# Patient Record
Sex: MALE | Race: WHITE | Employment: OTHER | URBAN - METROPOLITAN AREA
[De-identification: names, ages, dates, MRNs, and addresses within clinical notes are randomized per-mention and may not be internally consistent; named-entity substitution may affect disease eponyms.]

---

## 2018-04-19 ENCOUNTER — TRANSFERRED RECORDS (OUTPATIENT)
Dept: HEALTH INFORMATION MANAGEMENT | Facility: CLINIC | Age: 68
End: 2018-04-19

## 2018-05-09 ENCOUNTER — TRANSFERRED RECORDS (OUTPATIENT)
Dept: HEALTH INFORMATION MANAGEMENT | Facility: CLINIC | Age: 68
End: 2018-05-09

## 2018-07-26 ENCOUNTER — TRANSFERRED RECORDS (OUTPATIENT)
Dept: HEALTH INFORMATION MANAGEMENT | Facility: CLINIC | Age: 68
End: 2018-07-26

## 2018-07-30 ENCOUNTER — TRANSFERRED RECORDS (OUTPATIENT)
Dept: HEALTH INFORMATION MANAGEMENT | Facility: CLINIC | Age: 68
End: 2018-07-30

## 2018-08-06 ENCOUNTER — APPOINTMENT (OUTPATIENT)
Dept: GENERAL RADIOLOGY | Facility: CLINIC | Age: 68
DRG: 219 | End: 2018-08-06
Attending: INTERNAL MEDICINE
Payer: MEDICARE

## 2018-08-06 ENCOUNTER — HOSPITAL ENCOUNTER (INPATIENT)
Facility: CLINIC | Age: 68
LOS: 36 days | Discharge: NURSING, REHAB OR LONG-TERM CARE | DRG: 219 | End: 2018-09-11
Attending: INTERNAL MEDICINE | Admitting: RADIOLOGY
Payer: MEDICARE

## 2018-08-06 ENCOUNTER — APPOINTMENT (OUTPATIENT)
Dept: CARDIOLOGY | Facility: CLINIC | Age: 68
DRG: 219 | End: 2018-08-06
Attending: INTERNAL MEDICINE
Payer: MEDICARE

## 2018-08-06 DIAGNOSIS — E03.9 HYPOTHYROIDISM, UNSPECIFIED TYPE: ICD-10-CM

## 2018-08-06 DIAGNOSIS — H04.123 DRY EYES: ICD-10-CM

## 2018-08-06 DIAGNOSIS — I48.0 PAROXYSMAL ATRIAL FIBRILLATION (H): ICD-10-CM

## 2018-08-06 DIAGNOSIS — Z95.4 S/P TVR (TRICUSPID VALVE REPLACEMENT): ICD-10-CM

## 2018-08-06 DIAGNOSIS — M62.830 BACK MUSCLE SPASM: ICD-10-CM

## 2018-08-06 DIAGNOSIS — I25.10 CORONARY ARTERY DISEASE INVOLVING NATIVE HEART WITHOUT ANGINA PECTORIS, UNSPECIFIED VESSEL OR LESION TYPE: ICD-10-CM

## 2018-08-06 DIAGNOSIS — D45 POLYCYTHEMIA VERA (H): ICD-10-CM

## 2018-08-06 DIAGNOSIS — J98.01 ACUTE BRONCHOSPASM: ICD-10-CM

## 2018-08-06 DIAGNOSIS — Z87.74 S/P VSD REPAIR: Primary | ICD-10-CM

## 2018-08-06 DIAGNOSIS — G89.18 ACUTE POST-OPERATIVE PAIN: ICD-10-CM

## 2018-08-06 LAB
ABO + RH BLD: NORMAL
ABO + RH BLD: NORMAL
ALBUMIN SERPL-MCNC: 2.3 G/DL (ref 3.4–5)
ALBUMIN SERPL-MCNC: 2.4 G/DL (ref 3.4–5)
ALBUMIN SERPL-MCNC: 2.6 G/DL (ref 3.4–5)
ALP SERPL-CCNC: 114 U/L (ref 40–150)
ALP SERPL-CCNC: 90 U/L (ref 40–150)
ALP SERPL-CCNC: 91 U/L (ref 40–150)
ALT SERPL W P-5'-P-CCNC: 23 U/L (ref 0–70)
ALT SERPL W P-5'-P-CCNC: 28 U/L (ref 0–70)
ALT SERPL W P-5'-P-CCNC: 30 U/L (ref 0–70)
ANION GAP SERPL CALCULATED.3IONS-SCNC: 10 MMOL/L (ref 3–14)
ANION GAP SERPL CALCULATED.3IONS-SCNC: 10 MMOL/L (ref 3–14)
ANION GAP SERPL CALCULATED.3IONS-SCNC: 13 MMOL/L (ref 3–14)
ANION GAP SERPL CALCULATED.3IONS-SCNC: 9 MMOL/L (ref 3–14)
AST SERPL W P-5'-P-CCNC: 77 U/L (ref 0–45)
AST SERPL W P-5'-P-CCNC: 86 U/L (ref 0–45)
AST SERPL W P-5'-P-CCNC: 95 U/L (ref 0–45)
BASE DEFICIT BLDV-SCNC: 4.5 MMOL/L
BASE DEFICIT BLDV-SCNC: 4.8 MMOL/L
BASE DEFICIT BLDV-SCNC: 5.1 MMOL/L
BASE DEFICIT BLDV-SCNC: 5.1 MMOL/L
BASE DEFICIT BLDV-SCNC: 5.6 MMOL/L
BASE DEFICIT BLDV-SCNC: 6.3 MMOL/L
BASE DEFICIT BLDV-SCNC: 6.5 MMOL/L
BASE DEFICIT BLDV-SCNC: 9.2 MMOL/L
BASOPHILS # BLD AUTO: 0 10E9/L (ref 0–0.2)
BASOPHILS NFR BLD AUTO: 0.1 %
BILIRUB DIRECT SERPL-MCNC: 0.1 MG/DL (ref 0–0.2)
BILIRUB SERPL-MCNC: 0.3 MG/DL (ref 0.2–1.3)
BILIRUB SERPL-MCNC: 0.4 MG/DL (ref 0.2–1.3)
BILIRUB SERPL-MCNC: 0.6 MG/DL (ref 0.2–1.3)
BLD GP AB SCN SERPL QL: NORMAL
BLOOD BANK CMNT PATIENT-IMP: NORMAL
BLOOD BANK CMNT PATIENT-IMP: NORMAL
BUN SERPL-MCNC: 15 MG/DL (ref 7–30)
BUN SERPL-MCNC: 15 MG/DL (ref 7–30)
BUN SERPL-MCNC: 17 MG/DL (ref 7–30)
BUN SERPL-MCNC: 22 MG/DL (ref 7–30)
CA-I BLD-MCNC: 4.1 MG/DL (ref 4.4–5.2)
CALCIUM SERPL-MCNC: 6.9 MG/DL (ref 8.5–10.1)
CALCIUM SERPL-MCNC: 7.1 MG/DL (ref 8.5–10.1)
CALCIUM SERPL-MCNC: 7.3 MG/DL (ref 8.5–10.1)
CALCIUM SERPL-MCNC: 7.6 MG/DL (ref 8.5–10.1)
CHLORIDE SERPL-SCNC: 102 MMOL/L (ref 94–109)
CHLORIDE SERPL-SCNC: 102 MMOL/L (ref 94–109)
CHLORIDE SERPL-SCNC: 104 MMOL/L (ref 94–109)
CHLORIDE SERPL-SCNC: 98 MMOL/L (ref 94–109)
CHOLEST SERPL-MCNC: 76 MG/DL
CO2 SERPL-SCNC: 16 MMOL/L (ref 20–32)
CO2 SERPL-SCNC: 17 MMOL/L (ref 20–32)
CO2 SERPL-SCNC: 19 MMOL/L (ref 20–32)
CO2 SERPL-SCNC: 21 MMOL/L (ref 20–32)
CREAT SERPL-MCNC: 0.91 MG/DL (ref 0.66–1.25)
CREAT SERPL-MCNC: 0.94 MG/DL (ref 0.66–1.25)
CREAT SERPL-MCNC: 0.96 MG/DL (ref 0.66–1.25)
CREAT SERPL-MCNC: 1.17 MG/DL (ref 0.66–1.25)
DIFFERENTIAL METHOD BLD: ABNORMAL
EOSINOPHIL # BLD AUTO: 0.1 10E9/L (ref 0–0.7)
EOSINOPHIL NFR BLD AUTO: 0.3 %
ERYTHROCYTE [DISTWIDTH] IN BLOOD BY AUTOMATED COUNT: 15.2 % (ref 10–15)
ERYTHROCYTE [DISTWIDTH] IN BLOOD BY AUTOMATED COUNT: 15.4 % (ref 10–15)
ERYTHROCYTE [DISTWIDTH] IN BLOOD BY AUTOMATED COUNT: 15.4 % (ref 10–15)
GFR SERPL CREATININE-BSD FRML MDRD: 62 ML/MIN/1.7M2
GFR SERPL CREATININE-BSD FRML MDRD: 78 ML/MIN/1.7M2
GFR SERPL CREATININE-BSD FRML MDRD: 79 ML/MIN/1.7M2
GFR SERPL CREATININE-BSD FRML MDRD: 83 ML/MIN/1.7M2
GLUCOSE BLDC GLUCOMTR-MCNC: 141 MG/DL (ref 70–99)
GLUCOSE SERPL-MCNC: 130 MG/DL (ref 70–99)
GLUCOSE SERPL-MCNC: 140 MG/DL (ref 70–99)
GLUCOSE SERPL-MCNC: 160 MG/DL (ref 70–99)
GLUCOSE SERPL-MCNC: 169 MG/DL (ref 70–99)
HBA1C MFR BLD: 6.3 % (ref 0–5.6)
HCO3 BLDV-SCNC: 18 MMOL/L (ref 21–28)
HCO3 BLDV-SCNC: 19 MMOL/L (ref 21–28)
HCO3 BLDV-SCNC: 20 MMOL/L (ref 21–28)
HCO3 BLDV-SCNC: 20 MMOL/L (ref 21–28)
HCT VFR BLD AUTO: 34.7 % (ref 40–53)
HCT VFR BLD AUTO: 34.8 % (ref 40–53)
HCT VFR BLD AUTO: 40.4 % (ref 40–53)
HDLC SERPL-MCNC: 30 MG/DL
HGB BLD-MCNC: 11.6 G/DL (ref 13.3–17.7)
HGB BLD-MCNC: 11.7 G/DL (ref 13.3–17.7)
HGB BLD-MCNC: 13.1 G/DL (ref 13.3–17.7)
IMM GRANULOCYTES # BLD: 0.1 10E9/L (ref 0–0.4)
IMM GRANULOCYTES NFR BLD: 0.4 %
INR PPP: 2.05 (ref 0.86–1.14)
LACTATE BLD-SCNC: 2.2 MMOL/L (ref 0.7–2)
LACTATE BLD-SCNC: 2.4 MMOL/L (ref 0.7–2)
LACTATE BLD-SCNC: 2.7 MMOL/L (ref 0.7–2)
LACTATE BLD-SCNC: 5.1 MMOL/L (ref 0.7–2)
LDLC SERPL CALC-MCNC: 34 MG/DL
LMWH PPP CHRO-ACNC: <0.1 IU/ML
LYMPHOCYTES # BLD AUTO: 1.5 10E9/L (ref 0.8–5.3)
LYMPHOCYTES NFR BLD AUTO: 6.7 %
MAGNESIUM SERPL-MCNC: 1.9 MG/DL (ref 1.6–2.3)
MAGNESIUM SERPL-MCNC: 2 MG/DL (ref 1.6–2.3)
MAGNESIUM SERPL-MCNC: 2.6 MG/DL (ref 1.6–2.3)
MAGNESIUM SERPL-MCNC: 2.7 MG/DL (ref 1.6–2.3)
MCH RBC QN AUTO: 30.4 PG (ref 26.5–33)
MCH RBC QN AUTO: 30.6 PG (ref 26.5–33)
MCH RBC QN AUTO: 30.8 PG (ref 26.5–33)
MCHC RBC AUTO-ENTMCNC: 32.4 G/DL (ref 31.5–36.5)
MCHC RBC AUTO-ENTMCNC: 33.4 G/DL (ref 31.5–36.5)
MCHC RBC AUTO-ENTMCNC: 33.6 G/DL (ref 31.5–36.5)
MCV RBC AUTO: 90 FL (ref 78–100)
MCV RBC AUTO: 92 FL (ref 78–100)
MCV RBC AUTO: 95 FL (ref 78–100)
MONOCYTES # BLD AUTO: 2.1 10E9/L (ref 0–1.3)
MONOCYTES NFR BLD AUTO: 9.4 %
MRSA DNA SPEC QL NAA+PROBE: NEGATIVE
NEUTROPHILS # BLD AUTO: 18.2 10E9/L (ref 1.6–8.3)
NEUTROPHILS NFR BLD AUTO: 83.1 %
NONHDLC SERPL-MCNC: 45 MG/DL
NRBC # BLD AUTO: 0 10*3/UL
NRBC BLD AUTO-RTO: 0 /100
O2/TOTAL GAS SETTING VFR VENT: ABNORMAL %
OXYHGB MFR BLDV: 43 %
OXYHGB MFR BLDV: 52 %
OXYHGB MFR BLDV: 53 %
OXYHGB MFR BLDV: 54 %
OXYHGB MFR BLDV: 55 %
OXYHGB MFR BLDV: 58 %
OXYHGB MFR BLDV: 91 %
PCO2 BLDV: 27 MM HG (ref 40–50)
PCO2 BLDV: 31 MM HG (ref 40–50)
PCO2 BLDV: 32 MM HG (ref 40–50)
PCO2 BLDV: 34 MM HG (ref 40–50)
PCO2 BLDV: 35 MM HG (ref 40–50)
PCO2 BLDV: 36 MM HG (ref 40–50)
PCO2 BLDV: 37 MM HG (ref 40–50)
PCO2 BLDV: 41 MM HG (ref 40–50)
PH BLDV: 7.24 PH (ref 7.32–7.43)
PH BLDV: 7.32 PH (ref 7.32–7.43)
PH BLDV: 7.36 PH (ref 7.32–7.43)
PH BLDV: 7.37 PH (ref 7.32–7.43)
PH BLDV: 7.39 PH (ref 7.32–7.43)
PH BLDV: 7.43 PH (ref 7.32–7.43)
PHOSPHATE SERPL-MCNC: 3.9 MG/DL (ref 2.5–4.5)
PLATELET # BLD AUTO: 614 10E9/L (ref 150–450)
PLATELET # BLD AUTO: 640 10E9/L (ref 150–450)
PLATELET # BLD AUTO: 833 10E9/L (ref 150–450)
PO2 BLDV: 22 MM HG (ref 25–47)
PO2 BLDV: 28 MM HG (ref 25–47)
PO2 BLDV: 32 MM HG (ref 25–47)
PO2 BLDV: 33 MM HG (ref 25–47)
PO2 BLDV: 34 MM HG (ref 25–47)
PO2 BLDV: 34 MM HG (ref 25–47)
PO2 BLDV: 35 MM HG (ref 25–47)
PO2 BLDV: 65 MM HG (ref 25–47)
POTASSIUM SERPL-SCNC: 4.2 MMOL/L (ref 3.4–5.3)
POTASSIUM SERPL-SCNC: 4.9 MMOL/L (ref 3.4–5.3)
POTASSIUM SERPL-SCNC: 4.9 MMOL/L (ref 3.4–5.3)
POTASSIUM SERPL-SCNC: 5.1 MMOL/L (ref 3.4–5.3)
PROCALCITONIN SERPL-MCNC: 0.1 NG/ML
PROT SERPL-MCNC: 6.1 G/DL (ref 6.8–8.8)
PROT SERPL-MCNC: 6.1 G/DL (ref 6.8–8.8)
PROT SERPL-MCNC: 6.7 G/DL (ref 6.8–8.8)
RBC # BLD AUTO: 3.79 10E12/L (ref 4.4–5.9)
RBC # BLD AUTO: 3.85 10E12/L (ref 4.4–5.9)
RBC # BLD AUTO: 4.26 10E12/L (ref 4.4–5.9)
SODIUM SERPL-SCNC: 127 MMOL/L (ref 133–144)
SODIUM SERPL-SCNC: 129 MMOL/L (ref 133–144)
SODIUM SERPL-SCNC: 132 MMOL/L (ref 133–144)
SODIUM SERPL-SCNC: 132 MMOL/L (ref 133–144)
SPECIMEN EXP DATE BLD: NORMAL
SPECIMEN SOURCE: NORMAL
TRIGL SERPL-MCNC: 58 MG/DL
TSH SERPL DL<=0.005 MIU/L-ACNC: 2.71 MU/L (ref 0.4–4)
WBC # BLD AUTO: 20 10E9/L (ref 4–11)
WBC # BLD AUTO: 21.9 10E9/L (ref 4–11)
WBC # BLD AUTO: 23 10E9/L (ref 4–11)

## 2018-08-06 PROCEDURE — 4A133B3 MONITORING OF ARTERIAL PRESSURE, PULMONARY, PERCUTANEOUS APPROACH: ICD-10-PCS | Performed by: INTERNAL MEDICINE

## 2018-08-06 PROCEDURE — 00000146 ZZHCL STATISTIC GLUCOSE BY METER IP

## 2018-08-06 PROCEDURE — 85027 COMPLETE CBC AUTOMATED: CPT | Performed by: INTERNAL MEDICINE

## 2018-08-06 PROCEDURE — A9270 NON-COVERED ITEM OR SERVICE: HCPCS | Mod: GY | Performed by: STUDENT IN AN ORGANIZED HEALTH CARE EDUCATION/TRAINING PROGRAM

## 2018-08-06 PROCEDURE — 40000076 ZZH STATISTIC IABP MONITORING

## 2018-08-06 PROCEDURE — 93306 TTE W/DOPPLER COMPLETE: CPT | Mod: 26 | Performed by: INTERNAL MEDICINE

## 2018-08-06 PROCEDURE — 25000128 H RX IP 250 OP 636: Performed by: STUDENT IN AN ORGANIZED HEALTH CARE EDUCATION/TRAINING PROGRAM

## 2018-08-06 PROCEDURE — 84100 ASSAY OF PHOSPHORUS: CPT | Performed by: STUDENT IN AN ORGANIZED HEALTH CARE EDUCATION/TRAINING PROGRAM

## 2018-08-06 PROCEDURE — 80076 HEPATIC FUNCTION PANEL: CPT | Performed by: STUDENT IN AN ORGANIZED HEALTH CARE EDUCATION/TRAINING PROGRAM

## 2018-08-06 PROCEDURE — A9270 NON-COVERED ITEM OR SERVICE: HCPCS | Mod: GY | Performed by: INTERNAL MEDICINE

## 2018-08-06 PROCEDURE — 25000132 ZZH RX MED GY IP 250 OP 250 PS 637: Performed by: INTERNAL MEDICINE

## 2018-08-06 PROCEDURE — 76000 FLUOROSCOPY <1 HR PHYS/QHP: CPT | Mod: TC

## 2018-08-06 PROCEDURE — 83735 ASSAY OF MAGNESIUM: CPT | Performed by: INTERNAL MEDICINE

## 2018-08-06 PROCEDURE — 85520 HEPARIN ASSAY: CPT | Performed by: STUDENT IN AN ORGANIZED HEALTH CARE EDUCATION/TRAINING PROGRAM

## 2018-08-06 PROCEDURE — 82803 BLOOD GASES ANY COMBINATION: CPT | Performed by: STUDENT IN AN ORGANIZED HEALTH CARE EDUCATION/TRAINING PROGRAM

## 2018-08-06 PROCEDURE — 40000275 ZZH STATISTIC RCP TIME EA 10 MIN

## 2018-08-06 PROCEDURE — 87640 STAPH A DNA AMP PROBE: CPT | Performed by: INTERNAL MEDICINE

## 2018-08-06 PROCEDURE — 83036 HEMOGLOBIN GLYCOSYLATED A1C: CPT | Performed by: STUDENT IN AN ORGANIZED HEALTH CARE EDUCATION/TRAINING PROGRAM

## 2018-08-06 PROCEDURE — 85027 COMPLETE CBC AUTOMATED: CPT | Performed by: STUDENT IN AN ORGANIZED HEALTH CARE EDUCATION/TRAINING PROGRAM

## 2018-08-06 PROCEDURE — 99292 CRITICAL CARE ADDL 30 MIN: CPT | Mod: GC | Performed by: INTERNAL MEDICINE

## 2018-08-06 PROCEDURE — 87641 MR-STAPH DNA AMP PROBE: CPT | Performed by: INTERNAL MEDICINE

## 2018-08-06 PROCEDURE — 20000004 ZZH R&B ICU UMMC

## 2018-08-06 PROCEDURE — 93005 ELECTROCARDIOGRAM TRACING: CPT

## 2018-08-06 PROCEDURE — 25000132 ZZH RX MED GY IP 250 OP 250 PS 637: Mod: GY | Performed by: STUDENT IN AN ORGANIZED HEALTH CARE EDUCATION/TRAINING PROGRAM

## 2018-08-06 PROCEDURE — 86850 RBC ANTIBODY SCREEN: CPT | Performed by: STUDENT IN AN ORGANIZED HEALTH CARE EDUCATION/TRAINING PROGRAM

## 2018-08-06 PROCEDURE — 99291 CRITICAL CARE FIRST HOUR: CPT | Mod: GC | Performed by: INTERNAL MEDICINE

## 2018-08-06 PROCEDURE — 25000125 ZZHC RX 250: Performed by: STUDENT IN AN ORGANIZED HEALTH CARE EDUCATION/TRAINING PROGRAM

## 2018-08-06 PROCEDURE — 80048 BASIC METABOLIC PNL TOTAL CA: CPT | Performed by: STUDENT IN AN ORGANIZED HEALTH CARE EDUCATION/TRAINING PROGRAM

## 2018-08-06 PROCEDURE — 40000048 ZZH STATISTIC DAILY SWAN MONITORING

## 2018-08-06 PROCEDURE — 40000281 ZZH STATISTIC TRANSPORT TIME EA 15 MIN

## 2018-08-06 PROCEDURE — 93503 INSERT/PLACE HEART CATHETER: CPT

## 2018-08-06 PROCEDURE — 80053 COMPREHEN METABOLIC PANEL: CPT | Performed by: STUDENT IN AN ORGANIZED HEALTH CARE EDUCATION/TRAINING PROGRAM

## 2018-08-06 PROCEDURE — 3E043XZ INTRODUCTION OF VASOPRESSOR INTO CENTRAL VEIN, PERCUTANEOUS APPROACH: ICD-10-PCS | Performed by: INTERNAL MEDICINE

## 2018-08-06 PROCEDURE — 85025 COMPLETE CBC W/AUTO DIFF WBC: CPT | Performed by: STUDENT IN AN ORGANIZED HEALTH CARE EDUCATION/TRAINING PROGRAM

## 2018-08-06 PROCEDURE — 25000132 ZZH RX MED GY IP 250 OP 250 PS 637: Performed by: STUDENT IN AN ORGANIZED HEALTH CARE EDUCATION/TRAINING PROGRAM

## 2018-08-06 PROCEDURE — 27210140 ZZH KIT CATH SWAN VIP SUPPLY

## 2018-08-06 PROCEDURE — 40000196 ZZH STATISTIC RAPCV CVP MONITORING

## 2018-08-06 PROCEDURE — 25000128 H RX IP 250 OP 636

## 2018-08-06 PROCEDURE — 93306 TTE W/DOPPLER COMPLETE: CPT

## 2018-08-06 PROCEDURE — 02HQ32Z INSERTION OF MONITORING DEVICE INTO RIGHT PULMONARY ARTERY, PERCUTANEOUS APPROACH: ICD-10-PCS | Performed by: INTERNAL MEDICINE

## 2018-08-06 PROCEDURE — 85610 PROTHROMBIN TIME: CPT | Performed by: INTERNAL MEDICINE

## 2018-08-06 PROCEDURE — 84145 PROCALCITONIN (PCT): CPT | Performed by: STUDENT IN AN ORGANIZED HEALTH CARE EDUCATION/TRAINING PROGRAM

## 2018-08-06 PROCEDURE — 86900 BLOOD TYPING SEROLOGIC ABO: CPT | Performed by: STUDENT IN AN ORGANIZED HEALTH CARE EDUCATION/TRAINING PROGRAM

## 2018-08-06 PROCEDURE — 02HV33Z INSERTION OF INFUSION DEVICE INTO SUPERIOR VENA CAVA, PERCUTANEOUS APPROACH: ICD-10-PCS | Performed by: RADIOLOGY

## 2018-08-06 PROCEDURE — 84443 ASSAY THYROID STIM HORMONE: CPT | Performed by: STUDENT IN AN ORGANIZED HEALTH CARE EDUCATION/TRAINING PROGRAM

## 2018-08-06 PROCEDURE — 83605 ASSAY OF LACTIC ACID: CPT | Performed by: STUDENT IN AN ORGANIZED HEALTH CARE EDUCATION/TRAINING PROGRAM

## 2018-08-06 PROCEDURE — 83735 ASSAY OF MAGNESIUM: CPT | Performed by: STUDENT IN AN ORGANIZED HEALTH CARE EDUCATION/TRAINING PROGRAM

## 2018-08-06 PROCEDURE — 25000128 H RX IP 250 OP 636: Performed by: INTERNAL MEDICINE

## 2018-08-06 PROCEDURE — 80061 LIPID PANEL: CPT | Performed by: INTERNAL MEDICINE

## 2018-08-06 PROCEDURE — 82805 BLOOD GASES W/O2 SATURATION: CPT | Performed by: STUDENT IN AN ORGANIZED HEALTH CARE EDUCATION/TRAINING PROGRAM

## 2018-08-06 PROCEDURE — 71045 X-RAY EXAM CHEST 1 VIEW: CPT

## 2018-08-06 PROCEDURE — 80048 BASIC METABOLIC PNL TOTAL CA: CPT | Performed by: INTERNAL MEDICINE

## 2018-08-06 PROCEDURE — 93010 ELECTROCARDIOGRAM REPORT: CPT | Performed by: INTERNAL MEDICINE

## 2018-08-06 PROCEDURE — 4A1239Z MONITORING OF CARDIAC OUTPUT, PERCUTANEOUS APPROACH: ICD-10-PCS | Performed by: INTERNAL MEDICINE

## 2018-08-06 PROCEDURE — C9460 INJECTION, CANGRELOR: HCPCS | Performed by: STUDENT IN AN ORGANIZED HEALTH CARE EDUCATION/TRAINING PROGRAM

## 2018-08-06 PROCEDURE — 86901 BLOOD TYPING SEROLOGIC RH(D): CPT | Performed by: STUDENT IN AN ORGANIZED HEALTH CARE EDUCATION/TRAINING PROGRAM

## 2018-08-06 PROCEDURE — 5A02210 ASSISTANCE WITH CARDIAC OUTPUT USING BALLOON PUMP, CONTINUOUS: ICD-10-PCS | Performed by: INTERNAL MEDICINE

## 2018-08-06 PROCEDURE — 82330 ASSAY OF CALCIUM: CPT | Performed by: STUDENT IN AN ORGANIZED HEALTH CARE EDUCATION/TRAINING PROGRAM

## 2018-08-06 RX ORDER — ATORVASTATIN CALCIUM 80 MG/1
80 TABLET, FILM COATED ORAL EVERY EVENING
Status: DISCONTINUED | OUTPATIENT
Start: 2018-08-06 | End: 2018-08-12

## 2018-08-06 RX ORDER — CLOPIDOGREL BISULFATE 75 MG/1
75 TABLET ORAL DAILY
Status: DISCONTINUED | OUTPATIENT
Start: 2018-08-06 | End: 2018-08-06

## 2018-08-06 RX ORDER — POTASSIUM CL/LIDO/0.9 % NACL 10MEQ/0.1L
10 INTRAVENOUS SOLUTION, PIGGYBACK (ML) INTRAVENOUS
Status: DISCONTINUED | OUTPATIENT
Start: 2018-08-06 | End: 2018-08-11

## 2018-08-06 RX ORDER — POTASSIUM CHLORIDE 1.5 G/1.58G
20-40 POWDER, FOR SOLUTION ORAL
Status: DISCONTINUED | OUTPATIENT
Start: 2018-08-06 | End: 2018-08-11

## 2018-08-06 RX ORDER — POTASSIUM CHLORIDE 29.8 MG/ML
20 INJECTION INTRAVENOUS
Status: DISCONTINUED | OUTPATIENT
Start: 2018-08-06 | End: 2018-08-06 | Stop reason: RX

## 2018-08-06 RX ORDER — LORAZEPAM 2 MG/ML
INJECTION INTRAMUSCULAR
Status: COMPLETED
Start: 2018-08-06 | End: 2018-08-06

## 2018-08-06 RX ORDER — LORAZEPAM 2 MG/ML
1 INJECTION INTRAMUSCULAR EVERY 5 MIN PRN
Status: COMPLETED | OUTPATIENT
Start: 2018-08-06 | End: 2018-08-06

## 2018-08-06 RX ORDER — POTASSIUM CHLORIDE 7.45 MG/ML
10 INJECTION INTRAVENOUS
Status: DISCONTINUED | OUTPATIENT
Start: 2018-08-06 | End: 2018-08-11

## 2018-08-06 RX ORDER — MAGNESIUM SULFATE HEPTAHYDRATE 40 MG/ML
2 INJECTION, SOLUTION INTRAVENOUS DAILY PRN
Status: DISCONTINUED | OUTPATIENT
Start: 2018-08-06 | End: 2018-08-14

## 2018-08-06 RX ORDER — LORAZEPAM 0.5 MG/1
0.5 TABLET ORAL 2 TIMES DAILY PRN
Status: DISCONTINUED | OUTPATIENT
Start: 2018-08-06 | End: 2018-08-10

## 2018-08-06 RX ORDER — HYDROMORPHONE HYDROCHLORIDE 1 MG/ML
INJECTION, SOLUTION INTRAMUSCULAR; INTRAVENOUS; SUBCUTANEOUS
Status: COMPLETED
Start: 2018-08-06 | End: 2018-08-06

## 2018-08-06 RX ORDER — MAGNESIUM SULFATE HEPTAHYDRATE 40 MG/ML
4 INJECTION, SOLUTION INTRAVENOUS EVERY 4 HOURS PRN
Status: DISCONTINUED | OUTPATIENT
Start: 2018-08-06 | End: 2018-08-11 | Stop reason: DRUGHIGH

## 2018-08-06 RX ORDER — HYDROMORPHONE HYDROCHLORIDE 1 MG/ML
.3-.5 INJECTION, SOLUTION INTRAMUSCULAR; INTRAVENOUS; SUBCUTANEOUS
Status: DISCONTINUED | OUTPATIENT
Start: 2018-08-06 | End: 2018-08-08

## 2018-08-06 RX ORDER — FUROSEMIDE 10 MG/ML
20 INJECTION INTRAMUSCULAR; INTRAVENOUS ONCE
Status: COMPLETED | OUTPATIENT
Start: 2018-08-06 | End: 2018-08-06

## 2018-08-06 RX ORDER — HEPARIN SODIUM 10000 [USP'U]/100ML
0-3500 INJECTION, SOLUTION INTRAVENOUS CONTINUOUS
Status: DISCONTINUED | OUTPATIENT
Start: 2018-08-06 | End: 2018-08-06

## 2018-08-06 RX ORDER — ASPIRIN 81 MG/1
81 TABLET, CHEWABLE ORAL DAILY
Status: DISCONTINUED | OUTPATIENT
Start: 2018-08-06 | End: 2018-08-10

## 2018-08-06 RX ORDER — NALOXONE HYDROCHLORIDE 0.4 MG/ML
.1-.4 INJECTION, SOLUTION INTRAMUSCULAR; INTRAVENOUS; SUBCUTANEOUS
Status: DISCONTINUED | OUTPATIENT
Start: 2018-08-06 | End: 2018-08-11

## 2018-08-06 RX ORDER — PANTOPRAZOLE SODIUM 40 MG/1
40 TABLET, DELAYED RELEASE ORAL
Status: DISCONTINUED | OUTPATIENT
Start: 2018-08-06 | End: 2018-08-10

## 2018-08-06 RX ORDER — POTASSIUM CHLORIDE 750 MG/1
20-40 TABLET, EXTENDED RELEASE ORAL
Status: DISCONTINUED | OUTPATIENT
Start: 2018-08-06 | End: 2018-08-11

## 2018-08-06 RX ADMIN — HEPARIN SODIUM 950 UNITS/HR: 10000 INJECTION, SOLUTION INTRAVENOUS at 08:24

## 2018-08-06 RX ADMIN — Medication 50 MEQ: at 02:27

## 2018-08-06 RX ADMIN — Medication 0.5 MG: at 18:56

## 2018-08-06 RX ADMIN — SODIUM BICARBONATE 50 MEQ: 84 INJECTION INTRAVENOUS at 02:27

## 2018-08-06 RX ADMIN — LORAZEPAM 1 MG: 2 INJECTION INTRAMUSCULAR; INTRAVENOUS at 03:48

## 2018-08-06 RX ADMIN — LORAZEPAM 1 MG: 2 INJECTION INTRAMUSCULAR; INTRAVENOUS at 03:15

## 2018-08-06 RX ADMIN — ATORVASTATIN CALCIUM 80 MG: 80 TABLET, FILM COATED ORAL at 20:26

## 2018-08-06 RX ADMIN — Medication 0.5 MG: at 03:48

## 2018-08-06 RX ADMIN — NOREPINEPHRINE BITARTRATE 0.3 MCG/KG/MIN: 1 INJECTION INTRAVENOUS at 02:30

## 2018-08-06 RX ADMIN — PANTOPRAZOLE SODIUM 40 MG: 40 TABLET, DELAYED RELEASE ORAL at 08:48

## 2018-08-06 RX ADMIN — FUROSEMIDE 20 MG: 10 INJECTION, SOLUTION INTRAVENOUS at 11:01

## 2018-08-06 RX ADMIN — ASPIRIN 81 MG CHEWABLE TABLET 81 MG: 81 TABLET CHEWABLE at 08:48

## 2018-08-06 RX ADMIN — DOBUTAMINE HYDROCHLORIDE 2.5 MCG/KG/MIN: 400 INJECTION INTRAVENOUS at 06:19

## 2018-08-06 RX ADMIN — CANGRELOR 0.75 MCG/KG/MIN: 50 INJECTION, POWDER, LYOPHILIZED, FOR SOLUTION INTRAVENOUS at 11:49

## 2018-08-06 RX ADMIN — LORAZEPAM 0.5 MG: 0.5 TABLET ORAL at 13:53

## 2018-08-06 RX ADMIN — MAGNESIUM SULFATE HEPTAHYDRATE 2 G: 40 INJECTION, SOLUTION INTRAVENOUS at 04:41

## 2018-08-06 RX ADMIN — Medication 0.5 MG: at 15:14

## 2018-08-06 ASSESSMENT — ACTIVITIES OF DAILY LIVING (ADL)
ADLS_ACUITY_SCORE: 18
ADLS_ACUITY_SCORE: 19
ADLS_ACUITY_SCORE: 18
ADLS_ACUITY_SCORE: 19
ADLS_ACUITY_SCORE: 19

## 2018-08-06 NOTE — IP AVS SNAPSHOT
` ` Patient Information     Patient Name Sex     Castillo De Anda ([id number]) Male 1950       Room Bed    6413 6413-02      Patient Demographics     Address Phone    [address] [facility] [address] [phone] (Home)      Patient Ethnicity & Race     Ethnic Group Patient Race    American White      Emergency Contact(s)     Name Relation Home Work Mobile    Radha De Anda Spouse [phone]        Documents on File        Status Date Received Description       Documents for the Patient    Affiliate Privacy placeholder   phase3    Consent for EHR Access Received 18     Insurance Card       Patient ID       [facility] Specified Other       Privacy Notice - [place] Received 18     Consent for Services - Hospital and Clinic Received 18     HIE Auth Received 18     Advance Directives and Living Will Received 18 Health Care Directive 18    Advance Directives and Living Will Not Received  Validation of AD 18       Documents for the Encounter    CMS IM for Patient Signature Received 18     EMS/Ambulance Record  18 [facility]    CE Point of Care Auth Received        Admission Information     Attending Provider Admitting Provider Admission Type Admission Date/Time    Jason Franco MD Jacobo, Maritza SERNA MD Urgent 18  0144    Discharge Date Hospital Service Auth/Cert Status Service Area     Cardiothoracic Surgery [facility]    Unit Room/Bed Admission Status       [facility] 6413/6413-02 Admission (Confirmed)       Admission     Complaint    cardiogenic shock, Cardiogenic shock (H), ventricular septal defect , ventricular septal defect , ventricular septal defect , Open Chest , GIB, Bleed      Hospital Account     Name Acct ID Class Status Primary Coverage    Castillo De Anda [id number] Inpatient Open MEDICARE - MEDICARE            Guarantor Account (for Hospital Account #[id number])     Name Relation to Pt Service  Area Active? Acct Type    Castillo De Anda Self FCS Yes Personal/Family    Address Phone          [address] [place] [address] [phone](H)              Coverage Information (for Hospital Account #[id number])     F/O Payor/Plan Precert #    MEDICARE/MEDICARE     Subscriber Subscriber #    Castillo De Anda [id number]    Address Phone    [employer] CLAIMS  [address]  [place] [address] [phone]

## 2018-08-06 NOTE — H&P
Camilo Hutchison M.D.  Cardiovascular Medicine  Critical Care    I personally saw and examined this patient, discussed care with housestaff and other consultants, reviewed current laboratories and imaging studies, and conveyed impression and diagnostic/therapeutic plan to patient.    Referring:  Jason Tran M.D  Pulmonary Critical Care  [facility]    Problem List  1. Post infarction VSD  2. ASHD    Right coronary occlusions treated with HUSSEIN   Proximal LAD stenosis treated with HUSSEIN  3. IABP  4. Myeloproliferative syndrome (P VERA)  5. Childhood tuberculosis  6. 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 Gallstones  8. Abnormal LFT c/w hepatic congestion  9. History of right heal fracture treated with surgery  10. History of prostate cancer  11. History of meningioma treated with surgery  12. Intolerance to non-hydroxy urea medication to treat P VERA/rash and epistaxis  13. Cardiogenic shock  14. Hypoproteinemia  15 Thrombocytosis    68 year old white male transferred from [facility] where he presented this evening with symptoms of confusion, chest pain and ECG with ST elevation suggestive of inferior myocardial infarction.  He underwent insertion of drug eluting Synergy stents in the mid and dist RCA and proximal LAD.  He was found by echocardiogram to have a VSD in the inferior apical position.   Shock was treated with vasopressors and IABP.   Risk factors for ASHD include: + family history, smoking, elevated cholesterol.  He has no history of palpitation, pre-syncope or syncope, PND, orthopnea, ankle edema.    He has a history of myeloproliferative syndrome treated initially with hydroxyurea and subsequently with another agent.  He has rash with shower, elevated platelet counts and previous history of TIA without reported existence of atrial fibrillation or atherosclerotic cerebrovascular disease.  He has undergone splenectomy as child for trauma.  He has been vaccinated for encapsulated organisms.      He fractured his  right heal in April and underwent surgical repair.    He has remote history of craniotomy for meningioma.    Allergies: none  Social:  with children, retired  Surgeries: see above  Smoking: quit remotely  ETOH : 2 oz/day but quit one month ago      Objective  BP (!) 88/64  Resp 12  SpO2 94%   .Constitutional: alert, oriented, normal gait and station, normal mentation.  Oral: moist mucous membranes  Lymph: without pathologic adenopathy  Chest: clear to ausculation and percussion, symmetric breath sounds  Cor: No evidence of left or right ventricular activity.  Rhythm is regular.  S1 normal, S2 split physiologically. Murmurs t present in parasternal position  Abdomen: without tenderness, rebound, guarding, masses, ascites  Extremities: Edema not present, IABP right groin, left femoral pulse present,  Neuro: no focal defects, normal mentation  Skin: without open lesions, pale vasoconstricted  Psych: oriented, verbal, mental status in tact    Meds: norepinephrine  Phenylephrine, Brillenta in catherization laboratory       Labs    Imaging : Bedside: VSD in inferior apical position,  RV enlarged by moving, IVC dilated      Assessment/Plan     Patient has cardiogenic shock that appears to being responsive to vasopressors and fluid with high mean arterial pressure and fortunately does require ECMO at this time.  Would like to wean back pressures, establish central hemodynamic monitoring, allow anti-platelet agents effects to resolve however, this poses threat to stent, especially LAD.  Brief echocardiographic examination does not appear the percutaneous closure with Amplatz will be specially feasible secondary to proximity to wall.    I discussed the risks and benefits of intubation, line placement, ECMO with his family and they consent.     Camilo Hutchison M.D.

## 2018-08-06 NOTE — PROGRESS NOTES
CV ICU PROGRESS NOTE  August 6, 2018      CO-MORBIDITIES:   Cardiogenic Shock   STEMI  S/p HUSSEIN to distal RCA & Proximal LAD (8/  Infraseptal VSD  Myeloproliferative Syndrome  TIA  S/p Splenectomy due to trauma      ASSESSMENT: Castillo De Anda is a 68 year old male s/p HUSSEIN to RCA and LAD for STEMI on 8/4 found to have post infarction VSD. Likely OR tomorrow (8/7) for repair.     TODAY'S PROGRESS:   -Potential arterial line placement  -Stable thus far  -TTE today  -NPO at midnight for potential OR tomorrow    PLAN:  Neuro/ pain/ sedation:  - Monitor neurological status. Notify the MD for any acute changes in exam.    Pulmonary care:   - Supplemental oxygen to keep saturation above 92 %.  - Incentive spirometer every 15- 30 minutes when awake.  - Monitor respiratory status    Cardiovascular:  #Post infarction 2.5cm infraseptal VSD  #s/p HUSSEIN to distal RCA and proximal LAD   #STEMI, cardiogenic shock  #Dilated RV   #Pulmonary HTN  - Monitor hemodynamic status.   - Infusions: Dobutamine 5mcg/kg/min  - Consider diuresis with low dose of lasix  - Obtain formal ECHO    Heme:  #Anticoagluation s/p Drug Eluting Stent placement  -Start Cangrelor    GI care:  #Transaminitis   - NPO at midnight    Fluids/ Electrolytes/ Nutrition:   - mIVF for IV fluid hydration  - Herring    Renal/ Fluid Balance:    - Urine output is adequate so far.  - Will continue to monitor intake and output.    Endocrine:  - Sliding scale for diabetes management when TF started    ID/ Antibiotics:  - No antibiotic needs currently  - Monitor fever curve, trend WBC      Prophylaxis:    - Mechanical prophylaxis for DVT.   - Anticoagulation with Cangrelor  - Protonix    Lines/ tubes/ drains:  - PIV, CVC    Disposition:  - CV ICU    Patient seen, findings and plan discussed with CV ICU staff, Dr. Porras.    Brad Hawkins MD  8/6/2018 9:15 AM  Anesthesia Resident  PGY4/CA-3      ====================================    SUBJECTIVE:   Overnight, arrived from OSH.  Evaluation with RV dilation and continued on pressors    OBJECTIVE:   1. VITAL SIGNS:   Temp:  [96  F (35.6  C)-99.3  F (37.4  C)] 96  F (35.6  C)  Heart Rate:  [] 97  Resp:  [12-20] 18  BP: ()/(22-96) 104/69  SpO2:  [89 %-100 %] 98 %  Resp: 18    2. INTAKE/ OUTPUT:   I/O last 3 completed shifts:  In: 386.4 [I.V.:386.4]  Out: 180 [Urine:180]    3. PHYSICAL EXAMINATION:   General: Alert, drowsy at times, lying flat due to invasive monitoring  Neuro: A&Ox3, NAD  Resp: Breathing non-labored on nasal cannula  CV: RRR  Abdomen: Soft, Non-distended, Non-tender  Incisions: Right central line internal jugular,   Extremities: warm and well perfused    4. INVESTIGATIONS:   Arterial Blood Gases   No lab results found in last 7 days.  Complete Blood Count     Recent Labs  Lab 08/06/18  0800 08/06/18  0218   WBC 20.0* 21.9*   HGB 11.6* 13.1*   * 640*     Basic Metabolic Panel    Recent Labs  Lab 08/06/18  0330 08/06/18  0218   * 127*   POTASSIUM 5.1 4.9   CHLORIDE 102 98   CO2 17* 16*   BUN 15 15   CR 0.91 0.94   * 160*     Liver Function Tests    Recent Labs  Lab 08/06/18  0330 08/06/18  0218   AST  --  77*   ALT  --  23   ALKPHOS  --  114   BILITOTAL  --  0.6   ALBUMIN  --  2.6*   INR 2.05*  --      Pancreatic Enzymes  No lab results found in last 7 days.  Coagulation Profile    Recent Labs  Lab 08/06/18  0330   INR 2.05*         5. RADIOLOGY:   Recent Results (from the past 24 hour(s))   XR Fluoro Port Time 0/1 Hour    Narrative    This exam was marked as non-reportable because it will not be read by a   radiologist or a [place] non-radiologist provider.             XR Chest Port 1 View    Narrative    Exam:  XR CHEST PORT 1 VW, 8/6/2018 5:18 AM    History: Congestive Heart Failure (CHF);     Comparison:  None available.    Findings:  Single AP view of the chest. Right IJ [place]-Owen catheter  tip projects over the mid right pulmonary artery. Intra-aortic balloon  pump tip projects approximately  2 cm below the aortic arch. Surgical  clips in the mediastinum and neck base.    Cardiac silhouette is enlarged. Mild perihilar and interstitial  opacities. Small left pleural effusion and dense left basilar  opacities. No pneumothorax. Visualized upper abdomen and bones are  unremarkable.      Impression    Impression:    1. Small left pleural effusion and left retrocardiac atelectasis  and/or consolidation.  2. Mild perihilar and interstitial opacities favored to represent  pulmonary edema.    I have personally reviewed the examination and initial interpretation  and I agree with the findings.    ALBERTO PERSAUD MD       =========================================

## 2018-08-06 NOTE — PROGRESS NOTES
"CLINICAL NUTRITION SERVICES - ASSESSMENT NOTE     Nutrition Prescription    RECOMMENDATIONS FOR MDs/PROVIDERS TO ORDER:  None at this time     Malnutrition Status:    Severe malnutrition in the context of acute on chronic illness     Recommendations already ordered by Registered Dietitian (RD):  None at this time     Future/Additional Recommendations:  Pending medical course, if TF indicated, would recommend:   Impact Peptide @ goal 60 ml/hr (1440 ml/day) to provide 2160 kcals (28 kcal/kg/day), 135 g PRO (1.8 g/kg/day), 1109 ml free H2O, 92 g Fat (50% from MCTs), 202 g CHO and no Fiber daily.  -vs-  (if renal formula indicated) Nepro @ goal 55 ml/hr (1320 ml/day) to provide 2376 kcals (31 kcal/kg/day), 107 g PRO (1.4 g/kg/day), 964 ml free H2O, 213 g CHO and 17 g Fiber daily.     REASON FOR ASSESSMENT  Castillo De Anda is a 68 year old male assessed by the dietitian for Interdisciplinary Rounds    NUTRITION HISTORY  Pt reports eating at baseline prior to admission. In the past, he has been told by his doctor to increase sodium intake though pt was unable to specify why exactly. For this reason, he includes higher-salt foods regularly (e.g. Cheese, potato chips).     CURRENT NUTRITION ORDERS  Diet: 2 g Sodium  Intake/Tolerance: Pt ate a few bites of applesauce this morning      LABS  Na 132 (L)     Total cholesterol 76   HDL cholesterol 30 (L)    LDL cholesterol 34  Trig 58     MEDICATIONS  Medications reviewed    ANTHROPOMETRICS  Height: 175.3 cm (5' 9\")  Most Recent Weight: 77.3 kg (170 lb 6.7 oz)    IBW: 72.7 kg  BMI: Overweight BMI 25-29.9  Weight History:  Reports weighing ~180 lbs last fall (wt loss does not meet criteria).  Wt Readings from Last 10 Encounters:   08/06/18 77.3 kg (170 lb 6.7 oz)       Dosing Weight: 77 kg (actual, based on admit wt of 77.3 kg on 8/6/18)     ASSESSED NUTRITION NEEDS  Estimated Energy Needs: 1925 - 2310 kcals/day (25 - 30 kcals/kg)  Justification: Maintenance  Estimated Protein " Needs: 92 - 116+ grams protein/day (1.2 - 1.5+ grams of pro/kg)  Justification: Increased needs  Estimated Fluid Needs: 1 mL/kcal  Justification: Maintenance    PHYSICAL FINDINGS  See malnutrition section below.    MALNUTRITION  % Intake: Decreased intake does not meet criteria  % Weight Loss: Weight loss does not meet criteria  Subcutaneous Fat Loss: Facial region:, Upper arm: and Lower arm: Moderate  Muscle Loss: Temporal, Facial & jaw region, Scapular bone, Thoracic region (clavicle, acromium bone, deltoid, trapezius, pectoral): Moderate, Upper arm (bicep, tricep), Lower arm  (forearm): Moderate/severe, Patellar region and Posterior calf: Moderate -- May be r/t to sarcopenia   Fluid Accumulation/Edema: None noted  Malnutrition Diagnosis: Severe malnutrition in the context of acute on chronic illness     NUTRITION DIAGNOSIS  Predicted inadequate nutrient intake (kcal/protein) related to poor PO intake since admission as evidenced by ate only a few bites of applesauce today, but potential to change pending medical course       INTERVENTIONS  Implementation  Nutrition Education - Provided education on a heart-healthy diet with emphasis on low-sodium foods.   Enteral nutrition - Recommendations     Goals  Patient to consume % of nutritionally adequate meal trays TID, or the equivalent with supplements/snacks.     Monitoring/Evaluation  Progress toward goals will be monitored and evaluated per protocol.    Lou Clark RD, LD  ICU Pager: [phone]

## 2018-08-06 NOTE — PLAN OF CARE
Problem: Patient Care Overview  Goal: Plan of Care/Patient Progress Review    Pt admitted from [facility] w/ STEMI, VSD, cardiogenic shock. Pt arrived on levo and ricardo gtts. Ricardo stopped per Dr. Porras, levo weaned gradually overnight. [place] placed at bedside. Pt became agitated and anxious and was given 1 mg ativan x2 doses and 0.5 mg dilaudid. IABP in place 1:1 100%. Monitor shows SR 90s w/ freq PACs. Difficulty to obtain accurate O2 sats, but has been requiring 12-15L per oxiplus mask. Hemodynamics worsened overnight, CVP 13-14 PA 42/24 SvO2 54 ->43 SVR 3390-9037 CO 2.4-3 CI 1.2-1.5. Skin is cool w/ barely palpable pulses. Dobutamine gtt started. Herring in place w/ urine output ~20ml/hr. Pt oriented x4 upon arrival but this am is confused and oriented only to self. Awaiting plan of care and surgical consult. Continue to monitor, notify team w/ changes.

## 2018-08-06 NOTE — IP AVS SNAPSHOT
` `     UNIT [address] [facility] BANK: [phone]            Medication Administration Report for Castillo De Anda as of 18 1544   Legend:    Given Hold Not Given Due Canceled Entry Other Actions    Time Time (Time) Time  Time-Action       Inactive    Active    Linked        Medications 09/05/18 09/06/18 09/07/18 09/08/18 09/09/18 09/10/18 09/11/18    acetaminophen (TYLENOL) tablet 650 mg  Dose: 650 mg  Freq: EVERY 4 HOURS PRN Route: ORAL OR FEED  PRN Reason: other  PRN Comment: multimodal surgical pain management along with NSAIDS and opioid medication as indicated based on pain control and physical function.  Start: 18 1729   Admin Instructions: May give first dose 4 hours after last scheduled dose of acetaminophen.  Maximum acetaminophen dose from all sources = 75 mg/kg/day not to exceed 4 grams/day.    Admin. Amount: 2 tablet (2 × 325 mg tablet)  Last Admin: 18 0636  Dispense Loc: [facility] ADS 6C1  POC: Post-procedure       1329 (650 mg)-Given        1939 (650 mg)-Given        0636 (650 mg)-Given             albuterol neb solution 2.5 mg  Dose: 2.5 mg  Freq: EVERY 4 HOURS PRN Route: NEBULIZATION  PRN Reason: wheezing  Start: 18 1338   Admin. Amount: 2.5 mg = 3 mL Conc: 2.5 mg/3 mL  Last Admin: 18 1425  Dispense Loc: [facility] ADS 6C1  Volume: 3 mL      1425 (2.5 mg)-Given                amiodarone (PACERONE/CODARONE) tablet 200 mg  Dose: 200 mg  Freq: DAILY Route: PO  Start: 18   End: 18 1379   Admin Instructions: Avoid grapefruit juice during oral amiodarone treatment.    Admin. Amount: 1 tablet (1 × 200 mg tablet)  Last Admin: 18 09  Dispense Loc: [facility] ADS 6C1  Administrations Remainin     0848 (200 mg)-Given        0828 (200 mg)-Given        0904 (200 mg)-Given        0810 (200 mg)-Given        0952 (200 mg)-Given        0817 (200 mg)-Given        0911 (200 mg)-Given           atorvastatin (LIPITOR) tablet 80 mg  Dose: 80 mg  Freq: EVERY EVENING Route:  ORAL OR FEED  Start: 09/05/18 2000   Admin. Amount: 1 tablet (1 × 80 mg tablet)  Last Admin: 09/10/18 2051  Dispense Loc: [facility] ADS 6C1     1925 (80 mg)-Given        2123 (80 mg)-Given        2021 (80 mg)-Given        1939 (80 mg)-Given        1926 (80 mg)-Given        2051 (80 mg)-Given        [ ] 2000           benzocaine-menthol (CEPACOL) 15-3.6 MG lozenge 1 lozenge  Dose: 1 lozenge  Freq: EVERY 1 HOUR PRN Route: BU  PRN Reason: sore throat  Start: 08/08/18 1733   Admin. Amount: 1 lozenge  Last Admin: 08/08/18 1805  Dispense Loc: [facility] ADS 6C1               bisacodyl (DULCOLAX) Suppository 10 mg  Dose: 10 mg  Freq: DAILY PRN Route: RE  PRN Reason: constipation  Start: 09/09/18 1238   Admin. Amount: 1 suppository (1 × 10 mg suppository)  Dispense Loc: [facility] ADS 6C1               calcium gluconate 1 g in D5W 100 mL intermittent infusion  Dose: 1 g  Freq: CONTINUOUS PRN Route: IV  PRN Reason: calcium supplementation  PRN Comment: For ionized calcium less than 4.5  Start: 08/12/18 2030   Admin. Amount: 1 g  Last Admin: 09/11/18 0932  Dispense Loc: [facility]  Infused Over: 60 Minutes  Volume: 100 mL   Mixture Administration Information:   Medication Type Amount   calcium gluconate 10 % SOLN Medications 1 g   D5W 5 % SOLN Base 100 mL               2024 (1 g)-New Bag [C]        1946 (1 g)-New Bag [C]        2000 (1 g)-New Bag        1616 (1 g)-New Bag        0932 (1 g)-New Bag           Carboxymethylcellulose Sod PF (REFRESH PLUS) 0.5 % ophthalmic solution 1 drop  Dose: 1 drop  Freq: 3 TIMES DAILY PRN Route: Both Eyes  PRN Reason: dry eyes  Start: 08/16/18 1228   Admin. Amount: 1 drop  Last Admin: 08/27/18 0927  Dispense Loc: [facility]               clopidogrel (PLAVIX) tablet 75 mg  Dose: 75 mg  Freq: DAILY Route: PO  Start: 08/16/18 0800   Admin. Amount: 1 tablet (1 × 75 mg tablet)  Last Admin: 09/11/18 0910  Dispense Loc: [facility] ADS 6C1     0848 (75 mg)-Given        0828 (75  mg)-Given        0904 (75 mg)-Given        0810 (75 mg)-Given        0952 (75 mg)-Given        0817 (75 mg)-Given        0910 (75 mg)-Given           glucose gel 15-30 g  Dose: 15-30 g  Freq: EVERY 15 MIN PRN Route: PO  PRN Reason: low blood sugar  Start: 08/15/18 1231   Admin Instructions: Give 15 g for BG 51 to 69 mg/dL IF patient is conscious and able to swallow. Give 30 g for BG less than or equal to 50 mg/dL IF patient is conscious and able to swallow. Do NOT give glucose gel via enteral tube.  IF patient has enteral tube: give apple juice 120 mL (4 oz or 15 g of CHO) via enteral tube for BG 51 to 69 mg/dL.  Give apple juice 240 mL (8 oz or 30 g of CHO) via enteral tube for BG less than or equal to 50 mg/dL.    ~Oral gel is preferable for conscious and able to swallow patient.   ~IF gel unavailable or patient refuses may provide apple juice 120 mL (4 oz or 15 g of CHO). Document juice on I and O flowsheet.    Admin. Amount: 15-30 g  Dispense Loc: [facility] ADS 6C1  Volume: 93.75 mL              Or  dextrose 50 % injection 25-50 mL  Dose: 25-50 mL  Freq: EVERY 15 MIN PRN Route: IV  PRN Reason: low blood sugar  Start: 08/15/18 1231   Admin Instructions: Use if have IV access, BG less than 70 mg/dL and meet dose criteria below:  Dose if conscious and alert (or disorientated) and NPO = 25 mL  Dose if unconscious / not alert = 50 mL  Vesicant. For ordered doses up to 25 g, give IV Push undiluted. Give each 5g over 1 minute.    Admin. Amount: 25-50 mL  Last Admin: 08/24/18 1652  Dispense Loc: [facility] ADS 6C1  Infused Over: 1-5 Minutes  Volume: 50 mL              Or  glucagon injection 1 mg  Dose: 1 mg  Freq: EVERY 15 MIN PRN Route: SC  PRN Reason: low blood sugar  PRN Comment: May repeat x 1 only  Start: 08/15/18 1231   Admin Instructions: May give SQ or IM. ONLY use glucagon IF patient has NO IV access AND is UNABLE to swallow AND blood glucose is LESS than or EQUAL to 50 mg/dL.  If ordered IV, give IV Push over 1  minute. Reconstitute with 1mL sterile water.    Admin. Amount: 1 mg  Dispense Loc: [facility] ADS 6C1               heparin lock flush 10 UNIT/ML injection 5-10 mL  Dose: 5-10 mL  Freq: EVERY 1 HOUR PRN Route: IK  PRN Reason: other  PRN Comment: to lock each CVC - Open Ended (Tunneled and Non-Tunneled) dormant lumen.  Start: 08/22/18 1312   Admin Instructions: MAX: 5 mL per lumen.    Admin. Amount: 5-10 mL  Last Admin: 09/11/18 0523  Dispense Loc: [facility] ADS 6C1  Volume: 10 mL      0543 (5 mL)-Given        0649 (5 mL)-Given       2142 (10 mL)-Given        2117 (10 mL)-Given        0634 (5 mL)-Given       2048 (10 mL)-Given        0609 (5 mL)-Given        0523 (5 mL)-Given           heparin lock flush 10 UNIT/ML injection 5-10 mL  Dose: 5-10 mL  Freq: EVERY 24 HOURS Route: IK  Start: 08/22/18 1400   Admin Instructions: To lock each CVC - Open Ended (Tunneled and Non-Tunneled) dormant lumen. Check PRN heparin flush order to see when last dose of PRN heparin was given before administering.  MAX: 5 mL per lumen..    Admin. Amount: 5-10 mL  Last Admin: 09/10/18 1616  Dispense Loc: [facility] ADS 6C1  Volume: 10 mL     1655 (5 mL)-Given        1558 (5 mL)-Given        1656 (5 mL)-Given        1600 (5 mL)-Given        1743 (10 mL)-Given        1616 (5 mL)-Given        (1519)-Not Given           hydrALAZINE (APRESOLINE) injection 10 mg  Dose: 10 mg  Freq: EVERY 30 MIN PRN Route: IV  PRN Reason: high blood pressure  PRN Comment: Systolic Blood Pressure greater than 140 mmHg or Diastolic Blood Pressure greater than 90 mmHg  Start: 08/10/18 2241   Admin Instructions: For ordered doses up to 40 mg, give IV Push undiluted over 1 minute.    Admin. Amount: 10 mg = 0.5 mL Conc: 20 mg/mL  Dispense Loc: [facility] ADS 6C1  Infused Over: 1-2 Minutes  Volume: 0.5 mL  POC: Post-procedure               HYDROmorphone (PF) (DILAUDID) injection 0.3-0.5 mg  Dose: 0.3-0.5 mg  Freq: EVERY 2 HOURS PRN Route: IV  PRN Reason: other  PRN Comment: pain  control or improvement in physical function. Hold dose for analgesic side effects.  Start: 08/10/18 2241   Admin Instructions: Start at the lowest dose.  May adjust dose by 0.1 mg every 2 hours as needed.   Notify provider to assess for uncontrolled pain or analgesic side effects.  Hold while on IV PCA or with regular IV opioid dosing.  For ordered doses up to 4 mg give IV Push undiluted. Administer each 2mg over 2-5 minutes.    Admin. Amount: 0.3-0.5 mg  Last Admin: 08/28/18 0923  Dispense Loc: [facility] ADS 6C1  POC: Post-procedure               hydroxyurea (HYDREA) capsule CHEMO 1,000 mg  Dose: 1,000 mg  Freq: DAILY AT 8PM Route: PO  Indications of Use: ESSENTIAL THROMBOCYTHEMIA  Start: 09/10/18 2000   Admin. Amount: 2 capsule (2 × 500 mg capsule)  Last Admin: 09/10/18 2049  Dispense Loc: [facility] ADS 6C1          2049 (1,000 mg)-Given        [ ] 2000           hydroxyurea (HYDREA) capsule CHEMO 1,500 mg  Dose: 1,500 mg  Freq: DAILY Route: PO  Indications of Use: ESSENTIAL THROMBOCYTHEMIA  Start: 09/11/18 0800   Admin. Amount: 3 capsule (3 × 500 mg capsule)  Last Admin: 09/11/18 0910  Dispense Loc: [facility] ADS 6C           0910 (1,500 mg)-Given           levothyroxine (SYNTHROID/LEVOTHROID) tablet 75 mcg  Dose: 75 mcg  Freq: DAILY Route: ORAL OR FEED  Start: 08/18/18 1700   Admin Instructions: Separate oral administration of iron- or calcium-containing products and levothyroxine by at least 4 hours.    Admin. Amount: 1 tablet (1 × 75 mcg tablet)  Last Admin: 09/11/18 0910  Dispense Loc: [facility] ADS 6C1     0848 (75 mcg)-Given        0826 (75 mcg)-Given        0904 (75 mcg)-Given        0810 (75 mcg)-Given        0952 (75 mcg)-Given        0816 (75 mcg)-Given        0910 (75 mcg)-Given           lidocaine (LMX4) cream  Freq: ONCE PRN Route: Top  PRN Reason: moderate pain  PRN Comment: for local anesthetic during PICC insertion  Start: 08/22/18 0944   Admin Instructions: Apply to PICC Insertion Site. Apply 30 minutes  "prior to procedure. MAX Dose: 2.5 gm  (1/2 of 5 gm tube)      Dispense Loc: [facility] ADS 6C1  Administrations Remainin               lidocaine 1 % 1 mL  Dose: 1 mL  Freq: EVERY 1 HOUR PRN Route: OTHER  PRN Comment: mild pain with VAD insertion or accessing implanted port  Start: 08/10/18 2241   Admin Instructions: Do NOT give if patient has a history of allergy to any local anesthetic or any \"dara\" product. MAX dose 1 mL subcutaneous OR intradermal in divided doses.    Admin. Amount: 1 mL  Dispense Loc: [facility] ADS 6C1  Volume: 2 mL  POC: Post-procedure               lidocaine 3% (non-sterile), phenylephrine 0.25% solution for irrigation  Dose: 5 mL  Freq: ONCE Route: IR  Start: 18 1130   Admin. Amount: 5 mL  Dispense Loc: [facility]  Administrations Remainin  Volume: 5 mL   Mixture Administration Information:   Medication Type Amount   lidocaine 4 % SOLN Medications 3.75 mL   phenylephrine 10 MG/ML SOLN Medications 1.25 mL                              magnesium sulfate 2 g in NS intermittent infusion ([facility] or [facility])  Dose: 2 g  Freq: DAILY PRN Route: IV  PRN Reason: magnesium supplementation  Last Dose: 2 g (18 1504)  Start: 08/10/18 2241   Admin Instructions: For Serum Mg++ 1.6 - 2 mg/dL  Give 2 g and recheck magnesium level next AM.    Admin. Amount: 2 g = 50 mL Conc: 2 g/50 mL  Last Admin: 18 1044  Dispense Loc: [facility]  Infused Over: 60 Minutes  Volume: 50 mL  POC: Post-procedure      1719 (2 g)-New Bag         1649 (2 g)-New Bag [C]         1206 (2 g)-New Bag        1044 (2 g)-New Bag           magnesium sulfate 4 g in 100 mL sterile water (premade)  Dose: 4 g  Freq: EVERY 4 HOURS PRN Route: IV  PRN Reason: magnesium supplementation  Start: 08/10/18 2241   Admin Instructions: For serum Mg++ less than 1.6 mg/dL  Give 4 g and recheck magnesium level 2 hours after dose, and next AM.    Admin. Amount: 4 g = 100 mL Conc: 4 g/100 mL  Dispense Loc: [facility] " ADS 6C1  Infused Over: 120 Minutes  Volume: 100 mL  POC: Post-procedure               May continue current IV fluids if patient has IV fluids infusing.  Freq: CONTINUOUS PRN Route: XX  Start: 08/24/18 1734   Dispense Loc: [facility]  POC: Post-procedure               melatonin tablet 1 mg  Dose: 1 mg  Freq: AT BEDTIME PRN Route: PO  PRN Reason: sleep  Start: 08/11/18 2000   Admin Instructions: POD 1.  Do not give unless at least 6 hours of uninterrupted sleep is expected.    Admin. Amount: 1 tablet (1 × 1 mg tablet)  Dispense Loc: [facility] ADS 6C1  POC: Post-procedure               methocarbamol (ROBAXIN) tablet 500 mg  Dose: 500 mg  Freq: 4 TIMES DAILY PRN Route: ORAL OR FEED  PRN Reason: muscle spasms  Start: 08/13/18 1730   Admin Instructions: Hold for sedation.    Admin. Amount: 1 tablet (1 × 500 mg tablet)  Dispense Loc: [facility] ADS 6C1  POC: Post-procedure               metoprolol (LOPRESSOR) injection 2.5 mg  Dose: 2.5 mg  Freq: EVERY 4 HOURS PRN Route: IV  PRN Reason: other  PRN Comment: For sustained HR>140  Start: 08/23/18 1545   Admin Instructions: For ordered doses up to 15 mg, give IV Push undiluted over 5-10 minutes.    Admin. Amount: 2.5 mg = 2.5 mL Conc: 1 mg/mL  Last Admin: 08/25/18 1022  Dispense Loc: [facility] ADS 6C1  Infused Over: 5-10 Minutes  Volume: 2.5 mL               metoprolol tartrate (LOPRESSOR) half-tab 12.5 mg  Dose: 12.5 mg  Freq: 2 TIMES DAILY Route: PO  Start: 08/31/18 1115   Admin Instructions: Hold for MAPS less than 65 or HR less than 60.    Admin. Amount: 1 half-tab (1 × 12.5 mg half-tab)  Last Admin: 09/11/18 0910  Dispense Loc: [facility] ADS 6C1     0848 (12.5 mg)-Given       1925 (12.5 mg)-Given [C]        0828 (12.5 mg)-Given       2123 (12.5 mg)-Given        0904 (12.5 mg)-Given       2021 (12.5 mg)-Given        0810 (12.5 mg)-Given       1939 (12.5 mg)-Given        0952 (12.5 mg)-Given       1926 (12.5 mg)-Given        0816 (12.5 mg)-Given       2051 (12.5  mg)-Given        0910 (12.5 mg)-Given       [ ] 2000           multivitamin, therapeutic with minerals (THERA-VIT-M) tablet 1 tablet  Dose: 1 tablet  Freq: DAILY Route: PO  Start: 09/08/18 0800   Admin. Amount: 1 tablet  Last Admin: 09/11/18 0910  Dispense Loc: [facility] ADS 6C1        0810 (1 tablet)-Given        0952 (1 tablet)-Given        0817 (1 tablet)-Given        0910 (1 tablet)-Given           naloxone (NARCAN) injection 0.1-0.4 mg  Dose: 0.1-0.4 mg  Freq: EVERY 2 MIN PRN Route: IV  PRN Reason: opioid reversal  Start: 08/10/18 2241   Admin Instructions: For respiratory rate LESS than or EQUAL to 8.  Partial reversal dose:  0.1 mg titrated q 2 minutes for Analgesia Side Effects Monitoring Sedation Level of 3 (frequently drowsy, arousable, drifts to sleep during conversation).Full reversal dose:  0.4 mg bolus for Analgesia Side Effects Monitoring Sedation Level of 4 (somnolent, minimal or no response to stimulation).  For ordered doses up to 2mg give IVP. Give each 0.4mg over 15 seconds in emergency situations. For non-emergent situations further dilute in 9mL of NS to facilitate titration of response.    Admin. Amount: 0.1-0.4 mg = 0.25-1 mL Conc: 0.4 mg/mL  Dispense Loc: [facility] ADS 6C1  Volume: 1 mL  POC: Post-procedure               ondansetron (ZOFRAN-ODT) ODT tab 4 mg  Dose: 4 mg  Freq: EVERY 6 HOURS PRN Route: PO  PRN Reasons: nausea,vomiting  Start: 08/10/18 2241   Admin Instructions: This is Step 1 of nausea and vomiting management.  If nausea not resolved in 15 minutes, go to Step 2 prochlorperazine (COMPAZINE). Do not push through foil backing. Peel back foil and gently remove. Place on tongue immediately. Administration with liquid unnecessary  With dry hands, peel back foil backing and gently remove tablet; do not push oral disintegrating tablet through foil backing; administer immediately on tongue and oral disintegrating tablet dissolves in seconds; then swallow with saliva; liquid not required.     Admin. Amount: 1 tablet (1 × 4 mg tablet)  Last Admin: 09/09/18 1226  Dispense Loc: [facility] ADS 6C1  POC: Post-procedure               1436 (4 mg)-Given        1226 (4 mg)-Given            Or  ondansetron (ZOFRAN) injection 4 mg  Dose: 4 mg  Freq: EVERY 6 HOURS PRN Route: IV  PRN Reasons: nausea,vomiting  Start: 08/10/18 2241   Admin Instructions: This is Step 1 of nausea and vomiting management.  If nausea not resolved in 15 minutes, go to Step 2 prochlorperazine (COMPAZINE).  Irritant. For ordered doses up to 4 mg, give IV Push undiluted over 2-5 minutes.    Admin. Amount: 4 mg = 2 mL Conc: 4 mg/2 mL  Last Admin: 09/07/18 1328  Dispense Loc: [facility] ADS 6C1  Infused Over: 2-5 Minutes  Volume: 2 mL  POC: Post-procedure       1328 (4 mg)-Given                             oxyCODONE IR (ROXICODONE) tablet 5-10 mg  Dose: 5-10 mg  Freq: EVERY 4 HOURS PRN Route: PER FEEDING   PRN Reason: other  PRN Comment: pain control or improvement in physical function. Hold dose for analgesic side effects.  Start: 08/13/18 1729   Admin Instructions: Start with the lowest dose. May adjust dose by 5 mg every 4 hours as needed. Notify provider to assess for uncontrolled pain or analgesic side effects. Hold while on PCA or with regular IV opioid dosing. Maximum total is 60 mg in 24 hours.    Admin. Amount: 1-2 tablet (1-2 × 5 mg tablet)  Last Admin: 09/08/18 2035  Dispense Loc: [facility] ADS 6C1  POC: Post-procedure        1156 (10 mg)-Given       2035 (5 mg)-Given              pantoprazole (PROTONIX) EC tablet 40 mg  Dose: 40 mg  Freq: 2 TIMES DAILY BEFORE MEALS Route: PO  Start: 08/27/18 1630   Admin Instructions: DO NOT CRUSH.    Admin. Amount: 1 tablet (1 × 40 mg tablet)  Last Admin: 09/11/18 1536  Dispense Loc: [facility] ADS 6C1     0848 (40 mg)-Given       1700 (40 mg)-Given        0827 (40 mg)-Given       1556 (40 mg)-Given        0904 (40 mg)-Given       1655 (40 mg)-Given        0810 (40 mg)-Given       1600 (40 mg)-Given         0952 (40 mg)-Given       1743 (40 mg)-Given        0817 (40 mg)-Given       1615 (40 mg)-Given        0910 (40 mg)-Given       1536 (40 mg)-Given           polyethylene glycol (MIRALAX/GLYCOLAX) Packet 17 g  Dose: 17 g  Freq: DAILY PRN Route: PO  PRN Reason: constipation  Start: 09/09/18 1238   Admin Instructions: 1 Packet = 17 grams. Mixed prescribed dose in 8 ounces of water. Follow with 8 oz. of water.    Admin. Amount: 17 g  Dispense Loc: [facility] ADS 6C1               potassium phosphate 15 mmol in D5W 250 mL intermittent infusion  Dose: 15 mmol  Freq: DAILY PRN Route: IV  PRN Reason: phosphorous supplementation  Start: 08/10/18 2241   Admin Instructions: For serum phosphorus level 2-2.4  Do not infuse Phosphorus in the same line as TPN.   Give 15 mmol and recheck phosphorus level next AM.  Each mmol of phosphate provides 1.47 mEq of Potassium. Multiply the patient's phosphate dose by 1.47 to determine the amount of potassium in this dose.    Admin. Amount: 15 mmol  Last Admin: 08/16/18 0528  Dispense Loc: [facility]  Infused Over: 4 Hours  Volume: 250 mL  POC: Post-procedure   Mixture Administration Information:   Medication Type Amount   potassium phosphate 3 MMOLE/ML SOLN Medications 15 mmol   D5W 5 % SOLN Base 250 mL                       potassium phosphate 20 mmol in D5W 250 mL intermittent infusion  Dose: 20 mmol  Freq: EVERY 6 HOURS PRN Route: IV  PRN Reason: phosphorous supplementation  Last Dose: 20 mmol (08/15/18 0540)  Start: 08/10/18 2241   Admin Instructions: For serum phosphorus level 1.1-1.9  For CENTRAL Line ONLY  Do not infuse Phosphorus in the same line as TPN.   Give 20 mmol and recheck phosphorus level 2 hours after last dose and next AM.  Each mmol of phosphate provides 1.47 mEq of Potassium. Multiply the patient's phosphate dose by 1.47 to determine the amount of potassium in this dose.    Admin. Amount: 20 mmol  Last Admin: 08/17/18 0753  Dispense Loc: [facility]  Infused Over: 4 Hours  Volume: 250 mL  POC: Post-procedure   Mixture Administration Information:   Medication Type Amount   potassium phosphate 3 MMOLE/ML SOLN Medications 20 mmol   D5W 5 % SOLN Base 250 mL                       potassium phosphate 20 mmol in D5W 500 mL intermittent infusion  Dose: 20 mmol  Freq: EVERY 6 HOURS PRN Route: IV  PRN Reason: phosphorous supplementation  Start: 08/10/18 2241   Admin Instructions: For serum phosphorus level 1.1-1.9  For Peripheral Line  Do not infuse Phosphorus in the same line as TPN.   Give 20 mmol and recheck phosphorus level 2 hours after last dose and next AM.  Each mmol of phosphate provides 1.47 mEq of Potassium. Multiply the patient's phosphate dose by 1.47 to determine the amount of potassium in this dose.    Admin. Amount: 20 mmol  Dispense Loc: [facility]  Infused Over: 4 Hours  Volume: 500 mL  POC: Post-procedure   Mixture Administration Information:   Medication Type Amount   potassium phosphate 3 MMOLE/ML SOLN Medications 20 mmol   D5W 5 % SOLN Base 500 mL                       potassium phosphate 25 mmol in D5W 500 mL intermittent infusion  Dose: 25 mmol  Freq: EVERY 8 HOURS PRN Route: IV  PRN Reason: phosphorous supplementation  Start: 08/10/18 2241   Admin Instructions: For serum phosphorus level less than 1.1  Do not infuse Phosphorus in the same line as TPN.   Give 25 mmol and recheck phosphorus level 2 hours after last dose and next AM.  Each mmol of phosphate provides 1.47 mEq of Potassium. Multiply the patient's phosphate dose by 1.47 to determine the amount of potassium in this dose.    Admin. Amount: 25 mmol  Dispense Loc: [facility]  Infused Over: 6 Hours  Volume: 500 mL  POC: Post-procedure   Mixture Administration Information:   Medication Type Amount   potassium phosphate 3 MMOLE/ML SOLN Medications 25 mmol   D5W 5 % SOLN Base 500 mL                       prochlorperazine (COMPAZINE) injection 5 mg  Dose: 5  mg  Freq: EVERY 6 HOURS PRN Route: IV  PRN Reasons: nausea,vomiting  Start: 08/10/18 2241   Admin Instructions: This is Step 2 of nausea and vomiting management.   If nausea not resolved in 15 minutes, give metoclopramide (REGLAN) if ordered (step 3 of nausea and vomiting management)  For ordered doses up to 10 mg, give IV Push undiluted. Each 5mg over 1 minute.    Admin. Amount: 5 mg = 1 mL Conc: 5 mg/mL  Dispense Loc: [facility] ADS 6C1  Infused Over: 1-2 Minutes  Volume: 2 mL  POC: Post-procedure              Or  prochlorperazine (COMPAZINE) tablet 5 mg  Dose: 5 mg  Freq: EVERY 6 HOURS PRN Route: PO  PRN Reasons: nausea,vomiting  Start: 08/10/18 2241   Admin Instructions: This is Step 2 of nausea and vomiting management.   If nausea not resolved in 15 minutes, give metoclopramide (REGLAN) if ordered (step 3 of nausea and vomiting management)    Admin. Amount: 1 tablet (1 × 5 mg tablet)  Dispense Loc: [facility] ADS 6C1  POC: Post-procedure               senna-docusate (SENOKOT-S;PERICOLACE) 8.6-50 MG per tablet 1 tablet  Dose: 1 tablet  Freq: 2 TIMES DAILY Route: ORAL OR FEED  Start: 08/13/18 2000   Admin Instructions: If no bowel movement in 24 hours, increase to 2 tablets PO.  Hold for loose stools.    Admin. Amount: 1 tablet  Last Admin: 09/10/18 2052  Dispense Loc: [facility] ADS 6C1  POC: Post-procedure     (0726)-Not Given       (1926)-Not Given        (0837)-Not Given       (2125)-Not Given        (0909)-Not Given       2021 (1 tablet)-Given               1941 (1 tablet)-Given                       (0815)-Not Given [C]       2052 (1 tablet)-Given        (0909)-Not Given [C]       [ ] 2000          Or  senna-docusate (SENOKOT-S;PERICOLACE) 8.6-50 MG per tablet 2 tablet  Dose: 2 tablet  Freq: 2 TIMES DAILY Route: ORAL OR FEED  Start: 08/13/18 2000   Admin Instructions: Hold for loose stools.    Admin. Amount: 2 tablet  Last Admin: 09/09/18 1927  Dispense Loc: [facility] ADS 6C1  POC: Post-procedure                                                   0816 (2 tablet)-Given               0953 (2 tablet)-Given       1927 (2 tablet)-Given                              [ ] 2000           simethicone (MYLICON) chewable tablet 80 mg  Dose: 80 mg  Freq: 4 TIMES DAILY PRN Route: PO  PRN Reason: cramping  Start: 09/09/18 1237   Admin. Amount: 1 tablet (1 × 80 mg tablet)  Last Admin: 09/09/18 1747  Dispense Loc: [facility] ADS 6C1         1747 (80 mg)-Given             sodium chloride (PF) 0.9% PF flush 10-20 mL  Dose: 10-20 mL  Freq: EVERY 1 HOUR PRN Route: IK  PRN Reasons: line flush,post meds or blood draw  Start: 08/22/18 1312   Admin Instructions: to flush CVC - Open Ended (Tunneled and Non-Tunneled).   10 mL post IV meds; 20 mL post blood draw.    Admin. Amount: 10-20 mL  Last Admin: 09/11/18 0523  Dispense Loc: [facility] Floor Stock  Volume: 20 mL      0543 (20 mL)-Given        0648 (20 mL)-Given         0635 (30 mL)-Given        1225 (20 mL)-Given        0523 (20 mL)-Given           sodium chloride (PF) 0.9% PF flush 3 mL  Dose: 3 mL  Freq: EVERY 1 MIN PRN Route: IV  PRN Reason: line flush  PRN Comment: after medication administration. For peripheral IV flush post IV meds  Start: 08/24/18 1734   Admin. Amount: 3 mL  Dispense Loc: [facility] Floor Stock  Volume: 3 mL  POC: Post-procedure               sodium chloride (PF) 0.9% PF flush 3 mL  Dose: 3 mL  Freq: EVERY 8 HOURS Route: IK  Start: 08/10/18 2245   Admin Instructions: And Q1H PRN, to lock peripheral IV dormant line.    Admin. Amount: 3 mL  Last Admin: 09/11/18 0914  Dispense Loc: [facility] Floor Stock  Volume: 3 mL  POC: Post-procedure     0000 (3 mL)-Given       0853 (3 mL)-Given       (1655)-Not Given [C]        (0002)-Not Given [C]       0833 (3 mL)-Given       1557 (3 mL)-Given        0000 (3 mL)-Given       0908 (3 mL)-Given       1656 (3 mL)-Given       (2308)-Not Given [C]        0818 (3 mL)-Given       1600 (3 mL)-Given               0953 (3 mL)-Given       1744 (3 mL)-Given                0819 (3 mL)-Given       1616 (3 mL)-Given       2322 (3 mL)-Given        0914 (3 mL)-Given       (1519)-Not Given           sodium chloride (PF) 0.9% PF flush 3 mL  Dose: 3 mL  Freq: EVERY 1 HOUR PRN Route: IK  PRN Reason: line flush  PRN Comment: for peripheral IV flush post IV meds  Start: 08/10/18 2241   Admin. Amount: 3 mL  Last Admin: 18 0333  Dispense Loc: [facility] Floor Stock  Volume: 3 mL  POC: Post-procedure               sodium chloride (PF) 0.9% PF flush 5-50 mL  Dose: 5-50 mL  Freq: ONCE PRN Route: IK  PRN Reason: line flush  PRN Comment: to flush each lumen with line placement  Start: 18 0944   Admin Instructions: May repeat x 1    Admin. Amount: 5-50 mL  Dispense Loc: [facility] Floor Stock  Administrations Remainin  Volume: 50 mL               umeclidinium-vilanterol (ANORO ELLIPTA) 62.5-25 MCG/INH oral inhaler 1 puff  Dose: 1 puff  Freq: DAILY Route: IN  Start: 18 1115   Admin Instructions: Therapeutic interchange for Combivent Respimat 1 puff QID.    Admin. Amount: 1 puff  Last Admin: 18 0910  Dispense Loc: [facility]     0847 (1 puff)-Given        0838 (1 puff)-Given        0911 (1 puff)-Given        0817 (1 puff)-Given        0951 (1 puff)-Given        0816 (1 puff)-Given        0910 (1 puff)-Given           Warfarin Therapy Reminder (Check START DATE - warfarin may be starting in the FUTURE)  Dose: 1 each  Freq: CONTINUOUS PRN Route: XX  Start: 18 1343   Admin Instructions: *Note to reorder warfarin daily*  Pharmacy Warfarin Dosing Service  Patient is on Warfarin Therapy - check for daily order    Admin. Amount: 1 each  Dispense Loc: [facility]              Future Medications  Medications 09/05/18 09/06/18 09/07/18 09/08/18 09/09/18 09/10/18 09/11/18       furosemide (LASIX) tablet 40 mg  Dose: 40 mg  Freq: DAILY Route: PO  Start: 18 0800   Admin. Amount: 1 tablet (1 × 40 mg tablet)  Dispense Loc: [facility] ADS 6C1                potassium chloride SA (K-DUR/KLOR-CON M) CR tablet 20 mEq  Dose: 20 mEq  Freq: DAILY Route: PO  Start: 18 0800   Admin Instructions: DO NOT CRUSH.    Admin. Amount: 2 tablet (2 × 10 mEq tablet)  Dispense Loc: [facility] ADS 6C              Completed Medications  Medications 09/05/18 09/06/18 09/07/18 09/08/18 09/09/18 09/10/18 09/11/18         Dose: 20 mg  Freq: ONCE Route: PO  Start: 18 0900   End: 18   Admin. Amount: 1 tablet (1 × 20 mg tablet)  Last Admin: 18  Dispense Loc: [facility] 6C  Administrations Remainin           0915 (20 mg)-Given             Dose: 4 mg  Freq: ONCE AT 6PM Route: PO  Start: 09/10/18 1800   End: 09/10/18 1745   Admin Instructions: Per pharmacy warfarin dosing service    Admin. Amount: 1 tablet (1 × 4 mg tablet)  Last Admin: 09/10/18 1745  Dispense Loc: [facility] 6C  Administrations Remainin          1745 (4 mg)-Given              Dose: 5 mg  Freq: ONCE AT 6PM Route: PO  Start: 18 1800   End: 18   Admin Instructions: Per pharmacy warfarin dosing service    Admin. Amount: 1 tablet (1 × 5 mg tablet)  Last Admin: 18  Dispense Loc: [facility] ADS 6C  Administrations Remainin           1536 (5 mg)-Given       [ ] 1800             Dose: 6 mg  Freq: ONCE AT 6PM Route: PO  Start: 18 1800   End: 18   Admin. Amount: 2 tablet (2 × 3 mg tablet)  Last Admin: 18  Dispense Loc: [facility] ADS 6C  Administrations Remainin         1743 (6 mg)-Given               Dose: 6 mg  Freq: ONCE AT 6PM Route: PO  Start: 18 1800   End: 18   Admin. Amount: 2 tablet (2 × 3 mg tablet)  Last Admin: 18  Disp[place] [facility] [place] [facility]  [place]ainin        1713 (6 mg)-Given             Discontinued Medications  Medications 09/05/18 09/06/18 09/07/18 09/08/18 09/09/18 09/10/18 09/11/18         Dose: 20 mg  Freq: DAILY Route: PO  Start: 18 111   End: 18 0858   Admin.  Amount: 1 tablet (1 × 20 mg tablet)  Last Admin: 09/10/18 0817  Dispense Loc: [facility] ADS 6C1     0848 (20 mg)-Given        0828 (20 mg)-Given        0904 (20 mg)-Given        0810 (20 mg)-Given        0952 (20 mg)-Given        0817 (20 mg)-Given        0858-Med Discontinued  (0908)-Not Given             Dose: 40 mg  Freq: 2 TIMES DAILY. Route: PO  Start: 09/11/18 1600   End: 09/11/18 1141   Admin. Amount: 1 tablet (1 × 40 mg tablet)  Dispense Loc: [facility] ADS 6C1           1141-Med Discontinued         Freq: HOLD Route: XX  Start: 09/06/18 1647   End: 09/09/18 1026   Order specific questions:  Medication(s) to hold: hold warfarin tonight  Parameter for hold (doses,days,conditions) : Hold NEXT  dose  Hours or days to hold med before/after procedure/surgery 1  Surgery or Procedure Date 9/7/2018     Dispense Loc: [facility]         1026-Med Discontinued           Dose: 1,500 mg  Freq: 2 TIMES DAILY Route: PO  Indications of Use: ESSENTIAL THROMBOCYTHEMIA  Start: 08/27/18 2000   End: 09/10/18 1437   Admin. Amount: 3 capsule (3 × 500 mg capsule)  Last Admin: 09/10/18 0816  Dispense Loc: [facility] ADS 6C1     0849 (1,500 mg)-Given       1926 (1,500 mg)-Given        0827 (1,500 mg)-Given       2123 (1,500 mg)-Given        0904 (1,500 mg)-Given       2021 (1,500 mg)-Given        0810 (1,500 mg)-Given       1940 (1,500 mg)-Given        0957 (1,500 mg)-Given       1927 (1,500 mg)-Given        0816 (1,500 mg)-Given       1437-Med Discontinued          Dose: 20-40 mEq  Freq: EVERY 2 HOURS PRN Route: ORAL OR FEED  PRN Reason: potassium supplementation  Start: 08/10/18 2241   End: 09/09/18 1026   Admin Instructions: Use if unable to tolerate tablets.    If Serum K+ 3.4-4.0, dose = 20 mEq x1. Recheck K+ level the next AM.  If Serum K+ 3.0-3.3, dose = 60 mEq po total dose (40 mEq x 1 followed in 2 hours by 20 mEq X1). Recheck K+ level 4 hours after dose and the next AM.  If Serum K+ 2.5-2.9, dose = 80 mEq po total dose  (40 mEq Q2H x2). Recheck K+ level 4 hours after dose and the next AM.  If Serum K+ less than 2.5, See IV order.  Dissolve packet contents in 4-8 ounces of cold water or juice.    Admin. Amount: 20-40 mEq  Last Admin: 08/25/18 0340  Dispense Loc: [facility] ADS 6C1  POC: Post-procedure         1026-Med Discontinued           Dose: 10 mEq  Freq: EVERY 1 HOUR PRN Route: IV  PRN Reason: potassium supplementation  Start: 08/10/18 2241   End: 09/09/18 1026   Admin Instructions: Infuse via PERIPHERAL LINE. Use potassium with lidocaine for pain with peripheral administration.  If Serum K+ 3.4-4.0, dose = 10 mEq/hr x2 doses. Recheck K+ level the next AM.  If Serum K+ 3.0-3.3, dose = 10 mEq/hr x4 doses (40 mEq IV total dose). Recheck K+ level 2 hours after dose and the next AM.  If Serum K+ less than 3.0, dose = 10 mEq/hr x6 doses (60 mEq IV total dose). Recheck K+ level 2 hours after dose and the next AM.    Admin. Amount: 10 mEq = 100 mL Conc: 10 mEq/100 mL  Dispense Loc: [facility]  Infused Over: 1 Hours  Volume: 100 mL  POC: Post-procedure         1026-Med Discontinued           Dose: 10 mEq  Freq: EVERY 1 HOUR PRN Route: IV  PRN Reason: potassium supplementation  Start: 08/10/18 2241   End: 09/09/18 1026   Admin Instructions: Infuse via PERIPHERAL LINE or CENTRAL LINE. Use for central line replacement if patient weight less than 65 kg, if patient is on TPN with high potassium content or if unit does not stock 20 mEq bags.  If Serum K+ 3.4-4.0, dose = 10 mEq/hr x2 doses. Recheck K+ level the next AM.  If Serum K+ 3.0-3.3, dose = 10 mEq/hr x4 doses (40 mEq IV total dose). Recheck K+ level 2 hours after dose and the next AM.  If Serum K+ less than 3.0, dose = 10 mEq/hr x6 doses (60 mEq IV total dose). Recheck K+ level 2 hours after dose and the next AM.    Admin. Amount: 10 mEq = 100 mL Conc: 10 mEq/100 mL  Dispense Loc: [facility]  Infused Over: 60 Minutes  Volume: 100 mL  POC: Post-procedure          1026-Med Discontinued           Dose: 20 mEq  Freq: EVERY 1 HOUR PRN Route: IV  PRN Reason: potassium supplementation  Last Dose: 20 mEq (08/17/18 0758)  Start: 08/10/18 2241   End: 09/09/18 1026   Admin Instructions: Infuse via CENTRAL LINE Only.  May need EKG if less than 65 kg or on TPN - Max rate is 0.3 mEq/kg/hr for patients not on EKG monitoring.    If Serum K+ 3.4-4.0, dose = 20 mEq/hr x1 doses. Recheck K+ level the next AM.  If Serum K+ 3.0-3.3, dose = 20 mEq/hr x2 doses (40 mEq IV total dose).  Recheck K+ level 2 hours after dose and the next AM.  If Serum K+ less than 3.0, dose = 20 mEq/hr x3 doses (60 mEq IV total dose). Recheck K+ level 2 hours after dose and the next AM.    Admin. Amount: 20 mEq = 50 mL Conc: 20 mEq/50 mL  Last Admin: 08/17/18 0758  Dispense Loc: [facility] ADS 6C1  Volume: 50 mL  POC: Post-procedure         1026-Med Discontinued           Dose: 10 mEq  Freq: DAILY Route: PO  Start: 08/31/18 1115   End: 09/11/18 1143   Admin Instructions: DO NOT CRUSH.    Admin. Amount: 1 tablet (1 × 10 mEq tablet)  Last Admin: 09/11/18 0910  Dispense Loc: [facility] ADS 6C1     0849 (10 mEq)-Given        0827 (10 mEq)-Given        0904 (10 mEq)-Given        0810 (10 mEq)-Given        0953 (10 mEq)-Given        0816 (10 mEq)-Given        0910 (10 mEq)-Given       1143-Med Discontinued         Dose: 20-40 mEq  Freq: EVERY 2 HOURS PRN Route: PO  PRN Reason: potassium supplementation  Start: 08/10/18 2241   End: 09/09/18 1026   Admin Instructions: Use if able to take PO.   If Serum K+ 3.4-4.0, dose = 20 mEq x1. Recheck K+ level the next AM.  If Serum K+ 3.0-3.3, dose = 60 mEq po total dose (40 mEq x1 followed in 2 hours by 20 mEq x1). Recheck K+ level 4 hours after dose and the next AM.  If Serum K+ 2.5-2.9, dose = 80 mEq po total dose (40 mEq Q2H x2). Recheck K+ level 4 hours after dose and the next AM.  If Serum K+ less than 2.5, See IV order.  DO NOT CRUSH.    Admin. Amount: 2-4 tablet (2-4 × 10 mEq  tablet)  Last Admin: 08/31/18 1055  Dispense Loc: [facility] ADS 6C1  POC: Post-procedure         1026-Med Discontinued           Dose: 12.5 mg  Freq: 2 TIMES DAILY PRN Route: ORAL OR FEED  PRN Comment: anxiety  Start: 08/30/18 1219   End: 09/09/18 1026   Admin. Amount: 1 half-tab (1 × 12.5 mg half-tab)  Last Admin: 09/05/18 2033  Dispense Loc: [facility] ADS 6C1     2033 (12.5 mg)-Given           1026-Med Discontinued           Freq: DOES NOT GO TO MAR Route: OTHER  PRN Reason: other  Start: 08/06/18 0148   End: 09/09/18 1026   Order specific questions:  Reason not prescribed: symptomatic hypotension     Dispense Loc: [facility]         1026-Med Discontinued           Freq: DOES NOT GO TO MAR Route: XX  PRN Reason: other  Start: 08/06/18 0148   End: 09/09/18 1026   Order specific questions:  Reason not prescribed: hypotension (SBP<90)     Dispense Loc: [facility]         1026-Med Discontinued           Freq: DOES NOT GO TO MAR Route: XX  PRN Reason: other  Start: 08/10/18 2241   End: 09/09/18 1026   Order specific questions:  Reason not prescribed: hypotension (SBP<90)     Dispense Loc: [facility]  POC: Post-procedure         1026-Med Discontinued      Medications 09/05/18 09/06/18 09/07/18 09/08/18 09/09/18 09/10/18 09/11/18

## 2018-08-06 NOTE — PROGRESS NOTES
Cardiology Progress Note    Assessment & Plan   67 yo M who presented with confusion chest pain and inferior STEMI s/p HUSSEIN to distal RCA and p LAD. Course complicated by inferoapical VSD    # Post infarction VSD   - c/b cardiogenic shock   -s/p IABP on dobutamine with stable CI    - amplatz likely not possible given the proximity to the wall- plan for possible OR in the am   - formal echo today will review with surgery and interventional   - gentle diuresis    - cangrelor given fresh stents     #Abnormal LFT c/w hepatic congestion    # CAD s/p inferoapical STEMI                        Right coronary occlusions treated with HUSSEIN                        Proximal LAD stenosis treated with HUSSEIN    Last dose of brillinta was 8/5 pm per discussion with CVTS who wants P2Y2 inhibitor off    # Myeloproliferative syndrome (P VERA)   -history of TIA   - non-hydroxy urea medication to treat P VERA/rash and epistaxis   -thrombocytosis    Chronic  - Childhood tuberculosis  - 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 Foster Romoal   Cardiology fellow, PGY-5    Interval History   HR in the 90's MAP 60-70's   SVO2's estimated from CVP dropping so dobutamine was added this am   On 12 LPM satting 100%  SCr stable INR 2          Physical Exam   Temp: 99.3  F (37.4  C) Temp src: Axillary BP: 90/68   Heart Rate: 92 Resp: 19 SpO2: 91 % O2 Device: Oxi Plus Oxygen Delivery: 12 LPM  Vitals:    08/06/18 0200   Weight: 77.3 kg (170 lb 6.7 oz)     Vital Signs with Ranges  Temp:  [97.9  F (36.6  C)-99.3  F (37.4  C)] 99.3  F (37.4  C)  Heart Rate:  [] 92  Resp:  [12-20] 19  BP: ()/(22-96) 90/68  SpO2:  [89 %-100 %] 91 %       Heart Rate: 92, Blood pressure 90/68,  "temperature 99.3  F (37.4  C), temperature source Axillary, resp. rate 19, height 1.753 m (5' 9\"), weight 77.3 kg (170 lb 6.7 oz), SpO2 91 %.  170 lbs 6.65 oz  GEN:  Alert, oriented x 3, appears comfortable, NAD.  CV:  Regular rate and rhythm, systolic murmur at apex  LUNGS:  Clear to auscultation bilaterally   ABD:  Active bowel sounds, soft, non-tender/non-distended.  No rebound/guarding/rigidity.  EXT:  No edema or cyanosis.      Medications     DOBUTamine 5 mcg/kg/min (08/06/18 0645)     norepinephrine 0.08 mcg/kg/min (08/06/18 0630)     Reason ACE/ARB/ARNI order not selected       Reason beta blocker order not selected         aspirin  81 mg Oral Daily     atorvastatin  80 mg Oral QPM       Data     Recent Labs  Lab 08/06/18  0330 08/06/18  0218   WBC  --  21.9*   HGB  --  13.1*   MCV  --  95   PLT  --  640*   INR 2.05*  --    * 127*   POTASSIUM 5.1 4.9   CHLORIDE 102 98   CO2 17* 16*   BUN 15 15   CR 0.91 0.94   ANIONGAP 10 13   GABRIELA 6.9* 7.3*   * 160*   ALBUMIN  --  2.6*   PROTTOTAL  --  6.7*   BILITOTAL  --  0.6   ALKPHOS  --  114   ALT  --  23   AST  --  77*       Recent Results (from the past 24 hour(s))   XR Fluoro Port Time 0/1 Hour    Narrative    This exam was marked as non-reportable because it will not be read by a   radiologist or a [place] non-radiologist provider.             XR Chest Port 1 View    Narrative    1. Small left pleural effusion and left retrocardiac atelectasis  and/or consolidation.  2. Mild perihilar and interstitial opacities favored to represent  pulmonary edema.     Camilo Hutchison  "

## 2018-08-06 NOTE — IP AVS SNAPSHOT
"    UNIT [address] [facility] BANK: [phone]                                              INTERAGENCY TRANSFER FORM - LAB / IMAGING / EKG / EMG RESULTS   2018                    Hospital Admission Date: 2018  JOSE SOLER   : 1950  Sex: Male        Attending Provider: Jason Franco MD     Allergies:  Anagrelide    Infection:  None   Service:  CARDIOTHORAC    Ht:  1.753 m (5' 9\")   Wt:  72.6 kg (160 lb)   Admission Wt:  77.3 kg (170 lb 6.7 oz)    BMI:  23.63 kg/m 2   BSA:  1.88 m 2            Patient PCP Information     Provider PCP Type    [facility]         Lab Results - 3 Days      Magnesium [[id number]]  Resulted: 18, Result status: Final result    Ordering provider: Miguel Hodge MD  09/10/18 2300 Resulting lab: [facility]    Specimen Information    Type Source Collected On   Blood  18          Components       Value Reference Range Flag Lab   Magnesium 2.0 1.6 - 2.3 mg/dL  51            Basic metabolic panel [[id number]] (Abnormal)  Resulted: 18, Result status: Final result    Ordering provider: Miguel Hodge MD  09/10/18 2300 Resulting lab: [facility]    Specimen Information    Type Source Collected On   Blood  18          Components       Value Reference Range Flag Lab   Sodium 131 133 - 144 mmol/L L 51   Potassium 4.1 3.4 - 5.3 mmol/L  51   Chloride 99 94 - 109 mmol/L  51   Carbon Dioxide 26 20 - 32 mmol/L  51   Anion Gap 6 3 - 14 mmol/L  51   Glucose 77 70 - 99 mg/dL  51   Urea Nitrogen 13 7 - 30 mg/dL  51   Creatinine 0.68 0.66 - 1.25 mg/dL  51   GFR Estimate >90 >60 mL/min/1.7m2  51   Comment:  Non  GFR Calc   GFR Estimate If Black >90 >60 mL/min/1.7m2  51   Comment:  African American GFR Calc   Calcium 7.6 8.5 - 10.1 mg/dL L 51            INR [[id number]] (Abnormal)  Resulted: 18, Result status: Final result    Ordering " provider: Phyllis Padron APRN CNP  09/10/18 2300 Resulting lab: [facility]    Specimen Information    Type Source Collected On   Blood  09/11/18 0526          Components       Value Reference Range Flag Lab   INR 2.08 0.86 - 1.14 H 51            Calcium ionized whole blood [[id number]] (Abnormal)  Resulted: 09/11/18 0600, Result status: Final result    Ordering provider: Jason Franco MD  09/10/18 2300 Resulting lab: [facility]    Specimen Information    Type Source Collected On   Blood  09/11/18 0526          Components       Value Reference Range Flag Lab   Calcium Ionized Whole Blood 4.3 4.4 - 5.2 mg/dL L 51            CBC (AM Draw) [[id number]] (Abnormal)  Resulted: 09/11/18 0557, Result status: Final result    Ordering provider: Miguel Hodge MD  09/10/18 2300 Resulting lab: [facility]    Specimen Information    Type Source Collected On   Blood  09/11/18 0526          Components       Value Reference Range Flag Lab   WBC 3.7 4.0 - 11.0 10e9/L L 51   RBC Count 2.44 4.4 - 5.9 10e12/L L 51   Hemoglobin 7.8 13.3 - 17.7 g/dL L 51   Hematocrit 24.4 40.0 - 53.0 % L 51    78 - 100 fl  51   MCH 32.0 26.5 - 33.0 pg  51   MCHC 32.0 31.5 - 36.5 g/dL  51   RDW 24.7 10.0 - 15.0 % H 51   Platelet Count 345 150 - 450 10e9/L  51            Calcium ionized whole blood [[id number]]  Resulted: 09/10/18 1318, Result status: Final result    Ordering provider: Jason Franco MD  09/10/18 1140 Resulting lab: [facility]    Specimen Information    Type Source Collected On   Blood  09/10/18 1231          Components       Value Reference Range Flag Lab   Calcium Ionized Whole Blood 4.4 4.4 - 5.2 mg/dL  51            Magnesium [[id number]]  Resulted: 09/10/18 0711, Result status: Final result    Ordering provider: Miguel Hodge MD  09/09/18 2300 Resulting lab: [facility]    Specimen Information    Type Source Collected On   Blood  09/10/18 0630          Components       Value Reference Range Flag Lab   Magnesium 1.9 1.6 - 2.3 mg/dL  51            Basic metabolic panel [[id number]] (Abnormal)  Resulted: 09/10/18 0711, Result status: Final result    Ordering provider: Miguel Hodge MD  09/09/18 2300 Resulting lab: [facility]    Specimen Information    Type Source Collected On   Blood  09/10/18 0630          Components       Value Reference Range Flag Lab   Sodium 131 133 - 144 mmol/L L 51   Potassium 4.1 3.4 - 5.3 mmol/L  51   Chloride 99 94 - 109 mmol/L  51   Carbon Dioxide 24 20 - 32 mmol/L  51   Anion Gap 8 3 - 14 mmol/L  51   Glucose 81 70 - 99 mg/dL  51   Urea Nitrogen 13 7 - 30 mg/dL  51   Creatinine 0.72 0.66 - 1.25 mg/dL  51   GFR Estimate >90 >60 mL/min/1.7m2  51   Comment:  Non  GFR Calc   GFR Estimate If Black >90 >60 mL/min/1.7m2  51   Comment:  African American GFR Calc   Calcium 7.8 8.5 - 10.1 mg/dL L 51            INR [[id number]] (Abnormal)  Resulted: 09/10/18 0707, Result status: Final result    Ordering provider: Phyllis Padron APRN CNP  09/09/18 2300 Resulting lab: [facility]    Specimen Information    Type Source Collected On   Blood  09/10/18 0630          Components       Value Reference Range Flag Lab   INR 1.90 0.86 - 1.14 H 51            CBC (AM Draw) [[id number]] (Abnormal)  Resulted: 09/10/18 0700, Result status: Final result    Ordering provider: Miguel Hodge MD  09/09/18 2300 Resulting lab: [facility]    Specimen Information    Type Source Collected On   Blood  09/10/18 0630          Components       Value Reference Range Flag Lab   WBC 3.8 4.0 - 11.0 10e9/L L 51   RBC Count 2.43 4.4 - 5.9 10e12/L L 51   Hemoglobin 7.7 13.3 - 17.7 g/dL L 51   Hematocrit 24.7 40.0 - 53.0 % L 51    78 - 100 fl H 51    MCH 31.7 26.5 - 33.0 pg  51   MCHC 31.2 31.5 - 36.5 g/dL L 51   RDW 24.7 10.0 - 15.0 % H 51   Platelet Count 405 150 - 450 10e9/L  51            Calcium ionized whole blood [[id number]]  Resulted: 09/09/18 1615, Result status: Final result    Ordering provider: Miguel Hodge MD  09/09/18 0634 Resulting lab: [facility]    Specimen Information    Type Source Collected On     09/09/18 0634          Components       Value Reference Range Flag Lab   Calcium Ionized Whole Blood 4.4 4.4 - 5.2 mg/dL  51            Magnesium [[id number]]  Resulted: 09/09/18 1542, Result status: Final result    Ordering provider: Miguel Hodge MD  09/08/18 2300 Resulting lab: [facility]    Specimen Information    Type Source Collected On   Blood  09/09/18 0634          Components       Value Reference Range Flag Lab   Magnesium 2.1 1.6 - 2.3 mg/dL  51            Basic metabolic panel [[id number]] (Abnormal)  Resulted: 09/09/18 1542, Result status: Final result    Ordering provider: Miguel Hodge MD  09/08/18 2300 Resulting lab: [facility]    Specimen Information    Type Source Collected On   Blood  09/09/18 0634          Components       Value Reference Range Flag Lab   Sodium 131 133 - 144 mmol/L L 51   Potassium 4.4 3.4 - 5.3 mmol/L  51   Chloride 99 94 - 109 mmol/L  51   Carbon Dioxide 25 20 - 32 mmol/L  51   Anion Gap 7 3 - 14 mmol/L  51   Glucose 98 70 - 99 mg/dL  51   Urea Nitrogen 17 7 - 30 mg/dL  51   Creatinine 0.72 0.66 - 1.25 mg/dL  51   GFR Estimate >90 >60 mL/min/1.7m2  51   Comment:  Non  GFR Calc   GFR Estimate If Black >90 >60 mL/min/1.7m2  51   Comment:  African American GFR Calc   Calcium 8.1 8.5 - 10.1 mg/dL L 51            INR [[id number]] (Abnormal)  Resulted: 09/09/18 1323, Result status: Final result    Ordering provider: Miguel Hodge MD  09/09/18 0663 Resulting lab: [facility]    Specimen Information    Type Source Collected On     09/09/18 0634          Components       Value Reference Range Flag Lab   INR 1.21 0.86 - 1.14 H 51            CBC (AM Draw) [[id number]] (Abnormal)  Resulted: 09/09/18 1246, Result status: Final result    Ordering provider: Miguel Hodge MD  09/08/18 2300 Resulting lab: [facility]    Specimen Information    Type Source Collected On   Blood  09/09/18 0634          Components       Value Reference Range Flag Lab   WBC 4.9 4.0 - 11.0 10e9/L  51   RBC Count 2.75 4.4 - 5.9 10e12/L L 51   Hemoglobin 8.7 13.3 - 17.7 g/dL L 51   Hematocrit 27.5 40.0 - 53.0 % L 51    78 - 100 fl  51   MCH 31.6 26.5 - 33.0 pg  51   MCHC 31.6 31.5 - 36.5 g/dL  51   RDW 24.5 10.0 - 15.0 % H 51   Platelet Count 468 150 - 450 10e9/L H 51            Calcium, ionized whole blood (1200) [[id number]]  Resulted: 09/08/18 0710, Result status: Final result    Ordering provider: Miguel Hodge MD  09/07/18 2300 Resulting lab: [facility]    Specimen Information    Type Source Collected On   Blood  09/08/18 0610          Components       Value Reference Range Flag Lab   Calcium Ionized Whole Blood 4.4 4.4 - 5.2 mg/dL  51            Magnesium [[id number]]  Resulted: 09/08/18 0708, Result status: Final result    Ordering provider: Miguel Hodge MD  09/07/18 2300 Resulting lab: [facility]    Specimen Information    Type Source Collected On   Blood  09/08/18 0610          Components       Value Reference Range Flag Lab   Magnesium 1.9 1.6 - 2.3 mg/dL  51            Basic metabolic panel [[id number]] (Abnormal)  Resulted: 09/08/18 0708, Result status: Final result    Ordering provider: Miguel Hodge MD  09/07/18 2300 Resulting lab: [facility]    Specimen Information    Type Source Collected On   Blood  09/08/18 0610           Components       Value Reference Range Flag Lab   Sodium 132 133 - 144 mmol/L L 51   Potassium 4.2 3.4 - 5.3 mmol/L  51   Chloride 100 94 - 109 mmol/L  51   Carbon Dioxide 25 20 - 32 mmol/L  51   Anion Gap 7 3 - 14 mmol/L  51   Glucose 74 70 - 99 mg/dL  51   Urea Nitrogen 16 7 - 30 mg/dL  51   Creatinine 0.70 0.66 - 1.25 mg/dL  51   GFR Estimate >90 >60 mL/min/1.7m2  51   Comment:  Non  GFR Calc   GFR Estimate If Black >90 >60 mL/min/1.7m2  51   Comment:  African American GFR Calc   Calcium 7.6 8.5 - 10.1 mg/dL L 51            INR [[id number]] (Abnormal)  Resulted: 09/08/18 0648, Result status: Final result    Ordering provider: Miguel Hodge MD  09/07/18 2300 Resulting lab: [facility]    Specimen Information    Type Source Collected On   Blood  09/08/18 0610          Components       Value Reference Range Flag Lab   INR 1.79 0.86 - 1.14 H 51            CBC (AM Draw) [[id number]] (Abnormal)  Resulted: 09/08/18 0639, Result status: Final result    Ordering provider: Miguel Hodge MD  09/07/18 2300 Resulting lab: [facility]    Specimen Information    Type Source Collected On   Blood  09/08/18 0610          Components       Value Reference Range Flag Lab   WBC 4.8 4.0 - 11.0 10e9/L  51   RBC Count 2.52 4.4 - 5.9 10e12/L L 51   Hemoglobin 8.1 13.3 - 17.7 g/dL L 51   Hematocrit 25.6 40.0 - 53.0 % L 51    78 - 100 fl H 51   MCH 32.1 26.5 - 33.0 pg  51   MCHC 31.6 31.5 - 36.5 g/dL  51   RDW 24.4 10.0 - 15.0 % H 51   Platelet Count 496 150 - 450 10e9/L H 51            Testing Performed By     Lab - Abbreviation Name Director Address Valid Date Range    51 - Unknown [facility] Unknown [address] [facility] [address] 12/31/14 1010 - Present            Unresulted Labs (24h ago through future)    Start       Ordered    08/27/18 0500  INR  (warfarin (COUMADIN) Pharmacy Consult-INITIAL ORDER)   "DAILY,   Routine      08/26/18 1343    08/25/18 0500  Magnesium  DAILY,   Routine      08/25/18 0303    08/25/18 0500  Basic metabolic panel  DAILY,   Routine      08/25/18 0303    08/25/18 0500  CBC (AM Draw)  AM DRAW,   Routine     Comments:  Last Lab Result: Hemoglobin (g/dL)       Date                     Value                 08/24/2018               8.5 (L)          ----------    08/25/18 0304    Unscheduled  Magnesium  (Magnesium Replacement - Adult - \"High\" - Replacement for all levels less than or equal to 2 mg/dL)  CONDITIONAL (SPECIFY),   Routine     Comments:  Obtain Magnesium Level for these conditions:  *IF no magnesium result within 24 hrs before initiation of order set, draw magnesium level with next lab collect.    *2 HOURS AFTER last magnesium replacement dose when magnesium replacement given for level less than 1.6  *Next morning after magnesium dose.     Repeat Magnesium Replacement if necessary.    08/06/18 0149    Unscheduled  Magnesium  (Magnesium Replacement - Adult - \"High\" - Replacement for all levels less than or equal to 2 mg/dL)  CONDITIONAL (SPECIFY),   Routine     Comments:  Obtain Magnesium Level for these conditions:  *IF no magnesium result within 24 hrs before initiation of order set, draw magnesium level with next lab collect.    *2 HOURS AFTER last magnesium replacement dose when magnesium replacement given for level less than 1.6  *Next morning after magnesium dose.     Repeat Magnesium Replacement if necessary.    08/10/18 2241         Imaging Results - 3 Days      XR Chest 2 Views [[id number]]  Resulted: 09/11/18 1439, Result status: Final result    Ordering provider: Janice Disla PA-C  09/11/18 0844 Resulted by: Vineet Kelly MD Bachour, Ornina, MD    Performed: 09/11/18 1305 - 09/11/18 1314 Resulting lab: RADIOLOGY RESULTS    Narrative:       Exam: XR CHEST 2 VW, 9/11/2018 1:14 PM    Indication: interval change;     Comparison: 9/10/2018    Findings:   PA and lateral " view of the chest. Right PICC tip projects over the mid  SVC. The trachea is midline. The cardiomediastinal silhouette is  unchanged. Pulmonary vasculature is distinct. Stable opacity in the  right lung base, compatible with blood products in the major fissure  on comparison CT. Bilateral pleural effusions are not sufficiently  changed image) technique. Streaky left retrocardiac opacities. The  upper abdomen is unremarkable.      Impression:       Impression:   1. Bilateral pleural effusions which are stable given the differences  in technique. Streaky left basilar opacity probably represents  atelectasis.  2. Round opacity in the right base is compatible with blood products  as seen on prior CT.    I have personally reviewed the examination and initial interpretation  and I agree with the findings.    ZOLTAN CHIRINOS MD      XR Chest Port 1 View [[id number]]  Resulted: 09/10/18 1619, Result status: Final result    Ordering provider: Janice Disla PA-C  09/10/18 1509 Resulted by: Zach Yarbrough MD Bachour, Ornina, MD    Performed: 09/10/18 1513 - 09/10/18 1533 Resulting lab: RADIOLOGY RESULTS    Narrative:       Exam: XR CHEST PORT 1 VW, 9/10/2018 3:33 PM    Indication: CT removed;     Comparison: Same-day radiograph    Findings:   AP view of the chest. Postsurgical changes of VSD repair. Interval  removal of right apically directed chest tube. Stable position of  right PICC tip.The trachea is midline. The cardiomediastinal  silhouette is stable. Stable opacity of the right lung base, seen as  blood products in the major fissure on comparison CT. No pneumothorax.  Stable small bilateral pleural effusions. Unchanged perihilar  opacities.      Impression:       Impression:   1. Removal of apically directed right chest tube without recurrent  pneumothorax.  2. Stable opacity of the right lung base, seen is blood products in  the major fissure on comparison CT.  3. Stable appearance of bilateral pleural  effusions.    I have personally reviewed the examination and initial interpretation  and I agree with the findings.    TARAN LOCKE MD      XR Chest Port 1 View [[id number]]  Resulted: 09/10/18 1619, Result status: Final result    Ordering provider: Janice Disla PA-C  09/10/18 1124 Resulted by: Taran Locke MD Bachour, Ornina, MD    Performed: 09/10/18 1224 - 09/10/18 1250 Resulting lab: RADIOLOGY RESULTS    Narrative:       Exam: XR CHEST PORT 1 VW, 9/10/2018 12:50 PM    Indication: interval change on water seal;     Comparison: Same-day radiograph    Findings:   AP view of the chest. Postsurgical changes of VSD repair and tricuspid  valve replacement. Stable median sternotomy wires. PICC tip projects  over the superior cavoatrial junction. Stable appearance of right  apical chest tube.    The trachea is midline. The cardiomediastinal silhouette is stable.  Stable opacity of the right lung base, seen as blood products in the  major fissure on comparison CT. No pneumothorax. Stable small  bilateral pleural effusions. Unchanged perihilar opacities.      Impression:       Impression:   1. Stable support devices no evidence of recurrent pneumothorax.  2. Stable opacity of the right lung base, seen as blood products in  the major fissure on comparison CT.  3. Stable small bilateral pleural effusions.    I have personally reviewed the examination and initial interpretation  and I agree with the findings.    TARAN LOCKE MD      XR Chest 2 Views [[id number]]  Resulted: 09/10/18 1208, Result status: Final result    Ordering provider: Tyrese Pollack PA-C  09/10/18 0757 Resulted by: Taran Locke MD Bachour, Ornina, MD    Performed: 09/10/18 0839 - 09/10/18 0854 Resulting lab: RADIOLOGY RESULTS    Narrative:       Exam: XR CHEST 2 VW, 9/10/2018 8:54 AM    Indication: interval; right-sided pneumo status post placement of  chest tube     Comparison: 9/9/2018   Findings:   PA and lateral views  of the chest. Postsurgical changes of VSD repair.  Stable median sternotomy wires. Right PICC tip projects over the  superior cavoatrial junction. Stable right apical chest tube. The  trachea is midline. The cardiomediastinal silhouette is stable. Stable  consolidation of the right lung base. No pneumothorax. Stable small  bilateral pleural effusions. Stable perihilar opacities. Upper abdomen  is unremarkable.      Impression:       Impression:   1. Stable support devices with no evidence of recurrent pneumothorax.  2. Unchanged bilateral perihilar, right greater than left patchy  airspace opacities.  3. Unchanged consolidation in the right lung base.  4. Unchanged small bilateral pleural effusions, layering on the left,  with associated atelectasis.    I have personally reviewed the examination and initial interpretation  and I agree with the findings.    TARAN LOCKE MD      XR Chest 2 Views [[id number]]  Resulted: 09/09/18 1154, Result status: Final result    Ordering provider: Abdelrahman Arredondo PA-C  09/09/18 1006 Resulted by: Cornelio Suresh MD Craig, Paul Grant, MD    Performed: 09/09/18 1055 - 09/09/18 1117 Resulting lab: RADIOLOGY RESULTS    Narrative:       EXAM: XR CHEST 2 VW  9/9/2018 11:17 AM     HISTORY:  R sided pneumothorax s/p placement of chest tube;        COMPARISON:  CT 9/7/2018. Radiograph 9/6/2018    FINDINGS: PA and lateral radiographs of the chest. Interval placement  of a right apical chest tube. Resolved pneumothorax. Prosthetic valves  and sternotomy wires are present. Right approach PICC tip projects  over the low SVC. Stable consolidation in the right lung base. Stable  small pleural effusions.      Impression:       IMPRESSION:  1. Resolved right apical pneumothorax, status post placement of a  right chest tube.  2. Stable consolidation in the right lung base.  3. Stable small pleural effusions.    I have personally reviewed the examination and initial interpretation  and I  agree with the findings.    EFREN CORDOBA MD      IR Chest Tube Place Non Tunneled Right [[id number]]  Resulted: 09/08/18 1512, Result status: Preliminary result    Ordering provider: Jason Tello PA-C  09/07/18 0953 Resulted by: Maritza Centeno MD Hakkola, Jacob M, MD    Performed: 09/08/18 1014 - 09/08/18 1101 Resulting lab: RADIOLOGY RESULTS    Narrative:       Procedure: 9/8/2018.  1. Fluoroscopic guided chest tube placement    History: Right hydropneumothorax.    Comparison: Chest CT 9/7/2017    Staff: Maritza Martinez MD    Fellow/Resident: Eric Delacruz MD    Monitoring: Patient was placed on continuous monitoring administered  by the IR nursing staff and supervised by the IR attending. Patient  remained stable throughout the procedure.    Medications:  1. 1% lidocaine for local anesthesia    Fluoroscopy time: 2.2 minutes    Dose: 1.9 mGy    Procedure/Findings: The patient understood the limitations,  alternatives, and risks of the procedure and requested the procedure  be performed. Both written and oral consent were obtained.    Patient placed supine on the interventional table. Right upper thorax  was prepped and draped in the usual sterile fashion. 1% lidocaine was  used for local anesthesia. 5 [place] Yueh needle/catheter was advanced  through the right second intercostal space in a caudal direction into  the pleural space. This was confirmed by aspiration of air. Catheter  advanced and needle removed. A 0.035 floppy tipped Bentson guidewire  was advanced into the catheter, and the tract was dilated. 8 [place]  Flexima chest tube advanced over the wire into the pleural space.  Secured with a 2-0 Ethilon catheter retention suture. Patient  tolerated the procedure well. No immediate complication.      Impression:       Impression:   1. Right-sided 8 [place]er nonlocking chest tube.    Plan:   1. Catheter to waterseal -20 mmH20      Testing Performed By     Lab - Abbreviation Name Director Address  Valid Date Range    104 - Rad Rslts RADIOLOGY RESULTS Unknown Unknown 05 1553 - Present               ECG/EMG Results      Echo complete [[id number]]  Resulted: 18 0756, Result status: Edited Result - FINAL    Ordering provider: Camilo Hutchison MD  18 0149 Resulted by: MARIXA Rosas MD    Performed: 18 0940 - 18 0941 Resulting lab: RADIOLOGY RESULTS    Narrative:       [id number]  [facility]  [id number]  [id number]^IVAN^SUSHIL^CASA           [facility],[place]  Echocardiography Laboratory  [address] [id number]     Name: JOSE SOLER  MRN: [id number]  : 1950  Study Date: 2018 07:56 AM  Age: 68 yrs  Gender: Male  Patient Location: [facility]  Reason For Study: CHF, VSD  Ordering Physician: SUSHIL LOVE  Referring Physician: SELF, REFERRED  Performed By: Brian Bansal RDCS     BSA: 1.9 m2  Height: 69 in  Weight: 170 lb  BP: 112/90 mmHg  _____________________________________________________________________________  __        Procedure  Complete Portable Echo Adult.  _____________________________________________________________________________  __        Interpretation Summary  Left ventricular size is normal.  The Ejection Fraction is estimated at 55-60%.  Inferior wall akinesis with inferoseptal muscular VAD. Peak velocity 3.7m/sec.  Mild to moderate right ventricular dilation is present.  Global right ventricular function is moderately reduced.  Dilation of the inferior vena cava is present with abnormal respiratory  variation in diameter.  No pericardial effusion is present.  Previous study not available for comparison.  _____________________________________________________________________________  __        Left Ventricle  Left ventricular size is normal. Left ventricular wall thickness is normal.  The Ejection Fraction is estimated at 55-60%. Left ventricular diastolic  function is indeterminate. Diastolic Doppler findings  (E/E' ratio and/or other  parameters) suggest left ventricular filling pressures are increased. Inferior  wall akinesis with inferoseptal muscular VAD. Peak velocity 3.7m/sec.     Right Ventricle  Mild to moderate right ventricular dilation is present. Global right  ventricular function is moderately reduced.     Atria  Both atria appear normal. The atrial septum is intact as assessed by color  Doppler .     Mitral Valve  The mitral valve is normal.        Aortic Valve  Aortic valve is normal in structure and function.     Tricuspid Valve  The tricuspid valve is normal. Mild tricuspid insufficiency is present. The  right ventricular systolic pressure is approximated at 30.0 mmHg plus the  right atrial pressure.     Pulmonic Valve  The pulmonic valve is normal.     Vessels  The aorta root is normal. The pulmonary artery is normal. Ascending aorta 3.8  cm. Dilation of the inferior vena cava is present with abnormal respiratory  variation in diameter.     Pericardium  No pericardial effusion is present.        Compared to Previous Study  Previous study not available for comparison.  _____________________________________________________________________________  __  MMode/2D Measurements & Calculations     RVDd: 5.5 cm  IVSd: 0.89 cm  LVIDd: 4.9 cm  LVIDs: 3.1 cm  LVPWd: 0.87 cm  FS: 36.3 %  LV mass(C)d: 145.9 grams  LV mass(C)dI: 75.7 grams/m2  LA dimension: 3.8 cm  asc Aorta Diam: 3.8 cm  LVOT diam: 2.1 cm  LVOT area: 3.5 cm2  RVOT diam: 2.6 cm  LA Volume (BP): 40.3 ml  RWT: 0.36  TAPSE: 2.0 cm        Doppler Measurements & Calculations  MV E max luisa: 90.4 cm/sec  MV A max luisa: 98.0 cm/sec  MV E/A: 0.92  MV dec slope: 651.6 cm/sec2  MV dec time: 0.14 sec  LV V1 VTI: 8.8 cm     SV(LVOT): 30.5 ml  SI(LVOT): 15.8 ml/m2  TV max P.0 mmHg  PA V2 max: 138.0 cm/sec  PA max P.6 mmHg  PA acc time: 0.08 sec  TR max luisa: 272.7 cm/sec  TR max P.0 mmHg  Qp/Qs: 3.3/1.0  E/E' av.8  Lateral E/e': 11.3  Medial E/e':  18.3     _____________________________________________________________________________  __           Report approved by: Hilda Bashir 2018 10:22 AM       1    Type Source Collected On     18 0756          View Image (below)        Echo limited (Adults Only) [[id number]]  Resulted: 08/10/18 1017, Result status: Edited Result - FINAL    Ordering provider: Iglesia Villa MD  08/10/18 1007 Resulted by: Aaron Sandoval MD    Performed: 08/10/18 1013 - 08/10/18 1043 Resulting lab: RADIOLOGY RESULTS    Narrative:       [id number]  ECH11  [id number]  [id number]^ALLEN^IGLESIA^CONCHITA           [facility],[place]  Echocardiography Laboratory  [address] [place] [id number]     Name: JOSE SOLER  MRN: [id number]  : 1950  Study Date: 08/10/2018 10:17 AM  Age: 68 yrs  Gender: Male  Patient Location: [facility]  Reason For Study: VSD  Ordering Physician: IGLESIA VILLA  Referring Physician: SELF, REFERRED  Performed By: Monico Alexandra     BSA: 1.9 m2  Height: 69 in  Weight: 170 lb  HR: 96  BP: 93/56 mmHg  _____________________________________________________________________________  __        Procedure  Limited Portable Echo Adult.  _____________________________________________________________________________  __        Interpretation Summary  Limited study.  The Ejection Fraction is estimated at 60-65%. Mid-inferior wall is akinetic.  A mid inferoseptal muscular VSD is present.  VSD peak gradient is 35 mmHg. Peak velocity 3 m/s.  Mild to moderate right ventricular dilation is present.  RV function is moderately reduced.  Moderate to severe tricuspid insufficiency is present.  Right ventricular systolic pressure is 43mmHg above the right atrial pressure.  Moderate pulmonary hypertension is present (~57 mmHg).  The inferior vena cava was dilated at 2.4 cm without respiratory variability,  consistent with increased right atrial pressure.  Estimated mean right atrial  pressure is 15 mmHg (significantly elevated).  No pericardial effusion is present.     This study was compared with the study from 2018 .  PAP appears slightly higher and TR appears to have worsened on this study.  Otherwise no other significant change.     _____________________________________________________________________________  __        Left Ventricle  The Ejection Fraction is estimated at 60-65%. Mid-inferior wall is akinetic. A  muscularventricular-septal defect is present. VSD peak gradient is 35 mmHg.  Peak velocity 3 m/s.     Right Ventricle  Mild to moderate right ventricular dilation is present. RV function is  moderately reduced. A right heart catheter is noted in the right ventricle.     Atria  The atria cannot be assessed.     Aortic Valve  The aortic valve cannot be assessed.        Tricuspid Valve  Moderate to severe tricuspid insufficiency is present. The right ventricular  systolic pressure is approximated at 42.8 mmHg plus the right atrial pressure.  Right ventricular systolic pressure is 43mmHg above the right atrial pressure.  Moderate (pulmonary artery systolic pressure 50-75mmHg) pulmonary hypertension  is present.     Vessels  The inferior vena cava was dilated at 2.4 cm without respiratory variability,  consistent with increased right atrial pressure. Estimated mean right atrial  pressure is 15 mmHg (significantly elevated).     Pericardium  No pericardial effusion is present.     Compared to Previous Study  This study was compared with the study from 2018 . PAP appears slightly  higher and TR appears to have worsened on this study. Otherwise no other  significant change.     _____________________________________________________________________________  __        Doppler Measurements & Calculations  TR max luisa: 327.0 cm/sec  TR max P.8 mmHg        _____________________________________________________________________________  __        Report approved by: Jacobo HERNANDEZ  08/10/2018 11:31 AM       1    Type Source Collected On     08/10/18 1017          View Image (below)        Echo limited (Adults Only) [[id number]]  Resulted: 18, Result status: Edited Result - FINAL    Ordering provider: Endy Ward MD  08/15/18 1739 Resulted by: Kris Monaco MD    Performed: 18 - 18 Resulting lab: RADIOLOGY RESULTS    Narrative:       [id number]  ECH  [id number]  [id number]^DANYA RIVERA^ENDY^           [facility],[place]  Echocardiography Laboratory  [address] [place] [address]     Name: JOSE SOLER  MRN: [id number]  : 1950  Study Date: 2018 08:17 AM  Age: 68 yrs  Gender: Male  Patient Location: [facility]  Reason For Study: VSD  Ordering Physician: ENDY WARD  Referring Physician: SELF, REFERRED  Performed By: Brian Bansal RDCS     BSA: 2.0 m2  Height: 69 in  Weight: 182 lb  HR: 105  BP: 96/66 mmHg  _____________________________________________________________________________  __        Procedure  Limited Portable Echo Adult. Technically difficult study. Poor acoustic  windows.  _____________________________________________________________________________  __        Interpretation Summary  Limited echocardiogram.Technically difficult study.     s/p repair of inferior membranous VSD. There is no flow seen across repair.  There is a non-mobile mass (1 cm) protruding into the LV at the site of patch  repair. At the edge of it there is a small filamentous mobile mass that likely  represents surgical material like a suture, less likely thrombus. These  findings appear unchanged from intra-op ELISABETH after repair. Consider cardiac CT  for further delineation of repair and mass. Findings discussed with Dr Tanner.  Left ventricular EF is 55-60%.  Inferior wall and basal to mid inferoseptal wall akinesis is present.  Global right ventricular function is mildly  reduced.  No pericardial effusion is present.     This study was compared with the study from 8/10/2018: s/p VSD repair,  improvement RV function, and improvement in TR.        _____________________________________________________________________________  __        Left Ventricle  Left ventricular function is normal.The EF is 55-60%. Left ventricular size is  normal. Relative wall thickness is increased consistent with concentric  remodeling. Inferior wall akinesis is present. Basal to mid inferoseptal wall  akinesis. Interval resolution of VSD.     Right Ventricle  The right ventricle is normal size. Global right ventricular function is  mildly reduced.     Atria  The atria cannot be assessed. The atrial septum is intact as assessed by color  Doppler .     Mitral Valve  The mitral valve cannot be assessed.        Aortic Valve  The aortic valve cannot be assessed.     Tricuspid Valve  The tricuspid valve is normal. Trace tricuspid insufficiency is present. The  peak velocity of the tricuspid regurgitant jet is not obtainable. Pulmonary  artery systolic pressure cannot be assessed.     Pulmonic Valve  The pulmonic valve cannot be assessed.     Vessels  The aorta root cannot be assessed. The thoracic aorta cannot be assessed. The  inferior vena cava cannot be assessed.     Pericardium  No pericardial effusion is present.        Compared to Previous Study  This study was compared with the study from 8/10/2018 .     Attestation  I have personally viewed the imaging and agree with the interpretation and  report as documented by the fellow, Ирина Drake, and/or edited by me.  _____________________________________________________________________________  __     MMode/2D Measurements & Calculations  IVSd: 1.1 cm  LVIDd: 4.2 cm  LVIDs: 2.7 cm  LVPWd: 1.0 cm  FS: 37.0 %  LV mass(C)d: 156.1 grams  LV mass(C)dI: 78.7 grams/m2  RWT: 0.49            _____________________________________________________________________________  __           Report approved by: Hilda Moser 2018 02:11 PM       1    Type Source Collected On     18 0817          View Image (below)        Echo limited (Adults Only) [[id number]]  Resulted: 18 1211, Result status: Edited Result - FINAL    Ordering provider: Brad Brown MD  18 1044 Resulted by: MARIXA Rosas MD    Performed: 18 1053 - 18 1241 Resulting lab: RADIOLOGY RESULTS    Narrative:       [id number]  ECH  [id number]  [id number]^KEVIN^BRAD^           [facility],[place]  Echocardiography Laboratory  [address] [place] [address]     Name: JOSE SOLER  MRN: [id number]  : 1950  Study Date: 2018 12:11 PM  Age: 68 yrs  Gender: Male  Patient Location: [facility]  Reason For Study: Eval LV/RV FX/ S/P VSC closure  History: Eval LV/RV FX/ S/P VSC closure  Ordering Physician: BRAD BROWN  Referring Physician: SELF, REFERRED  Performed By: Marcie Buitrago RDCS     BSA: 2.0 m2  Height: 69 in  Weight: 175 lb  BP: 86/63 mmHg  _____________________________________________________________________________  __        Procedure  Limited Echocardiogram with portions of two-dimensional, color and spectral  Doppler performed.  _____________________________________________________________________________  __        Interpretation Summary  Moderate size pericardial effusion predominently loculated along RV free wall.  No definitive echo evidence for Tamponade. Recommend clinical correlation.  This effusion is new when compared to previous study.  _____________________________________________________________________________  __        Left Ventricle  Left ventricular wall thickness is normal. Left ventricular size is normal.  Diastolic function not assessed due to tachycardia. The Ejection Fraction is  estimated at 55-60%. Basal to mid  inferoseptal wall akinesis. Basal to mid  anteroseptal wall hypokinesis.     Right Ventricle  The right ventricle is normal size. Global right ventricular function is  moderately to severely reduced.     Atria  Both atria appear normal. Atrial septum can't be assessed.     Mitral Valve  The mitral valve is normal.        Aortic Valve  Aortic valve is normal in structure and function.     Tricuspid Valve  The tricuspid valve is normal.     Pulmonic Valve  The pulmonic valve cannot be assessed.     Vessels  The inferior vena cava cannot be assessed. The aorta root cannot be assessed.  The thoracic aorta cannot be assessed.     Pericardium  Moderate size pericardial effusion predominently loculated along RV free wall.  No definitive echo evidence for Tamponade. Recommend clinical correlation.  This effusion is new when compared to previous study.        Attestation  I have personally viewed the imaging and agree with the interpretation and  report as documented by the fellow, Richard Horton, and/or edited by me.  _____________________________________________________________________________  __                          Report approved by: Hilda Bashir 2018 01:46 PM              _____________________________________________________________________________  __       1    Type Source Collected On     18 1211          View Image (below)        Echo limited (Adults Only) [[id number]]  Resulted: 18 012, Result status: Edited Result - FINAL    Ordering provider: Duke Farris MD  18 0113 Resulted by: Lee Mauricio MD    Performed: 18 0728 - 18 0730 Resulting lab: RADIOLOGY RESULTS    Narrative:       [id number]  ECH11  [id number]  [id number]^KIMBERLI^DUKE^NALLELY           [facility]  Echocardiography Laboratory  [address] [place] [address]     Name: JOSE SOLER  MRN: [id number]  : 1950  Study Date:  08/22/2018 01:22 AM  Age: 68 yrs  Gender: Male  Patient Location: [facility]  Reason For Study: Heart Failure  History: VSD  Ordering Physician: DUKE AG  Referring Physician: [facility]  Performed By: Norbreto Uriarte MD     BSA: 1.9 m2  Height: 69 in  Weight: 174 lb  HR: 101  BP: 98/58 mmHg  _____________________________________________________________________________  __        Procedure  Limited Echocardiogram with portions of two-dimensional, color and spectral  Doppler performed. Adequate quality two-dimensional was performed and  interpreted. ECG shows normal sinus rhythm.  _____________________________________________________________________________  __        Interpretation Summary  Limited study.  Moderate sized (max dimension 1.8 cm anterior to the RV), predominantly  anterior, partially-organized pericardial effusion. Echocardiographic findings  collectively do not support a diagnosis of pericardial tamponade.     Global and regional left ventricular function is normal with an EF of 60-65%.  Global right ventricular function is moderately reduced. The right ventricle  is normal size.  This study was compared with the study from 8/20/18 . There has been no  change.  Compared with 8/20/18, there has been no significant change.  _____________________________________________________________________________  __        Left Ventricle  Left ventricular size is normal. Global and regional left ventricular function  is normal with an EF of 60-65%.     Right Ventricle  The right ventricle is normal size. Global right ventricular function is  moderately reduced.     Atria  The atria cannot be assessed.     Mitral Valve  The mitral leaflets are not well visualized, but mitral valve function appears  normal on Doppler interrogation.        Aortic Valve  The aortic valve cannot be assessed.     Tricuspid Valve  The tricuspid leaflets are not well visualized, but mitral valve function  appears normal on Doppler  interrogation.     Pulmonic Valve  The pulmonic valve cannot be assessed.     Vessels  The aorta root cannot be assessed. The pulmonary artery and bifurcation cannot  be assessed. Dilation of the inferior vena cava is present with normal  respiratory variation in diameter. The IVC is dilated; respiratory variability  is not assessed (RA pressure at least 8 mmHg.).     Pericardium  Moderate sized (max dimension 1.8 cm anterior to the RV), predominantly  anterior, partially-organized pericardial effusion. Echocardiographic findings  collectively do not support a diagnosis of pericardial tamponade. Features  suggesting against a diagnosis of pericardial tamponade are: absence of  diastolic RV collpase, absence of systolic RA collpase, absence of respiratory  variation in mitral inflows, absence of respiratory variation in tricuspid  inflows, absence of respirophasic septal shift.  Features supporting a diagnosis of pericardial tamponade are: dilated IVC.        Compared to Previous Study  This study was compared with the study from 18 . There has been no  change.     Attestation  I have personally viewed the imaging and agree with the interpretation and  report as documented by the fellow, Norberto Uriarte, and/or edited by me.  _____________________________________________________________________________  __           Doppler Measurements & Calculations  TR max luisa: 63.8 cm/sec  TR max P.8 mmHg     _____________________________________________________________________________  __           Report approved by: Hilda Jensen 2018 08:38 AM       1    Type Source Collected On     18 0122          View Image (below)        Echo limited (Adults Only) [[id number]]  Resulted: 18 1042, Result status: Edited Result - FINAL    Ordering provider: Brad Hawkins MD  18 0005 Resulted by: MARIXA Rosas MD    Performed: 18 1036 - 18 1117 Resulting lab: RADIOLOGY RESULTS     Narrative:       [id number]  [facility]  [id number]  [id number]^KEVIN^BONY           [facility],[place]  Echocardiography Laboratory  [address] [place] [address]     Name: JOSE SOLER  MRN: [id number]  : 1950  Study Date: 2018 10:42 AM  Age: 68 yrs  Gender: Male  Patient Location: [facility]  Reason For Study: Other, Please Specify in Comments  Ordering Physician: BONY BROWN  Referring Physician: SELF, REFERRED  Performed By: Monico Alexandra     BSA: 2.0 m2  Height: 69 in  Weight: 178 lb  BP: 94/69 mmHg  _____________________________________________________________________________  __        Procedure  Limited Portable Echo Adult.  _____________________________________________________________________________  __        Interpretation Summary  S/P surgical Ischemic posterior wall VSD repair. A 1 cm long mobile  echodensity noted on the VAD patch on LV side. Most likely a thrombus.        Left ventricular size is normal.  The Ejection Fraction is estimated at 50-55%.  Global right ventricular function is moderately reduced.  A bioprosthetic tricuspid valve is present.  Mean tricuspid gradient 5mmHg.  Pericardial effusion as noted in previous study.  _____________________________________________________________________________  __        Left Ventricle  Left ventricular size is normal. The Ejection Fraction is estimated at 50-55%.     Right Ventricle  The right ventricle is normal size. Global right ventricular function is  moderately reduced.     Tricuspid Valve  A bioprosthetic tricuspid valve is present. Mean tricuspid gradient 5mmHg.     Pericardium  Pericardial effusion as noted in previous study.     _____________________________________________________________________________  __           Doppler Measurements & Calculations  TV V2 max: 141.0 cm/sec  TV max P.0 mmHg  TV mean P.0 mmHg  TV V2 VTI: 35.0 cm      _____________________________________________________________________________  __           Report approved by: Hilda Bashir 2018 01:30 PM       1    Type Source Collected On     18 1042          View Image (below)        Echocardiogram Complete [[id number]]  Resulted: 18, Result status: In process    Ordering provider: Torsten Ruiz PA  18 1640 Performed: 18 - 18    Resulting lab: [facility]IANT             Echocardiogram Limited [[id number]]  Resulted: 18, Result status: Edited Result - FINAL    Ordering provider: Torsten Ruiz PA  18 1640 Resulted by: Ignacia Morales MD    Performed: 18 - 18 Resulting lab: RADIOLOGY RESULTS    Narrative:       [id number]  ECH11  [id number]  [id number]^VALDEMAR^TORSTEN^SUSHIL           [facility],[place]  Echocardiography Laboratory  [address] [place], [place] [address]     Name: JOSE SOLER  MRN: [id number]  : 1950  Study Date: 2018 08:27 AM  Age: 68 yrs  Gender: Male  Patient Location: [facility]  Reason For Study: Eval for effusion, EF, RV fxn  Ordering Physician: TORSTEN RUIZ  Referring Physician: SELF, REFERRED  Performed By: Jeanmarie Saucedo RDCS     BSA: 1.9 m2  Height: 69 in  Weight: 162 lb  HR: 93  BP: 109/73 mmHg  _____________________________________________________________________________  __        Procedure  Limited Portable Echo Adult.  _____________________________________________________________________________  __        Interpretation Summary  Compared to study done 18 the pericardial effusion is decreased in size  and now only trivial.  VSD patch protruding into the LV cavitity and continues to have mobile  filamentous density attached to it.  Limited Color Doppler without signs of shunting.  IVC diameter at upper normal measuring 2.2 cm and without compression.      _____________________________________________________________________________  __     _____________________________________________________________________________  __           Doppler Measurements & Calculations  TV V2 max: 162.0 cm/sec  TV max PG: 10.5 mmHg  TV mean P.0 mmHg  TV V2 VTI: 41.6 cm     _____________________________________________________________________________  __           Report approved by: Hilda Mejia 2018 09:15 AM       1    Type Source Collected On     18 0827          View Image (below)              Encounter-Level Documents:     There are no encounter-level documents.      Order-Level Documents:     There are no order-level documents.

## 2018-08-06 NOTE — IP AVS SNAPSHOT
` `           UNIT [facility] [facility]: [phone]                 INTERAGENCY TRANSFER FORM - NOTES (H&P, Discharge Summary, Consults, Procedures, Therapies)   2018                    Hospital Admission Date: 2018  JOSE SOLER   : 1950  Sex: Male        Patient PCP Information     Provider PCP Type    [facility]         History & Physicals      H&P by Camilo Hutchison MD at 2018  2:39 AM     Author:  Camilo Hutchison MD Service:  Cardiology Author Type:  Physician    Filed:  2018  2:41 AM Date of Service:  2018  2:39 AM Creation Time:  2018  2:39 AM    Status:  Signed :  Camilo Hutchison MD (Physician)         Critical Care    Critical Care ICU Note - Cardiology  Camilo Hutchison M.D.    Impression:    Cardiogenic shock  Acute and chronic congestive heart failure  Acute respiratory failure  Acute renal failure    The patient remains unstable in the ICU with on-going need for t, parenteral medications for the adjustment of blood pressure and cardiac output and maintenance of renal function.      The patient is seen foshock requiring vasopressor and or inotropic agents; low cardiac output necessitating  inotropic agents, vasopressors and afterload reducing agents; limited mechanical support requiring IABP;   I personally reviewed:    Arterial and venous blood gases to assess acid base balance, oxygenation, and ventilator settings.      Hemodynamic parameters obtained by central hemodynamic monitoring including RAP, estimated LVEDP, pulmonary artery pressure, cardiac output and vascular resistances in order to adjust fluids and infused medications for blood pressure and cardiac output maintenance.    Ventilatory settings and/or supplement oxygen needs in order to obtain optimal oxygenation and electrolyte balance at low possible pressure support and inspired oxygen tension  Volume status, renal function and nutritional support as judged by  intake, output, daily weight and appropriate laboratories.    I reviewed individual and serial imaging studies including echocardiogram, chest x-ray, CT scan    I personally supervised the prescription of parenteral fluids, inotropes, vasodilators , and vasopressors in order to correct cardiac output, maintain urine output and renal function.    I personally met with patient or family to discuss current and future diagnostic and therapeutic strategies    90 minutes[MP1.1]         Revision History        User Key Date/Time User Provider Type Action    > MP1.1 8/6/2018  2:41 AM Camilo Hutchison MD Physician Sign            H&P by Camilo Hutchison MD at 8/6/2018  2:14 AM     Author:  Camilo Hutchison MD Service:  Cardiology Author Type:  Physician    Filed:  8/6/2018  2:39 AM Date of Service:  8/6/2018  2:14 AM Creation Time:  8/6/2018  2:14 AM    Status:  Signed :  Camilo Hutchison MD (Physician)         Camilo Hutchison M.D.  Cardiovascular Medicine  Critical Care    I personally saw and examined this patient, discussed care with housestaff and other consultants, reviewed current laboratories and imaging studies, and conveyed impression and diagnostic/therapeutic plan to patient.    Referring:  Jason Tran M.D  Pulmonary Critical Care  [facility]    Problem List  1. Post infarction VSD  2. ASHD    Right coronary occlusions treated with HUSSEIN   Proximal LAD stenosis treated with HUSSEIN  3. IABP  4. Myeloproliferative syndrome (P VERA)  5. Childhood tuberculosis  6. 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 Gallstones  8. Abnormal LFT c/w hepatic congestion  9. History of right heal fracture treated with surgery  10. History of prostate cancer  11. History of meningioma treated with surgery  12. Intolerance to non-hydroxy urea medication to treat P VERA/rash and epistaxis  13. Cardiogenic shock  14. Hypoproteinemia  15 Thrombocytosis    68 year old white male transferred from [facility] where he  presented this evening with symptoms of confusion, chest pain and ECG with ST elevation suggestive of inferior myocardial infarction.  He underwent insertion of drug eluting Synergy stents in the mid and dist RCA and proximal LAD.  He was found by echocardiogram to have a VSD in the inferior apical position.   Shock was treated with vasopressors and IABP.   Risk factors for ASHD include: + family history, smoking, elevated cholesterol.  He has no history of palpitation, pre-syncope or syncope, PND, orthopnea, ankle edema.    He has a history of myeloproliferative syndrome treated initially with hydroxyurea and subsequently with another agent.  He has rash with shower, elevated platelet counts and previous history of TIA without reported existence of atrial fibrillation or atherosclerotic cerebrovascular disease.  He has undergone splenectomy as child for trauma.  He has been vaccinated for encapsulated organisms.      He fractured his right heal in April and underwent surgical repair.    He has remote history of craniotomy for meningioma.    Allergies: none  Social:  with children, retired  Surgeries: see above  Smoking: quit remotely  ETOH : 2 oz/day but quit one month ago      Objective  BP (!) 88/64  Resp 12  SpO2 94%   .Constitutional: alert, oriented, normal gait and station, normal mentation.  Oral: moist mucous membranes  Lymph: without pathologic adenopathy  Chest: clear to ausculation and percussion, symmetric breath sounds  Cor: No evidence of left or right ventricular activity.  Rhythm is regular.  S1 normal, S2 split physiologically. Murmurs t present in parasternal position  Abdomen: without tenderness, rebound, guarding, masses, ascites  Extremities: Edema not present, IABP right groin, left femoral pulse present,  Neuro: no focal defects, normal mentation  Skin: without open lesions, pale vasoconstricted  Psych: oriented, verbal, mental status in tact    Meds: norepinephrine  Phenylephrine,  Jose in catherization laboratory       Labs    Imaging : Bedside: VSD in inferior apical position,  RV enlarged by moving, IVC dilated      Assessment/Plan     Patient has cardiogenic shock that appears to being responsive to vasopressors and fluid with high mean arterial pressure and fortunately does require ECMO at this time.  Would like to wean back pressures, establish central hemodynamic monitoring, allow anti-platelet agents effects to resolve however, this poses threat to stent, especially LAD.  Brief echocardiographic examination does not appear the percutaneous closure with Amplatz will be specially feasible secondary to proximity to wall.    I discussed the risks and benefits of intubation, line placement, ECMO with his family and they consent.     Camilo Hutchison M.D.[MP1.1]         Revision History        User Key Date/Time User Provider Type Action    > MP1.1 8/6/2018  2:39 AM Camilo Hutchison MD Physician Sign                     Discharge Summaries      Discharge Summaries by Janice Disla PA-C at 9/10/2018 11:55 AM     Author:  Janice Disla PA-C Service:  Cardiothoracic Surgery Author Type:  Physician Assistant    Filed:  9/11/2018  2:35 PM Date of Service:  9/10/2018 11:55 AM Creation Time:  9/10/2018 11:55 AM    Status:  Addendum :  Janice Disla PA-C (Physician Assistant)         [facility], [place]   Cardiothoracic Surgery [facility] Discharge Summary       Castillo De Anda MRN# [id number]   YOB: 1950 Age: 68 year old     Date of Admission:  8/6/2018  Date of Discharge::[TY1.1]  9/11/2018[AS1.1]  Admitting Physician:  Maritza Centeno MD  Discharge Physician:[TY1.1]  Janice Ellis PA-C ([phone])[AS1.2]   Primary Care Physician:        Vinod Frank          Primary Diagnoses:   1. STEMI s/p HUSSEIN to distal RCA and proximal LAD  2. Ischemic ventricular septal defect s/p repair  3.[TY1.1] S/p TVR due to the  septal leaflet of the tricuspid valve being included in the ventricular septal repair requiring valve replacement[AS1.2]  4. Anticoagulation x 3 months s/p atrial flutter/atrial fibrillation          Secondary Diagnosis:   1. Coronary artery disease  2. Thyroid cancer  3. Polycythemia vera  4. Anemia  5. Deep vein thrombosis  6. Meningioma  7. Prostate Cancer         Procedures:[TY1.1]   PROCEDURE: 8/10/18  1) Repair of posterior ventricular septal defect with 5x15mm Dacron patch  2) Tricuspid valve replacement - 33m St. Kt Epic mitral valve     SURGEON: Jason Franco MD     COSURGEON: Dianne Plunkett MD    PROCEDURE: 8/13/18  1) Chest washout  2) Chest closure     SURGEON: Jason Franco MD     ASSISTANT: Michael Viera MD[AS1.2]        Non-operative procedures:   PICC line placement, Interventional radiology drain placement and Interventional radiology non-tunneled dialysis catheter line placement for plateletpheresis           Consultations:   CARDIAC REHAB IP CONSULT  NUTRITION SERVICES ADULT IP CONSULT  SOCIAL WORK IP CONSULT  CORE CLINIC EVALUATION IP CONSULT  HEMATOLOGY IP CONSULT  VASCULAR ACCESS CARE ADULT IP CONSULT  NEUROLOGY GENERAL ADULT IP CONSULT  PHARMACY IP CONSULT  NUTRITION SERVICES ADULT IP CONSULT  CARDIAC REHAB IP CONSULT  PHYSICAL THERAPY ADULT IP CONSULT  OCCUPATIONAL THERAPY ADULT IP CONSULT  PHARMACY TO DOSE VANCO  NUTRITION SERVICES ADULT IP CONSULT  PHARMACY IP CONSULT  CARDIOLOGY ELECTROPHYSIOLOGY (EP) IP CONSULT  VASCULAR ACCESS CARE ADULT IP CONSULT  VASCULAR ACCESS CARE ADULT IP CONSULT  VASCULAR ACCESS CARE ADULT IP CONSULT  VASCULAR ACCESS CARE ADULT IP CONSULT  SWALLOW EVAL SPEECH PATH AT BEDSIDE IP CONSULT  VASCULAR ACCESS CARE ADULT IP CONSULT  CARDIOLOGY ELECTROPHYSIOLOGY (EP) IP CONSULT  GI LUMINAL ADULT IP CONSULT  VASCULAR ACCESS ADULT IP CONSULT  SPIRITUAL HEALTH SERVICES IP CONSULT  SOCIAL WORK IP CONSULT  PHARMACY TO DOSE WARFARIN  INTERVENTIONAL RADIOLOGY  ADULT/PEDS IP CONSULT  INTERVENTIONAL RADIOLOGY ADULT/PEDS IP CONSULT  APHERESIS TRANSFUSION ADULT/PEDS IP CONSULT  INTERVENTIONAL RADIOLOGY ADULT/PEDS IP CONSULT  INTERVENTIONAL RADIOLOGY ADULT/PEDS IP CONSULT  ENT IP CONSULT          Brief History of Illness:   Castillo De Anda is a 68 year old male with a past medical history of coronary artery disease, myeloproliferative syndrome, and TIA who presented from OSH s/p inferior STEMI with drug eluding stents to the distal RCA and proximal LAD.  At time of admission, active problems included cardiogenic shock requiring IABP and inotrope support. Also found to have an ischemic ventricular septal defect, timeline unclear, but thought to have occurred at the time of symptom onset. Mr. De Anda underwent VSD repair and a bioprosthetic tricuspid valve replacement.            Hospital Course:   Castillo De Anda is a 68 year old male was admitted on 8/6/2018. Prior to surgery he required IABP and inotrope support.  Additionally, there was concern for focal seizures prior to surgery. Neurology recommended keppra load and scheduled dosing. Head CT appreciable for encephalomalacia.   Patient underwent surgery on 8/10/2018 for the above-named procedures.  Patient tolerated the operation and was admitted to the CVICU post-operatively with an open-chest due to coagulopathy.  Continued to required IABP and pressor support to maintain hemodynamic stability.  Patient returned to OR on 8/13/2018 for chest washout and closure.  IABP removed 8/14/2018.   Per neurology recommendations, repeat EEGs consistent with moderate encephalopathy, right frontotemporal neuronal dysfunction, evidence of a known skull defect, and an elevated risk of partial epilepsy. Final repeat EEG on 8/16 was without seizure activity. Neuro recommends follow-up as outpatient in 3 months. Keppra discontinued 8/20.   Patient was extubated on POD # 11 after aggressive diuresis.  Pressors were weaned off as able.   Repeat ECHO on 8/20 appreciable for moderate size pericardial effusion with no clear signs of tamponade.   Patient's ICU stay was also remarkable for atrial flutter and SVT.  EP consulted, treated SVT with IV adenosine resulting in approximately 20 seconds of asystole with brief CPR and ROSC.   Prior to discharge from the ICU, patient had two gram hemoglobin drop and had melanotic stools and NG output.  Low intensity heparin stopped and patient received 3 units of PRBC. EGD appreciable for two gastric ulcers with no active bleeding.  Continued IV PPI for couple days, prior to transitioning to high dose PPI PO BID x 8 weeks.      Patient was transferred to the surgical floor on POD # 15.     ENT:   Concern for vocal cord paralysis s/p VSD repair and TVR, requiring prolonged intubation. ENT consult and exam appreciable for paretic left vocal cord. No immediate interventions.  Recommend following up with ENT as outpatient for possible temporary VC injections if there is no improvement in voice.      CV:  Patient started on ASA 81 mg, atorvastatin 40 mg daily, and metoprolol 12.5 mg BID.   Patient converted from sinus rhythm to atrial fibrillation on 8/25/18.  Amiodarone bolus and drip started with successful rhythm conversion.  Transitioned to PO Amiodarone, does have notable prolonged QTc.  TPW were cut following no notable bradycardia.[TY1.1] Last Echo showed stable[AS1.3] VSD patch protruding into the LV cavitity and continues to have mobile filamentous density attached to it.[TY1.1] Spoke with Dr. Franco and he believes it is a fold in the VSD patch.[AS1.4] Patient on coumadin[AS1.3] regardless[AS1.4], will repeat ECHO at F/u with CV surgery[AS1.3] if necessary[AS1.4].[AS1.3]       Resp:   Patient participated in aggressive pulmonary toilet. POD #19 Left pleural chest tube removed.  Follow-up CXR appreciable for left pleural effusion. U/S completed with findings of loculated fluid. No thoracentesis performed per  Dr. Franco.   POD # 26 Right pleural chest tube removed. Follow-up CXR and Chest CT appreciable for small right apical pneumothorax.  IR consulted and right pigtail chest tube placed on 9/8/2018 for treatment of right pneumothorax.  Pigtail removed 9/10/18 following resolution of pneumothorax.     FEN/GI:   Following prolonged intubation, patient started on continuous tube feedings in the ICU, and eventually transition to cycled tube feeds with PO intake.  Speech consulted for risk of aspiration.  Patient's diet advanced as tolerated, discharged on regular diet and thin liquids. Tube feedings discontinued following calorie counts and adequate calorie and protein intake. + flatus, +BM, bowel regimen in place.      Renal:   Creatinine WNL with adequate UOP. Diuresed throughout hospitalization to dry weight.  Plan to discharge on  Lasix[TY1.1] 4[AS1.3]0 mg PO daily[TY1.1] with KCL[AS1.3].      ID:   Completed alban-op abx.  WBC WNL  and trending down, afebrile, no signs or symptoms of infection      Heme:   At time of discharge, patient's hemoglobin stable, no signs or symptoms of bleeding.    Hematology consulted for management of myeloproliferative syndrome and polycythemia vera, accounting for thrombocytosis. Patient started on scheduled Hydrea 1500 mg BID, in addition to plateletpheresis x 2.  Per hematology, stopping plateletpheresis with improvement in platelet counts and will follow-up with hematology as outpatient for further management.[TY1.1] Day prior to discharge patient's dose was decreased to Hydrea 1500 mg qAM and 1,000 mg qpm. He will follow up with Hematology at the [facility] on 9/18. Please send labs to their office.[AS1.3]       Endo:   Blood glucose well controlled without sliding scale insulin.       PPx:   Discharged on protonix EC 40 mg BID per GI recommendation s/p GI bleed.       Anticoagulation:   Started on low intensity heparin for atrial fibrillation with bridge to warfarin. INR goal 2-3 x  3 months[TY1.1] for a-fib/TVR tissue[AS1.3].  Plavix 75 mg daily for at least one year s/p HUSSEIN to dRCA and pLAD.[TY1.1] No aspirin due to coumadin and Plavix to avoid triple therapy.[AS1.3]     Post-operative pain control included PO oxycodone and tylenol  and will be discharged on[TY1.1] just Tylenol as patient no longer requiring narcotics[AS1.3].     Prior to discharge, patient's pain was controlled well, he was able to perform most ADLs and ambulate without difficulty, and had full return of bowel and bladder function.  On September 1[TY1.1]1[AS1.3], 2018, he was[TY1.1] Discharged to rehabilitation facility[AS1.3] in stable condition.      Day of Discharge Exam   Vitals:[TY1.1]  Temp:  [97.7  F (36.5  C)-98.7  F (37.1  C)] 97.8  F (36.6  C)  Pulse:  [89] 89  Heart Rate:  [87-96] 87  Resp:  [18] 18  BP: ()/(61-80) 114/80  SpO2:  [93 %-100 %] 100 %[AS1.5]  Wt: 160 lbs 0 oz, 72 kg      MAPs:[TY1.1] 60's[AS1.3]  Physical Exam:   Gen:  NAD, conversational  CV:  S1S2 normal, no murmurs, rubs, or gallops  Pulm:  CTA, no rhonchi or wheezes  Abd:  Soft, nondistended, NTTP  Ext:[TY1.1] 1+[AS1.3] lower extremity edema  Incision:  Clean, dry, intact, no erythema      Labs:   BMP[TY1.1]    Recent Labs  Lab 09/11/18  0526 09/10/18  0630 09/09/18  0634 09/08/18  0610   * 131* 131* 132*   POTASSIUM 4.1 4.1 4.4 4.2   CHLORIDE 99 99 99 100   CO2 26 24 25 25   BUN 13 13 17 16   CR 0.68 0.72 0.72 0.70   GLC 77 81 98 74   MAG 2.0 1.9 2.1 1.9[AS1.5]     CBC[TY1.1]    Recent Labs  Lab 09/11/18  0526 09/10/18  0630 09/09/18  0634 09/08/18  0610   WBC 3.7* 3.8* 4.9 4.8   RBC 2.44* 2.43* 2.75* 2.52*   HGB 7.8* 7.7* 8.7* 8.1*   HCT 24.4* 24.7* 27.5* 25.6*    102* 100 102*   MCH 32.0 31.7 31.6 32.1   MCHC 32.0 31.2* 31.6 31.6   RDW 24.7* 24.7* 24.5* 24.4*    405 468* 496*[AS1.5]     INR[TY1.1]    Recent Labs  Lab 09/11/18  0526 09/10/18  0630 09/09/18  0634 09/08/18  0610   INR 2.08* 1.90* 1.21* 1.79*[AS1.5]       Hepatic Panel[TY1.1]     Recent Labs  Lab 09/05/18  0616   AST 21   ALT 53   ALKPHOS 153*   BILITOTAL 0.4   ALBUMIN 2.2*[AS1.5]            Imaging Studies:   CXR[TY1.1]   9/10/18  3:33 PM [id number] [facility], [place],  Radiology    [employer] Interactive Report and InfoRx   View the interactive report   PACS Images   Show images for XR Chest Port 1 View   Study Result   Exam: XR CHEST PORT 1 VW, 9/10/2018 3:33 PM     Indication: CT removed;      Comparison: Same-day radiograph     Findings:   AP view of the chest. Postsurgical changes of VSD repair. Interval  removal of right apically directed chest tube. Stable position of  right PICC tip.The trachea is midline. The cardiomediastinal  silhouette is stable. Stable opacity of the right lung base, seen as  blood products in the major fissure on comparison CT. No pneumothorax.  Stable small bilateral pleural effusions. Unchanged perihilar  opacities.         Impression:   1. Removal of apically directed right chest tube without recurrent  pneumothorax.  2. Stable opacity of the right lung base, seen is blood products in  the major fissure on comparison CT.  3. Stable appearance of bilateral pleural effusions.     I have personally reviewed the examination and initial interpretation  and I agree with the findings.     TARAN LOCKE MD[AS1.3]         ECHO 9/7/2018   Compared to study done 8/27/18 the pericardial effusion is decreased in size  and now only trivial.  VSD patch protruding into the LV cavitity and continues to have mobile  filamentous density attached to it.  Limited Color Doppler without signs of shunting.  IVC diameter at upper normal measuring 2.2 cm and without compression.         Final Pathology/Culture Result:   Heart, interventricular septum, repair:   Extensive myocardial necrosis, consistent with recent (3-7 days)   myocardial infarction       Drains/tubes Present at Discharge   None         Medications Prior to Admission:     No prescriptions prior  to admission.             Discharge Medications:[TY1.1]        Review of your medicines      START taking       Dose / Directions    acetaminophen 325 MG tablet   Commonly known as:  TYLENOL   Used for:  S/P VSD repair        Dose:  650 mg   2 tablets (650 mg) by Oral or Feeding Tube route every 4 hours as needed for other (multimodal surgical pain management along with NSAIDS and opioid medication as indicated based on pain control and physical function.)   Quantity:  100 tablet   Refills:  0       amiodarone 200 MG tablet   Commonly known as:  PACERONE/CODARONE   Used for:  Paroxysmal atrial fibrillation (H)        Dose:  200 mg   Start taking on:  9/22/2018   Take 1 tablet (200 mg) by mouth daily   Quantity:  60 tablet   Refills:  0       atorvastatin 80 MG tablet   Commonly known as:  LIPITOR   Used for:  S/P VSD repair        Dose:  80 mg   1 tablet (80 mg) by Oral or Feeding Tube route every evening   Quantity:  30 tablet   Refills:  0       Carboxymethylcellulose Sod PF 0.5 % Soln ophthalmic solution   Commonly known as:  REFRESH PLUS   Used for:  S/P VSD repair        Dose:  1 drop   Place 1 drop into both eyes 3 times daily as needed for dry eyes   Quantity:  1 Bottle   Refills:  0       clopidogrel 75 MG tablet   Commonly known as:  PLAVIX   Used for:  S/P VSD repair        Dose:  75 mg   Start taking on:  9/12/2018   Take 1 tablet (75 mg) by mouth daily   Quantity:  30 tablet   Refills:  0       furosemide 40 MG tablet   Commonly known as:  LASIX   Used for:  S/P VSD repair        Dose:  40 mg   Start taking on:  9/12/2018   Take 1 tablet (40 mg) by mouth daily   Quantity:  30 tablet   Refills:  0       * hydroxyurea 500 MG capsule CHEMO   Commonly known as:  HYDREA   Indication:  Essential Thrombocythemia   Used for:  Polycythemia vera (H)        Dose:  1000 mg   Take 2 capsules (1,000 mg) by mouth every evening   Refills:  0       * hydroxyurea 500 MG capsule CHEMO   Commonly known as:  HYDREA    Indication:  Essential Thrombocythemia   Used for:  S/P VSD repair        Dose:  1500 mg   Start taking on:  9/12/2018   Take 3 capsules (1,500 mg) by mouth daily   Refills:  0       levothyroxine 75 MCG tablet   Commonly known as:  SYNTHROID/LEVOTHROID   Used for:  S/P VSD repair        Dose:  75 mcg   Start taking on:  9/12/2018   1 tablet (75 mcg) by Oral or Feeding Tube route daily   Quantity:  30 tablet   Refills:  0       melatonin 1 MG Tabs tablet   Used for:  S/P VSD repair        Dose:  1 mg   Take 1 tablet (1 mg) by mouth nightly as needed for sleep   Refills:  0       methocarbamol 500 MG tablet   Commonly known as:  ROBAXIN   Used for:  S/P VSD repair        Dose:  500 mg   1 tablet (500 mg) by Oral or Feeding Tube route 4 times daily as needed for muscle spasms   Quantity:  120 tablet   Refills:  0       metoprolol tartrate 25 MG tablet   Commonly known as:  LOPRESSOR   Used for:  S/P VSD repair        Dose:  12.5 mg   Take 0.5 tablets (12.5 mg) by mouth 2 times daily   Quantity:  60 tablet   Refills:  0       multivitamin, therapeutic with minerals Tabs tablet   Used for:  S/P VSD repair        Dose:  1 tablet   Start taking on:  9/12/2018   Take 1 tablet by mouth daily   Quantity:  30 each   Refills:  0       pantoprazole 40 MG EC tablet   Commonly known as:  PROTONIX   Used for:  S/P VSD repair        Dose:  40 mg   Take 1 tablet (40 mg) by mouth 2 times daily (before meals)   Quantity:  30 tablet   Refills:  0       potassium chloride SA 20 MEQ CR tablet   Commonly known as:  K-DUR/KLOR-CON M   Used for:  S/P VSD repair        Dose:  20 mEq   Start taking on:  9/12/2018   Take 1 tablet (20 mEq) by mouth daily   Quantity:  90 tablet   Refills:  0       senna-docusate 8.6-50 MG per tablet   Commonly known as:  SENOKOT-S;PERICOLACE   Used for:  S/P VSD repair        Dose:  1 tablet   1 tablet by Oral or Feeding Tube route 2 times daily   Quantity:  100 tablet   Refills:  0        umeclidinium-vilanterol 62.5-25 MCG/INH oral inhaler   Commonly known as:  ANORO ELLIPTA   Used for:  S/P VSD repair        Dose:  1 puff   Start taking on:  9/12/2018   Inhale 1 puff into the lungs daily   Refills:  0       warfarin 5 MG tablet   Commonly known as:  COUMADIN   Used for:  S/P VSD repair        Dose:  5 mg   Take 1 tablet (5 mg) by mouth daily   Quantity:  30 tablet   Refills:  0       * Notice:  This list has 2 medication(s) that are the same as other medications prescribed for you. Read the directions carefully, and ask your doctor or other care provider to review them with you.         Where to get your medicines      Some of these will need a paper prescription and others can be bought over the counter. Ask your nurse if you have questions.     You don't need a prescription for these medications      acetaminophen 325 MG tablet     amiodarone 200 MG tablet     atorvastatin 80 MG tablet     Carboxymethylcellulose Sod PF 0.5 % Soln ophthalmic solution     clopidogrel 75 MG tablet     furosemide 40 MG tablet     hydroxyurea 500 MG capsule CHEMO     hydroxyurea 500 MG capsule CHEMO     levothyroxine 75 MCG tablet     melatonin 1 MG Tabs tablet     methocarbamol 500 MG tablet     metoprolol tartrate 25 MG tablet     multivitamin, therapeutic with minerals Tabs tablet     pantoprazole 40 MG EC tablet     potassium chloride SA 20 MEQ CR tablet     senna-docusate 8.6-50 MG per tablet     umeclidinium-vilanterol 62.5-25 MCG/INH oral inhaler     warfarin 5 MG tablet[AS1.6]                  Discharge Instructions and Follow-Up:   Avoid lifting anything greater than ten pounds for 6 weeks after surgery and then less than 20 pounds for an additional 6 weeks.    No driving for 4 weeks after surgery or while on pain medication. If you had a minimally invasive procedure, you may drive after three weeks if not taking pain medication.      Avoid strenuous activities such as bowling, vacuuming, raking, shoveling,  golf or tennis for 12 weeks after your surgery. Splint chest incision by hugging a pillow or bringing arms across chest when coughing or sneezing. Avoid pushing off with arms when getting up for the first month if sternum was opened.    Shower or wash incisions daily with soap and water (or as instructed), pat dry. Watch for signs of infection: increased redness, tenderness, warmth or any drainage that appears infected (pus like) or is persistent.  Also a temperature > 100.5 F or chills. Call surgeon or primary care provider's office immediately. Remove any skin glue left on incisions after 10 days. This will not affect the incision and can speed up healing.    Avoid sitting for prolonged periods of time, try to walk every hour during the day. If patient has a leg incision, patient should elevate leg often when not walking.    Check weight when arriving at home from the hospital and continue to check it daily through recovery for at least a month. Patient instructed to call with any weight gain more than 3 pounds overnight or 5 pounds in a week.     We recommend using stool softeners while using narcotics for pain.      DENTAL VISITS AFTER SURGERY  If a heart valve was repaired or replaced, we do not recommend having any dental work done for 6 months and we recommend taking an antibiotic prior to dental visits from now on.  Patient is to notify their dentist before any procedure for the proper treatment needed. The antibiotic is taken by mouth one hour prior to visit. This includes routine cleanings.    POST-OPERATIVE CLINIC VISITS  Patient has a follow up visit with [facility] Advance Care Practitioners on[TY1.1] 9/21/18 at 1:30pm[AS1.3] at the [facility].  They will then return to the care of their primary physician and cardiologist. Instructed to see PCP within 3-5 days of discharge and cardiologist at 1 month post surgery.         Home Health Care:[TY1.1]   N/A- discharged to rehab  facility[AS1.3]           Discharge Disposition:[TY1.1]   Discharged to rehabilitation facility[AS1.3]      Condition at discharge:[TY1.1] Stable    Janice Ellis PA-C  Cardiothoracic Surgery  Pager [phone]  2018[AS1.3]     CC:Vinod Blanco[TY1.1]         Revision History        User Key Date/Time User Provider Type Action    > AS1.4 2018  2:35 PM Janice Disla PA-C Physician Assistant Addend     AS1.2 2018  1:19 PM Janice Disla PA-C Physician Assistant Addend     AS1.6 2018  1:14 PM Janice Disla PA-C Physician Assistant Sign     AS1.5 2018  1:09 PM Janice Disla PA-C Physician Assistant      AS1.1 2018 12:58 PM Janice Disla PA-C Physician Assistant      AS1.3 2018 12:56 PM Janice Disla PA-C Physician Assistant      TY1.1 9/10/2018  2:49 PM Phyllis Padron, STEPHANIE CNP Nurse Practitioner Share                     Consult Notes      Consults by Leny Oconnell MD at 2018 11:55 AM     Author:  Leny Oconnell MD Service:  Hematology Author Type:  Physician    Filed:  2018 11:59 AM Date of Service:  2018 11:55 AM Creation Time:  2018 11:55 AM    Status:  Signed :  Leny Oconnell MD (Physician)             Hematology Consult Service     Follow Up Consult Note:    Patient: Castillo De Anda  MRN: [id number]  : 1950  DOS: 2018  Date Admitted:2018  Hospital Day:34      Primary Team: [facility]  Other Inpatient Consultants: Cardiology, Transfusion Medicine    Reason for Consultation:  Hematology is consulted on Castillo De Anda for evaluation and treatment of Jak2+ Myeloproliferative neoplasm.   __________________________________________________________________________________________________________________________________________________________________________________________________________________________________________  Assessment:  In summary, Castillo De Anda is 68 year old man with JAK2+  myeloproliferative neoplasm (polycythemia vera), recently switched by primary heme/onc provider from Hydrea to Anagrelide with very poor tolerance. Reason for switching apparently was that his anemia was felt to be due to Hydrea.  He is admitted s/p STEMI with ischemia-related VSD requiring surgical intervention. He is high-risk for ongoing thrombocytosis and we restarted hydrea on 8/7/18.   He completed two sessions of plateletpheresis with platelet counts of 359 followed by 397. Although new NCCN guidelines give a target platelet count of 425, there is no strong evidence for a particular target. Goal of therapy was to reduce count to <1 million to reduce overall risk of both thrombosis and bleeding complications from acquired von willebrand disease.      Discuss with patient the rational for edgardo-2 inhibition moving forward. Would like to obtain outpatient bone marrow in future. Patient would like to transfer care to [facility] in future. Outpatient appointment with Dr. Oconnell set for 9/18.    Please send all labs to Dr. Oconnell's RN Care Coordinator: DANNY ShahN, RN, OCN       Recommendations:  1. Continue with HU at 20 mg/kg dose.  2. Patient needs weekly CBC with differential and comprehensive metabolic panel.   3. Would continue systemic anticoagulation (coumadin) with INR goal 2-3    We will continue to follow the patient peripherally.     Leny Oconnell MD/PhD   of Medicine  Division of Hematology, Oncology and Transplantation  Pager [phone]  09/09/2018 11:55 AM    __________________________________________________________________________________________________________________________________________________________________________________________________________________________________________  Interval History:  Patient up and out of bed. SOB improved and working with PT.      Medications:  Facility Administered Medications as of 9/9/2018             acetaminophen (TYLENOL)  "tablet 650 mg 2 tablets (650 mg) by Oral or Feeding Tube route every 4 hours as needed for other (multimodal surgical pain management along with NSAIDS and opioid medication as indicated based on pain control and physical function.)    albuterol neb solution 2.5 mg Take 3 mLs (2.5 mg) by nebulization every 4 hours as needed for wheezing    amiodarone (PACERONE/CODARONE) tablet 200 mg Take 1 tablet (200 mg) by mouth daily    atorvastatin (LIPITOR) tablet 80 mg 1 tablet (80 mg) by Oral or Feeding Tube route every evening    benzocaine-menthol (CEPACOL) 15-3.6 MG lozenge 1 lozenge Place 1 lozenge inside cheek every hour as needed for sore throat    calcium gluconate 1 g in D5W 100 mL intermittent infusion Inject 1 g into the vein continuous prn for calcium supplementation (For ionized calcium less than 4.5)    Carboxymethylcellulose Sod PF (REFRESH PLUS) 0.5 % ophthalmic solution 1 drop Place 1 drop into both eyes 3 times daily as needed for dry eyes    clopidogrel (PLAVIX) tablet 75 mg Take 1 tablet (75 mg) by mouth daily    dextrose 50 % injection 25-50 mL Inject 25-50 mLs into the vein every 15 minutes as needed for low blood sugar    Linked Group 1:  \"Or\" Linked Group Details     furosemide (LASIX) tablet 20 mg Take 1 tablet (20 mg) by mouth daily    glucagon injection 1 mg Inject 1 mg Subcutaneous every 15 minutes as needed for low blood sugar (May repeat x 1 only)    Linked Group 1:  \"Or\" Linked Group Details     glucose gel 15-30 g Take 15-30 g by mouth every 15 minutes as needed for low blood sugar    Linked Group 1:  \"Or\" Linked Group Details     heparin lock flush 10 UNIT/ML injection 5-10 mL 5-10 mLs by Intracatheter route every 24 hours    heparin lock flush 10 UNIT/ML injection 5-10 mL 5-10 mLs by Intracatheter route every hour as needed for other (to lock each CVC - Open Ended (Tunneled and Non-Tunneled) dormant lumen.)    hydrALAZINE (APRESOLINE) injection 10 mg Inject 0.5 mLs (10 mg) into the vein every " 30 minutes as needed for high blood pressure (Systolic Blood Pressure greater than 140 mmHg or Diastolic Blood Pressure greater than 90 mmHg)    HYDROmorphone (PF) (DILAUDID) injection 0.3-0.5 mg Inject 0.3-0.5 mg into the vein every 2 hours as needed for other (pain control or improvement in physical function. Hold dose for analgesic side effects.)    hydroxyurea (HYDREA) capsule CHEMO 1,500 mg Take 3 capsules (1,500 mg) by mouth 2 times daily    levothyroxine (SYNTHROID/LEVOTHROID) tablet 75 mcg 1 tablet (75 mcg) by Oral or Feeding Tube route daily    lidocaine (LMX4) cream Apply topically once as needed for moderate pain (for local anesthetic during PICC insertion)    lidocaine 1 % 1 mL 1 mL by Other route every hour as needed (mild pain with VAD insertion or accessing implanted port)    lidocaine 3% (non-sterile), phenylephrine 0.25% solution for irrigation Irrigate with 5 mLs as directed once    magnesium sulfate 2 g in NS intermittent infusion (Phar[employer] or FV Cmpd) Inject 50 mLs (2 g) into the vein daily as needed for magnesium supplementation    magnesium sulfate 4 g in 100 mL sterile water (premade) Inject 100 mLs (4 g) into the vein every 4 hours as needed for magnesium supplementation    May continue current IV fluids if patient has IV fluids infusing. continuous prn    melatonin tablet 1 mg Take 1 tablet (1 mg) by mouth nightly as needed for sleep    methocarbamol (ROBAXIN) tablet 500 mg 1 tablet (500 mg) by Oral or Feeding Tube route 4 times daily as needed for muscle spasms    metoprolol (LOPRESSOR) injection 2.5 mg Inject 2.5 mLs (2.5 mg) into the vein every 4 hours as needed for other (For sustained HR>140)    metoprolol tartrate (LOPRESSOR) half-tab 12.5 mg Take 1 half-tab (12.5 mg) by mouth 2 times daily    multivitamin, therapeutic with minerals (THERA-VIT-M) tablet 1 tablet Take 1 tablet by mouth daily    naloxone (NARCAN) injection 0.1-0.4 mg Inject 0.25-1 mLs (0.1-0.4 mg) into the vein every 2  "minutes as needed for opioid reversal    ondansetron (ZOFRAN) injection 4 mg Inject 2 mLs (4 mg) into the vein every 6 hours as needed for nausea or vomiting    Linked Group 2:  \"Or\" Linked Group Details     ondansetron (ZOFRAN-ODT) ODT tab 4 mg Take 1 tablet (4 mg) by mouth every 6 hours as needed for nausea or vomiting    Linked Group 2:  \"Or\" Linked Group Details     oxyCODONE IR (ROXICODONE) tablet 5-10 mg 1-2 tablets (5-10 mg) by Per Feeding Tube route every 4 hours as needed for other (pain control or improvement in physical function. Hold dose for analgesic side effects.)    pantoprazole (PROTONIX) EC tablet 40 mg Take 1 tablet (40 mg) by mouth 2 times daily (before meals)    potassium chloride SA (K-DUR/KLOR-CON M) CR tablet 10 mEq Take 1 tablet (10 mEq) by mouth daily    potassium phosphate 15 mmol in D5W 250 mL intermittent infusion Inject 15 mmol into the vein daily as needed for phosphorous supplementation    potassium phosphate 20 mmol in D5W 250 mL intermittent infusion Inject 20 mmol into the vein every 6 hours as needed for phosphorous supplementation    potassium phosphate 20 mmol in D5W 500 mL intermittent infusion Inject 20 mmol into the vein every 6 hours as needed for phosphorous supplementation    potassium phosphate 25 mmol in D5W 500 mL intermittent infusion Inject 25 mmol into the vein every 8 hours as needed for phosphorous supplementation    prochlorperazine (COMPAZINE) injection 5 mg Inject 1 mL (5 mg) into the vein every 6 hours as needed for nausea or vomiting    Linked Group 3:  \"Or\" Linked Group Details     prochlorperazine (COMPAZINE) tablet 5 mg Take 1 tablet (5 mg) by mouth every 6 hours as needed for nausea or vomiting    Linked Group 3:  \"Or\" Linked Group Details     senna-docusate (SENOKOT-S;PERICOLACE) 8.6-50 MG per tablet 2 tablet 2 tablets by Oral or Feeding Tube route 2 times daily    Linked Group 4:  \"Or\" Linked Group Details     senna-docusate (SENOKOT-S;PERICOLACE) 8.6-50 " "MG per tablet 1 tablet 1 tablet by Oral or Feeding Tube route 2 times daily    Linked Group 4:  \"Or\" Linked Group Details     sodium chloride (PF) 0.9% PF flush 10-20 mL 10-20 mLs by Intracatheter route every hour as needed for line flush or post meds or blood draw    sodium chloride (PF) 0.9% PF flush 3 mL Inject 3 mLs into the vein q1 min prn for line flush (after medication administration. For peripheral IV flush post IV meds)    sodium chloride (PF) 0.9% PF flush 3 mL 3 mLs by Intracatheter route every hour as needed for line flush (for peripheral IV flush post IV meds)    sodium chloride (PF) 0.9% PF flush 3 mL 3 mLs by Intracatheter route every 8 hours    sodium chloride (PF) 0.9% PF flush 5-50 mL 5-50 mLs by Intracatheter route once as needed for line flush (to flush each lumen with line placement)    umeclidinium-vilanterol (ANORO ELLIPTA) 62.5-25 MCG/INH oral inhaler 1 puff Inhale 1 puff into the lungs daily    warfarin (COUMADIN) tablet 6 mg Take 2 tablets (6 mg) by mouth Once at 6pm    Warfarin Therapy Reminder (Check START DATE - warfarin may be starting in the FUTURE) 1 each continuous prn    HOLD MEDICATION (Discontinued) HOLD    potassium chloride (KLOR-CON) Packet 20-40 mEq (Discontinued) 20-40 mEq by Oral or Feeding Tube route every 2 hours as needed for potassium supplementation    potassium chloride 10 mEq in 100 mL intermittent infusion with 10 mg lidocaine (Discontinued) Inject 100 mLs (10 mEq) into the vein every hour as needed for potassium supplementation    potassium chloride 10 mEq in 100 mL sterile water intermittent infusion (premix) (Discontinued) Inject 100 mLs (10 mEq) into the vein every hour as needed for potassium supplementation    potassium chloride 20 mEq in 50 mL intermittent infusion (Discontinued) Inject 50 mLs (20 mEq) into the vein every hour as needed for potassium supplementation    potassium chloride SA (K-DUR/KLOR-CON M) CR tablet 20-40 mEq (Discontinued) Take 2-4 " tablets (20-40 mEq) by mouth every 2 hours as needed for potassium supplementation    QUEtiapine (SEROquel) half-tab 12.5 mg (Discontinued) 1 half-tab (12.5 mg) by Oral or Feeding Tube route 2 times daily as needed (anxiety)    Reason ACE/ARB/ARNI order not selected (Discontinued) by Other route DOES NOT GO TO MAR for other    Reason beta blocker not prescribed (Discontinued) DOES NOT GO TO MAR for other    Reason beta blocker order not selected (Discontinued) DOES NOT GO TO MAR for other          Objective:  Temp: 98.3  F (36.8  C) Temp src: Oral BP: 90/65 Pulse: 92 Heart Rate: 104 Resp: 16 SpO2: 97 % O2 Device: None (Room air) Oxygen Delivery: 2 LPM    Exam:   Constitutional: up in chair, Appears well, no distress  HEENT: No scleral icterus or hemorrhage. Mucous membranes moist with no wet purpura.   CV: scar present on chest  Respiratory: no accessory muscle use  Mus/Skele: no edema  Skin: no petechiae, no ecchymosis.  Neuro:  AOx3    Labs: personally reviewed with relevant trends annotated below  CBC RESULTS:   Recent Labs   Lab Test  09/08/18   0610   WBC  4.8   RBC  2.52*   HGB  8.1*   HCT  25.6*   MCV  102*   MCH  32.1   MCHC  31.6   RDW  24.4*   PLT  496*[JB1.1]          Revision History        User Key Date/Time User Provider Type Action    > JB1.1 9/9/2018 11:59 AM Leny Oconnell MD Physician Sign            Consults by Yazmin Michael PA-C at 9/7/2018 11:13 AM     Author:  Yazmin Michael PA-C Service:  Otolaryngology/ENT Author Type:  Physician Assistant - C    Filed:  9/7/2018  1:56 PM Date of Service:  9/7/2018 11:13 AM Creation Time:  9/7/2018 11:13 AM    Status:  Signed :  Yazmin Michael PA-C (Physician Assistant - C)     Consult Orders:    1. ENT IP Consult: hoarse voice s/p VSD repair and TVR; Consultant may enter orders: Yes; Patient to be seen: Routine - within 24 hours [[id number]] ordered by Phyllis Padron APRN CNP at 09/07/18 1006                Otolaryngology Consult  Note  September 7, 2018      CC: We were asked to evaluate the patient for[TV1.1] a cause of his postoperative hoarseness[TV1.2]    HPI: Castillo De Anda is a 68 year old male with a past medical history of meningioma s/p craniotomy and resection (2008) c/b DVT, polycythemia vera, and hx of STEMI c/b postinfarct VSD s/p VSD repair and TVR on 8/10/18.[TV1.1] He was kept with an open chest due to coagulopathy and RV dysfunction.[CS1.1] He was extubated on 8/21/18 (POD#11).[TV1.1] The patient reports that his voice has been hoarse since his extubation. He describes his voice as gravelly and is only able to speak in short sentences[TV1.2] due to loss of breath[CS1.1]. He occasionally feels secretions pool in his throat but he is able to cough them up. He is tolerating a regular diet[TV1.2] without signs or symptoms of aspiration[CS1.1].[TV1.2] He was evaluated by speech therapy earlier on this admission who gave him vocal cord strengthening exercises for his voice and recommended outpatient ENT follow up.[CS1.1] He denies any stridor, coughing or choking when drinking, or throat pain[TV1.2]/odynophagia[CS1.1].[TV1.2]    He had b/l chest tube placed from his surgery had has had b/l pleural effusions ost-op. He now has a right apical pneumothorax and will undergo pigtail placement by IR.[CS1.1]       Past Medical History:   Diagnosis Date     DVT (deep venous thrombosis) (H) 2008    after craniotomy     Meningioma (H)      Polycythemia vera (H)      Prostate cancer (H)        Past Surgical History:   Procedure Laterality Date     ANKLE SURGERY Right 04/2018     APPENDECTOMY       CRANIOTOMY Right 2008     ESOPHAGOSCOPY, GASTROSCOPY, DUODENOSCOPY (EGD), COMBINED N/A 8/24/2018    Procedure: COMBINED ESOPHAGOSCOPY, GASTROSCOPY, DUODENOSCOPY (EGD);  Upper Endoscopy with No Intervention; Two Gastric Ulcers;  Surgeon: Rocael Barber MD;  Location: [facility] OR     IRRIGATION AND DEBRIDEMENT CHEST WASHOUT, COMBINED N/A 8/13/2018  "   Procedure: COMBINED IRRIGATION AND DEBRIDEMENT CHEST WASHOUT;  COMBINED IRRIGATION AND DEBRIDEMENT CHEST WASHOUT, Closure;  Surgeon: Jason Franco MD;  Location: [facility] OR     PROSTATECTOMY PERINEAL  2004     REPAIR VENTRICULAR SEPTAL DEFECT N/A 8/10/2018    Procedure: REPAIR VENTRICULAR SEPTAL DEFECT;  Median Sternotomy, REPAIR VENTRICULAR SEPTAL DEFECT on pump oxygenation, Tricuspid valve replacement ;  Surgeon: Jason Franco MD;  Location: [facility] OR     SPLENECTOMY      childhood     THYROID SURGERY  2012       No current outpatient prescriptions on file.          Allergies   Allergen Reactions     Anagrelide Rash     Noted to have small rash and nose bleeds after starting the prescription (prior to August 2018 hospitalization; prescribed by [place] clinician); dosage had then been increased (by [place] clinician) and subsequently developed full body rash within 4 hours of increased dose.       Social History     Social History     Marital status:      Spouse name: Radha     Number of children: N/A     Years of education: N/A     Occupational History     Not on file.     Social History Main Topics     Smoking status: Former Smoker     Smokeless tobacco: Never Used     Alcohol use 1.2 oz/week     2 Shots of liquor per week     Drug use: Not on file     Sexual activity: Not on file     Other Topics Concern     Not on file     Social History Narrative    Wife is Radha       Family History   Problem Relation Age of Onset     Hypertension Mother      Cerebrovascular Disease Paternal Uncle        ROS: 12 point review of systems is negative unless noted in HPI.    PHYSICAL EXAM:  /66 (BP Location: Left arm)  Pulse 94  Temp 97.6  F (36.4  C) (Oral)  Resp 20  Ht 1.753 m (5' 9\")  Wt 72.7 kg (160 lb 4.8 oz)  SpO2 99%  BMI 23.67 kg/m2    General: laying[TV1.1] reclined[TV1.2] bed, no acute distress  HEAD: normocephalic, atraumatic  Face: symmetrical, CN VII intact bilaterally (HB 1), no " swelling, edema, or erythema.  Eyes: EOMI without spontaneous or gaze evoked nystagmus, clear sclera  Ears:[TV1.1] normal external ears[TV1.2]  Nose:[TV1.1] normal external nose, no anterior drainage, dry mucosa[TV1.2]  Neck: no LAD, trachea midline  Neuro: cranial nerves 2-12 grossly intact[TV1.1]  Pulm: breathing comfortably on 1.5 L NC, no stridor. Strong cough. Voice hoarse, speaks in short breathy sentences.   CV: extremities are warm and well perfused  Psych: pleasant affect[TV1.2]    FIBEROPTIC ENDOSCOPY:  Due to[TV1.1] hoarseness[TV1.2], fiberoptic laryngoscopy was indicated. After obtaining verbal consent, the nose was topically decongested and anesthetized. The fiberoptic laryngoscope was passed under endoscopic vision through the[TV1.1] right[TV1.2] nasal passage.[TV1.1] The mucosa was dry and crusty, but[TV1.2] other[TV1.1]wise clear, no purulence or masses[TV1.2]. The nasopharynx was clear. The soft palate appeared normal with good mobility. The epiglottis was sharp, and the visualized portion of the vallecula[TV1.1] and base of tongue were[TV1.2] clear.[TV1.1] Right vocal cord was fully mobile with normal epithelium. Left vocal cord paresis with normal epithelium.[TV1.2] There seems to be some very minor motion of the left cord.[CS1.1] Good glottic closure during cough and phonation. The hypopharynx was clear.[TV1.2]     ROUTINE IP LABS (Last four results)  BMP  Recent Labs  Lab 09/07/18  0653 09/06/18  0548 09/05/18  0616 09/04/18  0557   * 131* 131* 134   POTASSIUM 4.2 4.3 4.1 4.3   CHLORIDE 100 101 102 103   GABRIELA 7.8* 7.5* 7.7* 7.6*   CO2 24 23 23 25   BUN 14 13 12 12   CR 0.65* 0.62* 0.77 0.73   GLC 90 83 81 77     CBC  Recent Labs  Lab 09/07/18  0653 09/06/18  0548 09/05/18  0616 09/04/18  0557   WBC 5.5 6.3 9.2 6.5   RBC 2.72* 2.82* 2.96* 2.88*   HGB 8.6* 8.8* 9.2* 8.9*   HCT 27.0* 28.7* 29.9* 29.3*   MCV 99 102* 101* 102*   MCH 31.6 31.2 31.1 30.9   MCHC 31.9 30.7* 30.8* 30.4*   RDW 24.4*  24.1* 23.6* 23.5*   * 596* 586* 550*     INR  Recent Labs  Lab 09/07/18  0653 09/06/18  0548 09/05/18  0616 09/04/18  0557   INR 2.45* 2.67* 2.21* 2.49*         Assessment and Plan  Castillo De Anda is a 68 year old male with a past medical history of STEMI c/b postinfarct VSD s/p VSD repair and TVR[TV1.1] with median sternotomy[TV1.2] on 8/10/18[TV1.1] requiring prolonged intubation with hoarseness. Ddx includes vocal cord paresis/paralysis, laryngeal edema, vocal cord polyp vs granuloma from traumatic intubation. 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 The patient is compensating well with good glottic closure, no signs or symptoms of aspiration and strong cough, SLP has already evaluated the patient and is safe for a regular diet. Discussed with patient that he may regain function in time. We would like to see him as an outpatient in 2-4 weeks to reassess and discuss temporary VC injection if[TV1.2] there is no improvement in his voice and this continues to be[CS1.1] bothersome.    - no indication for[TV1.2] acute[CS1.1] intervention at this time  -[TV1.2] recommend f[CS1.1]ollow up as an outpatient in 2-4 weeks for reevaluation[TV1.2] if symptoms do not improve[CS1.1]  - please contact ENT with any questions or concerns[TV1.2]    -- Will discuss patient and above plan with Dr. Tra Mcihael PA-C[CS1.1]  Otolaryngology-Head & Neck Surgery  Please page ENT with questions by dialing [phone] and entering job code [phone] when prompted.[TV1.1]       Revision History        User Key Date/Time User Provider Type Action    > CS1.1 9/7/2018  1:56 PM Yazmin Michael PA-C Physician Assistant - C Sign     TV1.2 9/7/2018  1:33 PM Vahid Cardozo MD Resident Share     TV1.1 9/7/2018 11:13 AM Vahid Cardozo MD Resident             Consults by Jason Tello PA-C at 9/7/2018  9:54 AM     Author:  Jason Tello PA-C Service:  Interventional Radiology  Author Type:  Physician Assistant    Filed:  9/7/2018 11:53 AM Date of Service:  9/7/2018  9:54 AM Creation Time:  9/7/2018  9:53 AM    Status:  Signed :  Jason Tello PA-C (Physician Assistant)     Consult Orders:    1. Interventional Radiology Adult/Peds IP Consult: Patient to be seen: Routine within 24 hours; Call back #: [phone]; right apical pneumothorax [[id number]] ordered by Phyllis Padron APRN CNP at 09/07/18 0822                INTERVENTIONAL RADIOLOGY CONSULT    Castillo De Anda is a 68 year old male with crecent cardiothoracic surgery who had a right chest tube removed 9/5 now found to have enlarging pneumothorax so IR has been consulted for apical chest tube placement.    Patient is on IR schedule for[AM1.1] tomorrow for[AM1.2] a[AM1.1]n apical chest tube placement[AM1.3].[AM1.1] INR is elevated at 2.45 due to warfarin which was held for 1 day.[AM1.3] Team to correct prior to procedure tomorrow, 9/8[AM1.2].[AM1.3] No NPO required. Consent will be done prior to procedure.[AM1.1] Discussed with Phyllis Padron PA-C and Dr. Martinez[AM1.2]    Immanuel Tello PA-C  Interventional Radiology  [phone][AM1.1]       Revision History        User Key Date/Time User Provider Type Action    > AM1.2 9/7/2018 11:53 AM Jason Tello PA-C Physician Assistant Sign     [N/A] 9/7/2018  9:57 AM Jason Tello PA-C Physician Assistant Share     AM1.3 9/7/2018  9:54 AM Jason Tello PA-C Physician Assistant Share     AM1.1 9/7/2018  9:53 AM Jason Tello PA-C Physician Assistant             Consults by Leny Oconnell MD at 9/2/2018  3:08 PM     Author:  Leny Oconnell MD Service:  Hematology Author Type:  Physician    Filed:  9/2/2018  3:11 PM Date of Service:  9/2/2018  3:08 PM Creation Time:  9/2/2018  3:07 PM    Status:  Signed :  Leny Oconnell MD (Physician)             Hematology Consult Service     Follow Up Consult Note:    Patient: Castillo Millerhlberg  MRN:  [id number]  : 1950  DOS:[JB1.1] 2018[JB1.2]  Date Admitted:2018  Hospital Day:[JB1.1][JB1.2]      Primary Team: [facility]  Other Inpatient Consultants: Cardiology, Transfusion Medicine    Reason for Consultation:  Hematology is consulted on Castillo De Anda for evaluation and treatment of Jak2+ Myeloproliferative neoplasm.   __________________________________________________________________________________________________________________________________________________________________________________________________________________________________________  Assessment:  In summary, Castillo De Anda is 68 year old man with JAK2+ myeloproliferative neoplasm (polycythemia vera), recently switched by primary heme/onc provider from Hydrea to Anagrelide with very poor tolerance. Reason for switching apparently was that his anemia was felt to be due to Hydrea.  He is admitted s/p STEMI with ischemia-related VSD requiring surgical intervention. He is high-risk for ongoing thrombocytosis and we restarted hydrea on 18.   He has now completed two sessions of plateletpheresis with platelet counts of 359 followed by 397. Although new NCCN guidelines give a target platelet count of 425, there is no strong evidence for a particular target. Goal of therapy was to reduce count to <1 million to reduce overall risk of both thrombosis and bleeding complications from acquired von willebrand disease. WE WILL NOT DO ANY FURTHER PLATELETPHERESIS--- PLEASE REMOVE LINE.      Discuss with patient the rational for edgardo-2 inhibition moving forward. Would like to obtain outpatient bone marrow in future. Patient would like to transfer care to [facility] in future. Will help arrange.     Recommendations:  1. PULL APHERESIS CATHETER  2. Continue with HU at 20 mg/kg dose.  3. Will assess platelet count in AM-- plan to start Jakafi based on Blood 2017 paper (Ruxolitinib for essential thrombocythemia refractory to or intolerant of  hydroxyurea, Radha BENNETT et al. DOI 10.1182/[id number])  4. Would continue systemic anticoagulation with bridging as patient is at high risk for bleeding.   5. No further iron infusions needed at present.       We will continue to follow the patient.     Leny Oconnell MD/PhD   of Medicine  Division of Hematology, Oncology and Transplantation  Pager [phone][JB1.1]  09/02/2018 3:08 PM[JB1.2]    __________________________________________________________________________________________________________________________________________________________________________________________________________________________________________  Interval History:  Patient doing well. No new symptoms.       Medications:[JB1.1]  Facility Administered Medications as of 9/2/2018             acetaminophen (TYLENOL) tablet 650 mg 2 tablets (650 mg) by Oral or Feeding Tube route every 4 hours as needed for other (multimodal surgical pain management along with NSAIDS and opioid medication as indicated based on pain control and physical function.)    albuterol neb solution 2.5 mg Take 3 mLs (2.5 mg) by nebulization every 4 hours as needed for wheezing    amiodarone (PACERONE/CODARONE) tablet 200 mg Take 1 tablet (200 mg) by mouth 2 times daily    amiodarone (PACERONE/CODARONE) tablet 200 mg Take 1 tablet (200 mg) by mouth daily    atorvastatin (LIPITOR) tablet 40 mg 1 tablet (40 mg) by Oral or Feeding Tube route every evening    benzocaine-menthol (CEPACOL) 15-3.6 MG lozenge 1 lozenge Place 1 lozenge inside cheek every hour as needed for sore throat    calcium gluconate 1 g in D5W 100 mL intermittent infusion Inject 1 g into the vein continuous prn for calcium supplementation (For ionized calcium less than 4.5)    Carboxymethylcellulose Sod PF (REFRESH PLUS) 0.5 % ophthalmic solution 1 drop Place 1 drop into both eyes 3 times daily as needed for dry eyes    clopidogrel (PLAVIX) tablet 75 mg Take 1 tablet (75  "mg) by mouth daily    dextrose 50 % injection 25-50 mL Inject 25-50 mLs into the vein every 15 minutes as needed for low blood sugar    Linked Group 1:  \"Or\" Linked Group Details     diphenhydrAMINE (BENADRYL) injection 50 mg Inject 1 mL (50 mg) into the vein once as needed for other (hives, itching, rash, flushing, swollen lips/tongue, or respiratory distress associated with IV iron hypersensitivity.)    EPINEPHrine (ADRENALIN) kit 0.3 mg Inject 0.3 mLs (0.3 mg) into the muscle every 3 minutes as needed (hypotension or airway obstruction associated with IV iron hypersensitivity.)    furosemide (LASIX) injection 20 mg Inject 2 mLs (20 mg) into the vein once    furosemide (LASIX) tablet 20 mg Take 1 tablet (20 mg) by mouth daily    glucagon injection 1 mg Inject 1 mg Subcutaneous every 15 minutes as needed for low blood sugar (May repeat x 1 only)    Linked Group 1:  \"Or\" Linked Group Details     glucose gel 15-30 g Take 15-30 g by mouth every 15 minutes as needed for low blood sugar    Linked Group 1:  \"Or\" Linked Group Details     heparin bolus from infusion pump Inject into the vein every 6 hours as needed (to reach target Heparin Xa (10a) goal. GOAL: Heparin Xa (10a) LEVEL = 0.15-0.35 (PTT = 45-65 seconds).)    heparin lock flush 10 UNIT/ML injection 2-5 mL 2-5 mLs by Intracatheter route once as needed for line flush (for locking each dormant lumen with line placement)    heparin lock flush 10 UNIT/ML injection 5-10 mL 5-10 mLs by Intracatheter route every 24 hours    heparin lock flush 10 UNIT/ML injection 5-10 mL 5-10 mLs by Intracatheter route every hour as needed for other (to lock each CVC - Open Ended (Tunneled and Non-Tunneled) dormant lumen.)    hydrALAZINE (APRESOLINE) injection 10 mg Inject 0.5 mLs (10 mg) into the vein every 30 minutes as needed for high blood pressure (Systolic Blood Pressure greater than 140 mmHg or Diastolic Blood Pressure greater than 90 mmHg)    HYDROmorphone (PF) (DILAUDID) " injection 0.3-0.5 mg Inject 0.3-0.5 mg into the vein every 2 hours as needed for other (pain control or improvement in physical function. Hold dose for analgesic side effects.)    hydroxyurea (HYDREA) capsule CHEMO 1,500 mg Take 3 capsules (1,500 mg) by mouth 2 times daily    levothyroxine (SYNTHROID/LEVOTHROID) tablet 75 mcg 1 tablet (75 mcg) by Oral or Feeding Tube route daily    lidocaine (LMX4) cream Apply topically once as needed for moderate pain (for local anesthetic during PICC insertion)    lidocaine (LMX4) cream Apply topically every hour as needed for pain (with VAD insertion or accessing implanted port.)    lidocaine 1 % 1 mL 1 mL by Other route every hour as needed (mild pain with VAD insertion or accessing implanted port)    magnesium sulfate 2 g in NS intermittent infusion (PharMEDium or FV Cmpd) Inject 50 mLs (2 g) into the vein daily as needed for magnesium supplementation    magnesium sulfate 4 g in 100 mL sterile water (premade) Inject 100 mLs (4 g) into the vein every 4 hours as needed for magnesium supplementation    May continue current IV fluids if patient has IV fluids infusing. continuous prn    melatonin tablet 1 mg Take 1 tablet (1 mg) by mouth nightly as needed for sleep    methocarbamol (ROBAXIN) tablet 500 mg 1 tablet (500 mg) by Oral or Feeding Tube route 4 times daily as needed for muscle spasms    methylPREDNISolone sodium succinate (solu-MEDROL) injection 125 mg Inject 2 mLs (125 mg) into the vein once as needed (respiratory distress associated with hypersensitivity reaction to iron.)    metoprolol (LOPRESSOR) injection 2.5 mg Inject 2.5 mLs (2.5 mg) into the vein every 4 hours as needed for other (For sustained HR>140)    metoprolol tartrate (LOPRESSOR) half-tab 12.5 mg Take 1 half-tab (12.5 mg) by mouth 2 times daily    multivitamin, therapeutic (THERA-VIT) tablet 1 tablet Take 1 tablet by mouth daily    naloxone (NARCAN) injection 0.1-0.4 mg Inject 0.25-1 mLs (0.1-0.4 mg) into  "the vein every 2 minutes as needed for opioid reversal    ondansetron (ZOFRAN) injection 4 mg Inject 2 mLs (4 mg) into the vein every 6 hours as needed for nausea or vomiting    Linked Group 2:  \"Or\" Linked Group Details     ondansetron (ZOFRAN-ODT) ODT tab 4 mg Take 1 tablet (4 mg) by mouth every 6 hours as needed for nausea or vomiting    Linked Group 2:  \"Or\" Linked Group Details     oxyCODONE IR (ROXICODONE) tablet 5-10 mg 1-2 tablets (5-10 mg) by Per Feeding Tube route every 4 hours as needed for other (pain control or improvement in physical function. Hold dose for analgesic side effects.)    pantoprazole (PROTONIX) EC tablet 40 mg Take 1 tablet (40 mg) by mouth 2 times daily (before meals)    potassium chloride (KLOR-CON) Packet 20-40 mEq 20-40 mEq by Oral or Feeding Tube route every 2 hours as needed for potassium supplementation    potassium chloride 10 mEq in 100 mL intermittent infusion with 10 mg lidocaine Inject 100 mLs (10 mEq) into the vein every hour as needed for potassium supplementation    potassium chloride 10 mEq in 100 mL sterile water intermittent infusion (premix) Inject 100 mLs (10 mEq) into the vein every hour as needed for potassium supplementation    potassium chloride 20 mEq in 50 mL intermittent infusion Inject 50 mLs (20 mEq) into the vein every hour as needed for potassium supplementation    potassium chloride SA (K-DUR/KLOR-CON M) CR tablet 10 mEq Take 1 tablet (10 mEq) by mouth daily    potassium chloride SA (K-DUR/KLOR-CON M) CR tablet 20-40 mEq Take 2-4 tablets (20-40 mEq) by mouth every 2 hours as needed for potassium supplementation    potassium phosphate 15 mmol in D5W 250 mL intermittent infusion Inject 15 mmol into the vein daily as needed for phosphorous supplementation    potassium phosphate 20 mmol in D5W 250 mL intermittent infusion Inject 20 mmol into the vein every 6 hours as needed for phosphorous supplementation    potassium phosphate 20 mmol in D5W 500 mL " "intermittent infusion Inject 20 mmol into the vein every 6 hours as needed for phosphorous supplementation    potassium phosphate 25 mmol in D5W 500 mL intermittent infusion Inject 25 mmol into the vein every 8 hours as needed for phosphorous supplementation    prochlorperazine (COMPAZINE) injection 5 mg Inject 1 mL (5 mg) into the vein every 6 hours as needed for nausea or vomiting    Linked Group 3:  \"Or\" Linked Group Details     prochlorperazine (COMPAZINE) tablet 5 mg Take 1 tablet (5 mg) by mouth every 6 hours as needed for nausea or vomiting    Linked Group 3:  \"Or\" Linked Group Details     QUEtiapine (SEROquel) half-tab 12.5 mg 1 half-tab (12.5 mg) by Oral or Feeding Tube route 2 times daily as needed (anxiety)    QUEtiapine (SEROquel) half-tab 12.5 mg 1 half-tab (12.5 mg) by Oral or Feeding Tube route At Bedtime    ranitidine (ZANTAC) injection 50 mg Inject 2 mLs (50 mg) into the vein once as needed (hives, itching, rash associated with IV iron hypersensitivity.  )    Reason ACE/ARB/ARNI order not selected by Other route DOES NOT GO TO MAR for other    Reason beta blocker not prescribed DOES NOT GO TO MAR for other    Reason beta blocker order not selected DOES NOT GO TO MAR for other    senna-docusate (SENOKOT-S;PERICOLACE) 8.6-50 MG per tablet 2 tablet 2 tablets by Oral or Feeding Tube route 2 times daily    Linked Group 4:  \"Or\" Linked Group Details     senna-docusate (SENOKOT-S;PERICOLACE) 8.6-50 MG per tablet 1 tablet 1 tablet by Oral or Feeding Tube route 2 times daily    Linked Group 4:  \"Or\" Linked Group Details     sodium chloride (PF) 0.9% PF flush 10-20 mL 10-20 mLs by Intracatheter route every hour as needed for line flush or post meds or blood draw    sodium chloride (PF) 0.9% PF flush 3 mL Inject 3 mLs into the vein q1 min prn for line flush (after medication administration. For peripheral IV flush post IV meds)    sodium chloride (PF) 0.9% PF flush 3 mL 3 mLs by Intracatheter route every hour " as needed for line flush (for peripheral IV flush post IV meds)    sodium chloride (PF) 0.9% PF flush 3 mL 3 mLs by Intracatheter route every 8 hours    sodium chloride (PF) 0.9% PF flush 5-50 mL 5-50 mLs by Intracatheter route once as needed for line flush (to flush each lumen with line placement)    warfarin (COUMADIN) tablet 4 mg Take 1 tablet (4 mg) by mouth Once at 6pm    warfarin (COUMADIN) tablet 6 mg Take 2 tablets (6 mg) by mouth Once at 6pm    Warfarin Therapy Reminder (Check START DATE - warfarin may be starting in the FUTURE) 1 each continuous prn    heparin  drip 25,000 units in 0.45% NaCl 250 mL (see additional administration details for dose) (Discontinued) Inject 0-3,500 Units/hr into the vein continuous[JB1.2]        A 14 point ROS is negative except as stated in the HPI    Objective:[JB1.1]  Temp: 97.8  F (36.6  C) Temp src: Oral BP: 101/69   Heart Rate: 83 Resp: 18 SpO2: 99 % O2 Device: Nasal cannula Oxygen Delivery: 2 LPM[JB1.3]    Exam:   Constitutional: up in chair, Appears well, no distress  HEENT: No scleral icterus or hemorrhage. Mucous membranes moist with no wet purpura.   CV: scar present on chest  Respiratory: no accessory muscle use  Mus/Skele: no edema  Skin: no petechiae, no ecchymosis.  Neuro:  AOx3    Labs: personally reviewed with relevant trends annotated below  CBC RESULTS:   CBC RESULTS:   Recent Labs   Lab Test  09/02/18   0626   WBC  6.0   RBC  2.81*   HGB  8.6*   HCT  28.2*   MCV  100   MCH  30.6   MCHC  30.5*   RDW  22.7*   PLT  487*       Recent Labs   Lab Test  09/01/18   0608   WBC  6.5   RBC  2.97*   HGB  9.1*   HCT  29.5*   MCV  99   MCH  30.6   MCHC  30.8*   RDW  22.7*   PLT  397[JB1.1]            Revision History        User Key Date/Time User Provider Type Action    > JB1.3 9/2/2018  3:11 PM Leny Oconnell MD Physician Sign     JB1.2 9/2/2018  3:08 PM Leny Oconnell MD Physician      JB1.1 9/2/2018  3:07 PM Leny Oconnell MD Physician             Consults by  Leny Oconnell MD at 2018  3:59 PM     Author:  Leny Oconnell MD Service:  Hematology Author Type:  Physician    Filed:  2018  4:07 PM Date of Service:  2018  3:59 PM Creation Time:  2018  3:59 PM    Status:  Signed :  Leny Oconnell MD (Physician)             Hematology Consult Service     Follow Up Consult Note:    Patient: Castillo De Anda  MRN: [id number]  : 1950  DOS:[JB1.1] 2018[JB1.2]  Date Admitted:2018  Hospital Day:[JB1.1][JB1.2]      Primary Team: [facility]  Other Inpatient Consultants: Cardiology, Transfusion Medicine    Reason for Consultation:  Hematology is consulted on Castillo De Anda for evaluation and treatment of Jak2+ Myeloproliferative neoplasm.   __________________________________________________________________________________________________________________________________________________________________________________________________________________________________________  Assessment:  In summary, Castillo De Anda is 68 year old man with JAK2+ myeloproliferative neoplasm (polycythemia vera), recently switched by primary heme/onc provider from Hydrea to Anagrelide with very poor tolerance. Reason for switching apparently was that his anemia was felt to be due to Hydrea.  He is admitted s/p STEMI with ischemia-related VSD requiring surgical intervention. He is high-risk for ongoing thrombocytosis and we restarted hydrea on 18.   He has now completed two sessions of plateletpheresis with platelet counts of 359 followed by 397. Although new NCCN guidelines give a target platelet count of 425, there is no strong evidence for a particular target. Goal of therapy was to reduce count to <1 million to reduce overall risk of both thrombosis and bleeding complications from acquired von willebrand disease. Anticipate that addition of iron to treat iron deficiency also helped reduce thrombopoietic drive.     Recommendations:  1. Will pull aphresis  catheter   2. Continue with HU at 20 mg/kg dose.  3. If platelet count start to rise precipitously, will start Jakafi based on Blood 2017 paper (Ruxolitinib for essential thrombocythemia refractory to or intolerant of hydroxyurea, Radha BENNETT, et al. DOI 10.1182/[id number])  4. Would continue systemic anticoagulation with bridging as patient is at high risk for bleeding.   5. No further iron infusions needed at present.       We will continue to follow the patient.     Leny Oconnell MD/PhD   of Medicine  Division of Hematology, Oncology and Transplantation  Pager [phone][JB1.1]  09/01/2018 4:00 PM[JB1.2]    __________________________________________________________________________________________________________________________________________________________________________________________________________________________________________  Interval History:  Patient doing well. Up enjoying football with family. No new symptoms.       Medications:[JB1.1]  Facility Administered Medications as of 9/1/2018             acetaminophen (TYLENOL) tablet 650 mg 2 tablets (650 mg) by Oral or Feeding Tube route every 4 hours as needed for other (multimodal surgical pain management along with NSAIDS and opioid medication as indicated based on pain control and physical function.)    albuterol neb solution 2.5 mg Take 3 mLs (2.5 mg) by nebulization every 4 hours as needed for wheezing    amiodarone (PACERONE/CODARONE) tablet 200 mg Take 1 tablet (200 mg) by mouth 2 times daily    amiodarone (PACERONE/CODARONE) tablet 200 mg Starting on 9/2/2018. Take 1 tablet (200 mg) by mouth daily    atorvastatin (LIPITOR) tablet 40 mg 1 tablet (40 mg) by Oral or Feeding Tube route every evening    benzocaine-menthol (CEPACOL) 15-3.6 MG lozenge 1 lozenge Place 1 lozenge inside cheek every hour as needed for sore throat    calcium gluconate 1 g in D5W 100 mL intermittent infusion Inject 1 g into the vein  "continuous prn for calcium supplementation (For ionized calcium less than 4.5)    Carboxymethylcellulose Sod PF (REFRESH PLUS) 0.5 % ophthalmic solution 1 drop Place 1 drop into both eyes 3 times daily as needed for dry eyes    clopidogrel (PLAVIX) tablet 75 mg Take 1 tablet (75 mg) by mouth daily    dextrose 50 % injection 25-50 mL Inject 25-50 mLs into the vein every 15 minutes as needed for low blood sugar    Linked Group 1:  \"Or\" Linked Group Details     diphenhydrAMINE (BENADRYL) injection 50 mg Inject 1 mL (50 mg) into the vein once as needed for other (hives, itching, rash, flushing, swollen lips/tongue, or respiratory distress associated with IV iron hypersensitivity.)    EPINEPHrine (ADRENALIN) kit 0.3 mg Inject 0.3 mLs (0.3 mg) into the muscle every 3 minutes as needed (hypotension or airway obstruction associated with IV iron hypersensitivity.)    furosemide (LASIX) injection 20 mg Inject 2 mLs (20 mg) into the vein once    furosemide (LASIX) tablet 20 mg Take 1 tablet (20 mg) by mouth daily    glucagon injection 1 mg Inject 1 mg Subcutaneous every 15 minutes as needed for low blood sugar (May repeat x 1 only)    Linked Group 1:  \"Or\" Linked Group Details     glucose gel 15-30 g Take 15-30 g by mouth every 15 minutes as needed for low blood sugar    Linked Group 1:  \"Or\" Linked Group Details     heparin  drip 25,000 units in 0.45% NaCl 250 mL (see additional administration details for dose) Inject 0-3,500 Units/hr into the vein continuous    heparin bolus from infusion pump Inject into the vein every 6 hours as needed (to reach target Heparin Xa (10a) goal. GOAL: Heparin Xa (10a) LEVEL = 0.15-0.35 (PTT = 45-65 seconds).)    heparin lock flush 10 UNIT/ML injection 2-5 mL 2-5 mLs by Intracatheter route once as needed for line flush (for locking each dormant lumen with line placement)    heparin lock flush 10 UNIT/ML injection 5-10 mL 5-10 mLs by Intracatheter route every 24 hours    heparin lock flush 10 " UNIT/ML injection 5-10 mL 5-10 mLs by Intracatheter route every hour as needed for other (to lock each CVC - Open Ended (Tunneled and Non-Tunneled) dormant lumen.)    hydrALAZINE (APRESOLINE) injection 10 mg Inject 0.5 mLs (10 mg) into the vein every 30 minutes as needed for high blood pressure (Systolic Blood Pressure greater than 140 mmHg or Diastolic Blood Pressure greater than 90 mmHg)    HYDROmorphone (PF) (DILAUDID) injection 0.3-0.5 mg Inject 0.3-0.5 mg into the vein every 2 hours as needed for other (pain control or improvement in physical function. Hold dose for analgesic side effects.)    hydroxyurea (HYDREA) capsule CHEMO 1,500 mg Take 3 capsules (1,500 mg) by mouth 2 times daily    levothyroxine (SYNTHROID/LEVOTHROID) tablet 75 mcg 1 tablet (75 mcg) by Oral or Feeding Tube route daily    lidocaine (LMX4) cream Apply topically once as needed for moderate pain (for local anesthetic during PICC insertion)    lidocaine (LMX4) cream Apply topically every hour as needed for pain (with VAD insertion or accessing implanted port.)    lidocaine 1 % 1 mL 1 mL by Other route every hour as needed (mild pain with VAD insertion or accessing implanted port)    magnesium sulfate 2 g in NS intermittent infusion (PharMEDium or FV Cmpd) Inject 50 mLs (2 g) into the vein daily as needed for magnesium supplementation    magnesium sulfate 4 g in 100 mL sterile water (premade) Inject 100 mLs (4 g) into the vein every 4 hours as needed for magnesium supplementation    May continue current IV fluids if patient has IV fluids infusing. continuous prn    melatonin tablet 1 mg Take 1 tablet (1 mg) by mouth nightly as needed for sleep    methocarbamol (ROBAXIN) tablet 500 mg 1 tablet (500 mg) by Oral or Feeding Tube route 4 times daily as needed for muscle spasms    methylPREDNISolone sodium succinate (solu-MEDROL) injection 125 mg Inject 2 mLs (125 mg) into the vein once as needed (respiratory distress associated with  "hypersensitivity reaction to iron.)    metoprolol (LOPRESSOR) injection 2.5 mg Inject 2.5 mLs (2.5 mg) into the vein every 4 hours as needed for other (For sustained HR>140)    metoprolol tartrate (LOPRESSOR) half-tab 12.5 mg Take 1 half-tab (12.5 mg) by mouth 2 times daily    multivitamin, therapeutic (THERA-VIT) tablet 1 tablet Take 1 tablet by mouth daily    naloxone (NARCAN) injection 0.1-0.4 mg Inject 0.25-1 mLs (0.1-0.4 mg) into the vein every 2 minutes as needed for opioid reversal    ondansetron (ZOFRAN) injection 4 mg Inject 2 mLs (4 mg) into the vein every 6 hours as needed for nausea or vomiting    Linked Group 2:  \"Or\" Linked Group Details     ondansetron (ZOFRAN-ODT) ODT tab 4 mg Take 1 tablet (4 mg) by mouth every 6 hours as needed for nausea or vomiting    Linked Group 2:  \"Or\" Linked Group Details     oxyCODONE IR (ROXICODONE) tablet 5-10 mg 1-2 tablets (5-10 mg) by Per Feeding Tube route every 4 hours as needed for other (pain control or improvement in physical function. Hold dose for analgesic side effects.)    pantoprazole (PROTONIX) EC tablet 40 mg Take 1 tablet (40 mg) by mouth 2 times daily (before meals)    potassium chloride (KLOR-CON) Packet 20 mEq Take 20 mEq by mouth once    potassium chloride (KLOR-CON) Packet 20-40 mEq 20-40 mEq by Oral or Feeding Tube route every 2 hours as needed for potassium supplementation    potassium chloride 10 mEq in 100 mL intermittent infusion with 10 mg lidocaine Inject 100 mLs (10 mEq) into the vein every hour as needed for potassium supplementation    potassium chloride 10 mEq in 100 mL sterile water intermittent infusion (premix) Inject 100 mLs (10 mEq) into the vein every hour as needed for potassium supplementation    potassium chloride 20 mEq in 50 mL intermittent infusion Inject 50 mLs (20 mEq) into the vein every hour as needed for potassium supplementation    potassium chloride SA (K-DUR/KLOR-CON M) CR tablet 10 mEq Take 1 tablet (10 mEq) by mouth " "daily    potassium chloride SA (K-DUR/KLOR-CON M) CR tablet 20-40 mEq Take 2-4 tablets (20-40 mEq) by mouth every 2 hours as needed for potassium supplementation    potassium phosphate 15 mmol in D5W 250 mL intermittent infusion Inject 15 mmol into the vein daily as needed for phosphorous supplementation    potassium phosphate 20 mmol in D5W 250 mL intermittent infusion Inject 20 mmol into the vein every 6 hours as needed for phosphorous supplementation    potassium phosphate 20 mmol in D5W 500 mL intermittent infusion Inject 20 mmol into the vein every 6 hours as needed for phosphorous supplementation    potassium phosphate 25 mmol in D5W 500 mL intermittent infusion Inject 25 mmol into the vein every 8 hours as needed for phosphorous supplementation    prochlorperazine (COMPAZINE) injection 5 mg Inject 1 mL (5 mg) into the vein every 6 hours as needed for nausea or vomiting    Linked Group 3:  \"Or\" Linked Group Details     prochlorperazine (COMPAZINE) tablet 5 mg Take 1 tablet (5 mg) by mouth every 6 hours as needed for nausea or vomiting    Linked Group 3:  \"Or\" Linked Group Details     QUEtiapine (SEROquel) half-tab 12.5 mg 1 half-tab (12.5 mg) by Oral or Feeding Tube route 2 times daily as needed (anxiety)    QUEtiapine (SEROquel) half-tab 12.5 mg 1 half-tab (12.5 mg) by Oral or Feeding Tube route At Bedtime    ranitidine (ZANTAC) injection 50 mg Inject 2 mLs (50 mg) into the vein once as needed (hives, itching, rash associated with IV iron hypersensitivity.  )    Reason ACE/ARB/ARNI order not selected by Other route DOES NOT GO TO MAR for other    Reason beta blocker not prescribed DOES NOT GO TO MAR for other    Reason beta blocker order not selected DOES NOT GO TO MAR for other    senna-docusate (SENOKOT-S;PERICOLACE) 8.6-50 MG per tablet 2 tablet 2 tablets by Oral or Feeding Tube route 2 times daily    Linked Group 4:  \"Or\" Linked Group Details     senna-docusate (SENOKOT-S;PERICOLACE) 8.6-50 MG per tablet " "1 tablet 1 tablet by Oral or Feeding Tube route 2 times daily    Linked Group 4:  \"Or\" Linked Group Details     sodium chloride (PF) 0.9% PF flush 10-20 mL 10-20 mLs by Intracatheter route every hour as needed for line flush or post meds or blood draw    sodium chloride (PF) 0.9% PF flush 3 mL Inject 3 mLs into the vein q1 min prn for line flush (after medication administration. For peripheral IV flush post IV meds)    sodium chloride (PF) 0.9% PF flush 3 mL 3 mLs by Intracatheter route every hour as needed for line flush (for peripheral IV flush post IV meds)    sodium chloride (PF) 0.9% PF flush 3 mL 3 mLs by Intracatheter route every 8 hours    sodium chloride (PF) 0.9% PF flush 5-50 mL 5-50 mLs by Intracatheter route once as needed for line flush (to flush each lumen with line placement)    warfarin (COUMADIN) tablet 6 mg Take 2 tablets (6 mg) by mouth Once at 6pm    warfarin (COUMADIN) tablet 6 mg Take 2 tablets (6 mg) by mouth Once at 6pm    Warfarin Therapy Reminder (Check START DATE - warfarin may be starting in the FUTURE) 1 each continuous prn[JB1.2]        A 14 point ROS is negative except as stated in the HPI    Objective:  Vitals: B/P: 130/85, T: 97.8, P: 68, R: 18, Wt: 279 lbs 4.8 oz  Exam:   Constitutional: Appears well, no distress  HEENT: No scleral icterus or hemorrhage. Mucous membranes moist with no wet purpura.   CV: scar present on chest  Respiratory: no accessory muscle use  Mus/Skele: no edema  Skin: no petechiae, no ecchymosis.  Neuro:  AOx3    Labs: personally reviewed with relevant trends annotated below  CBC RESULTS:   Recent Labs   Lab Test  09/01/18   0608   WBC  6.5   RBC  2.97*   HGB  9.1*   HCT  29.5*   MCV  99   MCH  30.6   MCHC  30.8*   RDW  22.7*   PLT  397[JB1.1]            Revision History        User Key Date/Time User Provider Type Action    > JB1.2 9/1/2018  4:07 PM Leny Oconnell MD Physician Sign     JB1.1 9/1/2018  3:59 PM Leny Oconnell MD Physician           "   Consults by Jason Fowler MD at 8/30/2018 12:30 PM     Author:  Jason Fowler MD Service:  Pathology Author Type:  Physician    Filed:  8/30/2018  8:27 PM Date of Service:  8/30/2018 12:30 PM Creation Time:  8/30/2018 12:30 PM    Status:  Signed :  Jason Fowler MD (Physician)     Consult Orders:    1. Apheresis Transfusion Adult/Peds IP Consult: Patient to be seen: Routine within 24 hrs; Call back #: [phone] (cell); need plateletpheresis; Consultant may enter orders: Yes [[id number]] ordered by Balaji Rodriguez MD at 08/30/18 0937                  Transfusion Medicine Consultation    Castillo De Anda [id number]   YOB: 1950 Age: 68 year old   Date of Consult: 8/30/2018      Reason for consult: Plateletpheresis            Assessment and Plan:   68 year old male is seen for consultation for initiation of plateletpheresis for thrombocytosis (PLT 1534 on 08/30).  He has a history of[KH1.1] a myleoproliferative disorder[AJ1.1]. The plan is to[KH1.1] complete the depletion procedure[AJ1.1] once, with clinical re-evaluation subsequently.  The patient will require a line placement prior to the procedure.            Chief Complaint:   Transfusion medicine consultation.         History of Present Illness:   Castillo De Anda is a 68 year old male who is seen for consultation for plateletpheresis for thrombocytosis.  His past medical history includes Polycythemia vera.  He is in hospital status post stenting for ST elevation myocardial infarction. He has ongoing thrombocytosis, with PLT 1534 on 08/30 labs. He is currently well.      The patient denies any back pain that would prevent him from tolerating the procedure. The procedure, risks/benefits were discussed with the patient and all of his questions were addressed at that time.             Past Medical History:   Polycythemia vera          Past Surgical History:[KH1.1]     Past Surgical History:   Procedure Laterality Date      ANKLE SURGERY Right 04/2018     APPENDECTOMY       CRANIOTOMY Right 2008     ESOPHAGOSCOPY, GASTROSCOPY, DUODENOSCOPY (EGD), COMBINED N/A 8/24/2018    Procedure: COMBINED ESOPHAGOSCOPY, GASTROSCOPY, DUODENOSCOPY (EGD);  Upper Endoscopy with No Intervention; Two Gastric Ulcers;  Surgeon: Rocael Barber MD;  Location: [facility] OR     IRRIGATION AND DEBRIDEMENT CHEST WASHOUT, COMBINED N/A 8/13/2018    Procedure: COMBINED IRRIGATION AND DEBRIDEMENT CHEST WASHOUT;  COMBINED IRRIGATION AND DEBRIDEMENT CHEST WASHOUT, Closure;  Surgeon: Jason Franco MD;  Location: [facility] OR     PROSTATECTOMY PERINEAL  2004     REPAIR VENTRICULAR SEPTAL DEFECT N/A 8/10/2018    Procedure: REPAIR VENTRICULAR SEPTAL DEFECT;  Median Sternotomy, REPAIR VENTRICULAR SEPTAL DEFECT on pump oxygenation, Tricuspid valve replacement ;  Surgeon: Jason Franco MD;  Location: [facility] OR     SPLENECTOMY      childhood     THYROID SURGERY  2012[KH1.2]            Allergies:[KH1.1]     Allergies   Allergen Reactions     Anagrelide Rash     Noted to have small rash and nose bleeds after starting the prescription (prior to August 2018 hospitalization; prescribed by [place] clinician); dosage had then been increased (by [place] clinician) and subsequently developed full body rash within 4 hours of increased dose.[KH1.2]             Medications:[KH1.1]     Current Facility-Administered Medications   Medication     acetaminophen (TYLENOL) tablet 650 mg     albuterol neb solution 2.5 mg     amiodarone (PACERONE/CODARONE) tablet 400 mg     atorvastatin (LIPITOR) tablet 40 mg     benzocaine-menthol (CEPACOL) 15-3.6 MG lozenge 1 lozenge     calcium gluconate 1 g in D5W 100 mL intermittent infusion     Carboxymethylcellulose Sod PF (REFRESH PLUS) 0.5 % ophthalmic solution 1 drop     clopidogrel (PLAVIX) tablet 75 mg     glucose gel 15-30 g    Or     dextrose 50 % injection 25-50 mL    Or     glucagon injection 1 mg     furosemide (LASIX) injection 40 mg      heparin  drip 25,000 units in 0.45% NaCl 250 mL (see additional administration details for dose)     heparin bolus from infusion pump     heparin lock flush 10 UNIT/ML injection 2-5 mL     heparin lock flush 10 UNIT/ML injection 5-10 mL     heparin lock flush 10 UNIT/ML injection 5-10 mL     hydrALAZINE (APRESOLINE) injection 10 mg     HYDROmorphone (PF) (DILAUDID) injection 0.3-0.5 mg     hydroxyurea (HYDREA) capsule CHEMO 1,500 mg     insulin aspart (NovoLOG) inj (RAPID ACTING)     levothyroxine (SYNTHROID/LEVOTHROID) tablet 75 mcg     lidocaine (LMX4) cream     lidocaine (LMX4) cream     lidocaine 1 % 1 mL     magnesium sulfate 2 g in NS intermittent infusion ([facility] or [facility])     magnesium sulfate 4 g in 100 mL sterile water (premade)     May continue current IV fluids if patient has IV fluids infusing.     melatonin tablet 1 mg     methocarbamol (ROBAXIN) tablet 500 mg     metoprolol (LOPRESSOR) injection 2.5 mg     multivitamin, therapeutic (THERA-VIT) tablet 1 tablet     naloxone (NARCAN) injection 0.1-0.4 mg     ondansetron (ZOFRAN-ODT) ODT tab 4 mg    Or     ondansetron (ZOFRAN) injection 4 mg     oxyCODONE IR (ROXICODONE) tablet 5-10 mg     pantoprazole (PROTONIX) EC tablet 40 mg     polyethylene glycol (MIRALAX/GLYCOLAX) Packet 17 g     potassium chloride (KLOR-CON) Packet 20-40 mEq     potassium chloride 10 mEq in 100 mL intermittent infusion with 10 mg lidocaine     potassium chloride 10 mEq in 100 mL sterile water intermittent infusion (premix)     potassium chloride 20 mEq in 50 mL intermittent infusion     potassium chloride SA (K-DUR/KLOR-CON M) CR tablet 20 mEq     potassium chloride SA (K-DUR/KLOR-CON M) CR tablet 20-40 mEq     potassium phosphate 15 mmol in D5W 250 mL intermittent infusion     potassium phosphate 20 mmol in D5W 250 mL intermittent infusion     potassium phosphate 20 mmol in D5W 500 mL intermittent infusion     potassium phosphate 25 mmol in D5W 500 mL intermittent  "infusion     prochlorperazine (COMPAZINE) injection 5 mg    Or     prochlorperazine (COMPAZINE) tablet 5 mg     QUEtiapine (SEROquel) half-tab 12.5 mg     QUEtiapine (SEROquel) half-tab 12.5 mg     Reason ACE/ARB/ARNI order not selected     Reason beta blocker not prescribed     Reason beta blocker order not selected     senna-docusate (SENOKOT-S;PERICOLACE) 8.6-50 MG per tablet 2 tablet    Or     senna-docusate (SENOKOT-S;PERICOLACE) 8.6-50 MG per tablet 1 tablet     sodium chloride (PF) 0.9% PF flush 10-20 mL     sodium chloride (PF) 0.9% PF flush 3 mL     sodium chloride (PF) 0.9% PF flush 3 mL     sodium chloride (PF) 0.9% PF flush 3 mL     sodium chloride (PF) 0.9% PF flush 5-50 mL     Warfarin Therapy Reminder (Check START DATE - warfarin may be starting in the FUTURE)[KH1.2]             Review of Systems:   CONSTITUTIONAL: NEGATIVE for fever, chills  HENT: NEGATIVE for hearing or vision changes  RESP: NEGATIVE for significant cough or SOB  CV: NEGATIVE for significant chest pain  MUSCULOSKELETAL: NEGATIVE for significant arthralgias or myalgia  NEURO: NEGATIVE for weakness, dizziness or paresthesias           Vital Signs:   /72 (BP Location: Left arm)  Pulse 88  Temp 97.7  F (36.5  C) (Oral)  Resp 20  Ht 1.753 m (5' 9\")  Wt 77 kg (169 lb 12.8 oz)  SpO2 98%  BMI 25.08 kg/m2              Data:     CBC RESULTS:   Recent Labs   Lab Test  08/30/18   0652   WBC  8.0   RBC  2.99*   HGB  9.2*   HCT  29.8*   MCV  100   MCH  30.8   MCHC  30.9*   RDW  23.2*   PLT  1534*       Vineet Urbano DO  Blood Bank and Transfusion Medicine Fellow  Transfusion Medicine Service  Pager [phone][KH1.1]      Attestation:  I, Jason Fowler MD, saw and evaluated this patient following their visit with the transfusion medicine fellow, Vineet Urbano DO. I have reviewed the patient's records and data.  I have edited this note and it reflects our medical assessment.  Please see my procedure note from 8/30 for more details " regarding tonight's plateletpheresis procedure.     Jason Fowler MD  Transfusion Medicine Attending  Laboratory Medicine and Pathology  Pager [phone][AJ1.1]             Revision History        User Key Date/Time User Provider Type Action    > AJ1.1 8/30/2018  8:27 PM Jason Fowler MD Physician Sign     KH1.2 8/30/2018 12:41 PM Vineet Urbano MD Resident Sign     KH1.1 8/30/2018 12:30 PM Vineet Urbano MD Resident             Consults by Lazaro Hartmann PA-C at 8/30/2018 11:42 AM     Author:  Lazaro Hartmann PA-C Service:  Radiology Author Type:  Physician Assistant - C    Filed:  8/30/2018 11:42 AM Date of Service:  8/30/2018 11:42 AM Creation Time:  8/30/2018 11:40 AM    Status:  Signed :  Lazaro Hartmann PA-C (Physician Assistant - YAMILA)     Consult Orders:    1. Interventional Radiology Adult/Peds IP Consult: Patient to be seen: URGENT within 4 hours; Call back #: Phyllis [phone]; dialysisis capable catheter needed for plateletpheresis [[id number]] ordered by Phyllis Padron APRN CNP at 08/30/18 1124                Patient is on IR schedule 8/30/2018 for a non-tunneled central venous catheter placement for plateletpheresis.   Labs WNL for procedure.    No NPO required.  Medications to be held include: none  Consent will be done prior to procedure.      [place] the [place] [phone] for estimated time of procedure.     Case discussed with Dr. Sadler and Phyllis BROWN.    Lazaro Hartmann PA-C  Interventional Radiology  Phone: [phone]  Pager: [phone][KO1.1]       Revision History        User Key Date/Time User Provider Type Action    > KO1.1 8/30/2018 11:42 AM Lazaro Hartmann PA-C Physician Assistant - C Sign            Consults by Lazaro Hartmann PA-C at 8/30/2018  9:48 AM     Author:  Lazaro Hartmann PA-C Service:  Radiology Author Type:  Physician Assistant - C    Filed:  8/30/2018  9:48 AM Date of Service:  8/30/2018  9:48 AM Creation Time:  8/30/2018   9:44 AM    Status:  Signed :  Lazaro Hartmann PA-C (Physician Assistant - C)     Consult Orders:    1. Interventional Radiology Adult/Peds IP Consult: Patient to be seen: Routine within 24 hours; Call back #: Phyllis [phone]; Left pleural effusion [[id number]] ordered by Phyllis Padron APRN CNP at 08/29/18 1551                Patient's imaging was reviewed and the pleural effusion is very minimal and the benefits do not outweigh the risks for intervention at this time.     Case discussed with both Dr. Sadler and Phyllis BROWN CNP.     Lazaro Hartmann PA-C  Interventional Radiology  Phone: [phone]  Pager: [phone][KO1.1]       Revision History        User Key Date/Time User Provider Type Action    > KO1.1 8/30/2018  9:48 AM Lazaro Hartmann PA-C Physician Assistant - C Sign            Consults by Lazaro Hartmann PA-C at 8/29/2018  2:38 PM     Author:  Lazaro Hartmann PA-C Service:  Radiology Author Type:  Physician Assistant - YAMILA    Filed:  8/29/2018  2:38 PM Date of Service:  8/29/2018  2:38 PM Creation Time:  8/29/2018  2:33 PM    Status:  Signed :  Lazaro Hartmann PA-C (Physician Assistant - C)     Consult Orders:    1. Interventional Radiology Adult/Peds IP Consult: Patient to be seen: Routine within 24 hours; Call back #: Phyllis [phone]; Left pleural effusion [[id number]] ordered by Phyllis Padron APRN CNP at 08/29/18 1401                Patient with left pleural effusion. Ordering team referred to CAPs for bedside thoracentesis.     Re-consult IR if procedural team is unable to accomodate.         Lazaro Hartmann PA-C  Interventional Radiology[KO1.1]     Revision History        User Key Date/Time User Provider Type Action    > KO1.1 8/29/2018  2:38 PM Lazaro Hartmann PA-C Physician Assistant - C Sign            Consults by Stacy Engel MD at 8/22/2018  1:30 PM     Author:  Toro, Stacy Claudia, MD Service:  Gastroenterology Author Type:  Resident    Filed:   8/22/2018  4:52 PM Date of Service:  8/22/2018  1:30 PM Creation Time:  8/22/2018  1:30 PM    Status:  Attested :  Stacy Engel MD (Resident)    Cosigner:  Zi Guzmán MD at 8/23/2018  9:06 AM         Consult Orders:    1. GI LUMINAL ADULT IP CONSULT: Patient to be seen: Routine within 24 hrs; Call back #: [phone]; Concern for GI Bleed, s/p 2 PRBC (+OG). Patient is s/p HUSSEIN x2 one month ago with recent VSD repair on 8/10. On ASA/Plavix, stopped heparin gtt this AM.; C... [[id number]] ordered by Brad Hawkins MD at 08/22/18 0942           Attestation signed by Zi Guzmán MD at 8/23/2018  9:06 AM        Attestation:  ATTENDING ATTESTATION:     DATE SEEN: 8/22/2018    Patient was discussed, seen, and examined by meZi. The plan of care and pertinent data/imaging were also reviewed with the GI Consult team and GI Fellow, Dr. Engel. Agree with the assessment and plan as delineated above with the following additions:     Overall his respiratory status has been tenuous since extubation. He has had 1-2 episodes of melena with decrease in hgb. Now vitals more stable and hgb has responded appropriately. If EGD were to be needed today he likely would need to be intubated again for airway protection. At this time I favor supportive care with IV PPI gtt and monitoring his clinical status. If he decompensates and is clearly actively bleeding then he will be intubated to allow for EGD. If he remains stable we can re-evaluate daily the need for an EGD. This plan was discussed with the patient, his wife and the ICU team.     Please contact me with any further questions.    Zi Guzmán MD    [facility]  Division of Gastroenterology, Hepatology and Nutrition                                         GASTROENTEROLOGY CONSULTATION      Date of Admission:  8/6/2018  Date of Service:   8/22/2018          ASSESSMENT AND RECOMMENDATIONS:    Assessment:  Castillo De Anda is a 68 year old male with h/o thrombocytosis, meningioma s/p radiation and surgery, and STEMI 8/4/18, treated with HUSSEIN to RCA and LAD, c/b basal VSD s/p posterior repair and TVR 8/10, with chest closure 8/13, IABP removal 8/14, and extubation 8/21.[NS1.1] Continued on ASA and Plavix.  He had signs of hemodynamically significant GI bleeding in the past day, with coffee ground material from OG prior to extubation and an episode of melena with associated hypotension.  However, hypotension quickly resolved and he responded appropriately to transfusion.  Discussed risks and benefits of pursuing EGD today.  Would likely be a diagnostic exam at this point, as he has no ongoing signs of rapid, or hemodynamically significant bleeding.  Expect upper GI bleeding is most likely related to a stress ulcer, considering recent critical illness and intubation, but differential also includes OG trauma, esophagitis, AVM, GAVE, dieulafoy lesion, and less likely malignancy.  Would defer diagnostic exam to a time with Mr. De Anda is a little less tenuous, as risk of EGD outweighs benefit at this time.  Will continue IV PPI ggt to help treat likely ulceration vs esophagitis/gastritis.  If there is any sign of recurrent, hemodynamically significant bleeding, remain available for EGD.[NS1.2]    Recommendations[NS1.1]  - Continue NPO and hold tube feeds for tonight, can resume in the morning if no concerns for bleeding overnight  - Continue PPI IV ggt  - Continue to trend hgb, transfuse as needed  - Monitor and document stool output closely  - Continue monitoring vitals closely[NS1.2]    Gastroenterology follow up recommendations:[NS1.1] Will continue to follow[NS1.2]    Thank you for involving us in this patient's care. Please do not hesitate to contact the GI service with any questions or concerns[NS1.1], or if Mr. De Anda develops signs of hemodynamically significant bleeding[NS1.2]    Pt care plan  discussed with [NS1.1] Ramirez[NS1.2], GI staff physician.    Stacy Engel MD  GI Fellow  [phone]  -------------------------------------------------------------------------------------------------------------------          Reason for Consult:   We were asked by Dr. Franco to evaluate this patient with GI bleeding    History is obtained from the patient and the medical record.          History of Present Illness:     Castillo De Anda is a 68 year old male with h/o thrombocytosis, meningioma s/p radiation and surgery, and STEMI 8/4/18, treated with HUSSEIN to RCA and LAD, c/b basal VSD s/p posterior repair and TVR 8/10, with chest closure 8/13, IABP removal 8/14, and extubation 8/21.  He was noted to have some coffee ground material through g-tube prior to extubation, but there was no change in vitals or hgb at that time.  Following extubation, he was noted to have hgb decrease to 6.1 from 7.7[NS1.1] with associated hypotension requiring pressors.  He had one episode of melena.  Received blood transfusion, and hypotension resolved and hgb increased appropriately.  He reports a baseline of formed, daily, brown stool.  Denies abd pain, nausea or vomiting.  He has been tolerating tube feeds.  Through the day today has had one smear of black stool.  On ASA and plavix, and denies other NSAIDS.  Has been on pantoprazole 40mg daily through IV and then PO in the last few days.[NS1.3]  Says breathing is better today than it was yesterday.[NS1.2]           Review of Systems:   A 10 point review of systems was performed and is negative except as noted in the HPI[NS1.1] and generalized weakness, discomfort related to recent surgery[NS1.2]          Past Medical History:   Reviewed and edited as appropriate[NS1.1]  Past Medical History:   Diagnosis Date     DVT (deep venous thrombosis) (H) 2008    after craniotomy     Meningioma (H)      Polycythemia vera (H)      Prostate cancer (H)[NS1.4]             Past Surgical History:    Reviewed and edited as appropriate[NS1.1]   Past Surgical History:   Procedure Laterality Date     ANKLE SURGERY Right 04/2018     APPENDECTOMY       CRANIOTOMY Right 2008     IRRIGATION AND DEBRIDEMENT CHEST WASHOUT, COMBINED N/A 8/13/2018    Procedure: COMBINED IRRIGATION AND DEBRIDEMENT CHEST WASHOUT;  COMBINED IRRIGATION AND DEBRIDEMENT CHEST WASHOUT, Closure;  Surgeon: Jason Franco MD;  Location: [facility] OR     PROSTATECTOMY PERINEAL  2004     REPAIR VENTRICULAR SEPTAL DEFECT N/A 8/10/2018    Procedure: REPAIR VENTRICULAR SEPTAL DEFECT;  Median Sternotomy, REPAIR VENTRICULAR SEPTAL DEFECT on pump oxygenation, Tricuspid valve replacement ;  Surgeon: Jason Franco MD;  Location: [facility] OR     SPLENECTOMY      childhood     THYROID SURGERY  2012[NS1.4]            Previous Endoscopy:[NS1.1]   None[NS1.2]         Social History:   Reviewed and edited as appropriate[NS1.1]  Social History     Social History     Marital status:      Spouse name: Radha     Number of children: N/A     Years of education: N/A     Occupational History     Not on file.     Social History Main Topics     Smoking status: Former Smoker     Smokeless tobacco: Never Used     Alcohol use 1.2 oz/week     2 Shots of liquor per week     Drug use: Not on file     Sexual activity: Not on file     Other Topics Concern     Not on file     Social History Narrative    Wife is Radha[NS1.4]            Family History:   Reviewed and edited as appropriate[NS1.1]  Family History   Problem Relation Age of Onset     Hypertension Mother      Cerebrovascular Disease Paternal Uncle[NS1.4]            Allergies:   Reviewed and edited as appropriate[NS1.1]     Allergies   Allergen Reactions     Anagrelide Rash     Noted to have small rash and nose bleeds after starting the prescription (prior to August 2018 hospitalization; prescribed by [facility] clinician); dosage had then been increased (by [facility] clinician) and subsequently developed full body  rash within 4 hours of increased dose.[NS1.4]            Medications:[NS1.1]       Current Facility-Administered Medications:      acetaminophen (TYLENOL) tablet 650 mg, 650 mg, Oral or Feeding Tube, Q4H PRN, Camilo Hutchison MD, 650 mg at 08/18/18 1639     aspirin chewable tablet 81 mg, 81 mg, Oral or Feeding Tube, Daily, Camilo Hutchison MD, 81 mg at 08/22/18 0746     atorvastatin (LIPITOR) tablet 80 mg, 80 mg, Oral or Feeding Tube, QPM, Brad Hawkins MD, 80 mg at 08/21/18 1947     benzocaine-menthol (CEPACOL) 15-3.6 MG lozenge 1 lozenge, 1 lozenge, Buccal, Q1H PRN, Arnaldo Viera MD, 1 lozenge at 08/08/18 1805     calcium gluconate 1 g in D5W 100 mL intermittent infusion, 1 g, Intravenous, Continuous PRN, Sal Chun MD, 1 g at 08/15/18 0929     Carboxymethylcellulose Sod PF (REFRESH PLUS) 0.5 % ophthalmic solution 1 drop, 1 drop, Both Eyes, TID PRN, Brad Hawkins MD, 1 drop at 08/21/18 1947     clopidogrel (PLAVIX) tablet 75 mg, 75 mg, Oral, Daily, Arnaldo Viera MD, 75 mg at 08/22/18 0746     glucose gel 15-30 g, 15-30 g, Oral, Q15 Min PRN **OR** dextrose 50 % injection 25-50 mL, 25-50 mL, Intravenous, Q15 Min PRN **OR** glucagon injection 1 mg, 1 mg, Subcutaneous, Q15 Min PRN, Arnaldo Viera MD     docusate (COLACE) 50 MG/5ML liquid 100 mg, 100 mg, Oral or Feeding Tube, BID, Brad Hawkins MD, 100 mg at 08/19/18 0755     heparin lock flush 10 UNIT/ML injection 2-5 mL, 2-5 mL, Intracatheter, Once PRN, Brad Hawkins MD     heparin lock flush 10 UNIT/ML injection 5-10 mL, 5-10 mL, Intracatheter, Q24H, Brad Hawkins MD, 10 mL at 08/22/18 1421     heparin lock flush 10 UNIT/ML injection 5-10 mL, 5-10 mL, Intracatheter, Q1H PRN, Brad Hawkins MD     HOLD nitroGLYcerin IF, , Does not apply, HOLD, Camilo Hutchison MD     hydrALAZINE (APRESOLINE) injection 10 mg, 10 mg, Intravenous, Q30 Min PRN, Hiram Meyers MD     HYDROmorphone (PF)  (DILAUDID) injection 0.3-0.5 mg, 0.3-0.5 mg, Intravenous, Q2H PRN, Hiram Meyers MD, 0.5 mg at 08/16/18 0203     hydroxyurea (HYDREA/DROXIA) suspension CHEMOTHERAPY 1,000 mg, 1,000 mg, Oral or Feeding Tube, BID, Brad Hawkins MD, 1,000 mg at 08/22/18 0748     insulin aspart (NovoLOG) inj (RAPID ACTING), 1-6 Units, Subcutaneous, Q4H, Arnaldo Viera MD, Stopped at 08/17/18 1659     levothyroxine (SYNTHROID/LEVOTHROID) tablet 75 mcg, 75 mcg, Oral or Feeding Tube, Daily, Qi, Jose Merino MD, 75 mcg at 08/22/18 0746     lidocaine (LMX4) cream, , Topical, Once PRN, Brad Hawkins MD     lidocaine (LMX4) cream, , Topical, Q1H PRN, Brad Hawkins MD     lidocaine (LMX4) cream, , Topical, Q1H PRN, Hiram Meyers MD     lidocaine 1 % 1 mL, 1 mL, Other, Q1H PRN, Brad Hawkins MD     lidocaine 1 % 1 mL, 1 mL, Other, Q1H PRN, Hiram Meyers MD     magnesium sulfate 2 g in NS intermittent infusion (PharMEDium or FV Cmpd), 2 g, Intravenous, Daily PRN, Hiram Meyers MD, 2 g at 08/20/18 0604     magnesium sulfate 4 g in 100 mL sterile water (premade), 4 g, Intravenous, Q4H PRN, Hiram Meyers MD     melatonin tablet 1 mg, 1 mg, Oral, At Bedtime PRN, Hiram Meyers MD     methocarbamol (ROBAXIN) tablet 500 mg, 500 mg, Oral or Feeding Tube, 4x Daily PRN, Camilo Hutchison MD     multivitamins with minerals (CERTAVITE/CEROVITE) liquid 15 mL, 15 mL, Per Feeding Tube, Daily, Arnaldo Viera MD, 15 mL at 08/22/18 0746     naloxone (NARCAN) injection 0.1-0.4 mg, 0.1-0.4 mg, Intravenous, Q2 Min PRN, Hiram Meyers MD     norepinephrine (LEVOPHED) 16 mg in D5W 250 mL infusion, 0.03-0.4 mcg/kg/min (Dosing Weight), Intravenous, Continuous, Bernice Farris MD, Stopped at 08/22/18 0700     ondansetron (ZOFRAN-ODT) ODT tab 4 mg, 4 mg, Oral, Q6H PRN **OR** ondansetron (ZOFRAN) injection 4 mg, 4 mg, Intravenous, Q6H PRN, Hiram Meyers MD, 4 mg at 08/19/18 0711     oxyCODONE IR (ROXICODONE) tablet  5-10 mg, 5-10 mg, Per Feeding Tube, Q4H PRN, Camilo Hutchison MD     pantoprazole (PROTONIX) 80 mg in sodium chloride 0.9 % 100 mL infusion, 8 mg/hr, Intravenous, Continuous, Brad Hawkins MD, Last Rate: 10 mL/hr at 08/22/18 1500, 8 mg/hr at 08/22/18 1500     polyethylene glycol (MIRALAX/GLYCOLAX) Packet 17 g, 17 g, Oral or Feeding Tube, Daily, Brad Hawkins MD, 17 g at 08/14/18 0815     potassium chloride (KLOR-CON) Packet 20-40 mEq, 20-40 mEq, Oral or Feeding Tube, Q2H PRN, Hiram Meyers MD, 20 mEq at 08/22/18 1101     potassium chloride 10 mEq in 100 mL intermittent infusion with 10 mg lidocaine, 10 mEq, Intravenous, Q1H PRN, Hiram Meyers MD     potassium chloride 10 mEq in 100 mL sterile water intermittent infusion (premix), 10 mEq, Intravenous, Q1H PRN, Hiram Meyers MD     potassium chloride 20 mEq in 50 mL intermittent infusion, 20 mEq, Intravenous, Q1H PRN, Hiram Meyers MD, Last Rate: 50 mL/hr at 08/17/18 0758, 20 mEq at 08/17/18 0758     potassium chloride SA (K-DUR/KLOR-CON M) CR tablet 20-40 mEq, 20-40 mEq, Oral, Q2H PRN, Hiram Meyers MD     potassium phosphate 15 mmol in D5W 250 mL intermittent infusion, 15 mmol, Intravenous, Daily PRN, Hiram Meyers MD, 15 mmol at 08/16/18 0528     potassium phosphate 20 mmol in D5W 250 mL intermittent infusion, 20 mmol, Intravenous, Q6H PRN, Hiram Meyers MD, Last Rate: 62.5 mL/hr at 08/15/18 0540, 20 mmol at 08/17/18 0753     potassium phosphate 20 mmol in D5W 500 mL intermittent infusion, 20 mmol, Intravenous, Q6H PRN, Hiram Meyers MD     potassium phosphate 25 mmol in D5W 500 mL intermittent infusion, 25 mmol, Intravenous, Q8H PRN, Hiram Meyers MD     prochlorperazine (COMPAZINE) injection 5 mg, 5 mg, Intravenous, Q6H PRN **OR** prochlorperazine (COMPAZINE) tablet 5 mg, 5 mg, Oral, Q6H PRN, Hiram Meyers MD     protein modular (PROSource TF) 1 packet, 1 packet, Per Feeding Tube, TID, Arnaldo Viera MD, 1 packet at  "08/22/18 1421     QUEtiapine (SEROquel) tablet 25 mg, 25 mg, Oral or Feeding Tube, BID PRN, Camilo Hutchison MD, 25 mg at 08/19/18 0100     QUEtiapine (SEROquel) tablet 50 mg, 50 mg, Oral or Feeding Tube, At Bedtime, Brad Hawkins MD, 50 mg at 08/21/18 2304     Reason ACE/ARB/ARNI order not selected, , Other, DOES NOT GO TO [facility] Marc Richard, MD     Reason beta blocker not prescribed, , Does not apply, DOES NOT GO TO [facility] Marc Richard, MD     Reason beta blocker order not selected, , Does not apply, DOES NOT GO TO [facility] Ryan C, MD     senna-docusate (SENOKOT-S;PERICOLACE) 8.6-50 MG per tablet 1 tablet, 1 tablet, Oral or Feeding Tube, BID, Stopped at 08/18/18 2027 **OR** senna-docusate (SENOKOT-S;PERICOLACE) 8.6-50 MG per tablet 2 tablet, 2 tablet, Oral or Feeding Tube, BID, Camilo Hutchison MD     sodium chloride (PF) 0.9% PF flush 10-20 mL, 10-20 mL, Intracatheter, Q1H PRN, Brad Hawkins MD     sodium chloride (PF) 0.9% PF flush 3 mL, 3 mL, Intracatheter, Q1H PRN, Hirma Meyers MD, 3 mL at 08/21/18 0333     sodium chloride (PF) 0.9% PF flush 3 mL, 3 mL, Intracatheter, Q8H, Hiram Meyers MD, 3 mL at 08/22/18 0749     sodium chloride (PF) 0.9% PF flush 5-50 mL, 5-50 mL, Intracatheter, Once PRN, Brad Hawkins MD     sodium chloride 0.9 % infusion, , , ,[NS1.5]            Physical Exam:[NS1.1]   BP (!) 86/63  Temp 97.9  F (36.6  C) (Axillary)  Resp (P) 20  Ht 1.753 m (5' 9\")  Wt 79.2 kg (174 lb 9.7 oz)  SpO2 90%  BMI 25.78 kg/m2[NS1.4]  Wt:[NS1.1]   Wt Readings from Last 2 Encounters:   08/21/18 79.2 kg (174 lb 9.7 oz)[NS1.4]      Constitutional: Cooperative, no acute distress[NS1.1], initially tachypnic and this improved on subsequent exam[NS1.2]  Eyes: Sclera anicteric  Ears/nose/mouth/throat: Mucus membranes moist, hearing intact  CV:[NS1.1] Tachycardic[NS1.2]  Respiratory:[NS1.1] Initially tachypnic, and this improved on subsequent exam. On oxygen by " NC.[NS1.2]    Abd: Soft, nontender, nondistended, no peritoneal signs  Skin: Warm, perfused, no jaundice  Neuro: AAO,[NS1.1] answers questions appropriately[NS1.2]  MSK: No gross deformities[NS1.1], surgical incision over chest c/d/i[NS1.2]           Data:   All available labs, imaging, and procedure notes were independently reviewed and interpreted, pertinent values are as follow:    BMP[NS1.1]    Recent Labs  Lab 08/22/18  0915 08/22/18  0340 08/21/18  2250 08/21/18  1550 08/21/18  0339    144  --  145* 148*   POTASSIUM 3.8 4.1 3.4 3.7 4.3   CHLORIDE 112* 112*  --  110* 112*   GABRIELA 7.5* 7.5*  --  8.4* 8.0*   CO2 24 27  --  26 28   BUN 62* 68*  --  66* 64*   CR 0.67 0.72  --  0.74 0.71   * 128*  --  108* 113*[NS1.4]     CBC[NS1.1]  Recent Labs  Lab 08/22/18  1221 08/22/18  0915 08/22/18  0340  08/21/18  1550 08/21/18  1105   WBC  --  8.3 8.1  --  9.2 8.4   RBC  --  2.35* 2.10*  --  2.79* 2.81*   HGB 8.0* 7.0* 6.1*  < > 8.1* 8.1*   HCT  --  21.3* 19.4*  --  25.5* 26.4*   MCV  --  91 92  --  91 94   MCH  --  29.8 29.0  --  29.0 28.8   MCHC  --  32.9 31.4*  --  31.8 30.7*   RDW  --  20.5* 22.7*  --  21.9* 20.9*   PLT  --  347 358  --  469* 460*   < > = values in this interval not displayed.[NS1.4]  INR[NS1.1]    Recent Labs  Lab 08/22/18  0340 08/21/18  0339 08/20/18  0315 08/19/18  0332   INR 1.27* 1.21* 1.16* 1.26*[NS1.4]     LFTs[NS1.1]    Recent Labs  Lab 08/22/18  0340 08/21/18  0339 08/20/18  0315 08/19/18  0332   ALKPHOS 158* 196* 220* 248*   AST 92* 86* 108* 188*   * 106* 132* 183*   BILITOTAL 0.3 0.2 0.3 0.4   PROTTOTAL 4.8* 5.0* 5.0* 4.8*   ALBUMIN 2.2* 1.6* 1.6* 1.6*[NS1.4]        Imaging:  Exam: XR CHEST PORT 1 VW, 8/22/2018 1:17 AM     Indication: hypotension in post op patient;      Comparison: Radiograph of the chest 8/21/2018     Findings:   AP view of the chest. Interval removal of the endotracheal tube.  Enteric tube courses below level the diaphragm and outside the  inferior field  of view. Bilateral chest tubes are stable. 3 drains  project over the mediastinum. Defibrillator pads project over the  inferior heart. Multiple epicardial pacemaker leads. The  cardiomediastinal silhouette is enlarged. The pulmonary vasculature is  indistinct, and the main pulmonary artery is prominent. Bilateral  mixed airspace opacities increased since the prior radiograph. Left  lung base opacity. Small left pleural effusion. No pneumothorax. The  upper abdomen is unremarkable .          Impression:   1.  Interval extubation. Remaining support devices are stable,  including bilateral chest tubes and mediastinal drains.  2.  Mixed airspace opacities throughout both lungs, unchanged small  left pleural effusion, and prominent main pulmonary artery, most  compatible with pulmonary edema.[NS1.1]     Revision History        User Key Date/Time User Provider Type Action    > NS1.5 8/22/2018  4:52 PM Stacy Engel MD Resident Sign     NS1.4 8/22/2018  4:29 PM Stacy Engel MD Resident      NS1.2 8/22/2018  4:27 PM Stacy Engel MD Resident      NS1.3 8/22/2018  2:26 PM Stacy Engel MD Resident      NS1.1 8/22/2018  1:30 PM Stacy Engel MD Resident             Consults by Deon Sorensen MD at 8/22/2018  1:00 PM     Author:  Deon Sorensen MD Service:  Cardiology Author Type:  Physician    Filed:  8/22/2018  6:09 PM Date of Service:  8/22/2018  1:00 PM Creation Time:  8/22/2018  1:00 PM    Status:  Signed :  Deon Sorensen MD (Physician)     Consult Orders:    1. Cardiology Electrophysiology (EP) IP Consult: Patient to be seen: Routine within 24 hrs; Call back #: [phone]; SVT, requiring intermittent pacing; Consultant may enter orders: Yes [[id number]] ordered by Hiram Meyers MD at 08/22/18 0910                  Electrophysiology Consultation Note   EP Attending: .   Reason for consultation: episode prolonged asystole s/p adenosine[JM1.1],  assess current rhythm[JM1.2]   Provider requesting consultation: .  Date of Service: 8/22/2018      HPI:   Mr. Castillo De Anda is a 68 year old male with a past medical history significant for STEMI s/p HUSSEIN, repair VSD and TVR. He underwent a HUSSEIN x2 to RCA and LAD for STEMI 8/4/2018.  Recovery c/b ischemic VSD and he underwent posterior VSD repair and TVR (bioprostetic) on 8/10. On 8/13 his chest was closed and on 8/14 IABP was pulled. He was extubated 8/21. Limited echocardiogram today shows EF 60-65%.  He has a temporary pacer in place set to VVI @ 60.  He is on aspirin and plavix.  CHADSVASC 2 (age+, CAD+).  EP has consulted on this patient for atrial flutter on 8/15 with recommendations that once it is safe to anticoagulate from a surgical standpoint, we may consider overdrive pacing with his temporary pacer as he has an A lead in place to attempt to convert his rhythm.  Last night patient was having narrow complex tachycardia with MAP in 50's so adenosine 6 mg was administered and s/p adenosine he had a period of approximately 20 seconds of asystole with brief CPR with ROSC. Review of telemetry and EKG[JM1.1] today[JM1.2] show[JM1.1] possible SR[JM1.2].[JM1.1] EP was consulted for prolonged asystole s/p adenosine and to assess current rhythm.[JM1.2]    Past Medical History:   Past Medical History:   Diagnosis Date     DVT (deep venous thrombosis) (H) 2008    after craniotomy     Meningioma (H)      Polycythemia vera (H)      Prostate cancer (H)      Past Surgical History:   Past Surgical History:   Procedure Laterality Date     ANKLE SURGERY Right 04/2018     APPENDECTOMY       CRANIOTOMY Right 2008     IRRIGATION AND DEBRIDEMENT CHEST WASHOUT, COMBINED N/A 8/13/2018    Procedure: COMBINED IRRIGATION AND DEBRIDEMENT CHEST WASHOUT;  COMBINED IRRIGATION AND DEBRIDEMENT CHEST WASHOUT, Closure;  Surgeon: Jason Franco MD;  Location: [facility] OR     PROSTATECTOMY PERINEAL  2004     REPAIR VENTRICULAR  SEPTAL DEFECT N/A 8/10/2018    Procedure: REPAIR VENTRICULAR SEPTAL DEFECT;  Median Sternotomy, REPAIR VENTRICULAR SEPTAL DEFECT on pump oxygenation, Tricuspid valve replacement ;  Surgeon: Jason Franco MD;  Location: [facility] OR     SPLENECTOMY      childhood     THYROID SURGERY  2012     Allergies: Per MAR     Allergies   Allergen Reactions     Anagrelide Rash     Noted to have small rash and nose bleeds after starting the prescription (prior to August 2018 hospitalization; prescribed by [facility] clinician); dosage had then been increased (by [facility] clinician) and subsequently developed full body rash within 4 hours of increased dose.     Medications:   Per MAR current outpatient cardiovascular medications include: No prescriptions prior to admission.     No current outpatient prescriptions on file.     Current Facility-Administered Medications   Medication Dose Route Frequency     aspirin  81 mg Oral or Feeding Tube Daily     atorvastatin  80 mg Oral or Feeding Tube QPM     clopidogrel  75 mg Oral Daily     docusate  100 mg Oral or Feeding Tube BID     hydroxyurea  1,000 mg Oral or Feeding Tube BID     insulin aspart  1-6 Units Subcutaneous Q4H     levothyroxine  75 mcg Oral or Feeding Tube Daily     multivitamins with minerals  15 mL Per Feeding Tube Daily     polyethylene glycol  17 g Oral or Feeding Tube Daily     protein modular  1 packet Per Feeding Tube TID     QUEtiapine  50 mg Oral or Feeding Tube At Bedtime     senna-docusate  2 tablet Oral or Feeding Tube BID    Or     senna-docusate  1 tablet Oral or Feeding Tube BID     sodium chloride (PF)  3 mL Intracatheter Q8H     sodium chloride         Family History:   Family History   Problem Relation Age of Onset     Hypertension Mother      Cerebrovascular Disease Paternal Uncle      Social History:   Social History   Substance Use Topics     Smoking status: Former Smoker     Smokeless tobacco: Never Used     Alcohol use 1.2 oz/week     2 Shots of  "liquor per week       ROS:   A comprehensive 10 point ROS was[JM1.1] negative[JM1.2] other than as mentioned in HPI.    Physical Examination:   VITALS: BP (!) 86/63  Temp 97.6  F (36.4  C) (Axillary)  Resp (!) 34  Ht 1.753 m (5' 9\")  Wt 79.2 kg (174 lb 9.7 oz)  SpO2 100%  BMI 25.78 kg/m2     GENERAL APPEARANCE: AxO, NAD   HEENT: NCAT, EOMI, MMM.   NECK: Supple. No JVD or bruit. Good carotid upstroke.   CHEST: CTAB   CARDIOVASCULAR: S1S2, Reg, No m/r/g.[JM1.1] Chest tubes noted.[JM1.2]  ABDOMEN: BS+, soft, No pulsatile masses or bruits.   EXTREMITIES: No pedal edema. Distal pulses intact.   NEURO: Grossly nonfocal.   PSYCH: Normal affect.  SKIN: Warm and dry.   Data:   Labs:  BMP  Recent Labs  Lab 08/22/18  0915 08/22/18  0340 08/21/18  2250 08/21/18  1550 08/21/18  0339    144  --  145* 148*   POTASSIUM 3.8 4.1 3.4 3.7 4.3   CHLORIDE 112* 112*  --  110* 112*   GBARIELA 7.5* 7.5*  --  8.4* 8.0*   CO2 24 27  --  26 28   BUN 62* 68*  --  66* 64*   CR 0.67 0.72  --  0.74 0.71   * 128*  --  108* 113*     CBC  Recent Labs  Lab 08/22/18  1221 08/22/18  0915 08/22/18  0340 08/21/18  2250 08/21/18  1550 08/21/18  1105   WBC  --  8.3 8.1  --  9.2 8.4   RBC  --  2.35* 2.10*  --  2.79* 2.81*   HGB 8.0* 7.0* 6.1* 7.7* 8.1* 8.1*   HCT  --  21.3* 19.4*  --  25.5* 26.4*   MCV  --  91 92  --  91 94   MCH  --  29.8 29.0  --  29.0 28.8   MCHC  --  32.9 31.4*  --  31.8 30.7*   RDW  --  20.5* 22.7*  --  21.9* 20.9*   PLT  --  347 358  --  469* 460*     INR  Recent Labs  Lab 08/22/18  0340 08/21/18  0339 08/20/18  0315 08/19/18  0332   INR 1.27* 1.21* 1.16* 1.26*     No results found for: CKTOTAL, CKMB, TROPN  Cholesterol (mg/dL)   Date Value   08/06/2018 76     HDL Cholesterol (mg/dL)   Date Value   08/06/2018 30 (L)     LDL Cholesterol Calculated (mg/dL)   Date Value   08/06/2018 34     EKG:[JM1.1] [JM1.2]   Tele:[JM1.1]     [JM1.2]  ECHO:   Recent Results (from the past 4320 hour(s))   Echo limited (Adults Only)    " [facility]    [id number]  [facility]  [id number]  [id number]^KIMBERLI^DUKE^[facility]LELYAbb[facility],[place]  Echocardiography Laboratory  [address] [place] [id number]     Name: JOSE SOLER  MRN: [id number]  : 1950  Study Date: 2018 01:22 AM  Age: 68 yrs  Gender: Male  Patient [facility]: [facility]  Reason For Study: Heart Failure  History: VSD  Ordering Physician: DUKE AG  Referring Physician: -[facility]  Performed By: Norberto Uriarte MD     BSA: 1.9 m2  Height: 69 in  Weight: 174 lb  HR: 101  BP: 98/58 mmHg  _____________________________________________________________________________  __        Procedure  Limited Echocardiogram with portions of two-dimensional, color and spectral  Doppler performed. Adequate quality two-dimensional was performed and  interpreted. ECG shows normal sinus rhythm.  _____________________________________________________________________________  __        Interpretation Summary  Limited study.  Moderate sized (max dimension 1.8 cm anterior to the RV), predominantly  anterior, partially-organized pericardial effusion. Echocardiographic findings  collectively do not support a diagnosis of pericardial tamponade.     Global and regional left ventricular function is normal with an EF of 60-65%.  Global right ventricular function is moderately reduced. The right ventricle  is normal size.  This study was compared with the study from 18 . There has been no  change.  Compared with 18, there has been no significant change.  _____________________________________________________________________________  __        Left Ventricle  Left ventricular size is normal. Global and regional left ventricular function  is normal with an EF of 60-65%.     Right Ventricle  The right ventricle is normal size. Global right ventricular function is  moderately reduced.     Atria  The atria cannot be assessed.     Mitral Valve  The mitral leaflets are not  well visualized, but mitral valve function appears  normal on Doppler interrogation.        Aortic Valve  The aortic valve cannot be assessed.     Tricuspid Valve  The tricuspid leaflets are not well visualized, but mitral valve function  appears normal on Doppler interrogation.     Pulmonic Valve  The pulmonic valve cannot be assessed.     Vessels  The aorta root cannot be assessed. The pulmonary artery and bifurcation cannot  be assessed. Dilation of the inferior vena cava is present with normal  respiratory variation in diameter. The IVC is dilated; respiratory variability  is not assessed (RA pressure at least 8 mmHg.).     Pericardium  Moderate sized (max dimension 1.8 cm anterior to the RV), predominantly  anterior, partially-organized pericardial effusion. Echocardiographic findings  collectively do not support a diagnosis of pericardial tamponade. Features  suggesting against a diagnosis of pericardial tamponade are: absence of  diastolic RV collpase, absence of systolic RA collpase, absence of respiratory  variation in mitral inflows, absence of respiratory variation in tricuspid  inflows, absence of respirophasic septal shift.  Features supporting a diagnosis of pericardial tamponade are: dilated IVC.        Compared to Previous Study  This study was compared with the study from 18 . There has been no  change.     Attestation  I have personally viewed the imaging and agree with the interpretation and  report as documented by the fellow, Norberto Uriarte, and/or edited by me.  _____________________________________________________________________________  __           Doppler Measurements & Calculations  TR max luisa: 63.8 cm/sec  TR max P.8 mmHg     _____________________________________________________________________________  __           Report approved by: Hilda Jensen 2018 08:38 AM      Echo limited (Adults Only)    Narrative    [id number]  [id number]  [id number]  [id number]^KEVIN^BONY^            [facility]  Echocardiography Laboratory  [address] [place] [address]     Name: JOSE SOLER  MRN: [id number]  : 1950  Study Date: 2018 12:11 PM  Age: 68 yrs  Gender: Male  Patient Location: [facility]  Reason For Study: Eval LV/RV FX/ S/P VSC closure  History: Eval LV/RV FX/ S/P VSC closure  Ordering Physician: BONY BROWN  Referring Physician: SELF, REFERRED  Performed By: Marcie Buitrago RDCS     BSA: 2.0 m2  Height: 69 in  Weight: 175 lb  BP: 86/63 mmHg  _____________________________________________________________________________  __        Procedure  Limited Echocardiogram with portions of two-dimensional, color and spectral  Doppler performed.  _____________________________________________________________________________  __        Interpretation Summary  Moderate size pericardial effusion predominently loculated along RV free wall.  No definitive echo evidence for Tamponade. Recommend clinical correlation.  This effusion is new when compared to previous study.  _____________________________________________________________________________  __        Left Ventricle  Left ventricular wall thickness is normal. Left ventricular size is normal.  Diastolic function not assessed due to tachycardia. The Ejection Fraction is  estimated at 55-60%. Basal to mid inferoseptal wall akinesis. Basal to mid  anteroseptal wall hypokinesis.     Right Ventricle  The right ventricle is normal size. Global right ventricular function is  moderately to severely reduced.     Atria  Both atria appear normal. Atrial septum can't be assessed.     Mitral Valve  The mitral valve is normal.        Aortic Valve  Aortic valve is normal in structure and function.     Tricuspid Valve  The tricuspid valve is normal.     Pulmonic Valve  The pulmonic valve cannot be assessed.     Vessels  The inferior vena cava cannot be assessed. The aorta root cannot be assessed.  The  thoracic aorta cannot be assessed.     Pericardium  Moderate size pericardial effusion predominently loculated along RV free wall.  No definitive echo evidence for Tamponade. Recommend clinical correlation.  This effusion is new when compared to previous study.        Attestation  I have personally viewed the imaging and agree with the interpretation and  report as documented by the fellow, Richard Horton, and/or edited by me.  _____________________________________________________________________________  __                          Report approved by: Hilda Bashir 2018 01:46 PM              _____________________________________________________________________________  __      Echo limited (Adults Only)    Narrative    [id number]  [facility]  [id number]  [id number]^DANYA RIVERA^ENDY^           [facility],[place]  Echocardiography Laboratory  [address] [place] [id number]     Name: JOSE SOLER  MRN: [id number]  : 1950  Study Date: 2018 08:17 AM  Age: 68 yrs  Gender: Male  Patient Location: [facility]  Reason For Study: VSD  Ordering Physician: ENDY WARD  Referring Physician: SELF, REFERRED  Performed By: Brian Bansal RDCS     BSA: 2.0 m2  Height: 69 in  Weight: 182 lb  HR: 105  BP: 96/66 mmHg  _____________________________________________________________________________  __        Procedure  Limited Portable Echo Adult. Technically difficult study. Poor acoustic  windows.  _____________________________________________________________________________  __        Interpretation Summary  Limited echocardiogram.Technically difficult study.     s/p repair of inferior membranous VSD. There is no flow seen across repair.  There is a non-mobile mass (1 cm) protruding into the LV at the site of patch  repair. At the edge of it there is a small filamentous mobile mass that likely  represents surgical material like a suture, less likely thrombus.  These  findings appear unchanged from intra-op ELISABETH after repair. Consider cardiac CT  for further delineation of repair and mass. Findings discussed with Dr Tanner.  Left ventricular EF is 55-60%.  Inferior wall and basal to mid inferoseptal wall akinesis is present.  Global right ventricular function is mildly reduced.  No pericardial effusion is present.     This study was compared with the study from 8/10/2018: s/p VSD repair,  improvement RV function, and improvement in TR.        _____________________________________________________________________________  __        Left Ventricle  Left ventricular function is normal.The EF is 55-60%. Left ventricular size is  normal. Relative wall thickness is increased consistent with concentric  remodeling. Inferior wall akinesis is present. Basal to mid inferoseptal wall  akinesis. Interval resolution of VSD.     Right Ventricle  The right ventricle is normal size. Global right ventricular function is  mildly reduced.     Atria  The atria cannot be assessed. The atrial septum is intact as assessed by color  Doppler .     Mitral Valve  The mitral valve cannot be assessed.        Aortic Valve  The aortic valve cannot be assessed.     Tricuspid Valve  The tricuspid valve is normal. Trace tricuspid insufficiency is present. The  peak velocity of the tricuspid regurgitant jet is not obtainable. Pulmonary  artery systolic pressure cannot be assessed.     Pulmonic Valve  The pulmonic valve cannot be assessed.     Vessels  The aorta root cannot be assessed. The thoracic aorta cannot be assessed. The  inferior vena cava cannot be assessed.     Pericardium  No pericardial effusion is present.        Compared to Previous Study  This study was compared with the study from 8/10/2018 .     Attestation  I have personally viewed the imaging and agree with the interpretation and  report as documented by the fellow, Ирина Drake, and/or edited by  me.  _____________________________________________________________________________  __     MMode/2D Measurements & Calculations  IVSd: 1.1 cm  LVIDd: 4.2 cm  LVIDs: 2.7 cm  LVPWd: 1.0 cm  FS: 37.0 %  LV mass(C)d: 156.1 grams  LV mass(C)dI: 78.7 grams/m2  RWT: 0.49           _____________________________________________________________________________  __           Report approved by: Hilda Moser 2018 02:11 PM      Echo limited (Adults Only)    Narrative    [id number]  [id number]  [id number]  [id number]^ALLEN^INGE^[facility],[place]  Echocardiography Laboratory  [address] [place] [id number]     Name: JOSE SOLER  MRN: [id number]  : 1950  Study Date: 08/10/2018 10:17 AM  Age: 68 yrs  Gender: Male  Patient Location: [facility]  Reason For Study: VSD  Ordering Physician: INGE VILLA  Referring Physician: SELF, REFERRED  Performed By: Monico Alexandra     BSA: 1.9 m2  Height: 69 in  Weight: 170 lb  HR: 96  BP: 93/56 mmHg  _____________________________________________________________________________  __        Procedure  Limited Portable Echo Adult.  _____________________________________________________________________________  __        Interpretation Summary  Limited study.  The Ejection Fraction is estimated at 60-65%. Mid-inferior wall is akinetic.  A mid inferoseptal muscular VSD is present.  VSD peak gradient is 35 mmHg. Peak velocity 3 m/s.  Mild to moderate right ventricular dilation is present.  RV function is moderately reduced.  Moderate to severe tricuspid insufficiency is present.  Right ventricular systolic pressure is 43mmHg above the right atrial pressure.  Moderate pulmonary hypertension is present (~57 mmHg).  The inferior vena cava was dilated at 2.4 cm without respiratory variability,  consistent with increased right atrial pressure.  Estimated mean right atrial pressure is 15 mmHg (significantly elevated).  No pericardial  effusion is present.     This study was compared with the study from 2018 .  PAP appears slightly higher and TR appears to have worsened on this study.  Otherwise no other significant change.     _____________________________________________________________________________  __        Left Ventricle  The Ejection Fraction is estimated at 60-65%. Mid-inferior wall is akinetic. A  muscularventricular-septal defect is present. VSD peak gradient is 35 mmHg.  Peak velocity 3 m/s.     Right Ventricle  Mild to moderate right ventricular dilation is present. RV function is  moderately reduced. A right heart catheter is noted in the right ventricle.     Atria  The atria cannot be assessed.     Aortic Valve  The aortic valve cannot be assessed.        Tricuspid Valve  Moderate to severe tricuspid insufficiency is present. The right ventricular  systolic pressure is approximated at 42.8 mmHg plus the right atrial pressure.  Right ventricular systolic pressure is 43mmHg above the right atrial pressure.  Moderate (pulmonary artery systolic pressure 50-75mmHg) pulmonary hypertension  is present.     Vessels  The inferior vena cava was dilated at 2.4 cm without respiratory variability,  consistent with increased right atrial pressure. Estimated mean right atrial  pressure is 15 mmHg (significantly elevated).     Pericardium  No pericardial effusion is present.     Compared to Previous Study  This study was compared with the study from 2018 . PAP appears slightly  higher and TR appears to have worsened on this study. Otherwise no other  significant change.     _____________________________________________________________________________  __        Doppler Measurements & Calculations  TR max luisa: 327.0 cm/sec  TR max P.8 mmHg        _____________________________________________________________________________  __        Report approved by: Jacobo HERNANDEZ 08/10/2018 11:31 AM      Echo complete    Narrative     [id number]  [facility]  [id number]  [id number]^IVAN^SUSHIL^CASA           [facility],[place]  Echocardiography Laboratory  [address] [place] [address]     Name: JOSE SOLER  MRN: [id number]  : 1950  Study Date: 2018 07:56 AM  Age: 68 yrs  Gender: Male  Patient Location: [facility]  Reason For Study: CHF, VSD  Ordering Physician: SUSHIL LOVE  Referring Physician: SELF, REFERRED  Performed By: Brian Bansal RDCS     BSA: 1.9 m2  Height: 69 in  Weight: 170 lb  BP: 112/90 mmHg  _____________________________________________________________________________  __        Procedure  Complete Portable Echo Adult.  _____________________________________________________________________________  __        Interpretation Summary  Left ventricular size is normal.  The Ejection Fraction is estimated at 55-60%.  Inferior wall akinesis with inferoseptal muscular VAD. Peak velocity 3.7m/sec.  Mild to moderate right ventricular dilation is present.  Global right ventricular function is moderately reduced.  Dilation of the inferior vena cava is present with abnormal respiratory  variation in diameter.  No pericardial effusion is present.  Previous study not available for comparison.  _____________________________________________________________________________  __        Left Ventricle  Left ventricular size is normal. Left ventricular wall thickness is normal.  The Ejection Fraction is estimated at 55-60%. Left ventricular diastolic  function is indeterminate. Diastolic Doppler findings (E/E' ratio and/or other  parameters) suggest left ventricular filling pressures are increased. Inferior  wall akinesis with inferoseptal muscular VAD. Peak velocity 3.7m/sec.     Right Ventricle  Mild to moderate right ventricular dilation is present. Global right  ventricular function is moderately reduced.     Atria  Both atria appear normal. The atrial septum is intact as assessed by color  Doppler .      Mitral Valve  The mitral valve is normal.        Aortic Valve  Aortic valve is normal in structure and function.     Tricuspid Valve  The tricuspid valve is normal. Mild tricuspid insufficiency is present. The  right ventricular systolic pressure is approximated at 30.0 mmHg plus the  right atrial pressure.     Pulmonic Valve  The pulmonic valve is normal.     Vessels  The aorta root is normal. The pulmonary artery is normal. Ascending aorta 3.8  cm. Dilation of the inferior vena cava is present with abnormal respiratory  variation in diameter.     Pericardium  No pericardial effusion is present.        Compared to Previous Study  Previous study not available for comparison.  _____________________________________________________________________________  __  MMode/2D Measurements & Calculations     RVDd: 5.5 cm  IVSd: 0.89 cm  LVIDd: 4.9 cm  LVIDs: 3.1 cm  LVPWd: 0.87 cm  FS: 36.3 %  LV mass(C)d: 145.9 grams  LV mass(C)dI: 75.7 grams/m2  LA dimension: 3.8 cm  asc Aorta Diam: 3.8 cm  LVOT diam: 2.1 cm  LVOT area: 3.5 cm2  RVOT diam: 2.6 cm  LA Volume (BP): 40.3 ml  RWT: 0.36  TAPSE: 2.0 cm        Doppler Measurements & Calculations  MV E max luisa: 90.4 cm/sec  MV A max luisa: 98.0 cm/sec  MV E/A: 0.92  MV dec slope: 651.6 cm/sec2  MV dec time: 0.14 sec  LV V1 VTI: 8.8 cm     SV(LVOT): 30.5 ml  SI(LVOT): 15.8 ml/m2  TV max P.0 mmHg  PA V2 max: 138.0 cm/sec  PA max P.6 mmHg  PA acc time: 0.08 sec  TR max luisa: 272.7 cm/sec  TR max P.0 mmHg  Qp/Qs: 3.3/1.0  E/E' av.8  Lateral E/e': 11.3  Medial E/e': 18.3     _____________________________________________________________________________  __           Report approved by: Hilda Bashir 2018 10:22 AM          Assessment:   Mr. Castillo De Anda is a 68 year old male with a past medical history significant for STEMI s/p HUSSEIN, repair VSD and TVR. He underwent a HUSSEIN x2 to RCA and LAD for STEMI 2018.  Recovery c/b ischemic VSD and he underwent posterior  VSD repair and TVR (bioprostetic) on 8/10. On 8/13 his chest was closed and on 8/14 IABP was pulled. He was extubated 8/21. Limited echocardiogram today shows EF 60-65%.  He has a temporary pacer in place set to VVI @ 60.  He is on aspirin and plavix.  CHADSVASC 2 (age+, CAD+).  EP has consulted on this patient for atrial flutter on 8/15 with recommendations that once it is safe to anticoagulate from a surgical standpoint, we may consider overdrive pacing with his temporary pacer as he has an A lead in place to attempt to convert his rhythm.  Last night patient was having narrow complex tachycardia with MAP in 50's so adenosine 6 mg was administered and s/p adenosine he had a period of approximately 20 seconds of asystole with brief CPR with ROSC. Review of telemetry and EKG[JM1.1] today[JM1.2] show[JM1.1] likely SR[JM1.2].[JM1.1] EP was consulted for prolonged asystole s/p adenosine and to assess current rhythm.[JM1.2]  EP Recommendations:[JM1.1]  1. Asystole s/p adenosine is an expected outcome of administration of the medication.  No further pauses noted on telemetry.    2.[JM1.2] Review of telemetry and EKG[JM1.1] today[JM1.2] show[JM1.1] likely SR.  Will review overnight telemetry to assess rhythm at possibly a slower rate.   3. Continue to recommend anticoagulation when safe from a surgical standpoint.[JM1.2]     The patient states understanding and is agreeable with plan.   Thank you for allowing us to participate in the care of this patient.     The patient was discussed w/ .[JM1.1] Franchesca[JM1.2].  The above note reflects our joint plan.    STEPHANIE Ledbetter CNP  Electrophysiology Consult Service  Pager: [phone][JM1.1]      ELECTROPHYSIOLOGY STAFF  Patient seen and examined by me.  History and physical examination discussed with Lavinia Wheeler whose note reflects our joint assessment and recommendation/plans.  We have been consulted for a new issue: asystole upon administration of IV adenosine.  AV  block is the expected response to adenosine, in this case there was no ventricular escape.  A little over a minute after administration sinus rhythm appears to ensue.  12 lead ECG now is consistent.  His HR has been about 103 bpm.  We will see if there is any variation in rate later, e.g during sleep.  We still recommend anticoagulation (for AF/flutter) when safe to do so from a surgical standpoint.  Deon Sorensen[SS1.1]         Revision History        User Key Date/Time User Provider Type Action    > SS1.1 8/22/2018  6:09 PM Deon Sorensen MD Physician Sign     JM1.2 8/22/2018  5:17 PM Lavinia Wheeler APRN CNP Nurse Practitioner Sign     JM1.1 8/22/2018  1:00 PM Lavinia Wheeler APRN CNP Nurse Practitioner             Consults by Deon Sorensen MD at 8/15/2018  1:44 PM     Author:  Deon Sorensen MD Service:  Cardiology Author Type:  Physician    Filed:  8/15/2018  6:26 PM Date of Service:  8/15/2018  1:44 PM Creation Time:  8/15/2018  1:44 PM    Status:  Signed :  Deon Sorensen MD (Physician)     Consult Orders:    1. Cardiology Electrophysiology (EP) IP Consult: Patient to be seen: Routine within 24 hrs; Call back #: [phone] Dimitris with CVTS; 68M Hx of HUSSEIN x2 to RCA and prox LAD c/b ischemic VSD now s/p VSD and tricuspid valve repair 8/6. Postop noted to have und... [[id number]] ordered by Arnaldo Viera MD at 08/15/18 1045                  Electrophysiology Consultation Note   EP Attending: .   Reason for consultation: Atrial flutter and evaluation for PPM.   Provider requesting consultation: .  Date of Service: 8/15/2018      HPI:   Mr. Castillo De Anda is a 68 year old male with a past medical history significant for STEMI s/p HUSSEIN, repair VSD and TVR. He underwent a HUSSEIN x2 to RCA and LAD for STEMI 8/4/2018.  Recovery c/b ischemic VSD and he underwent posterior VSD repair and TVR (bioprostetic) on 8/10. On 8/13 his chest was closed and on 8/14 IABP was  pulled. Limited echocardiogram 8/10 shows EF 60-65%.  He has a temporary pacer in place set to VVI @ . EP consulted for development of atrial flutter and evaluation for PPM. Telemetry shows intrisic rhythm AFL 3:1 conduction at 70 bpm and intermittent pacing. He is currently on cangrelor gtt. CHADSVASC 2 (age+, CAD+). He is intubated and[JM1.1] awake[JM1.2].     Past Medical History:   Past Medical History:   Diagnosis Date     DVT (deep venous thrombosis) (H) 2008    after craniotomy     Meningioma (H)      Polycythemia vera (H)      Prostate cancer (H)      Past Surgical History:   Past Surgical History:   Procedure Laterality Date     ANKLE SURGERY Right 04/2018     APPENDECTOMY       CRANIOTOMY Right 2008     IRRIGATION AND DEBRIDEMENT CHEST WASHOUT, COMBINED N/A 8/13/2018    Procedure: COMBINED IRRIGATION AND DEBRIDEMENT CHEST WASHOUT;  COMBINED IRRIGATION AND DEBRIDEMENT CHEST WASHOUT, Closure;  Surgeon: Jason Franco MD;  Location: [facility] OR     PROSTATECTOMY PERINEAL  2004     REPAIR VENTRICULAR SEPTAL DEFECT N/A 8/10/2018    Procedure: REPAIR VENTRICULAR SEPTAL DEFECT;  Median Sternotomy, REPAIR VENTRICULAR SEPTAL DEFECT on pump oxygenation, Tricuspid valve replacement ;  Surgeon: Jason Franco MD;  Location: [facility] OR     SPLENECTOMY      childhood     THYROID SURGERY  2012     Allergies: Per MAR     Allergies   Allergen Reactions     Anagrelide Rash     Noted to have small rash and nose bleeds after starting the prescription (prior to August 2018 hospitalization; prescribed by [facility] clinician); dosage had then been increased (by [facility] clinician) and subsequently developed full body rash within 4 hours of increased dose.     Medications:   Per MAR current outpatient cardiovascular medications include: No prescriptions prior to admission.     No current outpatient prescriptions on file.     Current Facility-Administered Medications   Medication Dose Route Frequency     aspirin  81 mg Oral or  "Feeding Tube Daily     atorvastatin  80 mg Oral or Feeding Tube QPM     clopidogrel  600 mg Oral Once     clopidogrel  75 mg Oral Daily     docusate  100 mg Oral or Feeding Tube BID     hydroxyurea  1,000 mg Oral or Feeding Tube BID     insulin aspart  1-6 Units Subcutaneous Q4H     levETIRAcetam  750 mg Intravenous Q12H     lidocaine (viscous)  5 mL Topical Once     multivitamins with minerals  15 mL Per Feeding Tube Daily     pantoprazole (PROTONIX) IV  40 mg Intravenous Daily     piperacillin-tazobactam  3.375 g Intravenous Q6H     polyethylene glycol  17 g Oral or Feeding Tube Daily     potassium & sodium phosphates  1 packet Oral 4x Daily     potassium phosphate (KPHOS) in D5W IV  20 mmol Intravenous Once     QUEtiapine  25 mg Oral or Feeding Tube QAM     QUEtiapine  50 mg Oral or Feeding Tube At Bedtime     senna-docusate  2 tablet Oral or Feeding Tube BID    Or     senna-docusate  1 tablet Oral or Feeding Tube BID     sodium chloride (PF)  3 mL Intracatheter Q8H     vitamin  B-1  100 mg Per Feeding Tube Daily     Family History:   Family History   Problem Relation Age of Onset     Hypertension Mother      Cerebrovascular Disease Paternal Uncle      Social History:   Social History   Substance Use Topics     Smoking status: Former Smoker     Smokeless tobacco: Never Used     Alcohol use 1.2 oz/week     2 Shots of liquor per week       ROS:   A comprehensive 10 point ROS was[JM1.1] negative[JM1.2] other than as mentioned in HPI.    Physical Examination:   VITALS: /76  Temp 97.2  F (36.2  C) (Oral)  Resp 25  Ht 1.753 m (5' 9\")  Wt 83 kg (182 lb 15.7 oz)  SpO2 100%  BMI 27.02 kg/m2    GENERAL APPEARANCE: A, NAD   HEENT: NCAT, EOMI, MMM.   NECK: Supple. No JVD or bruit. Good carotid upstroke.   CHEST:[JM1.1] mechanical[JM1.2]  CARDIOVASCULAR: S1S2, Reg, No m/r/g.   ABDOMEN: BS+, soft, No pulsatile masses or bruits.   EXTREMITIES: No pedal edema. Distal pulses intact.   NEURO: Grossly nonfocal.   PSYCH: " Normal affect.  SKIN: Warm and dry.   Data:   Labs:  BMP  Recent Labs  Lab 08/15/18  0957 08/15/18  0339 18  03318  2340 18  1511 18  1319  18  0433   NA  --  146* 142  --  144 140  --  143   POTASSIUM 4.2 3.9 4.6 4.0 4.1 4.6  < > 4.0   CHLORIDE  --  119* 115*  --  112*  --   --  113*   GABRIELA  --  6.9* 7.6*  --  7.4*  --   --  7.8*   CO2  --  21 20  --  21  --   --  21   BUN  --  28 25  --  28  --   --  29   CR  --  0.67 0.84  --  0.93  --   --  0.99   GLC  --  135* 128*  --  133* 127*  --  115*   < > = values in this interval not displayed.  CBC  Recent Labs  Lab 08/15/18  0339 08/14/18  2030 18  1611 18  1511   WBC 12.9*  --  11.7* 10.6 11.9*   RBC 3.07*  --  3.01* 2.68* 2.84*   HGB 8.9* 8.7* 8.8* 7.8* 8.4*   HCT 27.4*  --  27.1* 24.4* 26.1*   MCV 89  --  90 91 92   MCH 29.0  --  29.2 29.1 29.6   MCHC 32.5  --  32.5 32.0 32.2   RDW 18.7*  --  18.1* 18.4* 18.5*     --  421 483* 495*     INR  Recent Labs  Lab 08/15/18  03318  1511 18  0433   INR 1.27* 1.32* 1.34* 1.44*     No results found for: CKTOTAL, CKMB, TROPN  Cholesterol (mg/dL)   Date Value   2018 76     HDL Cholesterol (mg/dL)   Date Value   2018 30 (L)     LDL Cholesterol Calculated (mg/dL)   Date Value   2018 34       Tele:   ECHO:   Recent Results (from the past 4320 hour(s))   Echo limited (Adults Only)    Narrative    [id number]  [facility]  [id number]  [id number]^ALLEN^INGE^[facility],[place]  Echocardiography Laboratory  [address] [place] [address]     Name: JOSE SOLER  MRN: [id number]  : 1950  Study Date: 08/10/2018 10:17 AM  Age: 68 yrs  Gender: Male  Patient [facility]  Reason For Study: VSD  Ordering Physician: INGE VILLA  Referring Physician: SELF, REFERRED  Performed By: Monico Alexandra     BSA: 1.9 m2  Height: 69 in  Weight: 170 lb  HR: 96  BP: 93/56  mmHg  _____________________________________________________________________________  __        Procedure  Limited Portable Echo Adult.  _____________________________________________________________________________  __        Interpretation Summary  Limited study.  The Ejection Fraction is estimated at 60-65%. Mid-inferior wall is akinetic.  A mid inferoseptal muscular VSD is present.  VSD peak gradient is 35 mmHg. Peak velocity 3 m/s.  Mild to moderate right ventricular dilation is present.  RV function is moderately reduced.  Moderate to severe tricuspid insufficiency is present.  Right ventricular systolic pressure is 43mmHg above the right atrial pressure.  Moderate pulmonary hypertension is present (~57 mmHg).  The inferior vena cava was dilated at 2.4 cm without respiratory variability,  consistent with increased right atrial pressure.  Estimated mean right atrial pressure is 15 mmHg (significantly elevated).  No pericardial effusion is present.     This study was compared with the study from 8/6/2018 .  PAP appears slightly higher and TR appears to have worsened on this study.  Otherwise no other significant change.     _____________________________________________________________________________  __        Left Ventricle  The Ejection Fraction is estimated at 60-65%. Mid-inferior wall is akinetic. A  muscularventricular-septal defect is present. VSD peak gradient is 35 mmHg.  Peak velocity 3 m/s.     Right Ventricle  Mild to moderate right ventricular dilation is present. RV function is  moderately reduced. A right heart catheter is noted in the right ventricle.     Atria  The atria cannot be assessed.     Aortic Valve  The aortic valve cannot be assessed.        Tricuspid Valve  Moderate to severe tricuspid insufficiency is present. The right ventricular  systolic pressure is approximated at 42.8 mmHg plus the right atrial pressure.  Right ventricular systolic pressure is 43mmHg above the right atrial  pressure.  Moderate (pulmonary artery systolic pressure 50-75mmHg) pulmonary hypertension  is present.     Vessels  The inferior vena cava was dilated at 2.4 cm without respiratory variability,  consistent with increased right atrial pressure. Estimated mean right atrial  pressure is 15 mmHg (significantly elevated).     Pericardium  No pericardial effusion is present.     Compared to Previous Study  This study was compared with the study from 2018 . PAP appears slightly  higher and TR appears to have worsened on this study. Otherwise no other  significant change.     _____________________________________________________________________________  __        Doppler Measurements & Calculations  TR max luisa: 327.0 cm/sec  TR max P.8 mmHg        _____________________________________________________________________________  __        Report approved by: Jacobo HERNANDEZ 08/10/2018 11:31 AM      Echo complete    Narrative    [id number]  [id number]  [id number]  [id number]^IVAN^SUSHIL^CASA           [facility],[place]  Echocardiography Laboratory  [address] [place] [address]     Name: JOSE SOLER  MRN: [id number]  : 1950  Study Date: 2018 07:56 AM  Age: 68 yrs  Gender: Male  Patient Location: [facility]  Reason For Study: CHF, VSD  Ordering Physician: SUSHIL LOVE  Referring Physician: SELF, REFERRED  Performed By: Brian Bansal [facility]     BSA: 1.9 m2  Height: 69 in  Weight: 170 lb  BP: 112/90 mmHg  _____________________________________________________________________________  __        Procedure  Complete Portable Echo Adult.  _____________________________________________________________________________  __        Interpretation Summary  Left ventricular size is normal.  The Ejection Fraction is estimated at 55-60%.  Inferior wall akinesis with inferoseptal muscular VAD. Peak velocity 3.7m/sec.  Mild to moderate right ventricular dilation is present.  Global right ventricular  function is moderately reduced.  Dilation of the inferior vena cava is present with abnormal respiratory  variation in diameter.  No pericardial effusion is present.  Previous study not available for comparison.  _____________________________________________________________________________  __        Left Ventricle  Left ventricular size is normal. Left ventricular wall thickness is normal.  The Ejection Fraction is estimated at 55-60%. Left ventricular diastolic  function is indeterminate. Diastolic Doppler findings (E/E' ratio and/or other  parameters) suggest left ventricular filling pressures are increased. Inferior  wall akinesis with inferoseptal muscular VAD. Peak velocity 3.7m/sec.     Right Ventricle  Mild to moderate right ventricular dilation is present. Global right  ventricular function is moderately reduced.     Atria  Both atria appear normal. The atrial septum is intact as assessed by color  Doppler .     Mitral Valve  The mitral valve is normal.        Aortic Valve  Aortic valve is normal in structure and function.     Tricuspid Valve  The tricuspid valve is normal. Mild tricuspid insufficiency is present. The  right ventricular systolic pressure is approximated at 30.0 mmHg plus the  right atrial pressure.     Pulmonic Valve  The pulmonic valve is normal.     Vessels  The aorta root is normal. The pulmonary artery is normal. Ascending aorta 3.8  cm. Dilation of the inferior vena cava is present with abnormal respiratory  variation in diameter.     Pericardium  No pericardial effusion is present.        Compared to Previous Study  Previous study not available for comparison.  _____________________________________________________________________________  __  MMode/2D Measurements & Calculations     RVDd: 5.5 cm  IVSd: 0.89 cm  LVIDd: 4.9 cm  LVIDs: 3.1 cm  LVPWd: 0.87 cm  FS: 36.3 %  LV mass(C)d: 145.9 grams  LV mass(C)dI: 75.7 grams/m2  LA dimension: 3.8 cm  asc Aorta Diam: 3.8 cm  LVOT diam: 2.1  cm  LVOT area: 3.5 cm2  RVOT diam: 2.6 cm  LA Volume (BP): 40.3 ml  RWT: 0.36  TAPSE: 2.0 cm        Doppler Measurements & Calculations  MV E max luisa: 90.4 cm/sec  MV A max luisa: 98.0 cm/sec  MV E/A: 0.92  MV dec slope: 651.6 cm/sec2  MV dec time: 0.14 sec  LV V1 VTI: 8.8 cm     SV(LVOT): 30.5 ml  SI(LVOT): 15.8 ml/m2  TV max P.0 mmHg  PA V2 max: 138.0 cm/sec  PA max P.6 mmHg  PA acc time: 0.08 sec  TR max luisa: 272.7 cm/sec  TR max P.0 mmHg  Qp/Qs: 3.3/1.0  E/E' av.8  Lateral E/e': 11.3  Medial E/e': 18.3     _____________________________________________________________________________  __           Report approved by: Hilda Bashir 2018 10:22 AM          Assessment:   Mr. Castillo De Anda is a 68 year old male with a past medical history significant for STEMI s/p HUSSEIN, repair VSD and TVR. He underwent a HUSSEIN x2 to RCA and LAD for STEMI 2018.  Recovery c/b ischemic VSD and he underwent posterior VSD repair and TVR (bioprostetic) on 8/10. On  his chest was closed and on  IABP was pulled. Limited echocardiogram 8/10 shows EF 60-65%.  He has a temporary pacer in place set to VVI @[JM1.1] 60.  He does have a V lead and an A lead in place[JM1.2]. EP consulted for development of atrial flutter and evaluation for PPM. Telemetry shows intrisic rhythm AFL 3:1 conduction at 70 bpm and intermittent pacing. He is currently on cangrelor gtt.  CHADSVASC 2 (age+, CAD+). He is intubated and[JM1.1] awake[JM1.2].   EP Recommendations:[JM1.1]  - His heart rate is currently well controlled.  - Once it is safe to anticoagulate from a surgical standpoint, we may consider overdrive pacing with his temporary pacer as he has an A lead in place to attempt to convert his rhythm.  - We will continue to follow this patient.[JM1.2]     The patient[JM1.1] family[JM1.2] states understanding and is agreeable with plan.   Thank you for allowing us to participate in the care of this patient.     The patient was  discussed w/ Dr. Sorensen.  The above note reflects our joint plan.    STEPHANIE Ledbetter CNP  Electrophysiology Consult Service  Pager: [phone][JM1.1]      ELECTROPHYSIOLOGY STAFF  Patient seen and examined by me.  History and physical examination discussed with Lavinia Wheeler whose note reflects our joint assessment and recommendation/plans.  68 year old gentleman s/p PCI for STEMI, repair of ischemic VSD and bioprosthetic TVR.  He has atrial flutter with controlled ventricular response.  We suggest anticoagulation for stroke prophyllaxis when safe to do so from a surgical standpoint. There is concern that he may need permanent pacing which would need to be considered carefully in view of his TVR.  When anticoagulated we can arrange ELISABETH/cardioversion.  He has an epicardial atrial lead (appropriately sensed flutter today) so we could try overdrive pacing prior to direct current cardioversion.  His post conversion pause may give an indication whether he needs pacing for sinus node dysfunction.  Deon Sorensen[SS1.1]       Revision History        User Key Date/Time User Provider Type Action    > SS1.1 8/15/2018  6:26 PM Deon Sorensen MD Physician Sign     JM1.2 8/15/2018  6:10 PM Lavinia Wheeler APRN CNP Nurse Practitioner Sign     JM1.1 8/15/2018  1:44 PM Lavinia Wheeler APRN CNP Nurse Practitioner             Consults by Krystin Anaya MD at 8/9/2018  6:10 PM     Author:  Krystin Anaya MD Service:  Neuro ICU Author Type:  Resident    Filed:  8/9/2018  9:58 PM Date of Service:  8/9/2018  6:10 PM Creation Time:  8/9/2018  6:10 PM    Status:  Attested :  Krystin Anaya MD (Resident)    Cosigner:  Rahul Fletcher MD at 8/11/2018  6:00 AM        Attestation signed by Rahul Fletcher MD at 8/11/2018  6:00 AM        Attestation:  Physician Attestation   I, Rahul Fletcher, saw this patient with the resident and agree with the resident and/or medical student's  findings and plan of care as documented in the note.      I personally reviewed vital signs, medications, labs and imaging.    Key findings: Patient presenting with complex cardiac history and now with multiple episodes of bradycardia with eyes rolling back and desaturations.  Per report he is conscious during these events and quickly returns to baseline after the episode subsides and 30 seconds or so.  On my examination today he was somnolent, but arousable and moves all extremities freely.  He had been receiving Ativan multiple times during the night following each episode.  There were many episodes documented over the night and marked on the EEG recording.  EEG recording would suggest that some of the events were related to hyperventilation and others may have represented simple partial seizures.  Will have to continue to follow these events with EEG to further determine the significance.  EEG should be reconnected following his cardiac surgery.    Rahul Fletcher MD  Date of Service (when I saw the patient): 8/10/18                               [facility]  Neurology Critical Care Consultation    Patient Name:  Castillo De Anda  MRN:  [id number]    :  1950  Date of Service:  2018  Primary care provider:  No primary care provider on file.      Neurology consultation service was asked to see Castillo Hauser[SR1.1]rg[SR1.2] to evaluate[SR1.1] for seizures.[SR1.2]     History of Present Illness:   68 year old ma[SR1.1]n[SR1.3] h/o[SR1.1] myeloproliferative syndrome, right temporal meningioma s/p resection (), hx of malignancy (prostate, thyroid)[SR1.3] who present[SR1.1]ed as transfer from [facility] for VSD in the setting of CAD/MI s/p RCA and proximal LAD HUSSEIN on 18. Neurology was consulted for symptoms concerning for possible seizures with abnormal EEG findings prior to consult. In conversation with Mr. De Anda's daughter and nursing staff, there have  "been numerous events on this admission in which he becomes bradycardic to 40s, desaturates to 70s, with eye rolling back phenomena lasting approximately 30 seconds per episode. He is responsive and states that he feels hot and appears anxious[SR1.3] during these episodes[SR1.4]. These have occurred nearly every 1.5-2 hours during this admission. In between episodes, he returns to baseline where he is largely intact cognitively.     Video EEG was obtained for these episodes with findings of \"very frequent right frontotemporal delta slowing and spike-wave complexes.\" Additionally \"2 events with marked increase in generalized delta slowing and in spike-wave discharges.\" I spoke to Dr. Cisneros prior to seeing Mr. De Anda and he informed me that there was increased build up of right frontal temporal spikes associated with episode of hyperventilation, anxious appearance, and mild bradycardia with retained consciousness. Although there were no electrographic seizures, this likely represent[SR1.3]s[SR1.4] simple partial seizures without significant alteration in consciousness.[SR1.3]     PMH[SR1.1]  Past Medical History:   Diagnosis Date     DVT (deep venous thrombosis) (H) 2008    after craniotomy     Meningioma (H)      Polycythemia vera (H)      Prostate cancer (H)[SR1.5]    - Thyroid cancer   - Polycythemia vera   - Anemia[SR1.2]       Past Surgical History:   Procedure Laterality Date     ANKLE SURGERY Right 04/2018     APPENDECTOMY       CRANIOTOMY Right 2008     PROSTATECTOMY PERINEAL  2004     SPLENECTOMY      childhood     THYROID SURGERY  2012[SR1.5]   - Recent left foot fracture[SR1.2]     Medications[SR1.1]   Levothyroxine   ASA   Rousvastatin   Anagrelide[SR1.2]     Allergies[SR1.1]  No Known Allergies[SR1.6]    Social History[SR1.1]  . Prior smoker 1/2 pk day until 2005. Occasional alcohol.[SR1.2]     Family History[SR1.1]    NPH in brother[SR1.2]     Physical Examination   Vitals: BP (!) 77/55  Temp " "96.9  F (36.1  C) (Axillary)  Resp 18  Ht 1.753 m (5' 9\")  Wt 78.3 kg (172 lb 9.9 oz)  SpO2 94%  BMI 25.49 kg/m2  General: Adult, in NAD, cooperative  HEENT:[SR1.1] Normocephalic atraumatic[SR1.2]   Cardiac:[SR1.1] Hum of balloon pump. Normal heart rate.[SR1.2]   Chest:[SR1.1] Clear to ausculation[SR1.2]   Abdomen:[SR1.1] Soft, nontender, nondistended[SR1.2]  Extremities:[SR1.1] 1+ pitting edema L ankle.[SR1.2]   Skin: No rash or lesion.   Neuro:  Mental status:[SR1.1] Mildly somnolent, but wakes easily. [facility], month, year, day of week. Calculates quarters in $1.75. [place] world backwards. Naming intact with increasing level of difficulty, missed stethoscope.[SR1.2]    Cranial nerves: Visual fields intact, eyes conjugate, PERRLA, EOMI, face symmetric, facial sensation intact, shoulder shrug strong, tongue/uvula midline.   Motor: Tone within normal. 5/5 strength[SR1.1] in bilateral uppers. LLE 5/5; RLE dorsiflexion 4/5, plantarflexion[SR1.2] 4/5[SR1.1], more proximal strength not assessed[SR1.2]. No atrophy or twitches.   Reflexes:[SR1.1] 2+  r[SR1.2]eflexic[SR1.1], symmetric[SR1.2] biceps,[SR1.1] triceps and brachioradialis. Right patellar not tested, left[SR1.2] patelllar[SR1.1] 1+, achilles 0.[SR1.2] Toes down-going.  Sensory: Intact to light touch throughout   Coordination: FNF no dysmetria[SR1.1]. HTS not performed because of restricted movement right leg.[SR1.2]   Gait:[SR1.1] Not assessed.[SR1.2]     Investigations[SR1.1]    Data[SR1.7]     CMP[SR1.1]   Recent Labs  Lab 08/09/18  1111 08/09/18  0351 08/08/18  2021 08/08/18  0503 08/07/18  1210 08/07/18  0403 08/06/18  1957 08/06/18  1144  08/06/18  0218   NA  --  133 132* 132* 133 132* 132* 132*  < > 127*   POTASSIUM 4.6 4.5 4.3 5.1 4.8 5.0 4.9 4.2  < > 4.9   CHLORIDE  --  104 102 105 104 103 102 104  < > 98   CO2  --  20 20 19* 19* 20 21 19*  < > 16*   ANIONGAP  --  9 10 8 10 9 10 9  < > 13   GLC  --  121* 142* 121* 130* 135* 140* 130*  < > " 160*   BUN  --  46* 45* 45* 32* 26 22 17  < > 15   CR  --  1.28* 1.39* 1.55* 1.39* 1.40* 1.17 0.96  < > 0.94   GFRESTIMATED  --  56* 51* 45* 51* 50* 62 78  < > 79   GFRESTBLACK  --  68 61 54* 61 61 75 >90  < > >90   GABRIELA  --  7.6* 7.7* 7.7* 7.3* 7.5* 7.6* 7.1*  < > 7.3*   MAG 2.9* 2.8* 2.7* 3.1* 2.7* 2.8* 2.7* 2.6*  < > 2.0   PHOS  --  4.6* 4.2 5.2* 4.7* 5.1*  --  3.9  < >  --    PROTTOTAL  --   --   --   --   --  6.2* 6.1* 6.1*  --  6.7*   ALBUMIN  --   --   --   --   --  2.4* 2.4* 2.3*  --  2.6*   BILITOTAL  --   --   --   --   --  0.4 0.4 0.3  --  0.6   ALKPHOS  --   --   --   --   --  85 91 90  --  114   AST  --   --   --   --   --  73* 86* 95*  --  77*   ALT  --   --   --   --   --  28 28 30  --  23   < > = values in this interval not displayed.[SR1.8]     CBC   Recent Labs  Lab 08/09/18  0351 08/07/18  1210 08/07/18  0403 08/07/18  0015   WBC 19.8* 21.3* 21.7* 24.1*   RBC 3.51* 3.57* 3.69* 3.66*   HGB 10.5* 10.9* 11.2* 11.1*   HCT 32.1* 32.3* 33.3* 33.2*   MCV 92 91 90 91   MCH 29.9 30.5 30.4 30.3   MCHC 32.7 33.7 33.6 33.4   RDW 15.8* 15.3* 15.3* 15.3*   * 865* 869* 836*       INR, PTT   Recent Labs  Lab 08/09/18  0351 08/08/18  0503 08/07/18  0403 08/06/18  0330   INR 1.34* 1.41* 1.39* 2.05*        Arterial Blood Gas   Recent Labs  Lab 08/09/18  1528 08/09/18  1111 08/09/18  0900 08/09/18  0351   O2PER 3L 2L  2L 2L 2L       UA    Recent Labs  Lab 08/07/18  0618   COLOR Yellow   APPEARANCE Clear   URINEGLC Negative   URINEBILI Negative   URINEKETONE Negative   SG 1.015   UBLD Large*   URINEPH 5.0   PROTEIN 10*   NITRITE Negative   LEUKEST Trace*   RBCU 2   WBCU 5       Micro[SR1.1]     Recent Labs  Lab 08/08/18  1158   SDES Blood Right Hand  Blood Right Hand   SREQ Received in aerobic bottle only  Received in aerobic bottle only   CULT No growth after 1 day  No growth after 23 hours[SR1.9]          Radiological Data[SR1.1]  CT head 8/9/18   - No hemorrhage. Encephalomalacia in the right temporal  lobe.[SR1.2] Formal reading pending.[SR1.3]     Impression[SR1.1]  Mr. De Anda is a 68 y.o. man myeloproliferative syndrome, right temporal meningioma s/p resection (2008), hx of malignancy (prostate, thyroid)[SR1.3] who present[SR1.1]ed as transfer from [facility] for VSD in the setting of CAD/MI s/p RCA and proximal LAD HUSSEIN on 8/6/18 with episodes concerning for focal seizures. EEG suggest vulnerability to seizures and prior to seeing Mr. De Anda, we recommended a loading dose of Keppra 2 g IV and maintenance Keppra 750 mg BID. CT head was pending, reviewed by me and notable for right temporal encephalomalacia. On CT there appears to be hypodensity in the right medulla that is likely artifact given lack of clinical correlate, no hemorrhage or new hypodensity.[SR1.3]     Recommendations  -[SR1.1]Keppra 2 g IV, completed   -Keppra 750 mg IV Q12 hrs[SR1.3]   -Treat any seizure last >5 minutes with 2 mg ativan   -Treat any 3 seizures within 1 hr period with 2 mg ativan[SR1.10]     Thank you for involving neurology in the care of Castillo De Anda.      Patient was seen and discussed with [SR1.1] Amrit[SR1.2], neurocritical care fellow. He will be formally staffed with Dr. Fletcher in the morning.[SR1.3]     Krystin Anaya MD  Neurology PGY 2  [phone][SR1.1]     Revision History        User Key Date/Time User Provider Type Action    > SR1.10 8/9/2018  9:58 PM Krystin Anaya MD Resident Sign     [N/A] 8/9/2018  9:02 PM Krystin Anaya MD Resident Sign     SR1.4 8/9/2018  9:00 PM Krystin Anaya MD Resident Sign     SR1.7 8/9/2018  8:50 PM Krystin Anaya MD Resident      SR1.3 8/9/2018  8:30 PM Krystin Anaya MD Resident      SR1.5 8/9/2018  7:37 PM Krystin Anaya MD Resident      SR1.6 8/9/2018  7:34 PM Krystin Anaya MD Resident      SR1.8 8/9/2018  7:27 PM Krystin Anaya MD Resident      SR1.2 8/9/2018  7:26 PM Krystin Anaya MD Resident      SR1.9 8/9/2018  6:11 PM Jaclyn  MD Krystin Resident      SR1.1 2018  6:10 PM Krystin Anaya MD Resident             Consults by Carolyn So MSW at 2018  3:34 PM     Author:  Carolyn So MSW Service:  Social Work Author Type:      Filed:  8/10/2018  9:56 AM Date of Service:  2018  3:34 PM Creation Time:  2018  3:33 PM    Status:  Signed :  Carolyn So MSW ()     Consult Orders:    1. Social Work IP Consult [[id number]] ordered by Camilo Hutchison MD at 18 0149                Social Work: Assessment with Discharge Plan    Patient Name:  Castillo De Anda  :  1950  Age:  68 year old  MRN:  [id number]  Risk/Complexity Score:  Filed Complexity Screen Score: 6  Completed assessment with:[KB1.1]  Pt, daughter (Chrsitine)[KB1.2]    Presenting Information   Reason for Referral:[KB1.1]  Discharge plan and Resources[KB1.2]  Date of Intake:  2018  Referral Source:[KB1.1]  Physician[KB1.2]  Decision Maker:[KB1.1]  Pt[KB1.2]  Alternate Decision Maker:[KB1.1]  Per [facility] policy, Pt's wife would be his surrogate decision-maker.[KB1.2]  Health Care Directive:[KB1.1]  Not on file[KB1.2]  Living Situation:[KB1.1]  House; Pt lives with his wife in a split-level house.[KB1.2]  Previous Functional Status:[KB1.1]  Independent; PT had been independent prior to admit. He is not working.[KB1.2]  Patient and family understanding of hospitalization:[KB1.1]  Pt and family are aware of plan for cardiac surgery.[KB1.2]  Cultural/Language/Spiritual Considerations:[KB1.1]  Pt's primary language is English.[KB1.2]  Adjustment to Illness:[KB1.1]  Pt has some anxiety due to his breathing and anticipation of surgery.[KB1.2]    Physical Health  Reason for Admission:[KB1.1]  No diagnosis found.[KB1.3]  Services Needed/Recommended:[KB1.1]  Other:  pending workup after surgery.[KB1.2]    Mental Health/Chemical Dependency  Diagnosis:[KB1.1]  Not noted[KB1.2]  Support/Services in  Place:[KB1.1]  N/A[KB1.2]  Services Needed/Recommended:[KB1.1]  N/A[KB1.2]    Support System  Significant relationship at present time:[KB1.1]  Wife, daughters, son[KB1.2]  Family of origin is available for support:[KB1.1]  Yes, Pt has a son and daughter in [place]. He, his wife, and other daughter are from [place].[KB1.2]  Other support available:[KB1.1]  Not noted.[KB1.2]  Gaps in support system:[KB1.1]  None[KB1.2]  Patient is caregiver to:[KB1.1]  None[KB1.2]     Provider Information   Primary Care Physician:  No primary care provider on file.   None   Clinic:  No primary physician on file.      :[KB1.1]  None[KB1.2]    Financial   Income Source:[KB1.1]  Retired[KB1.2]  Financial Concerns:[KB1.1]  None[KB1.2]  Insurance:    Payor/Plan Subscriber Name Rel Member # Group #   MEDICARE - MEDICARE JOSE SOLER  [id number]       ATTN CLAIMS, [address]       Discharge Plan   Patient and family discharge goal:[KB1.1]  Pt and family are hopeful for return to baseline function.[KB1.2]  Provided education on discharge plan:[KB1.1]  YES; discussed need for re-evaluation after surgery to assess care and rehab needs.[KB1.2]  Patient agreeable to discharge plan:[KB1.1]  Pending recommendations.[KB1.2]  A list of Medicare Certified Facilities was provided to the patient and/or family to encourage patient choice. Patient's choices for facility are:[KB1.1]  N/A[KB1.2]  Will NH provide Skilled rehabilitation or complex medical:[KB1.1]  N/A[KB1.2]  General information regarding anticipated insurance coverage and possible out of pocket cost was discussed. Patient and patient's family are aware patient may incur the cost of transportation to the facility, pending insurance payment:[KB1.1] N/A[KB1.2]  Barriers to discharge:[KB1.1]  Surgery, medical clearance[KB1.2]    Discharge Recommendations   Anticipated Disposition:[KB1.1]  pending evaluation after surgery.[KB1.2]  Transportation Needs:[KB1.1]  Family:  pending  "needs, family may transport[KB1.2]  Name of Transportation Company and Phone:[KB1.1]  N/A[KB1.2]    Additional comments[KB1.1]   Writer met with Pt and daughter at bedside. Pt was in and out of sleeping, but daughter was able to provide information above. Pt had an episode of difficult breathing and his daughter attended to him to gently assist him in calming his breath. Pt's daughter states these \"episodes\" have increased in frequency. Pt will have surgery Friday and is hopeful to be able to return home after a period of recovery. SW will remain available for ongoing support as needed.        Carolyn So [facility]  ICU   Pager: [phone][KB1.2]     Revision History        User Key Date/Time User Provider Type Action    > KB1.2 8/10/2018  9:56 AM Carolyn So MSW  Sign     KB1.3 8/9/2018  3:34 PM [place] [facility] [place]       KB1.1 8/9/2018  3:33 PM [place] [facility] [place]              Consults by Dionna Bates MD at 8/7/2018  3:30 PM     Author:  Dionna Bates MD Service:  Hem/Onc Author Type:  Physician    Filed:  8/7/2018  9:58 PM Date of Service:  8/7/2018  3:30 PM Creation Time:  8/7/2018  4:20 PM    Status:  Attested :  Dionna Bates MD (Physician)    Cosigner:  Miguel Hodge MD at 8/8/2018  9:43 PM         Consult Orders:    1. Hematology IP Consult: Patient to be seen: Routine - within 24 hours; thrombocytosis, vsd, ? need for hydroxyurea; Consultant may enter orders: Yes [[id number]] ordered by Bautista Yarbrough MD at 08/07/18 1425           Attestation signed by Miguel Hodge MD at 8/8/2018  9:43 PM        Attestation:  Physician Attestation   IMiguel, saw this patient with the resident and agree with the resident and/or medical student's findings and plan of care as documented in the note.      I personally reviewed vital signs, medications and labs.    Key findings: 68 YOM with Salbador-2 pos " P Vera, with new leukocytosis, thrombocytosis and anemia while on HU.  Stopped HU recently and now is s/p STEMI and high risk for ongoing thrombosis.  Recommend carefully starting HU and checking ferritin.    Miguel Hodge MD  Date of Service (when I saw the patient): 8/7/18                               [facility] [place]   Hematology/Oncology Consult Note    Castillo De Anda MRN# [id number]   Age: 68 year old YOB: 1950          Reason for Consult:[KO1.1]   History of PCV, now admitted with STEMI - consideration of hydrea?[AW1.1]         Assessment and Plan:   Castillo De Anda is a 68 year old[KO1.1] man[AW1.1] with[KO1.1] history of[AW1.1] polycythemia vera[KO1.1], DVT, and meningioma[AW1.1] who present[KO1.1]ed[AW1.1] as a transfer after STEMI s/p HUSSEIN placement[KO1.1]. He was ultimately[AW1.1] found to have[KO1.1] a[AW1.1] VSD.[KO1.1] He was previously maintained on hydrea for ~10 years, but stopped taking it several months ago, likely related to anemia (though unclear and no records). He was started on anagrelide, and this was very poorly tolerated, and stopped several days prior to this presentation.     He is currently[AW1.1] anemic[KO1.1] ([AW1.1]Hgb of 13.1 on arrival 08/16/18, now 10.9 today[KO1.1]), with a platelet count in the 800s.[AW1.1] Thrombocytosis is likely secondary to[KO1.1] his[AW1.1] underlying MPN, prior splenectomy, and[KO1.1] suspected[AW1.1] iron deficiency.[KO1.1] At this time, we[AW1.1] recommend[KO1.1] re[AW1.1]starting hydroxyurea 500 mg[KO1.1] PO[AW1.1] once daily. Given[KO1.1] his[AW1.1] anemia and reported history of nose bleeds[KO1.1], we[AW1.1] will also order von Willebrand factor labs to assess[KO1.1] for potential platelet dysfunction[AW1.1].     Summary of[KO1.1] Recommendations[AW1.1]:  -[KO1.1] S[AW1.1]tart Hydroxyurea 500 mg[KO1.1] PO daily.[AW1.1]  -[KO1.1] vWF labs ordered. We will follow-up on these results, which may take  several days.[AW1.1]    Thank you for involving us in the care of this patient. We will continue to follow during the hospitalization.    Patient was seen and plan of care developed with Dr. Hodge .    Georgesujit Vegas, MS-IV  Acting as scribe for Dr. Hodge[KO1.1]  08/07/2018[KO1.2]         History of Present Illness:   History obtained from chart review and confirmed with patient and family[KO1.1].[AW1.1]    Castillo De Anda is a 68 year old[KO1.1] man[AW1.1] with PMH[KO1.1] significant for[AW1.1] polycythemia vera[KO1.1], DVT, meningioma and prior prostate cancer[AW1.1] who present[KO1.1]ed[AW1.1] as a transfer from[KO1.1] an[AW1.1] outside facility for[KO1.1] further evaluation after he was found to have a[AW1.1] STEMI[KO1.1] ([AW1.1]s/p HUSSEIN to RCA and LAD[KO1.1])[AW1.1] with ischemic VSD.[KO1.1] The heme/onc team was consulted for assistance in managing the patient's thrombocytosis with history of PCV. Patient was[AW1.1] interviewed at bedside with multiple family members present.   P[KO1.1]atient[AW1.1] states he was diagnosed with PV in 2008 after developing[KO1.1] two separate DVTs in the right leg[AW1.1] after having surgery for a meningioma. Subsequent workup by heme/onc with[KO1.1] Bm[AW1.1]Bx and[KO1.1] he was[AW1.1] shown to have a JAK2 mutation.[KO1.1] He[AW1.1] was started on hydroxyurea at that time and was on it for a couple of years but reports that it was discontinued[KO1.1] several months ago[AW1.1] because his doctor felt it was no[KO1.1] longer[AW1.1] working. He was started on Lovenox for DVT prophylaxis.[KO1.1] He later[AW1.1] had ankle surgery in April 2018, around this time he states his platelets climbed to 1200 and[KO1.1] he[AW1.1] had an increase in WBCs. He was on Lovenox for 6 more weeks and then was started on an[KO1.1]a[AW1.1]grelide. P[KO1.1]atien[AW1.1]t reports having HAs and a full body rash with a[KO1.1]nagrelide[AW1.1].[KO1.1] He[AW1.1] and family report recent weight loss,  "decreased appetite, lethargy and increased sleeping over the past few weeks.        Past Medical History:[KO1.1]     Past Medical History:   Diagnosis Date     DVT (deep venous thrombosis) (H) 2008    after craniotomy     Meningioma (H)      Polycythemia vera (H)      Prostate cancer (H)[AW1.2]           Past Surgical History:[KO1.1]     Past Surgical History:   Procedure Laterality Date     ANKLE SURGERY Right 04/2018     APPENDECTOMY       CRANIOTOMY Right 2008     PROSTATECTOMY PERINEAL  2004     SPLENECTOMY      childhood     THYROID SURGERY  2012[AW1.2]          Social History:[KO1.1]     Social History     Social History     Marital status: N/A     Spouse name: Radha     Number of children: N/A     Years of education: N/A     Occupational History     Not on file.     Social History Main Topics     Smoking status: Former Smoker     Smokeless tobacco: Never Used     Alcohol use 1.2 oz/week     2 Shots of liquor per week     Drug use: Not on file     Sexual activity: Not on file     Other Topics Concern     Not on file     Social History Narrative    Wife is Radha[KO1.2]          Family History:[KO1.1]     Family History   Problem Relation Age of Onset     Hypertension Mother      Cerebrovascular Disease Paternal Uncle[KO1.2]           Allergies:[KO1.1]   No Known Allergies[KO1.2]          Physical Exam:[KO1.1]   BP (!) 109/92  Temp 97  F (36.1  C) (Axillary)  Resp 14  Ht 1.753 m (5' 9\")  Wt 76.7 kg (169 lb 1.5 oz)  SpO2 96%  BMI 24.97 kg/m2  Vitals:    08/06/18 0200 08/07/18 0400   Weight: 77.3 kg (170 lb 6.7 oz) 76.7 kg (169 lb 1.5 oz)[KO1.2]     General: Lying supine in bed, no acute distress.  Heme/Lymph: No overt bleeding.  HEENT: EOMI, anicteric sclera. NC in place[KO1.1].[AW1.1]  Respiratory: Non-labored breathing, normal resp rate[KO1.1].[AW1.1]  Cardiovascular: Tachycardic, balloon pump in place[KO1.1].[AW1.1]  Extremities: Mild extremity edema, R leg in brace[KO1.1].[AW1.1]  Neurologic: A&O x 3, " mildly dysarthric speech[KO1.1].[AW1.1]         Data:   I have personally reviewed the following labs/imaging:  CBC[KO1.1]  Recent Labs  Lab 08/07/18  1210 08/07/18  0403 08/07/18  0015 08/06/18 1957   WBC 21.3* 21.7* 24.1* 23.0*   RBC 3.57* 3.69* 3.66* 3.85*   HGB 10.9* 11.2* 11.1* 11.7*   HCT 32.3* 33.3* 33.2* 34.8*   MCV 91 90 91 90   MCH 30.5 30.4 30.3 30.4   MCHC 33.7 33.6 33.4 33.6   RDW 15.3* 15.3* 15.3* 15.4*   * 869* 836* 833*[KO1.2]     CMP[KO1.1]  Recent Labs  Lab 08/07/18  1210 08/07/18  0403 08/06/18 1957 08/06/18  1144  08/06/18  0218    132* 132* 132*  < > 127*   POTASSIUM 4.8 5.0 4.9 4.2  < > 4.9   CHLORIDE 104 103 102 104  < > 98   CO2 19* 20 21 19*  < > 16*   ANIONGAP 10 9 10 9  < > 13   * 135* 140* 130*  < > 160*   BUN 32* 26 22 17  < > 15   CR 1.39* 1.40* 1.17 0.96  < > 0.94   GFRESTIMATED 51* 50* 62 78  < > 79   GFRESTBLACK 61 61 75 >90  < > >90   GABRIELA 7.3* 7.5* 7.6* 7.1*  < > 7.3*   MAG 2.7* 2.8* 2.7* 2.6*  < > 2.0   PHOS 4.7* 5.1*  --  3.9  --   --    PROTTOTAL  --  6.2* 6.1* 6.1*  --  6.7*   ALBUMIN  --  2.4* 2.4* 2.3*  --  2.6*   BILITOTAL  --  0.4 0.4 0.3  --  0.6   ALKPHOS  --  85 91 90  --  114   AST  --  73* 86* 95*  --  77*   ALT  --  28 28 30  --  23   < > = values in this interval not displayed.[KO1.2]  INR[KO1.1]  Recent Labs  Lab 08/07/18  0403 08/06/18  0330   INR 1.39* 2.05*[KO1.2]            Revision History        User Key Date/Time User Provider Type Action    > AW1.2 8/7/2018  9:58 PM Dionna Bates MD Physician Sign     AW1.1 8/7/2018  9:40 PM Dionna Bates MD Physician      KO1.2 8/7/2018  4:50 PM [facility] Medical Student Share     KO1.1 8/7/2018  4:20 PM [facility] Medical Student             Consults by Hiram Meyers MD at 8/7/2018  8:55 AM     Author:  Hiram Meyers MD Service:  Cardiothoracic Surgery Author Type:  Resident    Filed:  8/7/2018  9:14 AM Date of Service:  8/7/2018  8:55 AM Creation Time:  8/7/2018   8:55 AM    Status:  Attested :  Hiram Meyers MD (Resident)    Cosigner:  Christiana Yousif MD at 8/8/2018  6:47 PM        Attestation signed by Christiana Yousif MD at 8/8/2018  6:47 PM        I have examined the patient after reviewing the chart and films. I agree with the assessment and plan as outlined.    Christiana Yousif MD  [phone] (pager)                               CARDIAC SURGERY CONSULT NOTE    Consult Reason: Ischemic VSD    HPI:   Mr. Castillo De Anda is a 68 year old man who presents as a transfer after having STEMI s/p HUSSEIN to RCA and pLAD found to have inferoapical VSD. He has an IABP and was on dobutamine and norepinephrine for cardiogenic shock. Cardiac surgery is consulted for surgical correction of the ischemic VSD.     A/P: Patient is a 68 year old male with STEMI s/p HUSSEIN to RCA and LAD with ischemic VSD.     - tentative plan for surgery today  - cangrelor for short half life to stop preop given recent HUSSEIN, will resume dual antiplatelet once stable from surgical standpoint.   - cont management of cardiogenic shock    Hiram Meyers MD  Cardiothoracic Surgery Fellow  [phone]      PMH:  Past Medical History:   Diagnosis Date     DVT (deep vein thrombosis) in pregnancy (H) 2008    after craniotomy     Meningioma (H)      Polycythemia vera (H)      Prostate cancer (H)          PSH:  Past Surgical History:   Procedure Laterality Date     APPENDECTOMY       CRANIOTOMY Right 2008     PROSTATECTOMY PERINEAL  2004     SPLENECTOMY      childhood     THYROID SURGERY  2012         FH:  family history includes Cerebrovascular Disease in his paternal uncle; Hypertension in his mother.      SH:  Former Tobacco use  Occasional EtOH use  Denies Illicit drug use    Allergies:  No Known Allergies    Home Meds:  No prescriptions prior to admission.       ROS:   Review Of Systems  Skin: negative  Eyes: negative  Ears/Nose/Throat: negative  Respiratory: shortness of breath  Cardiovascular:  negative  Gastrointestinal: negative  Genitourinary: negative  Musculoskeletal: negative  Neurologic: negative  Psychiatric: negative  Hematologic/Lymphatic/Immunologic: negative  Endocrine: negative      Physical Exam:  Temp:  [96.9  F (36.1  C)-98.2  F (36.8  C)] 96.9  F (36.1  C)  Heart Rate:  [] 90  Resp:  [14-18] 16  BP: ()/() 107/43  SpO2:  [85 %-100 %] 93 %    Gen: NAD, resting comfortably in bed  Lungs: CTAB, non-labored breathing, on NC  CV: regular rhythm, normal rate, IABP augmenting at  1:1  Abd: soft, nt, nd  Ext: cool, 1+ cap refill,   Neuro: AOx3    Labs:  ABG No lab results found in last 7 days.  CBC  Recent Labs  Lab 08/07/18  0403 08/07/18  0015 08/06/18 1957 08/06/18  0800   WBC 21.7* 24.1* 23.0* 20.0*   HGB 11.2* 11.1* 11.7* 11.6*   * 836* 833* 614*     BMP  Recent Labs  Lab 08/07/18  0403 08/06/18 1957 08/06/18  1144 08/06/18  0330   * 132* 132* 129*   POTASSIUM 5.0 4.9 4.2 5.1   CHLORIDE 103 102 104 102   CO2 20 21 19* 17*   BUN 26 22 17 15   CR 1.40* 1.17 0.96 0.91   * 140* 130* 169*     LFT  Recent Labs  Lab 08/07/18  0403 08/06/18 1957 08/06/18  1144 08/06/18  0330 08/06/18  0218   AST 73* 86* 95*  --  77*   ALT 28 28 30  --  23   ALKPHOS 85 91 90  --  114   BILITOTAL 0.4 0.4 0.3  --  0.6   ALBUMIN 2.4* 2.4* 2.3*  --  2.6*   INR 1.39*  --   --  2.05*  --      PancreasNo lab results found in last 7 days.    Imaging:  TTE 8/6/2018:   Interpretation Summary  Left ventricular size is normal.  The Ejection Fraction is estimated at 55-60%.  Inferior wall akinesis with inferoseptal muscular VAD. Peak velocity 3.7m/sec.  Mild to moderate right ventricular dilation is present.  Global right ventricular function is moderately reduced.  Dilation of the inferior vena cava is present with abnormal respiratory  variation in diameter.  No pericardial effusion is present.  Previous study not available for comparison.    Coronary angiogram:  kelsea ZHAO  bring today.[RK1.1]                 Revision History        User Key Date/Time User Provider Type Action    > RK1.1 8/7/2018  9:14 AM Hiram Meyers MD Resident Sign                     Progress Notes - Physician (Notes from 09/08/18 through 09/11/18)      Progress Notes by Marie Alaniz, RN at 9/11/2018 11:01 AM     Author:  Marie Alaniz RN Service:  WO Nurse Author Type:  Registered Nurse    Filed:  9/11/2018  3:13 PM Date of Service:  9/11/2018 11:01 AM Creation Time:  9/11/2018  3:01 PM    Status:  Addendum :  Marie Alaniz RN (Registered Nurse)         WO Nurse Inpatient Pressure Injury Assessment   Reason for consultation: Evaluate and treat coccyx      ASSESSMENT  Pressure Injury: on coccyx  , hospital acquired ,   Pressure Injury is Stage 2   Contributing factor of the pressure injury: moisture  Status: initial assessment     TREATMENT PLAN  coccyx wound: Every 3 days:  Cleanse with microklenz spray and gauze.  Paint periwound with no-sting barrier film.  Cover with mepilex sacral border dressing[PR1.1]  Geomat cushion in chair  Remind patient to shift weight every 10-15 minutes while in chair.    Limit sitting in chair to 2 hours as much as possible[PR1.2]   Orders Written  WO Nurse follow-up plan:weekly  Nursing to notify the Provider(s) and re-consult the WO Nurse if wound(s) deteriorates or new skin concern.    Patient History  According to provider note(s): 68 year old male with a history of s/p HUSSEIN to RCA and LAD for STEMI with post infarction basal VSD, who on 8/10/2018 underwent repair of posterior VSD and TVR with bioprosthetic valve.  He was kept open chest due to coagulopathy and RV dysfunction. Chest closed on 8/13/2018, IABP removed 8/14/18 and he was extubated on POD #11  Objective Data  Containment of urine/stool: Diaper.  Pt now rarely incontinent, uses urinal at NOC    Current Diet/ Nutrition:    Active Diet Order      Regular Diet Adult Thin Liquids (water, ice  chips, juice, milk, gelatin, ice cream, etc)      Advance Diet as Tolerated    Output:   I/O last 3 completed shifts:  In: 630 [P.O.:360; I.V.:270]  Out: 925 [Urine:925]    Risk Assessment:   Sensory Perception: 4-->no impairment  Moisture: 4-->rarely moist  Activity: 3-->walks occasionally  Mobility: 4-->no limitation  Nutrition: 3-->adequate  Friction and Shear: 3-->no apparent problem  Lee Score: 21      Labs:   Recent Labs  Lab 09/11/18  0526  09/05/18  0616   ALBUMIN  --   --  2.2*   HGB 7.8*  < > 9.2*   INR 2.08*  < > 2.21*   WBC 3.7*  < > 9.2   < > = values in this interval not displayed.    Physical Exam  Skin inspection: focused buttocks   Wound Location: coccyx  Date of last Photo none, camera unavailable   Wound History:  Pt previously incontinent of bladder.    Measurements (length x width x depth, in cm)   Right coccyx:  1.2cm x 0.7cm  x  0.1 cm   Left coccyx: 0.5 cm x 0.5 cm  x  0.1 cm   Wound Base: 100 % dermis  Tunneling N/A  Undermining N/A  Palpation of the wound bed: normal   Periwound skin: intact  Color:  Up to 1 cm ring of blanchable erythema   Temperature: normal   Drainage:, scant  Description of drainage: serosanguinous  Odor: mild  Pain: when sitting    Interventions  Current support surface: Standard  Atmos Air mattress  Current off-loading measures: Foam padding  Repositioning aid:  Independent   Visual inspection of wound(s) completed   Wound Care: was done per plan of care.  Supplies: ordered: Geomat cushion   Educated provided: importance of repositioning and plan of care  Education provided to: patient  and spouse  Discussed plan of care with Nurse[PR1.1]         Revision History        User Key Date/Time User Provider Type Action    > [N/A] 9/11/2018  3:13 PM Marie Alaniz RN Registered Nurse Addend     PR1.2 9/11/2018  3:12 PM Marie Alaniz, RN Registered Nurse Sign     PR1.1 9/11/2018  3:01 PM Marie Alaniz, RN Registered Nurse             Progress Notes by Radha  LUPE Brantley at 9/11/2018  1:33 PM     Author:  Karon Garcia RN Service:  (none) Author Type:  Registered Nurse    Filed:  9/11/2018  1:35 PM Date of Service:  9/11/2018  1:33 PM Creation Time:  9/11/2018  1:33 PM    Status:  Signed :  Karno Garcia RN (Registered Nurse)         Gave RN to RN report to staff at [facility], phone number [phone].  All questions answered at this time.  Patient expected to transfer by wheelchair transport at 1600.[MD1.1]     Revision History        User Key Date/Time User Provider Type Action    > MD1.1 9/11/2018  1:35 PM Karon Garcia RN Registered Nurse Sign            Progress Notes by Lula Virk MSW at 9/11/2018 12:12 PM     Author:  Lula Virk MSW Service:  Social Work Author Type:      Filed:  9/11/2018 12:24 PM Date of Service:  9/11/2018 12:12 PM Creation Time:  9/11/2018 12:12 PM    Status:  Signed :  Lula Virk MSW ()         Social Work Services Discharge Note      Patient Name:  Castillo De Anda     Anticipated Discharge Date: 09/11/18 @ 1600    Discharge Disposition:   Facility: [facility]  PH: [phone]  F: [phone]  Nurse to Nurse Call: PH: [phone]    Following MD: Vinod Frank  [facility] [address] [place] [id number]     Pre-Admission Screening (PAS) online form has been completed.  The Level of Care (LOC) is:  Determined  Confirmation Code is:[LH1.1]  [id number][LH1.2]  Patient/caregiver informed of referral to [facility] Line for Pre-Admission Screening for skilled nursing facility (SNF) placement and to expect a phone call post discharge from SNF.     Additional Services/Equipment Arranged:    - Transportation: [facility] (PH: [phone]) wheelchair ride scheduled for 1600.  Pt and family are aware and in agreement that insurance may not cover wheelchair ride: YES     Patient / Family response to discharge  plan:  Pt and family (Daughter Christine in room) in agreement with the plan.     Persons notified of above discharge plan:  Pt, Family (Christine), bedside RN, CVTS team, PCP, SNF admissions.    Staff Discharge Instructions:  Please fax discharge orders and signed hard scripts for any controlled substances.  Please print a packet and send with patient.     CTS Handoff completed:  YES    Medicare Notice of Rights provided to the patient/family:  YES    CAROL ANN Almanza APSW  [address] Unit   Phone: [phone]  Pager: [phone]  Unit: [phone][LH1.1]             Revision History        User Key Date/Time User Provider Type Action    > LH1.2 9/11/2018 12:24 PM Lula Virk MSW  Sign     LH1.1 9/11/2018 12:12 PM Lula Virk MSW              Progress Notes by Lula Virk MSW at 9/10/2018  9:44 AM     Author:  Lula Virk MSW Service:  Social Work Author Type:      Filed:  9/11/2018  9:27 AM Date of Service:  9/10/2018  9:44 AM Creation Time:  9/10/2018  9:44 AM    Status:  Addendum :  Lula Virk MSW ()         Social Work Services Progress Note    Hospital Day: 35  Date of Initial Social Work Evaluation: 8/9/18  Collaborated with:  SNF admissions,    Data:  Pt is a 68 year old male being followed by [facility] for placement to TCU vs ARU per rehab recommendations.    Intervention: On chart review, notes indicate pt's chest tube will be removed today.  SW called [facility] admissions to have pt reviewed for both transitional care and acute rehab placement.  SW to follow up with admissions on determination of placement.[LH1.1]     Addendum: Sw met with pt in the afternoon to notify of progress in discharge planning.  Pt in agreement with the plans.  Pt could be ready for discharge as early as tomorrow (tuesday).  Pt states he would prefer to update is daughter and family this  afternoon.[LH1.2]    Referrals in Progress:   [facility] - re-sent updated notes/referral  PH: [phone]  F: [phone]    [facility] - pended for [facility] review  PH: [phone]  F: [phone]    [facility] - pended for [facility] review  PH: [phone]  F: [phone]    Assessment:  Did not meet with pt for this encounter note.    Plan:    Anticipated Disposition:  Facility - TCU    Barriers to d/c plan:  Medical stability with removal of chest tube, bed availability    Follow Up:  SW to follow for discharge planning.    CAROL ANN Almanza, APSW  [address] Unit   Phone: [phone]  Pager: [phone]  Unit: [phone]    ___________________________________________________________________________________________________________________________________________________  Referrals Discontinued:  [facility] - not a medical candidate  PH: [phone]  F: [phone]    Community Case Management/Community Services in place:   None reported.[LH1.1]           Revision History        User Key Date/Time User Provider Type Action    > LH1.2 9/11/2018  9:27 AM Lula Virk MSW  Addend     LH1.1 9/10/2018  9:46 AM Lula Virk MSW  Sign            Progress Notes by Balaji Rodriguez MD at 9/10/2018  1:26 PM     Author:  Balaji Rodriguez MD Service:  Hem/Onc Author Type:  Fellow    Filed:  9/10/2018  2:38 PM Date of Service:  9/10/2018  1:26 PM Creation Time:  9/10/2018  1:26 PM    Status:  Attested :  Balaji Rodriguez MD (Fellow)    Cosigner:  Leny Oconnell MD at 9/10/2018  9:44 PM        Attestation signed by Leny Oconnell MD at 9/10/2018  9:44 PM        Attestation:I personally reviewed labs, vitals and medications. I discussed treatment plan with Fellow and agree with plan as documented.   Platelets stable and starting to have some leukopenia. Will reduce  Hyrea dose.   Outpatient set up with me set up for 9/18.  Physician Attestation   I did not see the patient on this date.    Leny Oconnell MD                               HEMATOLOGY FOLLOW UP NOTE    Castillo Soler is a 68 year old man with JAK2+ polycythemia vera, recently switched by primary heme/onc provider from Hydrea to Anagrelide with very poor tolerance. Reason for switching apparently was that his anemia was felt to be due to Hydrea.  He is admitted s/p STEMI with ischemia-related VSD requiring surgical intervention. He is high-risk for ongoing thrombocytosis and we restarted hydrea on 8/7/18. Platelet count has continued to increase, hydrea dose increased to 1000 mg bid on 8/10. Work-up for platelet dysfunction has been negative.    Patient is doing ok overall, denies chest pain. No palpitation. Denies sob, no headache, no fever/chills.   Offers no other complains.         Physical Examination:   Temp: 97.9  F (36.6  C) Temp src: Oral BP: 90/59   Heart Rate: 84 Resp: 18 SpO2: 98 % O2 Device: None (Room air)    Constitutional: Awake and alert, not in acute distress.  Eyes: No scleral icterus. Eyes exhibit no discharge.  ENT/Mouth: Oral mucosa pink and moist  Cardiovascular: Normal rate, regular rhythm, S1, S2. No murmur or rub. No leg edema. Surgical incision in chest, healing well  Respiratory: No respiratory distress. No wheezes. Chest tube out.  Gastrointestinal: soft, no distention, no tenderness, active BS  Neurological: AAOX3, grossly non-focal  Psychiatric: Mentation and affect appear normal      Results for CASTILLO SOLER (MRN [id number]) as of 9/10/2018 12:06   Ref. Range 9/3/2018 05:41 9/4/2018 05:57 9/5/2018 06:16 9/6/2018 05:48 9/7/2018 06:53 9/8/2018 06:10 9/9/2018 06:34 9/10/2018 06:30   Platelet Count Latest Ref Range: 150 - 450 10e9/L 534 (H) 550 (H) 586 (H) 596 (H) 518 (H) 496 (H) 468 (H) 405             Recommendation:  - His platelet counts are down trending now with hydroxyurea  (1500mg bid), so is his hgb and WBC, would decrease his hydroxyurea dose to 1500mg @AM, then 1000mg @PM (order placed)   - will likely need repeat bone marrow biopsy before adjust treatment if considering swithc to [facility], this can be planned as outpatient.  - He will follow with Hem clinic after discharge       We will continue to follow. Please page with any questions/concerns.       Plan was discussed with attending physician Dr. Oconnell.        Balaji Rodriguez MD/MPH  Heme/Onc Fellow, PGY-4[HW1.1]         Revision History        User Key Date/Time User Provider Type Action    > HW1.1 9/10/2018  2:38 PM Balaji Rodriguez MD Fellow Sign            Progress Notes by Janice Disla PA-C at 9/10/2018  8:48 AM     Author:  Janice Disla PA-C Service:  Cardiothoracic Surgery Author Type:  Physician Assistant    Filed:  9/10/2018  2:58 PM Date of Service:  9/10/2018  8:48 AM Creation Time:  9/10/2018  8:48 AM    Status:  Signed :  Janice Disla PA-C (Physician Assistant)         9/10/2018   CVTS Progress Note  Attending provider: Jason Franco, *        S: No acute issues over night.[PS1.1]  Breathing much improved and increasing appetite.[PS1.2] Patient denies SOB, CP, fever, chills, sweats. Patient eating well/poor.[PS1.1] Patient[PS1.2] ambulating in halls.[PS1.1] +[PS1.2] BM[PS1.1] today (9/10), some constipation yesterday[PS1.2].[PS1.1] No[PS1.2] arrhythmias.[PS1.1] Lower extremity swelling is unchanged, and has had persistent coldness and decreased sensation in his toes bilaterally[PS1.2]    O:   Vitals:    09/09/18 2317 09/10/18 0251 09/10/18 0614 09/10/18 0705   BP: 94/62 (!) 88/63  97/58   BP Location: Left arm Left arm  Left arm   Pulse:       Resp: 18 16 18   Temp: 98.4  F (36.9  C)   97.5  F (36.4  C)   TempSrc: Oral   Oral   SpO2: 93% 93%  95%   Weight:   72.6 kg (160 lb)    Height:         Vitals:    09/07/18 0528 09/08/18 0500 09/10/18 0614   Weight: 72.7 kg (160 lb 4.8 oz) 70.6 kg  (155 lb 9.6 oz) 72.6 kg (160 lb)       Intake/Output Summary (Last 24 hours) at 09/10/18 0848  Last data filed at 09/10/18 0800   Gross per 24 hour   Intake              460 ml   Output              610 ml   Net             -150 ml       Gen: AxOx3, NAD  CV: RRR, no murmurs, rubs, or gallops, HR[PS1.1] 85, -JVD[PS1.2]  Pulm: CTA,[PS1.1] decreased lung sounds lower left lobe, no[PS1.2] wheezing,[PS1.1] no[PS1.2] rhonchi  Abd: soft, NT, ND, +BS,[PS1.1] +[PS1.2]BM  Ext:[PS1.1] Mild[PS1.2] LE edema[PS1.1], Gross Sensation and ROM intact in toes bilaterally[PS1.2]  Incision: c/d/i, no erythema, sternal stable  Tubes/drains:[PS1.1] Right Apical pigtail, dressings CDI[PS1.2]    Labs:   BMP  Recent Labs  Lab 09/10/18  0630 09/09/18  0634 09/08/18  0610 09/07/18  0653   * 131* 132* 131*   POTASSIUM 4.1 4.4 4.2 4.2   CHLORIDE 99 99 100 100   GABRIELA 7.8* 8.1* 7.6* 7.8*   CO2 24 25 25 24   BUN 13 17 16 14   CR 0.72 0.72 0.70 0.65*   GLC 81 98 74 90     CBC  Recent Labs  Lab 09/10/18  0630 09/09/18  0634 09/08/18  0610 09/07/18  0653   WBC 3.8* 4.9 4.8 5.5   RBC 2.43* 2.75* 2.52* 2.72*   HGB 7.7* 8.7* 8.1* 8.6*   HCT 24.7* 27.5* 25.6* 27.0*   * 100 102* 99   MCH 31.7 31.6 32.1 31.6   MCHC 31.2* 31.6 31.6 31.9   RDW 24.7* 24.5* 24.4* 24.4*    468* 496* 518*     INR  Recent Labs  Lab 09/10/18  0630 09/09/18  0634 09/08/18  0610 09/07/18  0653   INR 1.90* 1.21* 1.79* 2.45*      Hepatic Panel   Lab Results   Component Value Date    AST 21 09/05/2018     Lab Results   Component Value Date    ALT 53 09/05/2018     No results found for: BILICONJ   Lab Results   Component Value Date    BILITOTAL 0.4 09/05/2018     Lab Results   Component Value Date    ALBUMIN 2.2 09/05/2018     Lab Results   Component Value Date    PROTTOTAL 6.2 09/05/2018      Lab Results   Component Value Date    ALKPHOS 153 09/05/2018         Recent Labs  Lab 09/10/18  0630 09/09/18  0634 09/08/18  0610 09/07/18  0653 09/06/18  0548 09/05/18  0616    GLC 81 98 74 90 83 81         Imaging:[PS1.1]   9/10/18  8:54 AM [id number] [facility], [place],  Radiology    [employer] Interactive Report and InfoRx   View the interactive report   PACS Images   Show images for XR Chest 2 Views   Study Result   Exam: XR CHEST 2 VW, 9/10/2018 8:54 AM     Indication: interval; right-sided pneumo status post placement of  chest tube      Comparison: 9/9/2018   Findings:   PA and lateral views of the chest. Postsurgical changes of VSD repair.  Stable median sternotomy wires. Right PICC tip projects over the  superior cavoatrial junction. Stable right apical chest tube. The  trachea is midline. The cardiomediastinal silhouette is stable. Stable  consolidation of the right lung base. No pneumothorax. Stable small  bilateral pleural effusions. Stable perihilar opacities. Upper abdomen  is unremarkable.         Impression:   1. Stable support devices with no evidence of recurrent pneumothorax.  2. Unchanged bilateral perihilar, right greater than left patchy  airspace opacities.  3. Unchanged consolidation in the right lung base.  4. Unchanged small bilateral pleural effusions, layering on the left,  with associated atelectasis.     I have personally reviewed the examination and initial interpretation  and I agree with the findings.     TARAN LOCKE MD     Second CXR performed at 1250 after being on water seal for 3 hours, unremarkable for pneumothorax, official read pending.[AS1.1]   Endoscopy 9/7:[PS1.1]   Per ENT,[PS1.2] Due to hoarseness, fiberoptic laryngoscopy was indicated. After obtaining verbal consent, the nose was topically decongested and anesthetized. The fiberoptic laryngoscope was passed under endoscopic vision through the right nasal passage. The mucosa was dry and crusty, but otherwise clear, no purulence or masses. The nasopharynx was clear. The soft palate appeared normal with good mobility. The epiglottis was sharp, and the visualized portion of the vallecula and base of  tongue were clear. Right vocal cord was fully mobile with normal epithelium. 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 There seems to be some very minor motion of the left cord. Good glottic closure during cough and phonation. The hypopharynx was clear.  ASSESSMENT/PLAN: Patient is a 68 year old male with a history of s/p HUSSEIN to RCA and LAD for STEMI with post infarction basal VSD, who on 8/10/2018 underwent repair of posterior VSD and TVR with bioprosthetic valve.  He was kept open chest due to coagulopathy and RV dysfunction. Chest closed on 8/13/2018, IABP removed 8/14/18 and he was extubated on POD #11.       Neuro:  -Acute post-op pain: IV dilaudid, tylenol and oxycodone prn  - Wean Seroquel as tolerated, decreased to 12.5 mg HS      ENT:   -ENT consult regarding hoarse voice, new following surgery  - 9/10 ENT recommended no acute intervention from 9/7 Endoscopy, F/U as outpatient in 2-4 weeks        CV:  - ASA 81 mg, atorvastatin 40 mg daily,   - Systolic Blood pressure: 80-98  - TPW capped  - ICU course complicated by a-flutter, EP consult with plan to anticoagulate and then attempt to overdrive pace with A lead.  While in the ICU, patient also went into narrow complex SVT with MAPs in 50s. Adenosine 6 mg was administered, resulting in 20 seconds of asystole and brief CPR with ROSC.    - 8/25 patient converted from NSR to a-fib overnight. During morning therapy, patient had a-fib with RVR (rates 140s) and symptomatic. EKGs appreciable for a-fib and SVT. Metoprolol 2.5 mg IV administered with improved rate control. Discussed case with EP fellow, amio bolus and gtt started.  Patient converted to NSR afternoon on 8/25. Amiodarone 400 mg PO BID 8/26.  8/31 Start Amiodarone taper 200 mg BID x 4 doses, then 200 mg daily for 21 doses   - Cut TPW 8/27, no bradycardia on PO Amio but prolonged QTc.   - Metoprolol 12.5 mg po bid.  - (9/6) Echo ordered to r/o pericardial effusion in setting of increased SOB. No  appreciable pericardial effusion. Small bilateral pleural effusions.   -  repeat CT chest appreciable for moderate R pneumothorax  - R sided pigtail placed on [PS1.1] for pneumothorax[AS1.1].      Resp:   - Extubated POD 11  - IS, encourage activity, albuterol inhaler QID  -  Pulled Left pleural tube, f/u CXR.  POC U/S by Medicine for Left pleural effusion today-findings appreciable for loculated fluid. IR consult with CT chest w/o contrast. Possible chest tube placement with IR on .   -  IR recommended thoracentesis for left pleural effusion.  Per Dr. Franco, no thoracentesis necessary at this time.    -  right pleural chest tube removed, f/u CXR,  dermabond old CT site  -  neb treatments q 4hrs, CXR for increased SOB  -  IR consult for R pigtail for increasing R apical pneumothorax  - 9/10[PS1.1]  Right pigtail[AS1.1] output, 190 mL 24 Hrs, Last three shifts, 130 > 20 > 40 mL, No airleak, CXR showed[PS1.1] no pneumothorax[PS1.2],[PS1.1] Water seal trail with f/u CXR[PS1.2] unremarkable. Pigtail removed without issue, will get f/u CXR[AS1.1]      FEN/GI:   - Reg Diet with thin liquids, speech following.   - Continuous TF, consider calorie counts once patient's diet advances   - Bowel regimen, + BM  -  EGD: two gastric ulcers, no active bleeding or high-risk features.        Renal:   - Creatinine WNL, adequate UOP  - Volume status: hypervolemic, dry weight ~ 170 lbs, current weight 160 lbs.  - Diurese Lasix 20 mg po daily[PS1.1], continue today and monitor.[AS1.1]       ID:   - Completed albna-op abx  - WBC WNL  and trending down, afebrile, no signs or symptoms of infection      Heme:   - Hgb stable, no signs or symptoms of bleeding   - Continue to monitor for signs of GI bleed, transfuse for Hgb < 7  - Hematology followin/30 Plt 1534, Hydrea 1500 mg BID, Continue to trend plts, Heme recommended Iron replacement, started 8/29.    -  Plateletpheresis for Plts >1500, IR to  place double lumen non-tunneled catheter prior to procedure  - 8/31 Plts 714 s/p plateletpheresis, repeat plateletpheresis x 1  Per hematology, stopping plateletpheresis, okay to remove HD catheter.  Will follow-up with hematology as outpatient for further management.     - 9/10 - Plts 405, stable      Endo:   - BG 100s, sliding scale insulin discontinue      PPx:   - PPI      Anticoagulation:   - 8/25 start low intensity heparin gtt, discontinue 9/2  - 8/26 start warfarin, INR 2-3. INR 1.90  - continue coumadin  - Plavix 75 mg daily      Dispo:   - 6C since 8/24  - Therapy recommending d/c to TCU,  pending medical stabilization and bed availablity. Looking at likely discharge on Tuesday, September 11, 2018.[PS1.1] Patient medically ready to discharge tomorrow.[AS1.1]     Clay ARIAS  [facility][PS1.3]    Janice Ellis PA-C  Cardiothoracic Surgery   Pager [phone]  September 10, 2018[PS1.1]         Revision History        User Key Date/Time User Provider Type Action    > AS1.1 9/10/2018  2:58 PM Janice Disla PA-C Physician Assistant Sign     PS1.3 9/10/2018  2:00 PM Clay Sim Physician Assistant Share     PS1.2 9/10/2018 10:44 AM Clay Sim Physician Assistant Share     PS1.1 9/10/2018  9:12 AM Clay Sim Physician Assistant Share            Progress Notes by Abdelrahman Arredondo PA-C at 9/9/2018 10:15 AM     Author:  Abdelrahman Arredondo PA-C Service:  (none) Author Type:  Physician Assistant - YAMILA    Filed:  9/9/2018 10:22 AM Date of Service:  9/9/2018 10:15 AM Creation Time:  9/9/2018 10:15 AM    Status:  Signed :  Abdelrahman Arredondo PA-C (Physician Assistant - C)         9/9/2018   CVTS Progress Note  Attending provider: Dr Franco    S:   States that pain is being controlled. Some pain from pigtail chest tube. Breathing is better. Working with IS. Appetite is good. +BM, denies any popping/clicking in his breast bone, ambulating, plans for TCU at [facility]  to get some sleep    O:   Vitals:    09/08/18 1900 09/08/18 2315 09/09/18 0427 09/09/18 0720   BP: 100/75 (!) 87/62 95/66 94/74   BP Location: Right arm Left arm Left arm Left arm   Pulse:       Resp: 16 16 16 16   Temp: 97.7  F (36.5  C) 97.8  F (36.6  C)  97.9  F (36.6  C)   TempSrc: Oral Oral  Oral   SpO2: 94% 94% 97% 92%   Weight:       Height:         Vitals:    09/06/18 0100 09/07/18 0528 09/08/18 0500   Weight: 73.7 kg (162 lb 8 oz) 72.7 kg (160 lb 4.8 oz) 70.6 kg (155 lb 9.6 oz)     Gen: up in bed, comfortable, -O2  CV: RRR, telemetry SR 90s  Pulm: CTA, IS 1000 ml  Abd: BS+, NT/ND, soft  Incision: sternal incision D/I  Labs: WBC 4.9   Hgb/Hct 8.7/27.5   Plt 468   INR 1.21    CXR: ordered    U/S Chest/Pleural Effusion: 9/6    Small bilateral pleural fluid collections. Echogenic foci in the right pleural fluid collection likely gas from pneumothorax.      ASSESSMENT/PLAN: Patient is a 68 year old male with a history of s/p HUSSEIN to RCA and LAD for STEMI with post infarction basal VSD, who on 8/10/2018 underwent repair of posterior VSD and TVR with bioprosthetic valve.  He was kept open chest due to coagulopathy and RV dysfunction. Chest closed on 8/13/2018, IABP removed 8/14/18 and he was extubated on POD #11.       Neuro:  -Acute post-op pain: IV dilaudid, tylenol and oxycodone prn  - Wean Seroquel as tolerated, decreased to 12.5 mg HS      ENT:   -ENT consult regarding hoarse voice, new following surgery      CV:  - ASA 81 mg, atorvastatin 40 mg daily,   - Systolic Blood pressure: 80-98  - TPW capped  - ICU course complicated by a-flutter, EP consult with plan to anticoagulate and then attempt to overdrive pace with A lead.  While in the ICU, patient also went into narrow complex SVT with MAPs in 50s. Adenosine 6 mg was administered, resulting in 20 seconds of asystole and brief CPR with ROSC.    - 8/25 patient converted from NSR to a-fib overnight. During morning therapy, patient had a-fib with RVR (rates  140s) and symptomatic. EKGs appreciable for a-fib and SVT. Metoprolol 2.5 mg IV administered with improved rate control. Discussed case with EP fellow, amio bolus and gtt started.  Patient converted to NSR afternoon on 8/25. Amiodarone 400 mg PO BID 8/26.  8/31 Start Amiodarone taper 200 mg BID x 4 doses, then 200 mg daily for 21 doses   - Cut TPW 8/27, no bradycardia on PO Amio but prolonged QTc.   - Metoprolol 12.5 mg po bid.  - (9/6) Echo ordered to r/o pericardial effusion in setting of increased SOB. No appreciable pericardial effusion. Small bilateral pleural effusions.   - 9/7 repeat CT chest appreciable for moderate R pneumothorax  - R sided pigtail placed on 9/8. Will plan to monitor for airleak. Will plan to keep x 2 days (Monday). CXR today. Possible removal of chest tube on 9/10 if continues with no air leak. Output has decreased though pleurovac has been knocked over (fluid in 2 chambers - to 300 ml, and 180 ml)      Resp:   - Extubated POD 11  - IS, encourage activity, albuterol inhaler QID  - 8/29 Pulled Left pleural tube, f/u CXR.  POC U/S by Medicine for Left pleural effusion today-findings appreciable for loculated fluid. IR consult with CT chest w/o contrast. Possible chest tube placement with IR on 8/30.   - 8/30 IR recommended thoracentesis for left pleural effusion.  Per Dr. Franco, no thoracentesis necessary at this time.    - 9/5 right pleural chest tube removed, f/u CXR, 9/7 dermabond old CT site  - 9/6 neb treatments q 4hrs, CXR for increased SOB  - 9/7 IR consult for R pigtail for increasing R apical pneumothorax       FEN/GI:   - Reg Diet with thin liquids, speech following.   - Continuous TF, consider calorie counts once patient's diet advances   - Bowel regimen, + BM  - 8/24 EGD: two gastric ulcers, no active bleeding or high-risk features.        Renal:   - Creatinine WNL, adequate UOP  - Volume status: hypervolemic, dry weight ~ 170 lbs, current weight 169.  - Diurese Lasix 20 mg po  daily     ID:   - Completed alban-op abx  - WBC WNL  and trending down, afebrile, no signs or symptoms of infection      Heme:   - Hgb stable, no signs or symptoms of bleeding   - Continue to monitor for signs of GI bleed, transfuse for Hgb < 7  - Hematology followin/30 Plt 1534, Hydrea 1500 mg BID, Continue to trend plts, Heme recommended Iron replacement, started .    -  Plateletpheresis for Plts >1500, IR to place double lumen non-tunneled catheter prior to procedure  -  Plts 714 s/p plateletpheresis, repeat plateletpheresis x 1  Per hematology, stopping plateletpheresis, okay to remove HD catheter.  Will follow-up with hematology as outpatient for further management.        Endo:   - BG 100s, sliding scale insulin discontinue      PPx:   - PPI      Anticoagulation:   -  start low intensity heparin gtt, discontinue   -  start warfarin, INR 2-3. INR 1.21  - continue coumadin, will not bring now with subtherapeutic INR  - Plavix 75 mg daily      Dispo:   - 6C since   - Therapy recommending d/c to TCU,  pending medical stabilization and bed availablity. Looking at likely discharge on 2018.    Abdelrahman Arredondo PA-C  [phone][DC1.1]     Revision History        User Key Date/Time User Provider Type Action    > DC1.1 2018 10:22 AM Abdelrahman Arredondo PA-C Physician Assistant - YAMILA Sign            Progress Notes by Abdelrahman Arredondo PA-C at 2018  1:08 PM     Author:  Abdelrahman Arredondo PA-C Service:  (none) Author Type:  Physician Assistant - C    Filed:  2018  1:17 PM Date of Service:  2018  1:08 PM Creation Time:  2018  1:08 PM    Status:  Signed :  Abdelrahman Arredondo PA-C (Physician Assistant - YAMILA)         2018   CVTS Progress Note  Attending provider: Dr Franco    S:   States that pain is being controlled. Some pain from pigtail chest tube. Breathing is better. Working with IS. Appetite is good. +BM, denies any popping/clicking in his breast  bone, ambulating, plans for TCU at [facility], able to get some sleep    O:   Vitals:    09/08/18 1040 09/08/18 1050 09/08/18 1100 09/08/18 1225   BP: 97/71 98/71 97/65 92/66   BP Location: Left arm Left arm Left arm Left arm   Pulse:       Resp: 20 20 20 18   Temp:    97.5  F (36.4  C)   TempSrc:    Oral   SpO2: 100% 99% 99% 93%   Weight:       Height:         Vitals:    09/06/18 0100 09/07/18 0528 09/08/18 0500   Weight: 73.7 kg (162 lb 8 oz) 72.7 kg (160 lb 4.8 oz) 70.6 kg (155 lb 9.6 oz)     Gen: up in bed, comfortable, -O2  CV: RRR, telemetry SR 80s  Pulm: CTA,  ml  Abd: BS+, NT/ND, soft  Incision: sternal incision D/I    Labs:     BMP    Recent Labs  Lab 09/08/18  0610 09/07/18  0653 09/06/18  0548 09/05/18  0616   * 131* 131* 131*   POTASSIUM 4.2 4.2 4.3 4.1   CHLORIDE 100 100 101 102   GABRIELA 7.6* 7.8* 7.5* 7.7*   CO2 25 24 23 23   BUN 16 14 13 12   CR 0.70 0.65* 0.62* 0.77   GLC 74 90 83 81     CBC    Recent Labs  Lab 09/08/18  0610 09/07/18  0653 09/06/18  0548 09/05/18  0616   WBC 4.8 5.5 6.3 9.2   RBC 2.52* 2.72* 2.82* 2.96*   HGB 8.1* 8.6* 8.8* 9.2*   HCT 25.6* 27.0* 28.7* 29.9*   * 99 102* 101*   MCH 32.1 31.6 31.2 31.1   MCHC 31.6 31.9 30.7* 30.8*   RDW 24.4* 24.4* 24.1* 23.6*   * 518* 596* 586*     INR    Recent Labs  Lab 09/08/18  0610 09/07/18  0653 09/06/18  0548 09/05/18  0616   INR 1.79* 2.45* 2.67* 2.21*      Hepatic Panel   Lab Results   Component Value Date    AST 21 09/05/2018     Lab Results   Component Value Date    ALT 53 09/05/2018     No results found for: BILICONJ   Lab Results   Component Value Date    BILITOTAL 0.4 09/05/2018     Lab Results   Component Value Date    ALBUMIN 2.2 09/05/2018     Lab Results   Component Value Date    PROTTOTAL 6.2 09/05/2018      Lab Results   Component Value Date    ALKPHOS 153 09/05/2018         Recent Labs  Lab 09/08/18  0610 09/07/18  0653 09/06/18  0548 09/05/18  0616 09/04/18  0557 09/03/18  0541   GLC 74 90 83 81 77 80      Imaging:   CXR: 9/6  Increased size of the right-sided hydropneumothorax.  Cardiomegaly with mild pulmonary edema.  Unchanged mild to moderate left pleural effusion with overlying atelectasis.    U/S Chest/Pleural Effusion: 9/6    Small bilateral pleural fluid collections. Echogenic foci in the right pleural fluid collection likely gas from pneumothorax.      ASSESSMENT/PLAN: Patient is a 68 year old male with a history of s/p HUSSEIN to RCA and LAD for STEMI with post infarction basal VSD, who on 8/10/2018 underwent repair of posterior VSD and TVR with bioprosthetic valve.  He was kept open chest due to coagulopathy and RV dysfunction. Chest closed on 8/13/2018, IABP removed 8/14/18 and he was extubated on POD #11.       Neuro:  -Acute post-op pain: IV dilaudid, tylenol and oxycodone prn  - Wean Seroquel as tolerated, decreased to 12.5 mg HS      ENT:   -ENT consult regarding hoarse voice, new following surgery      CV:  - ASA 81 mg, atorvastatin 40 mg daily,   - Systolic Blood pressure: 80-98  - TPW capped  - ICU course complicated by a-flutter, EP consult with plan to anticoagulate and then attempt to overdrive pace with A lead.  While in the ICU, patient also went into narrow complex SVT with MAPs in 50s. Adenosine 6 mg was administered, resulting in 20 seconds of asystole and brief CPR with ROSC.    - 8/25 patient converted from NSR to a-fib overnight. During morning therapy, patient had a-fib with RVR (rates 140s) and symptomatic. EKGs appreciable for a-fib and SVT. Metoprolol 2.5 mg IV administered with improved rate control. Discussed case with EP fellow, amio bolus and gtt started.  Patient converted to NSR afternoon on 8/25. Amiodarone 400 mg PO BID 8/26.  8/31 Start Amiodarone taper 200 mg BID x 4 doses, then 200 mg daily for 21 doses   - Cut TPW 8/27, no bradycardia on PO Amio but prolonged QTc.   - Metoprolol 12.5 mg po bid.  - (9/6) Echo ordered to r/o pericardial effusion in setting of increased SOB.  No appreciable pericardial effusion. Small bilateral pleural effusions.   -  repeat CT chest appreciable for moderate R pneumothorax  - R sided pigtail placed on . Will plan to monitor for airleak. Will plan to keep x 2 days (Monday).      Resp:   - Extubated POD 11  - IS, encourage activity, albuterol inhaler QID  -  Pulled Left pleural tube, f/u CXR.  POC U/S by Medicine for Left pleural effusion today-findings appreciable for loculated fluid. IR consult with CT chest w/o contrast. Possible chest tube placement with IR on .   -  IR recommended thoracentesis for left pleural effusion.  Per Dr. Franco, no thoracentesis necessary at this time.    -  right pleural chest tube removed, f/u CXR,  dermabond old CT site  -  neb treatments q 4hrs, CXR for increased SOB  -  IR consult for R pigtail for increasing R apical pneumothorax       FEN/GI:   - Reg Diet with thin liquids, speech following.   - Continuous TF, consider calorie counts once patient's diet advances   - Bowel regimen, + BM  -  EGD: two gastric ulcers, no active bleeding or high-risk features.        Renal:   - Creatinine WNL, adequate UOP  - Volume status: hypervolemic, dry weight ~ 170 lbs, current weight 169.  - Diurese Lasix 20 mg po daily     ID:   - Completed alban-op abx  - WBC WNL  and trending down, afebrile, no signs or symptoms of infection      Heme:   - Hgb stable, no signs or symptoms of bleeding   - Continue to monitor for signs of GI bleed, transfuse for Hgb < 7  - Hematology followin/30 Plt 1534, Hydrea 1500 mg BID, Continue to trend plts, Heme recommended Iron replacement, started .    -  Plateletpheresis for Plts >1500, IR to place double lumen non-tunneled catheter prior to procedure  -  Plts 714 s/p plateletpheresis, repeat plateletpheresis x 1  Per hematology, stopping plateletpheresis, okay to remove HD catheter.  Will follow-up with hematology as outpatient for further management.         Endo:   - BG 100s, sliding scale insulin discontinue      PPx:   - PPI      Anticoagulation:   - 8/25 start low intensity heparin gtt, discontinue 9/2  - 8/26 start warfarin, INR 2-3. INR 1.79  - will restart coumadin today  - Plavix 75 mg daily      Dispo:   - 6C since 8/24  - Therapy recommending d/c to TCU,  pending medical stabilization and bed availablity. Looking at likely discharge on Tuesday, September 11, 2018.    Abdelrahman Arredondo PA-C  [phone][DC1.1]     Revision History        User Key Date/Time User Provider Type Action    > DC1.1 9/8/2018  1:17 PM Abdelrahman Arredondo PA-C Physician Assistant - YAMILA Sign            Progress Notes by Nya Tanner RN at 9/8/2018 10:16 AM     Author:  Nya aTnner RN Service:  (none) Author Type:  Registered Nurse    Filed:  9/8/2018 11:06 AM Date of Service:  9/8/2018 10:16 AM Creation Time:  9/8/2018 10:16 AM    Status:  Signed :  Nya Tanner RN (Registered Nurse)         Patient Name: Castillo De Anda  Medical Record Number: [id number]  Today's Date: 9/8/2018    Procedure:[MS1.1] apical chest[MS1.2]  tube placement   Proceduralist: Dr.D'Souza Burger    Procedure start time:[MS1.1] 1045[MS1.3]  Procedure end time:[MS1.1] 1100[MS1.4]    Report given to: Roosevelt ANDRADE     Pt arrived to IR room 5 from 6C accompanied by daughter.Consent obtained  Pt denies any questions or concerns regarding procedure. Pt positioned supine  and monitored per protocol. Pt tolerated procedure without any noted complications.[MS1.1] 8Fr Flexima chest tube to right apical chest.[MS1.4]Pt transferred back to[MS1.1] 6C.[MS1.3]     Revision History        User Key Date/Time User Provider Type Action    > MS1.4 9/8/2018 11:06 AM Nya Tanner RN Registered Nurse Sign     MS1.3 9/8/2018 10:48 AM Nya Tanner RN Registered Nurse      MS1.2 9/8/2018 10:38 AM Nya Tanner, RN Registered Nurse      MS1.1 9/8/2018 10:16 AM Nya Tanner, RN Registered Nurse                       Procedure Notes      Procedures by Eric Delacruz MD at 9/8/2018 11:15 AM     Author:  Eric Delacruz MD Service:  Interventional Radiology Author Type:  Resident    Filed:  9/8/2018 11:15 AM Date of Service:  9/8/2018 11:15 AM Creation Time:  9/8/2018 11:14 AM    Status:  Signed :  Eric Delacruz MD (Resident)         Interventional Radiology Brief Post Procedure Note    Procedure: Right Chest Tube    Proceduralist: Maritza Martinez MD    Assistant: John Delacruz MD    Time Out: Prior to the start of the procedure and with procedural staff participation, I verbally confirmed the patient s identity using two indicators, relevant allergies, that the procedure was appropriate and matched the consent or emergent situation, and that the correct equipment/implants were available. Immediately prior to starting the procedure I conducted the Time Out with the procedural staff and re-confirmed the patient s name, procedure, and site/side. (The Joint Commission universal protocol was followed.)  Yes        Sedation: None. Local Anesthestic used    Findings: Right pneumothorax    Estimated Blood Loss: Minimal    Fluoroscopy Time:  minute(s)    SPECIMENS: None    Complications: 1. None     Condition: Stable    Plan: Chest tube to water seal -20 mmH20    Comments: See dictated procedure note for full details.    Eric Delacruz MD[JH1.1]           Revision History        User Key Date/Time User Provider Type Action    > JH1.1 9/8/2018 11:15 AM Eric Delacruz MD Resident Sign            Procedures by Jason Fowler MD at 8/31/2018  2:06 PM     Author:  Jason Fowler MD Service:  Pathology Author Type:  Physician    Filed:  8/31/2018  5:36 PM Date of Service:  8/31/2018  2:06 PM Creation Time:  8/31/2018  3:29 PM    Status:  Signed :  Jason Fowler MD (Physician)     Procedure Orders:    1. Plateletpheresis [[id number]] ordered by Vineet Urbano MD at 08/31/18 1051                     Transfusion Medicine  Apheresis Procedure Note    Castillo De Anda [id number]   YOB: 1950 Age: 68 year old       Procedure: Plateletpheresis for thrombocytosis           Assessment and Plan:   68 year old male is seen for a second plateletpheresis procedure for thrombocytosis (PLT 1534 on 08/30).  He has a history of a myleoproliferative disorder.     The patient tolerated the plateletpheresis procedure well without complication.  Denies nausea, vomiting, fevers, chills.  Depletion procedure from yesterday was effective. Pre procedure platelet count: 1666.  Post procedure platelet count: 586.  His platelet count is already beginning to drop independent of the depletion procedures.  Do not anticipate a need for additional procedures.     Pre procedure platelet count: 685  Post procedure platelet count:[AJ1.1] 359[AJ1.2]             History of Present Illness:   Castillo De Anda is a 68 year old male who is seen for plateletpheresis for thrombocytosis.  His past medical history includes Polycythemia vera.  He is in hospital status post stenting for ST elevation myocardial infarction. He has ongoing thrombocytosis, with PLT 1534 on 08/30 AM labs. He is currently well.                 Past Medical History:   Polycythemia vera  STEMI s/p HUSSEIN          Past Surgical History:     Past Surgical History:   Procedure Laterality Date     ANKLE SURGERY Right 04/2018     APPENDECTOMY       CRANIOTOMY Right 2008     ESOPHAGOSCOPY, GASTROSCOPY, DUODENOSCOPY (EGD), COMBINED N/A 8/24/2018    Procedure: COMBINED ESOPHAGOSCOPY, GASTROSCOPY, DUODENOSCOPY (EGD);  Upper Endoscopy with No Intervention; Two Gastric Ulcers;  Surgeon: Rocael Barber MD;  Location: [facility] OR     IRRIGATION AND DEBRIDEMENT CHEST WASHOUT, COMBINED N/A 8/13/2018    Procedure: COMBINED IRRIGATION AND DEBRIDEMENT CHEST WASHOUT;  COMBINED IRRIGATION AND DEBRIDEMENT CHEST WASHOUT, Closure;  Surgeon: Jason Franco MD;  Location: [facility]  OR     PROSTATECTOMY PERINEAL  2004     REPAIR VENTRICULAR SEPTAL DEFECT N/A 8/10/2018    Procedure: REPAIR VENTRICULAR SEPTAL DEFECT;  Median Sternotomy, REPAIR VENTRICULAR SEPTAL DEFECT on pump oxygenation, Tricuspid valve replacement ;  Surgeon: Jason Franco MD;  Location: [facility] OR     SPLENECTOMY      childhood     THYROID SURGERY  2012            Allergies:     Allergies   Allergen Reactions     Anagrelide Rash     Noted to have small rash and nose bleeds after starting the prescription (prior to August 2018 hospitalization; prescribed by [facility] clinician); dosage had then been increased (by [facility] clinician) and subsequently developed full body rash within 4 hours of increased dose.             Medications:     Current Facility-Administered Medications   Medication     acetaminophen (TYLENOL) tablet 650 mg     albuterol neb solution 2.5 mg     amiodarone (PACERONE/CODARONE) tablet 200 mg     [START ON 9/2/2018] amiodarone (PACERONE/CODARONE) tablet 200 mg     atorvastatin (LIPITOR) tablet 40 mg     benzocaine-menthol (CEPACOL) 15-3.6 MG lozenge 1 lozenge     calcium gluconate 1 g in D5W 100 mL intermittent infusion     Carboxymethylcellulose Sod PF (REFRESH PLUS) 0.5 % ophthalmic solution 1 drop     clopidogrel (PLAVIX) tablet 75 mg     glucose gel 15-30 g    Or     dextrose 50 % injection 25-50 mL    Or     glucagon injection 1 mg     diphenhydrAMINE (BENADRYL) injection 50 mg     EPINEPHrine (ADRENALIN) kit 0.3 mg     furosemide (LASIX) tablet 20 mg     heparin  drip 25,000 units in 0.45% NaCl 250 mL (see additional administration details for dose)     heparin 100 UNIT/ML injection 3 mL     heparin 100 UNIT/ML injection 3 mL     heparin bolus from infusion pump     heparin lock flush 10 UNIT/ML injection 2-5 mL     heparin lock flush 10 UNIT/ML injection 5-10 mL     heparin lock flush 10 UNIT/ML injection 5-10 mL     hydrALAZINE (APRESOLINE) injection 10 mg     HYDROmorphone (PF) (DILAUDID)  injection 0.3-0.5 mg     hydroxyurea (HYDREA) capsule CHEMO 1,500 mg     levothyroxine (SYNTHROID/LEVOTHROID) tablet 75 mcg     lidocaine (LMX4) cream     lidocaine (LMX4) cream     lidocaine 1 % 1 mL     magnesium sulfate 2 g in NS intermittent infusion ([facility] or [facility])     magnesium sulfate 4 g in 100 mL sterile water (premade)     May continue current IV fluids if patient has IV fluids infusing.     melatonin tablet 1 mg     methocarbamol (ROBAXIN) tablet 500 mg     methylPREDNISolone sodium succinate (solu-MEDROL) injection 125 mg     metoprolol (LOPRESSOR) injection 2.5 mg     metoprolol tartrate (LOPRESSOR) half-tab 12.5 mg     multivitamin, therapeutic (THERA-VIT) tablet 1 tablet     naloxone (NARCAN) injection 0.1-0.4 mg     ondansetron (ZOFRAN-ODT) ODT tab 4 mg    Or     ondansetron (ZOFRAN) injection 4 mg     oxyCODONE IR (ROXICODONE) tablet 5-10 mg     pantoprazole (PROTONIX) EC tablet 40 mg     potassium chloride (KLOR-CON) Packet 20-40 mEq     potassium chloride 10 mEq in 100 mL intermittent infusion with 10 mg lidocaine     potassium chloride 10 mEq in 100 mL sterile water intermittent infusion (premix)     potassium chloride 20 mEq in 50 mL intermittent infusion     potassium chloride SA (K-DUR/KLOR-CON M) CR tablet 10 mEq     potassium chloride SA (K-DUR/KLOR-CON M) CR tablet 20-40 mEq     potassium phosphate 15 mmol in D5W 250 mL intermittent infusion     potassium phosphate 20 mmol in D5W 250 mL intermittent infusion     potassium phosphate 20 mmol in D5W 500 mL intermittent infusion     potassium phosphate 25 mmol in D5W 500 mL intermittent infusion     prochlorperazine (COMPAZINE) injection 5 mg    Or     prochlorperazine (COMPAZINE) tablet 5 mg     QUEtiapine (SEROquel) half-tab 12.5 mg     QUEtiapine (SEROquel) half-tab 12.5 mg     ranitidine (ZANTAC) injection 50 mg     Reason ACE/ARB/ARNI order not selected     Reason beta blocker not prescribed     Reason beta blocker order not  "selected     senna-docusate (SENOKOT-S;PERICOLACE) 8.6-50 MG per tablet 2 tablet    Or     senna-docusate (SENOKOT-S;PERICOLACE) 8.6-50 MG per tablet 1 tablet     sodium chloride (PF) 0.9% PF flush 10 mL     sodium chloride (PF) 0.9% PF flush 10 mL     sodium chloride (PF) 0.9% PF flush 10-20 mL     sodium chloride (PF) 0.9% PF flush 3 mL     sodium chloride (PF) 0.9% PF flush 3 mL     sodium chloride (PF) 0.9% PF flush 3 mL     sodium chloride (PF) 0.9% PF flush 5-50 mL     warfarin (COUMADIN) tablet 6 mg     Warfarin Therapy Reminder (Check START DATE - warfarin may be starting in the FUTURE)             Review of Systems:   See above           Exam:   /74  Pulse 78  Temp 97.9  F (36.6  C) (Oral)  Resp 20  Ht 1.753 m (5' 9\")  Wt 76.6 kg (168 lb 14.4 oz)  SpO2 95%  BMI 24.94 kg/m2   Alert, no acute distress.  Central line accessed for the procedure.              Data:     ROUTINE IP LABS (Last four results)  BMP    Recent Labs  Lab 08/31/18  0822 08/30/18  0652 08/29/18  0829 08/28/18  0731    133 133 135   POTASSIUM 3.9 4.4 4.7 4.2   CHLORIDE 96 100 101 101   GABRIELA 8.8 7.8* 7.8* 7.9*   CO2 30 26 24 26   BUN 22 27 29 31*   CR 0.91 0.83 0.70 0.77   * 88 109* 98     CBC    Recent Labs  Lab 08/31/18  1350 08/31/18  0822 08/30/18  2121 08/30/18  1730   WBC 7.3 7.3 7.1 8.5   RBC 2.85* 3.18* 2.84* 3.11*   HGB 8.7* 9.8* 8.6* 9.5*   HCT 27.9* 31.9* 27.5* 30.4*   MCV 98 100 97 98   MCH 30.5 30.8 30.3 30.5   MCHC 31.2* 30.7* 31.3* 31.3*   RDW 22.0* 22.6* 22.0* 22.7*   * 714* 586* 1666*       INR    Recent Labs  Lab 08/31/18  0822 08/30/18  0652 08/29/18  0829 08/28/18  0731   INR 1.47* 1.32* 1.23* 1.13             Procedure Summary:   A Plateletpheresis procedure was performed..  A dual lumen central line was used for access and allowed for appropriate flow during the procedure.  ACD-A was used for anticoagulation. To offset the effects of the citrate, calcium gluconate was given in the return " line.  The patient's vital signs were stable throughout.  The patient tolerated the procedure well without complication.  See apheresis flowsheet for additional details.[AJ1.1]      Attestation: During the procedure the patient was directly seen and evaluated by Jason domingo MD.    Jason Fowler MD  Transfusion Medicine Attending  Laboratory Medicine & Pathology  Pager: [phone][AJ1.2]                 Revision History        User Key Date/Time User Provider Type Action    > AJ1.2 8/31/2018  5:36 PM Jason Fowler MD Physician Sign     AJ1.1 8/31/2018  3:29 PM Jason Fowler MD Physician             Procedures by Jason Fowler MD at 8/30/2018  7:34 PM     Author:  Jason Fowler MD Service:  Pathology Author Type:  Physician    Filed:  8/30/2018  9:54 PM Date of Service:  8/30/2018  7:34 PM Creation Time:  8/30/2018  9:28 PM    Status:  Signed :  Jason Fowler MD (Physician)     Procedure Orders:    1. Plateletpheresis [[id number]] ordered by Vineet Urbano MD at 08/30/18 1536                  Transfusion Medicine  Apheresis Procedure Note    Castillo De Anda [id number]   YOB: 1950 Age: 68 year old   Date of Consult: 8/30/2018      Reason for consult: Plateletpheresis            Assessment and Plan:   68 year old male is seen for plateletpheresis for thrombocytosis (PLT 1534 on 08/30).  He has a history of a myleoproliferative disorder. The plan is to complete the depletion procedure once, with clinical re-evaluation subsequently. Please feel free to contact us if there is a clinical need for additional plateletpheresis procedure.    The patient tolerated the plateletpheresis procedure well without complication.  He felt a little cold during the procedure, this improved with some warm blankets.  He noted some tingling and paresthesias, increased calcium gluconate replacement.  Denies nausea, vomiting, fevers.    Pre procedure platelet  count: 1666  Post procedure platelet count: 586           History of Present Illness:   Castillo De Anda is a 68 year old male who is seen for plateletpheresis for thrombocytosis.  His past medical history includes Polycythemia vera.  He is in hospital status post stenting for ST elevation myocardial infarction. He has ongoing thrombocytosis, with PLT 1534 on 08/30 AM labs. He is currently well.                 Past Medical History:   Polycythemia vera  STEMI s/p HUSSEIN          Past Surgical History:     Past Surgical History:   Procedure Laterality Date     ANKLE SURGERY Right 04/2018     APPENDECTOMY       CRANIOTOMY Right 2008     ESOPHAGOSCOPY, GASTROSCOPY, DUODENOSCOPY (EGD), COMBINED N/A 8/24/2018    Procedure: COMBINED ESOPHAGOSCOPY, GASTROSCOPY, DUODENOSCOPY (EGD);  Upper Endoscopy with No Intervention; Two Gastric Ulcers;  Surgeon: Rocael Barber MD;  Location: [facility]     IRRIGATION AND DEBRIDEMENT CHEST WASHOUT, COMBINED N/A 8/13/2018    Procedure: COMBINED IRRIGATION AND DEBRIDEMENT CHEST WASHOUT;  COMBINED IRRIGATION AND DEBRIDEMENT CHEST WASHOUT, Closure;  Surgeon: Jason Franco MD;  Location: [facility]     PROSTATECTOMY PERINEAL  2004     REPAIR VENTRICULAR SEPTAL DEFECT N/A 8/10/2018    Procedure: REPAIR VENTRICULAR SEPTAL DEFECT;  Median Sternotomy, REPAIR VENTRICULAR SEPTAL DEFECT on pump oxygenation, Tricuspid valve replacement ;  Surgeon: Jason Franco MD;  Location: [facility] OR     SPLENECTOMY      childhood     THYROID SURGERY  2012            Allergies:     Allergies   Allergen Reactions     Anagrelide Rash     Noted to have small rash and nose bleeds after starting the prescription (prior to August 2018 hospitalization; prescribed by [facility] clinician); dosage had then been increased (by [facility] clinician) and subsequently developed full body rash within 4 hours of increased dose.             Medications:     Current Facility-Administered Medications   Medication      acetaminophen (TYLENOL) tablet 650 mg     albuterol neb solution 2.5 mg     amiodarone (PACERONE/CODARONE) tablet 400 mg     atorvastatin (LIPITOR) tablet 40 mg     benzocaine-menthol (CEPACOL) 15-3.6 MG lozenge 1 lozenge     calcium gluconate 1 g in D5W 100 mL intermittent infusion     Carboxymethylcellulose Sod PF (REFRESH PLUS) 0.5 % ophthalmic solution 1 drop     clopidogrel (PLAVIX) tablet 75 mg     glucose gel 15-30 g    Or     dextrose 50 % injection 25-50 mL    Or     glucagon injection 1 mg     heparin  drip 25,000 units in 0.45% NaCl 250 mL (see additional administration details for dose)     heparin bolus from infusion pump     heparin lock flush 10 UNIT/ML injection 2-5 mL     heparin lock flush 10 UNIT/ML injection 5-10 mL     heparin lock flush 10 UNIT/ML injection 5-10 mL     hydrALAZINE (APRESOLINE) injection 10 mg     HYDROmorphone (PF) (DILAUDID) injection 0.3-0.5 mg     hydroxyurea (HYDREA) capsule CHEMO 1,500 mg     levothyroxine (SYNTHROID/LEVOTHROID) tablet 75 mcg     lidocaine (LMX4) cream     lidocaine (LMX4) cream     lidocaine 1 % 1 mL     magnesium sulfate 2 g in NS intermittent infusion ([facility] or [facility])     magnesium sulfate 4 g in 100 mL sterile water (premade)     May continue current IV fluids if patient has IV fluids infusing.     melatonin tablet 1 mg     methocarbamol (ROBAXIN) tablet 500 mg     metoprolol (LOPRESSOR) injection 2.5 mg     multivitamin, therapeutic (THERA-VIT) tablet 1 tablet     naloxone (NARCAN) injection 0.1-0.4 mg     ondansetron (ZOFRAN-ODT) ODT tab 4 mg    Or     ondansetron (ZOFRAN) injection 4 mg     oxyCODONE IR (ROXICODONE) tablet 5-10 mg     pantoprazole (PROTONIX) EC tablet 40 mg     polyethylene glycol (MIRALAX/GLYCOLAX) Packet 17 g     potassium chloride (KLOR-CON) Packet 20-40 mEq     potassium chloride 10 mEq in 100 mL intermittent infusion with 10 mg lidocaine     potassium chloride 10 mEq in 100 mL sterile water intermittent infusion (premix)  "    potassium chloride 20 mEq in 50 mL intermittent infusion     potassium chloride SA (K-DUR/KLOR-CON M) CR tablet 20-40 mEq     potassium phosphate 15 mmol in D5W 250 mL intermittent infusion     potassium phosphate 20 mmol in D5W 250 mL intermittent infusion     potassium phosphate 20 mmol in D5W 500 mL intermittent infusion     potassium phosphate 25 mmol in D5W 500 mL intermittent infusion     prochlorperazine (COMPAZINE) injection 5 mg    Or     prochlorperazine (COMPAZINE) tablet 5 mg     QUEtiapine (SEROquel) half-tab 12.5 mg     QUEtiapine (SEROquel) half-tab 12.5 mg     Reason ACE/ARB/ARNI order not selected     Reason beta blocker not prescribed     Reason beta blocker order not selected     senna-docusate (SENOKOT-S;PERICOLACE) 8.6-50 MG per tablet 2 tablet    Or     senna-docusate (SENOKOT-S;PERICOLACE) 8.6-50 MG per tablet 1 tablet     sodium chloride (PF) 0.9% PF flush 10-20 mL     sodium chloride (PF) 0.9% PF flush 3 mL     sodium chloride (PF) 0.9% PF flush 3 mL     sodium chloride (PF) 0.9% PF flush 3 mL     sodium chloride (PF) 0.9% PF flush 5-50 mL     Warfarin Therapy Reminder (Check START DATE - warfarin may be starting in the FUTURE)             Review of Systems:   See above           Exam:   BP 99/63  Pulse 95  Temp 98.2  F (36.8  C) (Oral)  Resp 18  Ht 1.753 m (5' 9\")  Wt 77 kg (169 lb 12.8 oz)  SpO2 100%  BMI 25.08 kg/m2   Alert, no acute distress.  On supplemental oxygen.  Central line accessed for the procedure.              Data:     ROUTINE IP LABS (Last four results)  BMP[AJ1.1]  Recent Labs  Lab 08/30/18  0652 08/29/18  0829 08/28/18  0731 08/27/18  0703    133 135 138   POTASSIUM 4.4 4.7 4.2 4.2   CHLORIDE 100 101 101 104   GABRIELA 7.8* 7.8* 7.9* 7.9*   CO2 26 24 26 26   BUN 27 29 31* 31*   CR 0.83 0.70 0.77 0.74   GLC 88 109* 98 102*[AJ1.2]     CBC[AJ1.1]    Recent Labs  Lab 08/30/18  2121 08/30/18  1730 08/30/18  0652 08/29/18  0829   WBC 7.1 8.5 8.0 9.2   RBC 2.84* 3.11* " 2.99* 2.98*   HGB 8.6* 9.5* 9.2* 9.2*   HCT 27.5* 30.4* 29.8* 29.8*   MCV 97 98 100 100   MCH 30.3 30.5 30.8 30.9   MCHC 31.3* 31.3* 30.9* 30.9*   RDW 22.0* 22.7* 23.2* 23.1*   * 1666* 1534* 1386*[AJ1.3]       INR[AJ1.1]  Recent Labs  Lab 08/30/18  0652 08/29/18  0829 08/28/18  0731 08/27/18  0703   INR 1.32* 1.23* 1.13 1.19*[AJ1.2]             Procedure Summary:   A Plateletpheresis procedure was performed..  A dual lumen central line was used for access and allowed for appropriate flow during the procedure.  ACD-A was used for anticoagulation. To offset the effects of the citrate, calcium gluconate was given in the return line.  The patient's vital signs were stable throughout.  The patient tolerated the procedure well without complication.  See apheresis flowsheet for additional details.    Attestation: During the procedure the patient was directly seen and evaluated by meJason MD.    Jason Fowler MD  Transfusion Medicine Attending  Laboratory Medicine & Pathology  Pager: [phone][AJ1.1]               Revision History        User Key Date/Time User Provider Type Action    > AJ1.3 8/30/2018  9:54 PM Jason Fowler MD Physician Sign     AJ1.2 8/30/2018  9:29 PM Jason Fowler MD Physician      AJ1.1 8/30/2018  9:28 PM Jason Fowler MD Physician             Procedures by Hermes Ortiz MD at 8/6/2018  1:44 AM     Author:  Hermes Ortiz MD Service:  Interventional Radiology Author Type:  Physician    Filed:  8/30/2018  5:08 PM Date of Service:  8/6/2018  1:44 AM Creation Time:  8/30/2018  5:07 PM    Status:  Signed :  Hermes Ortiz MD (Physician)         Interventional Radiology Brief Post Procedure Note    Procedure: IR CVC NON TUNNEL PLACEMENT    Proceduralist: Hermes Ortiz, MD    Assistant: None    Time Out: Prior to the start of the procedure and with procedural staff participation, I verbally confirmed the patient s  identity using two indicators, relevant allergies, that the procedure was appropriate and matched the consent or emergent situation, and that the correct equipment/implants were available. Immediately prior to starting the procedure I conducted the Time Out with the procedural staff and re-confirmed the patient s name, procedure, and site/side. (The Joint Commission universal protocol was followed.)  Yes    Medications   Medication Event Details Admin User Admin Time   heparin  drip 25,000 units in 0.45% NaCl 250 mL (see additional administration details for dose) Medication Rate/Dose Verify Dose: 800 Units/hr; Rate: 8 mL/hr; Route: Intravenous; Scheduled Time:  3:11 PM; Comment: xa=0.22 no change in rate Elliott Echeverria RN 8/30/2018  3:11 PM   lidocaine (PF) (XYLOCAINE) 1 % injection 1-30 mL Medication Given by Other Dose: 6 mL; Route: Subcutaneous Marissa Michelle RN 8/30/2018  3:24 PM   heparin 5,000 units in 0.9% sodium chloride 1000 mL Medication New Bag by Other Clinician Dose: 5,000 Units; Route: TABLE SOLN Marissa Michelle RN 8/30/2018  3:25 PM   heparin 100 UNIT/ML injection 3 mL Medication Given by Other Dose: 2 mL; Route: Intracatheter; Scheduled Time:  2:30 PM Marissa Michelle RN 8/30/2018  3:25 PM   heparin 100 UNIT/ML injection 3 mL Medication Given by Other Dose: 2 mL; Route: Intracatheter; Scheduled Time:  2:30 PM Marissa Michelle RN 8/30/2018  3:26 PM       Sedation: None. Local Anesthestic used    Findings: 15cm 14F DL Schon apheresis catheter R IJV SVC/RA jxn under U/S and fluoro guidance.  Functions well, is hep-locked and ready for immediate use.     Estimated Blood Loss: Minimal    Fluoroscopy Time: 0.4 minute(s)    SPECIMENS: None    Complications: 1. None     Condition: Stable    Plan: As above.    Comments: See dictated procedure note for full details.    Hermes Ortiz MD[MR1.1]         Revision History        User Key Date/Time User Provider Type  Action    > MR1.1 8/30/2018  5:08 PM Hermes Ortiz MD Physician Sign            Procedures by Bernice Farris MD at 8/22/2018  1:25 AM     Author:  Bernice Farris MD Service:  Cardiothoracic Surgery Author Type:  Physician    Filed:  8/22/2018  1:25 AM Date of Service:  8/22/2018  1:25 AM Creation Time:  8/22/2018  1:19 AM    Status:  Signed :  Bernice Farris MD (Physician)     Pre-procedure Diagnoses:    1. Hypotension, unspecified hypotension type [I95.9]           Post-procedure Diagnoses:    1. Other specified hypotension [I95.89]           Procedures:    1. CENTRAL LINE [PRO85 (Custom)]               Central line placement    Indication for procedure: Hypotension requiring pressors  Procedure was considered emergent. Patient verbally agreed to procedure. Timeout was performed.   Due to emergent nature of procedure and plavix/heparin use, elected to place left femoral CVC.   The patient was prepped and fully draped in usual sterile fashion. The skin of the left groin was anesthetized with 1% lidocaine. The femoral pulse was barely palpable given blood pressure. Ultrasound was used to visualize the left femoral vein. After puncturing the vein, a wire was passed through the needle and the needle removed. A skin knick was made over the wire and the dilator was passed. Next the previously flushed CVC was placed over the wire and the wire was removed. All hubs withdrew blood and flushed easily. The catheter was sutured in place and a sterile dressing was applied. EBL was 5 ml. Patient tolerated procedure with no apparent complications.     Bernice Farris MD, PhD  CV surgery[ER1.1]       Revision History        User Key Date/Time User Provider Type Action    > ER1.1 8/22/2018  1:25 AM Bernice Farris MD Physician Sign            Procedures signed by Steffen Doyle MD at 8/20/2018  2:22 PM      Author:  Steffen Doyle MD Service:  Neurology  Author Type:  Physician    Filed:  8/20/2018  2:22 PM Encounter Date:  8/16/2018 Status:  Signed    :  Steffen Doyle MD (Physician)       Procedure Orders:    1. EEG [[id number]] ordered by Interface, Transcription at 08/17/18 0252                  EEG #       DATE OF RECORDING/SERVICE DATE:  08/16/2018       TYPE OF STUDY:  Inpatient video-EEG monitoring       DURATION OF STUDY:  3 hours, 29 minutes, 12 seconds       CLINICAL SUMMARY:  Inpatient video-EEG monitoring performed on Castillo De Anda, a 68-year-old who is status post resection of a right temporal meningioma and who underwent extensive cardiac surgery and placement of a bioprosthetic valve.  He has had periods of altered mental status associated with tachycardia and concern is raised that these are epileptic seizures.  Previous video monitoring reported epileptiform abnormalities in the right temporal region and treatment with levetiracetam was initiated.  He is currently in ICU and intubated. The patient also had a right temporal meningioma with resection and right temporal encephalomalacia that could provide a focus for seizure activity.      TECHNICAL SUMMARY: EEG was recorded from scalp electrodes placed according to the 10-20 International system.  Additional electrodes were utilized for referencing, artifact detection, and recording from other cerebral regions.  Video was continuously recorded.  Video was reviewed for clinical correlation and to assist with EEG interpretation.        FINDINGS:  While awake, with eye blinks, etc., a 10 Hz posterior dominant rhythm is seen.  There is excessive diffuse theta over the left hemisphere.  There is more extensive slowing over the right hemisphere. More persistent irregular delta is seen over the right temporal regions.  Higher amplitude rhythmic theta is seen over the right temporal regions with amplitude maximum at T4.  This has the appearance of a breach rhythm.  Occasional theta  transients stand out and occasionally occur singly with a spiking negativity at T4.  However, these all have a wickety appearance and in this observer's opinion are not epileptiform appearing.      ICTAL:  No electrographic seizures.  No spells marked by staff or patient.      IMPRESSION:  Abnormal.  There is evidence of a mild diffuse encephalopathy.  There is evidence of additional right temporal focal cerebral dysfunction.  This is consistent with known right temporal structural abnormalities.  Epileptiform discharges or seizures were not seen in this approximately 3-hour video-EEG study.         STEFFEN ALVARES MD             D: 2018   T: 2018   MT:       Name:     CASTILLO SOLER   MRN:      [id number]        Account:        [id number]   :      1950           Procedure Date: 2018      Document: [id number][TW1.1]         Revision History        User Key Date/Time User Provider Type Action    > TW1.1 2018  2:22 PM Steffen Alvares MD Physician Sign     [N/A] 2018  2:52 AM Steffen Alvares MD Physician Edit            Procedures signed by Joaquín Cisneros MD at 2018  7:39 PM      Author:  Joaquín Cisneros MD Service:  Neurology Author Type:  Physician    Filed:  2018  7:39 PM Encounter Date:  8/10/2018 Status:  Signed    :  Joaquín Cisneros MD (Physician)       Procedure Orders:    1. EEG [[id number]] ordered by Interface, Transcription at 18 2049                  [facility] EEG #-2 (Day 2 of Video-EEG Monitoring)    DATE OF RECORDIN/10/2018    DURATION OF RECORDING:  15 hours, 14 minutes.      CLINICAL SUMMARY:  This video-EEG monitoring procedure is performed in evaluation of encephalopathy in Castillo Soler.  He was reported to be receiving levetiracetam and lorazepam on this day of monitoring.      TECHNICAL SUMMARY:  This continuous EEG monitoring procedure was performed with 23 scalp electrodes in 10-20  system placements, and additional scalp, precordial and other surface electrodes used for electrical referencing and artifact detection.  Video monitoring was utilized and periodically reviewed by EEG technologists and the physician for electroclinical correlation.     INTERICTAL EEG ACTIVITIES:  During waking, there was a bilateral 8 Hz posterior dominant rhythm, which was poorly sustained on the right.  Lower amplitude faster activities predominated anteriorly.  During waking, there was frequent occurrence of generalized irregular 2-8 Hz delta-theta slowing.  Drowsiness was manifested by increased rhythmicity of background theta activities.  Symmetric sleep spindles were seen during slow wave sleep.  During waking and drowsiness, there was very frequent occurrence of right frontotemporal polymorphic 2-4 Hz delta slowing.  There was a breach effect at the T4 electrode.  There were frequent right frontotemporal spike-wave complexes, which were independent of the breach effect in that spike frequencies were faster than the background activities subject to the breach effect.      ICTAL RECORDINGS:  The patient or his family members activated the event marking system several times for reasons that were not fully described; no definite behavioral or EEG changes were observed at these times.      SUMMARY OF DAY 2 OF VIDEO EEG MONITORING:    The interictal EEG recording was abnormal due to frequent right frontotemporal spike-wave complexes, with a focal breach effect at the T4 electrode.    No electrographic seizures were recorded on this day of monitoring.      SUMMARY OF 2 DAYS OF VIDEO EEG MONITORING:         The interictal EEG recording was persistently abnormal due to generalized delta-theta slowing during waking, due to very frequent right frontotemporal delta slowing in waking and drowsiness, due to frequent right frontotemporal spike-wave complexes in waking and drowsiness, and due to a focal breach effect at the  T4 electrode.    Multiple events with subjective alteration in wellbeing where family observed changes in heart rate were marked, and twice on day 1 of monitoring there was apparent hyperventilation associated with a buildup of generalized delta slowing, but also with monorhythmic nonevolving 1 Hz delta slowing over the right frontotemporal area and increased right frontotemporal spike frequency.           These findings indicate moderate electrographic encephalopathy, additional right frontotemporal neuronal dysfunction, evidence of a known skull defect, and an elevated risk of partial epilepsy.  Some of the marked events may represent simple partial seizures given that there were right frontotemporal frequency changes and increased spiking at these times, but transitory increases in generalized delta slowing are more likely to be accounted for by volitional hyperventilation.  Clinical correlation is recommended.   Joaquín Cisneros M.D., Professor of Neurology       D: 2018   T: 2018   MT: MODESTO      Name:     JOSE SOLER   MRN:      [id number]        Account:        [id number]   :      1950           Procedure Date: 08/10/2018      Document: [id number][TH1.1]             Revision History        User Key Date/Time User Provider Type Action    > TH1.1 2018  7:39 PM Joaquín Cisneros MD Physician Sign     [N/A] 2018  8:49 PM Joaquín Cisneros MD Physician Edit            Procedures signed by Joaquín Cisneros MD at 2018  7:37 PM      Author:  Joaquín Cisneros MD Service:  Neurology Author Type:  Physician    Filed:  2018  7:37 PM Encounter Date:  2018 Status:  Signed    :  Joaquín Cisneros MD (Physician)       Procedure Orders:    1. EEG [[id number]] ordered by Interface, Transcription at 08/10/18 2156                  [facility] EEG #-1 (Day 1 of Video-EEG Monitoring)    DATE OF RECORDIN2018    DURATION OF RECORDIN hours, 28 minutes         CLINICAL SUMMARY:  This diagnostic video-EEG monitoring procedure is performed in evaluation of encephalopathy in Castillo De Anda.  He was reported to be receiving levetiracetam, lorazepam and quetiapine during the period of monitoring.      TECHNICAL SUMMARY:  This continuous EEG monitoring procedure was performed with 23 scalp electrodes in 10-20 system placements, and additional scalp, precordial and other surface electrodes used for electrical referencing and artifact detection.  Video monitoring was utilized and periodically reviewed by EEG technologists and the physician for electroclinical correlation.     INTERICTAL EEG ACTIVITIES:  During waking, there was a bilateral 8 Hz posterior dominant rhythm, which was poorly sustained on the right.  Lower amplitude faster activities predominated anteriorly.  During waking, there was frequent occurrence of generalized irregular 2-8 Hz delta-theta slowing.  Drowsiness was manifested by increased rhythmicity of background theta activities.  Symmetric sleep spindles were seen during slow wave sleep.  During waking and drowsiness, there was very frequent occurrence of right frontotemporal polymorphic 2-4 Hz delta slowing.  There was a breach effect at the T4 electrode.  There were frequent right frontotemporal spike-wave complexes, which were independent of the breach effect in that spike frequencies were faster than the background activities subject to the breach effect.      ICTAL RECORDINGS:  The event marker was activated multiple times during the period of monitoring, usually by the patient's family members, who noted that the patient was having variability in heart rate.  On 2 occasions (at 15:54 and at 17:59), there was a buildup of generalized high amplitude semi-rhythmic 2-4 Hz delta activity, in association with video recorded evidence of hyperventilation, and with increased occurrence of right frontotemporal spikes and runs of approximately 1 Hz delta  slowing for up to 30 seconds.  During other event markings, there were less prominent changes in generalized delta slowing, in right frontotemporal delta slowing and in spike frequency.  The patient had behavioral interaction at all of the marked events, and he was never observed to be unresponsive to voice.  With several events, he complained of feeling hot.      SUMMARY OF DAY 1 OF VIDEO-EEG MONITORING:    The interictal EEG recording was abnormal due to generalized delta-theta slowing during waking, due to very frequent right frontotemporal delta slowing in waking and drowsiness, frequent right frontotemporal spike-wave complexes in waking and drowsiness, and a focal breach effect at the T4 electrode.    Multiple events with subjective alteration in well-being or family-observed changes in heart rate were marked, on 2 occasions with apparent hyperventilation associated with a buildup of generalized delta slowing but also with monorhythmic non-evolving 1 Hz delta slowing over the right frontotemporal area in conjunction with marked increase in right frontotemporal spike frequency.    These findings indicate moderate electrographic encephalopathy, additional right frontotemporal neuronal dysfunction, evidence of the known skull defect and an elevated risk of partial epilepsy.  Some of the marked events may represent simple partial seizures given that there were right frontotemporal changes and slowing and spiking at these times, but transitory increases in generalized delta slowing are more likely to be accounted for by volitional hyperventilation.  Clinical correlation is recommended.   Joaquín Cisneros M.D., Professor of Neurology       D: 08/10/2018   T: 08/10/2018   MT:       Name:     JOSE SOLER   MRN:      [id number]        Account:        [id number]   :      1950           Procedure Date: 2018      Document: [id number][TH1.1]             Revision History        User Key Date/Time User  Provider Type Action    > TH1.1 8/18/2018  7:37 PM Joaquín Cisneros MD Physician Sign     [N/A] 8/10/2018  9:56 PM Joaquín Cisneros MD Physician Edit            Procedures by Elizabeth Berry RD at 8/13/2018  3:44 PM     Author:  Elizabeth Berry RD Service:  Nutrition Author Type:  Registered Dietitian    Filed:  8/13/2018  3:44 PM Date of Service:  8/13/2018  3:44 PM Creation Time:  8/13/2018  3:44 PM    Status:  Signed :  Elizabeth Berry RD (Registered Dietitian)         Bridle Placement:   Reason for bridle placement: securement of FT   Medicine delivered during procedure: lubricating jelly   Procedure: Successful  Location of top of clip on FT: @ 97 cm marker   Condition of nose/skin at time of bridle placement: Unremarkable   Face to Face time with patient: <5 minutes.    Elizabeth Berry RD, LD, CNSC  CVICU Dietitian  Pager: [phone][EB1.1]             Revision History        User Key Date/Time User Provider Type Action    > EB1.1 8/13/2018  3:44 PM Elizabeth Berry RD Registered Dietitian Sign            Procedures by Elizabeth Berry RD at 8/13/2018  3:44 PM     Author:  Elizabeth Berry RD Service:  Nutrition Author Type:  Registered Dietitian    Filed:  8/13/2018  3:44 PM Date of Service:  8/13/2018  3:44 PM Creation Time:  8/13/2018  3:42 PM    Status:  Signed :  Elizabeth Berry RD (Registered Dietitian)         Small Bowel Feeding Tube Placement Assessment  Reason for Feeding Tube Placement: request for post-pyloric FT by providers  Cortrak Start Time: 15:10   Cortrak End Time: 15:18  Medicine Delivered During Procedure: lubricating jelly as pt remained sedated from OR  Placement Successful: Presume post-pyloric (pending AXR confirmation).    Procedure Complications: None  Final Placement Elvis at exit of nare 96 cm  Face to Face time with patient: 15 min    Elizabeth Berry RD, LD, CNSC  CVICU Dietitian  Pager: [phone][EB1.1]         Revision History        User Key Date/Time User  Provider Type Action    > EB1.1 8/13/2018  3:44 PM Elizabeth Berry RD Registered Dietitian Sign            Procedures by Richard Horton MD at 8/6/2018  3:45 AM     Author:  Richard Horton MD Service:  Cardiology Author Type:  Fellow    Filed:  8/6/2018  5:13 AM Date of Service:  8/6/2018  3:45 AM Creation Time:  8/6/2018  3:45 AM    Status:  Addendum :  Richard Horton MD (Fellow)         Procedure: [facility] catheter placement  Consent obtained from the family. A double lumen placed sheath placed under US guidance in Right internal jugular vein using standard sterile technique. [facility] catheter floated in under fluoro. No complications.[PP1.1]      CVP 13  PA 40/25  PCW 14  MAP 70 (per IABP 1:1)  CVP ScVO2 54% using this CI 1.5 SVR 1500 (on Norepi 0.14)  PA ScVO2 90%[PP1.2]      Richard Horton  General Cardiology Fellow, PGY6  Pager [phone][PP1.1]     Revision History        User Key Date/Time User Provider Type Action    > PP1.2 8/6/2018  5:13 AM Richard Horton MD Fellow Addend     PP1.1 8/6/2018  3:47 AM Richard Horton MD Fellow Sign                  Progress Notes - Therapies (Notes from 09/08/18 through 09/11/18)     No notes of this type exist for this encounter.

## 2018-08-06 NOTE — IP AVS SNAPSHOT
"` `           [address] [facility]: [phone]                                              INTERAGENCY TRANSFER FORM - NURSING   2018                    Hospital Admission Date: 2018  JOSE SOLER   : 1950  Sex: Male        Attending Provider: Jason Franco MD     Allergies:  Anagrelide    Infection:  None   Service:  CARDIOTHORAC    Ht:  1.753 m (5' 9\")   Wt:  72.6 kg (160 lb)   Admission Wt:  77.3 kg (170 lb 6.7 oz)    BMI:  23.63 kg/m 2   BSA:  1.88 m 2            Patient PCP Information     Provider PCP Type    TRAN Poe      Current Code Status     Date Active Code Status Order ID Comments User Context       Prior      Code Status History     Date Active Date Inactive Code Status Order ID Comments User Context    2018 12:49 PM  Full Code [id number]  Janice Disla PA-C Outpatient    8/10/2018 10:41 PM 2018 12:49 PM Full Code [id number]  Hiram Meyers MD Inpatient    2018  5:33 AM 8/10/2018 10:41 PM Full Code [id number]  Hermes Munroe MD Inpatient      Advance Directives        Scanned docmt in ACP Activity?           No scanned doc        Hospital Problems as of 2018              Priority Class Noted POA    Cardiogenic shock (H) Medium  2018 Yes      Non-Hospital Problems as of 2018              Priority Class Noted    Splenosis Medium  2015    Myeloproliferative disease (H) Medium  2018      Immunizations     None         END      ASSESSMENT     Discharge Profile Flowsheet     DISCHARGE NEEDS ASSESSMENT     Full except areas not inspected   Hip, left;Hip, right;Buttock, left;Buttock, right;Sacrum;Coccyx 09/10/18 1110    Equipment Currently Used at Home  walker, standard (does not use currently) 18 0926   Inspection under devices  Full 18 1052    Transportation Available  car 18 09   Not Inspected under devices  -- (old CT site dressing) 09/10/18 2158    GASTROINTESTINAL (ADULT,PEDIATRIC,OB)     " "Skin WDL  ex 09/11/18 1052    GI WDL  ex 09/11/18 0908   Skin Color/Characteristics  bruised (ecchymotic) 09/11/18 1052    Abdominal Appearance  rounded 09/11/18 0908   Skin Temperature  warm 09/09/18 1848    All Quadrants Bowel Sounds  audible and normoactive 09/10/18 2158   Skin Moisture  flaky;dry 09/11/18 1052    All Quadrants Palpation  soft/nontender 09/10/18 2158   Skin Elasticity  quick return to original state 09/03/18 2059    Last Bowel Movement  09/10/18 09/11/18 0908   Skin Integrity  bruise(s);incision(s);scab(s);scar(s);wound(s) 09/11/18 1052    GI Signs/Symptoms  diarrhea 09/11/18 0908   SAFETY      Passing flatus  yes 09/11/18 0908   Safety WDL  WDL 09/11/18 1157    COMMUNICATION ASSESSMENT     Safety Factors  patient up in chair;bed in low position;wheels locked;call light in reach;upper side rails raised x 2;ID band on 09/11/18 1157    Patient's communication style  spoken language (English or Bilingual) 08/08/18 1644   Safety Equipment  oxygen flowmeter;manual resuscitator/mask/valve in room;suction regulator;suction equipment 09/11/18 1157    SKIN     All Alarms  none present 09/11/18 1157    Inspection of bony prominences  Full 09/11/18 1052                      Assessment WDL (Within Defined Limits) Definitions           Safety WDL     Effective: 09/28/15    Row Information: <b>WDL Definition:</b> Bed in low position, wheels locked; call light in reach; upper side rails up x 2; ID band on<br> <font color=\"gray\"><i>Item=AS safety wdl>>List=AS safety wdl>>Version=F14</i></font>      Skin WDL     Effective: 09/28/15    Row Information: <b>WDL Definition:</b> Warm; dry; intact; elastic; without discoloration; pressure points without redness<br> <font color=\"gray\"><i>Item=AS skin wdl>>List=AS skin wdl>>Version=F14</i></font>      Vitals     Vital Signs Flowsheet     COMMENTS     Comfort  negligible pain 09/11/18 1203    Comments  extubation 08/21/18 1200   Change in Pain  about the same 09/11/18 " 1203    VITAL SIGNS     Pain Control  fully effective 09/11/18 1203    Temp  97.8  F (36.6  C) 09/11/18 1137   Functioning  can do everything I need to 09/11/18 1203    Temp src  Oral 09/11/18 1137   Sleep  normal sleep 09/11/18 1203    Resp  18 09/11/18 1137   PAIN ASSESSMENT/NONVERBAL ICU (ADULT)      Pulse  89 09/10/18 2007   Presence of Pain  Nonverbal indicators of pain present 08/30/18 1047    Heart Rate  87 09/11/18 1137   Assessment Indicator  PRN assessment 08/30/18 1047    Pulse/Heart Rate Source  Monitor 09/08/18 1037   Nonverbal Indicators of Pain  ADLs, inability to perform 08/30/18 1047    BP  114/80 09/11/18 1137   Pain Management Interventions  Distraction 08/30/18 1047    BP Location  Left arm 09/11/18 1137   Response to Interventions  Absence of nonverbal indicators of pain 08/30/18 1050    Patient Position  Lying 08/24/18 1448   CRITICAL-CARE PAIN OBSERVATION TOOL (CPOT)      OXYGEN THERAPY     Facial Expression  0 08/21/18 0404    SpO2  100 % 09/11/18 1137   Body Movements  0 08/21/18 0404    O2 Device  None (Room air) 09/11/18 1137   Compliance w/ventilator (intubated patients)  0 08/21/18 0404    FiO2 (%)  3 % 08/22/18 0046   Vocalization (extubated patients)  Intubated 08/21/18 0404    Oxygen Delivery  2 LPM 09/08/18 1225   Muscle Tension  0 08/21/18 0404    Suction Occurrance  1 09/05/18 0846   Total  0 08/21/18 0404    RESPIRATORY MONITORING     ANALGESIA SIDE EFFECTS MONITORING      Respiratory Monitoring (EtCO2)  -- (D/C no orders) 08/25/18 0237   Side Effects Monitoring: Respiratory Quality  R 09/11/18 1203    Integrated Pulmonary Index (IPI)  5-6 08/24/18 2035   Side Effects Monitoring: Respiratory Depth  N 09/11/18 1203    PACEMAKER     Side Effects Monitoring: Sedation Level  1 09/11/18 1203    Pacemaker  Temporary 08/26/18 1856   HEIGHT AND WEIGHT      Pacemaker Type  Epicardial 08/26/18 1856   Height  -- (175cm) 08/31/18 1414    Atrial Output (milliamps)  10 milliamps 08/25/18 2129    Height Method  Stated 08/06/18 0220    Atrial Sensitivity (mV)  .5 08/24/18 1100   Weight  72.6 kg (160 lb) 09/11/18 0323    AV Interval (mSec)  200 08/24/18 1100   Weight Method  Standing scale 09/11/18 0323    Ventricular Output (milliamps)  10 milliamps 08/25/18 0300   Bed Scale  Other, please comment (pillow, green sheet, chux ) 08/15/18 0310    Ventricular Sensitivity (mV)  2 08/24/18 1100   BSA (Calculated - sq m)  1.94 08/06/18 0221    Pacemaker Set Rate (beats/min)  50 BPM 08/26/18 1605   BMI (Calculated)  25.22 08/06/18 0221    Pacer Mode  Off 08/26/18 1856   POSITIONING      Function  Sensing 08/24/18 1250   Body Position  independently positioning;supine, head elevated 09/11/18 1154    Paced Amount  Rarely paced (<10%) 09/10/18 0807   Head of Bed (HOB)  HOB at 30-45 degrees 09/11/18 1154    Standby Status  Other (Comments) (PW dc'd) 08/27/18 1719   Positioning/Transfer Devices  pillows 09/11/18 0323    Battery Check  Done 08/26/18 1605   Chair  Upright in chair 09/11/18 0854    Battery Changed  Done 08/22/18 0055   ECG      Site Assessment  WDL 09/08/18 1002   ECG Rhythm  Sinus rhythm 09/11/18 1154    Dressing Status  Normal: Clean, Dry & Intact 08/27/18 2114   Ectopy  None 09/11/18 1154    Dressing Change Due  08/28/18 08/27/18 2114   Ectopy Frequency  Rare 09/09/18 1047    PAIN/COMFORT     Rhythm Comment  ST Segment Normal 08/30/18 1047    Patient Currently in Pain  denies 09/11/18 1203   Lead Monitored  Lead II 09/09/18 1047    Preferred Pain Scale  CAPA (Clinically Aligned Pain Assessment) ([facility], [facility] and [facility] Adults Only) 09/11/18 1203   Equipment  electrodes changed 09/09/18 1047    Patient's Stated Pain Goal  No pain 09/09/18 1047   DRUG CALCULATION WEIGHT      0-10 Pain Scale  0 09/09/18 1047   Drug Calculation Weight  77.1 kg (169 lb 15.6 oz) 08/06/18 0149    Pain Location  Incision 09/09/18 0637   DAILY CARE      Pain Orientation  Right 09/09/18 0637   Activity Management  activity  encouraged 09/11/18 1154    Pain Descriptors  Aching 09/09/18 0637   Activity Assistance Provided  assistance, stand-by 09/11/18 1154    Pain Management Interventions  breathing exercises;analgesia administered 09/08/18 1339   Symptoms Noted During/After Activity  shortness of breath 09/11/18 1154    Pain Intervention(s)  Medication (See eMAR) 09/09/18 0637   Additional Documentation  Symptoms Noted After/During Activity (Row) 09/08/18 1048    Response to Interventions  Decrease in pain 09/08/18 2147   POINT OF CARE TESTING      Additional Documentation  CAPA (Group) 09/08/18 1339   Puncture Site  fingertip 08/29/18 2043    CLINICALLY ALIGNED PAIN ASSESSMENT (CAPA) ([facility], [facility] AND [facility] ADULTS ONLY)     Bedside Glucose (mg/dl )   107 mg/dl 08/29/18 2043            Patient Lines/Drains/Airways Status    Active LINES/DRAINS/AIRWAYS     Name: Placement date: Placement time: Site: Days: Last dressing change:    Chest Tube 1 Anterior Fourth intercostal space 8 Azeri Flexima J tip [id number] [id number] exp:02/2021 09/08/18   1101   Fourth intercostal space   3     Pressure Injury 09/01/18 Coccyx Reddened, Blanchable NA 09/01/18   1918    9     Incision/Surgical Site 08/10/18 Bilateral Chest 08/10/18   1802    31             Patient Lines/Drains/Airways Status    Active PICC/CVC     Name: Placement date: Placement time: Site: Days: Additional Info Last dressing change:    PICC Double Lumen 08/22/18 Right Basilic 08/22/18   1141   Basilic   20 External Cath Length (cm): 1 cm   09/05/18 1400 (145.35 hrs)         Size (Fr): 5 Fr            Orientation: Right            Extremity Circumference (cm): 27 cm            Catheter Brand: BIoFlo            Dressing Intervention: Chlorhexidine patch;Transparent;Securing device            Description: Power PICC;Non - valved (open ended)            Total Catheter Length (cm) Trimmed: 42 cm            Site Prep: Chlorhexidine            Inserted by: Gilmar Hong RN             Insertion attempts with ultrasound: 1            Patient Tolerance: Tolerated well            Placement Verification: Xray;Blood Return            Difficulty with threading line: No            Tip location: SVC            Full barrier precautions done: Yes            Consent Signed: Yes            Time Out performed: Yes            Lot #: [id number]            Use for : Access. RN notified PICC was ready to use.               Intake/Output Detail Report     Date Intake                   Output         Net    Shift P.O. I.V. Other NG/GT IV Piggyback Colloid Enteral Red Blood Cells Plasma Blood Components Total Urine Emesis/NG output Drains Stool Chest Tube Total       Anabella 09/10/18 0700 - 09/10/18 1459 480 70 -- -- -- -- -- -- -- -- 550 -- -- -- -- -- -- 550    Noc 09/10/18 1500 - 09/10/18 2359 -- 100 -- -- -- -- -- -- -- -- 100 100 -- -- -- -- 100 0    Day 09/11/18 0000 - 09/11/18 0659 -- 20 -- -- -- -- -- -- -- -- 20 500 -- -- -- -- 500 -480    Anabella 09/11/18 0700 - 09/11/18 1459 360 150 -- -- -- -- -- -- -- -- 510 325 -- -- -- -- 325 185    Noc 09/11/18 1500 - 09/11/18 2359 -- -- -- -- -- -- -- -- -- -- -- -- -- -- -- -- -- 0      Last Void/BM       Most Recent Value    Urine Occurrence 1 [into toilet, flushed] at 09/11/2018 1000    Stool Occurrence 1 [into toilet, flushed] at 09/11/2018 1000      Case Management/Discharge Planning     Case Management/Discharge Planning Flowsheet     LIVING ENVIRONMENT     [facility] CAREGIVER      Lives With  spouse 08/19/18 1127   Filed Complexity Screen Score  9 08/17/18 1558    Living Arrangements  [place] 08/19/18 1127   ABUSE RISK SCREEN      COPING/STRESS     QUESTION TO PATIENT:  Has a member of your family or a partner(now or in the past) intimidated, hurt, manipulated, or controlled you in any way?  patient declined to answer or is unable to answer 08/15/18 0657    Major Change/Loss/Stressor  other (see comments) (fall, broken aaron) 08/08/18 0312   QUESTION TO PATIENT: Do you feel  safe going back to the place where you are living?  patient declined to answer or is unable to answer 08/15/18 0633    DISCHARGE PLANNING     OBSERVATION: Is there reason to believe there has been maltreatment of a vulnerable adult (ie. Physical/Sexual/Emotional abuse, self neglect, lack of adequate food, shelter, medical care, or financial exploitation)?  no 08/15/18 0633    Transportation Available  car 08/08/18 0936   OTHER      FINAL RESOURCES     Are you depressed or being treated for depression?  No 08/08/18 1701    Equipment Currently Used at Home  walker, standard (does not use currently) 08/08/18 4582

## 2018-08-06 NOTE — PROCEDURES
Procedure: Madison Owen catheter placement  Consent obtained from the family. A double lumen placed sheath placed under US guidance in Right internal jugular vein using standard sterile technique. Madison Owen catheter floated in under fluoro. No complications.      CVP 13  PA 40/25  PCW 14  MAP 70 (per IABP 1:1)  CVP ScVO2 54% using this CI 1.5 SVR 1500 (on Norepi 0.14)  PA ScVO2 90%      Richard Horton  General Cardiology Fellow, PGY6  Pager [phone]

## 2018-08-06 NOTE — IP AVS SNAPSHOT
MRN:[id number]                      After Visit Summary   8/6/2018    Castillo De Anda    MRN: [id number]           Thank you!     Thank you for choosing [facility] for your care. Our goal is always to provide you with excellent care. Hearing back from our patients is one way we can continue to improve our services. Please take a few minutes to complete the written survey that you may receive in the mail after you visit with us. Thank you!        Patient Information     Date Of Birth          1950        Designated Caregiver       Most Recent Value    Caregiver    Will someone help with your care after discharge? yes    Name of designated caregiver Radha De Anda    Phone number of caregiver [phone]    Caregiver address [address] [place] [address], [address]      About your hospital stay     You were admitted on:  August 6, 2018 You last received care in the:  [address] [facility]    You were discharged on:  September 11, 2018        Reason for your hospital stay       S/p VSD repair and TVR by Dr. Franco on 8/10/18                  Who to Call     For medical emergencies, please call 911.  For non-urgent questions about your medical care, please call your primary care provider or clinic, [phone]  For questions related to your surgery, please call your surgery clinic        Attending Provider     Provider Specialty    Camilo Hutchison MD Cardiology    Salvador, Jason Sheridan MD Cardiology       Primary Care Provider Office Phone # Fax #    Vinod Frank [phone]      After Care Instructions     Activity - Up with nursing assistance           Additional Discharge Instructions       Monitor weight, if 2 pound weight gain in 24 hours or 5 pounds in a week, notify a provider. Monitor platelets, send lab draws to patient's home hematologist.            Advance Diet as Tolerated       Follow this diet upon discharge: Orders Placed This Encounter      Calorie  Counts      Snacks/Supplements Adult: Other; If pt asks for a snack/supplement, please send.; With Meals      Snacks/Supplements Adult: Boost Plus; Between Meals      Snacks/Supplements Adult: Boost Breeze; Between Meals      Regular Diet Adult Thin Liquids (water, ice chips, juice, milk, gelatin, ice cream, etc)            Cardiac sternal precautions           General info for SNF       Length of Stay Estimate: Short Term Care: Estimated # of Days <30  Condition at Discharge: Stable  Level of care:skilled   Rehabilitation Potential: Good  Admission H&P remains valid and up-to-date: Yes  Recent Chemotherapy: Date:  On oral hydroxyurea 9/11/18                    Use Nursing Home Standing Orders: Yes            Mantoux instructions       Give two-step Mantoux (PPD) Per Facility Policy Yes            Wound care (specify)       Site:   Sternal wound   Instructions:  Wash daily in shower with soap and water, pat dry, leave open to air if not draining.                  Follow-up Appointments     Follow Up and recommended labs and tests       Follow up with Nursing home physician as necessary. INR, BMP, and CBC should be drawn 2x weekly starting 9/13/18. INR goal 2-3 for a-fib and TVR.   Follow up with primary care provider in 1-2 weeks to reestablish care and wound check.   Follow up with CV surgery September 18 at 1:30pm for post op appointment.   Follow up with a cardiologist in 4-6 weeks. Family physician to arrange.   Follow up with hematology as scheduled below on 9/18 at 2:30pm. Send lab results to hematology office at [facility].                  Your next 10 appointments already scheduled     Sep 18, 2018  1:30 PM CDT   (Arrive by 1:15 PM)   Return Visit with  CVTS   [facility] ([facility])    [address] [place] [address]  [address] [place] [address] [phone]            Sep 18, 2018  2:00 PM CDT   [facility] with  [facility]   [facility] ([facility])    [address]  [address]  [facility] [address]   [phone]            Sep 18, 2018  2:30 PM CDT   (Arrive by 2:15 PM)   New Patient Visit with Leny Oconnell MD   [facility] ([facility])    [address]  [address]  [facility] [address]   [phone]              Additional Services     Occupational Therapy Adult Consult       Evaluate and treat as clinically indicated.    Reason:  S/p VSD repair and TVR            Physical Therapy Adult Consult       Evaluate and treat as clinically indicated.    Reason:  S/p VSD repair and TVR                  Future tests that were ordered for you     AntiEmbolism Stockings       Bilateral below knee length.On in the morning, off at night                  Further instructions from your care team       [facility]      AFTER YOU GO HOME FROM YOUR HEART SURGERY    You had a sternotomy, avoid lifting anything greater than ten pounds for 6 weeks after surgery and then less than 20 pounds for an additional 6 weeks.    No driving for 4 weeks after surgery or while on pain medication.    Avoid strenuous activities such as bowling, vacuuming, raking, shoveling, golf or tennis for 12 weeks after your surgery. It is okay to resume sex if you feel comfortable in doing so. You may have to try different positions with your partner.     Splint your chest incision by hugging a pillow or bringing your arms across your chest when coughing or sneezing. Avoid pushing off with your arms when getting up for the first month if you have had your sternum opened.    Shower or wash your incisions daily with soap and water (or as instructed), pat dry. Keep wound clean and dry, showers are okay after discharge, but don't let spray hit directly on incision. No baths or swimming for 1 month. Cover chest tube sites with gauze until they stop draining, then leave open to air. It  is not abnormal for chest tube sites to drain yellowish/clear fluid for up to 2-3 weeks after surgery.   Watch for signs of infection: increased redness, tenderness, warmth or any drainage that appears infected (pus like) or is persistent.  Also a temperature > 100.5 F or chills. Call your surgeon or primary care provider's office immediately. Remove any skin glue left on incisions after 10-14 days. This will not affect your incision and can speed up healing.    Exercise is very important in your recovery. Please follow the guidelines set up for you in your cardiac rehab classes at the hospital. If outpatient cardiac rehab was ordered for you, we highly recommend you participate. If you have problems arranging your cardiac rehab, please call [phone].     Avoid sitting for prolonged periods of time, try to walk every hour during the day. If you have a leg incision, elevate your leg often when you are not walking.    Check your weight when you get home from the hospital and continue to check it daily through your recovery for at least a month. If you notice a weight gain of 2-3 pounds in a week, notify your primary care physician, cardiologist or surgeon.    Bowel activity may be slow after surgery. If necessary, you may take an over the counter laxative such as Milk of Magnesia or Miralax. You may have stool softeners prescribed (docusate sodium, Senokot). We recommend using stool softeners while using narcotics for pain (oxycodone/percocet, hydrocodone/vicodin, hydromorphone/dilaudid).      Wean OFF of narcotics (oxycodone, dilaudid, hydrocodone) as soon as possible. You should continue taking acetaminophen as long as you have any surgical pain as the first choice for pain control and add narcotics as necessary for pain to be tolerable.      DENTAL VISITS AFTER SURGERY  You have had your heart valve repaired or replaced, we do not recommend having any dental work done for 6 months and you will need to take an  "antibiotic prior to dental visits from now on.  Please notify your dentist before any procedure for the proper treatment needed. The antibiotic is taken by mouth one hour prior to visit. This includes routine cleanings.  You can sometimes hear a mechanical valve \"clicking,\" this is normal and not a sign of something wrong.    DO NOT SMOKE.  IF YOU NEED HELP QUITTING, PLEASE TALK WITH YOUR CARDIOLOGIST OR PRIMARY DOCTOR.    You are on a blood thinner, follow the instructions you were given in the hospital and DO NOT SKIP this medication. Try and take it the same time everyday. Your primary care physician or coumadin clinic will manage the dosing. INR goal is 2-3.      REGARDING PRESCRIPTION REFILLS.  If you need a refill on your pain medication contact us to discuss your pain and a possible one time refill.   All other medications will be adjusted, discontinued and re-filled by your primary care physician and/or your cardiologist as they were prior to your surgery. We have given you enough for one to three month with possibly one refill.    POST-OPERATIVE CLINIC VISITS  You have a follow up visit with CVTS Surgery Advance Care Practitioners on 9/18/18 at 1:30 pm at the [facility].  You will then return to the care of your primary physician and your cardiologist.   It is important to see your cardiologist about 4-6 weeks after discharge and your primary care physician in 2-4 weeks after discharge.   If there is a need to return to see CT Surgery please call our  at [phone].    SURGICAL QUESTIONS  Please call Stacy Valero or Maria M Saldivar with surgical recovery and medication questions, the phone number is listed below.  They can assist you with your needs and contact other surgery care team members as indicated.    On weekends or after hours, please call [phone] and ask the  to   page the Cardiothoracic Surgery fellow on call.      Thank you,    Your Cardiothoracic " "Surgery Team  Stacy Valero RN Care Coordinator-  [phone]   Maria M Saldivar RN Care Coordinator-  [phone]  Janice Padron NP-C           Patient will need outpatient INR and Warfarin monitoring arranged prior to discharge from rehab facility: for Atrial Fibrillation, goal=2-3.         Warfarin Instruction     You have started taking a medicine called warfarin. This is a blood-thinning medicine (anticoagulant). It helps prevent and treat blood clots.      Before leaving the hospital, make sure you know how much to take and how long to take it.      You will need regular blood tests to make sure your blood is clotting safely. It is very important to see your doctor for regular blood tests.    Talk to your doctor before taking any new medicine (this includes over-the-counter drugs and herbal products). Many medicines can interact with warfarin. This may cause more bleeding or too much clotting.     Eating a lot of vitamin K--found in green, leafy vegetables--can change the way warfarin works in your body. Do NOT avoid these foods. Instead, try to eat the same amount each day.     Bleeding is the most common side-effect of warfarin. You may notice bleeding gums, a bloody nose, bruises and bleeding longer when you cut yourself. See a doctor at once if:   o You cough up blood  o You find blood in your stool (poop)  o You have a deep cut, or a cut that bleeds longer than 10 minutes   o You have a bad cut, hard fall, accident or hit your head (go to urgent care or the emergency room).    For women who can get pregnant: This medicine can harm an unborn baby. Be very careful not to get pregnant while taking this medicine. If you think you might be pregnant, call your doctor right away.    For more information, read \"Guide to Warfarin Therapy,  the booklet you received in the hospital.        Pending Results     Date and Time Order Name " "Status Description    2018 0953 IR Chest Tube Place Non Tunneled Right Preliminary     2018 1322 Platelets prepare order unit In process     2018 1322 Plasma prepare order unit In process     8/10/2018 2124 Cryoprecipitate prepare order unit In process             Statement of Approval     Ordered          18 1256  I have reviewed and agree with all the recommendations and orders detailed in this document.  EFFECTIVE NOW     Approved and electronically signed by:  Janice Disla PA-C             Admission Information     Date & Time Provider Department Dept. Phone    2018 Jason Franco MD [address] [facility] [phone]      Your Vitals Were     Blood Pressure Pulse Temperature Respirations Height Weight    114/80 (BP Location: Left arm) 89 97.8  F (36.6  C) (Oral) 18 1.753 m (5' 9\") 72.6 kg (160 lb)    Pulse Oximetry BMI (Body Mass Index)                100% 23.63 kg/m2          "[employer]" Information     "[employer]" lets you send messages to your doctor, view your test results, renew your prescriptions, schedule appointments and more. To sign up, go to [email or web address]" . Click on \"Log in\" on the left side of the screen, which will take you to the Welcome page. Then click on \"Sign up Now\" on the right side of the page.     You will be asked to enter the access code listed below, as well as some personal information. Please follow the directions to create your username and password.     Your access code is: [email or web address]  Expires: 2018  4:09 PM     Your access code will  in 90 days. If you need help or a new code, please call your [facility] clinic or [phone].        Care EveryWhere ID     This is your Care EveryWhere ID. This could be used by other organizations to access your [facility] medical records  [id number]        Equal Access to Services     MORGAN SANCHEZ AH: Jaden Leong, dinora anand, giana nicholson " harrison gamezstarrlizbeth naylor'aan ah. So [facility] [phone].    ATENCIÓN: Si shayyla radhames, tiene a durbin disposición servicios gratuitos de asistencia lingüística. Cyrus al [phone].    We comply with applicable federal civil rights laws and [place] laws. We do not discriminate on the basis of race, color, national origin, age, disability, sex, sexual orientation, or gender identity.               Review of your medicines      START taking        Dose / Directions    acetaminophen 325 MG tablet   Commonly known as:  TYLENOL   Used for:  S/P VSD repair        Dose:  650 mg   2 tablets (650 mg) by Oral or Feeding Tube route every 4 hours as needed for other (multimodal surgical pain management along with NSAIDS and opioid medication as indicated based on pain control and physical function.)   Quantity:  100 tablet   Refills:  0       amiodarone 200 MG tablet   Commonly known as:  PACERONE/CODARONE   Used for:  Paroxysmal atrial fibrillation (H)        Dose:  200 mg   Start taking on:  9/22/2018   Take 1 tablet (200 mg) by mouth daily   Quantity:  60 tablet   Refills:  0       atorvastatin 80 MG tablet   Commonly known as:  LIPITOR   Used for:  S/P VSD repair        Dose:  80 mg   1 tablet (80 mg) by Oral or Feeding Tube route every evening   Quantity:  30 tablet   Refills:  0       Carboxymethylcellulose Sod PF 0.5 % Soln ophthalmic solution   Commonly known as:  REFRESH PLUS   Used for:  S/P VSD repair        Dose:  1 drop   Place 1 drop into both eyes 3 times daily as needed for dry eyes   Quantity:  1 Bottle   Refills:  0       clopidogrel 75 MG tablet   Commonly known as:  PLAVIX   Used for:  S/P VSD repair        Dose:  75 mg   Start taking on:  9/12/2018   Take 1 tablet (75 mg) by mouth daily   Quantity:  30 tablet   Refills:  0       furosemide 40 MG tablet   Commonly known as:  LASIX   Used for:  S/P VSD repair        Dose:  40 mg   Start taking on:  9/12/2018   Take 1 tablet (40 mg) by mouth daily   Quantity:  30 tablet    Refills:  0       * hydroxyurea 500 MG capsule CHEMO   Commonly known as:  HYDREA   Indication:  Essential Thrombocythemia   Used for:  Polycythemia vera (H)        Dose:  1000 mg   Take 2 capsules (1,000 mg) by mouth every evening   Refills:  0       * hydroxyurea 500 MG capsule CHEMO   Commonly known as:  HYDREA   Indication:  Essential Thrombocythemia   Used for:  S/P VSD repair        Dose:  1500 mg   Start taking on:  9/12/2018   Take 3 capsules (1,500 mg) by mouth daily   Refills:  0       levothyroxine 75 MCG tablet   Commonly known as:  SYNTHROID/LEVOTHROID   Used for:  S/P VSD repair        Dose:  75 mcg   Start taking on:  9/12/2018   1 tablet (75 mcg) by Oral or Feeding Tube route daily   Quantity:  30 tablet   Refills:  0       melatonin 1 MG Tabs tablet   Used for:  S/P VSD repair        Dose:  1 mg   Take 1 tablet (1 mg) by mouth nightly as needed for sleep   Refills:  0       methocarbamol 500 MG tablet   Commonly known as:  ROBAXIN   Used for:  S/P VSD repair        Dose:  500 mg   1 tablet (500 mg) by Oral or Feeding Tube route 4 times daily as needed for muscle spasms   Quantity:  120 tablet   Refills:  0       metoprolol tartrate 25 MG tablet   Commonly known as:  LOPRESSOR   Used for:  S/P VSD repair        Dose:  12.5 mg   Take 0.5 tablets (12.5 mg) by mouth 2 times daily   Quantity:  60 tablet   Refills:  0       multivitamin, therapeutic with minerals Tabs tablet   Used for:  S/P VSD repair        Dose:  1 tablet   Start taking on:  9/12/2018   Take 1 tablet by mouth daily   Quantity:  30 each   Refills:  0       pantoprazole 40 MG EC tablet   Commonly known as:  PROTONIX   Used for:  S/P VSD repair        Dose:  40 mg   Take 1 tablet (40 mg) by mouth 2 times daily (before meals)   Quantity:  30 tablet   Refills:  0       potassium chloride SA 20 MEQ CR tablet   Commonly known as:  K-DUR/KLOR-CON M   Used for:  S/P VSD repair        Dose:  20 mEq   Start taking on:  9/12/2018   Take 1 tablet  (20 mEq) by mouth daily   Quantity:  90 tablet   Refills:  0       senna-docusate 8.6-50 MG per tablet   Commonly known as:  SENOKOT-S;PERICOLACE   Used for:  S/P VSD repair        Dose:  1 tablet   1 tablet by Oral or Feeding Tube route 2 times daily   Quantity:  100 tablet   Refills:  0       umeclidinium-vilanterol 62.5-25 MCG/INH oral inhaler   Commonly known as:  ANORO ELLIPTA   Used for:  S/P VSD repair        Dose:  1 puff   Start taking on:  9/12/2018   Inhale 1 puff into the lungs daily   Refills:  0       warfarin 5 MG tablet   Commonly known as:  COUMADIN   Used for:  S/P VSD repair        Dose:  5 mg   Take 1 tablet (5 mg) by mouth daily   Quantity:  30 tablet   Refills:  0       * Notice:  This list has 2 medication(s) that are the same as other medications prescribed for you. Read the directions carefully, and ask your doctor or other care provider to review them with you.         Where to get your medicines      Some of these will need a paper prescription and others can be bought over the counter. Ask your nurse if you have questions.     You don't need a prescription for these medications     acetaminophen 325 MG tablet    amiodarone 200 MG tablet    atorvastatin 80 MG tablet    Carboxymethylcellulose Sod PF 0.5 % Soln ophthalmic solution    clopidogrel 75 MG tablet    furosemide 40 MG tablet    hydroxyurea 500 MG capsule CHEMO    hydroxyurea 500 MG capsule CHEMO    levothyroxine 75 MCG tablet    melatonin 1 MG Tabs tablet    methocarbamol 500 MG tablet    metoprolol tartrate 25 MG tablet    multivitamin, therapeutic with minerals Tabs tablet    pantoprazole 40 MG EC tablet    potassium chloride SA 20 MEQ CR tablet    senna-docusate 8.6-50 MG per tablet    umeclidinium-vilanterol 62.5-25 MCG/INH oral inhaler    warfarin 5 MG tablet                Protect others around you: Learn how to safely use, store and throw away your medicines at www.disposemymeds.org.             Medication List: This is a  list of all your medications and when to take them. Check marks below indicate your daily home schedule. Keep this list as a reference.      Medications           Morning Afternoon Evening Bedtime As Needed    acetaminophen 325 MG tablet   Commonly known as:  TYLENOL   2 tablets (650 mg) by Oral or Feeding Tube route every 4 hours as needed for other (multimodal surgical pain management along with NSAIDS and opioid medication as indicated based on pain control and physical function.)   Last time this was given:  650 mg on 9/9/2018  6:36 AM                                   amiodarone 200 MG tablet   Commonly known as:  PACERONE/CODARONE   Take 1 tablet (200 mg) by mouth daily   Start taking on:  9/22/2018   Last time this was given:  200 mg on 9/11/2018  9:11 AM                                   atorvastatin 80 MG tablet   Commonly known as:  LIPITOR   1 tablet (80 mg) by Oral or Feeding Tube route every evening   Last time this was given:  80 mg on 9/10/2018  8:51 PM                                   Carboxymethylcellulose Sod PF 0.5 % Soln ophthalmic solution   Commonly known as:  REFRESH PLUS   Place 1 drop into both eyes 3 times daily as needed for dry eyes   Last time this was given:  1 drop on 8/27/2018  9:27 AM                                   clopidogrel 75 MG tablet   Commonly known as:  PLAVIX   Take 1 tablet (75 mg) by mouth daily   Start taking on:  9/12/2018   Last time this was given:  75 mg on 9/11/2018  9:10 AM                                   furosemide 40 MG tablet   Commonly known as:  LASIX   Take 1 tablet (40 mg) by mouth daily   Start taking on:  9/12/2018   Last time this was given:  20 mg on 9/11/2018  9:15 AM                                   * hydroxyurea 500 MG capsule CHEMO   Commonly known as:  HYDREA   Take 2 capsules (1,000 mg) by mouth every evening   Last time this was given:  1,500 mg on 9/11/2018  9:10 AM                    1000 mg               * hydroxyurea 500 MG capsule CHEMO    Commonly known as:  HYDREA   Take 3 capsules (1,500 mg) by mouth daily   Start taking on:  9/12/2018   Last time this was given:  1,500 mg on 9/11/2018  9:10 AM            1500 mg                       levothyroxine 75 MCG tablet   Commonly known as:  SYNTHROID/LEVOTHROID   1 tablet (75 mcg) by Oral or Feeding Tube route daily   Start taking on:  9/12/2018   Last time this was given:  75 mcg on 9/11/2018  9:10 AM                                   melatonin 1 MG Tabs tablet   Take 1 tablet (1 mg) by mouth nightly as needed for sleep                        As needed           methocarbamol 500 MG tablet   Commonly known as:  ROBAXIN   1 tablet (500 mg) by Oral or Feeding Tube route 4 times daily as needed for muscle spasms                                   metoprolol tartrate 25 MG tablet   Commonly known as:  LOPRESSOR   Take 0.5 tablets (12.5 mg) by mouth 2 times daily   Last time this was given:  12.5 mg on 9/11/2018  9:10 AM                                   multivitamin, therapeutic with minerals Tabs tablet   Take 1 tablet by mouth daily   Start taking on:  9/12/2018   Last time this was given:  1 tablet on 9/11/2018  9:10 AM                                   pantoprazole 40 MG EC tablet   Commonly known as:  PROTONIX   Take 1 tablet (40 mg) by mouth 2 times daily (before meals)   Last time this was given:  40 mg on 9/11/2018  3:36 PM                                      potassium chloride SA 20 MEQ CR tablet   Commonly known as:  K-DUR/KLOR-CON M   Take 1 tablet (20 mEq) by mouth daily   Start taking on:  9/12/2018   Last time this was given:  10 mEq on 9/11/2018  9:10 AM                                   senna-docusate 8.6-50 MG per tablet   Commonly known as:  SENOKOT-S;PERICOLACE   1 tablet by Oral or Feeding Tube route 2 times daily   Last time this was given:  1 tablet on 9/10/2018  8:52 PM            As needed           As needed               umeclidinium-vilanterol 62.5-25 MCG/INH oral inhaler    Commonly known as:  ANORO ELLIPTA   Inhale 1 puff into the lungs daily   Start taking on:  9/12/2018   Last time this was given:  1 puff on 9/11/2018  9:10 AM                                   warfarin 5 MG tablet   Commonly known as:  COUMADIN   Take 1 tablet (5 mg) by mouth daily   Last time this was given:  5 mg on 9/11/2018  3:36 PM                                   * Notice:  This list has 2 medication(s) that are the same as other medications prescribed for you. Read the directions carefully, and ask your doctor or other care provider to review them with you.

## 2018-08-06 NOTE — IP AVS SNAPSHOT
[address] [address]    Phone:  [phone]                                       After Visit Summary   8/6/2018    Castillo De Anda    MRN: [id number]           After Visit Summary Signature Page     I have received my discharge instructions, and my questions have been answered. I have discussed any challenges I see with this plan with the nurse or doctor.    ..........................................................................................................................................  Patient/Patient Representative Signature      ..........................................................................................................................................  Patient Representative Print Name and Relationship to Patient    ..................................................               ................................................  Date                                   Time    ..........................................................................................................................................  Reviewed by Signature/Title    ...................................................              ..............................................  Date                                               Time          22EPIC Rev 08/18

## 2018-08-06 NOTE — PLAN OF CARE
Problem: Patient Care Overview  Goal: Plan of Care/Patient Progress Review  Outcome: No Change  Patient remained stable throughout day. NSR/ST 90s-100s with occasional PACs. MAP goal >65. IABP 1:1 in right groin. CVP 6-8, PAP 30s/10s, SVO2 53-58, CO 3.2-3.7, CI 1.6-1.9, SVR 9406-1071. Afebrile. 4L NC. Lung sounds clear/diminished. Patient is A&Ox4- forgetful/anxious at times. Moving all extremities. Doppler pulses. Currently on Dobutamine and Cangrelor drips. Herring intact with minimal output- Lasix 20mg IV given x1. No bm. Decreased appetite. Minimal pain/discomfort- prn Ativan and Dilaudid given. Family at bedside and updated multiple times throughout day. Will continue to monitor and update MDs with any changes. Plan for possible VSD closure in OR tomorrow.

## 2018-08-06 NOTE — LETTER
Transition Communication Hand-off for Care Transitions to Next Level of Care Provider    Name: Castillo De Anda  : 1950  MRN #: [id number]  Primary Care Provider: No primary care provider on file.     Primary Clinic: No primary provider on file.     Reason for Hospitalization:  Ventricular septal defect, post-infarction [I51.0]  Admit Date/Time: 2018  1:44 AM  Discharge Date: 18  Payor Source: Payor: MEDICARE / Plan: MEDICARE / Product Type: Medicare /     Readmission Assessment Measure (MARGARET) Risk Score/category: None    Reason for Communication Hand-off Referral: Fragility    Discharge Plan:  [facility]  PH: [phone]  F: [phone]     Concern for non-adherence with plan of care:   Y/N N  Discharge Needs Assessment:  Needs       Most Recent Value    Equipment Currently Used at Home walker, standard [does not use currently]    Transportation Available car          Already enrolled in Tele-monitoring program and name of program:  Unknown  Follow-up specialty is recommended: Per AVS    Follow-up plan:  Future Appointments  Date Time Provider Department Center   2018 1:30 PM  CVTS [facility]   2018 2:00 PM  [facility]   2018 2:30 PM Leny Oconnell MD [facility]       Any outstanding tests or procedures:              Key Recommendations:      CAROL ANN Almanza, APSW  [address]   Phone: [phone]  Pager: [phone]  Unit: [phone]

## 2018-08-06 NOTE — H&P
Critical Care    Critical Care ICU Note - Cardiology  Camilo Hutchison M.D.    Impression:    Cardiogenic shock  Acute and chronic congestive heart failure  Acute respiratory failure  Acute renal failure    The patient remains unstable in the ICU with on-going need for t, parenteral medications for the adjustment of blood pressure and cardiac output and maintenance of renal function.      The patient is seen foshock requiring vasopressor and or inotropic agents; low cardiac output necessitating  inotropic agents, vasopressors and afterload reducing agents; limited mechanical support requiring IABP;   I personally reviewed:    Arterial and venous blood gases to assess acid base balance, oxygenation, and ventilator settings.      Hemodynamic parameters obtained by central hemodynamic monitoring including RAP, estimated LVEDP, pulmonary artery pressure, cardiac output and vascular resistances in order to adjust fluids and infused medications for blood pressure and cardiac output maintenance.    Ventilatory settings and/or supplement oxygen needs in order to obtain optimal oxygenation and electrolyte balance at low possible pressure support and inspired oxygen tension  Volume status, renal function and nutritional support as judged by intake, output, daily weight and appropriate laboratories.    I reviewed individual and serial imaging studies including echocardiogram, chest x-ray, CT scan    I personally supervised the prescription of parenteral fluids, inotropes, vasodilators , and vasopressors in order to correct cardiac output, maintain urine output and renal function.    I personally met with patient or family to discuss current and future diagnostic and therapeutic strategies    90 minutes

## 2018-08-06 NOTE — IP AVS SNAPSHOT
"    [address] [facility]: [phone]                                              INTERAGENCY TRANSFER FORM - PHYSICIAN ORDERS   2018                    Hospital Admission Date: 2018  JOSE SOLER   : 1950  Sex: Male        Attending Provider: Jason Franco MD     Allergies:  Anagrelide    Infection:  None   Service:  CARDIOTHORAC    Ht:  1.753 m (5' 9\")   Wt:  72.6 kg (160 lb)   Admission Wt:  77.3 kg (170 lb 6.7 oz)    BMI:  23.63 kg/m 2   BSA:  1.88 m 2            Patient PCP Information     Provider PCP Type    RTAN ARRIOLA [facility]      ED Clinical Impression     Diagnosis Description Comment Added By Time Added    S/P VSD repair [Z98.890, Z87.74] S/P VSD repair [Z98.890, Z87.74]  Phyllis Padron APRN CNP 2018  4:07 PM    S/P TVR (tricuspid valve replacement) [Z95.2] S/P TVR (tricuspid valve replacement) [Z95.2]  Phyllis Padron APRN CNP 2018  4:07 PM    Acute post-operative pain [G89.18] Acute post-operative pain [G89.18]  Phyllis Padron APRN CNP 2018  4:08 PM    Paroxysmal atrial fibrillation (H) [I48.0] Paroxysmal atrial fibrillation (H) [I48.0]  Phyllis Padron APRN CNP 2018  4:08 PM    Acute bronchospasm [J98.01] Acute bronchospasm [J98.01]  Phyllis Padron APRN CNP 2018  4:09 PM    Coronary artery disease involving native heart without angina pectoris, unspecified vessel or lesion type [I25.10] Coronary artery disease involving native heart without angina pectoris, unspecified vessel or lesion type [I25.10]  Phyllis Padron APRN CNP 2018  4:10 PM    Polycythemia vera (H) [D45] Polycythemia vera (H) [D45]  Phyllis Padron APRN CNP 2018  4:10 PM    Dry eyes [H04.123] Dry eyes [H04.123]  Phyllis Padron APRN CNP 2018  4:16 PM    Back muscle spasm [M62.830] Back muscle spasm [M62.830]  Phyllis Padron, STEPHANIE CNP 2018  4:16 PM    Hypothyroidism, unspecified type [E03.9] Hypothyroidism, unspecified type [E03.9]  Phyllis Padron, STEPHANIE CNP " 9/5/2018  4:16 PM      Hospital Problems as of 9/11/2018              Priority Class Noted POA    Cardiogenic shock (H) Medium  8/6/2018 Yes      Non-Hospital Problems as of 9/11/2018              Priority Class Noted    Splenosis Medium  7/28/2015    Myeloproliferative disease (H) Medium  7/30/2018      Code Status History     Date Active Date Inactive Code Status Order ID Comments User Context    9/11/2018 12:49 PM  Full Code [id number]  Janice Disla PA-C Outpatient    8/10/2018 10:41 PM 9/11/2018 12:49 PM Full Code [id number]  Hiram Meyers MD Inpatient    8/6/2018  5:33 AM 8/10/2018 10:41 PM Full Code [id number]  Hermes Munroe MD Inpatient         Medication Review      START taking        Dose / Directions Comments    acetaminophen 325 MG tablet   Commonly known as:  TYLENOL   Used for:  S/P VSD repair        Dose:  650 mg   2 tablets (650 mg) by Oral or Feeding Tube route every 4 hours as needed for other (multimodal surgical pain management along with NSAIDS and opioid medication as indicated based on pain control and physical function.)   Quantity:  100 tablet   Refills:  0        amiodarone 200 MG tablet   Commonly known as:  PACERONE/CODARONE   Used for:  Paroxysmal atrial fibrillation (H)        Dose:  200 mg   Start taking on:  9/22/2018   Take 1 tablet (200 mg) by mouth daily   Quantity:  60 tablet   Refills:  0        atorvastatin 80 MG tablet   Commonly known as:  LIPITOR   Used for:  S/P VSD repair        Dose:  80 mg   1 tablet (80 mg) by Oral or Feeding Tube route every evening   Quantity:  30 tablet   Refills:  0        Carboxymethylcellulose Sod PF 0.5 % Soln ophthalmic solution   Commonly known as:  REFRESH PLUS   Used for:  S/P VSD repair        Dose:  1 drop   Place 1 drop into both eyes 3 times daily as needed for dry eyes   Quantity:  1 Bottle   Refills:  0        clopidogrel 75 MG tablet   Commonly known as:  PLAVIX   Used for:  S/P VSD repair        Dose:  75 mg   Start taking on:   9/12/2018   Take 1 tablet (75 mg) by mouth daily   Quantity:  30 tablet   Refills:  0        furosemide 40 MG tablet   Commonly known as:  LASIX   Used for:  S/P VSD repair        Dose:  40 mg   Start taking on:  9/12/2018   Take 1 tablet (40 mg) by mouth daily   Quantity:  30 tablet   Refills:  0        * hydroxyurea 500 MG capsule CHEMO   Commonly known as:  HYDREA   Indication:  Essential Thrombocythemia   Used for:  Polycythemia vera (H)        Dose:  1000 mg   Take 2 capsules (1,000 mg) by mouth every evening   Refills:  0        * hydroxyurea 500 MG capsule CHEMO   Commonly known as:  HYDREA   Indication:  Essential Thrombocythemia   Used for:  S/P VSD repair        Dose:  1500 mg   Start taking on:  9/12/2018   Take 3 capsules (1,500 mg) by mouth daily   Refills:  0        levothyroxine 75 MCG tablet   Commonly known as:  SYNTHROID/LEVOTHROID   Used for:  S/P VSD repair        Dose:  75 mcg   Start taking on:  9/12/2018   1 tablet (75 mcg) by Oral or Feeding Tube route daily   Quantity:  30 tablet   Refills:  0        melatonin 1 MG Tabs tablet   Used for:  S/P VSD repair        Dose:  1 mg   Take 1 tablet (1 mg) by mouth nightly as needed for sleep   Refills:  0        methocarbamol 500 MG tablet   Commonly known as:  ROBAXIN   Used for:  S/P VSD repair        Dose:  500 mg   1 tablet (500 mg) by Oral or Feeding Tube route 4 times daily as needed for muscle spasms   Quantity:  120 tablet   Refills:  0        metoprolol tartrate 25 MG tablet   Commonly known as:  LOPRESSOR   Used for:  S/P VSD repair        Dose:  12.5 mg   Take 0.5 tablets (12.5 mg) by mouth 2 times daily   Quantity:  60 tablet   Refills:  0        multivitamin, therapeutic with minerals Tabs tablet   Used for:  S/P VSD repair        Dose:  1 tablet   Start taking on:  9/12/2018   Take 1 tablet by mouth daily   Quantity:  30 each   Refills:  0        pantoprazole 40 MG EC tablet   Commonly known as:  PROTONIX   Used for:  S/P VSD repair         Dose:  40 mg   Take 1 tablet (40 mg) by mouth 2 times daily (before meals)   Quantity:  30 tablet   Refills:  0        potassium chloride SA 20 MEQ CR tablet   Commonly known as:  K-DUR/KLOR-CON M   Used for:  S/P VSD repair        Dose:  20 mEq   Start taking on:  9/12/2018   Take 1 tablet (20 mEq) by mouth daily   Quantity:  90 tablet   Refills:  0        senna-docusate 8.6-50 MG per tablet   Commonly known as:  SENOKOT-S;PERICOLACE   Used for:  S/P VSD repair        Dose:  1 tablet   1 tablet by Oral or Feeding Tube route 2 times daily   Quantity:  100 tablet   Refills:  0        umeclidinium-vilanterol 62.5-25 MCG/INH oral inhaler   Commonly known as:  ANORO ELLIPTA   Used for:  S/P VSD repair        Dose:  1 puff   Start taking on:  9/12/2018   Inhale 1 puff into the lungs daily   Refills:  0        warfarin 5 MG tablet   Commonly known as:  COUMADIN   Used for:  S/P VSD repair        Dose:  5 mg   Take 1 tablet (5 mg) by mouth daily   Quantity:  30 tablet   Refills:  0        * Notice:  This list has 2 medication(s) that are the same as other medications prescribed for you. Read the directions carefully, and ask your doctor or other care provider to review them with you.              Further instructions from your care team       [facility]      AFTER YOU GO HOME FROM YOUR HEART SURGERY    You had a sternotomy, avoid lifting anything greater than ten pounds for 6 weeks after surgery and then less than 20 pounds for an additional 6 weeks.    No driving for 4 weeks after surgery or while on pain medication.    Avoid strenuous activities such as bowling, vacuuming, raking, shoveling, golf or tennis for 12 weeks after your surgery. It is okay to resume sex if you feel comfortable in doing so. You may have to try different positions with your partner.     Splint your chest incision by hugging a pillow or bringing your arms across your chest when coughing or sneezing. Avoid pushing  off with your arms when getting up for the first month if you have had your sternum opened.    Shower or wash your incisions daily with soap and water (or as instructed), pat dry. Keep wound clean and dry, showers are okay after discharge, but don't let spray hit directly on incision. No baths or swimming for 1 month. Cover chest tube sites with gauze until they stop draining, then leave open to air. It is not abnormal for chest tube sites to drain yellowish/clear fluid for up to 2-3 weeks after surgery.   Watch for signs of infection: increased redness, tenderness, warmth or any drainage that appears infected (pus like) or is persistent.  Also a temperature > 100.5 F or chills. Call your surgeon or primary care provider's office immediately. Remove any skin glue left on incisions after 10-14 days. This will not affect your incision and can speed up healing.    Exercise is very important in your recovery. Please follow the guidelines set up for you in your cardiac rehab classes at the hospital. If outpatient cardiac rehab was ordered for you, we highly recommend you participate. If you have problems arranging your cardiac rehab, please call [phone].     Avoid sitting for prolonged periods of time, try to walk every hour during the day. If you have a leg incision, elevate your leg often when you are not walking.    Check your weight when you get home from the hospital and continue to check it daily through your recovery for at least a month. If you notice a weight gain of 2-3 pounds in a week, notify your primary care physician, cardiologist or surgeon.    Bowel activity may be slow after surgery. If necessary, you may take an over the counter laxative such as Milk of Magnesia or Miralax. You may have stool softeners prescribed (docusate sodium, Senokot). We recommend using stool softeners while using narcotics for pain (oxycodone/percocet, hydrocodone/vicodin, hydromorphone/dilaudid).      Wean OFF of narcotics  "(oxycodone, dilaudid, hydrocodone) as soon as possible. You should continue taking acetaminophen as long as you have any surgical pain as the first choice for pain control and add narcotics as necessary for pain to be tolerable.      DENTAL VISITS AFTER SURGERY  You have had your heart valve repaired or replaced, we do not recommend having any dental work done for 6 months and you will need to take an antibiotic prior to dental visits from now on.  Please notify your dentist before any procedure for the proper treatment needed. The antibiotic is taken by mouth one hour prior to visit. This includes routine cleanings.  You can sometimes hear a mechanical valve \"clicking,\" this is normal and not a sign of something wrong.    DO NOT SMOKE.  IF YOU NEED HELP QUITTING, PLEASE TALK WITH YOUR CARDIOLOGIST OR PRIMARY DOCTOR.    You are on a blood thinner, follow the instructions you were given in the hospital and DO NOT SKIP this medication. Try and take it the same time everyday. Your primary care physician or coumadin clinic will manage the dosing. INR goal is 2-3.      REGARDING PRESCRIPTION REFILLS.  If you need a refill on your pain medication contact us to discuss your pain and a possible one time refill.   All other medications will be adjusted, discontinued and re-filled by your primary care physician and/or your cardiologist as they were prior to your surgery. We have given you enough for one to three month with possibly one refill.    POST-OPERATIVE CLINIC VISITS  You have a follow up visit with CVTS Surgery Advance Care Practitioners on *** at the [facility].  You will then return to the care of your primary physician and your cardiologist.   It is important to see your cardiologist about 4-6 weeks after discharge and your primary care physician in 2-4 weeks after discharge.   If there is a need to return to see CT Surgery please call our  at [phone].    SURGICAL " QUESTIONS  Please call Stacy Valero or Maria M Saldivar with surgical recovery and medication questions, the phone number is listed below.  They can assist you with your needs and contact other surgery care team members as indicated.    On weekends or after hours, please call [phone] and ask the  to   page the Cardiothoracic Surgery fellow on call.      Thank you,    Your Cardiothoracic Surgery Team  Stacy Valero RN Care Coordinator-  [phone]   Maria M Saldivar RN Care Coordinator-  [phone]  Janice Pollack PACHRIS Padron NP-C           Patient will need outpatient INR and Warfarin monitoring arranged prior to discharge from rehab facility: for Atrial Fibrillation, goal=2-3.         Summary of Visit     Reason for your hospital stay       S/p VSD repair and TVR by Dr. Franco on 8/10/18             After Care     Activity - Up with nursing assistance           Additional Discharge Instructions       Monitor weight, if 2 pound weight gain in 24 hours or 5 pounds in a week, notify a provider. Monitor platelets, send lab draws to patient's home hematologist.       Advance Diet as Tolerated       Follow this diet upon discharge: Orders Placed This Encounter      Calorie Counts      Snacks/Supplements Adult: Other; If pt asks for a snack/supplement, please send.; With Meals      Snacks/Supplements Adult: Boost Plus; Between Meals      Snacks/Supplements Adult: Boost Breeze; Between Meals      Regular Diet Adult Thin Liquids (water, ice chips, juice, milk, gelatin, ice cream, etc)       Cardiac sternal precautions           General info for SNF       Length of Stay Estimate: Short Term Care: Estimated # of Days <30  Condition at Discharge: Stable  Level of care:skilled   Rehabilitation Potential: Good  Admission H&P remains valid and up-to-date: Yes  Recent Chemotherapy: Date:  On oral hydroxyurea 9/11/18                    Use Nursing  Home Standing Orders: Yes       Mantoux instructions       Give two-step Mantoux (PPD) Per Facility Policy Yes       Wound care (specify)       Site:   Sternal wound   Instructions:  Wash daily in shower with soap and water, pat dry, leave open to air if not draining.             Referrals     Occupational Therapy Adult Consult       Evaluate and treat as clinically indicated.    Reason:  S/p VSD repair and TVR       Physical Therapy Adult Consult       Evaluate and treat as clinically indicated.    Reason:  S/p VSD repair and TVR             Supplies     AntiEmbolism Stockings       Bilateral below knee length.On in the morning, off at night             Your next 10 appointments already scheduled     Sep 18, 2018  1:30 PM CDT   (Arrive by 1:15 PM)   Return Visit with  CVTS   [facility] ([facility])    [address] [place] [address]  [address]  [facility] [address]   [phone]            Sep 18, 2018  2:00 PM CDT   [facility] Draw with  [facility]   [facility] ([facility])    [address] [place] [address]  [address]  [facility] [address] [phone]            Sep 18, 2018  2:30 PM CDT   (Arrive by 2:15 PM)   New Patient Visit with Leny Oconnell MD   [facility] ([facility])    [address] [place] [address]  [address]  [facility] [address] [phone]              Follow-Up Appointment Instructions     Future Labs/Procedures    Follow Up and recommended labs and tests     Comments:    Follow up with Nursing home physician as necessary. INR, BMP, and CBC should be drawn 2x weekly starting 9/13/18. INR goal 2-3 for a-fib and TVR.   Follow up with primary care provider in 1-2 weeks to reestablish care and wound check.   Follow up with CV surgery September 18 at 1:30pm for post op appointment.   Follow up with a cardiologist in 4-6 weeks. Family physician to arrange.   Follow up with hematology  as scheduled below on 9/18 at 2:30pm. Send lab results to hematology office at [facility].      Follow-Up Appointment Instructions     Follow Up and recommended labs and tests       Follow up with Nursing home physician as necessary. INR, BMP, and CBC should be drawn 2x weekly starting 9/13/18. INR goal 2-3 for a-fib and TVR.   Follow up with primary care provider in 1-2 weeks to reestablish care and wound check.   Follow up with CV surgery September 18 at 1:30pm for post op appointment.   Follow up with a cardiologist in 4-6 weeks. Family physician to arrange.   Follow up with hematology as scheduled below on 9/18 at 2:30pm. Send lab results to hematology office at [facility].             Statement of Approval     Ordered          09/11/18 1256  I have reviewed and agree with all the recommendations and orders detailed in this document.  EFFECTIVE NOW     Approved and electronically signed by:  Janice Disla PA-C

## 2018-08-06 NOTE — PLAN OF CARE
Problem: Patient Care Overview  Goal: Plan of Care/Patient Progress Review  OT:  Per nursing, pt not medically appropriate for therapy today.  Will reschedule.

## 2018-08-07 ENCOUNTER — APPOINTMENT (OUTPATIENT)
Dept: GENERAL RADIOLOGY | Facility: CLINIC | Age: 68
DRG: 219 | End: 2018-08-07
Attending: INTERNAL MEDICINE
Payer: MEDICARE

## 2018-08-07 LAB
ALBUMIN SERPL-MCNC: 2.4 G/DL (ref 3.4–5)
ALBUMIN UR-MCNC: 10 MG/DL
ALP SERPL-CCNC: 85 U/L (ref 40–150)
ALT SERPL W P-5'-P-CCNC: 28 U/L (ref 0–70)
ANION GAP SERPL CALCULATED.3IONS-SCNC: 10 MMOL/L (ref 3–14)
ANION GAP SERPL CALCULATED.3IONS-SCNC: 9 MMOL/L (ref 3–14)
APPEARANCE UR: CLEAR
AST SERPL W P-5'-P-CCNC: 73 U/L (ref 0–45)
BASE DEFICIT BLDV-SCNC: 4.8 MMOL/L
BASE DEFICIT BLDV-SCNC: 5.4 MMOL/L
BASE DEFICIT BLDV-SCNC: 5.5 MMOL/L
BASE DEFICIT BLDV-SCNC: 5.8 MMOL/L
BASE DEFICIT BLDV-SCNC: 6.3 MMOL/L
BILIRUB DIRECT SERPL-MCNC: 0.1 MG/DL (ref 0–0.2)
BILIRUB SERPL-MCNC: 0.4 MG/DL (ref 0.2–1.3)
BILIRUB UR QL STRIP: NEGATIVE
BUN SERPL-MCNC: 26 MG/DL (ref 7–30)
BUN SERPL-MCNC: 32 MG/DL (ref 7–30)
CALCIUM SERPL-MCNC: 7.3 MG/DL (ref 8.5–10.1)
CALCIUM SERPL-MCNC: 7.5 MG/DL (ref 8.5–10.1)
CHLORIDE SERPL-SCNC: 103 MMOL/L (ref 94–109)
CHLORIDE SERPL-SCNC: 104 MMOL/L (ref 94–109)
CO2 SERPL-SCNC: 19 MMOL/L (ref 20–32)
CO2 SERPL-SCNC: 20 MMOL/L (ref 20–32)
COLOR UR AUTO: YELLOW
CREAT SERPL-MCNC: 1.39 MG/DL (ref 0.66–1.25)
CREAT SERPL-MCNC: 1.4 MG/DL (ref 0.66–1.25)
CREAT UR-MCNC: 287 MG/DL
ERYTHROCYTE [DISTWIDTH] IN BLOOD BY AUTOMATED COUNT: 15.3 % (ref 10–15)
GFR SERPL CREATININE-BSD FRML MDRD: 50 ML/MIN/1.7M2
GFR SERPL CREATININE-BSD FRML MDRD: 51 ML/MIN/1.7M2
GLUCOSE BLDC GLUCOMTR-MCNC: 135 MG/DL (ref 70–99)
GLUCOSE BLDC GLUCOMTR-MCNC: 143 MG/DL (ref 70–99)
GLUCOSE SERPL-MCNC: 130 MG/DL (ref 70–99)
GLUCOSE SERPL-MCNC: 135 MG/DL (ref 70–99)
GLUCOSE UR STRIP-MCNC: NEGATIVE MG/DL
HCO3 BLDV-SCNC: 18 MMOL/L (ref 21–28)
HCO3 BLDV-SCNC: 19 MMOL/L (ref 21–28)
HCO3 BLDV-SCNC: 19 MMOL/L (ref 21–28)
HCO3 BLDV-SCNC: 20 MMOL/L (ref 21–28)
HCO3 BLDV-SCNC: 20 MMOL/L (ref 21–28)
HCT VFR BLD AUTO: 32.3 % (ref 40–53)
HCT VFR BLD AUTO: 33.2 % (ref 40–53)
HCT VFR BLD AUTO: 33.3 % (ref 40–53)
HGB BLD-MCNC: 10.9 G/DL (ref 13.3–17.7)
HGB BLD-MCNC: 11.1 G/DL (ref 13.3–17.7)
HGB BLD-MCNC: 11.2 G/DL (ref 13.3–17.7)
HGB UR QL STRIP: ABNORMAL
HYALINE CASTS #/AREA URNS LPF: 3 /LPF (ref 0–2)
INR PPP: 1.39 (ref 0.86–1.14)
INTERPRETATION ECG - MUSE: NORMAL
IRON SATN MFR SERPL: 20 % (ref 15–46)
IRON SERPL-MCNC: 34 UG/DL (ref 35–180)
KETONES UR STRIP-MCNC: NEGATIVE MG/DL
LACTATE BLD-SCNC: 2.2 MMOL/L (ref 0.7–2)
LACTATE BLD-SCNC: 2.3 MMOL/L (ref 0.7–2)
LACTATE BLD-SCNC: 2.5 MMOL/L (ref 0.7–2)
LEUKOCYTE ESTERASE UR QL STRIP: ABNORMAL
LMWH PPP CHRO-ACNC: <0.1 IU/ML
MAGNESIUM SERPL-MCNC: 2.7 MG/DL (ref 1.6–2.3)
MAGNESIUM SERPL-MCNC: 2.8 MG/DL (ref 1.6–2.3)
MCH RBC QN AUTO: 30.3 PG (ref 26.5–33)
MCH RBC QN AUTO: 30.4 PG (ref 26.5–33)
MCH RBC QN AUTO: 30.5 PG (ref 26.5–33)
MCHC RBC AUTO-ENTMCNC: 33.4 G/DL (ref 31.5–36.5)
MCHC RBC AUTO-ENTMCNC: 33.6 G/DL (ref 31.5–36.5)
MCHC RBC AUTO-ENTMCNC: 33.7 G/DL (ref 31.5–36.5)
MCV RBC AUTO: 90 FL (ref 78–100)
MCV RBC AUTO: 91 FL (ref 78–100)
MCV RBC AUTO: 91 FL (ref 78–100)
NITRATE UR QL: NEGATIVE
O2/TOTAL GAS SETTING VFR VENT: ABNORMAL %
OXYHGB MFR BLDV: 56 %
OXYHGB MFR BLDV: 59 %
OXYHGB MFR BLDV: 64 %
OXYHGB MFR BLDV: 64 %
OXYHGB MFR BLDV: 65 %
OXYHGB MFR BLDV: 83 %
OXYHGB MFR BLDV: 86 %
PCO2 BLDV: 28 MM HG (ref 40–50)
PCO2 BLDV: 29 MM HG (ref 40–50)
PCO2 BLDV: 30 MM HG (ref 40–50)
PCO2 BLDV: 30 MM HG (ref 40–50)
PCO2 BLDV: 33 MM HG (ref 40–50)
PCO2 BLDV: 34 MM HG (ref 40–50)
PCO2 BLDV: 35 MM HG (ref 40–50)
PH BLDV: 7.35 PH (ref 7.32–7.43)
PH BLDV: 7.37 PH (ref 7.32–7.43)
PH BLDV: 7.38 PH (ref 7.32–7.43)
PH BLDV: 7.39 PH (ref 7.32–7.43)
PH BLDV: 7.4 PH (ref 7.32–7.43)
PH BLDV: 7.41 PH (ref 7.32–7.43)
PH BLDV: 7.41 PH (ref 7.32–7.43)
PH UR STRIP: 5 PH (ref 5–7)
PHOSPHATE SERPL-MCNC: 4.7 MG/DL (ref 2.5–4.5)
PHOSPHATE SERPL-MCNC: 5.1 MG/DL (ref 2.5–4.5)
PLATELET # BLD AUTO: 836 10E9/L (ref 150–450)
PLATELET # BLD AUTO: 865 10E9/L (ref 150–450)
PLATELET # BLD AUTO: 869 10E9/L (ref 150–450)
PO2 BLDV: 34 MM HG (ref 25–47)
PO2 BLDV: 37 MM HG (ref 25–47)
PO2 BLDV: 38 MM HG (ref 25–47)
PO2 BLDV: 53 MM HG (ref 25–47)
PO2 BLDV: 57 MM HG (ref 25–47)
POTASSIUM SERPL-SCNC: 4.8 MMOL/L (ref 3.4–5.3)
POTASSIUM SERPL-SCNC: 5 MMOL/L (ref 3.4–5.3)
PROT SERPL-MCNC: 6.2 G/DL (ref 6.8–8.8)
RBC # BLD AUTO: 3.57 10E12/L (ref 4.4–5.9)
RBC # BLD AUTO: 3.66 10E12/L (ref 4.4–5.9)
RBC # BLD AUTO: 3.69 10E12/L (ref 4.4–5.9)
RBC #/AREA URNS AUTO: 2 /HPF (ref 0–2)
SODIUM SERPL-SCNC: 132 MMOL/L (ref 133–144)
SODIUM SERPL-SCNC: 133 MMOL/L (ref 133–144)
SODIUM UR-SCNC: 18 MMOL/L
SOURCE: ABNORMAL
SP GR UR STRIP: 1.01 (ref 1–1.03)
TIBC SERPL-MCNC: 170 UG/DL (ref 240–430)
UROBILINOGEN UR STRIP-MCNC: NORMAL MG/DL (ref 0–2)
UUN UR-MCNC: 297 MG/DL
UUN/CREAT 24H UR: 1 G/G CR
WBC # BLD AUTO: 21.3 10E9/L (ref 4–11)
WBC # BLD AUTO: 21.7 10E9/L (ref 4–11)
WBC # BLD AUTO: 24.1 10E9/L (ref 4–11)
WBC #/AREA URNS AUTO: 5 /HPF (ref 0–5)

## 2018-08-07 PROCEDURE — 20000004 ZZH R&B ICU UMMC

## 2018-08-07 PROCEDURE — 40000275 ZZH STATISTIC RCP TIME EA 10 MIN

## 2018-08-07 PROCEDURE — A9270 NON-COVERED ITEM OR SERVICE: HCPCS | Mod: GY | Performed by: STUDENT IN AN ORGANIZED HEALTH CARE EDUCATION/TRAINING PROGRAM

## 2018-08-07 PROCEDURE — 83540 ASSAY OF IRON: CPT | Performed by: HOSPITALIST

## 2018-08-07 PROCEDURE — 25000128 H RX IP 250 OP 636: Performed by: STUDENT IN AN ORGANIZED HEALTH CARE EDUCATION/TRAINING PROGRAM

## 2018-08-07 PROCEDURE — 80076 HEPATIC FUNCTION PANEL: CPT | Performed by: INTERNAL MEDICINE

## 2018-08-07 PROCEDURE — 80048 BASIC METABOLIC PNL TOTAL CA: CPT | Performed by: STUDENT IN AN ORGANIZED HEALTH CARE EDUCATION/TRAINING PROGRAM

## 2018-08-07 PROCEDURE — 40000281 ZZH STATISTIC TRANSPORT TIME EA 15 MIN

## 2018-08-07 PROCEDURE — 40000196 ZZH STATISTIC RAPCV CVP MONITORING

## 2018-08-07 PROCEDURE — 82805 BLOOD GASES W/O2 SATURATION: CPT | Performed by: STUDENT IN AN ORGANIZED HEALTH CARE EDUCATION/TRAINING PROGRAM

## 2018-08-07 PROCEDURE — 84100 ASSAY OF PHOSPHORUS: CPT | Performed by: INTERNAL MEDICINE

## 2018-08-07 PROCEDURE — 85610 PROTHROMBIN TIME: CPT | Performed by: INTERNAL MEDICINE

## 2018-08-07 PROCEDURE — 71045 X-RAY EXAM CHEST 1 VIEW: CPT

## 2018-08-07 PROCEDURE — 25000132 ZZH RX MED GY IP 250 OP 250 PS 637: Performed by: STUDENT IN AN ORGANIZED HEALTH CARE EDUCATION/TRAINING PROGRAM

## 2018-08-07 PROCEDURE — 83735 ASSAY OF MAGNESIUM: CPT | Performed by: STUDENT IN AN ORGANIZED HEALTH CARE EDUCATION/TRAINING PROGRAM

## 2018-08-07 PROCEDURE — 00000146 ZZHCL STATISTIC GLUCOSE BY METER IP

## 2018-08-07 PROCEDURE — A9270 NON-COVERED ITEM OR SERVICE: HCPCS | Mod: GY | Performed by: INTERNAL MEDICINE

## 2018-08-07 PROCEDURE — 25000128 H RX IP 250 OP 636: Performed by: HOSPITALIST

## 2018-08-07 PROCEDURE — 40000076 ZZH STATISTIC IABP MONITORING

## 2018-08-07 PROCEDURE — 83605 ASSAY OF LACTIC ACID: CPT | Performed by: INTERNAL MEDICINE

## 2018-08-07 PROCEDURE — 80048 BASIC METABOLIC PNL TOTAL CA: CPT | Performed by: INTERNAL MEDICINE

## 2018-08-07 PROCEDURE — 85520 HEPARIN ASSAY: CPT | Performed by: INTERNAL MEDICINE

## 2018-08-07 PROCEDURE — 40000048 ZZH STATISTIC DAILY SWAN MONITORING

## 2018-08-07 PROCEDURE — A9270 NON-COVERED ITEM OR SERVICE: HCPCS | Mod: GY | Performed by: HOSPITALIST

## 2018-08-07 PROCEDURE — 25000132 ZZH RX MED GY IP 250 OP 250 PS 637: Mod: GY | Performed by: INTERNAL MEDICINE

## 2018-08-07 PROCEDURE — 85027 COMPLETE CBC AUTOMATED: CPT | Performed by: STUDENT IN AN ORGANIZED HEALTH CARE EDUCATION/TRAINING PROGRAM

## 2018-08-07 PROCEDURE — 25000132 ZZH RX MED GY IP 250 OP 250 PS 637: Mod: GY | Performed by: STUDENT IN AN ORGANIZED HEALTH CARE EDUCATION/TRAINING PROGRAM

## 2018-08-07 PROCEDURE — 83735 ASSAY OF MAGNESIUM: CPT | Performed by: INTERNAL MEDICINE

## 2018-08-07 PROCEDURE — 83605 ASSAY OF LACTIC ACID: CPT | Performed by: STUDENT IN AN ORGANIZED HEALTH CARE EDUCATION/TRAINING PROGRAM

## 2018-08-07 PROCEDURE — 99221 1ST HOSP IP/OBS SF/LOW 40: CPT | Mod: GC | Performed by: INTERNAL MEDICINE

## 2018-08-07 PROCEDURE — 99291 CRITICAL CARE FIRST HOUR: CPT | Performed by: INTERNAL MEDICINE

## 2018-08-07 PROCEDURE — 83550 IRON BINDING TEST: CPT | Performed by: HOSPITALIST

## 2018-08-07 PROCEDURE — 85027 COMPLETE CBC AUTOMATED: CPT | Performed by: INTERNAL MEDICINE

## 2018-08-07 PROCEDURE — 99291 CRITICAL CARE FIRST HOUR: CPT | Mod: GC | Performed by: INTERNAL MEDICINE

## 2018-08-07 PROCEDURE — 25000132 ZZH RX MED GY IP 250 OP 250 PS 637: Mod: GY | Performed by: HOSPITALIST

## 2018-08-07 PROCEDURE — C9460 INJECTION, CANGRELOR: HCPCS | Performed by: STUDENT IN AN ORGANIZED HEALTH CARE EDUCATION/TRAINING PROGRAM

## 2018-08-07 PROCEDURE — 84300 ASSAY OF URINE SODIUM: CPT | Performed by: STUDENT IN AN ORGANIZED HEALTH CARE EDUCATION/TRAINING PROGRAM

## 2018-08-07 PROCEDURE — 84540 ASSAY OF URINE/UREA-N: CPT | Performed by: STUDENT IN AN ORGANIZED HEALTH CARE EDUCATION/TRAINING PROGRAM

## 2018-08-07 PROCEDURE — 81001 URINALYSIS AUTO W/SCOPE: CPT | Performed by: STUDENT IN AN ORGANIZED HEALTH CARE EDUCATION/TRAINING PROGRAM

## 2018-08-07 PROCEDURE — 84100 ASSAY OF PHOSPHORUS: CPT | Performed by: STUDENT IN AN ORGANIZED HEALTH CARE EDUCATION/TRAINING PROGRAM

## 2018-08-07 PROCEDURE — 25000128 H RX IP 250 OP 636: Performed by: INTERNAL MEDICINE

## 2018-08-07 PROCEDURE — 99292 CRITICAL CARE ADDL 30 MIN: CPT | Performed by: INTERNAL MEDICINE

## 2018-08-07 RX ORDER — QUETIAPINE FUMARATE 25 MG/1
25 TABLET, FILM COATED ORAL 2 TIMES DAILY PRN
Status: DISCONTINUED | OUTPATIENT
Start: 2018-08-07 | End: 2018-08-13

## 2018-08-07 RX ORDER — HEPARIN SODIUM 5000 [USP'U]/.5ML
5000 INJECTION, SOLUTION INTRAVENOUS; SUBCUTANEOUS EVERY 12 HOURS
Status: DISCONTINUED | OUTPATIENT
Start: 2018-08-07 | End: 2018-08-11

## 2018-08-07 RX ORDER — QUETIAPINE FUMARATE 25 MG/1
25 TABLET, FILM COATED ORAL DAILY PRN
Status: DISCONTINUED | OUTPATIENT
Start: 2018-08-07 | End: 2018-08-07

## 2018-08-07 RX ORDER — HYDROXYUREA 500 MG/1
500 CAPSULE ORAL DAILY
Status: DISCONTINUED | OUTPATIENT
Start: 2018-08-07 | End: 2018-08-09

## 2018-08-07 RX ADMIN — ATORVASTATIN CALCIUM 80 MG: 80 TABLET, FILM COATED ORAL at 20:14

## 2018-08-07 RX ADMIN — HEPARIN SODIUM 5000 UNITS: 5000 INJECTION, SOLUTION INTRAVENOUS; SUBCUTANEOUS at 16:20

## 2018-08-07 RX ADMIN — ASPIRIN 81 MG CHEWABLE TABLET 81 MG: 81 TABLET CHEWABLE at 07:44

## 2018-08-07 RX ADMIN — CANGRELOR 0.75 MCG/KG/MIN: 50 INJECTION, POWDER, LYOPHILIZED, FOR SOLUTION INTRAVENOUS at 01:23

## 2018-08-07 RX ADMIN — DOBUTAMINE HYDROCHLORIDE 8.5 MCG/KG/MIN: 400 INJECTION INTRAVENOUS at 11:39

## 2018-08-07 RX ADMIN — LORAZEPAM 0.5 MG: 0.5 TABLET ORAL at 20:18

## 2018-08-07 RX ADMIN — Medication 0.5 MG: at 02:14

## 2018-08-07 RX ADMIN — PANTOPRAZOLE SODIUM 40 MG: 40 TABLET, DELAYED RELEASE ORAL at 07:44

## 2018-08-07 RX ADMIN — HYDROXYUREA 500 MG: 500 CAPSULE ORAL at 18:30

## 2018-08-07 RX ADMIN — SODIUM CHLORIDE, POTASSIUM CHLORIDE, SODIUM LACTATE AND CALCIUM CHLORIDE 500 ML: 600; 310; 30; 20 INJECTION, SOLUTION INTRAVENOUS at 11:14

## 2018-08-07 RX ADMIN — QUETIAPINE FUMARATE 25 MG: 25 TABLET, FILM COATED ORAL at 20:17

## 2018-08-07 RX ADMIN — Medication 0.5 MG: at 21:16

## 2018-08-07 RX ADMIN — QUETIAPINE FUMARATE 25 MG: 25 TABLET ORAL at 02:57

## 2018-08-07 RX ADMIN — CANGRELOR 0.75 MCG/KG/MIN: 50 INJECTION, POWDER, LYOPHILIZED, FOR SOLUTION INTRAVENOUS at 16:52

## 2018-08-07 ASSESSMENT — ACTIVITIES OF DAILY LIVING (ADL)
ADLS_ACUITY_SCORE: 18

## 2018-08-07 NOTE — PROGRESS NOTES
CV ICU PROGRESS NOTE  August 7, 2018  CO-MORBIDITIES:   Cardiogenic Shock   STEMI  S/p HUSSEIN to distal RCA & Proximal LAD (8/  Infraseptal VSD  Myeloproliferative Syndrome  TIA  S/p Splenectomy due to trauma    ASSESSMENT: Castillo De Anda is a 68 year old male s/p HUSSEIN to RCA and LAD for STEMI on 8/4 found to have post infarction inferoapical VSD. OR timing TBD. NATALIIA on labs today with low urine output    TODAY'S PROGRESS:   - Continue cangrelor  - OR timing TBD  - PRN Quetiapine 25mg for sleep  - LR bolus 500cc    PLAN:  Neuro/ pain/ sedation:  - PRN Quetiapine 25mg for sleep  - Monitor neurological status. Notify the MD for any acute changes in exam.    Pulmonary care:   - Supplemental oxygen to keep saturation above 92 %.  - Incentive spirometer every 15- 30 minutes when awake.  - Monitor respiratory status    Cardiovascular:  #Post infarction 2.5cm infraseptal VSD  #s/p HUSSEIN to distal RCA and proximal LAD  #STEMI, cardiogenic shock  #Dilated RV   #Pulmonary HTN  - Monitor hemodynamic status.   - Infusions: Dobutamine 8.5mcg/kg/min  - Consider diuresis with low dose of lasix    Heme:  #Anticoagluation s/p Drug Eluting Stent placement  -Continue Cangrelor until time of surgery, reconsider if surgical timing changed to next week    GI care:  #Transaminitis   - NPO at midnight    Fluids/ Electrolytes/ Nutrition:   - mIVF for IV fluid hydration  - Wiggins    Renal/ Fluid Balance:    #Acute Kidney injury  - Urine output low today.  - Will continue to monitor intake and output via wiggins    Endocrine:  - Sliding scale for diabetes management when TF started    ID/ Antibiotics:  - No antibiotic needs currently  - Monitor fever curve, trend WBC      Prophylaxis:    - Mechanical prophylaxis for DVT.   - Anticoagulation with Cangrelor  - Protonix    Lines/ tubes/ drains:  - PIV, CVC/[place], IABP    Disposition:  - CV ICU    Patient seen, findings and plan discussed with CV ICU staff, Dr. Porras.    Arnaldo Viera MD   PGY-3,  GEN SURG  ====================================    SUBJECTIVE:   ECHO yesterday showing Inferior wall akinesis with inferoseptal muscular VAD. Mild to moderate right ventricular dilation is present. Global right ventricular function is moderately reduced. Creatinine elevated. Was given seroquel 25mg overnight.      OBJECTIVE:   1. VITAL SIGNS:   Temp:  [96.9  F (36.1  C)-98.2  F (36.8  C)] 96.9  F (36.1  C)  Heart Rate:  [] 91  Resp:  [14-18] 16  BP: ()/() 101/54  SpO2:  [85 %-100 %] 98 %  Resp: 16    2. INTAKE/ OUTPUT:   I/O last 3 completed shifts:  In: 962.77 [P.O.:250; I.V.:712.77]  Out: 610 [Urine:610]    3. PHYSICAL EXAMINATION:   General: Alert, lying flat due to invasive monitoring  Neuro: A&Ox3, NAD  Resp: Breathing non-labored on nasal cannula  CV: RRR  Abdomen: Soft, Non-distended, Non-tender  Incisions: Right central line internal jugular,   Extremities: warm and well perfused    4. INVESTIGATIONS:   Arterial Blood Gases   No lab results found in last 7 days.  Complete Blood Count     Recent Labs  Lab 08/07/18 0403 08/07/18  0015 08/06/18 1957 08/06/18  0800   WBC 21.7* 24.1* 23.0* 20.0*   HGB 11.2* 11.1* 11.7* 11.6*   * 836* 833* 614*     Basic Metabolic Panel    Recent Labs  Lab 08/07/18 0403 08/06/18 1957 08/06/18  1144 08/06/18  0330   * 132* 132* 129*   POTASSIUM 5.0 4.9 4.2 5.1   CHLORIDE 103 102 104 102   CO2 20 21 19* 17*   BUN 26 22 17 15   CR 1.40* 1.17 0.96 0.91   * 140* 130* 169*     Liver Function Tests    Recent Labs  Lab 08/07/18  0403 08/06/18  1957 08/06/18  1144 08/06/18  0330 08/06/18  0218   AST 73* 86* 95*  --  77*   ALT 28 28 30  --  23   ALKPHOS 85 91 90  --  114   BILITOTAL 0.4 0.4 0.3  --  0.6   ALBUMIN 2.4* 2.4* 2.3*  --  2.6*   INR 1.39*  --   --  2.05*  --      Pancreatic Enzymes  No lab results found in last 7 days.  Coagulation Profile    Recent Labs  Lab 08/07/18  0403 08/06/18  0330   INR 1.39* 2.05*         5. RADIOLOGY:   No  results found for this or any previous visit (from the past 24 hour(s)).    =========================================    ECHO  Interpretation Summary  Left ventricular size is normal.  The Ejection Fraction is estimated at 55-60%.  Inferior wall akinesis with inferoseptal muscular VAD. Peak velocity 3.7m/sec.  Mild to moderate right ventricular dilation is present.  Global right ventricular function is moderately reduced.  Dilation of the inferior vena cava is present with abnormal respiratory  variation in diameter.  No pericardial effusion is present.  Previous study not available for comparison.  _____________________________________________________________________________

## 2018-08-07 NOTE — PLAN OF CARE
Assessment: VSD, IABP.   Neuro: A&O x4, mildly slurred speech, redirectable. Pt displaying panic attacks and night terrors overnight (i.e. HR bradys to 50s) but is redirectable with vocal instruction. PERRLA, pupils 3 mm b/l, CARRIAZLES, follows commands and able to make needs known. No fevers overnight.  Cardiac: SR with PACs, HR 85-90. Normotensive via IABP, MAP > 65. Pulses: +2 b/l radial and pedal. Extremities cool, pt refused blankets/lindsey hugger. Dobutamine gtt increased to 8.5 mcg/kg/min.  IABP 1:1, augmentation 70s-100s (mainly in 80s).   Hemodynamics: CVP 10/12/12, PA 38/22, 40/26, 38/26, ScvO2 52/59/64, CO 3.2/4/4.4, CI 1.6/2.1/2.3, SVR 1562/1334/1054, /93/72, SV 35/44/47. (SAKSHI numbers based off of CVP port).  Respiratory: LSC with IABP sounds present, SpO2 100% on 4L NC.   GI: Abd soft, non distended. No BM this shift.   : UOP 10-15 ml/hr with x1 UOP of 0 ml/hr.      @ 2030, C2 Fellow called re: low UOP (20 ml/hr), CVP 9-10, increase in plt count (pt on cangrelor gtt), and poor PO intake by patient. Fluids suggested, per MD, con't to monitor.    @ 0001, C2 Fellow called re: no UOP from 3774-9016, CVP 13. No fluids given d/t stable SAKSHI numbers.    @ 0200, C2 Fellow called re: consistent night terrors experienced by patient. PRN Seroquel 25 mg ordered.    @ 0430, C2 Fellow notified of worsening renal function and low UOP, UA ordered and sent.

## 2018-08-07 NOTE — CONSULTS
CARDIAC SURGERY CONSULT NOTE    Consult Reason: Ischemic VSD    HPI:   Mr. Castillo De Anda is a 68 year old man who presents as a transfer after having STEMI s/p HUSSEIN to RCA and pLAD found to have inferoapical VSD. He has an IABP and was on dobutamine and norepinephrine for cardiogenic shock. Cardiac surgery is consulted for surgical correction of the ischemic VSD.     A/P: Patient is a 68 year old male with STEMI s/p HUSSEIN to RCA and LAD with ischemic VSD.     - tentative plan for surgery today  - cangrelor for short half life to stop preop given recent HUSSEIN, will resume dual antiplatelet once stable from surgical standpoint.   - cont management of cardiogenic shock    Hiram Meyers MD  Cardiothoracic Surgery Fellow  [phone]      PMH:  Past Medical History:   Diagnosis Date     DVT (deep vein thrombosis) in pregnancy (H) 2008    after craniotomy     Meningioma (H)      Polycythemia vera (H)      Prostate cancer (H)          PSH:  Past Surgical History:   Procedure Laterality Date     APPENDECTOMY       CRANIOTOMY Right 2008     PROSTATECTOMY PERINEAL  2004     SPLENECTOMY      childhood     THYROID SURGERY  2012         FH:  family history includes Cerebrovascular Disease in his paternal uncle; Hypertension in his mother.      SH:  Former Tobacco use  Occasional EtOH use  Denies Illicit drug use    Allergies:  No Known Allergies    Home Meds:  No prescriptions prior to admission.       ROS:   Review Of Systems  Skin: negative  Eyes: negative  Ears/Nose/Throat: negative  Respiratory: shortness of breath  Cardiovascular: negative  Gastrointestinal: negative  Genitourinary: negative  Musculoskeletal: negative  Neurologic: negative  Psychiatric: negative  Hematologic/Lymphatic/Immunologic: negative  Endocrine: negative      Physical Exam:  Temp:  [96.9  F (36.1  C)-98.2  F (36.8  C)] 96.9  F (36.1  C)  Heart Rate:  [] 90  Resp:  [14-18] 16  BP: ()/() 107/43  SpO2:  [85 %-100 %] 93 %    Gen: NAD,  resting comfortably in bed  Lungs: CTAB, non-labored breathing, on NC  CV: regular rhythm, normal rate, IABP augmenting at  1:1  Abd: soft, nt, nd  Ext: cool, 1+ cap refill,   Neuro: AOx3    Labs:  ABG No lab results found in last 7 days.  CBC  Recent Labs  Lab 08/07/18  0403 08/07/18  0015 08/06/18 1957 08/06/18  0800   WBC 21.7* 24.1* 23.0* 20.0*   HGB 11.2* 11.1* 11.7* 11.6*   * 836* 833* 614*     BMP  Recent Labs  Lab 08/07/18  0403 08/06/18 1957 08/06/18  1144 08/06/18  0330   * 132* 132* 129*   POTASSIUM 5.0 4.9 4.2 5.1   CHLORIDE 103 102 104 102   CO2 20 21 19* 17*   BUN 26 22 17 15   CR 1.40* 1.17 0.96 0.91   * 140* 130* 169*     LFT  Recent Labs  Lab 08/07/18  0403 08/06/18 1957 08/06/18  1144 08/06/18  0330 08/06/18  0218   AST 73* 86* 95*  --  77*   ALT 28 28 30  --  23   ALKPHOS 85 91 90  --  114   BILITOTAL 0.4 0.4 0.3  --  0.6   ALBUMIN 2.4* 2.4* 2.3*  --  2.6*   INR 1.39*  --   --  2.05*  --      PancreasNo lab results found in last 7 days.    Imaging:  TTE 8/6/2018:   Interpretation Summary  Left ventricular size is normal.  The Ejection Fraction is estimated at 55-60%.  Inferior wall akinesis with inferoseptal muscular VAD. Peak velocity 3.7m/sec.  Mild to moderate right ventricular dilation is present.  Global right ventricular function is moderately reduced.  Dilation of the inferior vena cava is present with abnormal respiratory  variation in diameter.  No pericardial effusion is present.  Previous study not available for comparison.    Coronary angiogram:  [facility],  to bring today.

## 2018-08-07 NOTE — PLAN OF CARE
Problem: Patient Care Overview  Goal: Plan of Care/Patient Progress Review  OT:  Evaluation rescheduled due to possible surgery today.

## 2018-08-07 NOTE — PROGRESS NOTES
Cardiology Progress Note    Assessment & Plan   67 yo M who presented with confusion chest pain and inferior STEMI s/p HUSSEIN to distal RCA and p LAD. Course complicated by inferoapical VSD    # Post infarction VSD   - c/b cardiogenic shock   -s/p IABP on dobutamine with stable CI    - amplatz likely not possible given the proximity to the wall- plan for possible OR in the am   - formal echo today will review with surgery and interventional   - gentle diuresis    - cangrelor given fresh stents     #Abnormal LFT c/w hepatic congestion    # CAD s/p inferoapical STEMI                        Right coronary occlusions treated with HUSSEIN                        Proximal LAD stenosis treated with HUSSEIN    Last dose of brillinta was 8/5 pm per discussion with CVTS who wants P2Y2 inhibitor off    # Myeloproliferative syndrome (P VERA)   -history of TIA   - non-hydroxy urea medication to treat P VERA/rash and epistaxis   -thrombocytosis    Chronic  - Childhood tuberculosis  - 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 Foster Yarbrough   Cardiology fellow, PGY-5    Interval History   No acute events overnight  Hemodynamically stable      Physical Exam   Temp: 98.2  F (36.8  C) Temp src: Axillary BP: (!) 67/54   Heart Rate: 91 Resp: 16 SpO2: 91 % O2 Device: Nasal cannula Oxygen Delivery: 4 LPM  Vitals:    08/06/18 0200 08/07/18 0400   Weight: 77.3 kg (170 lb 6.7 oz) 76.7 kg (169 lb 1.5 oz)     Vital Signs with Ranges  Temp:  [96  F (35.6  C)-98.2  F (36.8  C)] 98.2  F (36.8  C)  Heart Rate:  [] 91  Resp:  [14-18] 16  BP: ()/(39-93) 67/54  SpO2:  [85 %-100 %] 91 %  I/O last 3 completed shifts:  In: 1117.47 [P.O.:250; I.V.:867.47]  Out: 715 [Urine:715]    Heart Rate: 91, Blood  "pressure (!) 67/54, temperature 98.2  F (36.8  C), temperature source Axillary, resp. rate 16, height 1.753 m (5' 9\"), weight 76.7 kg (169 lb 1.5 oz), SpO2 91 %.  169 lbs 1.49 oz  GEN:  Alert, oriented x 3, appears comfortable, NAD.  CV:  Regular rate and rhythm, systolic murmur at apex  LUNGS:  Clear to auscultation bilaterally   ABD:  Active bowel sounds, soft, non-tender/non-distended.  No rebound/guarding/rigidity.  EXT:  No edema or cyanosis.      Medications     cangrelor (KENGREAL) infusion ADULT 0.75 mcg/kg/min (08/07/18 0600)     DOBUTamine 8.5 mcg/kg/min (08/07/18 0600)     - MEDICATION INSTRUCTIONS -       - MEDICATION INSTRUCTIONS -       norepinephrine Stopped (08/06/18 0730)     Reason ACE/ARB/ARNI order not selected       Reason beta blocker order not selected         aspirin  81 mg Oral Daily     atorvastatin  80 mg Oral QPM     cangrelor  30 mcg/kg (Dosing Weight) Intravenous Once     pantoprazole  40 mg Oral QAM AC       Data     Recent Labs  Lab 08/07/18  0403 08/07/18  0015 08/06/18  1957 08/06/18  1144  08/06/18  0330   WBC 21.7* 24.1* 23.0*  --   < >  --    HGB 11.2* 11.1* 11.7*  --   < >  --    MCV 90 91 90  --   < >  --    * 836* 833*  --   < >  --    INR 1.39*  --   --   --   --  2.05*   *  --  132* 132*  --  129*   POTASSIUM 5.0  --  4.9 4.2  --  5.1   CHLORIDE 103  --  102 104  --  102   CO2 20  --  21 19*  --  17*   BUN 26  --  22 17  --  15   CR 1.40*  --  1.17 0.96  --  0.91   ANIONGAP 9  --  10 9  --  10   GABRIELA 7.5*  --  7.6* 7.1*  --  6.9*   *  --  140* 130*  --  169*   ALBUMIN 2.4*  --  2.4* 2.3*  --   --    PROTTOTAL 6.2*  --  6.1* 6.1*  --   --    BILITOTAL 0.4  --  0.4 0.3  --   --    ALKPHOS 85  --  91 90  --   --    ALT 28  --  28 30  --   --    AST 73*  --  86* 95*  --   --    < > = values in this interval not displayed.    No results found for this or any previous visit (from the past 24 hour(s)).    Critical Care ICU Note - Cardiology  Camilo Hutchison, " M.D.    Impression:  Post infarction VSD  Cardiogenic shock  Acute and chronic congestive heart failurea  IABP  Splenectomy  Polycythemia rubra vera       The patient remains unstable in the ICU with on-going need for , parenteral medications for the adjustment of blood pressure and cardiac output and maintenance of renal function.      The patient is seen for; shock requiring vasopressor and or inotropic agents; low cardiac output necessitating  inotropic agents, vasopressors and afterload reducing agents; limited mechanical support requiring IABP; aI personally reviewed:    Arterial and venous blood gases to assess acid base balance, oxygenation, and ventilator settings.      Hemodynamic parameters obtained by central hemodynamic monitoring including RAP, estimated LVEDP, pulmonary artery pressure, cardiac output and vascular resistances in order to adjust fluids and infused medications for blood pressure and cardiac output maintenance.      Volume status, renal function and nutritional support as judged by intake, output, daily weight and appropriate laboratories.    I reviewed individual and serial imaging studies including echocardiogram, chest x-ray, CT scan    I personally supervised the prescription of parenteral fluids, inotropes, vasodilators , and vasopressors in order to correct cardiac output, maintain urine output and renal function.    I personally met with patient or family to discuss current and future diagnostic and therapeutic strategies    The patient's platelet count is increasing in the context of splenic absence and myeloproliferative syndrome.  He previously has taken anagrelide (has cardiac toxicity).  I am wondering if he should get a dose of hydroxyurea to blunt platelet increase with its attendant risk for stroke.     We coordinated care with hematology, cardiac surgery, critical care.      IABP site without evidence of arterial impairment     90 minutes

## 2018-08-07 NOTE — CONSULTS
[facility], [place]   Hematology/Oncology Consult Note    Castillo De Anda MRN# [id number]   Age: 68 year old YOB: 1950          Reason for Consult:   History of PCV, now admitted with STEMI - consideration of hydrea?         Assessment and Plan:   Castillo De Anda is a 68 year old man with history of polycythemia vera, DVT, and meningioma who presented as a transfer after STEMI s/p HUSSEIN placement. He was ultimately found to have a VSD. He was previously maintained on hydrea for ~10 years, but stopped taking it several months ago, likely related to anemia (though unclear and no records). He was started on anagrelide, and this was very poorly tolerated, and stopped several days prior to this presentation.     He is currently anemic (Hgb of 13.1 on arrival 08/16/18, now 10.9 today), with a platelet count in the 800s. Thrombocytosis is likely secondary to his underlying MPN, prior splenectomy, and suspected iron deficiency. At this time, we recommend restarting hydroxyurea 500 mg PO once daily. Given his anemia and reported history of nose bleeds, we will also order von Willebrand factor labs to assess for potential platelet dysfunction.     Summary of Recommendations:  - Start Hydroxyurea 500 mg PO daily.  - vWF labs ordered. We will follow-up on these results, which may take several days.    Thank you for involving us in the care of this patient. We will continue to follow during the hospitalization.    Patient was seen and plan of care developed with Dr. Hodge .    George Vegas, MS-IV  Acting as scribe for Dr. Hodge  08/07/2018         History of Present Illness:   History obtained from chart review and confirmed with patient and family.    Castillo De Anda is a 68 year old man with PMH significant for polycythemia vera, DVT, meningioma and prior prostate cancer who presented as a transfer from an outside facility for further evaluation after he was found to have a  STEMI (s/p HUSSEIN to RCA and LAD) with ischemic VSD. The heme/onc team was consulted for assistance in managing the patient's thrombocytosis with history of PCV. Patient was interviewed at bedside with multiple family members present.   Patient states he was diagnosed with PV in 2008 after developing two separate DVTs in the right leg after having surgery for a meningioma. Subsequent workup by heme/onc with BmBx and he was shown to have a JAK2 mutation. He was started on hydroxyurea at that time and was on it for a couple of years but reports that it was discontinued several months ago because his doctor felt it was no longer working. He was started on Lovenox for DVT prophylaxis. He later had ankle surgery in April 2018, around this time he states his platelets climbed to 1200 and he had an increase in WBCs. He was on Lovenox for 6 more weeks and then was started on anagrelide. Patient reports having HAs and a full body rash with anagrelide. He and family report recent weight loss, decreased appetite, lethargy and increased sleeping over the past few weeks.        Past Medical History:     Past Medical History:   Diagnosis Date     DVT (deep venous thrombosis) (H) 2008    after craniotomy     Meningioma (H)      Polycythemia vera (H)      Prostate cancer (H)           Past Surgical History:     Past Surgical History:   Procedure Laterality Date     ANKLE SURGERY Right 04/2018     APPENDECTOMY       CRANIOTOMY Right 2008     PROSTATECTOMY PERINEAL  2004     SPLENECTOMY      childhood     THYROID SURGERY  2012          Social History:     Social History     Social History     Marital status: N/A     Spouse name: Radha     Number of children: N/A     Years of education: N/A     Occupational History     Not on file.     Social History Main Topics     Smoking status: Former Smoker     Smokeless tobacco: Never Used     Alcohol use 1.2 oz/week     2 Shots of liquor per week     Drug use: Not on file     Sexual activity: Not  "on file     Other Topics Concern     Not on file     Social History Narrative    Wife is Radha          Family History:     Family History   Problem Relation Age of Onset     Hypertension Mother      Cerebrovascular Disease Paternal Uncle           Allergies:   No Known Allergies          Physical Exam:   BP (!) 109/92  Temp 97  F (36.1  C) (Axillary)  Resp 14  Ht 1.753 m (5' 9\")  Wt 76.7 kg (169 lb 1.5 oz)  SpO2 96%  BMI 24.97 kg/m2  Vitals:    08/06/18 0200 08/07/18 0400   Weight: 77.3 kg (170 lb 6.7 oz) 76.7 kg (169 lb 1.5 oz)     General: Lying supine in bed, no acute distress.  Heme/Lymph: No overt bleeding.  HEENT: EOMI, anicteric sclera. NC in place.  Respiratory: Non-labored breathing, normal resp rate.  Cardiovascular: Tachycardic, balloon pump in place.  Extremities: Mild extremity edema, R leg in brace.  Neurologic: A&O x 3, mildly dysarthric speech.         Data:   I have personally reviewed the following labs/imaging:  CBC  Recent Labs  Lab 08/07/18  1210 08/07/18  0403 08/07/18  0015 08/06/18 1957   WBC 21.3* 21.7* 24.1* 23.0*   RBC 3.57* 3.69* 3.66* 3.85*   HGB 10.9* 11.2* 11.1* 11.7*   HCT 32.3* 33.3* 33.2* 34.8*   MCV 91 90 91 90   MCH 30.5 30.4 30.3 30.4   MCHC 33.7 33.6 33.4 33.6   RDW 15.3* 15.3* 15.3* 15.4*   * 869* 836* 833*     CMP  Recent Labs  Lab 08/07/18  1210 08/07/18  0403 08/06/18 1957 08/06/18  1144  08/06/18  0218    132* 132* 132*  < > 127*   POTASSIUM 4.8 5.0 4.9 4.2  < > 4.9   CHLORIDE 104 103 102 104  < > 98   CO2 19* 20 21 19*  < > 16*   ANIONGAP 10 9 10 9  < > 13   * 135* 140* 130*  < > 160*   BUN 32* 26 22 17  < > 15   CR 1.39* 1.40* 1.17 0.96  < > 0.94   GFRESTIMATED 51* 50* 62 78  < > 79   GFRESTBLACK 61 61 75 >90  < > >90   GABRIELA 7.3* 7.5* 7.6* 7.1*  < > 7.3*   MAG 2.7* 2.8* 2.7* 2.6*  < > 2.0   PHOS 4.7* 5.1*  --  3.9  --   --    PROTTOTAL  --  6.2* 6.1* 6.1*  --  6.7*   ALBUMIN  --  2.4* 2.4* 2.3*  --  2.6*   BILITOTAL  --  0.4 0.4 0.3  --  0.6 "   ALKPHOS  --  85 91 90  --  114   AST  --  73* 86* 95*  --  77*   ALT  --  28 28 30  --  23   < > = values in this interval not displayed.  INR  Recent Labs  Lab 08/07/18  0403 08/06/18  0330   INR 1.39* 2.05*

## 2018-08-08 ENCOUNTER — APPOINTMENT (OUTPATIENT)
Dept: OCCUPATIONAL THERAPY | Facility: CLINIC | Age: 68
DRG: 219 | End: 2018-08-08
Attending: INTERNAL MEDICINE
Payer: MEDICARE

## 2018-08-08 LAB
ABO + RH BLD: NORMAL
ANION GAP SERPL CALCULATED.3IONS-SCNC: 10 MMOL/L (ref 3–14)
ANION GAP SERPL CALCULATED.3IONS-SCNC: 8 MMOL/L (ref 3–14)
BASE DEFICIT BLDV-SCNC: 3.6 MMOL/L
BASE DEFICIT BLDV-SCNC: 3.6 MMOL/L
BASE DEFICIT BLDV-SCNC: 3.7 MMOL/L
BASE DEFICIT BLDV-SCNC: 4.3 MMOL/L
BASE DEFICIT BLDV-SCNC: 5.2 MMOL/L
BASE DEFICIT BLDV-SCNC: 6.2 MMOL/L
BLD GP AB SCN SERPL QL: NORMAL
BLD PROD TYP BPU: NORMAL
BLOOD BANK CMNT PATIENT-IMP: NORMAL
BLOOD BANK CMNT PATIENT-IMP: NORMAL
BUN SERPL-MCNC: 45 MG/DL (ref 7–30)
BUN SERPL-MCNC: 45 MG/DL (ref 7–30)
CALCIUM SERPL-MCNC: 7.7 MG/DL (ref 8.5–10.1)
CALCIUM SERPL-MCNC: 7.7 MG/DL (ref 8.5–10.1)
CHLORIDE SERPL-SCNC: 102 MMOL/L (ref 94–109)
CHLORIDE SERPL-SCNC: 105 MMOL/L (ref 94–109)
CO2 SERPL-SCNC: 19 MMOL/L (ref 20–32)
CO2 SERPL-SCNC: 20 MMOL/L (ref 20–32)
CREAT SERPL-MCNC: 1.39 MG/DL (ref 0.66–1.25)
CREAT SERPL-MCNC: 1.55 MG/DL (ref 0.66–1.25)
FACT VIII ACT/NOR PPP: 190 % (ref 55–200)
FACT VIII ACT/NOR PPP: NORMAL % (ref 55–200)
GFR SERPL CREATININE-BSD FRML MDRD: 45 ML/MIN/1.7M2
GFR SERPL CREATININE-BSD FRML MDRD: 51 ML/MIN/1.7M2
GLUCOSE BLDC GLUCOMTR-MCNC: 143 MG/DL (ref 70–99)
GLUCOSE SERPL-MCNC: 121 MG/DL (ref 70–99)
GLUCOSE SERPL-MCNC: 142 MG/DL (ref 70–99)
HCO3 BLDV-SCNC: 18 MMOL/L (ref 21–28)
HCO3 BLDV-SCNC: 19 MMOL/L (ref 21–28)
HCO3 BLDV-SCNC: 20 MMOL/L (ref 21–28)
HCO3 BLDV-SCNC: 20 MMOL/L (ref 21–28)
HCO3 BLDV-SCNC: 21 MMOL/L (ref 21–28)
HCO3 BLDV-SCNC: 21 MMOL/L (ref 21–28)
INR PPP: 1.41 (ref 0.86–1.14)
LMWH PPP CHRO-ACNC: <0.1 IU/ML
MAGNESIUM SERPL-MCNC: 2.7 MG/DL (ref 1.6–2.3)
MAGNESIUM SERPL-MCNC: 3.1 MG/DL (ref 1.6–2.3)
NUM BPU REQUESTED: 4
O2/TOTAL GAS SETTING VFR VENT: ABNORMAL %
OXYHGB MFR BLDV: 46 %
OXYHGB MFR BLDV: 53 %
OXYHGB MFR BLDV: 62 %
OXYHGB MFR BLDV: 64 %
OXYHGB MFR BLDV: 67 %
OXYHGB MFR BLDV: 84 %
PCO2 BLDV: 29 MM HG (ref 40–50)
PCO2 BLDV: 30 MM HG (ref 40–50)
PCO2 BLDV: 31 MM HG (ref 40–50)
PCO2 BLDV: 32 MM HG (ref 40–50)
PCO2 BLDV: 35 MM HG (ref 40–50)
PCO2 BLDV: 36 MM HG (ref 40–50)
PH BLDV: 7.37 PH (ref 7.32–7.43)
PH BLDV: 7.38 PH (ref 7.32–7.43)
PH BLDV: 7.39 PH (ref 7.32–7.43)
PH BLDV: 7.4 PH (ref 7.32–7.43)
PH BLDV: 7.41 PH (ref 7.32–7.43)
PH BLDV: 7.42 PH (ref 7.32–7.43)
PHOSPHATE SERPL-MCNC: 4.2 MG/DL (ref 2.5–4.5)
PHOSPHATE SERPL-MCNC: 5.2 MG/DL (ref 2.5–4.5)
PO2 BLDV: 31 MM HG (ref 25–47)
PO2 BLDV: 32 MM HG (ref 25–47)
PO2 BLDV: 36 MM HG (ref 25–47)
PO2 BLDV: 37 MM HG (ref 25–47)
PO2 BLDV: 40 MM HG (ref 25–47)
PO2 BLDV: 55 MM HG (ref 25–47)
POTASSIUM SERPL-SCNC: 4.3 MMOL/L (ref 3.4–5.3)
POTASSIUM SERPL-SCNC: 5.1 MMOL/L (ref 3.4–5.3)
SODIUM SERPL-SCNC: 132 MMOL/L (ref 133–144)
SODIUM SERPL-SCNC: 132 MMOL/L (ref 133–144)
SPECIMEN EXP DATE BLD: NORMAL
SPECIMEN EXP DATE BLD: NORMAL
TRANSFERRIN SERPL-MCNC: 158 MG/DL (ref 210–360)
VWF CBA/VWF AG PPP IA-RTO: 142 % (ref 50–200)
VWF:AC ACT/NOR PPP IA: 106 % (ref 50–180)

## 2018-08-08 PROCEDURE — 85610 PROTHROMBIN TIME: CPT | Performed by: INTERNAL MEDICINE

## 2018-08-08 PROCEDURE — 82805 BLOOD GASES W/O2 SATURATION: CPT | Performed by: STUDENT IN AN ORGANIZED HEALTH CARE EDUCATION/TRAINING PROGRAM

## 2018-08-08 PROCEDURE — 40000133 ZZH STATISTIC OT WARD VISIT: Performed by: OCCUPATIONAL THERAPIST

## 2018-08-08 PROCEDURE — 86900 BLOOD TYPING SEROLOGIC ABO: CPT | Performed by: PHYSICIAN ASSISTANT

## 2018-08-08 PROCEDURE — A9270 NON-COVERED ITEM OR SERVICE: HCPCS | Mod: GY | Performed by: HOSPITALIST

## 2018-08-08 PROCEDURE — 84466 ASSAY OF TRANSFERRIN: CPT | Performed by: INTERNAL MEDICINE

## 2018-08-08 PROCEDURE — 40000275 ZZH STATISTIC RCP TIME EA 10 MIN

## 2018-08-08 PROCEDURE — 97166 OT EVAL MOD COMPLEX 45 MIN: CPT | Mod: GO | Performed by: OCCUPATIONAL THERAPIST

## 2018-08-08 PROCEDURE — 80048 BASIC METABOLIC PNL TOTAL CA: CPT | Performed by: INTERNAL MEDICINE

## 2018-08-08 PROCEDURE — 85245 CLOT FACTOR VIII VW RISTOCTN: CPT | Performed by: INTERNAL MEDICINE

## 2018-08-08 PROCEDURE — 86923 COMPATIBILITY TEST ELECTRIC: CPT | Performed by: PHYSICIAN ASSISTANT

## 2018-08-08 PROCEDURE — 00000328 ZZHCL STATISTIC PTT NC: Performed by: INTERNAL MEDICINE

## 2018-08-08 PROCEDURE — 40000048 ZZH STATISTIC DAILY SWAN MONITORING

## 2018-08-08 PROCEDURE — 25000132 ZZH RX MED GY IP 250 OP 250 PS 637: Mod: GY | Performed by: STUDENT IN AN ORGANIZED HEALTH CARE EDUCATION/TRAINING PROGRAM

## 2018-08-08 PROCEDURE — 25000125 ZZHC RX 250

## 2018-08-08 PROCEDURE — A9270 NON-COVERED ITEM OR SERVICE: HCPCS | Mod: GY | Performed by: STUDENT IN AN ORGANIZED HEALTH CARE EDUCATION/TRAINING PROGRAM

## 2018-08-08 PROCEDURE — 25000128 H RX IP 250 OP 636: Performed by: STUDENT IN AN ORGANIZED HEALTH CARE EDUCATION/TRAINING PROGRAM

## 2018-08-08 PROCEDURE — 84100 ASSAY OF PHOSPHORUS: CPT | Performed by: INTERNAL MEDICINE

## 2018-08-08 PROCEDURE — 85247 CLOT FACTOR VIII MULTIMETRIC: CPT | Performed by: INTERNAL MEDICINE

## 2018-08-08 PROCEDURE — 86900 BLOOD TYPING SEROLOGIC ABO: CPT | Performed by: INTERNAL MEDICINE

## 2018-08-08 PROCEDURE — 00000146 ZZHCL STATISTIC GLUCOSE BY METER IP

## 2018-08-08 PROCEDURE — 00000447 ZZHCL STATISTIC VON WILLEBRAND MULTIMERS: Performed by: INTERNAL MEDICINE

## 2018-08-08 PROCEDURE — 97110 THERAPEUTIC EXERCISES: CPT | Mod: GO | Performed by: OCCUPATIONAL THERAPIST

## 2018-08-08 PROCEDURE — 86901 BLOOD TYPING SEROLOGIC RH(D): CPT | Performed by: PHYSICIAN ASSISTANT

## 2018-08-08 PROCEDURE — 40000076 ZZH STATISTIC IABP MONITORING

## 2018-08-08 PROCEDURE — 85240 CLOT FACTOR VIII AHG 1 STAGE: CPT | Performed by: INTERNAL MEDICINE

## 2018-08-08 PROCEDURE — 85246 CLOT FACTOR VIII VW ANTIGEN: CPT | Performed by: INTERNAL MEDICINE

## 2018-08-08 PROCEDURE — 25000132 ZZH RX MED GY IP 250 OP 250 PS 637: Mod: GY | Performed by: INTERNAL MEDICINE

## 2018-08-08 PROCEDURE — 40000196 ZZH STATISTIC RAPCV CVP MONITORING

## 2018-08-08 PROCEDURE — 40000556 ZZH STATISTIC PERIPHERAL IV START W US GUIDANCE

## 2018-08-08 PROCEDURE — 83735 ASSAY OF MAGNESIUM: CPT | Performed by: INTERNAL MEDICINE

## 2018-08-08 PROCEDURE — 00000167 ZZHCL STATISTIC INR NC: Performed by: INTERNAL MEDICINE

## 2018-08-08 PROCEDURE — 25000132 ZZH RX MED GY IP 250 OP 250 PS 637: Mod: GY | Performed by: HOSPITALIST

## 2018-08-08 PROCEDURE — 86850 RBC ANTIBODY SCREEN: CPT | Performed by: PHYSICIAN ASSISTANT

## 2018-08-08 PROCEDURE — 99291 CRITICAL CARE FIRST HOUR: CPT | Mod: GC | Performed by: INTERNAL MEDICINE

## 2018-08-08 PROCEDURE — 25000128 H RX IP 250 OP 636: Performed by: INTERNAL MEDICINE

## 2018-08-08 PROCEDURE — A9270 NON-COVERED ITEM OR SERVICE: HCPCS | Mod: GY | Performed by: INTERNAL MEDICINE

## 2018-08-08 PROCEDURE — 25000128 H RX IP 250 OP 636: Performed by: HOSPITALIST

## 2018-08-08 PROCEDURE — 00000401 ZZHCL STATISTIC THROMBIN TIME NC: Performed by: INTERNAL MEDICINE

## 2018-08-08 PROCEDURE — 87103 BLOOD FUNGUS CULTURE: CPT | Performed by: INTERNAL MEDICINE

## 2018-08-08 PROCEDURE — 87040 BLOOD CULTURE FOR BACTERIA: CPT | Performed by: INTERNAL MEDICINE

## 2018-08-08 PROCEDURE — 97535 SELF CARE MNGMENT TRAINING: CPT | Mod: GO | Performed by: OCCUPATIONAL THERAPIST

## 2018-08-08 PROCEDURE — C9460 INJECTION, CANGRELOR: HCPCS | Performed by: STUDENT IN AN ORGANIZED HEALTH CARE EDUCATION/TRAINING PROGRAM

## 2018-08-08 PROCEDURE — 20000004 ZZH R&B ICU UMMC

## 2018-08-08 PROCEDURE — 85520 HEPARIN ASSAY: CPT | Performed by: INTERNAL MEDICINE

## 2018-08-08 RX ORDER — QUETIAPINE FUMARATE 25 MG/1
25 TABLET, FILM COATED ORAL EVERY MORNING
Status: DISCONTINUED | OUTPATIENT
Start: 2018-08-09 | End: 2018-08-08

## 2018-08-08 RX ORDER — HYDROMORPHONE HCL/0.9% NACL/PF 0.2MG/0.2
0.2 SYRINGE (ML) INTRAVENOUS EVERY 4 HOURS PRN
Status: DISCONTINUED | OUTPATIENT
Start: 2018-08-08 | End: 2018-08-08

## 2018-08-08 RX ORDER — MUPIROCIN 20 MG/G
1 OINTMENT TOPICAL 2 TIMES DAILY
Status: DISCONTINUED | OUTPATIENT
Start: 2018-08-09 | End: 2018-09-12

## 2018-08-08 RX ORDER — ACETAMINOPHEN 325 MG/1
650 TABLET ORAL EVERY 6 HOURS
Status: DISCONTINUED | OUTPATIENT
Start: 2018-08-08 | End: 2018-08-10

## 2018-08-08 RX ORDER — OXYCODONE HYDROCHLORIDE 5 MG/1
5 TABLET ORAL EVERY 6 HOURS PRN
Status: DISCONTINUED | OUTPATIENT
Start: 2018-08-08 | End: 2018-08-13

## 2018-08-08 RX ORDER — QUETIAPINE FUMARATE 25 MG/1
25 TABLET, FILM COATED ORAL EVERY MORNING
Status: DISCONTINUED | OUTPATIENT
Start: 2018-08-09 | End: 2018-08-12

## 2018-08-08 RX ORDER — ACETAMINOPHEN 325 MG/1
650 TABLET ORAL EVERY 6 HOURS PRN
Status: DISCONTINUED | OUTPATIENT
Start: 2018-08-08 | End: 2018-08-08

## 2018-08-08 RX ORDER — FUROSEMIDE 10 MG/ML
40 INJECTION INTRAMUSCULAR; INTRAVENOUS ONCE
Status: COMPLETED | OUTPATIENT
Start: 2018-08-08 | End: 2018-08-08

## 2018-08-08 RX ORDER — CEFAZOLIN SODIUM 1 G/3ML
1 INJECTION, POWDER, FOR SOLUTION INTRAMUSCULAR; INTRAVENOUS SEE ADMIN INSTRUCTIONS
Status: CANCELLED | OUTPATIENT
Start: 2018-08-08

## 2018-08-08 RX ORDER — QUETIAPINE FUMARATE 50 MG/1
50 TABLET, FILM COATED ORAL AT BEDTIME
Status: DISCONTINUED | OUTPATIENT
Start: 2018-08-08 | End: 2018-08-12

## 2018-08-08 RX ORDER — CEFAZOLIN SODIUM 2 G/100ML
2 INJECTION, SOLUTION INTRAVENOUS
Status: CANCELLED | OUTPATIENT
Start: 2018-08-08

## 2018-08-08 RX ORDER — POLYETHYLENE GLYCOL 3350 17 G/17G
17 POWDER, FOR SOLUTION ORAL DAILY
Status: DISCONTINUED | OUTPATIENT
Start: 2018-08-08 | End: 2018-08-10

## 2018-08-08 RX ORDER — DOCUSATE SODIUM 100 MG/1
100 CAPSULE, LIQUID FILLED ORAL 2 TIMES DAILY
Status: DISCONTINUED | OUTPATIENT
Start: 2018-08-08 | End: 2018-08-11

## 2018-08-08 RX ADMIN — DOBUTAMINE HYDROCHLORIDE 7.5 MCG/KG/MIN: 400 INJECTION INTRAVENOUS at 19:22

## 2018-08-08 RX ADMIN — QUETIAPINE FUMARATE 25 MG: 25 TABLET, FILM COATED ORAL at 07:52

## 2018-08-08 RX ADMIN — ASPIRIN 81 MG CHEWABLE TABLET 81 MG: 81 TABLET CHEWABLE at 07:52

## 2018-08-08 RX ADMIN — FUROSEMIDE 40 MG: 10 INJECTION, SOLUTION INTRAVENOUS at 17:56

## 2018-08-08 RX ADMIN — Medication 0.5 MG: at 00:16

## 2018-08-08 RX ADMIN — PANTOPRAZOLE SODIUM 40 MG: 40 TABLET, DELAYED RELEASE ORAL at 07:52

## 2018-08-08 RX ADMIN — QUETIAPINE FUMARATE 50 MG: 50 TABLET ORAL at 21:15

## 2018-08-08 RX ADMIN — ACETAMINOPHEN 650 MG: 325 TABLET, FILM COATED ORAL at 17:56

## 2018-08-08 RX ADMIN — HYDROXYUREA 500 MG: 500 CAPSULE ORAL at 07:52

## 2018-08-08 RX ADMIN — ACETAMINOPHEN 650 MG: 325 TABLET, FILM COATED ORAL at 13:09

## 2018-08-08 RX ADMIN — QUETIAPINE FUMARATE 25 MG: 25 TABLET, FILM COATED ORAL at 23:59

## 2018-08-08 RX ADMIN — HEPARIN SODIUM 5000 UNITS: 5000 INJECTION, SOLUTION INTRAVENOUS; SUBCUTANEOUS at 15:16

## 2018-08-08 RX ADMIN — OXYCODONE HYDROCHLORIDE 5 MG: 5 TABLET ORAL at 21:55

## 2018-08-08 RX ADMIN — CANGRELOR 0.75 MCG/KG/MIN: 50 INJECTION, POWDER, LYOPHILIZED, FOR SOLUTION INTRAVENOUS at 19:22

## 2018-08-08 RX ADMIN — ACETAMINOPHEN 650 MG: 325 TABLET, FILM COATED ORAL at 23:59

## 2018-08-08 RX ADMIN — BENZOCAINE AND MENTHOL 1 LOZENGE: 15; 3.6 LOZENGE ORAL at 18:05

## 2018-08-08 RX ADMIN — DOCUSATE SODIUM 100 MG: 100 CAPSULE, LIQUID FILLED ORAL at 13:09

## 2018-08-08 RX ADMIN — DOCUSATE SODIUM 100 MG: 100 CAPSULE, LIQUID FILLED ORAL at 20:40

## 2018-08-08 RX ADMIN — Medication 0.5 MG: at 03:26

## 2018-08-08 RX ADMIN — HEPARIN SODIUM 5000 UNITS: 5000 INJECTION, SOLUTION INTRAVENOUS; SUBCUTANEOUS at 03:26

## 2018-08-08 RX ADMIN — DOBUTAMINE HYDROCHLORIDE 7.5 MCG/KG/MIN: 400 INJECTION INTRAVENOUS at 17:42

## 2018-08-08 RX ADMIN — ATORVASTATIN CALCIUM 80 MG: 80 TABLET, FILM COATED ORAL at 20:40

## 2018-08-08 RX ADMIN — LORAZEPAM 0.5 MG: 0.5 TABLET ORAL at 21:55

## 2018-08-08 RX ADMIN — POLYETHYLENE GLYCOL 3350 17 G: 17 POWDER, FOR SOLUTION ORAL at 13:08

## 2018-08-08 RX ADMIN — FUROSEMIDE 40 MG: 10 INJECTION, SOLUTION INTRAVENOUS at 15:16

## 2018-08-08 RX ADMIN — CANGRELOR 0.75 MCG/KG/MIN: 50 INJECTION, POWDER, LYOPHILIZED, FOR SOLUTION INTRAVENOUS at 05:13

## 2018-08-08 ASSESSMENT — ACTIVITIES OF DAILY LIVING (ADL)
ADLS_ACUITY_SCORE: 14
ADLS_ACUITY_SCORE: 18
NUMBER_OF_TIMES_PATIENT_HAS_FALLEN_WITHIN_LAST_SIX_MONTHS: 1
PREVIOUS_RESPONSIBILITIES: MEAL PREP;HOUSEKEEPING;LAUNDRY;SHOPPING;YARDWORK;MEDICATION MANAGEMENT;FINANCES;DRIVING
FALL_HISTORY_WITHIN_LAST_SIX_MONTHS: YES
ADLS_ACUITY_SCORE: 18
ADLS_ACUITY_SCORE: 15
ADLS_ACUITY_SCORE: 14
ADLS_ACUITY_SCORE: 18

## 2018-08-08 NOTE — PROGRESS NOTES
CLINICAL NUTRITION SERVICES - brief note. See 8/6 note for full assessment.    Alerted by RN of fair PO intake, would like to try magic cups PRN (already trying Boost, etc).    Interventions  Medical food supplement therapy - entered order for PRN oral supplements      RD will continue to follow.    Elizabeth Berry RD, LD, [facility]  CVICU Dietitian  Pager: [phone]

## 2018-08-08 NOTE — PROGRESS NOTES
CV ICU PROGRESS NOTE  08/08/18  CO-MORBIDITIES:   Cardiogenic Shock   STEMI  S/p HUSSEIN to distal RCA & Proximal LAD  Infraseptal VSD  Myeloproliferative Syndrome  TIA  S/p Splenectomy due to trauma    ASSESSMENT: Castillo De Anda is a 68 year old male s/p HUSSEIN to RCA and LAD for STEMI on 8/4 found to have post infarction inferoapical VSD. OR timing tentatively for Friday 8/10 and monitoring now for optimization prior to surgery, IABP in place. NATALIIA on labs today with low urine output.    TODAY'S PROGRESS:   - Seroquel 50mg nightime dose   -Seroquel 25mg BID PRN  -Bowel meds  -EKG tomorrow to evaluate QTc (466ms from EKG 8/6)  -Planning OR 8/10 @0715  -Net even goal  -Hydroxyurea per Hematology    PLAN:  Neuro/ pain/ sedation:  #Anxiety  - Seroquel 50mg at night, also 25mg BID PRN available  - Tylenol scheduled, oxycodone PRN 5mg Q6h  - Monitor neurological status. Notify the MD for any acute changes in exam.    Pulmonary care:   - Supplemental oxygen to keep saturation above 92 %.  - Incentive spirometer every 15- 30 minutes when awake.  - Monitor respiratory status    Cardiovascular:  #Post infarction 2.5cm infraseptal VSD  #s/p HUSSEIN to distal RCA and proximal LAD  #STEMI, cardiogenic shock  #Dilated RV   #Pulmonary HTN  - Monitor hemodynamic status.   - Infusions: Dobutamine 7.5mcg/kg/min  - Consider diuresis with low dose of lasix    Heme:  #Anticoagluation s/p Drug Eluting Stent placement  -Continue Cangrelor until time of surgery, planned for 8/10 tentatively, reconsider if surgical timing changed to next week    GI care:  #Transaminitis   - Cardiac diet  -Colace 100mg BID  -NPO at midnight 8/9    Fluids/ Electrolytes/ Nutrition:   - mIVF for IV fluid hydration  - Wiggins  - Net even goal    Renal/ Fluid Balance:    #Acute Kidney injury  - Urine output low today.  - Will continue to monitor intake and output via wiggins    Endocrine:  - Sliding scale for diabetes management when TF started    ID/ Antibiotics:  - No  antibiotic needs currently  - Monitor fever curve, trend WBC    Prophylaxis:    - Mechanical prophylaxis for DVT.   - Anticoagulation with Cangrelor  - Protonix    Lines/ tubes/ drains:  - PIV, CVC/[place], IABP    Disposition:  - CV ICU    Patient seen, findings and plan discussed with CV ICU staff, Dr. Porras.    Brad Hawkins MD  8/8/2018 11:35 AM  Anesthesia Resident  PGY4/CA-3    ====================================    SUBJECTIVE:   Agitated at times overnight, desaturated into 70% briefly. Given Seroquel for sedation.      OBJECTIVE:   1. VITAL SIGNS:   Temp:  [96.6  F (35.9  C)-97.8  F (36.6  C)] 96.8  F (36  C)  Heart Rate:  [] 93  Resp:  [14-22] 22  BP: ()/() 74/56  SpO2:  [85 %-100 %] 99 %  Resp: 22    2. INTAKE/ OUTPUT:   I/O last 3 completed shifts:  In: 1390.25 [I.V.:890.25; IV Piggyback:500]  Out: 255 [Urine:255]    3. PHYSICAL EXAMINATION:    General: Alert, lying flat due to invasive monitoring  Neuro: A&Ox3, NAD  Resp: Breathing non-labored on nasal cannula  CV: RRR  Abdomen: Soft, Non-distended, Non-tender  Incisions: Right central line internal jugular,   Extremities: warm and well perfused    4. INVESTIGATIONS:   Arterial Blood Gases   No lab results found in last 7 days.  Complete Blood Count     Recent Labs  Lab 08/07/18  1210 08/07/18  0403 08/07/18  0015 08/06/18 1957   WBC 21.3* 21.7* 24.1* 23.0*   HGB 10.9* 11.2* 11.1* 11.7*   * 869* 836* 833*     Basic Metabolic Panel    Recent Labs  Lab 08/08/18  0503 08/07/18  1210 08/07/18  0403 08/06/18 1957   * 133 132* 132*   POTASSIUM 5.1 4.8 5.0 4.9   CHLORIDE 105 104 103 102   CO2 19* 19* 20 21   BUN 45* 32* 26 22   CR 1.55* 1.39* 1.40* 1.17   * 130* 135* 140*     Liver Function Tests    Recent Labs  Lab 08/08/18  0503 08/07/18  0403 08/06/18  1957 08/06/18  1144 08/06/18  0330 08/06/18  0218   AST  --  73* 86* 95*  --  77*   ALT  --  28 28 30  --  23   ALKPHOS  --  85 91 90  --  114   BILITOTAL  --  0.4 0.4  0.3  --  0.6   ALBUMIN  --  2.4* 2.4* 2.3*  --  2.6*   INR 1.41* 1.39*  --   --  2.05*  --      Pancreatic Enzymes  No lab results found in last 7 days.  Coagulation Profile    Recent Labs  Lab 08/08/18  0503 08/07/18  0403 08/06/18  0330   INR 1.41* 1.39* 2.05*         5. RADIOLOGY:   Recent Results (from the past 24 hour(s))   XR Chest Port 1 View    Narrative    Exam:  Chest X-ray 8/7/2018 9:25 AM    History: IABP, [place];     Comparison: 8/6/2018    Findings: AP chest radiograph. [place]-Owen catheter with tip in the  right pulmonary artery. Intra-aortic balloon pump in stable position  in the high descending aorta. Surgical clips in the superior  mediastinum and neck. Cardiac size within normal limits. Interval  improvement of left retrocardiac opacity. Small left pleural effusion.  No new focal pulmonary opacities, perihilar and interstitial opacities  are grossly unchanged. No acute bony abnormalities.      Impression    Impression:  1. Interval improvement of left retrocardiac opacity. Small left  pleural effusion noted.  2. Stable perihilar and interstitial opacities, favoring pulmonary  edema    I have personally reviewed the examination and initial interpretation  and I agree with the findings.    TARAN LOCKE MD

## 2018-08-08 NOTE — PLAN OF CARE
Problem: Patient Care Overview  Goal: Plan of Care/Patient Progress Review  Outcome: No Change  Pt A&Ox4 with periods of anxiety/panic and hyperventilation with desats into the 70's. Reassurance and calming techniques given per RN. Seroquel, ativan and hydromorphone given.  NSR/ST 90s-100s with rare PACs.  IABP in place 1:1/100%. CVP 12-14, PAP 38/22, Afebrile. 2L NC. Lung sounds clear/diminished. Herring with poor UOP. Creat increased to 1.55. Team aware. No bm. Continue plan of care. Continue to update team with changes.

## 2018-08-08 NOTE — PLAN OF CARE
Problem: Patient Care Overview  Goal: Plan of Care/Patient Progress Review  OT 4E: Evaluation completed and treatment initiated    Discharge Planner OT   Patient plan for discharge: home with wife  Current status: currently on bedrest 2/2 femoral line, deconditioned with noted SOB during light in bed activity  Barriers to return to prior living situation: deconditioning, medical status  Recommendations for discharge: TBD pending OOB activity assessment and potential surgical course  Rationale for recommendations: TBD. Given pt's PLOF and supportive family, anticipate home with OP CR       Entered by: Azalea Bruno 08/08/2018 3:04 PM

## 2018-08-08 NOTE — PROGRESS NOTES
Cardiology Progress Note    Assessment & Plan   69 yo M who presented with confusion chest pain and inferior STEMI s/p HUSSEIN to distal RCA and p LAD. Course complicated by inferoapical VSD    # Post infarction VSD   - c/b cardiogenic shock   -s/p IABP on dobutamine with stable CI    - echo showed EF 55-60% inferor wall akinesis with inferoseptal muscular VAD with peak velocity of 3.7m/sec mild to mod RV dilation and global moderate dysfunction.    - amplatz likely not possible given the proximity to the wall- plan for possible OR in the am   - cangrelor given fresh stents     #Abnormal LFT c/w hepatic congestion    # NATALIIA   - baseline SCr 0.9-1-> currently trending up 1.55   - will treat per Jose nice suspect it is secondary to hemodynamics, has not been hypotensive here though admitted with 2 pressors and consideration for advanced therapies from OSH  - UA with micro to assess for ATN    # CAD s/p inferoapical STEMI                        Right coronary occlusions treated with HUSSEIN                        Proximal LAD stenosis treated with HUSSEIN    Last dose of brillinta was 8/5 pm per discussion with CVTS - cangrelor- surgery Friday??    # Myeloproliferative syndrome (P VERA)   -history of TIA   - appreciate heme/onc recs- started hydroxyurea 500mg daily   -thrombocytosis    # severe malnutrition   - nutrition consult appreciate recs    Chronic  - Childhood tuberculosis  - Splenectomy secondary to trauma  -Gallstones  -History of prostate cancer  - History of meningioma treated with surgery  - Hypoproteinemia- nutrition consult  -smoker    Code Status: Full Code  DVT prophylaxis- on heparin SC  GI prophy- pantoprazole    Case discussed with Dr. Foster Saldana   Cardiology fellow, PGY-5    Interval History   No acute events overnight  Hemodynamically stable on IABP CI dropped somewhat overnight after drips were titrated with SVO2 from CVP     CVP 13 PA 32/22 PCWP 12 sVO2 pending      Physical  "Exam   Temp: 96.8  F (36  C) Temp src: Axillary BP: (!) 74/56   Heart Rate: 93 Resp: 22 SpO2: 99 % O2 Device: Nasal cannula Oxygen Delivery: 2 LPM  Vitals:    08/06/18 0200 08/07/18 0400 08/08/18 0500   Weight: 77.3 kg (170 lb 6.7 oz) 76.7 kg (169 lb 1.5 oz) 78.3 kg (172 lb 9.9 oz)     Vital Signs with Ranges  Temp:  [96.6  F (35.9  C)-97.8  F (36.6  C)] 96.8  F (36  C)  Heart Rate:  [] 93  Resp:  [14-22] 22  BP: ()/() 74/56  SpO2:  [85 %-100 %] 99 %  I/O last 3 completed shifts:  In: 1390.25 [I.V.:890.25; IV Piggyback:500]  Out: 255 [Urine:255]    Heart Rate: 93, Blood pressure (!) 74/56, temperature 96.8  F (36  C), temperature source Axillary, resp. rate 22, height 1.753 m (5' 9\"), weight 78.3 kg (172 lb 9.9 oz), SpO2 99 %.  172 lbs 9.92 oz  GEN:  Alert, oriented x 3, appears comfortable, NAD.  CV:  Regular rate and rhythm, systolic murmur at apex, IABP site clean not bleeding no hematoma  LUNGS:  Clear to auscultation bilaterally   ABD:  Active bowel sounds, soft, non-tender/non-distended.  No rebound/guarding/rigidity.  EXT:  No edema or cyanosis.      Medications     cangrelor (KENGREAL) infusion ADULT 0.75 mcg/kg/min (08/08/18 0700)     DOBUTamine 7.5 mcg/kg/min (08/08/18 0700)     - MEDICATION INSTRUCTIONS -       Reason ACE/ARB/ARNI order not selected       Reason beta blocker order not selected         aspirin  81 mg Oral Daily     atorvastatin  80 mg Oral QPM     heparin  5,000 Units Subcutaneous Q12H     hydroxyurea  500 mg Oral Daily     pantoprazole  40 mg Oral QAM AC       Data     Recent Labs  Lab 08/08/18  0503 08/07/18  1210 08/07/18  0403 08/07/18  0015 08/06/18  1957  08/06/18  0330   WBC  --  21.3* 21.7* 24.1* 23.0*  < >  --    HGB  --  10.9* 11.2* 11.1* 11.7*  < >  --    MCV  --  91 90 91 90  < >  --    PLT  --  865* 869* 836* 833*  < >  --    INR 1.41*  --  1.39*  --   --   --  2.05*   * 133 132*  --  132*  < > 129*   POTASSIUM 5.1 4.8 5.0  --  4.9  < > 5.1   CHLORIDE " 105 104 103  --  102  < > 102   CO2 19* 19* 20  --  21  < > 17*   BUN 45* 32* 26  --  22  < > 15   CR 1.55* 1.39* 1.40*  --  1.17  < > 0.91   ANIONGAP 8 10 9  --  10  < > 10   GABRIELA 7.7* 7.3* 7.5*  --  7.6*  < > 6.9*   * 130* 135*  --  140*  < > 169*   ALBUMIN  --   --  2.4*  --  2.4*  < >  --    PROTTOTAL  --   --  6.2*  --  6.1*  < >  --    BILITOTAL  --   --  0.4  --  0.4  < >  --    ALKPHOS  --   --  85  --  91  < >  --    ALT  --   --  28  --  28  < >  --    AST  --   --  73*  --  86*  < >  --    < > = values in this interval not displayed.    Recent Results (from the past 24 hour(s))   XR Chest Port 1 View    Narrative    Exam:  Chest X-ray 8/7/2018 9:25 AM    History: IABP, [place];     Comparison: 8/6/2018    Findings: AP chest radiograph. [place]-Owen catheter with tip in the  right pulmonary artery. Intra-aortic balloon pump in stable position  in the high descending aorta. Surgical clips in the superior  mediastinum and neck. Cardiac size within normal limits. Interval  improvement of left retrocardiac opacity. Small left pleural effusion.  No new focal pulmonary opacities, perihilar and interstitial opacities  are grossly unchanged. No acute bony abnormalities.      Impression    Impression:  1. Interval improvement of left retrocardiac opacity. Small left  pleural effusion noted.  2. Stable perihilar and interstitial opacities, favoring pulmonary  edema    I have personally reviewed the examination and initial interpretation  and I agree with the findings.    TARAN LOCKE MD         Echo  Interpretation Summary  Left ventricular size is normal.  The Ejection Fraction is estimated at 55-60%.  Inferior wall akinesis with inferoseptal muscular VAD. Peak velocity 3.7m/sec.  Mild to moderate right ventricular dilation is present.  Global right ventricular function is moderately reduced.  Dilation of the inferior vena cava is present with abnormal respiratory  variation in diameter.  No pericardial effusion  is present.  Previous study not available for comparison.    I personally provided care for this patient, reviewed chart, discussed course with patient, housestaff and consulting physicians.  I answered all questions.    Camilo Hutchison M.D.  Division of Cardiology  Department of Medicine

## 2018-08-08 NOTE — PLAN OF CARE
Problem: Patient Care Overview  Goal: Plan of Care/Patient Progress Review  Outcome: No Change  Alert and oriented x4.  MAP 70-90, -100s SR/ST, PAP44/24, CO/CI 4.9/2.5, CVP13; continue dobutamine at same rate.  Temp 96.5.  Spontaneous breathing with NC 2L, SpO2 good.  With episode of hyperventilation with HR dropped to 50-60 at the same time, then subsided spontaneously in about 1 min.  Continue cangrelor drip.  U/O poor 10cc per hour with IV lasix 40+ 40 mg given with minimal effect.  Continue IABP 1:1 via right femoral access.  Bilateral lower extremities with baseline poor circulation.  Poor appetite with oral intake encourage.  Pending surgery Friday.

## 2018-08-08 NOTE — PROGRESS NOTES
08/08/18 0919   Quick Adds   Type of Visit Initial Occupational Therapy Evaluation   Living Environment   Lives With spouse   Living Arrangements house  (split level)   Number of Stairs to Enter Home 2   Number of Stairs Within Home 7  (split level)   Stair Railings at Home inside, present at both sides;outside, present at both sides   Transportation Available car   Self-Care   Dominant Hand right   Usual Activity Tolerance good   Current Activity Tolerance fair   Regular Exercise yes   Activity/Exercise Type walking   Exercise Amount/Frequency daily   Equipment Currently Used at Home walker, standard  (does not use currently)   Activity/Exercise/Self-Care Comment walks dog 2 miles/day   Functional Level Prior   Ambulation 0-->independent   Transferring 0-->independent   Toileting 0-->independent   Bathing 0-->independent   Dressing 0-->independent   Eating 0-->independent   Communication 0-->understands/communicates without difficulty   Swallowing 0-->swallows foods/liquids without difficulty   Cognition 0 - no cognition issues reported   General Information   Onset of Illness/Injury or Date of Surgery - Date 08/06/18   Referring Physician Hermes Munroe MD   Patient/Family Goals Statement return home with OP CR as needed   Additional Occupational Profile Info/Pertinent History of Current Problem 67 yo M who presented with confusion chest pain and inferior STEMI s/p HUSSEIN to distal RCA and p LAD. Awaiting further potential surgical course   General Info Comments balloon pump right LE, bedrest   Cognitive Status Examination   Orientation orientation to person, place and time   Level of Consciousness alert   Able to Follow Commands WNL/WFL   Personal Safety (Cognitive) WNL/WFL   Memory intact   Attention No deficits were identified   Organization/Problem Solving No deficits were identified   Executive Function Cognitive flexibility impaired;Working memory impaired, decreased storage of information for performing  tasks   Cognitive Comment Pt/wife question if mild deficits are due to new diagnosis/feeling overwhelmed with current medical status   Visual Perception   Visual Perception No deficits were identified   Sensory Examination   Sensory Quick Adds No deficits were identified   Pain Assessment   Patient Currently in Pain No   Posture   Posture not impaired   Range of Motion (ROM)   ROM Quick Adds No deficits were identified   Hand Strength   Hand Strength Comments good grasp   Muscle Tone Assessment   Muscle Tone Quick Adds No deficits were identified   Coordination   Upper Extremity Coordination No deficits were identified   Transfer Skills   Transfer Comments Pt on bedrest 2/2 balloon pump   Transfer Skill: Bed to Chair/Chair to Bed   Level of [place]: Bed to Chair unable to perform   Transfer Skill: Sit to Stand   Level of [place]: Sit/Stand unable to perform   Transfer Skill: Toilet Transfer   Level of [place]: Toilet unable to perform   Grooming   Level of [place]: Grooming stand-by assist   Physical Assist/Nonphysical Assist: Grooming set-up required   Eating/Self Feeding   Level of [place]: Eating independent   Instrumental Activities of Daily Living (IADL)   Previous Responsibilities meal prep;housekeeping;laundry;shopping;yardwork;medication management;finances;driving   Activities of Daily Living Analysis   Impairments Contributing to Impaired Activities of Daily Living cognition impaired;fear and anxiety;post surgical precautions;strength decreased   ADL Comments Pt at risk for further deconditioning given current bedridden status and anticipated surgical intervention end of this week   General Therapy Interventions   Planned Therapy Interventions ADL retraining;IADL retraining;bed mobility training;strengthening;transfer training;home program guidelines;progressive activity/exercise;risk factor education;cognition   Intervention Comments Anticipating need for further education and use  "of activity precautions following anticipated surgery end of week   Clinical Impression   Criteria for Skilled Therapeutic Interventions Met yes, treatment indicated   OT Diagnosis deconditioning, impaired cognition, decreased I with ADLs   Influenced by the following impairments pending surgical intervention, bedrest status (balloon pump), dyspnea   Assessment of Occupational Performance 3-5 Performance Deficits   Identified Performance Deficits decreased dressing, showering, toileting, home mgmt, community integration   Clinical Decision Making (Complexity) Moderate complexity   Therapy Frequency 5 times/wk   Predicted Duration of Therapy Intervention (days/wks) 2 weeks   Anticipated Discharge Disposition Home with Outpatient Therapy  (OP CR)   Risks and Benefits of Treatment have been explained. Yes   Patient, Family & other staff in agreement with plan of care Yes   [facility] TM \"6 Clicks\"   2016, Trustees of [employer], under license to [employer].  All rights reserved.   6 Clicks Short Forms Daily Activity Inpatient Short Form   [employer] AM-PAC  \"6 Clicks\" Daily Activity Inpatient Short Form   1. Putting on and taking off regular lower body clothing? 3 - A Little   2. Bathing (including washing, rinsing, drying)? 2 - A Lot   3. Toileting, which includes using toilet, bedpan or urinal? 3 - A Little   4. Putting on and taking off regular upper body clothing? 4 - None   5. Taking care of personal grooming such as brushing teeth? 3 - A Little   6. Eating meals? 4 - None   Daily Activity Raw Score (Score out of 24.Lower scores equate to lower levels of function) 19   Total Evaluation Time   Total Evaluation Time (Minutes) 15     "

## 2018-08-09 ENCOUNTER — APPOINTMENT (OUTPATIENT)
Dept: CT IMAGING | Facility: CLINIC | Age: 68
DRG: 219 | End: 2018-08-09
Attending: STUDENT IN AN ORGANIZED HEALTH CARE EDUCATION/TRAINING PROGRAM
Payer: MEDICARE

## 2018-08-09 ENCOUNTER — ALLIED HEALTH/NURSE VISIT (OUTPATIENT)
Dept: NEUROLOGY | Facility: CLINIC | Age: 68
DRG: 219 | End: 2018-08-09
Attending: PSYCHIATRY & NEUROLOGY
Payer: MEDICARE

## 2018-08-09 DIAGNOSIS — R57.0 CARDIOGENIC SHOCK (H): Primary | ICD-10-CM

## 2018-08-09 LAB
ANION GAP SERPL CALCULATED.3IONS-SCNC: 9 MMOL/L (ref 3–14)
BASE DEFICIT BLDV-SCNC: 2.3 MMOL/L
BASE DEFICIT BLDV-SCNC: 3.4 MMOL/L
BASE DEFICIT BLDV-SCNC: 3.5 MMOL/L
BASE DEFICIT BLDV-SCNC: 3.5 MMOL/L
BASE DEFICIT BLDV-SCNC: 3.8 MMOL/L
BASE DEFICIT BLDV-SCNC: 4.2 MMOL/L
BASE DEFICIT BLDV-SCNC: 4.6 MMOL/L
BUN SERPL-MCNC: 46 MG/DL (ref 7–30)
CALCIUM SERPL-MCNC: 7.6 MG/DL (ref 8.5–10.1)
CHLORIDE SERPL-SCNC: 104 MMOL/L (ref 94–109)
CO2 SERPL-SCNC: 20 MMOL/L (ref 20–32)
CREAT SERPL-MCNC: 1.28 MG/DL (ref 0.66–1.25)
ERYTHROCYTE [DISTWIDTH] IN BLOOD BY AUTOMATED COUNT: 15.8 % (ref 10–15)
GFR SERPL CREATININE-BSD FRML MDRD: 56 ML/MIN/1.7M2
GLUCOSE BLDC GLUCOMTR-MCNC: 132 MG/DL (ref 70–99)
GLUCOSE BLDC GLUCOMTR-MCNC: 144 MG/DL (ref 70–99)
GLUCOSE BLDC GLUCOMTR-MCNC: 148 MG/DL (ref 70–99)
GLUCOSE BLDC GLUCOMTR-MCNC: 151 MG/DL (ref 70–99)
GLUCOSE SERPL-MCNC: 121 MG/DL (ref 70–99)
HCO3 BLDV-SCNC: 18 MMOL/L (ref 21–28)
HCO3 BLDV-SCNC: 19 MMOL/L (ref 21–28)
HCO3 BLDV-SCNC: 20 MMOL/L (ref 21–28)
HCO3 BLDV-SCNC: 21 MMOL/L (ref 21–28)
HCO3 BLDV-SCNC: 21 MMOL/L (ref 21–28)
HCT VFR BLD AUTO: 32.1 % (ref 40–53)
HGB BLD-MCNC: 10.5 G/DL (ref 13.3–17.7)
INR PPP: 1.34 (ref 0.86–1.14)
LMWH PPP CHRO-ACNC: <0.1 IU/ML
MAGNESIUM SERPL-MCNC: 2.8 MG/DL (ref 1.6–2.3)
MAGNESIUM SERPL-MCNC: 2.9 MG/DL (ref 1.6–2.3)
MCH RBC QN AUTO: 29.9 PG (ref 26.5–33)
MCHC RBC AUTO-ENTMCNC: 32.7 G/DL (ref 31.5–36.5)
MCV RBC AUTO: 92 FL (ref 78–100)
O2/TOTAL GAS SETTING VFR VENT: ABNORMAL %
OXYHGB MFR BLDV: 54 %
OXYHGB MFR BLDV: 61 %
OXYHGB MFR BLDV: 61 %
OXYHGB MFR BLDV: 63 %
OXYHGB MFR BLDV: 65 %
OXYHGB MFR BLDV: 84 %
OXYHGB MFR BLDV: 86 %
PCO2 BLDV: 27 MM HG (ref 40–50)
PCO2 BLDV: 29 MM HG (ref 40–50)
PCO2 BLDV: 30 MM HG (ref 40–50)
PCO2 BLDV: 31 MM HG (ref 40–50)
PCO2 BLDV: 31 MM HG (ref 40–50)
PCO2 BLDV: 33 MM HG (ref 40–50)
PCO2 BLDV: 35 MM HG (ref 40–50)
PH BLDV: 7.39 PH (ref 7.32–7.43)
PH BLDV: 7.4 PH (ref 7.32–7.43)
PH BLDV: 7.42 PH (ref 7.32–7.43)
PH BLDV: 7.43 PH (ref 7.32–7.43)
PH BLDV: 7.43 PH (ref 7.32–7.43)
PH BLDV: 7.44 PH (ref 7.32–7.43)
PH BLDV: 7.46 PH (ref 7.32–7.43)
PHOSPHATE SERPL-MCNC: 4.6 MG/DL (ref 2.5–4.5)
PLATELET # BLD AUTO: 956 10E9/L (ref 150–450)
PO2 BLDV: 34 MM HG (ref 25–47)
PO2 BLDV: 35 MM HG (ref 25–47)
PO2 BLDV: 37 MM HG (ref 25–47)
PO2 BLDV: 37 MM HG (ref 25–47)
PO2 BLDV: 39 MM HG (ref 25–47)
PO2 BLDV: 56 MM HG (ref 25–47)
PO2 BLDV: 57 MM HG (ref 25–47)
POTASSIUM SERPL-SCNC: 4.5 MMOL/L (ref 3.4–5.3)
POTASSIUM SERPL-SCNC: 4.6 MMOL/L (ref 3.4–5.3)
RBC # BLD AUTO: 3.51 10E12/L (ref 4.4–5.9)
SODIUM SERPL-SCNC: 133 MMOL/L (ref 133–144)
SPECIMEN SOURCE: NORMAL
VWF MULTIMERS PPP QL: NORMAL
WBC # BLD AUTO: 19.8 10E9/L (ref 4–11)
YEAST SPEC QL CULT: NORMAL

## 2018-08-09 PROCEDURE — A9270 NON-COVERED ITEM OR SERVICE: HCPCS | Mod: GY | Performed by: STUDENT IN AN ORGANIZED HEALTH CARE EDUCATION/TRAINING PROGRAM

## 2018-08-09 PROCEDURE — 40000076 ZZH STATISTIC IABP MONITORING

## 2018-08-09 PROCEDURE — 85610 PROTHROMBIN TIME: CPT | Performed by: INTERNAL MEDICINE

## 2018-08-09 PROCEDURE — 20000004 ZZH R&B ICU UMMC

## 2018-08-09 PROCEDURE — 93010 ELECTROCARDIOGRAM REPORT: CPT | Performed by: INTERNAL MEDICINE

## 2018-08-09 PROCEDURE — 00000146 ZZHCL STATISTIC GLUCOSE BY METER IP

## 2018-08-09 PROCEDURE — 25000132 ZZH RX MED GY IP 250 OP 250 PS 637: Mod: GY | Performed by: HOSPITALIST

## 2018-08-09 PROCEDURE — 83735 ASSAY OF MAGNESIUM: CPT | Performed by: STUDENT IN AN ORGANIZED HEALTH CARE EDUCATION/TRAINING PROGRAM

## 2018-08-09 PROCEDURE — 25000128 H RX IP 250 OP 636: Performed by: STUDENT IN AN ORGANIZED HEALTH CARE EDUCATION/TRAINING PROGRAM

## 2018-08-09 PROCEDURE — C9460 INJECTION, CANGRELOR: HCPCS | Performed by: STUDENT IN AN ORGANIZED HEALTH CARE EDUCATION/TRAINING PROGRAM

## 2018-08-09 PROCEDURE — 40000281 ZZH STATISTIC TRANSPORT TIME EA 15 MIN

## 2018-08-09 PROCEDURE — 25000128 H RX IP 250 OP 636: Performed by: HOSPITALIST

## 2018-08-09 PROCEDURE — 70450 CT HEAD/BRAIN W/O DYE: CPT

## 2018-08-09 PROCEDURE — 83735 ASSAY OF MAGNESIUM: CPT | Performed by: INTERNAL MEDICINE

## 2018-08-09 PROCEDURE — 99291 CRITICAL CARE FIRST HOUR: CPT | Mod: GC | Performed by: INTERNAL MEDICINE

## 2018-08-09 PROCEDURE — 25000132 ZZH RX MED GY IP 250 OP 250 PS 637: Mod: GY | Performed by: STUDENT IN AN ORGANIZED HEALTH CARE EDUCATION/TRAINING PROGRAM

## 2018-08-09 PROCEDURE — A9270 NON-COVERED ITEM OR SERVICE: HCPCS | Mod: GY | Performed by: HOSPITALIST

## 2018-08-09 PROCEDURE — 25000125 ZZHC RX 250: Performed by: PHYSICIAN ASSISTANT

## 2018-08-09 PROCEDURE — 84132 ASSAY OF SERUM POTASSIUM: CPT | Performed by: STUDENT IN AN ORGANIZED HEALTH CARE EDUCATION/TRAINING PROGRAM

## 2018-08-09 PROCEDURE — 40000196 ZZH STATISTIC RAPCV CVP MONITORING

## 2018-08-09 PROCEDURE — 40000275 ZZH STATISTIC RCP TIME EA 10 MIN

## 2018-08-09 PROCEDURE — 85027 COMPLETE CBC AUTOMATED: CPT | Performed by: INTERNAL MEDICINE

## 2018-08-09 PROCEDURE — 80048 BASIC METABOLIC PNL TOTAL CA: CPT | Performed by: INTERNAL MEDICINE

## 2018-08-09 PROCEDURE — 85520 HEPARIN ASSAY: CPT | Performed by: INTERNAL MEDICINE

## 2018-08-09 PROCEDURE — A9270 NON-COVERED ITEM OR SERVICE: HCPCS | Mod: GY | Performed by: INTERNAL MEDICINE

## 2018-08-09 PROCEDURE — 95951 ZZHC EEG VIDEO < 12 HR: CPT | Mod: 52,ZF

## 2018-08-09 PROCEDURE — 82805 BLOOD GASES W/O2 SATURATION: CPT | Performed by: STUDENT IN AN ORGANIZED HEALTH CARE EDUCATION/TRAINING PROGRAM

## 2018-08-09 PROCEDURE — 84100 ASSAY OF PHOSPHORUS: CPT | Performed by: INTERNAL MEDICINE

## 2018-08-09 PROCEDURE — 25000132 ZZH RX MED GY IP 250 OP 250 PS 637: Mod: GY | Performed by: INTERNAL MEDICINE

## 2018-08-09 PROCEDURE — 40000048 ZZH STATISTIC DAILY SWAN MONITORING

## 2018-08-09 PROCEDURE — P9047 ALBUMIN (HUMAN), 25%, 50ML: HCPCS | Performed by: STUDENT IN AN ORGANIZED HEALTH CARE EDUCATION/TRAINING PROGRAM

## 2018-08-09 PROCEDURE — 93005 ELECTROCARDIOGRAM TRACING: CPT

## 2018-08-09 RX ORDER — HYDROXYUREA 500 MG/1
500 CAPSULE ORAL 2 TIMES DAILY
Status: DISCONTINUED | OUTPATIENT
Start: 2018-08-09 | End: 2018-08-10

## 2018-08-09 RX ORDER — LORAZEPAM 2 MG/ML
2 INJECTION INTRAMUSCULAR
Status: DISCONTINUED | OUTPATIENT
Start: 2018-08-09 | End: 2018-08-10

## 2018-08-09 RX ORDER — FUROSEMIDE 10 MG/ML
40 INJECTION INTRAMUSCULAR; INTRAVENOUS ONCE
Status: COMPLETED | OUTPATIENT
Start: 2018-08-09 | End: 2018-08-09

## 2018-08-09 RX ORDER — ALBUMIN (HUMAN) 12.5 G/50ML
12.5 SOLUTION INTRAVENOUS ONCE
Status: COMPLETED | OUTPATIENT
Start: 2018-08-09 | End: 2018-08-09

## 2018-08-09 RX ADMIN — LEVETIRACETAM 2000 MG: 100 INJECTION, SOLUTION INTRAVENOUS at 18:57

## 2018-08-09 RX ADMIN — FUROSEMIDE 40 MG: 10 INJECTION, SOLUTION INTRAVENOUS at 12:21

## 2018-08-09 RX ADMIN — DOCUSATE SODIUM 100 MG: 100 CAPSULE, LIQUID FILLED ORAL at 07:53

## 2018-08-09 RX ADMIN — ATORVASTATIN CALCIUM 80 MG: 80 TABLET, FILM COATED ORAL at 20:09

## 2018-08-09 RX ADMIN — ASPIRIN 81 MG CHEWABLE TABLET 81 MG: 81 TABLET CHEWABLE at 07:53

## 2018-08-09 RX ADMIN — HEPARIN SODIUM 5000 UNITS: 5000 INJECTION, SOLUTION INTRAVENOUS; SUBCUTANEOUS at 04:03

## 2018-08-09 RX ADMIN — MUPIROCIN 1 G: 20 OINTMENT TOPICAL at 07:57

## 2018-08-09 RX ADMIN — HYDROXYUREA 500 MG: 500 CAPSULE ORAL at 07:54

## 2018-08-09 RX ADMIN — HYDROXYUREA 500 MG: 500 CAPSULE ORAL at 20:09

## 2018-08-09 RX ADMIN — PANTOPRAZOLE SODIUM 40 MG: 40 TABLET, DELAYED RELEASE ORAL at 07:53

## 2018-08-09 RX ADMIN — QUETIAPINE FUMARATE 25 MG: 25 TABLET, FILM COATED ORAL at 07:54

## 2018-08-09 RX ADMIN — QUETIAPINE FUMARATE 50 MG: 50 TABLET ORAL at 22:55

## 2018-08-09 RX ADMIN — CANGRELOR 0.75 MCG/KG/MIN: 50 INJECTION, POWDER, LYOPHILIZED, FOR SOLUTION INTRAVENOUS at 11:13

## 2018-08-09 RX ADMIN — DOCUSATE SODIUM 100 MG: 100 CAPSULE, LIQUID FILLED ORAL at 20:09

## 2018-08-09 RX ADMIN — POLYETHYLENE GLYCOL 3350 17 G: 17 POWDER, FOR SOLUTION ORAL at 07:52

## 2018-08-09 RX ADMIN — HEPARIN SODIUM 5000 UNITS: 5000 INJECTION, SOLUTION INTRAVENOUS; SUBCUTANEOUS at 16:11

## 2018-08-09 RX ADMIN — ALBUMIN HUMAN 12.5 G: 0.25 SOLUTION INTRAVENOUS at 21:19

## 2018-08-09 RX ADMIN — LORAZEPAM 0.5 MG: 0.5 TABLET ORAL at 20:47

## 2018-08-09 ASSESSMENT — ACTIVITIES OF DAILY LIVING (ADL)
ADLS_ACUITY_SCORE: 13

## 2018-08-09 ASSESSMENT — PAIN DESCRIPTION - DESCRIPTORS: DESCRIPTORS: ACHING

## 2018-08-09 NOTE — MR AVS SNAPSHOT
After Visit Summary   2018    Castillo De Anda    MRN: [id number]           Patient Information     Date Of Birth          1950        Visit Information        Provider Department      2018 2:30 PM [facility] EEG TECH 4 P EEG        Today's Diagnoses     Cardiogenic shock (H)    -  1       Follow-ups after your visit        Your next 10 appointments already scheduled     Aug 10, 2018  7:00 AM CDT   24 Hour Video Visit with [facility] EEG TECH 4   P EEG ([facility])    [address] [id number] [phone]           Magnet: Your appointment is scheduled at [facility]. [address] [place] [id number]            Aug 10, 2018   Procedure with Jason Franco MD   [facility], [place], Same Day Surgery (--)    [address] [place] [id number] [phone]              Who to contact     Please call your clinic at [phone] to:    Ask questions about your health    Make or cancel appointments    Discuss your medicines    Learn about your test results    Speak to your doctor            Additional Information About Your Visit        MyChart Information     InnerWorkingst is an electronic gateway that provides easy, online access to your medical records. With Envoimoinscher, you can request a clinic appointment, read your test results, renew a prescription or communicate with your care team.     To sign up for InnerWorkingst visit the website at [email or web address]   You will be asked to enter the access code listed below, as well as some personal information. Please follow the directions to create your username and password.     Your access code is: [email or web address]  Expires: 2018  4:09 PM     Your access code will  in 90 days. If you need help or a new code, please contact your [facility] or call [phone] for assistance.        Care EveryWhere ID     This  is your Care EveryWhere ID. This could be used by other organizations to access your [facility] medical records  [id number]         Blood Pressure from Last 3 Encounters:   08/09/18 109/55    Weight from Last 3 Encounters:   08/08/18 78.3 kg (172 lb 9.9 oz)              Today, you had the following     No orders found for display         Today's Medication Changes      Notice     This visit is during an admission. Changes to the med list made in this visit will be reflected in the After Visit Summary of the admission.             Primary Care Provider    None Specified       No primary provider on file.        Equal Access to Services     : Hadii rajendra vidal Sokeisha, waximena luqadaha, qamendez kaalmaaruna garcia, giana aguiar . So [facility] [phone].    ATENCIÓN: Si habla español, tiene a durbin disposición servicios gratuitos de asistencia lingüística. [facility] [phone].    We comply with applicable federal civil rights laws and [place] laws. We do not discriminate on the basis of race, color, national origin, age, disability, sex, sexual orientation, or gender identity.            Thank you!     Thank you for choosing [facility]  for your care. Our goal is always to provide you with excellent care. Hearing back from our patients is one way we can continue to improve our services. Please take a few minutes to complete the written survey that you may receive in the mail after your visit with us. Thank you!             Your Updated Medication List - Protect others around you: Learn how to safely use, store and throw away your medicines at www.disposemymeds.org.      Notice     This visit is during an admission. Changes to the med list made in this visit will be reflected in the After Visit Summary of the admission.

## 2018-08-09 NOTE — PROGRESS NOTES
Cardiology Progress Note    Assessment & Plan   67 yo M who presented with confusion chest pain and inferior STEMI s/p HUSSEIN to distal RCA and p LAD. Course complicated by inferoapical VSD    # Post infarction VSD   - c/b cardiogenic shock   -s/p IABP on dobutamine with stable CI    - echo showed EF 55-60% inferor wall akinesis with inferoseptal muscular VAD with peak velocity of 3.7m/sec mild to mod RV dilation and global moderate dysfunction.    - plan for surgical closure tomorrow   - cangrelor given fresh stents     #Abnormal LFT c/w hepatic congestion   - improving    # NATALIIA   - baseline SCr 0.9-1-> currently trending up 1.55   - improving with IABP and decongestion    # CAD s/p inferoapical STEMI                        Right coronary occlusions treated with HUSSEIN                        Proximal LAD stenosis treated with HUSSEIN    Last dose of brillinta was 8/5 pm per discussion with CVTS - cangrelor- surgery Friday??    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 Foster Saldana   Cardiology fellow, PGY-5    Interval History   No acute events overnight  Net positive 699 s/p 40mg IV lasix   SCr downtrending 1.28  plts uptreding 956      Physical Exam   Temp: 96.4  F (35.8  C) Temp src: Axillary BP: 98/53   Heart Rate: 97 Resp: 24 SpO2: 100 % O2 Device: Nasal cannula Oxygen Delivery: 2 LPM  Vitals:    08/06/18 0200 08/07/18 0400 08/08/18 0500   Weight: 77.3 kg (170 lb 6.7 oz) 76.7 kg (169 lb 1.5 oz) 78.3 kg (172 lb  "9.9 oz)     Vital Signs with Ranges  Temp:  [96.2  F (35.7  C)-96.6  F (35.9  C)] 96.4  F (35.8  C)  Heart Rate:  [] 97  Resp:  [12-26] 24  BP: ()/(41-92) 98/53  SpO2:  [80 %-100 %] 100 %  I/O last 3 completed shifts:  In: 1342 [P.O.:350; I.V.:992]  Out: 670 [Urine:670]    Heart Rate: 97, Blood pressure 98/53, temperature 96.4  F (35.8  C), temperature source Axillary, resp. rate 24, height 1.753 m (5' 9\"), weight 78.3 kg (172 lb 9.9 oz), SpO2 100 %.  172 lbs 9.92 oz  GEN:  Alert, oriented x 3, appears comfortable, NAD.  CV:  Regular rate and rhythm, systolic murmur at apex, IABP site clean not bleeding no hematoma  LUNGS:  Clear to auscultation bilaterally   ABD:  Active bowel sounds, soft, non-tender/non-distended.  No rebound/guarding/rigidity.  EXT:  No edema or cyanosis.      Medications     cangrelor (KENGREAL) infusion ADULT 0.75 mcg/kg/min (08/09/18 0700)     DOBUTamine 7.5 mcg/kg/min (08/09/18 0700)     - MEDICATION INSTRUCTIONS -       Reason ACE/ARB/ARNI order not selected       Reason beta blocker order not selected         acetaminophen  650 mg Oral Q6H     aspirin  81 mg Oral Daily     atorvastatin  80 mg Oral QPM     docusate sodium  100 mg Oral BID     heparin  5,000 Units Subcutaneous Q12H     hydroxyurea  500 mg Oral Daily     mupirocin  1 g Both Nostrils BID     pantoprazole  40 mg Oral QAM AC     polyethylene glycol  17 g Oral Daily     QUEtiapine  25 mg Oral QAM     QUEtiapine  50 mg Oral At Bedtime       Data     Recent Labs  Lab 08/09/18  0351 08/08/18  2021 08/08/18  0503 08/07/18  1210 08/07/18  0403  08/06/18  1957   WBC 19.8*  --   --  21.3* 21.7*  < > 23.0*   HGB 10.5*  --   --  10.9* 11.2*  < > 11.7*   MCV 92  --   --  91 90  < > 90   *  --   --  865* 869*  < > 833*   INR 1.34*  --  1.41*  --  1.39*  --   --     132* 132* 133 132*  --  132*   POTASSIUM 4.5 4.3 5.1 4.8 5.0  --  4.9   CHLORIDE 104 102 105 104 103  --  102   CO2 20 20 19* 19* 20  --  21   BUN 46* " 45* 45* 32* 26  --  22   CR 1.28* 1.39* 1.55* 1.39* 1.40*  --  1.17   ANIONGAP 9 10 8 10 9  --  10   GABRIELA 7.6* 7.7* 7.7* 7.3* 7.5*  --  7.6*   * 142* 121* 130* 135*  --  140*   ALBUMIN  --   --   --   --  2.4*  --  2.4*   PROTTOTAL  --   --   --   --  6.2*  --  6.1*   BILITOTAL  --   --   --   --  0.4  --  0.4   ALKPHOS  --   --   --   --  85  --  91   ALT  --   --   --   --  28  --  28   AST  --   --   --   --  73*  --  86*   < > = values in this interval not displayed.    No results found for this or any previous visit (from the past 24 hour(s)).      Echo  Interpretation Summary  Left ventricular size is normal.  The Ejection Fraction is estimated at 55-60%.  Inferior wall akinesis with inferoseptal muscular VAD. Peak velocity 3.7m/sec.  Mild to moderate right ventricular dilation is present.  Global right ventricular function is moderately reduced.  Dilation of the inferior vena cava is present with abnormal respiratory  variation in diameter.  No pericardial effusion is present.  Previous study not available for comparison.    Faculty Attestation  Camilo Hutchison M.D.    I personally saw and examined this patient, reviewed recent laboratories and imaging studies, discussed the case with the housestaff, and conveyed plan to the patient.  I answered all questions from patient and/or family. I agree with the examination, assessment and plan outlined here.

## 2018-08-09 NOTE — PROGRESS NOTES
BRIEF HEME/ONC NOTE    Castillo De Anda is a 68 year old man with JAK2+ polycythemia vera, with thrombocytosis and anemia while on hydrea, recently switched to Anagrelide with very poor tolerance. He is admitted s/p STEMI with ischemia-related VSD requiring surgical intervention. He is high-risk for ongoing thrombocytosis and we restarted hydrea on 8/7/18. Platelet count has continued to increase. Work-up for platelet dysfunction has been negative.    Component      Latest Ref Rng & Units 8/6/2018 8/7/2018 8/9/2018   WBC      4.0 - 11.0 10e9/L 23.0 (H) 21.7 (H) 19.8 (H)   Hemoglobin      13.3 - 17.7 g/dL 11.7 (L) 11.2 (L) 10.5 (L)   Platelet Count      150 - 450 10e9/L 833 (H) 869 (H) 956 (H)       Recommendation:    Please increase hydrea to 500 mg PO BID for now.      We will continue to follow peripherally. Please page with any questions/concerns.      Plan was discussed with attending physician Dr. Elvis Quezada.    Dionna Bates MD/PhD  Heme/Onc Fellow, PGY-4

## 2018-08-09 NOTE — PLAN OF CARE
Problem: Patient Care Overview  Goal: Plan of Care/Patient Progress Review  Outcome: No Change  D: STEMI/ VSD/ stents  I/A: Alert/ issues with word-finding at times, answers orientation questions appropriately. Continues to have episodes of tachypnea, bradycardia with HR in the 40s, and decreased O2 sats with upward glare during episode. EEG monitoring initiated. STAT head CT pending. IV Keppra given.  No change in amount of episodes with Seroquel. HR primarily 90-110s with exemption of bradycardia episodes. IABP 1:1 augmenting at 100%, MAPs on IABP 70-80. 2-3L O2 to keep sats >90%. UO decreased, lasix given x1 with UO ~100 mL/hr after. No BM. R ankle swollen from prior surgery, pulses in BLE present on doppler. Pt family at bedside for majority of shift.   P: Continue POC, scheduled VSD repair tomorrow with Dr. Franco.

## 2018-08-09 NOTE — PLAN OF CARE
Problem: Patient Care Overview  Goal: Plan of Care/Patient Progress Review  OT4E: CX: Pt continues with femoral IABP. Possible surgery tomorrow. Will follow up post op as appropriate.

## 2018-08-09 NOTE — CONSULTS
Social Work: Assessment with Discharge Plan    Patient Name:  Castillo Soler  :  1950  Age:  68 year old  MRN:  [id number]  Risk/Complexity Score:  Filed Complexity Screen Score: 6  Completed assessment with:  Pt, daughter (Christine)    Presenting Information   Reason for Referral:  Discharge plan and Resources  Date of Intake:  2018  Referral Source:  Physician  Decision Maker:  Pt  Alternate Decision Maker:  Per [facility] policy, Pt's wife would be his surrogate decision-maker.  Health Care Directive:  Not on file  Living Situation:  House; Pt lives with his wife in a split-level house.  Previous Functional Status:  Independent; PT had been independent prior to admit. He is not working.  Patient and family understanding of hospitalization:  Pt and family are aware of plan for cardiac surgery.  Cultural/Language/Spiritual Considerations:  Pt's primary language is English.  Adjustment to Illness:  Pt has some anxiety due to his breathing and anticipation of surgery.    Physical Health  Reason for Admission:  No diagnosis found.  Services Needed/Recommended:  Other:  pending workup after surgery.    Mental Health/Chemical Dependency  Diagnosis:  Not noted  Support/Services in Place:  N/A  Services Needed/Recommended:  N/A    Support System  Significant relationship at present time:  Wife, daughters, son  Family of origin is available for support:  Yes, Pt has a son and daughter in [place]. He, his wife, and other daughter are from [place].  Other support available:  Not noted.  Gaps in support system:  None  Patient is caregiver to:  None     Provider Information   Primary Care Physician:  No primary care provider on file.   None   Clinic:  No primary physician on file.      :  None    Financial   Income Source:  Retired  Financial Concerns:  None  Insurance:    Payor/Plan Subscriber Name Rel Member # Group #   MEDICARE - MEDICARE CASTILLO SOLER  [id number]       ATTN CLAIMS, [address]  "      Discharge Plan   Patient and family discharge goal:  Pt and family are hopeful for return to baseline function.  Provided education on discharge plan:  YES; discussed need for re-evaluation after surgery to assess care and rehab needs.  Patient agreeable to discharge plan:  Pending recommendations.  A list of Medicare Certified Facilities was provided to the patient and/or family to encourage patient choice. Patient's choices for facility are:  N/A  Will NH provide Skilled rehabilitation or complex medical:  N/A  General information regarding anticipated insurance coverage and possible out of pocket cost was discussed. Patient and patient's family are aware patient may incur the cost of transportation to the facility, pending insurance payment: N/A  Barriers to discharge:  Surgery, medical clearance    Discharge Recommendations   Anticipated Disposition:  pending evaluation after surgery.  Transportation Needs:  Family:  pending needs, family may transport  Name of Transportation Company and Phone:  N/A    Additional comments   Writer met with Pt and daughter at bedside. Pt was in and out of sleeping, but daughter was able to provide information above. Pt had an episode of difficult breathing and his daughter attended to him to gently assist him in calming his breath. Pt's daughter states these \"episodes\" have increased in frequency. Pt will have surgery Friday and is hopeful to be able to return home after a period of recovery. SW will remain available for ongoing support as needed.        Carolyn So, [facility]  ICU   Pager: [phone]  "

## 2018-08-09 NOTE — PLAN OF CARE
Problem: Patient Care Overview  Goal: Plan of Care/Patient Progress Review  Outcome: No Change  Pt A&Ox4 multiple episodes of hyperventilation/ bradycardia and desats into the 70's.  Reassurance and calming techniques given per RN. Seroquel and ativan given.  SR 80's -90's  IABP in place 1:1/100%. CVP 11-12, 0400   SVO2 54 CI 1.9. Team notified, Dobutamine left at current rate.  Afebrile. 2L NC. Lung sounds clear/diminished. UOP improving 10 - 50 mL/hr . Creat decreased to 1.28.  No bm. Continue plan of care. Continue to update team with changes. Continue to encourage family presence.         Problem: Cardiac Output Decreased (Adult)  Goal: Effective Tissue Perfusion  Patient will demonstrate the desired outcomes by discharge/transition of care.   Outcome: No Change  Pt remains on dobutamine with IABP

## 2018-08-09 NOTE — PROGRESS NOTES
INITIAL REPORT of Video-EEG Monitoring              DATE OF RECORDIN2018         I reviewed the first 2 hours of video-EEG monitoring of Castillo eD Anda.          The EEG during waking was abnormal due to generalized delta-theta slowing, with very frequent right frontotemporal delta slowing and spike-wave complexes (independently of a T4 electrode breach effect), and 2 events with marked increase in generalized delta slowing and in spike-wave discharges (probably without unresponsiveness, but with limited behavioral testing).           These abnormalities indicate moderate electrographic encephalopathy, additional right frontotemporal dysfunction and evidence of the known skull defect (due to reported meningioma resection), and an elevated risk of partial epilepsy.  The patient may be having simple partial seizures, but this determination would require further neurological evaluation.    Neurology consultation should be considered.   Joaquín Cisneros M.D., [facility] [phone]

## 2018-08-09 NOTE — CONSULTS
"[facility]  Neurology Critical Care Consultation    Patient Name:  Castillo De Anda  MRN:  [id number]    :  1950  Date of Service:  2018  Primary care provider:  No primary care provider on file.      Neurology consultation service was asked to see Castillo De Anda to evaluate for seizures.     History of Present Illness:   68 year old man h/o myeloproliferative syndrome, right temporal meningioma s/p resection (), hx of malignancy (prostate, thyroid) who presented as transfer from [facility] for VSD in the setting of CAD/MI s/p RCA and proximal LAD HUSSEIN on 18. Neurology was consulted for symptoms concerning for possible seizures with abnormal EEG findings prior to consult. In conversation with Mr. De Anda's daughter and nursing staff, there have been numerous events on this admission in which he becomes bradycardic to 40s, desaturates to 70s, with eye rolling back phenomena lasting approximately 30 seconds per episode. He is responsive and states that he feels hot and appears anxious during these episodes. These have occurred nearly every 1.5-2 hours during this admission. In between episodes, he returns to baseline where he is largely intact cognitively.     Video EEG was obtained for these episodes with findings of \"very frequent right frontotemporal delta slowing and spike-wave complexes.\" Additionally \"2 events with marked increase in generalized delta slowing and in spike-wave discharges.\" I spoke to Dr. Cisneros prior to seeing Mr. De Anda and he informed me that there was increased build up of right frontal temporal spikes associated with episode of hyperventilation, anxious appearance, and mild bradycardia with retained consciousness. Although there were no electrographic seizures, this likely represents simple partial seizures without significant alteration in consciousness.     PMH  Past Medical History:   Diagnosis Date     DVT (deep venous " "thrombosis) (H) 2008    after craniotomy     Meningioma (H)      Polycythemia vera (H)      Prostate cancer (H)    - Thyroid cancer   - Polycythemia vera   - Anemia       Past Surgical History:   Procedure Laterality Date     ANKLE SURGERY Right 04/2018     APPENDECTOMY       CRANIOTOMY Right 2008     PROSTATECTOMY PERINEAL  2004     SPLENECTOMY      childhood     THYROID SURGERY  2012   - Recent left foot fracture     Medications   Levothyroxine   ASA   Rousvastatin   Anagrelide     Allergies  No Known Allergies    Social History  . Prior smoker 1/2 pk day until 2005. Occasional alcohol.     Family History    NPH in brother     Physical Examination   Vitals: BP (!) 77/55  Temp 96.9  F (36.1  C) (Axillary)  Resp 18  Ht 1.753 m (5' 9\")  Wt 78.3 kg (172 lb 9.9 oz)  SpO2 94%  BMI 25.49 kg/m2  General: Adult, in NAD, cooperative  HEENT: Normocephalic atraumatic   Cardiac: Hum of balloon pump. Normal heart rate.   Chest: Clear to ausculation   Abdomen: Soft, nontender, nondistended  Extremities: 1+ pitting edema L ankle.   Skin: No rash or lesion.   Neuro:  Mental status: Mildly somnolent, but wakes easily. [facility], month, year, day of week. Calculates quarters in $1.75. [place] world backwards. Naming intact with increasing level of difficulty, missed stethoscope.    Cranial nerves: Visual fields intact, eyes conjugate, PERRLA, EOMI, face symmetric, facial sensation intact, shoulder shrug strong, tongue/uvula midline.   Motor: Tone within normal. 5/5 strength in bilateral uppers. LLE 5/5; RLE dorsiflexion 4/5, plantarflexion 4/5, more proximal strength not assessed. No atrophy or twitches.   Reflexes: 2+  reflexic, symmetric biceps, triceps and brachioradialis. Right patellar not tested, left patelllar 1+, achilles 0. Toes down-going.  Sensory: Intact to light touch throughout   Coordination: FNF no dysmetria. HTS not performed because of restricted movement right leg.   Gait: Not assessed. "     Investigations    Data     CMP   Recent Labs  Lab 08/09/18  1111 08/09/18  0351 08/08/18  2021 08/08/18  0503 08/07/18  1210 08/07/18  0403 08/06/18  1957 08/06/18  1144  08/06/18  0218   NA  --  133 132* 132* 133 132* 132* 132*  < > 127*   POTASSIUM 4.6 4.5 4.3 5.1 4.8 5.0 4.9 4.2  < > 4.9   CHLORIDE  --  104 102 105 104 103 102 104  < > 98   CO2  --  20 20 19* 19* 20 21 19*  < > 16*   ANIONGAP  --  9 10 8 10 9 10 9  < > 13   GLC  --  121* 142* 121* 130* 135* 140* 130*  < > 160*   BUN  --  46* 45* 45* 32* 26 22 17  < > 15   CR  --  1.28* 1.39* 1.55* 1.39* 1.40* 1.17 0.96  < > 0.94   GFRESTIMATED  --  56* 51* 45* 51* 50* 62 78  < > 79   GFRESTBLACK  --  68 61 54* 61 61 75 >90  < > >90   GABRIELA  --  7.6* 7.7* 7.7* 7.3* 7.5* 7.6* 7.1*  < > 7.3*   MAG 2.9* 2.8* 2.7* 3.1* 2.7* 2.8* 2.7* 2.6*  < > 2.0   PHOS  --  4.6* 4.2 5.2* 4.7* 5.1*  --  3.9  < >  --    PROTTOTAL  --   --   --   --   --  6.2* 6.1* 6.1*  --  6.7*   ALBUMIN  --   --   --   --   --  2.4* 2.4* 2.3*  --  2.6*   BILITOTAL  --   --   --   --   --  0.4 0.4 0.3  --  0.6   ALKPHOS  --   --   --   --   --  85 91 90  --  114   AST  --   --   --   --   --  73* 86* 95*  --  77*   ALT  --   --   --   --   --  28 28 30  --  23   < > = values in this interval not displayed.     CBC   Recent Labs  Lab 08/09/18  0351 08/07/18  1210 08/07/18  0403 08/07/18  0015   WBC 19.8* 21.3* 21.7* 24.1*   RBC 3.51* 3.57* 3.69* 3.66*   HGB 10.5* 10.9* 11.2* 11.1*   HCT 32.1* 32.3* 33.3* 33.2*   MCV 92 91 90 91   MCH 29.9 30.5 30.4 30.3   MCHC 32.7 33.7 33.6 33.4   RDW 15.8* 15.3* 15.3* 15.3*   * 865* 869* 836*       INR, PTT   Recent Labs  Lab 08/09/18  0351 08/08/18  0503 08/07/18  0403 08/06/18  0330   INR 1.34* 1.41* 1.39* 2.05*        Arterial Blood Gas   Recent Labs  Lab 08/09/18  1528 08/09/18  1111 08/09/18  0900 08/09/18  0351   O2PER 3L 2L  2L 2L 2L       UA    Recent Labs  Lab 08/07/18  0618   COLOR Yellow   APPEARANCE Clear   URINEGLC Negative   URINEBILI  Negative   URINEKETONE Negative   SG 1.015   UBLD Large*   URINEPH 5.0   PROTEIN 10*   NITRITE Negative   LEUKEST Trace*   RBCU 2   WBCU 5       Micro     Recent Labs  Lab 08/08/18  1158   SDES Blood Right Hand  Blood Right Hand   SREQ Received in aerobic bottle only  Received in aerobic bottle only   CULT No growth after 1 day  No growth after 23 hours          Radiological Data  CT head 8/9/18   - No hemorrhage. Encephalomalacia in the right temporal lobe. Formal reading pending.     Impression  Mr. De Anda is a 68 y.o. man myeloproliferative syndrome, right temporal meningioma s/p resection (2008), hx of malignancy (prostate, thyroid) who presented as transfer from [facility] for VSD in the setting of CAD/MI s/p RCA and proximal LAD HUSSEIN on 8/6/18 with episodes concerning for focal seizures. EEG suggest vulnerability to seizures and prior to seeing Mr. De Anda, we recommended a loading dose of Keppra 2 g IV and maintenance Keppra 750 mg BID. CT head was pending, reviewed by me and notable for right temporal encephalomalacia. On CT there appears to be hypodensity in the right medulla that is likely artifact given lack of clinical correlate, no hemorrhage or new hypodensity.     Recommendations  -Keppra 2 g IV, completed   -Keppra 750 mg IV Q12 hrs   -Treat any seizure last >5 minutes with 2 mg ativan   -Treat any 3 seizures within 1 hr period with 2 mg ativan     Thank you for involving neurology in the care of Castillo De Anda.      Patient was seen and discussed with Dr. Marcus, neurocritical care fellow. He will be formally staffed with Dr. Fletcher in the morning.     Krystin Anaya MD  Neurology PGY 2  [phone]

## 2018-08-09 NOTE — PROGRESS NOTES
[facility] ICU PROGRESS NOTE  08/09/18  CO-MORBIDITIES:   Cardiogenic Shock   STEMI  S/p HUSSEIN to distal RCA & Proximal LAD  Infraseptal VSD  Myeloproliferative Syndrome  TIA  S/p Splenectomy due to trauma    ASSESSMENT: Castillo De Anda is a 68 year old male s/p HUSSEIN to RCA and LAD for STEMI on 8/4 found to have post infarction inferoapical VSD. OR timing tentatively planned for AM 8/10. On Cangrelor gtt for anticoagulation.    TODAY'S PROGRESS:   - To OR tomorrow  - Continue cangrelor, anticipate stopping 30-60min prior to OR  - NPO at midnight  - Will draw SvO2 from PA port for accurate saturations      PLAN:  Neuro/ pain/ sedation:  - PRN Quetiapine 25mg for sleep, ativan PRN  - Monitor neurological status. Notify the MD for any acute changes in exam.    Pulmonary care:   - Supplemental oxygen to keep saturation above 92 %.  - Incentive spirometer every 15- 30 minutes when awake.  - Monitor respiratory status    Cardiovascular:  #Post infarction 2.5cm infraseptal VSD  #s/p HUSSEIN to distal RCA and proximal LAD  #STEMI, cardiogenic shock  #Dilated RV   #Pulmonary HTN  - Monitor hemodynamic status.   - Infusions: Dobutamine 7.5mcg/kg/min  - Consider diuresis with low dose of lasix  - ECHO from 8/8 prior to surgery: Inferior wall akinesis with inferoseptal muscular VAD. Mild to moderate right ventricular dilation is present. Global right ventricular function is moderately reduced.    Heme:  #Anticoagluation s/p Drug Eluting Stent placement  -Continue Cangrelor until time of surgery, planning holding 30min to 1hr prior to OR    GI care:  #Transaminitis   - NPO at midnight    Fluids/ Electrolytes/ Nutrition:   - mIVF for IV fluid hydration  - Wiggins    Renal/ Fluid Balance:    #Acute Kidney injury  - Urine output low but stable, creatinine trending down  - Will continue to monitor intake and output via wiggins    Endocrine:  - Sliding scale for diabetes management when TF started    ID/ Antibiotics:  - No antibiotic needs  currently  - Monitor fever curve, trend WBC      Prophylaxis:    - Mechanical prophylaxis for DVT.   - Anticoagulation with Cangrelor  - Protonix    Lines/ tubes/ drains:  - PIV, CVC/[place], IABP    Disposition:  - [facility] ICU    Patient seen, findings and plan discussed with CV ICU staff, Dr. Porrsa.    Brad Hawkins MD  8/9/2018 2:28 PM  Anesthesia Resident  PGY4/CA-3    ====================================    SUBJECTIVE:   Some brief episodes of anxiety and hyperventilation.  ECHO yesterday showing Inferior wall akinesis with inferoseptal muscular VAD. Mild to moderate right ventricular dilation is present. Global right ventricular function is moderately reduced. Creatinine elevated. Was given seroquel 25mg overnight.      OBJECTIVE:   1. VITAL SIGNS:   Temp:  [96.4  F (35.8  C)-98.9  F (37.2  C)] 97.6  F (36.4  C)  Heart Rate:  [] 102  Resp:  [12-26] 18  BP: ()/(46-86) 109/55  SpO2:  [75 %-100 %] 82 %  Resp: 18    2. INTAKE/ OUTPUT:   I/O last 3 completed shifts:  In: 1342 [P.O.:350; I.V.:992]  Out: 670 [Urine:670]    3. PHYSICAL EXAMINATION:   General: Alert, lying flat due to invasive monitoring  Neuro: A&Ox3, NAD  Resp: Breathing non-labored on nasal cannula  CV: RRR  Abdomen: Soft, Non-distended, Non-tender  Incisions: Right central line internal jugular,   Extremities: warm and well perfused    4. INVESTIGATIONS:   Arterial Blood Gases   No lab results found in last 7 days.  Complete Blood Count     Recent Labs  Lab 08/09/18  0351 08/07/18  1210 08/07/18  0403 08/07/18  0015   WBC 19.8* 21.3* 21.7* 24.1*   HGB 10.5* 10.9* 11.2* 11.1*   * 865* 869* 836*     Basic Metabolic Panel    Recent Labs  Lab 08/09/18  1111 08/09/18  0351 08/08/18 2021 08/08/18  0503 08/07/18  1210   NA  --  133 132* 132* 133   POTASSIUM 4.6 4.5 4.3 5.1 4.8   CHLORIDE  --  104 102 105 104   CO2  --  20 20 19* 19*   BUN  --  46* 45* 45* 32*   CR  --  1.28* 1.39* 1.55* 1.39*   GLC  --  121* 142* 121* 130*     Liver Function  Tests    Recent Labs  Lab 08/09/18  0351 08/08/18  0503 08/07/18  0403 08/06/18  1957 08/06/18  1144 08/06/18  0330 08/06/18  0218   AST  --   --  73* 86* 95*  --  77*   ALT  --   --  28 28 30  --  23   ALKPHOS  --   --  85 91 90  --  114   BILITOTAL  --   --  0.4 0.4 0.3  --  0.6   ALBUMIN  --   --  2.4* 2.4* 2.3*  --  2.6*   INR 1.34* 1.41* 1.39*  --   --  2.05*  --      Pancreatic Enzymes  No lab results found in last 7 days.  Coagulation Profile    Recent Labs  Lab 08/09/18  0351 08/08/18  0503 08/07/18  0403 08/06/18  0330   INR 1.34* 1.41* 1.39* 2.05*         5. RADIOLOGY:   No results found for this or any previous visit (from the past 24 hour(s)).

## 2018-08-10 ENCOUNTER — APPOINTMENT (OUTPATIENT)
Dept: GENERAL RADIOLOGY | Facility: CLINIC | Age: 68
DRG: 219 | End: 2018-08-10
Attending: THORACIC SURGERY (CARDIOTHORACIC VASCULAR SURGERY)
Payer: MEDICARE

## 2018-08-10 ENCOUNTER — ANESTHESIA EVENT (OUTPATIENT)
Dept: SURGERY | Facility: CLINIC | Age: 68
DRG: 219 | End: 2018-08-10
Payer: MEDICARE

## 2018-08-10 ENCOUNTER — ALLIED HEALTH/NURSE VISIT (OUTPATIENT)
Dept: NEUROLOGY | Facility: CLINIC | Age: 68
DRG: 219 | End: 2018-08-10
Attending: PSYCHIATRY & NEUROLOGY
Payer: MEDICARE

## 2018-08-10 ENCOUNTER — APPOINTMENT (OUTPATIENT)
Dept: CARDIOLOGY | Facility: CLINIC | Age: 68
DRG: 219 | End: 2018-08-10
Attending: INTERNAL MEDICINE
Payer: MEDICARE

## 2018-08-10 ENCOUNTER — ANESTHESIA (OUTPATIENT)
Dept: SURGERY | Facility: CLINIC | Age: 68
DRG: 219 | End: 2018-08-10
Payer: MEDICARE

## 2018-08-10 ENCOUNTER — APPOINTMENT (OUTPATIENT)
Dept: GENERAL RADIOLOGY | Facility: CLINIC | Age: 68
DRG: 219 | End: 2018-08-10
Attending: INTERNAL MEDICINE
Payer: MEDICARE

## 2018-08-10 DIAGNOSIS — R57.0 CARDIOGENIC SHOCK (H): Primary | ICD-10-CM

## 2018-08-10 LAB
ALBUMIN SERPL-MCNC: 2.7 G/DL (ref 3.4–5)
ALP SERPL-CCNC: 80 U/L (ref 40–150)
ALT SERPL W P-5'-P-CCNC: 37 U/L (ref 0–70)
ANION GAP SERPL CALCULATED.3IONS-SCNC: 10 MMOL/L (ref 3–14)
ANION GAP SERPL CALCULATED.3IONS-SCNC: 15 MMOL/L (ref 3–14)
APTT PPP: 41 SEC (ref 22–37)
APTT PPP: 46 SEC (ref 22–37)
AST SERPL W P-5'-P-CCNC: 24 U/L (ref 0–45)
BASE DEFICIT BLDA-SCNC: 0.8 MMOL/L
BASE DEFICIT BLDA-SCNC: 1.1 MMOL/L
BASE DEFICIT BLDA-SCNC: 2.2 MMOL/L
BASE DEFICIT BLDA-SCNC: 3.8 MMOL/L
BASE DEFICIT BLDA-SCNC: 4.4 MMOL/L
BASE DEFICIT BLDA-SCNC: 4.5 MMOL/L
BASE DEFICIT BLDA-SCNC: 5 MMOL/L
BASE DEFICIT BLDA-SCNC: 5.9 MMOL/L
BASE DEFICIT BLDV-SCNC: 3.2 MMOL/L
BASE DEFICIT BLDV-SCNC: 3.7 MMOL/L
BASE DEFICIT BLDV-SCNC: 4 MMOL/L
BASE DEFICIT BLDV-SCNC: 4.9 MMOL/L
BASE DEFICIT BLDV-SCNC: 5.7 MMOL/L
BASE DEFICIT BLDV-SCNC: 5.8 MMOL/L
BASE DEFICIT BLDV-SCNC: 6 MMOL/L
BASE DEFICIT BLDV-SCNC: 7.8 MMOL/L
BASE EXCESS BLDA CALC-SCNC: 2.2 MMOL/L
BILIRUB DIRECT SERPL-MCNC: 0.2 MG/DL (ref 0–0.2)
BILIRUB SERPL-MCNC: 0.5 MG/DL (ref 0.2–1.3)
BLD PROD TYP BPU: NORMAL
BLD UNIT ID BPU: 0
BLOOD PRODUCT CODE: NORMAL
BPU ID: NORMAL
BUN SERPL-MCNC: 50 MG/DL (ref 7–30)
BUN SERPL-MCNC: 52 MG/DL (ref 7–30)
CA-I BLD-MCNC: 3.4 MG/DL (ref 4.4–5.2)
CA-I BLD-MCNC: 3.5 MG/DL (ref 4.4–5.2)
CA-I BLD-MCNC: 3.6 MG/DL (ref 4.4–5.2)
CA-I BLD-MCNC: 3.6 MG/DL (ref 4.4–5.2)
CA-I BLD-MCNC: 3.7 MG/DL (ref 4.4–5.2)
CA-I BLD-MCNC: 3.7 MG/DL (ref 4.4–5.2)
CA-I BLD-MCNC: 4.1 MG/DL (ref 4.4–5.2)
CA-I BLD-MCNC: 4.1 MG/DL (ref 4.4–5.2)
CA-I BLD-MCNC: 4.5 MG/DL (ref 4.4–5.2)
CALCIUM SERPL-MCNC: 7 MG/DL (ref 8.5–10.1)
CALCIUM SERPL-MCNC: 7.4 MG/DL (ref 8.5–10.1)
CHLORIDE SERPL-SCNC: 104 MMOL/L (ref 94–109)
CHLORIDE SERPL-SCNC: 108 MMOL/L (ref 94–109)
CO2 SERPL-SCNC: 19 MMOL/L (ref 20–32)
CO2 SERPL-SCNC: 21 MMOL/L (ref 20–32)
CREAT SERPL-MCNC: 1.34 MG/DL (ref 0.66–1.25)
CREAT SERPL-MCNC: 1.43 MG/DL (ref 0.66–1.25)
ERYTHROCYTE [DISTWIDTH] IN BLOOD BY AUTOMATED COUNT: 15.8 % (ref 10–15)
ERYTHROCYTE [DISTWIDTH] IN BLOOD BY AUTOMATED COUNT: 15.8 % (ref 10–15)
ERYTHROCYTE [DISTWIDTH] IN BLOOD BY AUTOMATED COUNT: 17.1 % (ref 10–15)
ERYTHROCYTE [DISTWIDTH] IN BLOOD BY AUTOMATED COUNT: 17.3 % (ref 10–15)
FIBRINOGEN PPP-MCNC: 237 MG/DL (ref 200–420)
GFR SERPL CREATININE-BSD FRML MDRD: 49 ML/MIN/1.7M2
GFR SERPL CREATININE-BSD FRML MDRD: 53 ML/MIN/1.7M2
GLUCOSE BLD-MCNC: 132 MG/DL (ref 70–99)
GLUCOSE BLD-MCNC: 132 MG/DL (ref 70–99)
GLUCOSE BLD-MCNC: 139 MG/DL (ref 70–99)
GLUCOSE BLD-MCNC: 146 MG/DL (ref 70–99)
GLUCOSE BLD-MCNC: 148 MG/DL (ref 70–99)
GLUCOSE BLD-MCNC: 149 MG/DL (ref 70–99)
GLUCOSE BLD-MCNC: 149 MG/DL (ref 70–99)
GLUCOSE BLD-MCNC: 177 MG/DL (ref 70–99)
GLUCOSE BLDC GLUCOMTR-MCNC: 106 MG/DL (ref 70–99)
GLUCOSE BLDC GLUCOMTR-MCNC: 121 MG/DL (ref 70–99)
GLUCOSE BLDC GLUCOMTR-MCNC: 121 MG/DL (ref 70–99)
GLUCOSE BLDC GLUCOMTR-MCNC: 127 MG/DL (ref 70–99)
GLUCOSE SERPL-MCNC: 117 MG/DL (ref 70–99)
GLUCOSE SERPL-MCNC: 120 MG/DL (ref 70–99)
HCO3 BLD-SCNC: 18 MMOL/L (ref 21–28)
HCO3 BLD-SCNC: 19 MMOL/L (ref 21–28)
HCO3 BLD-SCNC: 20 MMOL/L (ref 21–28)
HCO3 BLD-SCNC: 21 MMOL/L (ref 21–28)
HCO3 BLD-SCNC: 22 MMOL/L (ref 21–28)
HCO3 BLD-SCNC: 24 MMOL/L (ref 21–28)
HCO3 BLD-SCNC: 26 MMOL/L (ref 21–28)
HCO3 BLDV-SCNC: 15 MMOL/L (ref 21–28)
HCO3 BLDV-SCNC: 18 MMOL/L (ref 21–28)
HCO3 BLDV-SCNC: 20 MMOL/L (ref 21–28)
HCO3 BLDV-SCNC: 21 MMOL/L (ref 21–28)
HCO3 BLDV-SCNC: 21 MMOL/L (ref 21–28)
HCO3 BLDV-SCNC: 22 MMOL/L (ref 21–28)
HCT VFR BLD AUTO: 24.7 % (ref 40–53)
HCT VFR BLD AUTO: 29.8 % (ref 40–53)
HCT VFR BLD AUTO: 30 % (ref 40–53)
HCT VFR BLD AUTO: 31 % (ref 40–53)
HGB BLD-MCNC: 10.1 G/DL (ref 13.3–17.7)
HGB BLD-MCNC: 7.1 G/DL (ref 13.3–17.7)
HGB BLD-MCNC: 8 G/DL (ref 13.3–17.7)
HGB BLD-MCNC: 8.1 G/DL (ref 13.3–17.7)
HGB BLD-MCNC: 8.2 G/DL (ref 13.3–17.7)
HGB BLD-MCNC: 8.3 G/DL (ref 13.3–17.7)
HGB BLD-MCNC: 8.4 G/DL (ref 13.3–17.7)
HGB BLD-MCNC: 8.4 G/DL (ref 13.3–17.7)
HGB BLD-MCNC: 8.5 G/DL (ref 13.3–17.7)
HGB BLD-MCNC: 9.7 G/DL (ref 13.3–17.7)
HGB BLD-MCNC: 9.7 G/DL (ref 13.3–17.7)
HGB BLD-MCNC: 9.9 G/DL (ref 13.3–17.7)
INR PPP: 1.3 (ref 0.86–1.14)
INR PPP: 1.67 (ref 0.86–1.14)
INR PPP: 1.7 (ref 0.86–1.14)
INTERPRETATION ECG - MUSE: NORMAL
LACTATE BLD-SCNC: 1.3 MMOL/L (ref 0.7–2)
LACTATE BLD-SCNC: 1.3 MMOL/L (ref 0.7–2)
LACTATE BLD-SCNC: 1.4 MMOL/L (ref 0.7–2)
LACTATE BLD-SCNC: 1.4 MMOL/L (ref 0.7–2)
LACTATE BLD-SCNC: 1.5 MMOL/L (ref 0.7–2)
LACTATE BLD-SCNC: 1.5 MMOL/L (ref 0.7–2)
LACTATE BLD-SCNC: 1.8 MMOL/L (ref 0.7–2)
LACTATE BLD-SCNC: 1.8 MMOL/L (ref 0.7–2)
LACTATE BLD-SCNC: 2.7 MMOL/L (ref 0.7–2)
LACTATE BLD-SCNC: 2.8 MMOL/L (ref 0.7–2)
LACTATE BLD-SCNC: 3 MMOL/L (ref 0.7–2)
LMWH PPP CHRO-ACNC: <0.1 IU/ML
MAGNESIUM SERPL-MCNC: 2.9 MG/DL (ref 1.6–2.3)
MAGNESIUM SERPL-MCNC: 3.8 MG/DL (ref 1.6–2.3)
MCH RBC QN AUTO: 29.2 PG (ref 26.5–33)
MCH RBC QN AUTO: 29.7 PG (ref 26.5–33)
MCH RBC QN AUTO: 29.9 PG (ref 26.5–33)
MCH RBC QN AUTO: 30 PG (ref 26.5–33)
MCHC RBC AUTO-ENTMCNC: 32.3 G/DL (ref 31.5–36.5)
MCHC RBC AUTO-ENTMCNC: 32.6 G/DL (ref 31.5–36.5)
MCHC RBC AUTO-ENTMCNC: 32.6 G/DL (ref 31.5–36.5)
MCHC RBC AUTO-ENTMCNC: 32.8 G/DL (ref 31.5–36.5)
MCV RBC AUTO: 90 FL (ref 78–100)
MCV RBC AUTO: 91 FL (ref 78–100)
MCV RBC AUTO: 92 FL (ref 78–100)
MCV RBC AUTO: 92 FL (ref 78–100)
NUM BPU REQUESTED: 4
O2/TOTAL GAS SETTING VFR VENT: 100 %
O2/TOTAL GAS SETTING VFR VENT: 60 %
O2/TOTAL GAS SETTING VFR VENT: 60 %
O2/TOTAL GAS SETTING VFR VENT: 70 %
O2/TOTAL GAS SETTING VFR VENT: 80 %
O2/TOTAL GAS SETTING VFR VENT: 80 %
O2/TOTAL GAS SETTING VFR VENT: ABNORMAL %
OXYHGB MFR BLD: 96 % (ref 92–100)
OXYHGB MFR BLD: 98 % (ref 92–100)
OXYHGB MFR BLDV: 40 %
OXYHGB MFR BLDV: 47 %
OXYHGB MFR BLDV: 56 %
OXYHGB MFR BLDV: 63 %
OXYHGB MFR BLDV: 64 %
OXYHGB MFR BLDV: 86 %
OXYHGB MFR BLDV: 90 %
PCO2 BLD: 26 MM HG (ref 35–45)
PCO2 BLD: 29 MM HG (ref 35–45)
PCO2 BLD: 31 MM HG (ref 35–45)
PCO2 BLD: 34 MM HG (ref 35–45)
PCO2 BLD: 36 MM HG (ref 35–45)
PCO2 BLD: 36 MM HG (ref 35–45)
PCO2 BLD: 37 MM HG (ref 35–45)
PCO2 BLDV: 22 MM HG (ref 40–50)
PCO2 BLDV: 27 MM HG (ref 40–50)
PCO2 BLDV: 28 MM HG (ref 40–50)
PCO2 BLDV: 30 MM HG (ref 40–50)
PCO2 BLDV: 34 MM HG (ref 40–50)
PCO2 BLDV: 35 MM HG (ref 40–50)
PCO2 BLDV: 41 MM HG (ref 40–50)
PCO2 BLDV: 41 MM HG (ref 40–50)
PH BLD: 7.35 PH (ref 7.35–7.45)
PH BLD: 7.35 PH (ref 7.35–7.45)
PH BLD: 7.38 PH (ref 7.35–7.45)
PH BLD: 7.39 PH (ref 7.35–7.45)
PH BLD: 7.4 PH (ref 7.35–7.45)
PH BLD: 7.41 PH (ref 7.35–7.45)
PH BLD: 7.42 PH (ref 7.35–7.45)
PH BLD: 7.46 PH (ref 7.35–7.45)
PH BLD: 7.52 PH (ref 7.35–7.45)
PH BLDV: 7.31 PH (ref 7.32–7.43)
PH BLDV: 7.34 PH (ref 7.32–7.43)
PH BLDV: 7.38 PH (ref 7.32–7.43)
PH BLDV: 7.39 PH (ref 7.32–7.43)
PH BLDV: 7.39 PH (ref 7.32–7.43)
PH BLDV: 7.41 PH (ref 7.32–7.43)
PH BLDV: 7.42 PH (ref 7.32–7.43)
PH BLDV: 7.45 PH (ref 7.32–7.43)
PHOSPHATE SERPL-MCNC: 4.6 MG/DL (ref 2.5–4.5)
PHOSPHATE SERPL-MCNC: 5 MG/DL (ref 2.5–4.5)
PLATELET # BLD AUTO: 1005 10E9/L (ref 150–450)
PLATELET # BLD AUTO: 488 10E9/L (ref 150–450)
PLATELET # BLD AUTO: 515 10E9/L (ref 150–450)
PLATELET # BLD AUTO: 993 10E9/L (ref 150–450)
PO2 BLD: 117 MM HG (ref 80–105)
PO2 BLD: 154 MM HG (ref 80–105)
PO2 BLD: 352 MM HG (ref 80–105)
PO2 BLD: 355 MM HG (ref 80–105)
PO2 BLD: 371 MM HG (ref 80–105)
PO2 BLD: 378 MM HG (ref 80–105)
PO2 BLD: 456 MM HG (ref 80–105)
PO2 BLD: 557 MM HG (ref 80–105)
PO2 BLD: 88 MM HG (ref 80–105)
PO2 BLDV: 27 MM HG (ref 25–47)
PO2 BLDV: 31 MM HG (ref 25–47)
PO2 BLDV: 35 MM HG (ref 25–47)
PO2 BLDV: 39 MM HG (ref 25–47)
PO2 BLDV: 39 MM HG (ref 25–47)
PO2 BLDV: 58 MM HG (ref 25–47)
PO2 BLDV: 65 MM HG (ref 25–47)
PO2 BLDV: 79 MM HG (ref 25–47)
POTASSIUM BLD-SCNC: 4 MMOL/L (ref 3.4–5.3)
POTASSIUM BLD-SCNC: 4.6 MMOL/L (ref 3.4–5.3)
POTASSIUM BLD-SCNC: 5 MMOL/L (ref 3.4–5.3)
POTASSIUM BLD-SCNC: 5.1 MMOL/L (ref 3.4–5.3)
POTASSIUM BLD-SCNC: 5.1 MMOL/L (ref 3.4–5.3)
POTASSIUM BLD-SCNC: 5.2 MMOL/L (ref 3.4–5.3)
POTASSIUM BLD-SCNC: 5.4 MMOL/L (ref 3.4–5.3)
POTASSIUM BLD-SCNC: 5.8 MMOL/L (ref 3.4–5.3)
POTASSIUM SERPL-SCNC: 4.4 MMOL/L (ref 3.4–5.3)
POTASSIUM SERPL-SCNC: 4.6 MMOL/L (ref 3.4–5.3)
PROT SERPL-MCNC: 6.1 G/DL (ref 6.8–8.8)
RBC # BLD AUTO: 2.73 10E12/L (ref 4.4–5.9)
RBC # BLD AUTO: 3.24 10E12/L (ref 4.4–5.9)
RBC # BLD AUTO: 3.32 10E12/L (ref 4.4–5.9)
RBC # BLD AUTO: 3.37 10E12/L (ref 4.4–5.9)
SODIUM BLD-SCNC: 132 MMOL/L (ref 133–144)
SODIUM BLD-SCNC: 134 MMOL/L (ref 133–144)
SODIUM BLD-SCNC: 135 MMOL/L (ref 133–144)
SODIUM BLD-SCNC: 136 MMOL/L (ref 133–144)
SODIUM BLD-SCNC: 136 MMOL/L (ref 133–144)
SODIUM BLD-SCNC: 137 MMOL/L (ref 133–144)
SODIUM BLD-SCNC: 137 MMOL/L (ref 133–144)
SODIUM BLD-SCNC: 139 MMOL/L (ref 133–144)
SODIUM SERPL-SCNC: 134 MMOL/L (ref 133–144)
SODIUM SERPL-SCNC: 144 MMOL/L (ref 133–144)
TRANSFUSION STATUS PATIENT QL: NORMAL
VWF:RCO ACT/NOR PPP PL AGG: 73 % (ref 51–215)
WBC # BLD AUTO: 17.3 10E9/L (ref 4–11)
WBC # BLD AUTO: 19.2 10E9/L (ref 4–11)
WBC # BLD AUTO: 27.8 10E9/L (ref 4–11)
WBC # BLD AUTO: 31.5 10E9/L (ref 4–11)

## 2018-08-10 PROCEDURE — 95951 ZZHC EEG VIDEO EACH 24 HR: CPT | Mod: ZF

## 2018-08-10 PROCEDURE — 85027 COMPLETE CBC AUTOMATED: CPT | Performed by: INTERNAL MEDICINE

## 2018-08-10 PROCEDURE — 25000128 H RX IP 250 OP 636: Performed by: THORACIC SURGERY (CARDIOTHORACIC VASCULAR SURGERY)

## 2018-08-10 PROCEDURE — 83605 ASSAY OF LACTIC ACID: CPT | Performed by: STUDENT IN AN ORGANIZED HEALTH CARE EDUCATION/TRAINING PROGRAM

## 2018-08-10 PROCEDURE — 40000344 ZZHCL STATISTIC THAWING COMPONENT: Performed by: INTERNAL MEDICINE

## 2018-08-10 PROCEDURE — 02UM0JZ SUPPLEMENT VENTRICULAR SEPTUM WITH SYNTHETIC SUBSTITUTE, OPEN APPROACH: ICD-10-PCS | Performed by: THORACIC SURGERY (CARDIOTHORACIC VASCULAR SURGERY)

## 2018-08-10 PROCEDURE — 40000076 ZZH STATISTIC IABP MONITORING

## 2018-08-10 PROCEDURE — 85730 THROMBOPLASTIN TIME PARTIAL: CPT | Performed by: THORACIC SURGERY (CARDIOTHORACIC VASCULAR SURGERY)

## 2018-08-10 PROCEDURE — 85730 THROMBOPLASTIN TIME PARTIAL: CPT | Performed by: INTERNAL MEDICINE

## 2018-08-10 PROCEDURE — 84295 ASSAY OF SERUM SODIUM: CPT

## 2018-08-10 PROCEDURE — 40000048 ZZH STATISTIC DAILY SWAN MONITORING

## 2018-08-10 PROCEDURE — C1768 GRAFT, VASCULAR: HCPCS | Performed by: THORACIC SURGERY (CARDIOTHORACIC VASCULAR SURGERY)

## 2018-08-10 PROCEDURE — 40000196 ZZH STATISTIC RAPCV CVP MONITORING

## 2018-08-10 PROCEDURE — 25000125 ZZHC RX 250: Performed by: THORACIC SURGERY (CARDIOTHORACIC VASCULAR SURGERY)

## 2018-08-10 PROCEDURE — 99292 CRITICAL CARE ADDL 30 MIN: CPT | Mod: GC | Performed by: INTERNAL MEDICINE

## 2018-08-10 PROCEDURE — 40000275 ZZH STATISTIC RCP TIME EA 10 MIN

## 2018-08-10 PROCEDURE — 41000019 ZZH PERA-PERFUSION EACH ADDTL 15 MIN: Performed by: THORACIC SURGERY (CARDIOTHORACIC VASCULAR SURGERY)

## 2018-08-10 PROCEDURE — 5A1955Z RESPIRATORY VENTILATION, GREATER THAN 96 CONSECUTIVE HOURS: ICD-10-PCS | Performed by: THORACIC SURGERY (CARDIOTHORACIC VASCULAR SURGERY)

## 2018-08-10 PROCEDURE — 85396 CLOTTING ASSAY WHOLE BLOOD: CPT | Performed by: INTERNAL MEDICINE

## 2018-08-10 PROCEDURE — C1781 MESH (IMPLANTABLE): HCPCS | Performed by: THORACIC SURGERY (CARDIOTHORACIC VASCULAR SURGERY)

## 2018-08-10 PROCEDURE — 37000008 ZZH ANESTHESIA TECHNICAL FEE, 1ST 30 MIN: Performed by: THORACIC SURGERY (CARDIOTHORACIC VASCULAR SURGERY)

## 2018-08-10 PROCEDURE — 00000146 ZZHCL STATISTIC GLUCOSE BY METER IP

## 2018-08-10 PROCEDURE — 25000128 H RX IP 250 OP 636: Performed by: STUDENT IN AN ORGANIZED HEALTH CARE EDUCATION/TRAINING PROGRAM

## 2018-08-10 PROCEDURE — 36600 WITHDRAWAL OF ARTERIAL BLOOD: CPT

## 2018-08-10 PROCEDURE — 83605 ASSAY OF LACTIC ACID: CPT | Performed by: THORACIC SURGERY (CARDIOTHORACIC VASCULAR SURGERY)

## 2018-08-10 PROCEDURE — 40000986 XR CHEST PORT 1 VW

## 2018-08-10 PROCEDURE — 95951 ZZHC EEG VIDEO < 12 HR: CPT | Mod: 52,ZF

## 2018-08-10 PROCEDURE — 02RJ08Z REPLACEMENT OF TRICUSPID VALVE WITH ZOOPLASTIC TISSUE, OPEN APPROACH: ICD-10-PCS | Performed by: THORACIC SURGERY (CARDIOTHORACIC VASCULAR SURGERY)

## 2018-08-10 PROCEDURE — 85610 PROTHROMBIN TIME: CPT | Performed by: THORACIC SURGERY (CARDIOTHORACIC VASCULAR SURGERY)

## 2018-08-10 PROCEDURE — 40000014 ZZH STATISTIC ARTERIAL MONITORING DAILY

## 2018-08-10 PROCEDURE — 37000009 ZZH ANESTHESIA TECHNICAL FEE, EACH ADDTL 15 MIN: Performed by: THORACIC SURGERY (CARDIOTHORACIC VASCULAR SURGERY)

## 2018-08-10 PROCEDURE — 93010 ELECTROCARDIOGRAM REPORT: CPT | Mod: 76 | Performed by: INTERNAL MEDICINE

## 2018-08-10 PROCEDURE — 25000125 ZZHC RX 250: Performed by: NURSE ANESTHETIST, CERTIFIED REGISTERED

## 2018-08-10 PROCEDURE — 93503 INSERT/PLACE HEART CATHETER: CPT

## 2018-08-10 PROCEDURE — 93308 TTE F-UP OR LMTD: CPT

## 2018-08-10 PROCEDURE — 25000565 ZZH ISOFLURANE, EA 15 MIN: Performed by: THORACIC SURGERY (CARDIOTHORACIC VASCULAR SURGERY)

## 2018-08-10 PROCEDURE — 93325 DOPPLER ECHO COLOR FLOW MAPG: CPT | Mod: 26 | Performed by: INTERNAL MEDICINE

## 2018-08-10 PROCEDURE — P9016 RBC LEUKOCYTES REDUCED: HCPCS | Performed by: PHYSICIAN ASSISTANT

## 2018-08-10 PROCEDURE — 82330 ASSAY OF CALCIUM: CPT

## 2018-08-10 PROCEDURE — 27210460 ZZH PUMP APP ADULT PERFUSION: Performed by: THORACIC SURGERY (CARDIOTHORACIC VASCULAR SURGERY)

## 2018-08-10 PROCEDURE — 27210995 ZZH RX 272: Performed by: THORACIC SURGERY (CARDIOTHORACIC VASCULAR SURGERY)

## 2018-08-10 PROCEDURE — 27210447 ZZH PACK CELL SAVER CSP: Performed by: THORACIC SURGERY (CARDIOTHORACIC VASCULAR SURGERY)

## 2018-08-10 PROCEDURE — 27210794 ZZH OR GENERAL SUPPLY STERILE: Performed by: THORACIC SURGERY (CARDIOTHORACIC VASCULAR SURGERY)

## 2018-08-10 PROCEDURE — 85520 HEPARIN ASSAY: CPT | Performed by: INTERNAL MEDICINE

## 2018-08-10 PROCEDURE — P9059 PLASMA, FRZ BETWEEN 8-24HOUR: HCPCS | Performed by: INTERNAL MEDICINE

## 2018-08-10 PROCEDURE — 25000128 H RX IP 250 OP 636: Performed by: HOSPITALIST

## 2018-08-10 PROCEDURE — 36000074 ZZH SURGERY LEVEL 6 1ST 30 MIN - UMMC: Performed by: THORACIC SURGERY (CARDIOTHORACIC VASCULAR SURGERY)

## 2018-08-10 PROCEDURE — 25000128 H RX IP 250 OP 636: Performed by: INTERNAL MEDICINE

## 2018-08-10 PROCEDURE — 25000128 H RX IP 250 OP 636: Performed by: ANESTHESIOLOGY

## 2018-08-10 PROCEDURE — 41000018 ZZH PER-PERFUSION 1ST 30 MIN: Performed by: THORACIC SURGERY (CARDIOTHORACIC VASCULAR SURGERY)

## 2018-08-10 PROCEDURE — 5A1223Z PERFORMANCE OF CARDIAC PACING, CONTINUOUS: ICD-10-PCS | Performed by: THORACIC SURGERY (CARDIOTHORACIC VASCULAR SURGERY)

## 2018-08-10 PROCEDURE — 85610 PROTHROMBIN TIME: CPT | Performed by: INTERNAL MEDICINE

## 2018-08-10 PROCEDURE — 80076 HEPATIC FUNCTION PANEL: CPT | Performed by: INTERNAL MEDICINE

## 2018-08-10 PROCEDURE — A9270 NON-COVERED ITEM OR SERVICE: HCPCS | Mod: GY | Performed by: STUDENT IN AN ORGANIZED HEALTH CARE EDUCATION/TRAINING PROGRAM

## 2018-08-10 PROCEDURE — P9041 ALBUMIN (HUMAN),5%, 50ML: HCPCS

## 2018-08-10 PROCEDURE — 99291 CRITICAL CARE FIRST HOUR: CPT | Mod: GC | Performed by: INTERNAL MEDICINE

## 2018-08-10 PROCEDURE — 80048 BASIC METABOLIC PNL TOTAL CA: CPT | Performed by: INTERNAL MEDICINE

## 2018-08-10 PROCEDURE — 85384 FIBRINOGEN ACTIVITY: CPT | Performed by: INTERNAL MEDICINE

## 2018-08-10 PROCEDURE — C9460 INJECTION, CANGRELOR: HCPCS | Performed by: STUDENT IN AN ORGANIZED HEALTH CARE EDUCATION/TRAINING PROGRAM

## 2018-08-10 PROCEDURE — 82947 ASSAY GLUCOSE BLOOD QUANT: CPT

## 2018-08-10 PROCEDURE — 84100 ASSAY OF PHOSPHORUS: CPT | Performed by: INTERNAL MEDICINE

## 2018-08-10 PROCEDURE — 84132 ASSAY OF SERUM POTASSIUM: CPT

## 2018-08-10 PROCEDURE — 85018 HEMOGLOBIN: CPT | Performed by: STUDENT IN AN ORGANIZED HEALTH CARE EDUCATION/TRAINING PROGRAM

## 2018-08-10 PROCEDURE — 25000125 ZZHC RX 250: Performed by: STUDENT IN AN ORGANIZED HEALTH CARE EDUCATION/TRAINING PROGRAM

## 2018-08-10 PROCEDURE — 25800025 ZZH RX 258: Performed by: NURSE ANESTHETIST, CERTIFIED REGISTERED

## 2018-08-10 PROCEDURE — 25000128 H RX IP 250 OP 636

## 2018-08-10 PROCEDURE — 83735 ASSAY OF MAGNESIUM: CPT | Performed by: INTERNAL MEDICINE

## 2018-08-10 PROCEDURE — 25000125 ZZHC RX 250

## 2018-08-10 PROCEDURE — 20000004 ZZH R&B ICU UMMC

## 2018-08-10 PROCEDURE — 93308 TTE F-UP OR LMTD: CPT | Mod: 26 | Performed by: INTERNAL MEDICINE

## 2018-08-10 PROCEDURE — 5A1221Z PERFORMANCE OF CARDIAC OUTPUT, CONTINUOUS: ICD-10-PCS | Performed by: THORACIC SURGERY (CARDIOTHORACIC VASCULAR SURGERY)

## 2018-08-10 PROCEDURE — 25000128 H RX IP 250 OP 636: Performed by: NURSE ANESTHETIST, CERTIFIED REGISTERED

## 2018-08-10 PROCEDURE — 02JA0ZZ INSPECTION OF HEART, OPEN APPROACH: ICD-10-PCS | Performed by: THORACIC SURGERY (CARDIOTHORACIC VASCULAR SURGERY)

## 2018-08-10 PROCEDURE — 25000132 ZZH RX MED GY IP 250 OP 250 PS 637: Mod: GY | Performed by: STUDENT IN AN ORGANIZED HEALTH CARE EDUCATION/TRAINING PROGRAM

## 2018-08-10 PROCEDURE — 85027 COMPLETE CBC AUTOMATED: CPT | Performed by: THORACIC SURGERY (CARDIOTHORACIC VASCULAR SURGERY)

## 2018-08-10 PROCEDURE — 83605 ASSAY OF LACTIC ACID: CPT

## 2018-08-10 PROCEDURE — 80048 BASIC METABOLIC PNL TOTAL CA: CPT | Performed by: THORACIC SURGERY (CARDIOTHORACIC VASCULAR SURGERY)

## 2018-08-10 PROCEDURE — 36000076 ZZH SURGERY LEVEL 6 EA 15 ADDTL MIN - UMMC: Performed by: THORACIC SURGERY (CARDIOTHORACIC VASCULAR SURGERY)

## 2018-08-10 PROCEDURE — C9132 KCENTRA, PER I.U.: HCPCS | Performed by: ANESTHESIOLOGY

## 2018-08-10 PROCEDURE — 82330 ASSAY OF CALCIUM: CPT | Performed by: THORACIC SURGERY (CARDIOTHORACIC VASCULAR SURGERY)

## 2018-08-10 PROCEDURE — 83735 ASSAY OF MAGNESIUM: CPT | Performed by: THORACIC SURGERY (CARDIOTHORACIC VASCULAR SURGERY)

## 2018-08-10 PROCEDURE — 93321 DOPPLER ECHO F-UP/LMTD STD: CPT | Mod: 26 | Performed by: INTERNAL MEDICINE

## 2018-08-10 PROCEDURE — 93005 ELECTROCARDIOGRAM TRACING: CPT

## 2018-08-10 PROCEDURE — 25000555 ZZHC RX FACTOR IP 250 OP 636: Performed by: ANESTHESIOLOGY

## 2018-08-10 PROCEDURE — 82805 BLOOD GASES W/O2 SATURATION: CPT | Performed by: THORACIC SURGERY (CARDIOTHORACIC VASCULAR SURGERY)

## 2018-08-10 PROCEDURE — 85014 HEMATOCRIT: CPT | Performed by: STUDENT IN AN ORGANIZED HEALTH CARE EDUCATION/TRAINING PROGRAM

## 2018-08-10 PROCEDURE — 88305 TISSUE EXAM BY PATHOLOGIST: CPT | Performed by: THORACIC SURGERY (CARDIOTHORACIC VASCULAR SURGERY)

## 2018-08-10 PROCEDURE — 82803 BLOOD GASES ANY COMBINATION: CPT

## 2018-08-10 PROCEDURE — 40000986 XR ABDOMEN PORT 1 VW

## 2018-08-10 PROCEDURE — 27810169 ZZH OR IMPLANT GENERAL: Performed by: THORACIC SURGERY (CARDIOTHORACIC VASCULAR SURGERY)

## 2018-08-10 PROCEDURE — 82805 BLOOD GASES W/O2 SATURATION: CPT | Performed by: STUDENT IN AN ORGANIZED HEALTH CARE EDUCATION/TRAINING PROGRAM

## 2018-08-10 PROCEDURE — P9012 CRYOPRECIPITATE EACH UNIT: HCPCS | Performed by: INTERNAL MEDICINE

## 2018-08-10 PROCEDURE — 84100 ASSAY OF PHOSPHORUS: CPT | Performed by: THORACIC SURGERY (CARDIOTHORACIC VASCULAR SURGERY)

## 2018-08-10 DEVICE — IMPLANTABLE DEVICE: Type: IMPLANTABLE DEVICE | Site: HEART | Status: FUNCTIONAL

## 2018-08-10 DEVICE — VALVE MITRAL EPIC STENTED PORCINE 33MM E100-33M-00: Type: IMPLANTABLE DEVICE | Site: HEART | Status: FUNCTIONAL

## 2018-08-10 DEVICE — GRAFT PTFE FELT 4X4": Type: IMPLANTABLE DEVICE | Site: HEART | Status: FUNCTIONAL

## 2018-08-10 RX ORDER — SODIUM CHLORIDE 9 MG/ML
INJECTION, SOLUTION INTRAVENOUS CONTINUOUS PRN
Status: DISCONTINUED | OUTPATIENT
Start: 2018-08-10 | End: 2018-08-10

## 2018-08-10 RX ORDER — LORAZEPAM 2 MG/ML
.5-1 INJECTION INTRAMUSCULAR
Status: DISCONTINUED | OUTPATIENT
Start: 2018-08-10 | End: 2018-08-10

## 2018-08-10 RX ORDER — HEPARIN SODIUM 1000 [USP'U]/ML
INJECTION, SOLUTION INTRAVENOUS; SUBCUTANEOUS PRN
Status: DISCONTINUED | OUTPATIENT
Start: 2018-08-10 | End: 2018-08-10

## 2018-08-10 RX ORDER — CEFAZOLIN SODIUM 1 G/3ML
INJECTION, POWDER, FOR SOLUTION INTRAMUSCULAR; INTRAVENOUS PRN
Status: DISCONTINUED | OUTPATIENT
Start: 2018-08-10 | End: 2018-08-10

## 2018-08-10 RX ORDER — PROPOFOL 10 MG/ML
10-20 INJECTION, EMULSION INTRAVENOUS EVERY 30 MIN PRN
Status: DISCONTINUED | OUTPATIENT
Start: 2018-08-10 | End: 2018-08-17

## 2018-08-10 RX ORDER — METHOCARBAMOL 500 MG/1
500 TABLET, FILM COATED ORAL 4 TIMES DAILY PRN
Status: DISCONTINUED | OUTPATIENT
Start: 2018-08-10 | End: 2018-08-13

## 2018-08-10 RX ORDER — MUPIROCIN 20 MG/G
0.5 OINTMENT TOPICAL 2 TIMES DAILY
Status: DISCONTINUED | OUTPATIENT
Start: 2018-08-10 | End: 2018-08-11

## 2018-08-10 RX ORDER — POTASSIUM CHLORIDE 29.8 MG/ML
20 INJECTION INTRAVENOUS
Status: DISCONTINUED | OUTPATIENT
Start: 2018-08-10 | End: 2018-09-09

## 2018-08-10 RX ORDER — PROTAMINE SULFATE 10 MG/ML
INJECTION, SOLUTION INTRAVENOUS PRN
Status: DISCONTINUED | OUTPATIENT
Start: 2018-08-10 | End: 2018-08-10

## 2018-08-10 RX ORDER — DEXTROSE MONOHYDRATE 25 G/50ML
25-50 INJECTION, SOLUTION INTRAVENOUS
Status: DISCONTINUED | OUTPATIENT
Start: 2018-08-10 | End: 2018-08-15

## 2018-08-10 RX ORDER — OXYCODONE HYDROCHLORIDE 5 MG/1
5-10 TABLET ORAL EVERY 4 HOURS PRN
Status: DISCONTINUED | OUTPATIENT
Start: 2018-08-10 | End: 2018-08-13

## 2018-08-10 RX ORDER — AMOXICILLIN 250 MG
2 CAPSULE ORAL 2 TIMES DAILY
Status: DISCONTINUED | OUTPATIENT
Start: 2018-08-10 | End: 2018-08-13

## 2018-08-10 RX ORDER — LIDOCAINE 4 G/G
2 PATCH TOPICAL
Status: DISCONTINUED | OUTPATIENT
Start: 2018-08-11 | End: 2018-08-13

## 2018-08-10 RX ORDER — POTASSIUM CHLORIDE 7.45 MG/ML
10 INJECTION INTRAVENOUS
Status: DISCONTINUED | OUTPATIENT
Start: 2018-08-10 | End: 2018-09-09

## 2018-08-10 RX ORDER — NALOXONE HYDROCHLORIDE 0.4 MG/ML
.1-.4 INJECTION, SOLUTION INTRAMUSCULAR; INTRAVENOUS; SUBCUTANEOUS
Status: DISCONTINUED | OUTPATIENT
Start: 2018-08-10 | End: 2018-09-11 | Stop reason: HOSPADM

## 2018-08-10 RX ORDER — MEPERIDINE HYDROCHLORIDE 50 MG/ML
12.5-25 INJECTION INTRAMUSCULAR; INTRAVENOUS; SUBCUTANEOUS
Status: DISCONTINUED | OUTPATIENT
Start: 2018-08-10 | End: 2018-08-16

## 2018-08-10 RX ORDER — AMOXICILLIN 250 MG
1 CAPSULE ORAL 2 TIMES DAILY
Status: DISCONTINUED | OUTPATIENT
Start: 2018-08-10 | End: 2018-08-13

## 2018-08-10 RX ORDER — MAGNESIUM SULFATE HEPTAHYDRATE 40 MG/ML
4 INJECTION, SOLUTION INTRAVENOUS EVERY 4 HOURS PRN
Status: DISCONTINUED | OUTPATIENT
Start: 2018-08-10 | End: 2018-09-11 | Stop reason: HOSPADM

## 2018-08-10 RX ORDER — PROCHLORPERAZINE MALEATE 5 MG
5 TABLET ORAL EVERY 6 HOURS PRN
Status: DISCONTINUED | OUTPATIENT
Start: 2018-08-10 | End: 2018-09-11 | Stop reason: HOSPADM

## 2018-08-10 RX ORDER — HYDROMORPHONE HYDROCHLORIDE 1 MG/ML
.3-.5 INJECTION, SOLUTION INTRAMUSCULAR; INTRAVENOUS; SUBCUTANEOUS
Status: DISCONTINUED | OUTPATIENT
Start: 2018-08-10 | End: 2018-09-11 | Stop reason: HOSPADM

## 2018-08-10 RX ORDER — ASPIRIN 325 MG
325 TABLET ORAL DAILY
Status: DISCONTINUED | OUTPATIENT
Start: 2018-08-11 | End: 2018-08-12

## 2018-08-10 RX ORDER — KETAMINE HYDROCHLORIDE 50 MG/ML
160 INJECTION, SOLUTION INTRAMUSCULAR; INTRAVENOUS ONCE
Status: DISCONTINUED | OUTPATIENT
Start: 2018-08-10 | End: 2018-08-10 | Stop reason: HOSPADM

## 2018-08-10 RX ORDER — POTASSIUM CHLORIDE 1.5 G/1.58G
20-40 POWDER, FOR SOLUTION ORAL
Status: DISCONTINUED | OUTPATIENT
Start: 2018-08-10 | End: 2018-09-09

## 2018-08-10 RX ORDER — DOPAMINE HYDROCHLORIDE 160 MG/100ML
2-20 INJECTION, SOLUTION INTRAVENOUS CONTINUOUS
Status: DISCONTINUED | OUTPATIENT
Start: 2018-08-10 | End: 2018-08-10

## 2018-08-10 RX ORDER — KETAMINE HCL IN 0.9 % NACL 20 MG/2 ML
2 SYRINGE (ML) INTRAVENOUS ONCE
Status: DISCONTINUED | OUTPATIENT
Start: 2018-08-10 | End: 2018-08-10 | Stop reason: CLARIF

## 2018-08-10 RX ORDER — PROPOFOL 10 MG/ML
INJECTION, EMULSION INTRAVENOUS PRN
Status: DISCONTINUED | OUTPATIENT
Start: 2018-08-10 | End: 2018-08-10

## 2018-08-10 RX ORDER — FENTANYL CITRATE 50 UG/ML
INJECTION, SOLUTION INTRAMUSCULAR; INTRAVENOUS
Status: COMPLETED
Start: 2018-08-10 | End: 2018-08-10

## 2018-08-10 RX ORDER — PROPOFOL 10 MG/ML
5-75 INJECTION, EMULSION INTRAVENOUS CONTINUOUS
Status: DISCONTINUED | OUTPATIENT
Start: 2018-08-10 | End: 2018-08-17

## 2018-08-10 RX ORDER — CEFAZOLIN SODIUM 1 G/3ML
1 INJECTION, POWDER, FOR SOLUTION INTRAMUSCULAR; INTRAVENOUS EVERY 8 HOURS
Status: DISCONTINUED | OUTPATIENT
Start: 2018-08-11 | End: 2018-08-11

## 2018-08-10 RX ORDER — NITROGLYCERIN 20 MG/100ML
.07-2 INJECTION INTRAVENOUS CONTINUOUS
Status: DISCONTINUED | OUTPATIENT
Start: 2018-08-10 | End: 2018-08-11

## 2018-08-10 RX ORDER — SODIUM CHLORIDE, SODIUM LACTATE, POTASSIUM CHLORIDE, CALCIUM CHLORIDE 600; 310; 30; 20 MG/100ML; MG/100ML; MG/100ML; MG/100ML
INJECTION, SOLUTION INTRAVENOUS CONTINUOUS PRN
Status: DISCONTINUED | OUTPATIENT
Start: 2018-08-10 | End: 2018-08-10

## 2018-08-10 RX ORDER — LORAZEPAM 2 MG/ML
.5-2 INJECTION INTRAMUSCULAR
Status: DISCONTINUED | OUTPATIENT
Start: 2018-08-10 | End: 2018-08-20

## 2018-08-10 RX ORDER — ACETAMINOPHEN 325 MG/1
975 TABLET ORAL EVERY 8 HOURS
Status: DISCONTINUED | OUTPATIENT
Start: 2018-08-10 | End: 2018-08-12

## 2018-08-10 RX ORDER — ONDANSETRON 2 MG/ML
4 INJECTION INTRAMUSCULAR; INTRAVENOUS EVERY 6 HOURS PRN
Status: DISCONTINUED | OUTPATIENT
Start: 2018-08-10 | End: 2018-09-11 | Stop reason: HOSPADM

## 2018-08-10 RX ORDER — VASOPRESSIN 20 U/ML
INJECTION PARENTERAL PRN
Status: DISCONTINUED | OUTPATIENT
Start: 2018-08-10 | End: 2018-08-10

## 2018-08-10 RX ORDER — POTASSIUM CHLORIDE 750 MG/1
20-40 TABLET, EXTENDED RELEASE ORAL
Status: DISCONTINUED | OUTPATIENT
Start: 2018-08-10 | End: 2018-09-09

## 2018-08-10 RX ORDER — NICOTINE POLACRILEX 4 MG
15-30 LOZENGE BUCCAL
Status: DISCONTINUED | OUTPATIENT
Start: 2018-08-10 | End: 2018-08-15

## 2018-08-10 RX ORDER — DOPAMINE HYDROCHLORIDE 160 MG/100ML
2-20 INJECTION, SOLUTION INTRAVENOUS CONTINUOUS
Status: DISCONTINUED | OUTPATIENT
Start: 2018-08-10 | End: 2018-08-11

## 2018-08-10 RX ORDER — HYDROXYUREA 500 MG/1
1000 CAPSULE ORAL 2 TIMES DAILY
Status: DISCONTINUED | OUTPATIENT
Start: 2018-08-10 | End: 2018-08-11

## 2018-08-10 RX ORDER — FUROSEMIDE 10 MG/ML
40 INJECTION INTRAMUSCULAR; INTRAVENOUS ONCE
Status: COMPLETED | OUTPATIENT
Start: 2018-08-10 | End: 2018-08-10

## 2018-08-10 RX ORDER — HYDRALAZINE HYDROCHLORIDE 20 MG/ML
10 INJECTION INTRAMUSCULAR; INTRAVENOUS EVERY 30 MIN PRN
Status: DISCONTINUED | OUTPATIENT
Start: 2018-08-10 | End: 2018-09-11 | Stop reason: HOSPADM

## 2018-08-10 RX ORDER — LIDOCAINE HYDROCHLORIDE 20 MG/ML
INJECTION, SOLUTION INFILTRATION; PERINEURAL PRN
Status: DISCONTINUED | OUTPATIENT
Start: 2018-08-10 | End: 2018-08-10

## 2018-08-10 RX ORDER — VANCOMYCIN HYDROCHLORIDE 1 G/200ML
1000 INJECTION, SOLUTION INTRAVENOUS EVERY 12 HOURS
Status: COMPLETED | OUTPATIENT
Start: 2018-08-11 | End: 2018-08-11

## 2018-08-10 RX ORDER — SODIUM CHLORIDE 9 MG/ML
INJECTION, SOLUTION INTRAVENOUS CONTINUOUS
Status: DISCONTINUED | OUTPATIENT
Start: 2018-08-10 | End: 2018-08-17

## 2018-08-10 RX ORDER — POTASSIUM CL/LIDO/0.9 % NACL 10MEQ/0.1L
10 INTRAVENOUS SOLUTION, PIGGYBACK (ML) INTRAVENOUS
Status: DISCONTINUED | OUTPATIENT
Start: 2018-08-10 | End: 2018-09-09

## 2018-08-10 RX ORDER — DEXTROSE MONOHYDRATE 25 G/50ML
INJECTION, SOLUTION INTRAVENOUS PRN
Status: DISCONTINUED | OUTPATIENT
Start: 2018-08-10 | End: 2018-08-10

## 2018-08-10 RX ORDER — FENTANYL CITRATE 50 UG/ML
INJECTION, SOLUTION INTRAMUSCULAR; INTRAVENOUS PRN
Status: DISCONTINUED | OUTPATIENT
Start: 2018-08-10 | End: 2018-08-10

## 2018-08-10 RX ORDER — PROPOFOL 10 MG/ML
INJECTION, EMULSION INTRAVENOUS
Status: COMPLETED
Start: 2018-08-10 | End: 2018-08-10

## 2018-08-10 RX ORDER — ACETAMINOPHEN 325 MG/1
650 TABLET ORAL EVERY 4 HOURS PRN
Status: DISCONTINUED | OUTPATIENT
Start: 2018-08-13 | End: 2018-08-13

## 2018-08-10 RX ORDER — ALBUMIN, HUMAN INJ 5% 5 %
500-1000 SOLUTION INTRAVENOUS
Status: COMPLETED | OUTPATIENT
Start: 2018-08-10 | End: 2018-08-11

## 2018-08-10 RX ORDER — ONDANSETRON 4 MG/1
4 TABLET, ORALLY DISINTEGRATING ORAL EVERY 6 HOURS PRN
Status: DISCONTINUED | OUTPATIENT
Start: 2018-08-10 | End: 2018-09-11 | Stop reason: HOSPADM

## 2018-08-10 RX ORDER — LIDOCAINE 40 MG/G
CREAM TOPICAL
Status: DISCONTINUED | OUTPATIENT
Start: 2018-08-10 | End: 2018-09-05

## 2018-08-10 RX ORDER — NALOXONE HYDROCHLORIDE 0.4 MG/ML
.1-.4 INJECTION, SOLUTION INTRAMUSCULAR; INTRAVENOUS; SUBCUTANEOUS
Status: DISCONTINUED | OUTPATIENT
Start: 2018-08-10 | End: 2018-08-11

## 2018-08-10 RX ADMIN — EPINEPHRINE 0.04 MCG/KG/MIN: 1 INJECTION PARENTERAL at 20:24

## 2018-08-10 RX ADMIN — PROTHROMBIN, COAGULATION FACTOR VII HUMAN, COAGULATION FACTOR IX HUMAN, COAGULATION FACTOR X HUMAN, PROTEIN C, PROTEIN S HUMAN, AND WATER 531 UNITS: KIT at 21:32

## 2018-08-10 RX ADMIN — NOREPINEPHRINE BITARTRATE 6.4 MCG: 1 INJECTION INTRAVENOUS at 16:20

## 2018-08-10 RX ADMIN — LORAZEPAM 2 MG: 2 INJECTION INTRAMUSCULAR; INTRAVENOUS at 00:14

## 2018-08-10 RX ADMIN — SODIUM NITROPRUSSIDE 0.25 MCG/KG/MIN: 25 INJECTION INTRAVENOUS at 11:34

## 2018-08-10 RX ADMIN — VASOPRESSIN 1 UNITS: 20 INJECTION INTRAVENOUS at 16:58

## 2018-08-10 RX ADMIN — QUETIAPINE FUMARATE 25 MG: 25 TABLET, FILM COATED ORAL at 09:34

## 2018-08-10 RX ADMIN — AMINOCAPROIC ACID 5 G: 250 INJECTION, SOLUTION INTRAVENOUS at 19:17

## 2018-08-10 RX ADMIN — HEPARIN SODIUM 5000 UNITS: 5000 INJECTION, SOLUTION INTRAVENOUS; SUBCUTANEOUS at 03:40

## 2018-08-10 RX ADMIN — DOBUTAMINE HYDROCHLORIDE 5 MCG/KG/MIN: 400 INJECTION INTRAVENOUS at 00:37

## 2018-08-10 RX ADMIN — LORAZEPAM 2 MG: 2 INJECTION INTRAMUSCULAR; INTRAVENOUS at 03:51

## 2018-08-10 RX ADMIN — VASOPRESSIN 1 UNITS: 20 INJECTION INTRAVENOUS at 21:26

## 2018-08-10 RX ADMIN — ROCURONIUM BROMIDE 70 MG: 10 INJECTION INTRAVENOUS at 16:13

## 2018-08-10 RX ADMIN — DOPAMINE HYDROCHLORIDE 2 MCG/KG/MIN: 160 INJECTION, SOLUTION INTRAVENOUS at 10:31

## 2018-08-10 RX ADMIN — FUROSEMIDE 40 MG: 10 INJECTION, SOLUTION INTRAVENOUS at 11:16

## 2018-08-10 RX ADMIN — LIDOCAINE HYDROCHLORIDE 100 MG: 20 INJECTION, SOLUTION INFILTRATION; PERINEURAL at 16:12

## 2018-08-10 RX ADMIN — DEXMEDETOMIDINE HYDROCHLORIDE 0.5 MCG/KG/HR: 100 INJECTION, SOLUTION INTRAVENOUS at 19:26

## 2018-08-10 RX ADMIN — ROCURONIUM BROMIDE 30 MG: 10 INJECTION INTRAVENOUS at 17:42

## 2018-08-10 RX ADMIN — SODIUM CHLORIDE: 9 INJECTION, SOLUTION INTRAVENOUS at 16:00

## 2018-08-10 RX ADMIN — FENTANYL CITRATE 200 MCG: 50 INJECTION, SOLUTION INTRAMUSCULAR; INTRAVENOUS at 21:09

## 2018-08-10 RX ADMIN — PROTAMINE SULFATE 190 MG: 10 INJECTION, SOLUTION INTRAVENOUS at 21:17

## 2018-08-10 RX ADMIN — PROPOFOL 40 MCG/KG/MIN: 10 INJECTION, EMULSION INTRAVENOUS at 22:49

## 2018-08-10 RX ADMIN — CEFAZOLIN 2 G: 1 INJECTION, POWDER, FOR SOLUTION INTRAMUSCULAR; INTRAVENOUS at 17:15

## 2018-08-10 RX ADMIN — HYDROMORPHONE HYDROCHLORIDE 1 MG: 1 INJECTION, SOLUTION INTRAMUSCULAR; INTRAVENOUS; SUBCUTANEOUS at 21:43

## 2018-08-10 RX ADMIN — FENTANYL CITRATE 50 MCG: 50 INJECTION, SOLUTION INTRAMUSCULAR; INTRAVENOUS at 21:24

## 2018-08-10 RX ADMIN — PROPOFOL 30 MG: 10 INJECTION, EMULSION INTRAVENOUS at 16:12

## 2018-08-10 RX ADMIN — LEVETIRACETAM 750 MG: 100 INJECTION, SOLUTION INTRAVENOUS at 18:01

## 2018-08-10 RX ADMIN — FENTANYL CITRATE 100 MCG: 50 INJECTION INTRAMUSCULAR; INTRAVENOUS at 23:00

## 2018-08-10 RX ADMIN — SODIUM CHLORIDE: 9 INJECTION, SOLUTION INTRAVENOUS at 23:49

## 2018-08-10 RX ADMIN — DEXTROSE MONOHYDRATE 12.5 G: 25 INJECTION, SOLUTION INTRAVENOUS at 20:41

## 2018-08-10 RX ADMIN — SODIUM CHLORIDE, POTASSIUM CHLORIDE, SODIUM LACTATE AND CALCIUM CHLORIDE: 600; 310; 30; 20 INJECTION, SOLUTION INTRAVENOUS at 16:48

## 2018-08-10 RX ADMIN — NOREPINEPHRINE BITARTRATE 6.4 MCG: 1 INJECTION INTRAVENOUS at 16:13

## 2018-08-10 RX ADMIN — PROPOFOL: 10 INJECTION, EMULSION INTRAVENOUS at 22:49

## 2018-08-10 RX ADMIN — HYDROMORPHONE HYDROCHLORIDE 1 MG: 1 INJECTION, SOLUTION INTRAMUSCULAR; INTRAVENOUS; SUBCUTANEOUS at 21:57

## 2018-08-10 RX ADMIN — VASOPRESSIN 1 UNITS: 20 INJECTION INTRAVENOUS at 16:54

## 2018-08-10 RX ADMIN — SODIUM CHLORIDE: 9 INJECTION, SOLUTION INTRAVENOUS at 16:30

## 2018-08-10 RX ADMIN — LEVETIRACETAM 750 MG: 100 INJECTION, SOLUTION INTRAVENOUS at 06:03

## 2018-08-10 RX ADMIN — FENTANYL CITRATE 1000 MCG: 50 INJECTION, SOLUTION INTRAMUSCULAR; INTRAVENOUS at 16:12

## 2018-08-10 RX ADMIN — CANGRELOR 0.75 MCG/KG/MIN: 50 INJECTION, POWDER, LYOPHILIZED, FOR SOLUTION INTRAVENOUS at 01:43

## 2018-08-10 RX ADMIN — HYDROXYUREA 500 MG: 500 CAPSULE ORAL at 09:36

## 2018-08-10 RX ADMIN — HEPARIN SODIUM 30000 UNITS: 1000 INJECTION, SOLUTION INTRAVENOUS; SUBCUTANEOUS at 17:22

## 2018-08-10 RX ADMIN — HUMAN INSULIN 1 UNITS/HR: 100 INJECTION, SOLUTION SUBCUTANEOUS at 19:12

## 2018-08-10 RX ADMIN — CEFAZOLIN 1 G: 1 INJECTION, POWDER, FOR SOLUTION INTRAMUSCULAR; INTRAVENOUS at 20:40

## 2018-08-10 ASSESSMENT — ACTIVITIES OF DAILY LIVING (ADL)
ADLS_ACUITY_SCORE: 13

## 2018-08-10 NOTE — ANESTHESIA PROCEDURE NOTES
ELISABETH Probe Insertion Note  Probe Inserted by:  DAKSHA METCALF in the presence of a teaching physician  MEDINA MADISON   Insertion method: ELISABETH probe easily inserted   Bite block used:   Yes      Probe type:  Adult 3D   Insertion complications: No complications.      Billing for ELISABETH report is being done by Anesthesiology

## 2018-08-10 NOTE — ANESTHESIA PROCEDURE NOTES
PA Catheter Insertion Note  Anesthesiologist: MEDINA MADISON  Resident:  DAKSHA METCALF   PA Catheter placed by resident/CRNA in the presence of a teaching physician.  Introducer: Introducer already in place.   Skin prep:  Chloraprep Cap, hand hygiene, Sterile gloves, Full body drape, Mask and Sterile gown    PA Catheter type:  CCO    Distance catheter advanced:  50 cm.    Appropriate RA, RV, PA  waveforms?: Yes    Dressing:  Biopatch and Tegaderm    Complications:  None apparent

## 2018-08-10 NOTE — PROGRESS NOTES
BRIEF HEMATOLOGY/ONCOLOGY NOTE    Platelet count >1000K today. Patient is high-risk for clotting events. Would increase hydrea to 1000 mg PO BID, starting today. Daily CBC for now.    Please page with any questions/concerns over the weekend.    Plan was discussed with attending physician Dr. Quezada.    Dionna Bates MD/PhD  Heme/Onc Fellow, PGY-4  [phone]

## 2018-08-10 NOTE — PROGRESS NOTES
Cardiology Progress Note    Assessment & Plan   69 yo M who presented with confusion chest pain and inferior STEMI s/p HUSSEIN to distal RCA and p LAD. Course complicated by inferoapical VSD    # Post infarction VSD   - c/b cardiogenic shock   -s/p IABP on dobutamine with increasing doses and lower CI's saturations and mentation high SVR will add nipride    - echo showed EF 55-60% inferor wall akinesis with inferoseptal muscular VAD with peak velocity of 3.7m/sec mild to mod RV dilation and global moderate dysfunction.    - plan for surgical closure today? Pending discussion with neuro cril   - cangrelor given fresh stents     # possible partial complex seizures- history of meningioma resection in 2004 EEG with activity in this area   - s/p 2g of keppra and on 750mg BID per neurology    - versed overnight for increased frequency but this am worsened mentation    - will discuss with neuro re the nature of these spells as delaying his cardiac surgery is impacting his surgical plans    #Abnormal LFT c/w hepatic congestion   - improving    # NATALIIA   - baseline SCr 0.9-1-> currently trending up   - improving with IABP and decongestion-> redose with lasix now    # CAD s/p inferoapical STEMI                        Right coronary occlusions treated with HUSSEIN                        Proximal LAD stenosis treated with HUSSEIN    Last dose of brillinta was 8/5 pm per discussion with CVTS - cangrelor- surgery Friday??    # Myeloproliferative syndrome (P VERA)   -history of TIA   - appreciate heme/onc recs- started hydroxyurea 1g BID   -thrombocytosis   -vW labs per heme/onc    # severe malnutrition   -nutrition consult- appreciate recs    Chronic  - Childhood tuberculosis  - Splenectomy secondary to trauma  -Gallstones  -History of prostate cancer  - History of meningioma treated with surgery  - Hypoproteinemia- nutrition consult  -smoker    Code Status: Full Code  DVT prophylaxis- on heparin SC  GI prophy- pantoprazole    Case discussed  "with Dr. Kan Julian Saldana   Cardiology fellow, PGY-5    Interval History   No acute events overnight  CVP rising to 15 SVO2 dropped to the 40's   SCr stable at 1.34 though UOP dropping      Physical Exam   Temp: 96.1  F (35.6  C) Temp src: Axillary BP: (!) 71/57   Heart Rate: 98 Resp: 24 SpO2: 100 % O2 Device: Oxymask Oxygen Delivery: 6 LPM  Vitals:    08/07/18 0400 08/08/18 0500 08/10/18 0400   Weight: 76.7 kg (169 lb 1.5 oz) 78.3 kg (172 lb 9.9 oz) 79.8 kg (175 lb 14.8 oz)     Vital Signs with Ranges  Temp:  [94  F (34.4  C)-96.9  F (36.1  C)] 96.1  F (35.6  C)  Heart Rate:  [] 98  Resp:  [8-39] 24  BP: ()/(53-73) 71/57  SpO2:  [75 %-100 %] 100 %  I/O last 3 completed shifts:  In: 1626.7 [P.O.:580; I.V.:996.7]  Out: 477 [Urine:477]    Heart Rate: 98, Blood pressure (!) 71/57, temperature 96.1  F (35.6  C), temperature source Axillary, resp. rate 24, height 1.753 m (5' 9\"), weight 79.8 kg (175 lb 14.8 oz), SpO2 100 %.  175 lbs 14.83 oz  GEN:  Somnolent not responding appropriately   CV:  Regular rate and rhythm, systolic murmur at apex, IABP site clean not bleeding no hematoma  LUNGS: decreased breath sounds   ABD:  Active bowel sounds, soft, non-tender/non-distended.  No rebound/guarding/rigidity.  EXT:  No edema or cyanosis.      Medications     cangrelor (KENGREAL) infusion ADULT 0.75 mcg/kg/min (08/10/18 1200)     DOBUTamine 10.03 mcg/kg/min (08/10/18 1200)     DOPamine Stopped (08/10/18 1235)     - MEDICATION INSTRUCTIONS -       nitroPRUsside (NIPRIDE) IV infusion ADULT/PEDS GREATER than or EQUAL to 45 kg std conc 1.003 mcg/kg/min (08/10/18 1200)     Reason ACE/ARB/ARNI order not selected       Reason beta blocker order not selected         acetaminophen  650 mg Oral Q6H     aspirin  81 mg Oral Daily     atorvastatin  80 mg Oral QPM     docusate sodium  100 mg Oral BID     heparin  5,000 Units Subcutaneous Q12H     hydroxyurea  500 mg Oral BID     levETIRAcetam  750 mg " Intravenous Q12H     pantoprazole  40 mg Oral QAM AC     polyethylene glycol  17 g Oral Daily     QUEtiapine  25 mg Oral QAM     QUEtiapine  50 mg Oral At Bedtime       Data     Recent Labs  Lab 08/10/18  1019 08/10/18  0408 08/09/18  1111 08/09/18  0351 08/08/18 2021 08/08/18  0503  08/07/18  0403   WBC 19.2* 17.3*  --  19.8*  --   --   < > 21.7*   HGB 9.7* 10.1*  --  10.5*  --   --   < > 11.2*   MCV 92 92  --  92  --   --   < > 90   * 1005*  --  956*  --   --   < > 869*   INR  --  1.30*  --  1.34*  --  1.41*  --  1.39*   NA  --  134  --  133 132* 132*  < > 132*   POTASSIUM  --  4.6 4.6 4.5 4.3 5.1  < > 5.0   CHLORIDE  --  104  --  104 102 105  < > 103   CO2  --  19*  --  20 20 19*  < > 20   BUN  --  52*  --  46* 45* 45*  < > 26   CR  --  1.34*  --  1.28* 1.39* 1.55*  < > 1.40*   ANIONGAP  --  10  --  9 10 8  < > 9   GABRIELA  --  7.4*  --  7.6* 7.7* 7.7*  < > 7.5*   GLC  --  117*  --  121* 142* 121*  < > 135*   ALBUMIN  --  2.7*  --   --   --   --   --  2.4*   PROTTOTAL  --  6.1*  --   --   --   --   --  6.2*   BILITOTAL  --  0.5  --   --   --   --   --  0.4   ALKPHOS  --  80  --   --   --   --   --  85   ALT  --  37  --   --   --   --   --  28   AST  --  24  --   --   --   --   --  73*   < > = values in this interval not displayed.    Recent Results (from the past 24 hour(s))   CT Head w/o Contrast    Narrative    CT HEAD W/O CONTRAST 8/9/2018 6:47 PM    History: partial seizures;     Comparison: None.    Technique: Using multidetector thin collimation helical acquisition  technique, axial, coronal and sagittal CT images from the skull base  to the vertex were obtained without intravenous contrast.     Findings:    Postsurgical changes of right anterior temporal craniotomy and  craniectomy and encephalomalcic changes in the right anterior temporal  lobe. No intracranial hemorrhage, mass effect, or midline shift. The  ventricles are proportionate to the cerebral sulci. The gray to white  matter differentiation  of the cerebral hemispheres is preserved. The  basal cisterns are patent.    The visualized paranasal sinuses are clear. The mastoid air cells are  clear.    There is sclerosis and mild expansion of the right greater wing of the  sphenoid bone with changes extending into the right sphenoid locule.  Right foramen ovale is narrowed due to these bony changes.       Impression    Impression:   1. No acute intracranial pathology. Operative changes in the right  anterior temporal lobe.  2. Sclerosis and mild expansion of the right greater wing of the  sphenoid with extension of these bony changes into the right sphenoid  locule. The right foramen ovale is narrowed due to is surrounding bony  changes. Differentials include metastatic disease (such as from  prostate cancer) versus Paget's disease.     I have personally reviewed the examination and initial interpretation  and I agree with the findings.    DESIREE LU MD         Echo  Interpretation Summary  Left ventricular size is normal.  The Ejection Fraction is estimated at 55-60%.  Inferior wall akinesis with inferoseptal muscular VAD. Peak velocity 3.7m/sec.  Mild to moderate right ventricular dilation is present.  Global right ventricular function is moderately reduced.  Dilation of the inferior vena cava is present with abnormal respiratory  variation in diameter.  No pericardial effusion is present.  Previous study not available for comparison.

## 2018-08-10 NOTE — PROGRESS NOTES
Subjective:  Mr. De Anda is a 68 year old gentleman s/p HUSSEIN to RCA and LAD for STEMI on 8/4 found to have post infarction inferoapical VSD. He is scheduled for surgery today.     Neurology was consulted for spells. He has had numerous events on this admission in which he becomes bradycardic to the 40s, desaturates to 70s, with eye rolling back; each episode lasting approximately 30 seconds. He is responsive and states that he feels hot and appears anxious during these episodes. These have occurred nearly every 1.5-2 hours during this admission. In between episodes, he returns to baseline.     Video EEG showed very frequent right frontotemporal delta slowing and spike-wave complexes. There have been two events with marked increase in generalized delta slowing and spike-wave discharges.     Overnight the patient continued to have frequent episodes of acute anxiety, tachypnia, O2 desaturations and bradycardia to 30's. He continued to be responsive and following commands during the episodes. Afterward the patient was disoriented. Episodes increased to 2-3 per hour. Keppra given as ordered and PRN lorazepam given with 3+ episodes per hour.      He received 2 mg lorazepam at 0014 and another 2 mg at 0351. This causes drowsiness and respiratory depression.     Physical Examination:  General:  Patient lying in bed without any acute distress. Drowsy.   HEENT:  Normocephalic/atraumatic  Cardio:  Regular rate and rhythm   Pulmonary:  No respiratory distress. On supplemental oxygen.   Extremities:  No edema  Skin:  Warm/dry     Neurologic:  Mental Status: Drowsy and oriented.   Speech: No dysarthria.   Cranial Nerves: Pupils are symmetric and briskly reactive to light. Gaze is conjugate. Face is symmetric.  Motor: Moving upper and lower extremities symmetrically.     Assessment/Plan:  #1 Focal seizures   #2 Right temporal meningioma, status-post resection in 2008  #3 Right temporal encephalomalacia secondary to #2    The  patient's spells are consistent with temporal lobe seizures, which correlates with right temporal encephalomalacia associated with previous meningioma resection. EEG shows right frontotemporal delta slowing and spike-wave complexes, which is supportive.     We will need to review the EEG closely and determine if the clinical spells correlate with electrographic seizures. This will help us determine how aggressive to be with the treatment of these spells.      EEG can be resumed after surgery.     Increase Keppra to 1500 mg BID    Can give lorazepam 2 mg IV for seizure that lasts >5 minutes or more than 3 seizure in one hour.

## 2018-08-10 NOTE — PROGRESS NOTES
CVTS PROGRESS NOTE  08/10/18  CO-MORBIDITIES:   Cardiogenic Shock   STEMI  S/p HUSSEIN to distal RCA & Proximal LAD  Infraseptal VSD  Myeloproliferative Syndrome  TIA  S/p Splenectomy due to trauma    ASSESSMENT: Castillo De Anda is a 68 year old male s/p HUSSEIN to RCA and LAD for STEMI on 8/4 found to have post infarction inferoapical VSD. OR timing tentatively planned for AM 8/10. On Cangrelor gtt for anticoagulation.    TODAY'S PROGRESS:   -Concern for focal seizures yesterday prompted neurocrit consult   -Rec's to keppra load and continue Keppra 750mg Q12h   -Any seizure >5min with ativan 2mg   -Head CT with encephalomalacia  -TTE obtained today with suggestions of volume overload  -PA #'s suggest high SVR in setting of low CI   -Start nitroprusside gtt  -Lasix 40mgx1  -NEURO consult not definitive on seizure episodes   -OR PLAN FOR TODAY PM   - Continue cangrelor, anticipate stopping 30-60min prior to OR if surgery planned       PLAN:  Neuro/ pain/ sedation:  #Questionable seizure activity  - EEG prior to OR suggested questionable focal seizures  - Neuro consult for questionable EEG evaluation on 8/9 with rec's to start Keppra load and infusion and to treat any prolonged seizure activity with ativan 2mg  - PRN Quetiapine 25mg for sleep, ativan PRN  - Monitor neurological status. Notify the MD for any acute changes in exam.    Pulmonary care:   - Supplemental oxygen to keep saturation above 92 %.  - Incentive spirometer every 15- 30 minutes when awake.  - Monitor respiratory status    Cardiovascular:  #Post infarction 2.5cm infraseptal VSD  #s/p HUSSEIN to distal RCA and proximal LAD  #STEMI, cardiogenic shock  #Dilated RV   #Pulmonary HTN  - Monitor hemodynamic status.   - Infusions: Dobutamine 7.5mcg/kg/min, Nitroprusside started to lower SVR  - Consider diuresis with low dose of lasix  - ECHO from 8/8 prior to surgery: Inferior wall akinesis with inferoseptal muscular VAD. Mild to moderate right ventricular dilation  is present. Global right ventricular function is moderately reduced.    Heme:  #Anticoagluation s/p Drug Eluting Stent placement  -Continue Cangrelor until time of surgery, planning holding 30min to 1hr prior to OR    GI care:  #Transaminitis   - NPO at midnight    Fluids/ Electrolytes/ Nutrition:   - mIVF for IV fluid hydration  - Wiggins    Renal/ Fluid Balance:    #Acute Kidney injury  - Urine output low but stable, creatinine trending down  - Will continue to monitor intake and output via wiggins    Endocrine:  - Sliding scale for diabetes management when TF started    ID/ Antibiotics:  - No antibiotic needs currently  - Monitor fever curve, trend WBC      Prophylaxis:    - Mechanical prophylaxis for DVT.   - Anticoagulation with Cangrelor  - Protonix    Lines/ tubes/ drains:  - PIV, CVC/[place], IABP    Disposition:  - [facility] ICU.    Patient seen, findings and plan discussed with CVTS Fellow.    Brad Hawkins MD  8/10/2018 3:58 PM  Anesthesia Resident  PGY4/CA-3    ====================================    SUBJECTIVE:   -Planing to go to the OR today evening, neuro consult with findings of questionable seizure activity, started on Keppra. Further evaluation suggested likely nonspecific events, ok to go to OR.      OBJECTIVE:   1. VITAL SIGNS:   Temp:  [94  F (34.4  C)-96.1  F (35.6  C)] 96.1  F (35.6  C)  Heart Rate:  [] 97  Resp:  [8-41] 22  BP: ()/(35-73) 114/35  SpO2:  [75 %-100 %] 98 %  Resp: 22    2. INTAKE/ OUTPUT:   I/O last 3 completed shifts:  In: 1237.16 [P.O.:120; I.V.:1067.16]  Out: 442 [Urine:442]    3. PHYSICAL EXAMINATION:   General: Alert, lying flat due to invasive monitoring  Neuro: A&Ox3, NAD  Resp: Breathing non-labored on nasal cannula  CV: RRR  Abdomen: Soft, Non-distended, Non-tender  Incisions: Right central line internal jugular,   Extremities: warm and well perfused    4. INVESTIGATIONS:   Arterial Blood Gases     Recent Labs  Lab 08/10/18  0220   PH 7.42   PCO2 29*   PO2 117*   HCO3  19*     Complete Blood Count     Recent Labs  Lab 08/10/18  1019 08/10/18  0408 08/09/18  0351 08/07/18  1210   WBC 19.2* 17.3* 19.8* 21.3*   HGB 9.7* 10.1* 10.5* 10.9*   * 1005* 956* 865*     Basic Metabolic Panel    Recent Labs  Lab 08/10/18  0408 08/09/18  1111 08/09/18  0351 08/08/18  2021 08/08/18  0503     --  133 132* 132*   POTASSIUM 4.6 4.6 4.5 4.3 5.1   CHLORIDE 104  --  104 102 105   CO2 19*  --  20 20 19*   BUN 52*  --  46* 45* 45*   CR 1.34*  --  1.28* 1.39* 1.55*   *  --  121* 142* 121*     Liver Function Tests    Recent Labs  Lab 08/10/18  0408 08/09/18  0351 08/08/18  0503 08/07/18  0403 08/06/18  1957 08/06/18  1144   AST 24  --   --  73* 86* 95*   ALT 37  --   --  28 28 30   ALKPHOS 80  --   --  85 91 90   BILITOTAL 0.5  --   --  0.4 0.4 0.3   ALBUMIN 2.7*  --   --  2.4* 2.4* 2.3*   INR 1.30* 1.34* 1.41* 1.39*  --   --      Pancreatic Enzymes  No lab results found in last 7 days.  Coagulation Profile    Recent Labs  Lab 08/10/18  0408 08/09/18  0351 08/08/18  0503 08/07/18  0403   INR 1.30* 1.34* 1.41* 1.39*         5. RADIOLOGY:   Recent Results (from the past 24 hour(s))   CT Head w/o Contrast    Narrative    CT HEAD W/O CONTRAST 8/9/2018 6:47 PM    History: partial seizures;     Comparison: None.    Technique: Using multidetector thin collimation helical acquisition  technique, axial, coronal and sagittal CT images from the skull base  to the vertex were obtained without intravenous contrast.     Findings:    Postsurgical changes of right anterior temporal craniotomy and  craniectomy and encephalomalcic changes in the right anterior temporal  lobe. No intracranial hemorrhage, mass effect, or midline shift. The  ventricles are proportionate to the cerebral sulci. The gray to white  matter differentiation of the cerebral hemispheres is preserved. The  basal cisterns are patent.    The visualized paranasal sinuses are clear. The mastoid air cells are  clear.    There is  sclerosis and mild expansion of the right greater wing of the  sphenoid bone with changes extending into the right sphenoid locule.  Right foramen ovale is narrowed due to these bony changes.       Impression    Impression:   1. No acute intracranial pathology. Operative changes in the right  anterior temporal lobe.  2. Sclerosis and mild expansion of the right greater wing of the  sphenoid with extension of these bony changes into the right sphenoid  locule. The right foramen ovale is narrowed due to is surrounding bony  changes. Differentials include metastatic disease (such as from  prostate cancer) versus Paget's disease.     I have personally reviewed the examination and initial interpretation  and I agree with the findings.    DESIREE LU MD

## 2018-08-10 NOTE — ANESTHESIA PROCEDURE NOTES
Arterial Line Procedure Note  Staff:     Anesthesiologist:  MEDINA MADISON    Resident/CRNA:  DAKSHA METCALF    Arterial line performed by resident/CRNA in presence of a teaching physician    Location: In OR Before Induction  Procedure Start/Stop Times:     patient identified, IV checked, site marked, risks and benefits discussed, informed consent, monitors and equipment checked, pre-op evaluation and at physician/surgeon's request      Correct Patient: Yes      Correct Position: Yes      Correct Site: Yes      Correct Procedure: Yes      Correct Laterality:  Yes    Site Marked:  Yes  Line Placement:     Procedure:  Arterial Line    Insertion Site:  Radial    Insertion laterality:  Left    Skin Prep: Chloraprep      Patient Prep: patient draped, mask, sterile gloves, hat and hand hygiene      Local skin infiltration:  2% lidocaine    Ultrasound Guided?: Yes      Artery evaluated via ultrasound confirming patency.   Using realtime imaging, the artery was punctured and the needle was observed entering the artery.      A permanent image is entered into patient's chart.      Catheter size:  20 gauge, Quick cath    Cath secured with: suture      Dressing:  Tegaderm    Complications:  None obvious    Arterial waveform: Yes      IBP within 10% of NIBP: Yes

## 2018-08-10 NOTE — PLAN OF CARE
Problem: Patient Care Overview  Goal: Plan of Care/Patient Progress Review  Outcome: No Change  Pt continues with frequent episodes where he becomes anxious, tachypneic, O2 sats drop, and HR drops to 30's. When bradycardic pt appears to almost be in 3rd degree heart block. Resident notified, pacing pads placed on pt and Atropine at bedside. During these periods pt is mostly responsive and able to follow most commands. Episodes last <30 seconds, and afterwards pt appears disoriented. Episodes increased to 2-3/hr this evening. Keppra given as ordered. IV Ativan given for 3 or more episodes in an hour with decrease in episodes noted. However after receiving Ativan pt began having intermittent apneic episodes, requiring increased O2 and sternal rubbing, reminders to take a breath. Dose decreased. Around 0200, L pupil noted to be 5 mm while right pupil was 3 mm. Both reactive and brisk. Fellow notified and assessed pt. After about an hour pupil sizes equalized. Pt hypothermic, temp around 94 degrees, and pt feels clammy. However he continuously says he's hot, so warming measures not taken for pt's comfort. LS clear/dim. Except for bradycardic events, HR has been 90's-100's. BP WNL off IABP. IABP 1:1, 100% augmentation. Continues with Dobutamine, titrated per order to 10 mcg/kg/min to keep CI >2. Urine output remains low at 10-15 ml/hr. Albumin given without results. Team aware, will continue to monitor. Still no BM, bowel regimen followed. VSD repair postponed for further management of seizure activity.

## 2018-08-10 NOTE — PROGRESS NOTES
Mr. De Anda has had increased seizure frequency (3 per hour from 1 per hour during daytime). He is on Ativan 2mg PRN for seizure frequency of 3 or more per hour per Neuro ICU team. After first dose of ativan he seems a little sleepy. I wasn't sure if this was from ativan effect vs subclinical status so I had Neurology come assess him. Neurology feels this is ativan effect.    Also he has bradycardia during these episodes. Rhythm strip shows transient 3rd degree AV block with likely junctional escape rhythm with HR 40s. After the termination of seizure (duration is ~30 seconds), his HR comes back up. Pacer pads placed. Atropine at bedside. I think he has seizure related vagally induced AV blocks.    His left eye pupil seemed bigger than right eye pupil. This resolved after one hour. Continue to monitor.     I spent a long time listening to Mr. De Anda's multiple concerns. Her main concern seems to be that she thinks that his symptoms maybe pseudoseizure from his history of meningoma surgery. She feels that the current treatment for his neurological symptoms is making him worse. I will pass this information to daytime Cards-2 team so that they can have neurology team come talk to his wife.      Richard Horton  General Cardiology Fellow, PGY6  Pager [phone]

## 2018-08-10 NOTE — PLAN OF CARE
Problem: Patient Care Overview  Goal: Individualization & Mutuality  Patient experienced 12 episodes of tachypnea followed by bradycardia with 20-30 seconds of unresponsiveness. Entire episode lasted 30-60 seconds. Patient lethargic with moments of alertness and clarity. Afebrile (96.1 axillary). LS clear and diminished, 6-8L oxiplus. 0800 CVP 15. PA 36/16. SvO2 40%. CI 1.6. SVR 1552. Dopamine gtt started. Nipride gtt started and titrated to MAP > 65. 1200 CVP 10, PA 32/14, SvO2 64%, CI 2.6, . Dopamine gtt stopped. Urine output 10-60 ml/hr. MD notified about low urine output, 40mg Lasix ordered and given, urine increased to 60 cc/hr. Family updated at bedside throughout day by Dr. Omer and Dr. Tanner. Patient to OR for VSD repair at 1550.

## 2018-08-10 NOTE — PROGRESS NOTES
CV ICU PROGRESS NOTE  08/10/18  CO-MORBIDITIES:   Cardiogenic Shock   STEMI  S/p HUSSEIN to distal RCA & Proximal LAD  Infraseptal VSD  Myeloproliferative Syndrome  TIA  S/p Splenectomy due to trauma    ASSESSMENT: Castillo De Anda is a 68 year old male s/p HUSSEIN to RCA and LAD for STEMI on 8/4 found to have post infarction inferoapical VSD. OR timing tentatively planned for AM 8/10. On Cangrelor gtt for anticoagulation.    TODAY'S PROGRESS:   -Concern for focal seizures yesterday prompted neurocrit consult   -Rec's to keppra load and continue Keppra 750mg Q12h   -Any seizure >5min with ativan 2mg   -Head CT with encephalomalacia  -TTE obtained today with suggestions of volume overload  -PA #'s suggest high SVR in setting of low CI   -Start nitroprusside gtt  -Lasix 40mgx1  -NEURO consult not definitive on seizure episodes   -OR PLAN FOR TODAY PM   - Continue cangrelor, anticipate stopping 30-60min prior to OR if surgery planned       PLAN:  Neuro/ pain/ sedation:  #Questionable seizure activity  - EEG prior to OR suggested questionable focal seizures  - Neuro consult for questionable EEG evaluation on 8/9 with rec's to start Keppra load and infusion and to treat any prolonged seizure activity with ativan 2mg  - PRN Quetiapine 25mg for sleep, ativan PRN  - Monitor neurological status. Notify the MD for any acute changes in exam.    Pulmonary care:   - Supplemental oxygen to keep saturation above 92 %.  - Incentive spirometer every 15- 30 minutes when awake.  - Monitor respiratory status    Cardiovascular:  #Post infarction 2.5cm infraseptal VSD  #s/p HUSSEIN to distal RCA and proximal LAD  #STEMI, cardiogenic shock  #Dilated RV   #Pulmonary HTN  - Monitor hemodynamic status.   - Infusions: Dobutamine 7.5mcg/kg/min, Nitroprusside started to lower SVR  - Consider diuresis with low dose of lasix  - ECHO from 8/8 prior to surgery: Inferior wall akinesis with inferoseptal muscular VAD. Mild to moderate right ventricular dilation  is present. Global right ventricular function is moderately reduced.    Heme:  #Anticoagluation s/p Drug Eluting Stent placement  -Continue Cangrelor until time of surgery, planning holding 30min to 1hr prior to OR    GI care:  #Transaminitis   - NPO at midnight    Fluids/ Electrolytes/ Nutrition:   - mIVF for IV fluid hydration  - Wiggins    Renal/ Fluid Balance:    #Acute Kidney injury  - Urine output low but stable, creatinine trending down  - Will continue to monitor intake and output via wiggins    Endocrine:  - Sliding scale for diabetes management when TF started    ID/ Antibiotics:  - No antibiotic needs currently  - Monitor fever curve, trend WBC      Prophylaxis:    - Mechanical prophylaxis for DVT.   - Anticoagulation with Cangrelor  - Protonix    Lines/ tubes/ drains:  - PIV, CVC/[place], IABP    Disposition:  - CV ICU.    Patient seen, findings and plan discussed with CV ICU staff, Dr. Porras.    Brad Hawkins MD  8/10/2018 3:58 PM  Anesthesia Resident  PGY4/CA-3    ====================================    SUBJECTIVE:   -Planing to go to the OR today evening, neuro consult with findings of questionable seizure activity, started on Keppra. Further evaluation suggested likely nonspecific events, ok to go to OR.      OBJECTIVE:   1. VITAL SIGNS:   Temp:  [94  F (34.4  C)-98.9  F (37.2  C)] 94  F (34.4  C)  Heart Rate:  [] 106  Resp:  [8-32] 13  BP: ()/(55-73) 101/62  SpO2:  [75 %-100 %] 100 %  Resp: 13    2. INTAKE/ OUTPUT:   I/O last 3 completed shifts:  In: 1626.7 [P.O.:580; I.V.:996.7]  Out: 477 [Urine:477]    3. PHYSICAL EXAMINATION:   General: Alert, lying flat due to invasive monitoring  Neuro: A&Ox3, NAD  Resp: Breathing non-labored on nasal cannula  CV: RRR  Abdomen: Soft, Non-distended, Non-tender  Incisions: Right central line internal jugular,   Extremities: warm and well perfused    4. INVESTIGATIONS:   Arterial Blood Gases     Recent Labs  Lab 08/10/18  0220   PH 7.42   PCO2 29*   PO2  117*   HCO3 19*     Complete Blood Count     Recent Labs  Lab 08/10/18  0408 08/09/18  0351 08/07/18  1210 08/07/18  0403   WBC 17.3* 19.8* 21.3* 21.7*   HGB 10.1* 10.5* 10.9* 11.2*   PLT 1005* 956* 865* 869*     Basic Metabolic Panel    Recent Labs  Lab 08/10/18  0408 08/09/18  1111 08/09/18  0351 08/08/18  2021 08/08/18  0503     --  133 132* 132*   POTASSIUM 4.6 4.6 4.5 4.3 5.1   CHLORIDE 104  --  104 102 105   CO2 19*  --  20 20 19*   BUN 52*  --  46* 45* 45*   CR 1.34*  --  1.28* 1.39* 1.55*   *  --  121* 142* 121*     Liver Function Tests    Recent Labs  Lab 08/10/18  0408 08/09/18  0351 08/08/18  0503 08/07/18  0403 08/06/18  1957 08/06/18  1144  08/06/18  0218   AST  --   --   --  73* 86* 95*  --  77*   ALT  --   --   --  28 28 30  --  23   ALKPHOS  --   --   --  85 91 90  --  114   BILITOTAL  --   --   --  0.4 0.4 0.3  --  0.6   ALBUMIN  --   --   --  2.4* 2.4* 2.3*  --  2.6*   INR 1.30* 1.34* 1.41* 1.39*  --   --   < >  --    < > = values in this interval not displayed.  Pancreatic Enzymes  No lab results found in last 7 days.  Coagulation Profile    Recent Labs  Lab 08/10/18  0408 08/09/18  0351 08/08/18  0503 08/07/18  0403   INR 1.30* 1.34* 1.41* 1.39*         5. RADIOLOGY:   Recent Results (from the past 24 hour(s))   CT Head w/o Contrast    Narrative    CT HEAD W/O CONTRAST 8/9/2018 6:47 PM    History: partial seizures;     Comparison: None.    Technique: Using multidetector thin collimation helical acquisition  technique, axial, coronal and sagittal CT images from the skull base  to the vertex were obtained without intravenous contrast.     Findings:    Postsurgical changes of right anterior temporal craniotomy and  craniectomy and encephalomalcic changes in the right anterior temporal  lobe. No intracranial hemorrhage, mass effect, or midline shift. The  ventricles are proportionate to the cerebral sulci. The gray to white  matter differentiation of the cerebral hemispheres is  preserved. The  basal cisterns are patent.    The visualized paranasal sinuses are clear. The mastoid air cells are  clear.    There is sclerosis and mild expansion of the right greater wing of the  sphenoid bone with changes extending into the right sphenoid locule.  Right foramen ovale is narrowed due to these bony changes.       Impression    Impression:   1. No acute intracranial pathology. Operative changes in the right  anterior temporal lobe.  2. Sclerosis and mild expansion of the right greater wing of the  sphenoid with extension of these bony changes into the right sphenoid  locule. The right foramen ovale is narrowed due to is surrounding bony  changes. Differentials include metastatic disease (such as from  prostate cancer) versus Paget's disease.     I have personally reviewed the examination and initial interpretation  and I agree with the findings.    DESIREE LU MD

## 2018-08-10 NOTE — MR AVS SNAPSHOT
After Visit Summary   8/10/2018    Castillo De Anda    MRN: [id number]           Patient Information     Date Of Birth          1950        Visit Information        Provider Department      8/10/2018 7:00 AM [facility] EEG TECH 4 [facility] EEG        Today's Diagnoses     Cardiogenic shock (H)    -  1       Follow-ups after your visit        Your next 10 appointments already scheduled     Aug 10, 2018   Procedure with Jason Franco MD   [facility], [place], Same Day Surgery (--)    [address]  [place] [id number] [phone]              Who to contact     Please call your clinic at [phone] to:    Ask questions about your health    Make or cancel appointments    Discuss your medicines    Learn about your test results    Speak to your doctor            Additional Information About Your Visit        MyChart Information     [employer] is an electronic gateway that provides easy, online access to your medical records. With [employer], you can request a clinic appointment, read your test results, renew a prescription or communicate with your care team.     To sign up for [employer] visit the website at [email or web address]   You will be asked to enter the access code listed below, as well as some personal information. Please follow the directions to create your username and password.     Your access code is: [email or web address]  Expires: 2018  4:09 PM     Your access code will  in 90 days. If you need help or a new code, please contact your [facility] or call [phone] for assistance.        Care EveryWhere ID     This is your Care EveryWhere ID. This could be used by other organizations to access your [place] medical records  [id number]         Blood Pressure from Last 3 Encounters:   08/10/18 (!) 71/49    Weight from Last 3 Encounters:   08/10/18 79.8 kg (175 lb 14.8 oz)              Today, you had the following     No orders found for display         Today's  Medication Changes      Notice     This visit is during an admission. Changes to the med list made in this visit will be reflected in the After Visit Summary of the admission.             Primary Care Provider    None Specified       No primary provider on file.        Equal Access to Services     MORGAN SANCHEZ : Jaden Leong, mattaruna websterelizabethha, meena garcia, giana carmelain hayaasujit gamezstarrlizbeth harris. So [facility] [phone].    ATENCIÓN: Si habla español, tiene a durbin disposición servicios gratuitos de asistencia lingüística. Llame al [phone].    We comply with applicable federal civil rights laws and [place] laws. We do not discriminate on the basis of race, color, national origin, age, disability, sex, sexual orientation, or gender identity.            Thank you!     Thank you for choosing [facility]  for your care. Our goal is always to provide you with excellent care. Hearing back from our patients is one way we can continue to improve our services. Please take a few minutes to complete the written survey that you may receive in the mail after your visit with us. Thank you!             Your Updated Medication List - Protect others around you: Learn how to safely use, store and throw away your medicines at www.disposemymeds.org.      Notice     This visit is during an admission. Changes to the med list made in this visit will be reflected in the After Visit Summary of the admission.

## 2018-08-11 LAB
ANGLE RATE OF CLOT GROWTH: 78.9 DEG (ref 59–74)
ANGLE RATE OF CLOT GROWTH: 80.4 DEG (ref 59–74)
ANION GAP SERPL CALCULATED.3IONS-SCNC: 11 MMOL/L (ref 3–14)
ANION GAP SERPL CALCULATED.3IONS-SCNC: 8 MMOL/L (ref 3–14)
BASE DEFICIT BLDA-SCNC: 0.4 MMOL/L
BASE DEFICIT BLDA-SCNC: 1.9 MMOL/L
BASE DEFICIT BLDV-SCNC: 0.2 MMOL/L
BASE DEFICIT BLDV-SCNC: 0.8 MMOL/L
BASE DEFICIT BLDV-SCNC: 1.1 MMOL/L
BASE DEFICIT BLDV-SCNC: 2 MMOL/L
BASE DEFICIT BLDV-SCNC: 3.8 MMOL/L
BASE EXCESS BLDV CALC-SCNC: 0.4 MMOL/L
BLD PROD TYP BPU: NORMAL
BLD PROD TYP BPU: NORMAL
BLD UNIT ID BPU: 0
BLOOD PRODUCT CODE: NORMAL
BPU ID: NORMAL
BUN SERPL-MCNC: 48 MG/DL (ref 7–30)
BUN SERPL-MCNC: 52 MG/DL (ref 7–30)
CA-I BLD-MCNC: 4 MG/DL (ref 4.4–5.2)
CA-I BLD-MCNC: 4.1 MG/DL (ref 4.4–5.2)
CALCIUM SERPL-MCNC: 6.7 MG/DL (ref 8.5–10.1)
CALCIUM SERPL-MCNC: 7 MG/DL (ref 8.5–10.1)
CHLORIDE SERPL-SCNC: 108 MMOL/L (ref 94–109)
CHLORIDE SERPL-SCNC: 111 MMOL/L (ref 94–109)
CI HYPERCOAGULATION INDEX: 4.1 RATIO (ref 0–3)
CI HYPERCOAGULATION INDEX: 5.3 RATIO (ref 0–3)
CLOT LYSIS 30M P MA LENFR BLD TEG: 0 % (ref 0–8)
CLOT LYSIS 30M P MA LENFR BLD TEG: 10 % (ref 0–8)
CLOT STRENGTH BLD TEG: 15.7 KD/SC (ref 5.3–13.2)
CLOT STRENGTH BLD TEG: 24.2 KD/SC (ref 5.3–13.2)
CO2 SERPL-SCNC: 21 MMOL/L (ref 20–32)
CO2 SERPL-SCNC: 24 MMOL/L (ref 20–32)
CREAT SERPL-MCNC: 1.53 MG/DL (ref 0.66–1.25)
CREAT SERPL-MCNC: 1.58 MG/DL (ref 0.66–1.25)
ERYTHROCYTE [DISTWIDTH] IN BLOOD BY AUTOMATED COUNT: 18.1 % (ref 10–15)
ERYTHROCYTE [DISTWIDTH] IN BLOOD BY AUTOMATED COUNT: 18.2 % (ref 10–15)
GFR SERPL CREATININE-BSD FRML MDRD: 44 ML/MIN/1.7M2
GFR SERPL CREATININE-BSD FRML MDRD: 45 ML/MIN/1.7M2
GLUCOSE BLDC GLUCOMTR-MCNC: 138 MG/DL (ref 70–99)
GLUCOSE BLDC GLUCOMTR-MCNC: 145 MG/DL (ref 70–99)
GLUCOSE BLDC GLUCOMTR-MCNC: 159 MG/DL (ref 70–99)
GLUCOSE BLDC GLUCOMTR-MCNC: 171 MG/DL (ref 70–99)
GLUCOSE BLDC GLUCOMTR-MCNC: 81 MG/DL (ref 70–99)
GLUCOSE SERPL-MCNC: 174 MG/DL (ref 70–99)
GLUCOSE SERPL-MCNC: 84 MG/DL (ref 70–99)
HCO3 BLD-SCNC: 22 MMOL/L (ref 21–28)
HCO3 BLD-SCNC: 23 MMOL/L (ref 21–28)
HCO3 BLDV-SCNC: 21 MMOL/L (ref 21–28)
HCO3 BLDV-SCNC: 22 MMOL/L (ref 21–28)
HCO3 BLDV-SCNC: 24 MMOL/L (ref 21–28)
HCO3 BLDV-SCNC: 24 MMOL/L (ref 21–28)
HCO3 BLDV-SCNC: 25 MMOL/L (ref 21–28)
HCO3 BLDV-SCNC: 25 MMOL/L (ref 21–28)
HCT VFR BLD AUTO: 24.3 % (ref 40–53)
HCT VFR BLD AUTO: 25.1 % (ref 40–53)
HCT VFR BLD AUTO: 28 % (ref 40–53)
HGB BLD-MCNC: 8.1 G/DL (ref 13.3–17.7)
HGB BLD-MCNC: 8.3 G/DL (ref 13.3–17.7)
HGB BLD-MCNC: 9.2 G/DL (ref 13.3–17.7)
INR PPP: 1.45 (ref 0.86–1.14)
INR PPP: 1.46 (ref 0.86–1.14)
K TIME TO SPEC CLOT STRENGTH: 0.8 MIN (ref 1–3)
K TIME TO SPEC CLOT STRENGTH: 0.8 MIN (ref 1–3)
LACTATE BLD-SCNC: 1.1 MMOL/L (ref 0.7–2)
LACTATE BLD-SCNC: 1.5 MMOL/L (ref 0.7–2)
LMWH PPP CHRO-ACNC: 0.1 IU/ML
LY60 LYSIS AT 60 MINUTES: 1.5 % (ref 0–15)
LY60 LYSIS AT 60 MINUTES: 15 % (ref 0–15)
MA MAXIMUM CLOT STRENGTH: 75.8 MM (ref 55–74)
MA MAXIMUM CLOT STRENGTH: 82.9 MM (ref 55–74)
MAGNESIUM SERPL-MCNC: 3.5 MG/DL (ref 1.6–2.3)
MAGNESIUM SERPL-MCNC: 3.7 MG/DL (ref 1.6–2.3)
MCH RBC QN AUTO: 29.5 PG (ref 26.5–33)
MCH RBC QN AUTO: 29.7 PG (ref 26.5–33)
MCHC RBC AUTO-ENTMCNC: 33.1 G/DL (ref 31.5–36.5)
MCHC RBC AUTO-ENTMCNC: 33.3 G/DL (ref 31.5–36.5)
MCV RBC AUTO: 89 FL (ref 78–100)
MCV RBC AUTO: 89 FL (ref 78–100)
MRSA DNA SPEC QL NAA+PROBE: NEGATIVE
NUM BPU REQUESTED: 1
O2/TOTAL GAS SETTING VFR VENT: 40 %
O2/TOTAL GAS SETTING VFR VENT: 50 %
OXYHGB MFR BLD: 96 % (ref 92–100)
OXYHGB MFR BLD: 97 % (ref 92–100)
OXYHGB MFR BLDV: 47 %
OXYHGB MFR BLDV: 48 %
OXYHGB MFR BLDV: 49 %
OXYHGB MFR BLDV: 50 %
OXYHGB MFR BLDV: 50 %
OXYHGB MFR BLDV: 60 %
PCO2 BLD: 34 MM HG (ref 35–45)
PCO2 BLD: 35 MM HG (ref 35–45)
PCO2 BLDV: 35 MM HG (ref 40–50)
PCO2 BLDV: 38 MM HG (ref 40–50)
PCO2 BLDV: 38 MM HG (ref 40–50)
PCO2 BLDV: 39 MM HG (ref 40–50)
PCO2 BLDV: 39 MM HG (ref 40–50)
PCO2 BLDV: 41 MM HG (ref 40–50)
PH BLD: 7.41 PH (ref 7.35–7.45)
PH BLD: 7.45 PH (ref 7.35–7.45)
PH BLDV: 7.36 PH (ref 7.32–7.43)
PH BLDV: 7.38 PH (ref 7.32–7.43)
PH BLDV: 7.4 PH (ref 7.32–7.43)
PH BLDV: 7.41 PH (ref 7.32–7.43)
PH BLDV: 7.42 PH (ref 7.32–7.43)
PH BLDV: 7.42 PH (ref 7.32–7.43)
PHOSPHATE SERPL-MCNC: 3.2 MG/DL (ref 2.5–4.5)
PHOSPHATE SERPL-MCNC: 3.4 MG/DL (ref 2.5–4.5)
PLATELET # BLD AUTO: 500 10E9/L (ref 150–450)
PLATELET # BLD AUTO: 532 10E9/L (ref 150–450)
PO2 BLD: 123 MM HG (ref 80–105)
PO2 BLD: 174 MM HG (ref 80–105)
PO2 BLDV: 29 MM HG (ref 25–47)
PO2 BLDV: 30 MM HG (ref 25–47)
PO2 BLDV: 31 MM HG (ref 25–47)
PO2 BLDV: 32 MM HG (ref 25–47)
PO2 BLDV: 32 MM HG (ref 25–47)
PO2 BLDV: 36 MM HG (ref 25–47)
POTASSIUM SERPL-SCNC: 4.1 MMOL/L (ref 3.4–5.3)
POTASSIUM SERPL-SCNC: 4.2 MMOL/L (ref 3.4–5.3)
R TIME UNTIL CLOT FORMS: 4.4 MIN (ref 4–9)
R TIME UNTIL CLOT FORMS: 4.7 MIN (ref 4–9)
RBC # BLD AUTO: 2.73 10E12/L (ref 4.4–5.9)
RBC # BLD AUTO: 2.81 10E12/L (ref 4.4–5.9)
SODIUM SERPL-SCNC: 140 MMOL/L (ref 133–144)
SODIUM SERPL-SCNC: 143 MMOL/L (ref 133–144)
SPECIMEN SOURCE: NORMAL
TRANSFUSION STATUS PATIENT QL: NORMAL
TRANSFUSION STATUS PATIENT QL: NORMAL
WBC # BLD AUTO: 15.4 10E9/L (ref 4–11)
WBC # BLD AUTO: 21.6 10E9/L (ref 4–11)

## 2018-08-11 PROCEDURE — 25000128 H RX IP 250 OP 636: Performed by: INTERNAL MEDICINE

## 2018-08-11 PROCEDURE — 86850 RBC ANTIBODY SCREEN: CPT | Performed by: INTERNAL MEDICINE

## 2018-08-11 PROCEDURE — A9270 NON-COVERED ITEM OR SERVICE: HCPCS | Mod: GY | Performed by: STUDENT IN AN ORGANIZED HEALTH CARE EDUCATION/TRAINING PROGRAM

## 2018-08-11 PROCEDURE — 85027 COMPLETE CBC AUTOMATED: CPT | Performed by: INTERNAL MEDICINE

## 2018-08-11 PROCEDURE — 99233 SBSQ HOSP IP/OBS HIGH 50: CPT | Mod: GC | Performed by: INTERNAL MEDICINE

## 2018-08-11 PROCEDURE — 82805 BLOOD GASES W/O2 SATURATION: CPT | Performed by: INTERNAL MEDICINE

## 2018-08-11 PROCEDURE — 80048 BASIC METABOLIC PNL TOTAL CA: CPT | Performed by: INTERNAL MEDICINE

## 2018-08-11 PROCEDURE — A9270 NON-COVERED ITEM OR SERVICE: HCPCS | Mod: GY | Performed by: THORACIC SURGERY (CARDIOTHORACIC VASCULAR SURGERY)

## 2018-08-11 PROCEDURE — 25000132 ZZH RX MED GY IP 250 OP 250 PS 637: Mod: GY | Performed by: INTERNAL MEDICINE

## 2018-08-11 PROCEDURE — 85610 PROTHROMBIN TIME: CPT | Performed by: INTERNAL MEDICINE

## 2018-08-11 PROCEDURE — 25000125 ZZHC RX 250: Performed by: THORACIC SURGERY (CARDIOTHORACIC VASCULAR SURGERY)

## 2018-08-11 PROCEDURE — 25000132 ZZH RX MED GY IP 250 OP 250 PS 637: Mod: GY | Performed by: THORACIC SURGERY (CARDIOTHORACIC VASCULAR SURGERY)

## 2018-08-11 PROCEDURE — 25000128 H RX IP 250 OP 636: Performed by: STUDENT IN AN ORGANIZED HEALTH CARE EDUCATION/TRAINING PROGRAM

## 2018-08-11 PROCEDURE — 82330 ASSAY OF CALCIUM: CPT | Performed by: INTERNAL MEDICINE

## 2018-08-11 PROCEDURE — 87640 STAPH A DNA AMP PROBE: CPT | Performed by: INTERNAL MEDICINE

## 2018-08-11 PROCEDURE — 83605 ASSAY OF LACTIC ACID: CPT | Performed by: INTERNAL MEDICINE

## 2018-08-11 PROCEDURE — P9041 ALBUMIN (HUMAN),5%, 50ML: HCPCS | Performed by: THORACIC SURGERY (CARDIOTHORACIC VASCULAR SURGERY)

## 2018-08-11 PROCEDURE — 82805 BLOOD GASES W/O2 SATURATION: CPT | Performed by: THORACIC SURGERY (CARDIOTHORACIC VASCULAR SURGERY)

## 2018-08-11 PROCEDURE — 25000132 ZZH RX MED GY IP 250 OP 250 PS 637: Mod: GY | Performed by: STUDENT IN AN ORGANIZED HEALTH CARE EDUCATION/TRAINING PROGRAM

## 2018-08-11 PROCEDURE — 40000048 ZZH STATISTIC DAILY SWAN MONITORING

## 2018-08-11 PROCEDURE — 93005 ELECTROCARDIOGRAM TRACING: CPT

## 2018-08-11 PROCEDURE — 84100 ASSAY OF PHOSPHORUS: CPT | Performed by: INTERNAL MEDICINE

## 2018-08-11 PROCEDURE — 87641 MR-STAPH DNA AMP PROBE: CPT | Performed by: INTERNAL MEDICINE

## 2018-08-11 PROCEDURE — 82330 ASSAY OF CALCIUM: CPT | Performed by: THORACIC SURGERY (CARDIOTHORACIC VASCULAR SURGERY)

## 2018-08-11 PROCEDURE — 85520 HEPARIN ASSAY: CPT | Performed by: INTERNAL MEDICINE

## 2018-08-11 PROCEDURE — 99291 CRITICAL CARE FIRST HOUR: CPT | Mod: GC | Performed by: INTERNAL MEDICINE

## 2018-08-11 PROCEDURE — 93010 ELECTROCARDIOGRAM REPORT: CPT | Performed by: INTERNAL MEDICINE

## 2018-08-11 PROCEDURE — P9059 PLASMA, FRZ BETWEEN 8-24HOUR: HCPCS | Performed by: SURGERY

## 2018-08-11 PROCEDURE — 00000146 ZZHCL STATISTIC GLUCOSE BY METER IP

## 2018-08-11 PROCEDURE — A9270 NON-COVERED ITEM OR SERVICE: HCPCS | Mod: GY | Performed by: INTERNAL MEDICINE

## 2018-08-11 PROCEDURE — 40000275 ZZH STATISTIC RCP TIME EA 10 MIN

## 2018-08-11 PROCEDURE — 83735 ASSAY OF MAGNESIUM: CPT | Performed by: INTERNAL MEDICINE

## 2018-08-11 PROCEDURE — 94002 VENT MGMT INPAT INIT DAY: CPT

## 2018-08-11 PROCEDURE — 86900 BLOOD TYPING SEROLOGIC ABO: CPT | Performed by: INTERNAL MEDICINE

## 2018-08-11 PROCEDURE — 25000128 H RX IP 250 OP 636: Performed by: THORACIC SURGERY (CARDIOTHORACIC VASCULAR SURGERY)

## 2018-08-11 PROCEDURE — 86923 COMPATIBILITY TEST ELECTRIC: CPT | Performed by: INTERNAL MEDICINE

## 2018-08-11 PROCEDURE — 40000344 ZZHCL STATISTIC THAWING COMPONENT: Performed by: SURGERY

## 2018-08-11 PROCEDURE — 20000004 ZZH R&B ICU UMMC

## 2018-08-11 PROCEDURE — 86901 BLOOD TYPING SEROLOGIC RH(D): CPT | Performed by: INTERNAL MEDICINE

## 2018-08-11 RX ORDER — HYDROXYUREA 500 MG/1
1000 CAPSULE ORAL 2 TIMES DAILY
Status: DISCONTINUED | OUTPATIENT
Start: 2018-08-11 | End: 2018-08-11

## 2018-08-11 RX ORDER — PIPERACILLIN SODIUM, TAZOBACTAM SODIUM 3; .375 G/15ML; G/15ML
3.38 INJECTION, POWDER, LYOPHILIZED, FOR SOLUTION INTRAVENOUS EVERY 6 HOURS
Status: DISCONTINUED | OUTPATIENT
Start: 2018-08-11 | End: 2018-09-12

## 2018-08-11 RX ADMIN — LEVETIRACETAM 750 MG: 100 INJECTION, SOLUTION INTRAVENOUS at 19:49

## 2018-08-11 RX ADMIN — DOCUSATE SODIUM 100 MG: 50 LIQUID ORAL at 09:27

## 2018-08-11 RX ADMIN — EPINEPHRINE 0.06 MCG/KG/MIN: 1 INJECTION PARENTERAL at 15:20

## 2018-08-11 RX ADMIN — PROPOFOL 35 MCG/KG/MIN: 10 INJECTION, EMULSION INTRAVENOUS at 21:27

## 2018-08-11 RX ADMIN — MUPIROCIN 0.5 G: 20 OINTMENT TOPICAL at 00:51

## 2018-08-11 RX ADMIN — QUETIAPINE FUMARATE 25 MG: 25 TABLET, FILM COATED ORAL at 09:27

## 2018-08-11 RX ADMIN — DOBUTAMINE HYDROCHLORIDE 12.5 MCG/KG/MIN: 400 INJECTION INTRAVENOUS at 00:31

## 2018-08-11 RX ADMIN — CALCIUM GLUCONATE 1 G: 98 INJECTION, SOLUTION INTRAVENOUS at 13:20

## 2018-08-11 RX ADMIN — SENNOSIDES AND DOCUSATE SODIUM 2 TABLET: 8.6; 5 TABLET ORAL at 09:27

## 2018-08-11 RX ADMIN — PIPERACILLIN SODIUM AND TAZOBACTAM SODIUM 3.38 G: 3; .375 INJECTION, POWDER, LYOPHILIZED, FOR SOLUTION INTRAVENOUS at 19:58

## 2018-08-11 RX ADMIN — LEVETIRACETAM 750 MG: 100 INJECTION, SOLUTION INTRAVENOUS at 06:06

## 2018-08-11 RX ADMIN — ACETAMINOPHEN 975 MG: 325 TABLET, FILM COATED ORAL at 15:53

## 2018-08-11 RX ADMIN — ASPIRIN 325 MG ORAL TABLET 325 MG: 325 PILL ORAL at 09:26

## 2018-08-11 RX ADMIN — PROPOFOL 30 MCG/KG/MIN: 10 INJECTION, EMULSION INTRAVENOUS at 15:22

## 2018-08-11 RX ADMIN — PIPERACILLIN SODIUM AND TAZOBACTAM SODIUM 3.38 G: 3; .375 INJECTION, POWDER, LYOPHILIZED, FOR SOLUTION INTRAVENOUS at 15:00

## 2018-08-11 RX ADMIN — PROPOFOL 35 MCG/KG/MIN: 10 INJECTION, EMULSION INTRAVENOUS at 03:30

## 2018-08-11 RX ADMIN — Medication 1000 MG: at 20:43

## 2018-08-11 RX ADMIN — PROPOFOL 35 MCG/KG/MIN: 10 INJECTION, EMULSION INTRAVENOUS at 10:04

## 2018-08-11 RX ADMIN — ALBUMIN HUMAN 500 ML: 0.05 INJECTION, SOLUTION INTRAVENOUS at 00:18

## 2018-08-11 RX ADMIN — VANCOMYCIN HYDROCHLORIDE 1000 MG: 1 INJECTION, SOLUTION INTRAVENOUS at 00:52

## 2018-08-11 RX ADMIN — ACETAMINOPHEN 975 MG: 325 TABLET, FILM COATED ORAL at 09:27

## 2018-08-11 RX ADMIN — ATORVASTATIN CALCIUM 80 MG: 80 TABLET, FILM COATED ORAL at 19:58

## 2018-08-11 RX ADMIN — Medication 50 MCG/HR: at 00:40

## 2018-08-11 RX ADMIN — SENNOSIDES AND DOCUSATE SODIUM 2 TABLET: 8.6; 5 TABLET ORAL at 19:58

## 2018-08-11 RX ADMIN — HUMAN INSULIN 1 UNITS/HR: 100 INJECTION, SOLUTION SUBCUTANEOUS at 15:43

## 2018-08-11 RX ADMIN — VANCOMYCIN HYDROCHLORIDE 1000 MG: 1 INJECTION, SOLUTION INTRAVENOUS at 12:19

## 2018-08-11 RX ADMIN — PANTOPRAZOLE SODIUM 40 MG: 40 INJECTION, POWDER, FOR SOLUTION INTRAVENOUS at 09:28

## 2018-08-11 RX ADMIN — DOCUSATE SODIUM 100 MG: 50 LIQUID ORAL at 19:57

## 2018-08-11 RX ADMIN — CEFAZOLIN 1 G: 330 INJECTION, POWDER, FOR SOLUTION INTRAMUSCULAR; INTRAVENOUS at 06:39

## 2018-08-11 ASSESSMENT — ACTIVITIES OF DAILY LIVING (ADL)
ADLS_ACUITY_SCORE: 13

## 2018-08-11 NOTE — PROGRESS NOTES
CLINICAL NUTRITION SERVICES - BRIEF NOTE    Received provider consult for nutrition education with comments post op cardiovascular surgery (automatic consult on post-op order set). S/p Tricuspid valve replacement on 8/10. Nutrition education not indicated. Pt remains NPO.     RD already following and will continue to follow.    Angela Cotter RD,LD  Weekend Pager [phone]

## 2018-08-11 NOTE — PROGRESS NOTES
Cardiology Progress Note    Assessment & Plan   67 yo M who presented with confusion chest pain and inferior STEMI s/p HUSSEIN to distal RCA and p LAD. Course complicated by inferoapical VSD    # Post infarction VSD   -8/11/18 VSD repair   - Chest open   - Resume DAPT as soon as able given recent stenting to pLAD and dRCA   -pacer at VVI epicardial lead    # possible partial complex seizures- history of meningioma resection in 2004 EEG with activity in this area   - s/p 2g of keppra and on 750mg BID per neurology    - will continue to monitor when propofol is discontinued once ready for extubation    #Abnormal LFT c/w hepatic congestion   - improving    # NATALIIA   - baseline SCr 0.9-1-> currently trending up       # CAD s/p inferoapical STEMI                        Right coronary occlusions treated with HUSSEIN                        Proximal LAD stenosis treated with HUSSEIN    Last dose of brillinta was 8/5 pm restart when able per primary team     # Myeloproliferative syndrome (P VERA)   -history of TIA   - appreciate heme/onc recs- started hydroxyurea 1g BID   -thrombocytosis   -vW labs per heme/onc    # severe malnutrition   -nutrition consult- appreciate recs    Chronic  - Childhood tuberculosis  - Splenectomy secondary to trauma  -Gallstones  -History of prostate cancer  - History of meningioma treated with surgery  - Hypoproteinemia- nutrition consult  -smoker    Case discussed with Dr. Kan Saldana   Cardiology fellow, PGY-5    Interval History   S/p VSD closure, on 10 dobutamine and low dose 0.06 epi  SCr increasing 1.7          Intake/Output Summary (Last 24 hours) at 08/11/18 0717  Last data filed at 08/11/18 0700   Gross per 24 hour   Intake          3982.38 ml   Output             1581 ml   Net          2401.38 ml         Physical Exam   Temp: 98.2  F (36.8  C) Temp src: Pulmonary Artery BP: (!) 114/35   Heart Rate: 74 Resp: 14 SpO2: 99 % O2 Device: Mechanical Ventilator Oxygen Delivery: (S)  "8 LPM  Vitals:    08/08/18 0500 08/10/18 0400 08/11/18 0600   Weight: 78.3 kg (172 lb 9.9 oz) 79.8 kg (175 lb 14.8 oz) 78.2 kg (172 lb 6.4 oz)     Vital Signs with Ranges  Temp:  [96  F (35.6  C)-98.2  F (36.8  C)] 98.2  F (36.8  C)  Heart Rate:  [] 74  Resp:  [10-41] 14  BP: ()/(35-64) 114/35  MAP:  [61 mmHg-104 mmHg] 84 mmHg  Arterial Line BP: ()/(39-67) 113/53  FiO2 (%):  [40 %-50 %] 40 %  SpO2:  [76 %-100 %] 99 %  I/O last 3 completed shifts:  In: 3960.58 [I.V.:2817.58; Other:343]  Out: 1501 [Urine:990; Chest Tube:511]    Heart Rate: 74, Blood pressure (!) 114/35, temperature 98.2  F (36.8  C), temperature source Pulmonary Artery, resp. rate 14, height 1.753 m (5' 9\"), weight 78.2 kg (172 lb 6.4 oz), SpO2 99 %.  172 lbs 6.4 oz  GEN: intubated sedated    CV:  Regular rate and rhythm, systolic murmur at apex, IABP site clean not bleeding no hematoma  LUNGS: decreased breath sounds   ABD:  Active bowel sounds, soft, non-tender/non-distended.  No rebound/guarding/rigidity.  EXT:  Trace pitting edema    Medications     aminocaproic acid (AMICAR) infusion ADULT Stopped (08/11/18 0400)     IV fluid REPLACEMENT ONLY       DOBUTamine 10 mcg/kg/min (08/11/18 0700)     DOPamine Stopped (08/10/18 1235)     EPINEPHrine IV infusion ADULT 0.06 mcg/kg/min (08/11/18 0700)     fentaNYL 50 mcg/hr (08/11/18 0700)     insulin (regular) 2 Units/hr (08/11/18 0700)     - MEDICATION INSTRUCTIONS -       nitroGLYcerin Stopped (08/10/18 5766)     nitroPRUsside (NIPRIDE) IV infusion ADULT/PEDS GREATER than or EQUAL to 45 kg std conc 1.496 mcg/kg/min (08/10/18 1500)     norepinephrine Stopped (08/10/18 2246)     propofol (DIPRIVAN) infusion 35 mcg/kg/min (08/11/18 0700)     Reason ACE/ARB/ARNI order not selected       Reason beta blocker order not selected       Reason beta blocker order not selected       sodium chloride 10 mL/hr at 08/11/18 0700     vasopressin (PITRESSIN) infusion ADULT (40 mL) Stopped (08/10/18 2246) "       acetaminophen  975 mg Oral Q8H     aspirin  325 mg Oral Daily     atorvastatin  80 mg Oral QPM     ceFAZolin  1 g Intravenous Q8H     docusate sodium  100 mg Oral BID     heparin  5,000 Units Subcutaneous Q12H     hydroxyurea  1,000 mg Oral BID     insulin aspart   Subcutaneous TID w/meals     levETIRAcetam  750 mg Intravenous Q12H     lidocaine  2 patch Transdermal Q24H     lidocaine   Transdermal Q8H     lidocaine   Transdermal Q24h     mupirocin  0.5 g Both Nostrils BID     pantoprazole (PROTONIX) IV  40 mg Intravenous Daily     QUEtiapine  25 mg Oral QAM     QUEtiapine  50 mg Oral At Bedtime     senna-docusate  1 tablet Oral BID    Or     senna-docusate  2 tablet Oral BID     sodium chloride (PF)  3 mL Intracatheter Q8H     vancomycin (VANCOCIN) IV  1,000 mg Intravenous Q12H       Data     Recent Labs  Lab 08/11/18  0432 08/10/18  2358 08/10/18  2243 08/10/18  2132 08/10/18  2129  08/10/18  0408  08/07/18  0403   WBC 21.6*  --  31.5* 27.8*  --   < > 17.3*  < > 21.7*   HGB 8.3* 9.2* 9.7* 8.1* 8.0*  < > 10.1*  < > 11.2*   MCV 89  --  90 91  --   < > 92  < > 90   *  --  488* 515*  --   < > 1005*  < > 869*   INR 1.46*  --  1.70* 1.67*  --   --  1.30*  < > 1.39*     --  144  --  139  < > 134  < > 132*   POTASSIUM 4.2  --  4.4  --  4.0  < > 4.6  < > 5.0   CHLORIDE 108  --  108  --   --   --  104  < > 103   CO2 21  --  21  --   --   --  19*  < > 20   BUN 52*  --  50*  --   --   --  52*  < > 26   CR 1.58*  --  1.43*  --   --   --  1.34*  < > 1.40*   ANIONGAP 11  --  15*  --   --   --  10  < > 9   GABRIELA 7.0*  --  7.0*  --   --   --  7.4*  < > 7.5*   *  --  120*  --  149*  < > 117*  < > 135*   ALBUMIN  --   --   --   --   --   --  2.7*  --  2.4*   PROTTOTAL  --   --   --   --   --   --  6.1*  --  6.2*   BILITOTAL  --   --   --   --   --   --  0.5  --  0.4   ALKPHOS  --   --   --   --   --   --  80  --  85   ALT  --   --   --   --   --   --  37  --  28   AST  --   --   --   --   --   --  24  --  73*    < > = values in this interval not displayed.    Recent Results (from the past 24 hour(s))   XR Chest Port 1 View    Narrative    1. Adequate endotracheal tube position with the tip in the midthoracic  trachea. Additional support devices as above.  2. Small bilateral pleural effusions and adjacent basilar atelectasis  and/or consolidation.  3. Slightly increased perihilar and interstitial opacities favored  represent pulmonary edema.         Echo  Interpretation Summary  Left ventricular size is normal.  The Ejection Fraction is estimated at 55-60%.  Inferior wall akinesis with inferoseptal muscular VAD. Peak velocity 3.7m/sec.  Mild to moderate right ventricular dilation is present.  Global right ventricular function is moderately reduced.  Dilation of the inferior vena cava is present with abnormal respiratory  variation in diameter.  No pericardial effusion is present.  Previous study not available for comparison.

## 2018-08-11 NOTE — PROGRESS NOTES
CVTS PROGRESS NOTE  08/11/18  CO-MORBIDITIES:   Cardiogenic Shock   STEMI  S/p HUSSEIN to distal RCA & Proximal LAD  Infraseptal VSD  Myeloproliferative Syndrome  TIA  S/p Splenectomy due to trauma    ASSESSMENT: Castillo De Anda is a 68 year old male s/p HUSSEIN to RCA and LAD for STEMI on 8/4 found to have post infarction basal VSD. Underwent repair of posterior VSD and TVR with bioprosthetic valve on 8/10, and was kept with open chest due to coagulopathy and RV dysfunction.     TODAY'S PROGRESS:   - Returned from OR yesterday  - Some coagulopathy post operatively  - Trend labs (CBC, lactate)  - Wean Dobutamine as able from 10>5, Keep Epi for now   -Potentially may need fluid bolus  - Sedation for open chest, will not wean sedation or ventilator for now  - Pacer evaluated at bedside, appears to capture (pace) but does not sense, will set backup rate for now  - Hematology consult recommended to keep hydroxyurea 1g BID  - Neurology will be consulted again once chest closed and neuro status can be evaluated off sedation      PLAN:  Neuro/ pain/ sedation:  #Post operative sedation  #Questionable seizure activity (temporal lobe seizures?)  - PRN Quetiapine 25mg for sleep, ativan PRN  - Prior to OR, there were suggestions of questionable seizure activity, neurology will resume EEG post chest closure  - Monitor neurological status. Notify the MD for any acute changes in exam.  - Propofol for sedation  - Fentanyl for analgesia    Pulmonary care:  #Post operative mechanical ventilation   - Intubated/sedated for open chest  - No plans to wean or pressure support at this time    Cardiovascular:  #Post infarction 2.5cm infraseptal VSD  #s/p HUSSEIN to distal RCA and proximal LAD  #STEMI, cardiogenic shock  #Dilated RV   #Pulmonary HTN  #IABP  #s/p repair of posterior VSD and TVR with bioprosthetic valve   - Monitor hemodynamic status.   - Infusions: Dobutamine 7.5mcg/kg/min  - Consider diuresis with low dose of lasix  - ECHO from 8/8  prior to surgery: Inferior wall akinesis with inferoseptal muscular VAD. Mild to moderate right ventricular dilation is present. Global right ventricular function is moderately reduced.  - ASA 325mg    Heme:  #Anticoagluation s/p Drug Eluting Stent placement  #Thrombocythemia  -Continue Cangrelor prior to surgery on 8/10  -Holding anticoagulation at this time given open chest, will need to resume DAPT as soon as feasible  -Hydroxyurea 1g BID, hematology consulted and following    GI care:  #Transaminitis   - NPO at midnight    Fluids/ Electrolytes/ Nutrition:   - mIVF for IV fluid hydration  - Wiggins    Renal/ Fluid Balance:    #Acute Kidney injury  - Urine output stable overnight, Creatinine 1.53  - Will continue to monitor intake and output via wiggins    Endocrine:  #Stress hyperglycemia  - Insulin gtt    ID/ Antibiotics:  #Open chest  - No antibiotic needs currently  - Monitor fever curve, trend WBC  - Vancomycin/Zosyn for open chest    Prophylaxis:    - Mechanical prophylaxis for DVT.   - Holding anticoagulation due to open chest  - Protonix    Lines/ tubes/ drains:  - ETT, arterial line, PIV, CVC/[place], IABP    Disposition:  - CV ICU    Patient seen, findings and plan discussed with CVTS Fellow.    Brad Hawkins MD  8/11/2018 1:58 PM  Anesthesia Resident  PGY4/CA-3    ====================================    SUBJECTIVE:   Returned to ICU after VSD repair and TVR. EBL 1000, Bypass time of 216min, cross clamp time of 159min. Patient was kept with open chest for coagulopathy and RV dysfunction. Total products from OR include 1 PRBC and 300ml Cell Saver.    Received Cryo and FFP overnight      OBJECTIVE:   1. VITAL SIGNS:   Temp:  [97.7  F (36.5  C)-98.2  F (36.8  C)] 97.9  F (36.6  C)  Heart Rate:  [] 75  Resp:  [14-40] 14  BP: ()/(35-61) 114/35  MAP:  [58 mmHg-104 mmHg] 72 mmHg  Arterial Line BP: ()/(38-67) 95/50  FiO2 (%):  [40 %-50 %] 40 %  SpO2:  [76 %-100 %] 100 %  Ventilation Mode: CMV/AC   (Continuous Mandatory Ventilation/ Assist Control)  FiO2 (%): 40 %  Rate Set (breaths/minute): 14 breaths/min  Tidal Volume Set (mL): 550 mL  PEEP (cm H2O): 5 cmH2O  Oxygen Concentration (%): 40 %  Resp: 14    2. INTAKE/ OUTPUT:   I/O last 3 completed shifts:  In: 3960.58 [I.V.:2817.58; Other:343]  Out: 1501 [Urine:990; Chest Tube:511]    3. PHYSICAL EXAMINATION:   General: Intubated/Sedated  Neuro: Sedated  Resp: Mechanical ventilation, intubated, coarse breath sounds  CV: Paced rhythm, open chest  Abdomen: Soft, dressings in place  Incisions: Dressing over chest, chest tubes in place, open chest, IABP in place  Extremities: warm and well perfused    4. INVESTIGATIONS:   Arterial Blood Gases     Recent Labs  Lab 08/11/18  1013 08/11/18  0357 08/10/18  2243 08/10/18  2129   PH 7.45 7.41 7.39 7.35   PCO2 34* 35 34* 37   PO2 123* 174* 355* 88   HCO3 23 22 21 21     Complete Blood Count     Recent Labs  Lab 08/11/18  1013 08/11/18  0432 08/10/18  2358 08/10/18  2243 08/10/18  2132   WBC 15.4* 21.6*  --  31.5* 27.8*   HGB 8.1* 8.3* 9.2* 9.7* 8.1*   * 532*  --  488* 515*     Basic Metabolic Panel    Recent Labs  Lab 08/11/18  1013 08/11/18  0432 08/10/18  2243 08/10/18  2129  08/10/18  0408    140 144 139  < > 134   POTASSIUM 4.1 4.2 4.4 4.0  < > 4.6   CHLORIDE 111* 108 108  --   --  104   CO2 24 21 21  --   --  19*   BUN 48* 52* 50*  --   --  52*   CR 1.53* 1.58* 1.43*  --   --  1.34*   GLC 84 174* 120* 149*  < > 117*   < > = values in this interval not displayed.  Liver Function Tests    Recent Labs  Lab 08/11/18  1013 08/11/18  0432 08/10/18  2243 08/10/18  2132 08/10/18  0408  08/07/18  0403 08/06/18 1957 08/06/18  1144   AST  --   --   --   --  24  --  73* 86* 95*   ALT  --   --   --   --  37  --  28 28 30   ALKPHOS  --   --   --   --  80  --  85 91 90   BILITOTAL  --   --   --   --  0.5  --  0.4 0.4 0.3   ALBUMIN  --   --   --   --  2.7*  --  2.4* 2.4* 2.3*   INR 1.45* 1.46* 1.70* 1.67* 1.30*  < >  1.39*  --   --    < > = values in this interval not displayed.  Pancreatic Enzymes  No lab results found in last 7 days.  Coagulation Profile    Recent Labs  Lab 08/11/18  1013 08/11/18  0432 08/10/18  2243 08/10/18  2132   INR 1.45* 1.46* 1.70* 1.67*   PTT  --   --  46* 41*         5. RADIOLOGY:   Recent Results (from the past 24 hour(s))   XR Abdomen Port 1 View    Narrative    Exam:  XR ABDOMEN PORT 1 VW, 8/11/2018 12:54 AM    History: OGT placement;     Comparison:  None available.    Findings:  Portal supine radiograph of the abdomen. Gastric tube tip  and sidehole project over the stomach body. No abnormally dilated  bowel. No pneumatosis or portal venous gas. Partially visualized  mediastinal drains, bilateral chest tubes, [place] catheter, and  intra-aortic balloon pump. Small left pleural effusion and adjacent  basilar opacities. Bones are unremarkable.      Impression    Impression:    1. Gastric tube tip and sidehole project over the stomach body.  2. Left pleural effusion with partially visualized postoperative  changes in the chest.    I have personally reviewed the examination and initial interpretation  and I agree with the findings.    ALBERTO PERSAUD MD   XR Chest Port 1 View    Narrative    Exam:  XR CHEST PORT 1 VW, 8/11/2018 12:49 AM    History: Endotracheal tube positioning;     Comparison:  Chest radiograph 8/7/2018.    Findings:  Single AP view of the chest. Endotracheal tube tip projects  approximately 4 cm above the audelia. Right IJ approach [place]  catheter tip projects over the proximal right pulmonary artery.  Gastric tube projects off the field-of-view. Mediastinal drains and  bilateral chest tubes are noted. Intrahepatic balloon pump tip  projects approximately 2 cm below the aortic arch.    Cardiac silhouette is obscured. Small left greater than right pleural  effusions and adjacent basilar opacities. No pneumothorax. Slightly  increased perihilar and bilateral interstitial  opacities. Visualized  upper abdomen and bones are unremarkable.      Impression    Impression:    1. Adequate endotracheal tube position with the tip in the midthoracic  trachea. Additional support devices as above.  2. Small bilateral pleural effusions and adjacent basilar atelectasis  and/or consolidation.  3. Slightly increased perihilar and interstitial opacities favored  represent pulmonary edema.    I have personally reviewed the examination and initial interpretation  and I agree with the findings.    ALBERTO PERSAUD MD

## 2018-08-11 NOTE — PROGRESS NOTES
CV ICU PROGRESS NOTE  08/11/18  CO-MORBIDITIES:   Cardiogenic Shock   STEMI  S/p HUSSEIN to distal RCA & Proximal LAD  Infraseptal VSD  Myeloproliferative Syndrome  TIA  S/p Splenectomy due to trauma    ASSESSMENT: Castillo De Anda is a 68 year old male s/p HUSSEIN to RCA and LAD for STEMI on 8/4 found to have post infarction basal VSD. Underwent repair of posterior VSD and TVR with bioprosthetic valve on 8/10, and was kept with open chest due to coagulopathy and RV dysfunction.     TODAY'S PROGRESS:   - Returned from OR yesterday  - Some coagulopathy post operatively  - Trend labs (CBC, lactate)  - Wean Dobutamine as able from 10>5, Keep Epi for now   -Potentially may need fluid bolus  - Sedation for open chest, will not wean sedation or ventilator for now  - Pacer evaluated at bedside, appears to capture (pace) but does not sense, will set backup rate for now  - Hematology consult recommended to keep hydroxyurea 1g BID  - Neurology will be consulted again once chest closed and neuro status can be evaluated off sedation      PLAN:  Neuro/ pain/ sedation:  #Post operative sedation  #Questionable seizure activity (temporal lobe seizures?)  - PRN Quetiapine 25mg for sleep, ativan PRN  - Prior to OR, there were suggestions of questionable seizure activity, neurology will resume EEG post chest closure  - Monitor neurological status. Notify the MD for any acute changes in exam.  - Propofol for sedation  - Fentanyl for analgesia    Pulmonary care:  #Post operative mechanical ventilation   - Intubated/sedated for open chest  - No plans to wean or pressure support at this time    Cardiovascular:  #Post infarction 2.5cm infraseptal VSD  #s/p HUSSEIN to distal RCA and proximal LAD  #STEMI, cardiogenic shock  #Dilated RV   #Pulmonary HTN  #IABP  #s/p repair of posterior VSD and TVR with bioprosthetic valve   - Monitor hemodynamic status.   - Infusions: Dobutamine 7.5mcg/kg/min  - Consider diuresis with low dose of lasix  - ECHO from 8/8  prior to surgery: Inferior wall akinesis with inferoseptal muscular VAD. Mild to moderate right ventricular dilation is present. Global right ventricular function is moderately reduced.  - ASA 325mg    Heme:  #Anticoagluation s/p Drug Eluting Stent placement  #Thrombocythemia  -Continue Cangrelor prior to surgery on 8/10  -Holding anticoagulation at this time given open chest, will need to resume DAPT as soon as feasible  -Hydroxyurea 1g BID, hematology consulted and following    GI care:  #Transaminitis   - NPO at midnight    Fluids/ Electrolytes/ Nutrition:   - mIVF for IV fluid hydration  - Wiggins    Renal/ Fluid Balance:    #Acute Kidney injury  - Urine output stable overnight, Creatinine 1.53  - Will continue to monitor intake and output via wiggins    Endocrine:  #Stress hyperglycemia  - Insulin gtt    ID/ Antibiotics:  #Open chest  - No antibiotic needs currently  - Monitor fever curve, trend WBC  - Vancomycin/Zosyn for open chest    Prophylaxis:    - Mechanical prophylaxis for DVT.   - Holding anticoagulation due to open chest  - Protonix    Lines/ tubes/ drains:  - ETT, arterial line, PIV, CVC/[place], IABP    Disposition:  - CV ICU    Patient seen, findings and plan discussed with CV ICU staff, Dr. Porras.    Brad Hawkins MD  8/11/2018 1:58 PM  Anesthesia Resident  PGY4/CA-3    ====================================    SUBJECTIVE:   Returned to ICU after VSD repair and TVR. EBL 1000, Bypass time of 216min, cross clamp time of 159min. Patient was kept with open chest for coagulopathy and RV dysfunction. Total products from OR include 1 PRBC and 300ml Cell Saver.    Received Cryo and FFP overnight      OBJECTIVE:   1. VITAL SIGNS:   Temp:  [97.7  F (36.5  C)-98.2  F (36.8  C)] 97.9  F (36.6  C)  Heart Rate:  [] 75  Resp:  [14-40] 14  BP: ()/(35-61) 114/35  MAP:  [58 mmHg-104 mmHg] 72 mmHg  Arterial Line BP: ()/(38-67) 95/50  FiO2 (%):  [40 %-50 %] 40 %  SpO2:  [76 %-100 %] 100 %  Ventilation  Mode: CMV/AC  (Continuous Mandatory Ventilation/ Assist Control)  FiO2 (%): 40 %  Rate Set (breaths/minute): 14 breaths/min  Tidal Volume Set (mL): 550 mL  PEEP (cm H2O): 5 cmH2O  Oxygen Concentration (%): 40 %  Resp: 14    2. INTAKE/ OUTPUT:   I/O last 3 completed shifts:  In: 3960.58 [I.V.:2817.58; Other:343]  Out: 1501 [Urine:990; Chest Tube:511]    3. PHYSICAL EXAMINATION:   General: Intubated/Sedated  Neuro: Sedated  Resp: Mechanical ventilation, intubated, coarse breath sounds  CV: Paced rhythm, open chest  Abdomen: Soft, dressings in place  Incisions: Dressing over chest, chest tubes in place, open chest, IABP in place  Extremities: warm and well perfused    4. INVESTIGATIONS:   Arterial Blood Gases     Recent Labs  Lab 08/11/18  1013 08/11/18  0357 08/10/18  2243 08/10/18  2129   PH 7.45 7.41 7.39 7.35   PCO2 34* 35 34* 37   PO2 123* 174* 355* 88   HCO3 23 22 21 21     Complete Blood Count     Recent Labs  Lab 08/11/18  1013 08/11/18  0432 08/10/18  2358 08/10/18  2243 08/10/18  2132   WBC 15.4* 21.6*  --  31.5* 27.8*   HGB 8.1* 8.3* 9.2* 9.7* 8.1*   * 532*  --  488* 515*     Basic Metabolic Panel    Recent Labs  Lab 08/11/18  1013 08/11/18  0432 08/10/18  2243 08/10/18  2129  08/10/18  0408    140 144 139  < > 134   POTASSIUM 4.1 4.2 4.4 4.0  < > 4.6   CHLORIDE 111* 108 108  --   --  104   CO2 24 21 21  --   --  19*   BUN 48* 52* 50*  --   --  52*   CR 1.53* 1.58* 1.43*  --   --  1.34*   GLC 84 174* 120* 149*  < > 117*   < > = values in this interval not displayed.  Liver Function Tests    Recent Labs  Lab 08/11/18  1013 08/11/18  0432 08/10/18  2243 08/10/18  2132 08/10/18  0408  08/07/18  0403 08/06/18 1957 08/06/18  1144   AST  --   --   --   --  24  --  73* 86* 95*   ALT  --   --   --   --  37  --  28 28 30   ALKPHOS  --   --   --   --  80  --  85 91 90   BILITOTAL  --   --   --   --  0.5  --  0.4 0.4 0.3   ALBUMIN  --   --   --   --  2.7*  --  2.4* 2.4* 2.3*   INR 1.45* 1.46* 1.70* 1.67*  1.30*  < > 1.39*  --   --    < > = values in this interval not displayed.  Pancreatic Enzymes  No lab results found in last 7 days.  Coagulation Profile    Recent Labs  Lab 08/11/18  1013 08/11/18  0432 08/10/18  2243 08/10/18  2132   INR 1.45* 1.46* 1.70* 1.67*   PTT  --   --  46* 41*         5. RADIOLOGY:   Recent Results (from the past 24 hour(s))   XR Abdomen Port 1 View    Narrative    Exam:  XR ABDOMEN PORT 1 VW, 8/11/2018 12:54 AM    History: OGT placement;     Comparison:  None available.    Findings:  Portal supine radiograph of the abdomen. Gastric tube tip  and sidehole project over the stomach body. No abnormally dilated  bowel. No pneumatosis or portal venous gas. Partially visualized  mediastinal drains, bilateral chest tubes, [place] catheter, and  intra-aortic balloon pump. Small left pleural effusion and adjacent  basilar opacities. Bones are unremarkable.      Impression    Impression:    1. Gastric tube tip and sidehole project over the stomach body.  2. Left pleural effusion with partially visualized postoperative  changes in the chest.    I have personally reviewed the examination and initial interpretation  and I agree with the findings.    ALBERTO PERSAUD MD   XR Chest Port 1 View    Narrative    Exam:  XR CHEST PORT 1 VW, 8/11/2018 12:49 AM    History: Endotracheal tube positioning;     Comparison:  Chest radiograph 8/7/2018.    Findings:  Single AP view of the chest. Endotracheal tube tip projects  approximately 4 cm above the audelia. Right IJ approach [place]  catheter tip projects over the proximal right pulmonary artery.  Gastric tube projects off the field-of-view. Mediastinal drains and  bilateral chest tubes are noted. Intrahepatic balloon pump tip  projects approximately 2 cm below the aortic arch.    Cardiac silhouette is obscured. Small left greater than right pleural  effusions and adjacent basilar opacities. No pneumothorax. Slightly  increased perihilar and bilateral  interstitial opacities. Visualized  upper abdomen and bones are unremarkable.      Impression    Impression:    1. Adequate endotracheal tube position with the tip in the midthoracic  trachea. Additional support devices as above.  2. Small bilateral pleural effusions and adjacent basilar atelectasis  and/or consolidation.  3. Slightly increased perihilar and interstitial opacities favored  represent pulmonary edema.    I have personally reviewed the examination and initial interpretation  and I agree with the findings.    ALBERTO PERSAUD MD

## 2018-08-11 NOTE — PLAN OF CARE
Problem: Patient Care Overview  Goal: Plan of Care/Patient Progress Review  Outcome: No Change  S:  Pt hemodynamically stable this shift, Epi rate unchanged, Dobutamine decreased from 10 to 5mcg/kg/min.  Propofol and Fentanyl for sedation.  SR 70's with no noted ectopy.  Pacer inappropriately pacing on QRS, so pacer mode changed to VVI with backup rate 50.  No vent changes. CVP 11,12. No fluids or blood products this shift.  Minimal CT output. UO ~40cc/hour.  Pt's pupils remain reactive and unequal. L>R.  Open chest dressing concave with no signs of bleeding.  No seizure activity noted.  EEG remains off until pt's chest is closed and able to wean sedation.  B:  Pt s/p VSD closure 2/2 STEMI.  A:  Pt's blood pressure, respiratory status and cardiac rhythm stable this shift. No escalation of pressors, or vent support. No blood products or fluid resuscitation needed to maintain ordered MAP's.  Pacer mode changed 2/2 inappropriate sensing of QRS complex's.  Tmax 37.1.    P:  [place] in OR Monday 8/13.  Continue to wean pressors as able.

## 2018-08-11 NOTE — PROGRESS NOTES
[facility]  Neurology ICU Consultation    Patient Name:  Castillo De Anda  MRN:  [id number]    :  1950  Date of Service:  2018  Primary care provider:  No primary care provider on file.      Summary:   Mr. De Anda is a 68 y.o. man myeloproliferative syndrome, right temporal meningioma s/p resection (), hx of malignancy (prostate, thyroid) who presented as transfer from [facility] for VSD in the setting of CAD/MI s/p RCA and proximal LAD HUSSEIN on 18 with episodes concerning for focal seizures. EEG showed spike-wave discharges with build up in setting of clinical tachypnea, subjective warm feeling, hypoxia and bradycardia. Unclear correlate for all behaviors to possible underlying seizures, but loaded on Keppra 2 g IV and continued 750 mg IV BID.     Assessment/Recommendations:   Underwent cardiac surgery to repair VSD in setting of ischemic heart diease and tricuspid valve repair yesterday evening/night. He is intubated, sedated with open chest. Unable to obtain neurological exam. While he is on propofol/sedation he is less likely to have seizure activity. No need to reattached the EEG at this time.    - Reconnect EEG after chest closed and call neurology again.   - Continue Keppra 750 mg BID   - Will sign off for time being.     Thank you for involving neurology in the care of Castillo De Anda.      Patient was seen and discussed with Dr. Fletcher.     Krystin Anaya MD  Neurology PGY 2  [phone]

## 2018-08-11 NOTE — PLAN OF CARE
Problem: Patient Care Overview  Goal: Plan of Care/Patient Progress Review  Outcome: No Change  Pt arrived back from OR at 2224 last night s/p VSD repair with an open chest and IABP. Overnight, chest tube output was ~40-90 cc/hr. UOP 30-60 cc/hr. Inotropic drips titrated to keep CI >2.0. SAKSHI CI 2.0-2.8. CCO monitor CI 1.2-2.2. SVR ~1100. SvO2 40s-60s. IABP means 70s-90s, 100% augmentation. Current vent settings: CMV, FiO2 40%, RR 14, , PEEP 5. Pt is sedated on a propofol drip, continuous fentanyl infusing for pain management. Insulin drip titrated per orders. No seizure-activity noted overnight.

## 2018-08-11 NOTE — PLAN OF CARE
Problem: Patient Care Overview  Goal: Plan of Care/Patient Progress Review  OT: OT orders received, per discussion w/ RN, pt w/ open chest. OT will hold until Monday, 8/13, and initiate as appropriate.

## 2018-08-11 NOTE — PROGRESS NOTES
BRIEF HEMATOLOGY/ONCOLOGY NOTE    Platelet count down to 500s today. Went to OR for VSD repair yesterday, now with open chest and IABP. We had increased hydrea to 1000 mg PO BID yesterday, which is likely also contributing.     - Would continue hydrea 1000 mg PO BID for now. Daily CBC.    Please page with any questions/concerns over the weekend.    Plan was discussed with attending physician Dr. Quezada.    Dionna Bates MD/PhD  Heme/Onc Fellow, PGY-4  [phone]

## 2018-08-11 NOTE — OR NURSING
Pacing Wires placed during surgical procedure; 1 Bipolar and 1 Unipolar lead in the [facility]. 1 Bipolar pacing lead in the [facility].

## 2018-08-11 NOTE — OP NOTE
OPERATIVE DATE: 8/10/2018    PRE-OPERATIVE DIAGNOSIS:  1) Ischemic ventricular septal defect  2) Coronary artery disease  3) Thyroid cancer  4) Polycythemia vera  5) Anemia  6) Deep vein thrombosis  7) Meningioma  8) Prostate cancer    POST-OPERATIVE DIAGNOSIS:  1) Ischemic ventricular septal defect  2) Coronary artery disease  3) Thyroid cancer  4) Polycythemia vera  5) Anemia  6) Deep vein thrombosis  7) Meningioma  8) Prostate cancer    PROCEDURE:  1) Repair of posterior ventricular septal defect with 5x15mm Dacron patch  2) Tricuspid valve replacement - 33m St. Kt Epic mitral valve    SURGEON: Jason Franco MD    COSURGEON: Dianne Plunkett MD    ANESTHESIA: GETA    ESTIMATED BLOOD LOSS: 1000cc    OPERATIVE FINDINGS:  1) Ejection fraction: 55%  2) Posterior / inferior ventricular septal defect  3) Septal leaflet of tricuspid valve was included in the ventricular septal repair requiring valve replacement    CARDIOPULMONARY BYPASS TIME: 216 minutes    AORTIC CROSS CLAMP TIME: 159 minutes    INDICATIONS:  Mr. JOSE SOLER is a 68 year old male admitted with ischemic ventricular septal defect.  We were asked to evaluate for surgical repair.  Risks and benefits of the operation were explained to the patient and their family including, but not limited to, bleeding, infection, stroke and even death.  They understood these risks and agreed to proceed electively.    OPERATIVE REPORT:  The patient was transferred to the operating room and positioned supine on the OR table.  General anesthesia was initiated by the anesthesia team.  Endotracheal intubation and central venous access was performed by anesthesia.  The patients neck, chest, abdomen and bilateral lower extremities were clipped, prepped and draped in sterile fashion.  A pre-procedure time-out was performed confirming the correct patient, correct site and correct procedure.    Median sternotomy was made with reciprocating saw.  The bone was made hemostatic  with cautery and bone wax.  A murrieta retractor was placed.    The patient was given 300 mg/kg IV heparin.   The pericardium was tented circumferentially.    Pledgeted 2-0 ethibond pursestring was made in the ascending aorta.  A 20F EOPA arterial cannula was placed here and connected to the bypass circuit.  A 2-0 ethibond pursestring was made in the right atrial appendage. Bicaval venous cannulae were placed and connected to the cardiopulmonary bypass circuit.  A 4-0 prolene pursestring was made in the right atrium.  A stab incision was made here.  A retrograde cardioplegia catheter was placed and connected to the cardioplegia circuit.  Next the aorto-pulmonary window was developed.  A PA vent catheter was placed through a stab in the PA and connected to the bypass circuit.  A 4-0 prolene pursestring was made in the ascending aorta.  A DLP root vent / antegrade cardioplegia catheter was placed here and connected to the cardioplegia circuit.    Cardiopulmonary bypass was initiated with good flows.  Flow on the circuit was decreased.  A large aortic cross clamp was applied.  Flow on the circuit was resumed.  1000cc of antegrade cold blood cardioplegia was delivered with good diastolic arrest.  An additional 300cc of retrograde cold blood cardioplegia was used to test the retrograde.    We focused our attention on the VSD repair.  The apex of the heart was elevated.  A ventriculotomy was made to the left side of the PDA.  The VSD was located in the mid portion of the septum.  Pledgeted 3-0 prolene stitches were place circumferentially.  Next a 5x15 woven dacron patch was opened.  The stitches were passed through the patch.  One pledget was dropped in the RV and not able to be retrieved.  The patch was tied down.  The ventriculotomy was closed with 3-0 prolenes and buttressed with felt strips.     A right atriotomy was made to attempt to find the dropped pledget.  The pledget appeared to have been tacked down with  another stitch.  The septal leaflet of the tricuspid valve was included in the closure of the VSD.  I decided to replace the tricuspid valve to prevent tricuspid insufficiency.    Pledgeted 2-0 ethibond stitches were placed circumferentially in the tricuspid annulus.  The annulus was sized to 33mm St. Kt Epic valve.  The stitches were passed through the sewing cuff of the valve.   The valve was seated and the sutures were tied.  The atriotomy was closed in 2 layers using running 4-0 prolene.      The pleural spaces were suctioned dry.  The lungs were ventilated bilaterally.  ELISABETH showed no intra-cardiac air.  The DLP root vent / antegrade cardioplegia catheter was removed.  Intravenous calcium was administered.  Flow on the bypass circuit was weaned to off.  The patient was stable on moderate low dose epinephrine.  The PA catheter was removed.  The site repaired with 4-0 prolene.  The venous cannulae were removed.  Pursestring at this sites were tied.  This was over-sewed with 0-ethibond.  The remaining pump blood volume was returned via the arterial cannula.  A test dose of protamine was administered.  The arterial cannula was removed.  The pursestrings at this site were tied.  This was oversewn with pledgeted 4-0 prolene.     The sternal retractor was removed.  Bilateral 32F plerual tubes and two 36F.  These were delivered through the anterior abdominal wall and secured to the skin with 0-ethibond stitches.      The wound was made hemostatic with cautery.  The subcutaneous tissue, sternal edges, thymic fat, pericardial edges and all anastomotic and cannulation sites were inspected and hemostatic.    The wound was irrigated with warm antibiotic saline.      The patient was moderately coagulopathic and there was moderate RV dysfunction.  I decided to leave the chest open and packed.  One kerlex gauze was placed, esmarch dressing was sutured to the skin edges, ioban was applied.    The patient was then transferred  from the operating bed to an ICU bed and transferred to the ICU in critical, but stable, condition.    All needle, sponge and instrument counts were correct at the end of the case.    Jason Franco  Cardiothoracic Surgery  Pager: [phone]

## 2018-08-11 NOTE — PLAN OF CARE
Problem: Patient Care Overview  Goal: Plan of Care/Patient Progress Review  PT 4E: PT orders received. Per RN, pt with open chest, not appropriate for PT evaluation today. PT will hold and initiate when appropriate.

## 2018-08-11 NOTE — PROCEDURES
[facility] EEG #-1 (Day 1 of Video-EEG Monitoring)    DATE OF RECORDIN2018    DURATION OF RECORDIN hours, 28 minutes        CLINICAL SUMMARY:  This diagnostic video-EEG monitoring procedure is performed in evaluation of encephalopathy in Castillo De Anda.  He was reported to be receiving levetiracetam, lorazepam and quetiapine during the period of monitoring.      TECHNICAL SUMMARY:  This continuous EEG monitoring procedure was performed with 23 scalp electrodes in 10-20 system placements, and additional scalp, precordial and other surface electrodes used for electrical referencing and artifact detection.  Video monitoring was utilized and periodically reviewed by EEG technologists and the physician for electroclinical correlation.     INTERICTAL EEG ACTIVITIES:  During waking, there was a bilateral 8 Hz posterior dominant rhythm, which was poorly sustained on the right.  Lower amplitude faster activities predominated anteriorly.  During waking, there was frequent occurrence of generalized irregular 2-8 Hz delta-theta slowing.  Drowsiness was manifested by increased rhythmicity of background theta activities.  Symmetric sleep spindles were seen during slow wave sleep.  During waking and drowsiness, there was very frequent occurrence of right frontotemporal polymorphic 2-4 Hz delta slowing.  There was a breach effect at the T4 electrode.  There were frequent right frontotemporal spike-wave complexes, which were independent of the breach effect in that spike frequencies were faster than the background activities subject to the breach effect.      ICTAL RECORDINGS:  The event marker was activated multiple times during the period of monitoring, usually by the patient's family members, who noted that the patient was having variability in heart rate.  On 2 occasions (at 15:54 and at 17:59), there was a buildup of generalized high amplitude semi-rhythmic 2-4 Hz delta activity, in association with video  recorded evidence of hyperventilation, and with increased occurrence of right frontotemporal spikes and runs of approximately 1 Hz delta slowing for up to 30 seconds.  During other event markings, there were less prominent changes in generalized delta slowing, in right frontotemporal delta slowing and in spike frequency.  The patient had behavioral interaction at all of the marked events, and he was never observed to be unresponsive to voice.  With several events, he complained of feeling hot.      SUMMARY OF DAY 1 OF VIDEO-EEG MONITORING:    The interictal EEG recording was abnormal due to generalized delta-theta slowing during waking, due to very frequent right frontotemporal delta slowing in waking and drowsiness, frequent right frontotemporal spike-wave complexes in waking and drowsiness, and a focal breach effect at the T4 electrode.    Multiple events with subjective alteration in well-being or family-observed changes in heart rate were marked, on 2 occasions with apparent hyperventilation associated with a buildup of generalized delta slowing but also with monorhythmic non-evolving 1 Hz delta slowing over the right frontotemporal area in conjunction with marked increase in right frontotemporal spike frequency.    These findings indicate moderate electrographic encephalopathy, additional right frontotemporal neuronal dysfunction, evidence of the known skull defect and an elevated risk of partial epilepsy.  Some of the marked events may represent simple partial seizures given that there were right frontotemporal changes and slowing and spiking at these times, but transitory increases in generalized delta slowing are more likely to be accounted for by volitional hyperventilation.  Clinical correlation is recommended.   Joaquín Cisneros M.D., Professor of Neurology       D: 08/10/2018   T: 08/10/2018   MT:       Name:     JOSE SOLER   MRN:      [id number]        Account:        [id number]   :       1950           Procedure Date: 08/09/2018      Document: [id number]

## 2018-08-12 ENCOUNTER — APPOINTMENT (OUTPATIENT)
Dept: GENERAL RADIOLOGY | Facility: CLINIC | Age: 68
DRG: 219 | End: 2018-08-12
Attending: STUDENT IN AN ORGANIZED HEALTH CARE EDUCATION/TRAINING PROGRAM
Payer: MEDICARE

## 2018-08-12 LAB
ALBUMIN SERPL-MCNC: 2.3 G/DL (ref 3.4–5)
ALP SERPL-CCNC: 60 U/L (ref 40–150)
ALT SERPL W P-5'-P-CCNC: 27 U/L (ref 0–70)
ANION GAP SERPL CALCULATED.3IONS-SCNC: 12 MMOL/L (ref 3–14)
ANION GAP SERPL CALCULATED.3IONS-SCNC: 8 MMOL/L (ref 3–14)
AST SERPL W P-5'-P-CCNC: 102 U/L (ref 0–45)
BASE DEFICIT BLDA-SCNC: 2.4 MMOL/L
BASE DEFICIT BLDA-SCNC: 2.4 MMOL/L
BASE DEFICIT BLDV-SCNC: 0.5 MMOL/L
BASE DEFICIT BLDV-SCNC: 4.3 MMOL/L
BASE DEFICIT BLDV-SCNC: 4.5 MMOL/L
BASE DEFICIT BLDV-SCNC: 4.9 MMOL/L
BASE DEFICIT BLDV-SCNC: 6.1 MMOL/L
BASE DEFICIT BLDV-SCNC: 6.9 MMOL/L
BILIRUB DIRECT SERPL-MCNC: 0.3 MG/DL (ref 0–0.2)
BILIRUB SERPL-MCNC: 0.7 MG/DL (ref 0.2–1.3)
BLD PROD TYP BPU: NORMAL
BLD UNIT ID BPU: 0
BLOOD PRODUCT CODE: NORMAL
BPU ID: NORMAL
BUN SERPL-MCNC: 34 MG/DL (ref 7–30)
BUN SERPL-MCNC: 40 MG/DL (ref 7–30)
CA-I BLD-MCNC: 3.4 MG/DL (ref 4.4–5.2)
CA-I BLD-MCNC: 3.9 MG/DL (ref 4.4–5.2)
CA-I BLD-MCNC: 4.3 MG/DL (ref 4.4–5.2)
CA-I BLD-MCNC: 4.4 MG/DL (ref 4.4–5.2)
CALCIUM SERPL-MCNC: 7 MG/DL (ref 8.5–10.1)
CALCIUM SERPL-MCNC: 7.1 MG/DL (ref 8.5–10.1)
CHLORIDE SERPL-SCNC: 110 MMOL/L (ref 94–109)
CHLORIDE SERPL-SCNC: 111 MMOL/L (ref 94–109)
CO2 SERPL-SCNC: 22 MMOL/L (ref 20–32)
CO2 SERPL-SCNC: 22 MMOL/L (ref 20–32)
CREAT SERPL-MCNC: 1.15 MG/DL (ref 0.66–1.25)
CREAT SERPL-MCNC: 1.34 MG/DL (ref 0.66–1.25)
ERYTHROCYTE [DISTWIDTH] IN BLOOD BY AUTOMATED COUNT: 18.4 % (ref 10–15)
GFR SERPL CREATININE-BSD FRML MDRD: 53 ML/MIN/1.7M2
GFR SERPL CREATININE-BSD FRML MDRD: 63 ML/MIN/1.7M2
GLUCOSE BLDC GLUCOMTR-MCNC: 111 MG/DL (ref 70–99)
GLUCOSE BLDC GLUCOMTR-MCNC: 112 MG/DL (ref 70–99)
GLUCOSE BLDC GLUCOMTR-MCNC: 124 MG/DL (ref 70–99)
GLUCOSE BLDC GLUCOMTR-MCNC: 127 MG/DL (ref 70–99)
GLUCOSE BLDC GLUCOMTR-MCNC: 129 MG/DL (ref 70–99)
GLUCOSE BLDC GLUCOMTR-MCNC: 88 MG/DL (ref 70–99)
GLUCOSE BLDC GLUCOMTR-MCNC: 93 MG/DL (ref 70–99)
GLUCOSE BLDC GLUCOMTR-MCNC: 98 MG/DL (ref 70–99)
GLUCOSE SERPL-MCNC: 123 MG/DL (ref 70–99)
GLUCOSE SERPL-MCNC: 129 MG/DL (ref 70–99)
HCO3 BLD-SCNC: 21 MMOL/L (ref 21–28)
HCO3 BLD-SCNC: 22 MMOL/L (ref 21–28)
HCO3 BLDV-SCNC: 18 MMOL/L (ref 21–28)
HCO3 BLDV-SCNC: 18 MMOL/L (ref 21–28)
HCO3 BLDV-SCNC: 20 MMOL/L (ref 21–28)
HCO3 BLDV-SCNC: 24 MMOL/L (ref 21–28)
HCT VFR BLD AUTO: 24.1 % (ref 40–53)
HCT VFR BLD AUTO: 26.2 % (ref 40–53)
HGB BLD-MCNC: 7.4 G/DL (ref 13.3–17.7)
HGB BLD-MCNC: 7.8 G/DL (ref 13.3–17.7)
HGB BLD-MCNC: 8.5 G/DL (ref 13.3–17.7)
INR PPP: 1.51 (ref 0.86–1.14)
LMWH PPP CHRO-ACNC: <0.1 IU/ML
MAGNESIUM SERPL-MCNC: 3 MG/DL (ref 1.6–2.3)
MAGNESIUM SERPL-MCNC: 3.3 MG/DL (ref 1.6–2.3)
MCH RBC QN AUTO: 29.4 PG (ref 26.5–33)
MCHC RBC AUTO-ENTMCNC: 32.4 G/DL (ref 31.5–36.5)
MCV RBC AUTO: 91 FL (ref 78–100)
O2/TOTAL GAS SETTING VFR VENT: 40 %
OXYHGB MFR BLD: 96 % (ref 92–100)
OXYHGB MFR BLD: 97 % (ref 92–100)
OXYHGB MFR BLDV: 53 %
OXYHGB MFR BLDV: 55 %
OXYHGB MFR BLDV: 56 %
OXYHGB MFR BLDV: 59 %
PCO2 BLD: 32 MM HG (ref 35–45)
PCO2 BLD: 33 MM HG (ref 35–45)
PCO2 BLDV: 30 MM HG (ref 40–50)
PCO2 BLDV: 30 MM HG (ref 40–50)
PCO2 BLDV: 33 MM HG (ref 40–50)
PCO2 BLDV: 38 MM HG (ref 40–50)
PH BLD: 7.43 PH (ref 7.35–7.45)
PH BLD: 7.44 PH (ref 7.35–7.45)
PH BLDV: 7.38 PH (ref 7.32–7.43)
PH BLDV: 7.38 PH (ref 7.32–7.43)
PH BLDV: 7.39 PH (ref 7.32–7.43)
PH BLDV: 7.4 PH (ref 7.32–7.43)
PH BLDV: 7.4 PH (ref 7.32–7.43)
PH BLDV: 7.41 PH (ref 7.32–7.43)
PHOSPHATE SERPL-MCNC: 3.5 MG/DL (ref 2.5–4.5)
PLATELET # BLD AUTO: 521 10E9/L (ref 150–450)
PO2 BLD: 114 MM HG (ref 80–105)
PO2 BLD: 140 MM HG (ref 80–105)
PO2 BLDV: 32 MM HG (ref 25–47)
PO2 BLDV: 33 MM HG (ref 25–47)
PO2 BLDV: 34 MM HG (ref 25–47)
PO2 BLDV: 35 MM HG (ref 25–47)
PO2 BLDV: 35 MM HG (ref 25–47)
PO2 BLDV: 36 MM HG (ref 25–47)
POTASSIUM BLD-SCNC: 3.9 MMOL/L (ref 3.4–5.3)
POTASSIUM SERPL-SCNC: 4.1 MMOL/L (ref 3.4–5.3)
POTASSIUM SERPL-SCNC: 4.1 MMOL/L (ref 3.4–5.3)
PROT SERPL-MCNC: 5 G/DL (ref 6.8–8.8)
RBC # BLD AUTO: 2.65 10E12/L (ref 4.4–5.9)
SODIUM SERPL-SCNC: 141 MMOL/L (ref 133–144)
SODIUM SERPL-SCNC: 145 MMOL/L (ref 133–144)
TRANSFUSION STATUS PATIENT QL: NORMAL
TRANSFUSION STATUS PATIENT QL: NORMAL
WBC # BLD AUTO: 12.4 10E9/L (ref 4–11)

## 2018-08-12 PROCEDURE — 25000128 H RX IP 250 OP 636: Performed by: SURGERY

## 2018-08-12 PROCEDURE — 85014 HEMATOCRIT: CPT | Performed by: STUDENT IN AN ORGANIZED HEALTH CARE EDUCATION/TRAINING PROGRAM

## 2018-08-12 PROCEDURE — 82330 ASSAY OF CALCIUM: CPT | Performed by: INTERNAL MEDICINE

## 2018-08-12 PROCEDURE — 25000128 H RX IP 250 OP 636: Performed by: INTERNAL MEDICINE

## 2018-08-12 PROCEDURE — 82330 ASSAY OF CALCIUM: CPT | Performed by: STUDENT IN AN ORGANIZED HEALTH CARE EDUCATION/TRAINING PROGRAM

## 2018-08-12 PROCEDURE — A9270 NON-COVERED ITEM OR SERVICE: HCPCS | Mod: GY | Performed by: INTERNAL MEDICINE

## 2018-08-12 PROCEDURE — 20000004 ZZH R&B ICU UMMC

## 2018-08-12 PROCEDURE — 40000014 ZZH STATISTIC ARTERIAL MONITORING DAILY

## 2018-08-12 PROCEDURE — A9270 NON-COVERED ITEM OR SERVICE: HCPCS | Mod: GY | Performed by: STUDENT IN AN ORGANIZED HEALTH CARE EDUCATION/TRAINING PROGRAM

## 2018-08-12 PROCEDURE — 25000132 ZZH RX MED GY IP 250 OP 250 PS 637: Mod: GY | Performed by: STUDENT IN AN ORGANIZED HEALTH CARE EDUCATION/TRAINING PROGRAM

## 2018-08-12 PROCEDURE — 71045 X-RAY EXAM CHEST 1 VIEW: CPT

## 2018-08-12 PROCEDURE — 82805 BLOOD GASES W/O2 SATURATION: CPT | Performed by: THORACIC SURGERY (CARDIOTHORACIC VASCULAR SURGERY)

## 2018-08-12 PROCEDURE — 82330 ASSAY OF CALCIUM: CPT | Performed by: THORACIC SURGERY (CARDIOTHORACIC VASCULAR SURGERY)

## 2018-08-12 PROCEDURE — 25000132 ZZH RX MED GY IP 250 OP 250 PS 637: Mod: GY | Performed by: THORACIC SURGERY (CARDIOTHORACIC VASCULAR SURGERY)

## 2018-08-12 PROCEDURE — 40000048 ZZH STATISTIC DAILY SWAN MONITORING

## 2018-08-12 PROCEDURE — 83735 ASSAY OF MAGNESIUM: CPT | Performed by: STUDENT IN AN ORGANIZED HEALTH CARE EDUCATION/TRAINING PROGRAM

## 2018-08-12 PROCEDURE — 00000146 ZZHCL STATISTIC GLUCOSE BY METER IP

## 2018-08-12 PROCEDURE — 99291 CRITICAL CARE FIRST HOUR: CPT | Mod: GC | Performed by: INTERNAL MEDICINE

## 2018-08-12 PROCEDURE — 85018 HEMOGLOBIN: CPT | Performed by: STUDENT IN AN ORGANIZED HEALTH CARE EDUCATION/TRAINING PROGRAM

## 2018-08-12 PROCEDURE — 83735 ASSAY OF MAGNESIUM: CPT | Performed by: INTERNAL MEDICINE

## 2018-08-12 PROCEDURE — A9270 NON-COVERED ITEM OR SERVICE: HCPCS | Mod: GY | Performed by: THORACIC SURGERY (CARDIOTHORACIC VASCULAR SURGERY)

## 2018-08-12 PROCEDURE — 40000275 ZZH STATISTIC RCP TIME EA 10 MIN

## 2018-08-12 PROCEDURE — C9460 INJECTION, CANGRELOR: HCPCS | Performed by: INTERNAL MEDICINE

## 2018-08-12 PROCEDURE — 80076 HEPATIC FUNCTION PANEL: CPT | Performed by: INTERNAL MEDICINE

## 2018-08-12 PROCEDURE — 85610 PROTHROMBIN TIME: CPT | Performed by: INTERNAL MEDICINE

## 2018-08-12 PROCEDURE — 80048 BASIC METABOLIC PNL TOTAL CA: CPT | Performed by: STUDENT IN AN ORGANIZED HEALTH CARE EDUCATION/TRAINING PROGRAM

## 2018-08-12 PROCEDURE — 94003 VENT MGMT INPAT SUBQ DAY: CPT

## 2018-08-12 PROCEDURE — 25000128 H RX IP 250 OP 636: Performed by: STUDENT IN AN ORGANIZED HEALTH CARE EDUCATION/TRAINING PROGRAM

## 2018-08-12 PROCEDURE — 80048 BASIC METABOLIC PNL TOTAL CA: CPT | Performed by: INTERNAL MEDICINE

## 2018-08-12 PROCEDURE — 85027 COMPLETE CBC AUTOMATED: CPT | Performed by: INTERNAL MEDICINE

## 2018-08-12 PROCEDURE — 25000125 ZZHC RX 250: Performed by: THORACIC SURGERY (CARDIOTHORACIC VASCULAR SURGERY)

## 2018-08-12 PROCEDURE — 82805 BLOOD GASES W/O2 SATURATION: CPT | Performed by: INTERNAL MEDICINE

## 2018-08-12 PROCEDURE — 84100 ASSAY OF PHOSPHORUS: CPT | Performed by: INTERNAL MEDICINE

## 2018-08-12 PROCEDURE — 40000076 ZZH STATISTIC IABP MONITORING

## 2018-08-12 PROCEDURE — 40000196 ZZH STATISTIC RAPCV CVP MONITORING

## 2018-08-12 PROCEDURE — 82805 BLOOD GASES W/O2 SATURATION: CPT | Performed by: STUDENT IN AN ORGANIZED HEALTH CARE EDUCATION/TRAINING PROGRAM

## 2018-08-12 PROCEDURE — 84132 ASSAY OF SERUM POTASSIUM: CPT | Performed by: STUDENT IN AN ORGANIZED HEALTH CARE EDUCATION/TRAINING PROGRAM

## 2018-08-12 PROCEDURE — 25000132 ZZH RX MED GY IP 250 OP 250 PS 637: Mod: GY | Performed by: INTERNAL MEDICINE

## 2018-08-12 PROCEDURE — P9016 RBC LEUKOCYTES REDUCED: HCPCS | Performed by: INTERNAL MEDICINE

## 2018-08-12 PROCEDURE — 25000128 H RX IP 250 OP 636: Performed by: THORACIC SURGERY (CARDIOTHORACIC VASCULAR SURGERY)

## 2018-08-12 PROCEDURE — 85520 HEPARIN ASSAY: CPT | Performed by: INTERNAL MEDICINE

## 2018-08-12 RX ORDER — QUETIAPINE FUMARATE 50 MG/1
50 TABLET, FILM COATED ORAL AT BEDTIME
Status: DISCONTINUED | OUTPATIENT
Start: 2018-08-12 | End: 2018-08-27

## 2018-08-12 RX ORDER — QUETIAPINE FUMARATE 25 MG/1
25 TABLET, FILM COATED ORAL EVERY MORNING
Status: DISCONTINUED | OUTPATIENT
Start: 2018-08-13 | End: 2018-08-20

## 2018-08-12 RX ORDER — ATORVASTATIN CALCIUM 80 MG/1
80 TABLET, FILM COATED ORAL EVERY EVENING
Status: DISCONTINUED | OUTPATIENT
Start: 2018-08-12 | End: 2018-08-24

## 2018-08-12 RX ORDER — ASPIRIN 81 MG/1
81 TABLET, CHEWABLE ORAL DAILY
Status: DISCONTINUED | OUTPATIENT
Start: 2018-08-12 | End: 2018-08-13

## 2018-08-12 RX ORDER — ASPIRIN 325 MG
325 TABLET ORAL DAILY
Status: DISCONTINUED | OUTPATIENT
Start: 2018-08-13 | End: 2018-08-12

## 2018-08-12 RX ORDER — ACETAMINOPHEN 325 MG/1
975 TABLET ORAL EVERY 8 HOURS
Status: COMPLETED | OUTPATIENT
Start: 2018-08-12 | End: 2018-08-13

## 2018-08-12 RX ADMIN — PIPERACILLIN SODIUM AND TAZOBACTAM SODIUM 3.38 G: 3; .375 INJECTION, POWDER, LYOPHILIZED, FOR SOLUTION INTRAVENOUS at 02:05

## 2018-08-12 RX ADMIN — EPINEPHRINE 0.03 MCG/KG/MIN: 1 INJECTION PARENTERAL at 09:31

## 2018-08-12 RX ADMIN — DOCUSATE SODIUM 100 MG: 50 LIQUID ORAL at 07:55

## 2018-08-12 RX ADMIN — CALCIUM GLUCONATE 1 G: 98 INJECTION, SOLUTION INTRAVENOUS at 17:15

## 2018-08-12 RX ADMIN — SENNOSIDES AND DOCUSATE SODIUM 2 TABLET: 8.6; 5 TABLET ORAL at 19:48

## 2018-08-12 RX ADMIN — PROPOFOL 35 MCG/KG/MIN: 10 INJECTION, EMULSION INTRAVENOUS at 04:35

## 2018-08-12 RX ADMIN — PROPOFOL 35 MCG/KG/MIN: 10 INJECTION, EMULSION INTRAVENOUS at 09:56

## 2018-08-12 RX ADMIN — CALCIUM GLUCONATE 2 G: 98 INJECTION, SOLUTION INTRAVENOUS at 10:10

## 2018-08-12 RX ADMIN — Medication 1000 MG: at 07:54

## 2018-08-12 RX ADMIN — LEVETIRACETAM 750 MG: 100 INJECTION, SOLUTION INTRAVENOUS at 06:02

## 2018-08-12 RX ADMIN — LEVETIRACETAM 750 MG: 100 INJECTION, SOLUTION INTRAVENOUS at 18:05

## 2018-08-12 RX ADMIN — ACETAMINOPHEN 975 MG: 325 TABLET, FILM COATED ORAL at 07:57

## 2018-08-12 RX ADMIN — VANCOMYCIN HYDROCHLORIDE 1500 MG: 10 INJECTION, POWDER, LYOPHILIZED, FOR SOLUTION INTRAVENOUS at 10:44

## 2018-08-12 RX ADMIN — DOBUTAMINE HYDROCHLORIDE 5 MCG/KG/MIN: 400 INJECTION INTRAVENOUS at 06:03

## 2018-08-12 RX ADMIN — QUETIAPINE 50 MG: 50 TABLET ORAL at 21:10

## 2018-08-12 RX ADMIN — ASPIRIN 325 MG ORAL TABLET 325 MG: 325 PILL ORAL at 07:57

## 2018-08-12 RX ADMIN — PROPOFOL 35 MCG/KG/MIN: 10 INJECTION, EMULSION INTRAVENOUS at 21:23

## 2018-08-12 RX ADMIN — SENNOSIDES AND DOCUSATE SODIUM 2 TABLET: 8.6; 5 TABLET ORAL at 07:57

## 2018-08-12 RX ADMIN — PIPERACILLIN SODIUM AND TAZOBACTAM SODIUM 3.38 G: 3; .375 INJECTION, POWDER, LYOPHILIZED, FOR SOLUTION INTRAVENOUS at 19:48

## 2018-08-12 RX ADMIN — ATORVASTATIN CALCIUM 80 MG: 80 TABLET, FILM COATED ORAL at 19:47

## 2018-08-12 RX ADMIN — ACETAMINOPHEN 975 MG: 325 TABLET, FILM COATED ORAL at 16:46

## 2018-08-12 RX ADMIN — ACETAMINOPHEN 975 MG: 325 TABLET, FILM COATED ORAL at 23:13

## 2018-08-12 RX ADMIN — DOCUSATE SODIUM 100 MG: 50 LIQUID ORAL at 19:47

## 2018-08-12 RX ADMIN — ACETAMINOPHEN 975 MG: 325 TABLET, FILM COATED ORAL at 00:22

## 2018-08-12 RX ADMIN — PIPERACILLIN SODIUM AND TAZOBACTAM SODIUM 3.38 G: 3; .375 INJECTION, POWDER, LYOPHILIZED, FOR SOLUTION INTRAVENOUS at 08:20

## 2018-08-12 RX ADMIN — PIPERACILLIN SODIUM AND TAZOBACTAM SODIUM 3.38 G: 3; .375 INJECTION, POWDER, LYOPHILIZED, FOR SOLUTION INTRAVENOUS at 14:15

## 2018-08-12 RX ADMIN — PANTOPRAZOLE SODIUM 40 MG: 40 INJECTION, POWDER, FOR SOLUTION INTRAVENOUS at 08:51

## 2018-08-12 RX ADMIN — POTASSIUM CHLORIDE 20 MEQ: 1.5 POWDER, FOR SOLUTION ORAL at 14:41

## 2018-08-12 RX ADMIN — CALCIUM GLUCONATE 1 G: 98 INJECTION, SOLUTION INTRAVENOUS at 21:22

## 2018-08-12 RX ADMIN — VANCOMYCIN HYDROCHLORIDE 1500 MG: 10 INJECTION, POWDER, LYOPHILIZED, FOR SOLUTION INTRAVENOUS at 23:06

## 2018-08-12 RX ADMIN — CANGRELOR 0.75 MCG/KG/MIN: 50 INJECTION, POWDER, LYOPHILIZED, FOR SOLUTION INTRAVENOUS at 13:21

## 2018-08-12 RX ADMIN — QUETIAPINE FUMARATE 25 MG: 25 TABLET, FILM COATED ORAL at 07:58

## 2018-08-12 RX ADMIN — Medication 1000 MG: at 21:15

## 2018-08-12 RX ADMIN — Medication 50 MCG/HR: at 04:33

## 2018-08-12 ASSESSMENT — ACTIVITIES OF DAILY LIVING (ADL)
ADLS_ACUITY_SCORE: 15
ADLS_ACUITY_SCORE: 13
ADLS_ACUITY_SCORE: 15
ADLS_ACUITY_SCORE: 13

## 2018-08-12 NOTE — PROGRESS NOTES
CV ICU PROGRESS NOTE  08/12/18  CO-MORBIDITIES:   Cardiogenic Shock   STEMI  S/p HUSSEIN to distal RCA & Proximal LAD  Infraseptal VSD  Myeloproliferative Syndrome  TIA  S/p Splenectomy due to trauma    ASSESSMENT: Castillo De Anda is a 68 year old male s/p HUSSEIN to RCA and LAD for STEMI on 8/4 found to have post infarction basal VSD. Underwent repair of posterior VSD and TVR with bioprosthetic valve on 8/10, and was kept with open chest due to coagulopathy and RV dysfunction.     TODAY'S PROGRESS:   - Continue with sedation for open chest  - 2g calcium gluconate for low ionized calcium  - Anticipated OR visit 8/13  - Wean Dobutamine as able from 4.5 --> 2.5 --> 0, Keep Epi for now  - Continue Vancomycin/Zosyn for open chest  - Starting cangrelor gtt for anticoagulation, ASA to 81mg   -Can discontinue cangrelor gtt 30-60min prior to OR for planned Chest closure in next 1-2 days  - Neurology will be consulted again once chest closed and neuro status can be evaluated off sedation    PLAN:  Neuro/ pain/ sedation:  #Post operative sedation  #Questionable seizure activity (temporal lobe seizures?)  - PRN Quetiapine 25mg for sleep, ativan PRN  - Prior to OR, there were suggestions of questionable seizure activity, neurology will resume EEG post chest closure  - Monitor neurological status. Notify the MD for any acute changes in exam.  - Propofol for sedation  - Fentanyl for analgesia  - Restart EEG post chest closure    Pulmonary care:  #Post operative mechanical ventilation   - Intubated/sedated for open chest  - No plans to wean or pressure support at this time    Cardiovascular:  #Post infarction 2.5cm infraseptal VSD  #s/p HUSSEIN to distal RCA and proximal LAD  #STEMI, cardiogenic shock  #Dilated RV   #Pulmonary HTN  #IABP  #s/p repair of posterior VSD and TVR with bioprosthetic valve   - Monitor hemodynamic status.   - Infusions: Dobutamine wean to off  - Consider diuresis with low dose of lasix  - ECHO from 8/8 prior to  surgery: Inferior wall akinesis with inferoseptal muscular VAD. Mild to moderate right ventricular dilation is present. Global right ventricular function is moderately reduced.  - ASA 81mg  - OR anticipated 8/13    Heme:  #Anticoagluation s/p Drug Eluting Stent placement  #Thrombocythemia  -Cangrelor gtt restarted 8/12, anticipate stopping 30-60min prior to OR  -Cangrelor gtt for now, will need to resume DAPT as soon as feasible  -Hydroxyurea 1g BID, hematology consulted and following    GI care:  #Transaminitis   - NPO at midnight    Fluids/ Electrolytes/ Nutrition:   - mIVF for IV fluid hydration  - Wiggins    Renal/ Fluid Balance:    #Acute Kidney injury  - Urine output stable overnight, Creatinine 1.34  - Will continue to monitor intake and output via wiggins    Endocrine:  #Stress hyperglycemia  - Insulin gtt    ID/ Antibiotics:  #Open chest  - No antibiotic needs currently  - Monitor fever curve, trend WBC  - Vancomycin/Zosyn for open chest    Prophylaxis:    - Mechanical prophylaxis for DVT.   - Cangrelor gtt started 8/12, can stop 30-60min prior to OR   - Protonix    Lines/ tubes/ drains:  - ETT, arterial line, PIV, CVC/[place], IABP    Disposition:  - CV ICU    Patient seen, findings and plan discussed with CV ICU staff, Dr. Porras.    Brad Hawkins MD  8/12/2018 8:05 AM  Anesthesia Resident  PGY4/CA-3      ====================================    SUBJECTIVE:   -Sedated for open chest, some episodes of bradycardia related to turns and movement        OBJECTIVE:   1. VITAL SIGNS:   Temp:  [97.9  F (36.6  C)-99.1  F (37.3  C)] 98.6  F (37  C)  Heart Rate:  [51-90] 73  Resp:  [14] 14  BP: (109)/(87) 109/87  MAP:  [47 mmHg-99 mmHg] 96 mmHg  Arterial Line BP: ()/(34-63) 130/58  FiO2 (%):  [40 %] 40 %  SpO2:  [92 %-100 %] 100 %  Ventilation Mode: CMV/AC  (Continuous Mandatory Ventilation/ Assist Control)  FiO2 (%): 40 %  Rate Set (breaths/minute): 14 breaths/min  Tidal Volume Set (mL): 550 mL  PEEP (cm H2O): 5  cmH2O  Oxygen Concentration (%): 40 %  Resp: 14    2. INTAKE/ OUTPUT:   I/O last 3 completed shifts:  In: 2686.38 [I.V.:2241.38; NG/GT:445]  Out: 1950 [Urine:920; Emesis/NG output:200; Chest Tube:830]    3. PHYSICAL EXAMINATION:   General: Intubated/Sedated  Neuro: Sedated  Resp: Mechanical ventilation, intubated, coarse breath sounds  CV: Paced rhythm, open chest  Abdomen: Soft, dressings in place  Incisions: Dressing over chest, chest tubes in place, open chest, IABP in place  Extremities: warm and well perfused    4. INVESTIGATIONS:   Arterial Blood Gases     Recent Labs  Lab 08/12/18 0338 08/11/18  1013 08/11/18  0357 08/10/18  2243   PH 7.43 7.45 7.41 7.39   PCO2 33* 34* 35 34*   PO2 114* 123* 174* 355*   HCO3 22 23 22 21     Complete Blood Count     Recent Labs  Lab 08/12/18 0338 08/11/18  1013 08/11/18  0432 08/10/18  2358 08/10/18  2243   WBC 12.4* 15.4* 21.6*  --  31.5*   HGB 7.8* 8.1* 8.3* 9.2* 9.7*   * 500* 532*  --  488*     Basic Metabolic Panel    Recent Labs  Lab 08/12/18 0338 08/11/18  1013 08/11/18  0432 08/10/18  2243    143 140 144   POTASSIUM 4.1 4.1 4.2 4.4   CHLORIDE 110* 111* 108 108   CO2 22 24 21 21   BUN 40* 48* 52* 50*   CR 1.34* 1.53* 1.58* 1.43*   * 84 174* 120*     Liver Function Tests    Recent Labs  Lab 08/12/18 0338 08/11/18  1013 08/11/18  0432 08/10/18  2243  08/10/18  0408  08/07/18  0403 08/06/18 1957   *  --   --   --   --  24  --  73* 86*   ALT 27  --   --   --   --  37  --  28 28   ALKPHOS 60  --   --   --   --  80  --  85 91   BILITOTAL 0.7  --   --   --   --  0.5  --  0.4 0.4   ALBUMIN 2.3*  --   --   --   --  2.7*  --  2.4* 2.4*   INR 1.51* 1.45* 1.46* 1.70*  < > 1.30*  < > 1.39*  --    < > = values in this interval not displayed.  Pancreatic Enzymes  No lab results found in last 7 days.  Coagulation Profile    Recent Labs  Lab 08/12/18  0338 08/11/18  1013 08/11/18  0432 08/10/18  2243 08/10/18  2132   INR 1.51* 1.45* 1.46* 1.70* 1.67*   PTT   --   --   --  46* 41*         5. RADIOLOGY:   Recent Results (from the past 24 hour(s))   XR Chest Port 1 View    Narrative    Exam:  XR CHEST PORT 1 VW, 8/12/2018 5:30 AM    History: tubes and line positions;     Comparison:  Chest radiograph 8/11/2018.    Findings:  Single AP view of the chest. Endotracheal tube tip projects  approximately 4 cm above the audelia. Right IJ approach Alma-Owen  catheter tip projects over the right pulmonary artery. Intra-aortic  balloon pump tip projects approximately 6 cm below the aortic arch,  previously 4 cm. Stable position of mediastinal drains and bilateral  chest tubes. Gastric tube sidehole projects over the stomach.    Cardiac silhouette is within normal limits. Small left pleural  effusion and adjacent basilar opacities are unchanged. No  pneumothorax. Slightly decreased perihilar and bilateral interstitial  opacities. Visualized upper abdomen and bones are unremarkable.      Impression    Impression:    1. Slight inferior migration of intra-aortic balloon pump. Otherwise  stable support devices.  2. Unchanged small left pleural effusion and adjacent basilar  atelectasis and/or consolidation.  3. Slightly decreased perihilar and interstitial opacities.    I have personally reviewed the examination and initial interpretation  and I agree with the findings.    ALBERTO PERSAUD MD

## 2018-08-12 NOTE — OP NOTE
Procedure Date: 08/10/2018      COSURGEON NOTE      OPERATING AREA:  Room 23      PREOPERATIVE DIAGNOSES:   1.  Ischemic ventricular septal defect.   2.  Coronary artery disease.   3.  Thyroid cancer.   4.  Polycythemia vera.   5.  Anemia.   6.  Deep venous thrombosis.   7.  Meningioma.   8.  Prostate cancer.      POSTOPERATIVE DIAGNOSES:   1.  Ischemic ventricular septal defect.   2.  Coronary artery disease.   3.  Thyroid cancer.   4.  Polycythemia vera.   5.  Anemia.   6.  Deep venous thrombosis.   7.  Meningioma.   8.  Prostate cancer.      PROCEDURES PERFORMED:   1.  Repair of posterior ventricular septal defect with a 5 x 15 Dacron patch.    2.  Tricuspid valve replacement with a 33 mm St. Kt Epic mitral valve.      SURGEON:  Jason Tanner MD      COSURGEON:  Dianne Plunkett MD.  A second attending surgeon was necessary, owing to the complexity of the case and the critical illness of the patient.      ANESTHESIA:  General endotracheal.      ESTIMATED BLOOD LOSS:  1000 mL.      FINDINGS AT OPERATION:  The patient's left ventricular ejection fraction was 55%.  He had a posterior/inferior ventricular septal defect.  The septal leaflet of the tricuspid valve occluded was included in the ventricular septal defect requiring valve replacement.  Total time on cardiopulmonary bypass was 216 minutes.  Aortic crossclamp time was 159 minutes.      BRIEF HISTORY:  Mr. De Anda is a 68-year-old gentleman who was admitted with an ischemic ventricular septal defect.  We were asked to perform a surgical repair.  The risks and benefits of the procedure were explained to the patient and his family.  They understand that potential complications include bleeding, infection, stroke, and even death.  Informed consent was obtained.      DESCRIPTION OF PROCEDURE:  The patient arrived in the operating room and was positioned supine.  General anesthesia was initiated by the anesthesia team.  Endotracheal intubation and central  venous access was obtained.  The patient's neck, chest, abdomen, and both groins were prepped and draped in the usual sterile fashion.  A preprocedure timeout was performed.  A median sternotomy was made without difficulty.  The bone was rendered hemostatic.  A Zapata retractor was inserted.  The patient was fully heparinized.  The pericardium was opened and tented up on pericardial sutures.  Pursestring sutures were placed in the ascending aorta and in the superior vena cava as well as at the cavoatrial junction with the inferior vena cava.  When the ACT was appropriate, cannulation was performed; bicaval venous cannulation was utilized.  A retrograde cardioplegic catheter was inserted into the coronary sinus and the AP window was developed.  A PA vent was inserted without difficulty.  Cardiopulmonary bypass was initiated and the aorta was cross-clamped.  1000 mL of antegrade cold blood cardioplegic was delivered with a good arrest.  Cold topical saline and slush was used intermittently during the period of aortic cross-clamp.        Attention was first turned to repairing the ventricular septal defect.  The apex of the heart was elevated.  A ventriculotomy was made to the left side of the right posterior descending coronary artery.  The VSD was located in the mid portion of the septum.  Pledgeted 3-0 Prolene sutures were placed circumferentially around the defect and a 5 x 15 woven Dacron patch was opened.  The stitches were passed through the patch.  One pledget was dropped in the right ventricle and not able to be retrieved.  The patch was tied down.  The ventriculotomy was closed with 3-0 Prolenes and buttressed with felt strips.  A right atriotomy was then made to find the dropped pledget.  The pledget appeared to be tacked down with another stitch.  The septal leaflet of the tricuspid valve had been included in the closure of the VSD and, therefore, it was necessary to replace the tricuspid valve.  The  annulus of the tricuspid valve was rimmed with 2-0 Ethibond pledgeted sutures.  A total of 18 were placed.  The annulus sized to a 33 mm St. Kt Epic mitral valve.  The valve was appropriately soaked and the annular sutures were passed through its sewing ring.  The valve was then lowered into position and the sutures were tied down.  The ventriculotomy was then closed in 2 layers using running 4-0 Prolene.  Throughout the period of aortic cross-clamp, approximately every 15-20 minutes, the patient received a bolus dose of cold blood cardioplegia in a retrograde fashion or slow continuous cold blood cardioplegia was delivered continuously.  The pleural spaces were suctioned dry, gentle ventilation was resumed, and deairing maneuvers were performed.  The aortic cross-clamp was released and the patient required no defibrillation.  Ventricular and atrial pacing wires were applied and the patient was AV sequentially paced at a rate of 90.  He was started on low-dose epinephrine.  The balloon pump was also resumed and it had been placed at standby prior to aortic cannulation.        The patient was finally weaned off cardiopulmonary bypass without difficulty.  All cannulation sites had their associated pursestrings tied down and were oversewn.  Hemostasis was satisfactory after the addition of protamine as well as additional blood products.  The sternal retractor was removed.  The mediastinum was drained with two 36-American Buckingham chest tubes and each pleural space was drained with a 32-American Buckingham chest tube.  The wound was rendered hemostatic and irrigated with warm antibiotic saline.  The patient was somewhat coagulopathic and there was some degree of right ventricular dysfunction.  It was decided to leave the chest open.  One Kerlix gauze was placed in the pericardial space and an Esmarch dressing was sutured to the skin edges.  Ioban was placed over this.  The sponge, needle, and instrument counts were reported as  correct.        The estimated blood loss was 1000 mL.        Mr. Soler was transferred to the CVICU in critical condition.         LAUREL WARREN MD             D: 2018   T: 2018   MT: MODESTO      Name:     JOSE SOLER   MRN:      [id number]        Account:        [id number]   :      1950           Procedure Date: 08/10/2018      Document: [id number]       cc: [facility] Surgery Billing

## 2018-08-12 NOTE — PROGRESS NOTES
HEMATOLOGY -- Follow Up Note     Chart / labs reviewed.  WBC 12.4, Hgb 7.8, Platelets 521,000.  Blood counts stable on Hydrea 1000 mg bid (increased to this dose 8/10).  Continue same dose Hydrea, follow CBC daily.  Will continue to follow.    Elvis Quezada MD  Associate Professor of Medicine  Division of Hematology, Oncology, and Transplantation  Director, [facility]

## 2018-08-12 NOTE — PHARMACY-VANCOMYCIN DOSING SERVICE
Pharmacy Vancomycin Initial Note  Date of Service 2018  Patient's  1950  68 year old, male    Indication: open chest prophylaxis      Current estimated CrCl = Estimated Creatinine Clearance: 59 mL/min (based on Cr of 1.34).    Creatinine for last 3 days  8/10/2018:  4:08 AM Creatinine 1.34 mg/dL; 10:43 PM Creatinine 1.43 mg/dL  2018:  4:32 AM Creatinine 1.58 mg/dL; 10:13 AM Creatinine 1.53 mg/dL  2018:  3:38 AM Creatinine 1.34 mg/dL    Recent Vancomycin Level(s) for last 3 days  No results found for requested labs within last 72 hours.      Vancomycin IV Administrations (past 72 hours)                   vancomycin (VANCOCIN) 1,500 mg in sodium chloride 0.9 % 250 mL intermittent infusion (mg) 1,500 mg New Bag 18 1044    vancomycin (VANCOCIN) 1000 mg in dextrose 5% 200 mL PREMIX (mg) 1,000 mg New Bag 18 1219     1,000 mg New Bag  0052                Nephrotoxins and other renal medications (Future)    Start     Dose/Rate Route Frequency Ordered Stop    18 1000  vancomycin (VANCOCIN) 1,500 mg in sodium chloride 0.9 % 250 mL intermittent infusion      1,500 mg  over 90 Minutes Intravenous EVERY 12 HOURS 18 0922      18 1330  piperacillin-tazobactam (ZOSYN) 3.375 g vial to attach to  mL bag      3.375 g  over 30 Minutes Intravenous EVERY 6 HOURS 18 1309            Contrast Orders - past 72 hours     None                Plan:  1.  Start vancomycin  1500 mg IV q12h.  Patient received 2 post-operative doses of vancomycin which completed yesterday.   2.  Goal Trough Level: 15-20 mg/L   3.  Pharmacy will check trough levels as appropriate in 1-3 Days.    4. Serum creatinine levels will be ordered daily for the first week of therapy and at least twice weekly for subsequent weeks.    5. [place] method utilized to dose vancomycin therapy: Method 2    Shasta Villasenor, Julianne, BCPS  Pager [phone]

## 2018-08-12 NOTE — PLAN OF CARE
Problem: Patient Care Overview  Goal: Plan of Care/Patient Progress Review  Outcome: No Change  D/I: Remains intubated, sedated w/ IABP,  on IV pressors x2 POD # 2 VSD repair and TVR w/ open chest.  SR w/ increasing episodes of labile HR   from 72-> 55,  w/ occas/ freq PVCs/ PACs. No episodes of tachypnea.  MD aware. Epi gtt @ 0.04 and Dobutamine gtt weaned slowly from 5 -> 2.5 mcg/kg/min maintaining MAP >65. No changes in Propofol/ Fentanyl sedation today. In am moved arms in response to vigorous stimuli, but did not follow commands. No movement of BLE noted. Less movement this pm. Hemodynamics stable: CVP 9-11; PAP 24/14; SVO2 55-59; Gunner CI 2.7; SVR 1000. UV adequate @ 3-=40 ml/h. Minimal CT output. Started on Cangrelar gtt. Wife here and updated.   P: continue to monitor hemodynamics and recurrent arrhythmias closely. Plan OR tomorrow for chest washout and possible closure.

## 2018-08-12 NOTE — PLAN OF CARE
Problem: Patient Care Overview  Goal: Plan of Care/Patient Progress Review  Outcome: No Change  Pt continues to be sedated on a propofol drip, grimaces with oral care and has a weak cough with suctioning. Continuous fentanyl IV infusing for pain management. SR 70s-80s, temporary pacemaker set to VVI at 50 BPM. MAPs ~70s-80s. IABP 1:1 with 100% augmentation, means 70s-80s, augmented 100s. Dobutamine infusing at 5 mcg/kg/min. Epinephrine infusing at 0.05 mcg/kg/min. CVP 12-12-10. PAPs 26-28/14. SAKSHI CI 2.2-2.8. SvO2 50s. SVR 900s-1100s. Afebrile. Current vent settings: CMV, FiO2 40%, RR 14, PEEP 5, . Pleural CT output 10-20 ml/hr (one-time output of 130 ml was due to recent turn). Mediastinal CT output 10-20 ml/hr. Insulin drip off all night d/t blood glucose in 70s-120s. Plan for washout tomorrow am.

## 2018-08-13 ENCOUNTER — ANESTHESIA EVENT (OUTPATIENT)
Dept: SURGERY | Facility: CLINIC | Age: 68
DRG: 219 | End: 2018-08-13
Payer: MEDICARE

## 2018-08-13 ENCOUNTER — APPOINTMENT (OUTPATIENT)
Dept: GENERAL RADIOLOGY | Facility: CLINIC | Age: 68
DRG: 219 | End: 2018-08-13
Attending: INTERNAL MEDICINE
Payer: MEDICARE

## 2018-08-13 ENCOUNTER — ANESTHESIA (OUTPATIENT)
Dept: SURGERY | Facility: CLINIC | Age: 68
DRG: 219 | End: 2018-08-13
Payer: MEDICARE

## 2018-08-13 LAB
ABO + RH BLD: NORMAL
ABO + RH BLD: NORMAL
ALBUMIN SERPL-MCNC: 1.9 G/DL (ref 3.4–5)
ALP SERPL-CCNC: 82 U/L (ref 40–150)
ALT SERPL W P-5'-P-CCNC: 21 U/L (ref 0–70)
ANION GAP SERPL CALCULATED.3IONS-SCNC: 11 MMOL/L (ref 3–14)
ANION GAP SERPL CALCULATED.3IONS-SCNC: 9 MMOL/L (ref 3–14)
AST SERPL W P-5'-P-CCNC: 68 U/L (ref 0–45)
BASE DEFICIT BLDA-SCNC: 2.2 MMOL/L
BASE DEFICIT BLDA-SCNC: 2.3 MMOL/L
BASE DEFICIT BLDA-SCNC: 2.8 MMOL/L
BASE DEFICIT BLDA-SCNC: 4.6 MMOL/L
BASE DEFICIT BLDA-SCNC: 5.1 MMOL/L
BASE DEFICIT BLDA-SCNC: 5.4 MMOL/L
BASE DEFICIT BLDV-SCNC: 1.4 MMOL/L
BASE DEFICIT BLDV-SCNC: 2.2 MMOL/L
BASE DEFICIT BLDV-SCNC: 2.6 MMOL/L
BASE DEFICIT BLDV-SCNC: 3.2 MMOL/L
BASE DEFICIT BLDV-SCNC: 4.3 MMOL/L
BASE DEFICIT BLDV-SCNC: 4.8 MMOL/L
BILIRUB SERPL-MCNC: 0.7 MG/DL (ref 0.2–1.3)
BLD GP AB SCN SERPL QL: NORMAL
BLD PROD TYP BPU: NORMAL
BLOOD BANK CMNT PATIENT-IMP: NORMAL
BUN SERPL-MCNC: 28 MG/DL (ref 7–30)
BUN SERPL-MCNC: 29 MG/DL (ref 7–30)
CA-I BLD-MCNC: 4.1 MG/DL (ref 4.4–5.2)
CA-I BLD-MCNC: 4.1 MG/DL (ref 4.4–5.2)
CA-I BLD-MCNC: 4.4 MG/DL (ref 4.4–5.2)
CA-I BLD-MCNC: 4.5 MG/DL (ref 4.4–5.2)
CA-I BLD-MCNC: 4.5 MG/DL (ref 4.4–5.2)
CA-I BLD-MCNC: 4.6 MG/DL (ref 4.4–5.2)
CA-I BLD-MCNC: 4.6 MG/DL (ref 4.4–5.2)
CALCIUM SERPL-MCNC: 7.4 MG/DL (ref 8.5–10.1)
CALCIUM SERPL-MCNC: 7.8 MG/DL (ref 8.5–10.1)
CHLORIDE SERPL-SCNC: 112 MMOL/L (ref 94–109)
CHLORIDE SERPL-SCNC: 113 MMOL/L (ref 94–109)
CO2 SERPL-SCNC: 21 MMOL/L (ref 20–32)
CO2 SERPL-SCNC: 21 MMOL/L (ref 20–32)
CREAT SERPL-MCNC: 0.93 MG/DL (ref 0.66–1.25)
CREAT SERPL-MCNC: 0.99 MG/DL (ref 0.66–1.25)
CRP SERPL-MCNC: 270 MG/L (ref 0–8)
ERYTHROCYTE [DISTWIDTH] IN BLOOD BY AUTOMATED COUNT: 18.3 % (ref 10–15)
ERYTHROCYTE [DISTWIDTH] IN BLOOD BY AUTOMATED COUNT: 18.5 % (ref 10–15)
GFR SERPL CREATININE-BSD FRML MDRD: 75 ML/MIN/1.7M2
GFR SERPL CREATININE-BSD FRML MDRD: 80 ML/MIN/1.7M2
GLUCOSE BLD-MCNC: 127 MG/DL (ref 70–99)
GLUCOSE BLDC GLUCOMTR-MCNC: 101 MG/DL (ref 70–99)
GLUCOSE BLDC GLUCOMTR-MCNC: 109 MG/DL (ref 70–99)
GLUCOSE BLDC GLUCOMTR-MCNC: 111 MG/DL (ref 70–99)
GLUCOSE BLDC GLUCOMTR-MCNC: 112 MG/DL (ref 70–99)
GLUCOSE BLDC GLUCOMTR-MCNC: 117 MG/DL (ref 70–99)
GLUCOSE BLDC GLUCOMTR-MCNC: 119 MG/DL (ref 70–99)
GLUCOSE BLDC GLUCOMTR-MCNC: 98 MG/DL (ref 70–99)
GLUCOSE SERPL-MCNC: 115 MG/DL (ref 70–99)
GLUCOSE SERPL-MCNC: 133 MG/DL (ref 70–99)
HCO3 BLD-SCNC: 20 MMOL/L (ref 21–28)
HCO3 BLD-SCNC: 20 MMOL/L (ref 21–28)
HCO3 BLD-SCNC: 21 MMOL/L (ref 21–28)
HCO3 BLD-SCNC: 21 MMOL/L (ref 21–28)
HCO3 BLD-SCNC: 22 MMOL/L (ref 21–28)
HCO3 BLD-SCNC: 22 MMOL/L (ref 21–28)
HCO3 BLDV-SCNC: 20 MMOL/L (ref 21–28)
HCO3 BLDV-SCNC: 22 MMOL/L (ref 21–28)
HCO3 BLDV-SCNC: 23 MMOL/L (ref 21–28)
HCO3 BLDV-SCNC: 23 MMOL/L (ref 21–28)
HCT VFR BLD AUTO: 25.7 % (ref 40–53)
HCT VFR BLD AUTO: 26.1 % (ref 40–53)
HGB BLD-MCNC: 8 G/DL (ref 13.3–17.7)
HGB BLD-MCNC: 8.3 G/DL (ref 13.3–17.7)
HGB BLD-MCNC: 8.4 G/DL (ref 13.3–17.7)
INR PPP: 1.34 (ref 0.86–1.14)
INR PPP: 1.44 (ref 0.86–1.14)
INTERPRETATION ECG - MUSE: NORMAL
KCT BLD-ACNC: 160 SEC (ref 75–150)
MAGNESIUM SERPL-MCNC: 2.5 MG/DL (ref 1.6–2.3)
MAGNESIUM SERPL-MCNC: 2.9 MG/DL (ref 1.6–2.3)
MCH RBC QN AUTO: 29.5 PG (ref 26.5–33)
MCH RBC QN AUTO: 29.6 PG (ref 26.5–33)
MCHC RBC AUTO-ENTMCNC: 32.2 G/DL (ref 31.5–36.5)
MCHC RBC AUTO-ENTMCNC: 32.3 G/DL (ref 31.5–36.5)
MCV RBC AUTO: 92 FL (ref 78–100)
MCV RBC AUTO: 92 FL (ref 78–100)
NUM BPU REQUESTED: 5
O2/TOTAL GAS SETTING VFR VENT: 40 %
O2/TOTAL GAS SETTING VFR VENT: 57 %
OXYHGB MFR BLD: 96 % (ref 92–100)
OXYHGB MFR BLD: 96 % (ref 92–100)
OXYHGB MFR BLD: 97 % (ref 92–100)
OXYHGB MFR BLDV: 50 %
OXYHGB MFR BLDV: 53 %
OXYHGB MFR BLDV: 53 %
OXYHGB MFR BLDV: 55 %
OXYHGB MFR BLDV: 59 %
OXYHGB MFR BLDV: 62 %
PCO2 BLD: 31 MM HG (ref 35–45)
PCO2 BLD: 33 MM HG (ref 35–45)
PCO2 BLD: 38 MM HG (ref 35–45)
PCO2 BLD: 43 MM HG (ref 35–45)
PCO2 BLDV: 34 MM HG (ref 40–50)
PCO2 BLDV: 35 MM HG (ref 40–50)
PCO2 BLDV: 36 MM HG (ref 40–50)
PCO2 BLDV: 37 MM HG (ref 40–50)
PCO2 BLDV: 39 MM HG (ref 40–50)
PCO2 BLDV: 43 MM HG (ref 40–50)
PH BLD: 7.3 PH (ref 7.35–7.45)
PH BLD: 7.33 PH (ref 7.35–7.45)
PH BLD: 7.39 PH (ref 7.35–7.45)
PH BLD: 7.43 PH (ref 7.35–7.45)
PH BLD: 7.43 PH (ref 7.35–7.45)
PH BLD: 7.44 PH (ref 7.35–7.45)
PH BLDV: 7.31 PH (ref 7.32–7.43)
PH BLDV: 7.37 PH (ref 7.32–7.43)
PH BLDV: 7.38 PH (ref 7.32–7.43)
PH BLDV: 7.39 PH (ref 7.32–7.43)
PH BLDV: 7.39 PH (ref 7.32–7.43)
PH BLDV: 7.41 PH (ref 7.32–7.43)
PHOSPHATE SERPL-MCNC: 3.1 MG/DL (ref 2.5–4.5)
PHOSPHATE SERPL-MCNC: 4.2 MG/DL (ref 2.5–4.5)
PLATELET # BLD AUTO: 432 10E9/L (ref 150–450)
PLATELET # BLD AUTO: 495 10E9/L (ref 150–450)
PO2 BLD: 101 MM HG (ref 80–105)
PO2 BLD: 125 MM HG (ref 80–105)
PO2 BLD: 125 MM HG (ref 80–105)
PO2 BLD: 138 MM HG (ref 80–105)
PO2 BLD: 148 MM HG (ref 80–105)
PO2 BLD: 154 MM HG (ref 80–105)
PO2 BLDV: 29 MM HG (ref 25–47)
PO2 BLDV: 32 MM HG (ref 25–47)
PO2 BLDV: 33 MM HG (ref 25–47)
PO2 BLDV: 34 MM HG (ref 25–47)
PO2 BLDV: 36 MM HG (ref 25–47)
PO2 BLDV: 37 MM HG (ref 25–47)
POTASSIUM BLD-SCNC: 4.1 MMOL/L (ref 3.4–5.3)
POTASSIUM BLD-SCNC: 4.6 MMOL/L (ref 3.4–5.3)
POTASSIUM SERPL-SCNC: 4 MMOL/L (ref 3.4–5.3)
POTASSIUM SERPL-SCNC: 4.1 MMOL/L (ref 3.4–5.3)
PROT SERPL-MCNC: 4.9 G/DL (ref 6.8–8.8)
RBC # BLD AUTO: 2.81 10E12/L (ref 4.4–5.9)
RBC # BLD AUTO: 2.84 10E12/L (ref 4.4–5.9)
SODIUM BLD-SCNC: 140 MMOL/L (ref 133–144)
SODIUM SERPL-SCNC: 143 MMOL/L (ref 133–144)
SODIUM SERPL-SCNC: 144 MMOL/L (ref 133–144)
SPECIMEN EXP DATE BLD: NORMAL
TRIGL SERPL-MCNC: 158 MG/DL
TRIGL SERPL-MCNC: 161 MG/DL
VANCOMYCIN SERPL-MCNC: 19 MG/L
WBC # BLD AUTO: 11.7 10E9/L (ref 4–11)
WBC # BLD AUTO: 11.9 10E9/L (ref 4–11)

## 2018-08-13 PROCEDURE — 25000128 H RX IP 250 OP 636: Performed by: NURSE ANESTHETIST, CERTIFIED REGISTERED

## 2018-08-13 PROCEDURE — 82805 BLOOD GASES W/O2 SATURATION: CPT | Performed by: INTERNAL MEDICINE

## 2018-08-13 PROCEDURE — 25000132 ZZH RX MED GY IP 250 OP 250 PS 637: Mod: GY | Performed by: THORACIC SURGERY (CARDIOTHORACIC VASCULAR SURGERY)

## 2018-08-13 PROCEDURE — A9270 NON-COVERED ITEM OR SERVICE: HCPCS | Mod: GY | Performed by: STUDENT IN AN ORGANIZED HEALTH CARE EDUCATION/TRAINING PROGRAM

## 2018-08-13 PROCEDURE — 25000128 H RX IP 250 OP 636: Performed by: THORACIC SURGERY (CARDIOTHORACIC VASCULAR SURGERY)

## 2018-08-13 PROCEDURE — 80053 COMPREHEN METABOLIC PANEL: CPT | Performed by: INTERNAL MEDICINE

## 2018-08-13 PROCEDURE — P9016 RBC LEUKOCYTES REDUCED: HCPCS | Performed by: INTERNAL MEDICINE

## 2018-08-13 PROCEDURE — 84478 ASSAY OF TRIGLYCERIDES: CPT | Performed by: INTERNAL MEDICINE

## 2018-08-13 PROCEDURE — 25000132 ZZH RX MED GY IP 250 OP 250 PS 637: Mod: GY | Performed by: STUDENT IN AN ORGANIZED HEALTH CARE EDUCATION/TRAINING PROGRAM

## 2018-08-13 PROCEDURE — 00000146 ZZHCL STATISTIC GLUCOSE BY METER IP

## 2018-08-13 PROCEDURE — 84132 ASSAY OF SERUM POTASSIUM: CPT | Performed by: THORACIC SURGERY (CARDIOTHORACIC VASCULAR SURGERY)

## 2018-08-13 PROCEDURE — 86140 C-REACTIVE PROTEIN: CPT | Performed by: INTERNAL MEDICINE

## 2018-08-13 PROCEDURE — C9460 INJECTION, CANGRELOR: HCPCS | Performed by: INTERNAL MEDICINE

## 2018-08-13 PROCEDURE — 85049 AUTOMATED PLATELET COUNT: CPT | Performed by: HOSPITALIST

## 2018-08-13 PROCEDURE — A9270 NON-COVERED ITEM OR SERVICE: HCPCS | Mod: GY | Performed by: THORACIC SURGERY (CARDIOTHORACIC VASCULAR SURGERY)

## 2018-08-13 PROCEDURE — 40000014 ZZH STATISTIC ARTERIAL MONITORING DAILY

## 2018-08-13 PROCEDURE — 71045 X-RAY EXAM CHEST 1 VIEW: CPT

## 2018-08-13 PROCEDURE — 27210437 ZZH NUTRITION PRODUCT SEMIELEM INTERMED LITER

## 2018-08-13 PROCEDURE — 36000057 ZZH SURGERY LEVEL 3 1ST 30 MIN - UMMC: Performed by: THORACIC SURGERY (CARDIOTHORACIC VASCULAR SURGERY)

## 2018-08-13 PROCEDURE — 84132 ASSAY OF SERUM POTASSIUM: CPT | Performed by: INTERNAL MEDICINE

## 2018-08-13 PROCEDURE — 94645 CONT INHLJ TX EACH ADDL HOUR: CPT

## 2018-08-13 PROCEDURE — 37000008 ZZH ANESTHESIA TECHNICAL FEE, 1ST 30 MIN: Performed by: THORACIC SURGERY (CARDIOTHORACIC VASCULAR SURGERY)

## 2018-08-13 PROCEDURE — 37000009 ZZH ANESTHESIA TECHNICAL FEE, EACH ADDTL 15 MIN: Performed by: THORACIC SURGERY (CARDIOTHORACIC VASCULAR SURGERY)

## 2018-08-13 PROCEDURE — 25000128 H RX IP 250 OP 636: Performed by: SURGERY

## 2018-08-13 PROCEDURE — 82947 ASSAY GLUCOSE BLOOD QUANT: CPT | Performed by: ANESTHESIOLOGY

## 2018-08-13 PROCEDURE — 27210794 ZZH OR GENERAL SUPPLY STERILE: Performed by: THORACIC SURGERY (CARDIOTHORACIC VASCULAR SURGERY)

## 2018-08-13 PROCEDURE — 71045 X-RAY EXAM CHEST 1 VIEW: CPT | Mod: 77

## 2018-08-13 PROCEDURE — 85610 PROTHROMBIN TIME: CPT | Performed by: INTERNAL MEDICINE

## 2018-08-13 PROCEDURE — A9270 NON-COVERED ITEM OR SERVICE: HCPCS | Mod: GY | Performed by: INTERNAL MEDICINE

## 2018-08-13 PROCEDURE — 86850 RBC ANTIBODY SCREEN: CPT | Performed by: STUDENT IN AN ORGANIZED HEALTH CARE EDUCATION/TRAINING PROGRAM

## 2018-08-13 PROCEDURE — 86900 BLOOD TYPING SEROLOGIC ABO: CPT | Performed by: STUDENT IN AN ORGANIZED HEALTH CARE EDUCATION/TRAINING PROGRAM

## 2018-08-13 PROCEDURE — 85027 COMPLETE CBC AUTOMATED: CPT | Performed by: INTERNAL MEDICINE

## 2018-08-13 PROCEDURE — 40000986 XR ABDOMEN PORT 1 VW

## 2018-08-13 PROCEDURE — 36000059 ZZH SURGERY LEVEL 3 EA 15 ADDTL MIN UMMC: Performed by: THORACIC SURGERY (CARDIOTHORACIC VASCULAR SURGERY)

## 2018-08-13 PROCEDURE — 94003 VENT MGMT INPAT SUBQ DAY: CPT

## 2018-08-13 PROCEDURE — 40000275 ZZH STATISTIC RCP TIME EA 10 MIN

## 2018-08-13 PROCEDURE — 27210995 ZZH RX 272: Performed by: THORACIC SURGERY (CARDIOTHORACIC VASCULAR SURGERY)

## 2018-08-13 PROCEDURE — 86901 BLOOD TYPING SEROLOGIC RH(D): CPT | Performed by: STUDENT IN AN ORGANIZED HEALTH CARE EDUCATION/TRAINING PROGRAM

## 2018-08-13 PROCEDURE — 44500 INTRO GASTROINTESTINAL TUBE: CPT | Performed by: DIETITIAN, REGISTERED

## 2018-08-13 PROCEDURE — 40000281 ZZH STATISTIC TRANSPORT TIME EA 15 MIN

## 2018-08-13 PROCEDURE — 82330 ASSAY OF CALCIUM: CPT | Performed by: THORACIC SURGERY (CARDIOTHORACIC VASCULAR SURGERY)

## 2018-08-13 PROCEDURE — 82805 BLOOD GASES W/O2 SATURATION: CPT | Performed by: THORACIC SURGERY (CARDIOTHORACIC VASCULAR SURGERY)

## 2018-08-13 PROCEDURE — 25000132 ZZH RX MED GY IP 250 OP 250 PS 637: Mod: GY | Performed by: INTERNAL MEDICINE

## 2018-08-13 PROCEDURE — 84100 ASSAY OF PHOSPHORUS: CPT | Performed by: INTERNAL MEDICINE

## 2018-08-13 PROCEDURE — 82803 BLOOD GASES ANY COMBINATION: CPT | Performed by: ANESTHESIOLOGY

## 2018-08-13 PROCEDURE — 84295 ASSAY OF SERUM SODIUM: CPT | Performed by: ANESTHESIOLOGY

## 2018-08-13 PROCEDURE — 84132 ASSAY OF SERUM POTASSIUM: CPT | Performed by: ANESTHESIOLOGY

## 2018-08-13 PROCEDURE — 25000128 H RX IP 250 OP 636: Performed by: STUDENT IN AN ORGANIZED HEALTH CARE EDUCATION/TRAINING PROGRAM

## 2018-08-13 PROCEDURE — 25000125 ZZHC RX 250: Performed by: NURSE ANESTHETIST, CERTIFIED REGISTERED

## 2018-08-13 PROCEDURE — 40000008 ZZH STATISTIC AIRWAY CARE

## 2018-08-13 PROCEDURE — 80202 ASSAY OF VANCOMYCIN: CPT | Performed by: INTERNAL MEDICINE

## 2018-08-13 PROCEDURE — 82330 ASSAY OF CALCIUM: CPT | Performed by: INTERNAL MEDICINE

## 2018-08-13 PROCEDURE — 20000004 ZZH R&B ICU UMMC

## 2018-08-13 PROCEDURE — 80048 BASIC METABOLIC PNL TOTAL CA: CPT | Performed by: INTERNAL MEDICINE

## 2018-08-13 PROCEDURE — P9041 ALBUMIN (HUMAN),5%, 50ML: HCPCS | Performed by: STUDENT IN AN ORGANIZED HEALTH CARE EDUCATION/TRAINING PROGRAM

## 2018-08-13 PROCEDURE — 85347 COAGULATION TIME ACTIVATED: CPT

## 2018-08-13 PROCEDURE — 82330 ASSAY OF CALCIUM: CPT | Performed by: ANESTHESIOLOGY

## 2018-08-13 PROCEDURE — 25000125 ZZHC RX 250: Performed by: THORACIC SURGERY (CARDIOTHORACIC VASCULAR SURGERY)

## 2018-08-13 PROCEDURE — 25000565 ZZH ISOFLURANE, EA 15 MIN: Performed by: THORACIC SURGERY (CARDIOTHORACIC VASCULAR SURGERY)

## 2018-08-13 PROCEDURE — 3E1Y38Z IRRIGATION OF PERICARDIAL CAVITY USING IRRIGATING SUBSTANCE, PERCUTANEOUS APPROACH: ICD-10-PCS | Performed by: THORACIC SURGERY (CARDIOTHORACIC VASCULAR SURGERY)

## 2018-08-13 PROCEDURE — 83735 ASSAY OF MAGNESIUM: CPT | Performed by: INTERNAL MEDICINE

## 2018-08-13 PROCEDURE — 99291 CRITICAL CARE FIRST HOUR: CPT | Mod: GC | Performed by: ANESTHESIOLOGY

## 2018-08-13 PROCEDURE — 25000128 H RX IP 250 OP 636: Performed by: INTERNAL MEDICINE

## 2018-08-13 RX ORDER — ALBUMIN, HUMAN INJ 5% 5 %
SOLUTION INTRAVENOUS
Status: DISCONTINUED
Start: 2018-08-13 | End: 2018-08-13 | Stop reason: HOSPADM

## 2018-08-13 RX ORDER — POLYETHYLENE GLYCOL 3350 17 G/17G
17 POWDER, FOR SOLUTION ORAL DAILY
Status: DISCONTINUED | OUTPATIENT
Start: 2018-08-13 | End: 2018-08-31

## 2018-08-13 RX ORDER — AMOXICILLIN 250 MG
1 CAPSULE ORAL 2 TIMES DAILY
Status: DISCONTINUED | OUTPATIENT
Start: 2018-08-13 | End: 2018-09-11 | Stop reason: HOSPADM

## 2018-08-13 RX ORDER — FENTANYL CITRATE 50 UG/ML
INJECTION, SOLUTION INTRAMUSCULAR; INTRAVENOUS PRN
Status: DISCONTINUED | OUTPATIENT
Start: 2018-08-13 | End: 2018-08-13

## 2018-08-13 RX ORDER — ACETAMINOPHEN 325 MG/1
650 TABLET ORAL EVERY 4 HOURS PRN
Status: DISCONTINUED | OUTPATIENT
Start: 2018-08-13 | End: 2018-09-11 | Stop reason: HOSPADM

## 2018-08-13 RX ORDER — METHOCARBAMOL 500 MG/1
500 TABLET, FILM COATED ORAL 4 TIMES DAILY PRN
Status: DISCONTINUED | OUTPATIENT
Start: 2018-08-13 | End: 2018-09-11 | Stop reason: HOSPADM

## 2018-08-13 RX ORDER — OXYCODONE HYDROCHLORIDE 5 MG/1
5-10 TABLET ORAL EVERY 4 HOURS PRN
Status: DISCONTINUED | OUTPATIENT
Start: 2018-08-13 | End: 2018-09-11 | Stop reason: HOSPADM

## 2018-08-13 RX ORDER — QUETIAPINE FUMARATE 25 MG/1
25 TABLET, FILM COATED ORAL 2 TIMES DAILY PRN
Status: DISCONTINUED | OUTPATIENT
Start: 2018-08-13 | End: 2018-08-30

## 2018-08-13 RX ORDER — AMOXICILLIN 250 MG
2 CAPSULE ORAL 2 TIMES DAILY
Status: DISCONTINUED | OUTPATIENT
Start: 2018-08-13 | End: 2018-09-11 | Stop reason: HOSPADM

## 2018-08-13 RX ORDER — ASPIRIN 81 MG/1
81 TABLET, CHEWABLE ORAL DAILY
Status: DISCONTINUED | OUTPATIENT
Start: 2018-08-14 | End: 2018-08-26

## 2018-08-13 RX ORDER — ALBUMIN, HUMAN INJ 5% 5 %
12.5 SOLUTION INTRAVENOUS ONCE
Status: COMPLETED | OUTPATIENT
Start: 2018-08-13 | End: 2018-08-13

## 2018-08-13 RX ORDER — SODIUM CHLORIDE 9 MG/ML
INJECTION, SOLUTION INTRAVENOUS CONTINUOUS PRN
Status: DISCONTINUED | OUTPATIENT
Start: 2018-08-13 | End: 2018-08-13

## 2018-08-13 RX ADMIN — VANCOMYCIN HYDROCHLORIDE 1500 MG: 10 INJECTION, POWDER, LYOPHILIZED, FOR SOLUTION INTRAVENOUS at 22:31

## 2018-08-13 RX ADMIN — PROPOFOL 35 MCG/KG/MIN: 10 INJECTION, EMULSION INTRAVENOUS at 16:41

## 2018-08-13 RX ADMIN — PIPERACILLIN SODIUM AND TAZOBACTAM SODIUM 3.38 G: 3; .375 INJECTION, POWDER, LYOPHILIZED, FOR SOLUTION INTRAVENOUS at 02:14

## 2018-08-13 RX ADMIN — CANGRELOR 0.75 MCG/KG/MIN: 50 INJECTION, POWDER, LYOPHILIZED, FOR SOLUTION INTRAVENOUS at 02:16

## 2018-08-13 RX ADMIN — ALBUMIN HUMAN 12.5 G: 0.05 INJECTION, SOLUTION INTRAVENOUS at 16:30

## 2018-08-13 RX ADMIN — EPINEPHRINE 0.05 MCG/KG/MIN: 1 INJECTION PARENTERAL at 08:12

## 2018-08-13 RX ADMIN — PROPOFOL 35 MCG/KG/MIN: 10 INJECTION, EMULSION INTRAVENOUS at 09:23

## 2018-08-13 RX ADMIN — PROPOFOL 35 MCG/KG/MIN: 10 INJECTION, EMULSION INTRAVENOUS at 05:13

## 2018-08-13 RX ADMIN — Medication 50 MCG/HR: at 09:08

## 2018-08-13 RX ADMIN — POTASSIUM CHLORIDE 20 MEQ: 1.5 POWDER, FOR SOLUTION ORAL at 08:24

## 2018-08-13 RX ADMIN — ROCURONIUM BROMIDE 50 MG: 10 INJECTION INTRAVENOUS at 13:13

## 2018-08-13 RX ADMIN — SODIUM CHLORIDE: 9 INJECTION, SOLUTION INTRAVENOUS at 12:30

## 2018-08-13 RX ADMIN — DOCUSATE SODIUM 100 MG: 50 LIQUID ORAL at 20:25

## 2018-08-13 RX ADMIN — ROCURONIUM BROMIDE 50 MG: 10 INJECTION INTRAVENOUS at 12:30

## 2018-08-13 RX ADMIN — SENNOSIDES AND DOCUSATE SODIUM 2 TABLET: 8.6; 5 TABLET ORAL at 08:18

## 2018-08-13 RX ADMIN — CANGRELOR 0.75 MCG/KG/MIN: 50 INJECTION, POWDER, LYOPHILIZED, FOR SOLUTION INTRAVENOUS at 10:20

## 2018-08-13 RX ADMIN — PIPERACILLIN SODIUM AND TAZOBACTAM SODIUM 3.38 G: 3; .375 INJECTION, POWDER, LYOPHILIZED, FOR SOLUTION INTRAVENOUS at 08:17

## 2018-08-13 RX ADMIN — ATORVASTATIN CALCIUM 80 MG: 80 TABLET, FILM COATED ORAL at 20:25

## 2018-08-13 RX ADMIN — MULTIVITAMIN 15 ML: LIQUID ORAL at 18:40

## 2018-08-13 RX ADMIN — CALCIUM GLUCONATE 1 G: 98 INJECTION, SOLUTION INTRAVENOUS at 21:13

## 2018-08-13 RX ADMIN — NOREPINEPHRINE BITARTRATE 6.4 MCG: 1 INJECTION INTRAVENOUS at 13:34

## 2018-08-13 RX ADMIN — QUETIAPINE 50 MG: 50 TABLET ORAL at 21:44

## 2018-08-13 RX ADMIN — PIPERACILLIN SODIUM AND TAZOBACTAM SODIUM 3.38 G: 3; .375 INJECTION, POWDER, LYOPHILIZED, FOR SOLUTION INTRAVENOUS at 20:26

## 2018-08-13 RX ADMIN — FENTANYL CITRATE 50 MCG: 50 INJECTION, SOLUTION INTRAMUSCULAR; INTRAVENOUS at 14:15

## 2018-08-13 RX ADMIN — PANTOPRAZOLE SODIUM 40 MG: 40 INJECTION, POWDER, FOR SOLUTION INTRAVENOUS at 08:17

## 2018-08-13 RX ADMIN — VANCOMYCIN HYDROCHLORIDE 1500 MG: 10 INJECTION, POWDER, LYOPHILIZED, FOR SOLUTION INTRAVENOUS at 10:36

## 2018-08-13 RX ADMIN — Medication 1000 MG: at 09:26

## 2018-08-13 RX ADMIN — ACETAMINOPHEN 975 MG: 325 TABLET, FILM COATED ORAL at 08:18

## 2018-08-13 RX ADMIN — MIDAZOLAM 2 MG: 1 INJECTION INTRAMUSCULAR; INTRAVENOUS at 13:27

## 2018-08-13 RX ADMIN — ASPIRIN 81 MG CHEWABLE TABLET 81 MG: 81 TABLET CHEWABLE at 08:18

## 2018-08-13 RX ADMIN — Medication 1000 MG: at 20:31

## 2018-08-13 RX ADMIN — QUETIAPINE FUMARATE 25 MG: 25 TABLET, FILM COATED ORAL at 08:18

## 2018-08-13 RX ADMIN — LEVETIRACETAM 750 MG: 100 INJECTION, SOLUTION INTRAVENOUS at 18:03

## 2018-08-13 RX ADMIN — PROPOFOL 35 MCG/KG/MIN: 10 INJECTION, EMULSION INTRAVENOUS at 23:13

## 2018-08-13 RX ADMIN — DOCUSATE SODIUM 100 MG: 50 LIQUID ORAL at 08:18

## 2018-08-13 RX ADMIN — SENNOSIDES AND DOCUSATE SODIUM 1 TABLET: 8.6; 5 TABLET ORAL at 20:26

## 2018-08-13 RX ADMIN — LEVETIRACETAM 750 MG: 100 INJECTION, SOLUTION INTRAVENOUS at 06:09

## 2018-08-13 RX ADMIN — POLYETHYLENE GLYCOL 3350 17 G: 17 POWDER, FOR SOLUTION ORAL at 10:24

## 2018-08-13 ASSESSMENT — ACTIVITIES OF DAILY LIVING (ADL)
ADLS_ACUITY_SCORE: 15

## 2018-08-13 NOTE — PHARMACY-VANCOMYCIN DOSING SERVICE
Pharmacy Vancomycin Note  Date of Service 2018  Patient's  1950   68 year old, male    Indication: Open chest prophylaxis  Goal Trough Level: 15-20 mg/L  Day of Therapy: 3  Current Vancomycin regimen:  1500 mg IV q12h    Current estimated CrCl = Estimated Creatinine Clearance: 80.4 mL/min (based on Cr of 0.99).    Creatinine for last 3 days  8/10/2018: 10:43 PM Creatinine 1.43 mg/dL  2018:  4:32 AM Creatinine 1.58 mg/dL; 10:13 AM Creatinine 1.53 mg/dL  2018:  3:38 AM Creatinine 1.34 mg/dL;  4:13 PM Creatinine 1.15 mg/dL  2018:  4:33 AM Creatinine 0.99 mg/dL    Recent Vancomycin Levels (past 3 days)  2018:  9:19 AM Vancomycin Level 19.0 mg/L    Vancomycin IV Administrations (past 72 hours)                   vancomycin (VANCOCIN) 1,500 mg in sodium chloride 0.9 % 250 mL intermittent infusion (mg) 1,500 mg New Bag 18 1036     1,500 mg New Bag 18 2306     1,500 mg New Bag  1044                Nephrotoxins and other renal medications (Future)    Start     Dose/Rate Route Frequency Ordered Stop    18 1000  vancomycin (VANCOCIN) 1,500 mg in sodium chloride 0.9 % 250 mL intermittent infusion      1,500 mg  over 90 Minutes Intravenous EVERY 12 HOURS 18 0922      18 1330  piperacillin-tazobactam (ZOSYN) 3.375 g vial to attach to  mL bag      3.375 g  over 30 Minutes Intravenous EVERY 6 HOURS 18 1309               Contrast Orders - past 72 hours     None          Interpretation of levels and current regimen:  Trough level is  Therapeutic    Has serum creatinine changed > 50% in last 72 hours: Yes (improving)    Urine output:  good urine output    Renal Function: Improving    Plan:  1.  Continue Current Dose  2.  Pharmacy will check trough levels as appropriate in 1-3 Days.    3. Serum creatinine levels will be ordered daily for the first week of therapy and at least twice weekly for subsequent weeks.      Ysabel Fowler, PharmD  Pager [phone]        .

## 2018-08-13 NOTE — ANESTHESIA PREPROCEDURE EVALUATION
Anesthesia Evaluation              Anesthesia Plan      History & Physical Review      ASA Status:  4 .    NPO Status:  > 6 hours    Plan for General and ETT with Intravenous induction. Maintenance will be Inhalation.      Additional equipment: 2nd IV, Arterial Line, Central Line, PA Catheter and ELISABETH      Postoperative Care  Postoperative pain management:  Multi-modal analgesia.      Consents  Anesthetic plan, risks, benefits and alternatives discussed with:  Patient.  Use of blood products discussed: Yes.   Consented to blood products.  .

## 2018-08-13 NOTE — PLAN OF CARE
Problem: Patient Care Overview  Goal: Plan of Care/Patient Progress Review  Outcome: Therapy, progress toward functional goals is gradual    CNS: Patient is sedated on propofol and fentanyl drips.   CV: Washout and chest closure done today.Temporary epicardial wires-pacer set at 50. Goal MAP>60. Epinephrine drip titrated for MAP>60. Dobutamine to stay at 2.5 mcg/kg/min.   Resp: CMV-rate 14, , Peep 5, Fio2 40. Oxygen sats>92%.  GI: NJ in place. TF started at 1830 at 10 ml/hr; advance by 10 ml q 8 hr-Goal rate is 60 ml/hr. Free water flush 30 ml q 4 hr. NG to LIWS.  : Urine output - about 30 ml/hr. Dr. Moore was notified that patient does not consistently have urine output 30 ml or more every hour. Dr. Hawkins said to continue to monitor for now.

## 2018-08-13 NOTE — PROGRESS NOTES
CLINICAL NUTRITION SERVICES - REASSESSMENT NOTE     Nutrition Prescription    RECOMMENDATIONS FOR MDs/PROVIDERS TO ORDER:  If desire post-pyloric FT, enter order set for post-pyloric bedside FT placement via Cortrak by RD (once chest is closed) vs XR Feeding Tube Placement for placement under fluoro (if chest remains open)    Malnutrition Status:    Severe malnutrition in the context of acute illness    Recommendations already ordered by Registered Dietitian (RD):  None today.    Addendum: received consult for TF assess and order - ordered below TF regimen, FT placed at bedside.    Future/Additional Recommendations:  1. Once begin TF, begin Impact Peptide @ 10 mL/hr and advance 10 mL q8h (or per MD pending enteral access/hemodynamic stability) to goal 60 ml/hr (1440 ml/day) to provide 2160 kcals (28 kcal/kg/day), 135 g PRO (1.8 g/kg/day), 1109 ml free H2O, 92 g Fat (50% from MCTs), 202 g CHO and no Fiber daily.    -Order free water flushes 30 mL q4h for patency.    -Order Certavite to ensure adequate micronutrients in case of slow TF adv, EN interruptions, hypermetabolic needs.                          -Obtain baseline and weekly CRP while on Impact Peptide to assess ongoing approp of immune-modulating TF therapy vs ability to change to standard formula.                          -Ensure K+, Mg++, phos ordered daily while nutrition support advancing. Do not adv if K+, Mg++ < nrml or phos <2. Replace lytes per protocol.    2. If remains intubated and once TF at goal rate, recommend obtain metabolic cart study to assess approp of energy provisions to avoid over/under feeding.    3. Monitor propofol provisions once TF at goal rate. Adjust TF rate/add protein modulars per RD as needed pending kcals from propofol to avoid overfeeding.      EVALUATION OF THE PROGRESS TOWARD GOALS   Diet: NPO, intubated  Enteral Access: OGT  Nutrition Support: None yet started post-op.   Intake: Pt was eating 25-75% of meals prior to  surgery (per wife, eating applesauce, etc). No appetite prior to surgery per RN notes and flowsheet. Previously had PRN supplements ordered. Per family, pt has had poor appetite x 4 weeks.      NEW FINDINGS   -Resp/CV: remains intubated s/p VSD repair with open chest.   -GI: No BM since admission. On bowel regimen.   -Skin: Lee 9.   -Meds: Propofol gtt.     Updated ASSESSED NUTRITION NEEDS per 77 kg  Estimated Protein Needs: 116 - 154 grams protein/day (1.5 - 2 grams of pro/kg)  Justification: Increased needs for repletion/muscle wasting + post op + critical illness    MALNUTRITION  % Intake: < 75% for > 7 days (non-severe)  % Weight Loss: None noted  Subcutaneous Fat Loss: Facial region, Upper arm and Lower arm: moderate  Muscle Loss: Temporal, Scapular bone, Thoracic region (clavicle, acromium bone, deltoid, trapezius, pectoral), Upper arm (bicep, tricep), Lower arm  (forearm) and Dorsal hand: severe  Fluid Accumulation/Edema: Moderate  Malnutrition Diagnosis: Severe malnutrition in the context of acute illness    Previous Goals   Patient to consume % of nutritionally adequate meal trays TID, or the equivalent with supplements/snacks.  Evaluation: Not met    Previous Nutrition Diagnosis  Predicted inadequate nutrient intake (kcal/protein) related to poor PO intake since admission as evidenced by ate only a few bites of applesauce today, but potential to change pending medical course    Evaluation: Declining    CURRENT NUTRITION DIAGNOSIS  Inadequate protein-energy intake related to poor appetite, now NPO/vented as evidenced by eating 25-75% of meals with documented poor appetite over past week prior to surgery, now NPO/vented x 2 days with no TF yet started      INTERVENTIONS  Implementation  Collaboration with other providers - discussed plan to place post-pyloric FT via cortrak once chest closed, possibly start TF overnight via OGT pending OR plans    Goals  Total avg nutritional intake to meet a  minimum of 25 kcal/kg and 1.5 g PRO/kg daily (per dosing wt 77 kg).    Monitoring/Evaluation  Progress toward goals will be monitored and evaluated per protocol.    Elizabeth Berry RD, DEBBIE, [facility]  CVICU Dietitian  Pager: [phone]

## 2018-08-13 NOTE — OP NOTE
OPERATIVE DATE: 8/13/2018    PRE-OPERATIVE DIAGNOSIS:  1) Open chest  2) Ischemic ventricular septal defect  3) Coronary artery disease  4) Thyroid cancer  5) Polycythemia vera  6) Anemia  7) Deep vein thrombosis  8) Meningioma  9) Prostate cancer     POST-OPERATIVE DIAGNOSIS:  1)Open chest  2) Ischemic ventricular septal defect  3) Coronary artery disease  4) Thyroid cancer  5) Polycythemia vera  6) Anemia  7) Deep vein thrombosis  8) Meningioma  9) Prostate cancer    PROCEDURE:  1) Chest washout  2) Chest closure    SURGEON: Jason Franco MD    ASSISTANT: Michael Viera MD    ANESTHESIA: GETA    ESTIMATED BLOOD LOSS: 100cc    INDICATIONS:  Mr. JOSE SOLER is a 68 year old male admitted with ischemic ventricular septal defect.  The patient had poor RV function after repair.  I decided to leave the chest open for improved hemodynamics.  The patient had stabilized and I felt it was safe to proceed with chest closure.  Risks and benefits of the operation were explained to the patient and their family including, but not limited to, bleeding, infection, stroke and even death.  They understood these risks and agreed to proceed electively.    OPERATIVE REPORT:  The patient was transferred to the operating room and positioned supine on the OR table.  General anesthesia was initiated by the anesthesia team.  Endotracheal intubation and IV access was already in place. The patients chest abdomen and bilateral lower extremities were clipped, prepped and draped in sterile fashion.  A pre-procedure time-out was performed confirming the correct patient, correct site and correct procedure.    The sterile dressing was removed.  There was no active bleeding.  RV function appeared improved.      The patient was stable on moderate dose of epinephrine and dobutamine.    The chest was irrigated with multiple liters of warm antibiotic containing saline.  The sternum was approximated with sternal wires.  The subcutaneous tissue  was closed with vicryl stitches.  The skin was approximated with nylon stitches.  A Proveena skin vac was applied.    The patient was then transferred from the operating bed to an ICU bed and transferred to the ICU in critical, but stable, condition.    All needle, sponge and instrument counts were correct at the end of the case.    Jason Franco  Cardiothoracic Surgery  Pager: [phone]

## 2018-08-13 NOTE — PROGRESS NOTES
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 Underwent repair of posterior VSD and TVR with bioprosthetic valve on 8/10, and was kept with open chest due to coagulopathy and RV dysfunction.     TODAY'S PROGRESS:   -Continue dobutamine at 2.5 and low dose Epi prior to OR  -Continue with deep sedation for open chest  -Potential OR today for closure  -Stop cangrelor prior to OR (can stop immediatly prior to going down to OR)  -Dietary to make rec's for feeds down OG if necessary   -Potential NJ placement tomorrow after chest closed  -Add miralax to bowel regimen  -Extremely labile with turns and movement (bradycardia into 40-50's, hypotensive). AVOID if able  -Pacer backup rate increased to 60      PLAN:  Neuro/ pain/ sedation:  #Post operative sedation  #Questionable seizure activity (temporal lobe seizures?)  - PRN Quetiapine 25mg for sleep, ativan PRN  - Prior to OR, there were suggestions of questionable seizure activity, neurology will resume EEG post chest closure  - Monitor neurological status. Notify the MD for any acute changes in exam.  - Propofol for sedation  - Fentanyl for analgesia  - Restart EEG post chest closure    Pulmonary care:  #Post operative mechanical ventilation   - Intubated/sedated for open chest  - No plans to wean or pressure support at this time    Cardiovascular:  #Post infarction 2.5cm infraseptal VSD  #s/p HUSSEIN to distal RCA and proximal LAD  #STEMI, cardiogenic shock  #Dilated RV   #Pulmonary HTN  #IABP  #s/p repair of posterior VSD and TVR with bioprosthetic valve   - Monitor hemodynamic status  - Infusions: Dobutamine wean to off  - Consider diuresis with low dose of lasix  - ECHO from 8/8 prior to  surgery: Inferior wall akinesis with inferoseptal muscular VAD. Mild to moderate right ventricular dilation is present. Global right ventricular function is moderately reduced.  - ASA 81mg  - OR anticipated 8/13  -Extremely labile with turns and movement (bradycardia into 40-50's, hypotensive).    Heme:  #Anticoagluation s/p Drug Eluting Stent placement  #Thrombocythemia  -Cangrelor gtt restarted 8/12, anticipate stopping 30-60min prior to OR  -Cangrelor gtt for now, will need to resume DAPT as soon as feasible   -Discuss with cardiology team regarding dosing post chest closure  -Hydroxyurea 1g BID, hematology consulted and following    GI care:  #Transaminitis   - NPO at midnight  - Bowel regimen    Fluids/ Electrolytes/ Nutrition:   - mIVF for IV fluid hydration  - Wiggins    Renal/ Fluid Balance:    #Acute Kidney injury  - Urine output stable overnight, Creatinine 1.34  - Will continue to monitor intake and output via wiggins    Endocrine:  #Stress hyperglycemia  - Insulin gtt    ID/ Antibiotics:  #Open chest  - Monitor fever curve, trend WBC  - Vancomycin/Zosyn for open chest    Prophylaxis:    - Mechanical prophylaxis for DVT.   - Cangrelor gtt started 8/12, can stop 30-60min prior to OR   - Protonix    Lines/ tubes/ drains:  - ETT, arterial line, PIV, CVC/[place], IABP    Disposition:  - CV ICU    Patient seen, findings and plan discussed with CVTS Fellow    Brad Hawkins MD  8/13/2018 10:27 AM  Anesthesia Resident  [id number]/CA-[id number]      ====================================    SUBJECTIVE:   -Intermittent bradycardic episodes overnight, atrial flutter and sinus rhythm intermittently, pacer backup utilized    OBJECTIVE:   1. VITAL SIGNS:   Temp:  [98.4  F (36.9  C)-99.1  F (37.3  C)] 98.4  F (36.9  C)  Heart Rate:  [] 80  Resp:  [14] 14  MAP:  [55 mmHg-101 mmHg] 79 mmHg  Arterial Line BP: ()/(29-64) 118/49  FiO2 (%):  [40 %] 40 %  SpO2:  [96 %-100 %] 100 %  Ventilation Mode: CMV/AC  (Continuous Mandatory  Ventilation/ Assist Control)  FiO2 (%): 40 %  Rate Set (breaths/minute): 14 breaths/min  Tidal Volume Set (mL): 550 mL  PEEP (cm H2O): 5 cmH2O  Oxygen Concentration (%): 40 %  Resp: 14    2. INTAKE/ OUTPUT:   I/O last 3 completed shifts:  In: 3748.58 [I.V.:2998.58; NG/GT:450]  Out: 1785 [Urine:795; Emesis/NG output:150; Chest Tube:840]    3. PHYSICAL EXAMINATION:   General: Intubated/Sedated with open chest  Neuro: Sedated  Resp: Mechanical ventilation, intubated, coarse breath sounds  CV: Paced rhythm, open chest  Abdomen: Soft, dressings in place  Incisions: Dressing over chest, chest tubes in place, open chest, IABP in place  Extremities: warm and well perfused    4. INVESTIGATIONS:   Arterial Blood Gases     Recent Labs  Lab 08/13/18  0759 08/13/18  0433 08/12/18  1613 08/12/18  0338   PH 7.43 7.43 7.44 7.43   PCO2 33* 33* 32* 33*   PO2 138* 148* 140* 114*   HCO3 22 22 21 22     Complete Blood Count     Recent Labs  Lab 08/13/18  0433 08/12/18  2035 08/12/18  1206 08/12/18  0338 08/11/18  1013 08/11/18  0432   WBC 11.7*  --   --  12.4* 15.4* 21.6*   HGB 8.3* 8.5* 7.4* 7.8* 8.1* 8.3*     --   --  521* 500* 532*     Basic Metabolic Panel    Recent Labs  Lab 08/13/18  0433 08/12/18  1613 08/12/18  1206 08/12/18  0338 08/11/18  1013    145*  --  141 143   POTASSIUM 4.0 4.1 3.9 4.1 4.1   CHLORIDE 113* 111*  --  110* 111*   CO2 21 22  --  22 24   BUN 29 34*  --  40* 48*   CR 0.99 1.15  --  1.34* 1.53*   * 129*  --  123* 84     Liver Function Tests    Recent Labs  Lab 08/13/18  0433 08/12/18  0338 08/11/18  1013 08/11/18  0432  08/10/18  0408  08/07/18  0403   AST 68* 102*  --   --   --  24  --  73*   ALT 21 27  --   --   --  37  --  28   ALKPHOS 82 60  --   --   --  80  --  85   BILITOTAL 0.7 0.7  --   --   --  0.5  --  0.4   ALBUMIN 1.9* 2.3*  --   --   --  2.7*  --  2.4*   INR 1.44* 1.51* 1.45* 1.46*  < > 1.30*  < > 1.39*   < > = values in this interval not displayed.  Pancreatic Enzymes  No lab  results found in last 7 days.  Coagulation Profile    Recent Labs  Lab 08/13/18  0433 08/12/18  0338 08/11/18  1013 08/11/18  0432 08/10/18  2243 08/10/18  2132   INR 1.44* 1.51* 1.45* 1.46* 1.70* 1.67*   PTT  --   --   --   --  46* 41*         5. RADIOLOGY:   Recent Results (from the past 24 hour(s))   XR Chest Port 1 View    Narrative    Exam: XR CHEST PORT 1 VW, 8/13/2018 2:08 AM    Indication: CVICU, intubated;     Comparison: Chest x-ray 8/12/2018    Findings:   Frontal chest x-ray.    [place] catheter with tip projecting over the right pulmonary  artery. Endotracheal tube is seen projecting 4 cm above the audelia.  Aortic balloon pump is seen projecting 6 cm below the aortic arch,  stable. Stable position of mediastinal and chest drains. Enteric tube  sidehole projects over the stomach.    Cardiomediastinal silhouette is unchanged. Persistent small left  pleural effusion. Unchanged left retrocardiac opacities. Unchanged  perihilar and interstitial opacity. Visualized abdomen and osseous  structures are unremarkable.      Impression    Impression:     1. Stable support devices.  2. Unchanged small left pleural effusion and adjacent basilar  atelectasis and/or consolidation.  3. Unchanged interstitial and perihilar opacities.    I have personally reviewed the examination and initial interpretation  and I agree with the findings.    LAKESHA AUSTIN MD

## 2018-08-13 NOTE — ANESTHESIA PREPROCEDURE EVALUATION
Anesthesia Plan      History & Physical Review      ASA Status:  4 .    NPO Status:  > 6 hours    Plan for General and ETT with Intravenous induction. Maintenance will be Inhalation.      Additional equipment: 2nd IV, Arterial Line, Central Line, PA Catheter and ELISABETH      Postoperative Care  Postoperative pain management:  Multi-modal analgesia.      Consents  Anesthetic plan, risks, benefits and alternatives discussed with:  Patient.  Use of blood products discussed: Yes.   Consented to blood products.  .        Procedure:  Procedure(s):  COMBINED IRRIGATION AND DEBRIDEMENT CHEST WASHOUT, Possible Closure - Wound Class: I-Clean    Patient Active Problem List   Diagnosis     Cardiogenic shock (H)       Past Medical History:   Diagnosis Date     DVT (deep venous thrombosis) (H) 2008    after craniotomy     Meningioma (H)      Polycythemia vera (H)      Prostate cancer (H)        Past Surgical History:   Procedure Laterality Date     ANKLE SURGERY Right 2018     APPENDECTOMY       CRANIOTOMY Right      PROSTATECTOMY PERINEAL  2004     SPLENECTOMY      childhood     THYROID SURGERY           No current facility-administered medications on file prior to encounter.   No current outpatient prescriptions on file prior to encounter.    No Known Allergies    Recent Labs   Lab Test  18   0433   HGB  8.3*     Recent Labs   Lab Test  18   0433   POTASSIUM  4.0     Recent Labs   Lab Test  18   0433   PLT  432     Recent Labs   Lab Test  18   0433   INR  1.44*       Recent Results (from the past 4320 hour(s))   Echo limited (Adults Only)    Narrative    [id number]  ECH11  [id number]  [id number]^ALLEN^INGE^[facility],[place]  Echocardiography Laboratory  [address] [place] [id number]     Name: JOSE SOLER  MRN: [id number]  : 1950  Study Date: 08/10/2018 10:17 AM  Age: 68 yrs  Gender: Male  Patient Location:  [id number]  Reason For Study: VSD  Ordering Physician: INGE VILLA  Referring Physician: SELF, REFERRED  Performed By: Monico Alexandra     BSA: 1.9 m2  Height: 69 in  Weight: 170 lb  HR: 96  BP: 93/56 mmHg  _____________________________________________________________________________  __        Procedure  Limited Portable Echo Adult.  _____________________________________________________________________________  __        Interpretation Summary  Limited study.  The Ejection Fraction is estimated at 60-65%. Mid-inferior wall is akinetic.  A mid inferoseptal muscular VSD is present.  VSD peak gradient is 35 mmHg. Peak velocity 3 m/s.  Mild to moderate right ventricular dilation is present.  RV function is moderately reduced.  Moderate to severe tricuspid insufficiency is present.  Right ventricular systolic pressure is 43mmHg above the right atrial pressure.  Moderate pulmonary hypertension is present (~57 mmHg).  The inferior vena cava was dilated at 2.4 cm without respiratory variability,  consistent with increased right atrial pressure.  Estimated mean right atrial pressure is 15 mmHg (significantly elevated).  No pericardial effusion is present.     This study was compared with the study from 8/6/2018 .  PAP appears slightly higher and TR appears to have worsened on this study.  Otherwise no other significant change.     _____________________________________________________________________________  __        Left Ventricle  The Ejection Fraction is estimated at 60-65%. Mid-inferior wall is akinetic. A  muscularventricular-septal defect is present. VSD peak gradient is 35 mmHg.  Peak velocity 3 m/s.     Right Ventricle  Mild to moderate right ventricular dilation is present. RV function is  moderately reduced. A right heart catheter is noted in the right ventricle.     Atria  The atria cannot be assessed.     Aortic Valve  The aortic valve cannot be assessed.        Tricuspid Valve  Moderate to severe tricuspid  insufficiency is present. The right ventricular  systolic pressure is approximated at 42.8 mmHg plus the right atrial pressure.  Right ventricular systolic pressure is 43mmHg above the right atrial pressure.  Moderate (pulmonary artery systolic pressure 50-75mmHg) pulmonary hypertension  is present.     Vessels  The inferior vena cava was dilated at 2.4 cm without respiratory variability,  consistent with increased right atrial pressure. Estimated mean right atrial  pressure is 15 mmHg (significantly elevated).     Pericardium  No pericardial effusion is present.     Compared to Previous Study  This study was compared with the study from 2018 . PAP appears slightly  higher and TR appears to have worsened on this study. Otherwise no other  significant change.     _____________________________________________________________________________  __        Doppler Measurements & Calculations  TR max luisa: 327.0 cm/sec  TR max P.8 mmHg        _____________________________________________________________________________  __        Report approved by: Jacobo HERNANDEZ 08/10/2018 11:31 AM      Echo complete    Narrative    [id number]  ECH19  [id number]  [id number]^IVAN^SUSHIL^CASA           [facility],[place]  Echocardiography Laboratory  [address] [place] [id number]     Name: JOSE SOLER  MRN: [id number]  : 1950  Study Date: 2018 07:56 AM  Age: 68 yrs  Gender: Male  Patient Location: [facility]  Reason For Study: CHF, VSD  Ordering Physician: SUSHIL LOVE  Referring Physician: SELF, REFERRED  Performed By: Brian Bansal RDCS     BSA: 1.9 m2  Height: 69 in  Weight: 170 lb  BP: 112/90 mmHg  _____________________________________________________________________________  __        Procedure  Complete Portable Echo Adult.  _____________________________________________________________________________  __        Interpretation Summary  Left ventricular size is normal.  The  Ejection Fraction is estimated at 55-60%.  Inferior wall akinesis with inferoseptal muscular VAD. Peak velocity 3.7m/sec.  Mild to moderate right ventricular dilation is present.  Global right ventricular function is moderately reduced.  Dilation of the inferior vena cava is present with abnormal respiratory  variation in diameter.  No pericardial effusion is present.  Previous study not available for comparison.  _____________________________________________________________________________  __        Left Ventricle  Left ventricular size is normal. Left ventricular wall thickness is normal.  The Ejection Fraction is estimated at 55-60%. Left ventricular diastolic  function is indeterminate. Diastolic Doppler findings (E/E' ratio and/or other  parameters) suggest left ventricular filling pressures are increased. Inferior  wall akinesis with inferoseptal muscular VAD. Peak velocity 3.7m/sec.     Right Ventricle  Mild to moderate right ventricular dilation is present. Global right  ventricular function is moderately reduced.     Atria  Both atria appear normal. The atrial septum is intact as assessed by color  Doppler .     Mitral Valve  The mitral valve is normal.        Aortic Valve  Aortic valve is normal in structure and function.     Tricuspid Valve  The tricuspid valve is normal. Mild tricuspid insufficiency is present. The  right ventricular systolic pressure is approximated at 30.0 mmHg plus the  right atrial pressure.     Pulmonic Valve  The pulmonic valve is normal.     Vessels  The aorta root is normal. The pulmonary artery is normal. Ascending aorta 3.8  cm. Dilation of the inferior vena cava is present with abnormal respiratory  variation in diameter.     Pericardium  No pericardial effusion is present.        Compared to Previous Study  Previous study not available for comparison.  _____________________________________________________________________________  __  MMode/2D Measurements & Calculations      RVDd: 5.5 cm  IVSd: 0.89 cm  LVIDd: 4.9 cm  LVIDs: 3.1 cm  LVPWd: 0.87 cm  FS: 36.3 %  LV mass(C)d: 145.9 grams  LV mass(C)dI: 75.7 grams/m2  LA dimension: 3.8 cm  asc Aorta Diam: 3.8 cm  LVOT diam: 2.1 cm  LVOT area: 3.5 cm2  RVOT diam: 2.6 cm  LA Volume (BP): 40.3 ml  RWT: 0.36  TAPSE: 2.0 cm        Doppler Measurements & Calculations  MV E max luisa: 90.4 cm/sec  MV A max luisa: 98.0 cm/sec  MV E/A: 0.92  MV dec slope: 651.6 cm/sec2  MV dec time: 0.14 sec  LV V1 VTI: 8.8 cm     SV(LVOT): 30.5 ml  SI(LVOT): 15.8 ml/m2  TV max P.0 mmHg  PA V2 max: 138.0 cm/sec  PA max P.6 mmHg  PA acc time: 0.08 sec  TR max luisa: 272.7 cm/sec  TR max P.0 mmHg  Qp/Qs: 3.3/1.0  E/E' av.8  Lateral E/e': 11.3  Medial E/e': 18.3     _____________________________________________________________________________  __           Report approved by: Hilda Bashir 2018 10:22 AM            Attending Anesthesiologist    Aldo Duncan MD    *[phone]

## 2018-08-13 NOTE — PROVIDER NOTIFICATION
Patient was turned to right side and after about 5 minutes, patient's MAP noted to drop to 50's then 30's. Dr. Hawkins was called to patient's room. Dr. Hawkins increased pacing rate from 50 to 60. Epinephrine drip and Dobutamine drip were titrated to keep MAP>60. After interventions, patient's MAP increased to >60. Dr. Hawkins notified that patient will not be turned for now due to hemodynamic instability.

## 2018-08-13 NOTE — PLAN OF CARE
Problem: Patient Care Overview  Goal: Plan of Care/Patient Progress Review  Outcome: No Change  Pt remains sedated on a propofol drip, grimaces to oral care and moves BUE but no movement seen in BLE. Continuous fentanyl infusing for pain management. Cardiac rhythm initially sinus arrhythmia with occasionally paced beats, then towards this am pacemaker dependency increased with more frequent PACs. Pt did not tolerate being paced at 50 BPM, MAPs dropped into 50s, Dr. Chun notified pacemaker rate increased to 60 BPM with MAPs increasing to >65. Pt is currently 100% paced at 60 BPM with what appears to be an underlying a-flutter rhythm. Pacemaker turned back down to 50 BPM, underlying rhythm 3:1 a-flutter in high 50s, MAPs currently in 70s. IABP 1:1, 100% augmentation, means 70s-80s, augmented 100s. CVP 14. PAPs 28-30/16-18. SvO2 50s-60s. SAKSHI CI 2.6-2.9. -1160. Dobutamine infusing at 2.5 mcg/kg/min. Epinephrine infusing at 0.05 mcg/kg/min. Current vent settings: CMV, FiO2 40%, RR 14, PEEP 5, . Pleural CT output ~10-50 ml/hr. Mediastinal CT output 5-15 ml/hr. UOP ~30 ml/hr. Cangrelor infusing at 0.75 mcg/kg/min. Calcium replaced twice per prn orders. Plan for washout today.

## 2018-08-13 NOTE — ANESTHESIA CARE TRANSFER NOTE
Patient: Castillo De Anda    Procedure(s):  COMBINED IRRIGATION AND DEBRIDEMENT CHEST WASHOUT, Closure - Wound Class: I-Clean    Diagnosis: Open Chest   Diagnosis Additional Information: No value filed.    Anesthesia Type:   General, ETT     Note:    Patient transferred to:ICU  Comments: Anesthesia Care Transfer Note    Patient: Castillo De Anda    Transferred to: ICU    Patient vital signs: stable    Airway: ETT    Transferred to ICU, monitored, sedated, O2 100% via ambu. Monitors on, Hooked to vent, report to RN    Minerva Wing CRNA  8/13/2018 2:41 PM    ICU Handoff: Call for PAUSE to initiate/utilize ICU HANDOFF, Identified Patient, Identified Responsible Provider, Reviewed the Pertinent Medical History, Discussed Surgical Course, Reviewed Intra-OP Anesthesia Management and Issues during Anesthesia, Set Expectations for Post Procedure Period and Allowed Opportunity for Questions and Acknowledgement of Understanding      Vitals: (Last set prior to Anesthesia Care Transfer)    CRNA VITALS  8/13/2018 1402 - 8/13/2018 1441      8/13/2018             Ht Rate: (!)  0    Resp Rate (observed): 10    Resp Rate (set): 8                Electronically Signed By: STEPHANIE De Anda CRNA  August 13, 2018  2:41 PM

## 2018-08-13 NOTE — PROGRESS NOTES
Patient went to OR for washout and possible chest closure. Anesthesia and RT were present for transport to OR. Patient's family was escorted to the OR waiting room.

## 2018-08-13 NOTE — PROCEDURES
Small Bowel Feeding Tube Placement Assessment  Reason for Feeding Tube Placement: request for post-pyloric FT by providers  Cortrak Start Time: 15:10   Cortrak End Time: 15:18  Medicine Delivered During Procedure: lubricating jelly as pt remained sedated from OR  Placement Successful: Presume post-pyloric (pending AXR confirmation).    Procedure Complications: None  Final Placement Elvis at exit of nare 96 cm  Face to Face time with patient: 15 min    Elizabeth Berry RD, LD, CNSC  CVICU Dietitian  Pager: [phone]

## 2018-08-13 NOTE — ANESTHESIA POSTPROCEDURE EVALUATION
Patient: Castillo De Anda    Procedure(s):  Median Sternotomy, REPAIR VENTRICULAR SEPTAL DEFECT on pump oxygenation, Tricuspid valve replacement  - Wound Class: I-Clean    Diagnosis:ventricular septal defect   Diagnosis Additional Information: No value filed.    Anesthesia Type:  No value filed.    Note:  Anesthesia Post Evaluation    Patient location during evaluation: ICU  Patient participation: Unable to evaluate secondary to administered sedation  Level of consciousness: obtunded/minimal responses  Pain management: adequate  Cardiovascular status: acceptable  Respiratory status: acceptable, intubated and ventilator  Hydration status: acceptable  PONV: unable to assess             Last vitals:  Vitals:    08/12/18 2145 08/12/18 2200 08/12/18 2226   BP:      Resp:  14    Temp:  37  C (98.6  F)    SpO2: 100% 100% 100%         Electronically Signed By: Briseyda Murillo MD  August 12, 2018  10:45 PM

## 2018-08-13 NOTE — PHARMACY-MEDICATION REGIMEN REVIEW
Pharmacy Tube Feeding Consult    Medication reviewed for administration by feeding tube and for potential food/drug interactions.    Recommendation: No changes are needed at this time.     Pharmacy will continue to follow as new medications are ordered.    Janice Cameron, SakinaD

## 2018-08-13 NOTE — PROGRESS NOTES
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 Underwent repair of posterior VSD and TVR with bioprosthetic valve on 8/10, and was kept with open chest due to coagulopathy and RV dysfunction.     TODAY'S PROGRESS:   -Continue dobutamine at 2.5 and low dose Epi prior to OR  -Continue with deep sedation for open chest  -Potential OR today for closure  -Stop cangrelor prior to OR (can stop immediatly prior to going down to OR)  -Dietary to make rec's for feeds down OG if necessary   -Potential NJ placement tomorrow after chest closed  -Add miralax to bowel regimen  -Extremely labile with turns and movement (bradycardia into 40-50's, hypotensive). AVOID if able  -Pacer backup rate increased to 60    PLAN:  Neuro/ pain/ sedation:  #Post operative sedation  #Questionable seizure activity (temporal lobe seizures?)  - PRN Quetiapine 25mg for sleep, ativan PRN  - Prior to OR, there were suggestions of questionable seizure activity, neurology will resume EEG post chest closure  - Monitor neurological status. Notify the MD for any acute changes in exam.  - Propofol for sedation  - Fentanyl for analgesia  - Restart EEG post chest closure    Pulmonary care:  #Post operative mechanical ventilation   - Intubated/sedated for open chest  - No plans to wean or pressure support at this time    Cardiovascular:  #Post infarction 2.5cm infraseptal VSD  #s/p HUSSEIN to distal RCA and proximal LAD  #STEMI, cardiogenic shock  #Dilated RV   #Pulmonary HTN  #IABP  #s/p repair of posterior VSD and TVR with bioprosthetic valve   - Monitor hemodynamic status  - Infusions: Dobutamine wean to off  - Consider diuresis with low dose of lasix  - ECHO from 8/8 prior to  surgery: Inferior wall akinesis with inferoseptal muscular VAD. Mild to moderate right ventricular dilation is present. Global right ventricular function is moderately reduced.  - ASA 81mg  - OR anticipated 8/13  -Extremely labile with turns and movement (bradycardia into 40-50's, hypotensive).    Heme:  #Anticoagluation s/p Drug Eluting Stent placement  #Thrombocythemia  -Cangrelor gtt restarted 8/12, anticipate stopping 30-60min prior to OR  -Cangrelor gtt for now, will need to resume DAPT as soon as feasible   -Discuss with cardiology team regarding dosing post chest closure  -Hydroxyurea 1g BID, hematology consulted and following    GI care:  #Transaminitis   - NPO at midnight  - Bowel regimen    Fluids/ Electrolytes/ Nutrition:   - mIVF for IV fluid hydration  - Wiggins    Renal/ Fluid Balance:    #Acute Kidney injury  - Urine output stable overnight, Creatinine 1.34  - Will continue to monitor intake and output via wiggins    Endocrine:  #Stress hyperglycemia  - Insulin gtt    ID/ Antibiotics:  #Open chest  - Monitor fever curve, trend WBC  - Vancomycin/Zosyn for open chest    Prophylaxis:    - Mechanical prophylaxis for DVT.   - Cangrelor gtt started 8/12, can stop 30-60min prior to OR   - Protonix    Lines/ tubes/ drains:  - ETT, arterial line, PIV, CVC/Franklin, IABP    Disposition:  - CV ICU    Patient seen, findings and plan discussed with CV ICU staff, Dr. Dyer.    Brad Hawkins MD  8/13/2018 10:27 AM  Anesthesia Resident  PGY4/CA-3      ====================================    SUBJECTIVE:   -Intermittent bradycardic episodes overnight, atrial flutter and sinus rhythm intermittently, pacer backup utilized    OBJECTIVE:   1. VITAL SIGNS:   Temp:  [98.4  F (36.9  C)-99.1  F (37.3  C)] 98.8  F (37.1  C)  Heart Rate:  [] 58  Resp:  [14] 14  BP: (109)/(87) 109/87  MAP:  [47 mmHg-99 mmHg] 71 mmHg  Arterial Line BP: ()/(34-64) 108/41  FiO2 (%):  [40 %] 40 %  SpO2:  [98 %-100 %] 100 %  Ventilation Mode:  CMV/AC  (Continuous Mandatory Ventilation/ Assist Control)  FiO2 (%): 40 %  Rate Set (breaths/minute): 14 breaths/min  Tidal Volume Set (mL): 550 mL  PEEP (cm H2O): 5 cmH2O  Oxygen Concentration (%): 40 %  Resp: 14    2. INTAKE/ OUTPUT:   I/O last 3 completed shifts:  In: 3614.75 [I.V.:2864.75; NG/GT:450]  Out: 2085 [Urine:895; Emesis/NG output:350; Chest Tube:840]    3. PHYSICAL EXAMINATION:   General: Intubated/Sedated with open chest  Neuro: Sedated  Resp: Mechanical ventilation, intubated, coarse breath sounds  CV: Paced rhythm, open chest  Abdomen: Soft, dressings in place  Incisions: Dressing over chest, chest tubes in place, open chest, IABP in place  Extremities: warm and well perfused    4. INVESTIGATIONS:   Arterial Blood Gases     Recent Labs  Lab 08/13/18  0433 08/12/18  1613 08/12/18  0338 08/11/18  1013   PH 7.43 7.44 7.43 7.45   PCO2 33* 32* 33* 34*   PO2 148* 140* 114* 123*   HCO3 22 21 22 23     Complete Blood Count     Recent Labs  Lab 08/13/18  0433 08/12/18  2035 08/12/18  1206 08/12/18  0338 08/11/18  1013 08/11/18  0432   WBC 11.7*  --   --  12.4* 15.4* 21.6*   HGB 8.3* 8.5* 7.4* 7.8* 8.1* 8.3*     --   --  521* 500* 532*     Basic Metabolic Panel    Recent Labs  Lab 08/13/18  0433 08/12/18  1613 08/12/18  1206 08/12/18  0338 08/11/18  1013    145*  --  141 143   POTASSIUM 4.0 4.1 3.9 4.1 4.1   CHLORIDE 113* 111*  --  110* 111*   CO2 21 22  --  22 24   BUN 29 34*  --  40* 48*   CR 0.99 1.15  --  1.34* 1.53*   * 129*  --  123* 84     Liver Function Tests    Recent Labs  Lab 08/13/18  0433 08/12/18  0338 08/11/18  1013 08/11/18  0432  08/10/18  0408  08/07/18  0403   AST 68* 102*  --   --   --  24  --  73*   ALT 21 27  --   --   --  37  --  28   ALKPHOS 82 60  --   --   --  80  --  85   BILITOTAL 0.7 0.7  --   --   --  0.5  --  0.4   ALBUMIN 1.9* 2.3*  --   --   --  2.7*  --  2.4*   INR 1.44* 1.51* 1.45* 1.46*  < > 1.30*  < > 1.39*   < > = values in this interval not  displayed.  Pancreatic Enzymes  No lab results found in last 7 days.  Coagulation Profile    Recent Labs  Lab 08/13/18  0433 08/12/18  0338 08/11/18  1013 08/11/18  0432 08/10/18  2243 08/10/18  2132   INR 1.44* 1.51* 1.45* 1.46* 1.70* 1.67*   PTT  --   --   --   --  46* 41*         5. RADIOLOGY:   No results found for this or any previous visit (from the past 24 hour(s)).

## 2018-08-13 NOTE — PLAN OF CARE
Problem: Patient Care Overview  Goal: Plan of Care/Patient Progress Review  OT 4E:  Hold.  Pt. with open chest, possible OR today for washout and closure.  Reschedule for 8/14/18 as appropriate.

## 2018-08-13 NOTE — ANESTHESIA PROCEDURE NOTES
Perioperative ELISABETH Report  Anesthesia Information  ELISABETH probe placed and report generated by: : MEDINA MADISON TJORVI E  Images for this study have been archived.         Preanesthesia Checklist:  Patient identified, IV assessed, risks and benefits discussed, monitors and equipment assessed, procedure being performed at surgeon's request and anesthesia consent obtained.  General Procedure Information  Modalities:  2D, 3D, CW Doppler, PW Doppler and Color flow mapping      Echocardiographic and Doppler Measurements        Ventricular Regional Function:  1- Basal Anteroseptal:  hypokinetic  2- Basal Anterior:  hypokinetic  3- Basal Anterolateral:  hypokinetic  4- Basal Inferolateral:  hypokinetic  5- Basal Inferior:  hypokinetic  6- Basal Inferoseptal:  hypokinetic  7- Mid Anteroseptal:  hypokinetic  8- Mid Anterior:  hypokinetic  9- Mid Anterolateral:  hypokinetic  10- Mid Inferolateral:  hypokinetic  11- Mid Inferior:  hypokinetic  12- Mid Inferoseptal:  hypokinetic  13- Apical Anterior:  hypokinetic  14- Apical Lateral:  hypokinetic  15- Apical Inferior:  hypokinetic  16- Apical Septal:  hypokinetic  17- Nova:  hypokinetic    Valves  Aortic Valve: Annulus normal.  Stenosis not present.  Regurgitation absent.  Leaflets normal.  Leaflet motions normal.    Mitral Valve: Annulus normal.  Stenosis not present.  Regurgitation absent.  Leaflets normal.  Leaflet motions normal.    Tricuspid Valve: Annulus normal.  Stenosis not present.  Regurgitation absent.  Leaflets normal.  Leaflet motions normal.    Pulmonic Valve: Annulus normal.  Stenosis not present.  Regurgitation absent.      Aorta: Ascending Aorta: Size normal.  Dissection not present.  Plaque thickness less than 3 mm.  Mobile plaque not present.    Aortic Arch: Size normal.   Dissection not present.   Plaque thickness less than 3 mm.   Mobile plaque not present.    Descending Aorta: Size normal.   Dissection not present.   Plaque thickness less than 3  mm.   Mobile plaque not present.            Ventricular Septum: Other ventricular septal defect findings:  Post MI VSD, see images for details      Post Intervention Findings  . Global function:  Unchanged.   Regional wall motion:  Unchanged   .  .  .   .  .  .  .  .  .  .    Post-MI VSD patch repair and 33mmTVR    Echocardiogram Comments

## 2018-08-13 NOTE — PROCEDURES
Bridle Placement:   Reason for bridle placement: securement of FT   Medicine delivered during procedure: lubricating jelly   Procedure: Successful  Location of top of clip on FT: @ 97 cm marker   Condition of nose/skin at time of bridle placement: Unremarkable   Face to Face time with patient: <5 minutes.    Elizabeth Berry RD, LD, [facility]  CVICU Dietitian  Pager: [phone]

## 2018-08-14 ENCOUNTER — APPOINTMENT (OUTPATIENT)
Dept: GENERAL RADIOLOGY | Facility: CLINIC | Age: 68
DRG: 219 | End: 2018-08-14
Attending: INTERNAL MEDICINE
Payer: MEDICARE

## 2018-08-14 LAB
ALBUMIN SERPL-MCNC: 1.9 G/DL (ref 3.4–5)
ALP SERPL-CCNC: 115 U/L (ref 40–150)
ALT SERPL W P-5'-P-CCNC: 20 U/L (ref 0–70)
ANION GAP SERPL CALCULATED.3IONS-SCNC: 8 MMOL/L (ref 3–14)
AST SERPL W P-5'-P-CCNC: 56 U/L (ref 0–45)
BACTERIA SPEC CULT: NO GROWTH
BASE DEFICIT BLDA-SCNC: 3.8 MMOL/L
BASE DEFICIT BLDV-SCNC: 2.5 MMOL/L
BASE DEFICIT BLDV-SCNC: 3.5 MMOL/L
BASE DEFICIT BLDV-SCNC: 3.8 MMOL/L
BASE DEFICIT BLDV-SCNC: 4 MMOL/L
BASE DEFICIT BLDV-SCNC: 4 MMOL/L
BASE DEFICIT BLDV-SCNC: 5.3 MMOL/L
BASE EXCESS BLDA CALC-SCNC: 0.3 MMOL/L
BILIRUB SERPL-MCNC: 0.6 MG/DL (ref 0.2–1.3)
BLD PROD TYP BPU: NORMAL
BLD UNIT ID BPU: 0
BLOOD PRODUCT CODE: NORMAL
BPU ID: NORMAL
BUN SERPL-MCNC: 25 MG/DL (ref 7–30)
CA-I BLD-MCNC: 4.3 MG/DL (ref 4.4–5.2)
CA-I BLD-MCNC: 4.7 MG/DL (ref 4.4–5.2)
CA-I BLD-MCNC: 4.8 MG/DL (ref 4.4–5.2)
CALCIUM SERPL-MCNC: 7.6 MG/DL (ref 8.5–10.1)
CHLORIDE SERPL-SCNC: 115 MMOL/L (ref 94–109)
CO2 SERPL-SCNC: 20 MMOL/L (ref 20–32)
CREAT SERPL-MCNC: 0.84 MG/DL (ref 0.66–1.25)
ERYTHROCYTE [DISTWIDTH] IN BLOOD BY AUTOMATED COUNT: 18.1 % (ref 10–15)
ERYTHROCYTE [DISTWIDTH] IN BLOOD BY AUTOMATED COUNT: 18.4 % (ref 10–15)
GFR SERPL CREATININE-BSD FRML MDRD: >90 ML/MIN/1.7M2
GLUCOSE BLD-MCNC: 136 MG/DL (ref 70–99)
GLUCOSE BLDC GLUCOMTR-MCNC: 107 MG/DL (ref 70–99)
GLUCOSE BLDC GLUCOMTR-MCNC: 125 MG/DL (ref 70–99)
GLUCOSE BLDC GLUCOMTR-MCNC: 131 MG/DL (ref 70–99)
GLUCOSE BLDC GLUCOMTR-MCNC: 132 MG/DL (ref 70–99)
GLUCOSE SERPL-MCNC: 128 MG/DL (ref 70–99)
HCO3 BLD-SCNC: 20 MMOL/L (ref 21–28)
HCO3 BLD-SCNC: 25 MMOL/L (ref 21–28)
HCO3 BLDV-SCNC: 20 MMOL/L (ref 21–28)
HCO3 BLDV-SCNC: 21 MMOL/L (ref 21–28)
HCO3 BLDV-SCNC: 22 MMOL/L (ref 21–28)
HCT VFR BLD AUTO: 24.4 % (ref 40–53)
HCT VFR BLD AUTO: 27.1 % (ref 40–53)
HGB BLD-MCNC: 7.7 G/DL (ref 13.3–17.7)
HGB BLD-MCNC: 7.8 G/DL (ref 13.3–17.7)
HGB BLD-MCNC: 8.7 G/DL (ref 13.3–17.7)
HGB BLD-MCNC: 8.8 G/DL (ref 13.3–17.7)
INR PPP: 1.32 (ref 0.86–1.14)
Lab: NORMAL
MAGNESIUM SERPL-MCNC: 2.3 MG/DL (ref 1.6–2.3)
MCH RBC QN AUTO: 29.1 PG (ref 26.5–33)
MCH RBC QN AUTO: 29.2 PG (ref 26.5–33)
MCHC RBC AUTO-ENTMCNC: 32 G/DL (ref 31.5–36.5)
MCHC RBC AUTO-ENTMCNC: 32.5 G/DL (ref 31.5–36.5)
MCV RBC AUTO: 90 FL (ref 78–100)
MCV RBC AUTO: 91 FL (ref 78–100)
O2/TOTAL GAS SETTING VFR VENT: 40 %
O2/TOTAL GAS SETTING VFR VENT: 50 %
OXYHGB MFR BLD: 96 % (ref 92–100)
OXYHGB MFR BLDV: 45 %
OXYHGB MFR BLDV: 49 %
OXYHGB MFR BLDV: 50 %
OXYHGB MFR BLDV: 51 %
OXYHGB MFR BLDV: 56 %
OXYHGB MFR BLDV: 56 %
PCO2 BLD: 32 MM HG (ref 35–45)
PCO2 BLD: 39 MM HG (ref 35–45)
PCO2 BLDV: 34 MM HG (ref 40–50)
PCO2 BLDV: 35 MM HG (ref 40–50)
PCO2 BLDV: 37 MM HG (ref 40–50)
PH BLD: 7.41 PH (ref 7.35–7.45)
PH BLD: 7.41 PH (ref 7.35–7.45)
PH BLDV: 7.36 PH (ref 7.32–7.43)
PH BLDV: 7.36 PH (ref 7.32–7.43)
PH BLDV: 7.37 PH (ref 7.32–7.43)
PH BLDV: 7.38 PH (ref 7.32–7.43)
PHOSPHATE SERPL-MCNC: 3.3 MG/DL (ref 2.5–4.5)
PLATELET # BLD AUTO: 421 10E9/L (ref 150–450)
PLATELET # BLD AUTO: 483 10E9/L (ref 150–450)
PO2 BLD: 129 MM HG (ref 80–105)
PO2 BLD: 328 MM HG (ref 80–105)
PO2 BLDV: 29 MM HG (ref 25–47)
PO2 BLDV: 30 MM HG (ref 25–47)
PO2 BLDV: 31 MM HG (ref 25–47)
PO2 BLDV: 33 MM HG (ref 25–47)
POTASSIUM BLD-SCNC: 4 MMOL/L (ref 3.4–5.3)
POTASSIUM BLD-SCNC: 5.4 MMOL/L (ref 3.4–5.3)
POTASSIUM SERPL-SCNC: 4.6 MMOL/L (ref 3.4–5.3)
PROT SERPL-MCNC: 5.1 G/DL (ref 6.8–8.8)
RBC # BLD AUTO: 2.68 10E12/L (ref 4.4–5.9)
RBC # BLD AUTO: 3.01 10E12/L (ref 4.4–5.9)
SODIUM BLD-SCNC: 137 MMOL/L (ref 133–144)
SODIUM SERPL-SCNC: 142 MMOL/L (ref 133–144)
SPECIMEN SOURCE: NORMAL
TRANSFUSION STATUS PATIENT QL: NORMAL
TRANSFUSION STATUS PATIENT QL: NORMAL
WBC # BLD AUTO: 10.6 10E9/L (ref 4–11)
WBC # BLD AUTO: 11.7 10E9/L (ref 4–11)

## 2018-08-14 PROCEDURE — 25000132 ZZH RX MED GY IP 250 OP 250 PS 637: Mod: GY | Performed by: STUDENT IN AN ORGANIZED HEALTH CARE EDUCATION/TRAINING PROGRAM

## 2018-08-14 PROCEDURE — 25000125 ZZHC RX 250: Performed by: THORACIC SURGERY (CARDIOTHORACIC VASCULAR SURGERY)

## 2018-08-14 PROCEDURE — 40000275 ZZH STATISTIC RCP TIME EA 10 MIN

## 2018-08-14 PROCEDURE — 93010 ELECTROCARDIOGRAM REPORT: CPT | Performed by: INTERNAL MEDICINE

## 2018-08-14 PROCEDURE — 25000128 H RX IP 250 OP 636: Performed by: SURGERY

## 2018-08-14 PROCEDURE — C9460 INJECTION, CANGRELOR: HCPCS | Performed by: STUDENT IN AN ORGANIZED HEALTH CARE EDUCATION/TRAINING PROGRAM

## 2018-08-14 PROCEDURE — 82805 BLOOD GASES W/O2 SATURATION: CPT | Performed by: THORACIC SURGERY (CARDIOTHORACIC VASCULAR SURGERY)

## 2018-08-14 PROCEDURE — A9270 NON-COVERED ITEM OR SERVICE: HCPCS | Mod: GY | Performed by: INTERNAL MEDICINE

## 2018-08-14 PROCEDURE — 25000128 H RX IP 250 OP 636: Performed by: STUDENT IN AN ORGANIZED HEALTH CARE EDUCATION/TRAINING PROGRAM

## 2018-08-14 PROCEDURE — 83735 ASSAY OF MAGNESIUM: CPT | Performed by: INTERNAL MEDICINE

## 2018-08-14 PROCEDURE — 25000128 H RX IP 250 OP 636: Performed by: THORACIC SURGERY (CARDIOTHORACIC VASCULAR SURGERY)

## 2018-08-14 PROCEDURE — 99291 CRITICAL CARE FIRST HOUR: CPT | Mod: GC | Performed by: ANESTHESIOLOGY

## 2018-08-14 PROCEDURE — 71045 X-RAY EXAM CHEST 1 VIEW: CPT

## 2018-08-14 PROCEDURE — 00000146 ZZHCL STATISTIC GLUCOSE BY METER IP

## 2018-08-14 PROCEDURE — 25000132 ZZH RX MED GY IP 250 OP 250 PS 637: Mod: GY | Performed by: THORACIC SURGERY (CARDIOTHORACIC VASCULAR SURGERY)

## 2018-08-14 PROCEDURE — 85018 HEMOGLOBIN: CPT | Performed by: THORACIC SURGERY (CARDIOTHORACIC VASCULAR SURGERY)

## 2018-08-14 PROCEDURE — 82805 BLOOD GASES W/O2 SATURATION: CPT | Performed by: STUDENT IN AN ORGANIZED HEALTH CARE EDUCATION/TRAINING PROGRAM

## 2018-08-14 PROCEDURE — 85027 COMPLETE CBC AUTOMATED: CPT | Performed by: INTERNAL MEDICINE

## 2018-08-14 PROCEDURE — 71045 X-RAY EXAM CHEST 1 VIEW: CPT | Mod: 76

## 2018-08-14 PROCEDURE — A9270 NON-COVERED ITEM OR SERVICE: HCPCS | Mod: GY | Performed by: STUDENT IN AN ORGANIZED HEALTH CARE EDUCATION/TRAINING PROGRAM

## 2018-08-14 PROCEDURE — P9016 RBC LEUKOCYTES REDUCED: HCPCS | Performed by: INTERNAL MEDICINE

## 2018-08-14 PROCEDURE — 20000004 ZZH R&B ICU UMMC

## 2018-08-14 PROCEDURE — 27210437 ZZH NUTRITION PRODUCT SEMIELEM INTERMED LITER

## 2018-08-14 PROCEDURE — 93005 ELECTROCARDIOGRAM TRACING: CPT

## 2018-08-14 PROCEDURE — 82330 ASSAY OF CALCIUM: CPT | Performed by: INTERNAL MEDICINE

## 2018-08-14 PROCEDURE — A9270 NON-COVERED ITEM OR SERVICE: HCPCS | Mod: GY | Performed by: THORACIC SURGERY (CARDIOTHORACIC VASCULAR SURGERY)

## 2018-08-14 PROCEDURE — 84100 ASSAY OF PHOSPHORUS: CPT | Performed by: INTERNAL MEDICINE

## 2018-08-14 PROCEDURE — 94003 VENT MGMT INPAT SUBQ DAY: CPT

## 2018-08-14 PROCEDURE — 82805 BLOOD GASES W/O2 SATURATION: CPT | Performed by: INTERNAL MEDICINE

## 2018-08-14 PROCEDURE — 25000132 ZZH RX MED GY IP 250 OP 250 PS 637: Mod: GY | Performed by: INTERNAL MEDICINE

## 2018-08-14 PROCEDURE — 80053 COMPREHEN METABOLIC PANEL: CPT | Performed by: INTERNAL MEDICINE

## 2018-08-14 PROCEDURE — 85610 PROTHROMBIN TIME: CPT | Performed by: INTERNAL MEDICINE

## 2018-08-14 RX ADMIN — QUETIAPINE 50 MG: 50 TABLET ORAL at 21:15

## 2018-08-14 RX ADMIN — PIPERACILLIN SODIUM AND TAZOBACTAM SODIUM 3.38 G: 3; .375 INJECTION, POWDER, LYOPHILIZED, FOR SOLUTION INTRAVENOUS at 13:29

## 2018-08-14 RX ADMIN — ASPIRIN 81 MG CHEWABLE TABLET 81 MG: 81 TABLET CHEWABLE at 08:15

## 2018-08-14 RX ADMIN — MULTIVITAMIN 15 ML: LIQUID ORAL at 08:15

## 2018-08-14 RX ADMIN — Medication 1000 MG: at 08:15

## 2018-08-14 RX ADMIN — CANGRELOR 0.75 MCG/KG/MIN: 50 INJECTION, POWDER, LYOPHILIZED, FOR SOLUTION INTRAVENOUS at 21:32

## 2018-08-14 RX ADMIN — CANGRELOR 0.75 MCG/KG/MIN: 50 INJECTION, POWDER, LYOPHILIZED, FOR SOLUTION INTRAVENOUS at 05:48

## 2018-08-14 RX ADMIN — CANGRELOR 0.75 MCG/KG/MIN: 50 INJECTION, POWDER, LYOPHILIZED, FOR SOLUTION INTRAVENOUS at 06:24

## 2018-08-14 RX ADMIN — PIPERACILLIN SODIUM AND TAZOBACTAM SODIUM 3.38 G: 3; .375 INJECTION, POWDER, LYOPHILIZED, FOR SOLUTION INTRAVENOUS at 02:21

## 2018-08-14 RX ADMIN — EPINEPHRINE 0.03 MCG/KG/MIN: 1 INJECTION PARENTERAL at 21:31

## 2018-08-14 RX ADMIN — PROPOFOL 15 MCG/KG/MIN: 10 INJECTION, EMULSION INTRAVENOUS at 05:35

## 2018-08-14 RX ADMIN — Medication 50 MCG/HR: at 16:02

## 2018-08-14 RX ADMIN — VANCOMYCIN HYDROCHLORIDE 1500 MG: 10 INJECTION, POWDER, LYOPHILIZED, FOR SOLUTION INTRAVENOUS at 22:31

## 2018-08-14 RX ADMIN — PIPERACILLIN SODIUM AND TAZOBACTAM SODIUM 3.38 G: 3; .375 INJECTION, POWDER, LYOPHILIZED, FOR SOLUTION INTRAVENOUS at 07:41

## 2018-08-14 RX ADMIN — EPINEPHRINE 0.06 MCG/KG/MIN: 1 INJECTION PARENTERAL at 02:28

## 2018-08-14 RX ADMIN — LEVETIRACETAM 750 MG: 100 INJECTION, SOLUTION INTRAVENOUS at 18:48

## 2018-08-14 RX ADMIN — Medication 1000 MG: at 21:15

## 2018-08-14 RX ADMIN — CALCIUM GLUCONATE 1 G: 98 INJECTION, SOLUTION INTRAVENOUS at 01:12

## 2018-08-14 RX ADMIN — VANCOMYCIN HYDROCHLORIDE 1500 MG: 10 INJECTION, POWDER, LYOPHILIZED, FOR SOLUTION INTRAVENOUS at 10:01

## 2018-08-14 RX ADMIN — PIPERACILLIN SODIUM AND TAZOBACTAM SODIUM 3.38 G: 3; .375 INJECTION, POWDER, LYOPHILIZED, FOR SOLUTION INTRAVENOUS at 21:16

## 2018-08-14 RX ADMIN — ATORVASTATIN CALCIUM 80 MG: 80 TABLET, FILM COATED ORAL at 21:15

## 2018-08-14 RX ADMIN — LEVETIRACETAM 750 MG: 100 INJECTION, SOLUTION INTRAVENOUS at 05:48

## 2018-08-14 RX ADMIN — QUETIAPINE FUMARATE 25 MG: 25 TABLET, FILM COATED ORAL at 08:15

## 2018-08-14 RX ADMIN — PROPOFOL 15 MCG/KG/MIN: 10 INJECTION, EMULSION INTRAVENOUS at 10:44

## 2018-08-14 RX ADMIN — SENNOSIDES AND DOCUSATE SODIUM 1 TABLET: 8.6; 5 TABLET ORAL at 08:15

## 2018-08-14 RX ADMIN — POLYETHYLENE GLYCOL 3350 17 G: 17 POWDER, FOR SOLUTION ORAL at 08:15

## 2018-08-14 RX ADMIN — SODIUM CHLORIDE: 9 INJECTION, SOLUTION INTRAVENOUS at 05:03

## 2018-08-14 RX ADMIN — POTASSIUM CHLORIDE 20 MEQ: 1.5 POWDER, FOR SOLUTION ORAL at 00:40

## 2018-08-14 RX ADMIN — DOCUSATE SODIUM 100 MG: 50 LIQUID ORAL at 08:15

## 2018-08-14 RX ADMIN — PROPOFOL 15 MCG/KG/MIN: 10 INJECTION, EMULSION INTRAVENOUS at 23:00

## 2018-08-14 RX ADMIN — PANTOPRAZOLE SODIUM 40 MG: 40 INJECTION, POWDER, FOR SOLUTION INTRAVENOUS at 08:11

## 2018-08-14 ASSESSMENT — ACTIVITIES OF DAILY LIVING (ADL)
ADLS_ACUITY_SCORE: 15

## 2018-08-14 NOTE — PLAN OF CARE
Problem: Patient Care Overview  Goal: Plan of Care/Patient Progress Review  Outcome: No Change    Neuro: Pt sedated on Propofol gtt and Fentanyl gtt. Pupils remain unequal, round, and reactive.      Cardiac: Frequently Vpaced with frequent PACs. Temporary epicardial wires in place; rate increased to 70 - MD aware. Epinephrine titrated from 0.04 to 0.06 to keep MAPs>60. Dobutamine at set rate of 2.5mcg/kg/min. Remains on IABP.     Respiratory: On CMV settings (RR 14, , Peep 5) at 40% FiO2. Coarse lung sounds with a small amount of yellow, tan, thick sputum present.      GI/: Abdomen soft with no audible bowel sounds. LBM 8/5. OG to LIS. NJ infusing with tube feeding; tube feeding rate increased to 20ml/hr at 0200 - goal 60ml/hr. Urine output 20-40ml/hr- MD aware.      Incisons: Wound vac remain in place over chest incision. 2 mediastinal chest tubes with 5-20ml/hr of sanguinous output present. 2 pleural chest tubes with 25-45ml/hr of serosanguinous output present.      Continue to monitor and update MD as needed. Continue plan of care.    Problem: Cardiac Disease Comorbidity  Goal: Cardiac Disease  Patient comorbidity will be monitored for signs and symptoms of Cardiac Disease.  Problems will be absent, minimized or managed by discharge/transition of care.   Outcome: No Change  Pt remains on IABP.

## 2018-08-14 NOTE — PLAN OF CARE
Problem: Patient Care Overview  Goal: Plan of Care/Patient Progress Review  Outcome: Improving  Remains sedated and on bedrest until 2300 since IABP removed at 1700. No signs of hematoma. Plan to restart cangrelor gtt at 1900 per CVIVU MD. Tmax 100.8. Follows some commands intermittently on unchanged rate of propofol 15 mcg/kg/min and fentanyl at 50 mcg/hr; opens eyes, squeezes with R hand, CARRIZALES. TPM remains in place, rate increased to encourage less ectopy, VVI 76bpm. Epi 0.02; Dobutamine 2.5; SVR around 800 with MAPs 65-75; plan to keep Epi in place until SVR nearer to 1000. PA 34/20, CVP 16 after receiving 1u RBC prior to IABP removal. Did not tolerate Dobutamine being turned off this morning; see 0800 yohan hemodynamics. Coarse LS; small secretions with inline suctioning, minimal cough on stimulation. Bowel regimen effective today; x1 loose stool this afternoon. UOP about 30 ml/hr. MS CT with 20/hr; Pl CT minimal out. TF continue to advance to goal nutrition rate of 60 ml/hr; presently at 40 ml/hr with q8h advancement by 10ml/hr. Pt's wife and two daughters at bedside most of the day and will return tomorrow.     Plan: pt to remain on bedrest until 2300; continue to monitor hemodynamics; consider pressure support trial after appropriate weaning of sedation.

## 2018-08-14 NOTE — ANESTHESIA POSTPROCEDURE EVALUATION
Patient: Castillo De Anda    Procedure(s):  COMBINED IRRIGATION AND DEBRIDEMENT CHEST WASHOUT, Closure - Wound Class: I-Clean    Diagnosis:Open Chest   Diagnosis Additional Information: No value filed.    Anesthesia Type:  General, ETT    Note:  Anesthesia Post Evaluation    Patient location during evaluation: ICU  Patient participation: Unable to evaluate secondary to administered sedation  Level of consciousness: obtunded/minimal responses  Pain management: adequate  Airway patency: patent  Cardiovascular status: acceptable  Respiratory status: acceptable  Hydration status: acceptable  PONV: none     Anesthetic complications: None          Last vitals:  Vitals:    08/14/18 0800 08/14/18 0900 08/14/18 1000   BP:      Resp: 16  17   Temp: 38.2  C (100.8  F) 38.1  C (100.6  F) 38.1  C (100.6  F)   SpO2: 94% 100%          Electronically Signed By: Aldo Duncan MD  August 14, 2018  11:14 AM

## 2018-08-14 NOTE — PLAN OF CARE
Problem: Patient Care Overview  Goal: Plan of Care/Patient Progress Review  OT-4E: Cancel. Pt to have balloon pump removed this afternoon, then 6hrs bedrest. Will reschedule and initiate therapy as appropriate.

## 2018-08-14 NOTE — PROGRESS NOTES
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 Underwent repair of posterior VSD and TVR with bioprosthetic valve on 8/10, and was kept with open chest due to coagulopathy and RV dysfunction. Chest closed 8/13.    TODAY'S PROGRESS:   -Continue Dobutamine at 2.5, low dose Epi for now   -Did not tolerate weaning of Dobutamine off   -IABP to 1:3 today, can remove if stable Gunner values on minimal support   -Stop Cangrelor 30 min prior to IABP removal   -Restart Cangrelor 1 hour AFTER IABP removal   -Discuss Dual Antiplatelet therapy with CVTS post IABP removal (ASA/Plavix)  -Pacer increased to VVI at 76 to prevent frequent competition from underlying rhythm  -Vancomycin/Zosyn to end tomorrow for open chest prophylaxis  -CXR to evaluate PA position  -Can keep sedated/intubated overnight, will try pressure support tomorrow if IABP out and patient stable on infusions      PLAN:  Neuro/ pain/ sedation:  #Post operative sedation  #Questionable seizure activity (temporal lobe seizures?)  - PRN Quetiapine 25mg for sleep, ativan PRN  - Prior to OR, there were suggestions of questionable seizure activity, neurology will resume EEG when closer to extubation  - Monitor neurological status. Notify the MD for any acute changes in exam.  - Propofol for sedation  - Fentanyl for analgesia  - Restart EEG post chest closure (8/14-8/15)    Pulmonary care:  #Post operative mechanical ventilation   - Intubated/sedated for open chest  - No plans to wean or pressure support at this time    Cardiovascular:  #Post infarction 2.5cm infraseptal VSD  #s/p HUSSEIN to distal RCA and proximal LAD  #STEMI, cardiogenic shock  #Dilated RV   #Pulmonary HTN  #IABP  #s/p repair  of posterior VSD and TVR with bioprosthetic valve   - Monitor hemodynamic status. Pacer at VVI at 76  - Infusions: Dobutamine to continue at 2.5 with low dose Epi   -Wean IABP to 1:3, then remove if stable (with cangrelor off for 30 min). Monitor PA values and titrate infusions as needed.   - Consider diuresis with low dose of lasix  - ECHO from 8/8 prior to surgery: Inferior wall akinesis with inferoseptal muscular VAD. Mild to moderate right ventricular dilation is present. Global right ventricular function is moderately reduced.  - ASA 81mg  - s/p Chest closure 8/13    Heme:  #Anticoagluation s/p Drug Eluting Stent placement  #Thrombocythemia  -Cangrelor gtt restarted 8/12, anticipate stopping 30-60min prior to OR  -Cangrelor gtt for now, will need to resume DAPT as soon as feasible   -Discuss with cardiology team regarding dosing post chest closure  -Hydroxyurea 1g BID, hematology consulted and following    GI care:  #Transaminitis   - NJ placed 8/13, TF to goal    Fluids/ Electrolytes/ Nutrition:   - mIVF for IV fluid hydration  - Wiggins    Renal/ Fluid Balance:    #Acute Kidney injury  - Urine output stable overnight, Creatinine 1.34  - Will continue to monitor intake and output via wiggins    Endocrine:  #Stress hyperglycemia  - Insulin gtt    ID/ Antibiotics:  #Open chest  - Monitor fever curve, trend WBC  - Vancomycin/Zosyn for 48h (8/14) post chest closure    Prophylaxis:    - Mechanical prophylaxis for DVT.   - Cangrelor gtt started 8/12, can stop 30-60min prior to procedures   -Discuss on DAPT with CVTS once IABP out  - Protonix    Lines/ tubes/ drains:  - ETT, arterial line, PIV, CVC/[place], IABP    Disposition:  - CV ICU    Patient seen, findings and plan discussed with CV ICU staff, Dr. Dyer.    Brad Hawkins MD  8/14/2018 6:53 AM  Anesthesia Resident  PGY4/CA-3      ====================================    SUBJECTIVE:   -Paced at 70 overnight, some arrhythmias with competition from underlying  rhythm.    OBJECTIVE:   1. VITAL SIGNS:   Temp:  [97  F (36.1  C)-100.8  F (38.2  C)] 100.8  F (38.2  C)  Heart Rate:  [52-82] 69  Resp:  [14-23] 23  MAP:  [35 mmHg-106 mmHg] 77 mmHg  Arterial Line BP: ()/(13-68) 103/61  FiO2 (%):  [40 %] 40 %  SpO2:  [78 %-100 %] 100 %  Ventilation Mode: CMV/AC  (Continuous Mandatory Ventilation/ Assist Control)  FiO2 (%): 40 %  Rate Set (breaths/minute): 14 breaths/min  Tidal Volume Set (mL): 550 mL  PEEP (cm H2O): 5 cmH2O  Oxygen Concentration (%): 40 %  Resp: 23    2. INTAKE/ OUTPUT:   I/O last 3 completed shifts:  In: 4006.25 [I.V.:2946.25; NG/GT:755]  Out: 2998 [Urine:898; Emesis/NG output:300; Chest Tube:1800]    3. PHYSICAL EXAMINATION:   General: Intubated, on minimal sedation  Neuro: On minimal sedation, follows commands, appropriately somnolent  Resp: Mechanical ventilation, intubated, coarse breath sounds  CV: Paced rhythm, closed chest, some competition from underlying rhythm  Abdomen: Soft, dressings in place  Incisions: Dressing over chest, chest tubes in place, open chest, IABP in place  Extremities: warm and well perfused    4. INVESTIGATIONS:   Arterial Blood Gases     Recent Labs  Lab 08/14/18  0331 08/13/18  1845 08/13/18  1511 08/13/18  1319   PH 7.41 7.39 7.33* 7.30*   PCO2 32* 33* 38 43   PO2 129* 101 125* 125*   HCO3 20* 20* 20* 21     Complete Blood Count     Recent Labs  Lab 08/14/18  0331 08/13/18  1511 08/13/18  1319 08/13/18  0433  08/12/18  0338   WBC 10.6 11.9*  --  11.7*  --  12.4*   HGB 7.8* 8.4* 8.0* 8.3*  < > 7.8*   * 495*  --  432  --  521*   < > = values in this interval not displayed.  Basic Metabolic Panel    Recent Labs  Lab 08/14/18  0331 08/13/18  2340 08/13/18  1511 08/13/18  1319  08/13/18  0433 08/12/18  1613     --  144 140  --  143 145*   POTASSIUM 4.6 4.0 4.1 4.6  < > 4.0 4.1   CHLORIDE 115*  --  112*  --   --  113* 111*   CO2 20  --  21  --   --  21 22   BUN 25  --  28  --   --  29 34*   CR 0.84  --  0.93  --   --   0.99 1.15   *  --  133* 127*  --  115* 129*   < > = values in this interval not displayed.  Liver Function Tests    Recent Labs  Lab 08/14/18  0331 08/13/18  1511 08/13/18  0433 08/12/18  0338  08/10/18  0408   AST 56*  --  68* 102*  --  24   ALT 20  --  21 27  --  37   ALKPHOS 115  --  82 60  --  80   BILITOTAL 0.6  --  0.7 0.7  --  0.5   ALBUMIN 1.9*  --  1.9* 2.3*  --  2.7*   INR 1.32* 1.34* 1.44* 1.51*  < > 1.30*   < > = values in this interval not displayed.  Pancreatic Enzymes  No lab results found in last 7 days.  Coagulation Profile    Recent Labs  Lab 08/14/18  0331 08/13/18  1511 08/13/18  0433 08/12/18  0338  08/10/18  2243 08/10/18  2132   INR 1.32* 1.34* 1.44* 1.51*  < > 1.70* 1.67*   PTT  --   --   --   --   --  46* 41*   < > = values in this interval not displayed.      5. RADIOLOGY:   Recent Results (from the past 24 hour(s))   XR Chest Port 1 View    Narrative    EXAMINATION:  XR CHEST PORT 1 VW 8/13/2018 2:23 PM.    COMPARISON: 8/13/2018 chest x-ray at 2:02 hours.    HISTORY:  Chest closure.  No mis count per OR nurse.  Chest radiograph  per chest closure protocol.     FINDINGS: AP portable chest x-ray completed in operating room.  [place] catheter projects over the right pulmonary artery. Aortic  balloon pump projects 2.5 cm below audelia. Stable position of left and  right chest tubes and 2 mediastinal drains. Endotracheal tube projects  3.8 cm above the audelia. Sternotomy closure wires. Presumed external  pacer wires project over the mediastinum.    Stable appearance of the cardiomediastinal silhouette. Persistent left  retrocardiac opacity . Unchanged prominence of the pulmonary  vasculature. No pneumothorax.      Impression    IMPRESSION:  1. Stable support devices. The aortic balloon pump projects below the  level of the audelia.  Consider repositioning.  2. Unchanged left retrocardiac subsegmental atelectasis and/or  consolidation.    Results communicated with OR nurse Christine Price  by Dr. Koroma.     I have personally reviewed the examination and initial interpretation  and I agree with the findings.    ANNY KOROMA MD   XR Abdomen Port 1 View    Narrative    EXAM: XR ABDOMEN PORT 1 VW  8/13/2018 5:10 PM      HISTORY: Verify small bowel feeding tube bedside placement;     COMPARISON: Radiograph 8/11/2018, 8/13/2018    FINDINGS: Single supine frontal view of the abdomen. New feeding tube  tip projects in the third segment of the duodenum. OG/NG tube tip and  side-port project over the gastric region. Partially seen multiple  mediastinal drains, chest tubes,[place] elia catheter and intra-aortic  balloon pump. Epicardial pacer leads.    No abnormally dilated loops of bowel. No pneumatosis or portal venous  gas.      Small left pleural effusion with overlying atelectasis/consolidation.      Impression    IMPRESSION:   New feeding tube tip projects in the third segment of the duodenum.  Otherwise no significant change since the abdominal radiograph  8/11/2018.    I have personally reviewed the examination and initial interpretation  and I agree with the findings.    GERMÁN MORRISON MD   XR Chest Port 1 View    Narrative    Exam: XR CHEST PORT 1 VW, 8/14/2018 2:02 AM    Indication: [facility], intubated;     Comparison: Chest x-ray 8/13/2018    Findings:   Semiupright frontal x-ray of the chest.    Right IJ central venous catheter with tip projecting over the main  pulmonary artery. Endotracheal tube is seen projecting about 3.5 cm  above the audelia enteric tube is coursing below the diaphragm with tip  collimated off the field of view. Stable postsurgical changes of  cardiac surgery as evidenced by intact appearing sternal wires. Stable  position of chest tubes, bilateral. External cardiac pacer wires.  Stable position of 2 mediastinal drains. Aortic balloon pump with tip,  below the level of audelia.    Unchanged cardiomediastinal silhouette. No pneumothorax. Persistent  left retrocardiac opacity with  small left pleural effusion.      Impression    Impression:     1. Stable support devices. The aortic balloon pump projects below the  level of audelia. Consider repositioning.  2. Unchanged left retrocardiac subsegmental atelectasis and/or  consolidation and likely small left pleural effusion.    I have personally reviewed the examination and initial interpretation  and I agree with the findings.    LORENA GUZMAN MD

## 2018-08-14 NOTE — PROGRESS NOTES
Care Coordinator Progress Note    Admission Date/Time:  8/6/2018  Attending MD:  Jason Franco, *    Data  Chart reviewed, discussed with interdisciplinary team.   Pt is s/p HUSSEIN to RCA and LAD for STEMI on 8/4 found to have post infarction basal VSD. Underwent repair of posterior VSD and TVR with bioprosthetic valve on 8/10, and was kept with open chest due to coagulopathy and RV dysfunction.      Concerns with insurance coverage for discharge needs: None.  Current Living Situation: Patient lives with spouse.  Support System: Supportive and Involved  Services Involved: None.  Transportation at Discharge: Family or friend will provide  Barriers to Discharge: Pt is not medically ready for discharge.     Assessment  Pt is vented, sedated and with IABP.  Pt chest closed yesterday, 8/13.  Visited pt spouse to introduce self and for support.  RNCC role discussed and contact info provide.  Pt spouse stated the team have been updating them well about the plan of care.  Pt and spouse live in [place].  Pt spouse stated she is staying locally at her dtr place ( has 2 kids that lives locally).  RNCC will cont to follow plan of care.     Plan  Anticipated Discharge Date:  TBD.  Anticipated Discharge Plan:   TBD.  RNCC will cont to follow plan of care.      Francis Sewell RN, PHN, BSN  4A and 4E/ ICU  Care Coordinator  Phone: [phone]  Pager: [phone]

## 2018-08-14 NOTE — PROGRESS NOTES
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 Underwent repair of posterior VSD and TVR with bioprosthetic valve on 8/10, and was kept with open chest due to coagulopathy and RV dysfunction. Chest closed 8/13.    TODAY'S PROGRESS:   -Continue Dobutamine at 2.5, low dose Epi for now   -Did not tolerate weaning of Dobutamine off   -IABP to 1:3 today, can remove if stable [place] values on minimal support   -Stop Cangrelor 30 min prior to IABP removal   -Restart Cangrelor 1 hour AFTER IABP removal   -Discuss Dual Antiplatelet therapy with CVTS post IABP removal (ASA/Plavix)  -Pacer increased to VVI at 76 to prevent frequent competition from underlying rhythm  -Vancomycin/Zosyn to end tomorrow for open chest prophylaxis  -CXR to evaluate PA position  -Can keep sedated/intubated overnight, will try pressure support tomorrow if IABP out and patient stable on infusions      PLAN:  Neuro/ pain/ sedation:  #Post operative sedation  #Questionable seizure activity (temporal lobe seizures?)  - PRN Quetiapine 25mg for sleep, ativan PRN  - Prior to OR, there were suggestions of questionable seizure activity, neurology will resume EEG when closer to extubation  - Monitor neurological status. Notify the MD for any acute changes in exam.  - Propofol for sedation  - Fentanyl for analgesia  - Restart EEG post chest closure (8/14-8/15)    Pulmonary care:  #Post operative mechanical ventilation   - Intubated/sedated for open chest  - No plans to wean or pressure support at this time    Cardiovascular:  #Post infarction 2.5cm infraseptal VSD  #s/p HUSSEIN to distal RCA and proximal LAD  #STEMI, cardiogenic shock  #Dilated RV   #Pulmonary HTN  #IABP  #s/p repair of  posterior VSD and TVR with bioprosthetic valve   - Monitor hemodynamic status. Pacer at VVI at 76  - Infusions: Dobutamine to continue at 2.5 with low dose Epi   -Wean IABP to 1:3, then remove if stable (with cangrelor off for 30 min). Monitor PA values and titrate infusions as needed.   - Consider diuresis with low dose of lasix  - ECHO from 8/8 prior to surgery: Inferior wall akinesis with inferoseptal muscular VAD. Mild to moderate right ventricular dilation is present. Global right ventricular function is moderately reduced.  - ASA 81mg  - s/p Chest closure 8/13    Heme:  #Anticoagluation s/p Drug Eluting Stent placement  #Thrombocythemia  -Cangrelor gtt restarted 8/12, anticipate stopping 30-60min prior to OR  -Cangrelor gtt for now, will need to resume DAPT as soon as feasible   -Discuss with cardiology team regarding dosing post chest closure  -Hydroxyurea 1g BID, hematology consulted and following    GI care:  #Transaminitis   - NJ placed 8/13, TF to goal    Fluids/ Electrolytes/ Nutrition:   - mIVF for IV fluid hydration  - Wiggins    Renal/ Fluid Balance:    #Acute Kidney injury  - Urine output stable overnight, Creatinine 1.34  - Will continue to monitor intake and output via wiggins    Endocrine:  #Stress hyperglycemia  - Insulin gtt    ID/ Antibiotics:  #Open chest  - Monitor fever curve, trend WBC  - Vancomycin/Zosyn for 48h (8/14) post chest closure    Prophylaxis:    - Mechanical prophylaxis for DVT.   - Cangrelor gtt started 8/12, can stop 30-60min prior to procedures   -Discuss on DAPT with CVTS once IABP out  - Protonix    Lines/ tubes/ drains:  - ETT, arterial line, PIV, CVC/[place], IABP    Disposition:  - CV ICU    Patient seen, findings and plan discussed with CVTS.    Brad Hawkins MD  8/14/2018 6:53 AM  Anesthesia Resident  PGY4/CA-3      ====================================    SUBJECTIVE:   -Paced at 70 overnight, some arrhythmias with competition from underlying rhythm.    OBJECTIVE:   1. VITAL  SIGNS:   Temp:  [97  F (36.1  C)-100.8  F (38.2  C)] 100.6  F (38.1  C)  Heart Rate:  [57-76] 75  Resp:  [14-23] 17  MAP:  [60 mmHg-92 mmHg] 70 mmHg  Arterial Line BP: ()/(29-67) 93/52  FiO2 (%):  [40 %] 40 %  SpO2:  [93 %-100 %] 100 %  Ventilation Mode: CMV/AC  (Continuous Mandatory Ventilation/ Assist Control)  FiO2 (%): 40 %  Rate Set (breaths/minute): 14 breaths/min  Tidal Volume Set (mL): 550 mL  PEEP (cm H2O): 5 cmH2O  Oxygen Concentration (%): 40 %  Resp: 17    2. INTAKE/ OUTPUT:   I/O last 3 completed shifts:  In: 4276.82 [I.V.:2996.82; NG/GT:855]  Out: 3138 [Urine:918; Emesis/NG output:450; Chest Tube:1770]    3. PHYSICAL EXAMINATION:   General: Intubated, on minimal sedation  Neuro: On minimal sedation, follows commands, appropriately somnolent  Resp: Mechanical ventilation, intubated, coarse breath sounds  CV: Paced rhythm, closed chest, some competition from underlying rhythm  Abdomen: Soft, dressings in place  Incisions: Dressing over chest, chest tubes in place, open chest, IABP in place  Extremities: warm and well perfused    4. INVESTIGATIONS:   Arterial Blood Gases     Recent Labs  Lab 08/14/18  0331 08/13/18  1845 08/13/18  1511 08/13/18  1319   PH 7.41 7.39 7.33* 7.30*   PCO2 32* 33* 38 43   PO2 129* 101 125* 125*   HCO3 20* 20* 20* 21     Complete Blood Count     Recent Labs  Lab 08/14/18  0331 08/13/18  1511 08/13/18  1319 08/13/18  0433  08/12/18  0338   WBC 10.6 11.9*  --  11.7*  --  12.4*   HGB 7.8* 8.4* 8.0* 8.3*  < > 7.8*   * 495*  --  432  --  521*   < > = values in this interval not displayed.  Basic Metabolic Panel    Recent Labs  Lab 08/14/18  0331 08/13/18  2340 08/13/18  1511 08/13/18  1319  08/13/18  0433 08/12/18  1613     --  144 140  --  143 145*   POTASSIUM 4.6 4.0 4.1 4.6  < > 4.0 4.1   CHLORIDE 115*  --  112*  --   --  113* 111*   CO2 20  --  21  --   --  21 22   BUN 25  --  28  --   --  29 34*   CR 0.84  --  0.93  --   --  0.99 1.15   *  --  133* 127*   --  115* 129*   < > = values in this interval not displayed.  Liver Function Tests    Recent Labs  Lab 08/14/18  0331 08/13/18  1511 08/13/18  0433 08/12/18  0338  08/10/18  0408   AST 56*  --  68* 102*  --  24   ALT 20  --  21 27  --  37   ALKPHOS 115  --  82 60  --  80   BILITOTAL 0.6  --  0.7 0.7  --  0.5   ALBUMIN 1.9*  --  1.9* 2.3*  --  2.7*   INR 1.32* 1.34* 1.44* 1.51*  < > 1.30*   < > = values in this interval not displayed.  Pancreatic Enzymes  No lab results found in last 7 days.  Coagulation Profile    Recent Labs  Lab 08/14/18 0331 08/13/18  1511 08/13/18  0433 08/12/18  0338  08/10/18  2243 08/10/18  2132   INR 1.32* 1.34* 1.44* 1.51*  < > 1.70* 1.67*   PTT  --   --   --   --   --  46* 41*   < > = values in this interval not displayed.      5. RADIOLOGY:   Recent Results (from the past 24 hour(s))   XR Chest Port 1 View    Narrative    EXAMINATION:  XR CHEST PORT 1 VW 8/13/2018 2:23 PM.    COMPARISON: 8/13/2018 chest x-ray at 2:02 hours.    HISTORY:  Chest closure.  No mis count per OR nurse.  Chest radiograph  per chest closure protocol.     FINDINGS: AP portable chest x-ray completed in operating room.  [place]wen catheter projects over the right pulmonary artery. Aortic  balloon pump projects 2.5 cm below audelia. Stable position of left and  right chest tubes and 2 mediastinal drains. Endotracheal tube projects  3.8 cm above the audelia. Sternotomy closure wires. Presumed external  pacer wires project over the mediastinum.    Stable appearance of the cardiomediastinal silhouette. Persistent left  retrocardiac opacity . Unchanged prominence of the pulmonary  vasculature. No pneumothorax.      Impression    IMPRESSION:  1. Stable support devices. The aortic balloon pump projects below the  level of the audelia.  Consider repositioning.  2. Unchanged left retrocardiac subsegmental atelectasis and/or  consolidation.    Results communicated with OR nurse Christine Price by Dr. Arana.     I have personally  reviewed the examination and initial interpretation  and I agree with the findings.    ANNY KOROMA MD   XR Abdomen Port 1 View    Narrative    EXAM: XR ABDOMEN PORT 1 VW  8/13/2018 5:10 PM      HISTORY: Verify small bowel feeding tube bedside placement;     COMPARISON: Radiograph 8/11/2018, 8/13/2018    FINDINGS: Single supine frontal view of the abdomen. New feeding tube  tip projects in the third segment of the duodenum. OG/NG tube tip and  side-port project over the gastric region. Partially seen multiple  mediastinal drains, chest tubes,swanz elia catheter and intra-aortic  balloon pump. Epicardial pacer leads.    No abnormally dilated loops of bowel. No pneumatosis or portal venous  gas.      Small left pleural effusion with overlying atelectasis/consolidation.      Impression    IMPRESSION:   New feeding tube tip projects in the third segment of the duodenum.  Otherwise no significant change since the abdominal radiograph  8/11/2018.    I have personally reviewed the examination and initial interpretation  and I agree with the findings.    GERMÁN MORRISON MD   XR Chest Port 1 View    Narrative    Exam: XR CHEST PORT 1 VW, 8/14/2018 2:02 AM    Indication: CVICU, intubated;     Comparison: Chest x-ray 8/13/2018    Findings:   Semiupright frontal x-ray of the chest.    Right IJ central venous catheter with tip projecting over the main  pulmonary artery. Endotracheal tube is seen projecting about 3.5 cm  above the audelia enteric tube is coursing below the diaphragm with tip  collimated off the field of view. Stable postsurgical changes of  cardiac surgery as evidenced by intact appearing sternal wires. Stable  position of chest tubes, bilateral. External cardiac pacer wires.  Stable position of 2 mediastinal drains. Aortic balloon pump with tip,  below the level of audelia.    Unchanged cardiomediastinal silhouette. No pneumothorax. Persistent  left retrocardiac opacity with small left pleural effusion.       Impression    Impression:     1. Stable support devices. The aortic balloon pump projects below the  level of audelia. Consider repositioning.  2. Unchanged left retrocardiac subsegmental atelectasis and/or  consolidation and likely small left pleural effusion.    I have personally reviewed the examination and initial interpretation  and I agree with the findings.    LORENA GUZMAN MD   XR Chest Port 1 View    Narrative    Exam: XR CHEST PORT 1 VW, 8/14/2018 10:47 AM    Indication: cvicu, intubated, pulled swan back 2cm, eval position;     Comparison: 8/14/2018    Findings:   Endotracheal tube tip at the mid thoracic trachea. Right internal  jugular [place]wen catheter tip at the right main pulmonary artery.  Enteric tubes course below the diaphragm and off the image margin.  Bilateral chest tubes in stable position. Intra-aortic balloon pump  marker in stable position, approximately 1.5 cm below the audelia. 2  mediastinal drains stable position. Stable cardiac mediastinal  silhouette. The pulmonary vasculature is indistinct. Stable small left  pleural effusion with adjacent basilar opacities. No pneumothorax.  Median sternotomy wires.      Impression    Impression:   1. [place]wen catheter in similar position to earlier same day, with  tip projecting over the right main pulmonary artery. Other support  devices are stable as well.  2. Stable small left pleural effusion with adjacent  atelectasis/consolidation.    KIMBERLEY ARAIZA, DO

## 2018-08-15 ENCOUNTER — APPOINTMENT (OUTPATIENT)
Dept: GENERAL RADIOLOGY | Facility: CLINIC | Age: 68
DRG: 219 | End: 2018-08-15
Attending: INTERNAL MEDICINE
Payer: MEDICARE

## 2018-08-15 ENCOUNTER — APPOINTMENT (OUTPATIENT)
Dept: OCCUPATIONAL THERAPY | Facility: CLINIC | Age: 68
DRG: 219 | End: 2018-08-15
Attending: INTERNAL MEDICINE
Payer: MEDICARE

## 2018-08-15 LAB
ABO + RH BLD: NORMAL
ABO + RH BLD: NORMAL
ALBUMIN SERPL-MCNC: 1.7 G/DL (ref 3.4–5)
ALP SERPL-CCNC: 139 U/L (ref 40–150)
ALT SERPL W P-5'-P-CCNC: 16 U/L (ref 0–70)
ANION GAP SERPL CALCULATED.3IONS-SCNC: 6 MMOL/L (ref 3–14)
AST SERPL W P-5'-P-CCNC: 47 U/L (ref 0–45)
BASE DEFICIT BLDA-SCNC: 3.7 MMOL/L
BASE DEFICIT BLDA-SCNC: 7 MMOL/L
BASE DEFICIT BLDV-SCNC: 3.4 MMOL/L
BASE DEFICIT BLDV-SCNC: 4.3 MMOL/L
BASE DEFICIT BLDV-SCNC: 4.7 MMOL/L
BASE DEFICIT BLDV-SCNC: 4.8 MMOL/L
BILIRUB SERPL-MCNC: 0.6 MG/DL (ref 0.2–1.3)
BLD GP AB SCN SERPL QL: NORMAL
BLD PROD TYP BPU: NORMAL
BLD UNIT ID BPU: 0
BLOOD BANK CMNT PATIENT-IMP: NORMAL
BLOOD PRODUCT CODE: NORMAL
BPU ID: NORMAL
BUN SERPL-MCNC: 28 MG/DL (ref 7–30)
CA-I BLD-MCNC: 4.3 MG/DL (ref 4.4–5.2)
CALCIUM SERPL-MCNC: 6.9 MG/DL (ref 8.5–10.1)
CHLORIDE SERPL-SCNC: 119 MMOL/L (ref 94–109)
CO2 SERPL-SCNC: 21 MMOL/L (ref 20–32)
CREAT SERPL-MCNC: 0.67 MG/DL (ref 0.66–1.25)
ERYTHROCYTE [DISTWIDTH] IN BLOOD BY AUTOMATED COUNT: 18.7 % (ref 10–15)
ERYTHROCYTE [DISTWIDTH] IN BLOOD BY AUTOMATED COUNT: 19 % (ref 10–15)
GFR SERPL CREATININE-BSD FRML MDRD: >90 ML/MIN/1.7M2
GLUCOSE BLDC GLUCOMTR-MCNC: 116 MG/DL (ref 70–99)
GLUCOSE BLDC GLUCOMTR-MCNC: 130 MG/DL (ref 70–99)
GLUCOSE BLDC GLUCOMTR-MCNC: 136 MG/DL (ref 70–99)
GLUCOSE BLDC GLUCOMTR-MCNC: 143 MG/DL (ref 70–99)
GLUCOSE BLDC GLUCOMTR-MCNC: 151 MG/DL (ref 70–99)
GLUCOSE BLDC GLUCOMTR-MCNC: 156 MG/DL (ref 70–99)
GLUCOSE SERPL-MCNC: 135 MG/DL (ref 70–99)
GLUCOSE SERPL-MCNC: 156 MG/DL (ref 70–99)
HCO3 BLD-SCNC: 17 MMOL/L (ref 21–28)
HCO3 BLD-SCNC: 21 MMOL/L (ref 21–28)
HCO3 BLDV-SCNC: 20 MMOL/L (ref 21–28)
HCO3 BLDV-SCNC: 21 MMOL/L (ref 21–28)
HCT VFR BLD AUTO: 27.4 % (ref 40–53)
HCT VFR BLD AUTO: 28.2 % (ref 40–53)
HGB BLD-MCNC: 8.9 G/DL (ref 13.3–17.7)
HGB BLD-MCNC: 9 G/DL (ref 13.3–17.7)
INR PPP: 1.27 (ref 0.86–1.14)
INTERPRETATION ECG - MUSE: NORMAL
MAGNESIUM SERPL-MCNC: 2 MG/DL (ref 1.6–2.3)
MAGNESIUM SERPL-MCNC: 2.1 MG/DL (ref 1.6–2.3)
MAGNESIUM SERPL-MCNC: 2.2 MG/DL (ref 1.6–2.3)
MCH RBC QN AUTO: 29 PG (ref 26.5–33)
MCH RBC QN AUTO: 29.1 PG (ref 26.5–33)
MCHC RBC AUTO-ENTMCNC: 31.9 G/DL (ref 31.5–36.5)
MCHC RBC AUTO-ENTMCNC: 32.5 G/DL (ref 31.5–36.5)
MCV RBC AUTO: 89 FL (ref 78–100)
MCV RBC AUTO: 91 FL (ref 78–100)
O2/TOTAL GAS SETTING VFR VENT: 40 %
OXYHGB MFR BLD: 97 % (ref 92–100)
OXYHGB MFR BLD: 97 % (ref 92–100)
OXYHGB MFR BLDV: 52 %
OXYHGB MFR BLDV: 57 %
OXYHGB MFR BLDV: 58 %
OXYHGB MFR BLDV: 61 %
PCO2 BLD: 27 MM HG (ref 35–45)
PCO2 BLD: 32 MM HG (ref 35–45)
PCO2 BLDV: 36 MM HG (ref 40–50)
PCO2 BLDV: 36 MM HG (ref 40–50)
PCO2 BLDV: 37 MM HG (ref 40–50)
PCO2 BLDV: 38 MM HG (ref 40–50)
PH BLD: 7.4 PH (ref 7.35–7.45)
PH BLD: 7.41 PH (ref 7.35–7.45)
PH BLDV: 7.34 PH (ref 7.32–7.43)
PH BLDV: 7.36 PH (ref 7.32–7.43)
PH BLDV: 7.36 PH (ref 7.32–7.43)
PH BLDV: 7.38 PH (ref 7.32–7.43)
PHOSPHATE SERPL-MCNC: 1.8 MG/DL (ref 2.5–4.5)
PHOSPHATE SERPL-MCNC: 2.6 MG/DL (ref 2.5–4.5)
PHOSPHATE SERPL-MCNC: 3.3 MG/DL (ref 2.5–4.5)
PLATELET # BLD AUTO: 427 10E9/L (ref 150–450)
PLATELET # BLD AUTO: 442 10E9/L (ref 150–450)
PO2 BLD: 145 MM HG (ref 80–105)
PO2 BLD: 159 MM HG (ref 80–105)
PO2 BLDV: 31 MM HG (ref 25–47)
PO2 BLDV: 32 MM HG (ref 25–47)
PO2 BLDV: 32 MM HG (ref 25–47)
PO2 BLDV: 35 MM HG (ref 25–47)
POTASSIUM SERPL-SCNC: 3.9 MMOL/L (ref 3.4–5.3)
POTASSIUM SERPL-SCNC: 4.2 MMOL/L (ref 3.4–5.3)
POTASSIUM SERPL-SCNC: 4.3 MMOL/L (ref 3.4–5.3)
PROT SERPL-MCNC: 4.8 G/DL (ref 6.8–8.8)
RBC # BLD AUTO: 3.07 10E12/L (ref 4.4–5.9)
RBC # BLD AUTO: 3.09 10E12/L (ref 4.4–5.9)
SODIUM SERPL-SCNC: 146 MMOL/L (ref 133–144)
SPECIMEN EXP DATE BLD: NORMAL
TRANSFUSION STATUS PATIENT QL: NORMAL
WBC # BLD AUTO: 12 10E9/L (ref 4–11)
WBC # BLD AUTO: 12.9 10E9/L (ref 4–11)

## 2018-08-15 PROCEDURE — 86850 RBC ANTIBODY SCREEN: CPT | Performed by: THORACIC SURGERY (CARDIOTHORACIC VASCULAR SURGERY)

## 2018-08-15 PROCEDURE — A9270 NON-COVERED ITEM OR SERVICE: HCPCS | Mod: GY | Performed by: STUDENT IN AN ORGANIZED HEALTH CARE EDUCATION/TRAINING PROGRAM

## 2018-08-15 PROCEDURE — 25000131 ZZH RX MED GY IP 250 OP 636 PS 637: Mod: GY | Performed by: STUDENT IN AN ORGANIZED HEALTH CARE EDUCATION/TRAINING PROGRAM

## 2018-08-15 PROCEDURE — 86900 BLOOD TYPING SEROLOGIC ABO: CPT | Performed by: THORACIC SURGERY (CARDIOTHORACIC VASCULAR SURGERY)

## 2018-08-15 PROCEDURE — 97168 OT RE-EVAL EST PLAN CARE: CPT | Mod: GO | Performed by: OCCUPATIONAL THERAPIST

## 2018-08-15 PROCEDURE — 80053 COMPREHEN METABOLIC PANEL: CPT | Performed by: INTERNAL MEDICINE

## 2018-08-15 PROCEDURE — 99221 1ST HOSP IP/OBS SF/LOW 40: CPT | Performed by: INTERNAL MEDICINE

## 2018-08-15 PROCEDURE — 25000125 ZZHC RX 250: Performed by: THORACIC SURGERY (CARDIOTHORACIC VASCULAR SURGERY)

## 2018-08-15 PROCEDURE — 84132 ASSAY OF SERUM POTASSIUM: CPT | Performed by: INTERNAL MEDICINE

## 2018-08-15 PROCEDURE — 40000133 ZZH STATISTIC OT WARD VISIT: Performed by: OCCUPATIONAL THERAPIST

## 2018-08-15 PROCEDURE — 85027 COMPLETE CBC AUTOMATED: CPT | Performed by: INTERNAL MEDICINE

## 2018-08-15 PROCEDURE — 84132 ASSAY OF SERUM POTASSIUM: CPT | Performed by: STUDENT IN AN ORGANIZED HEALTH CARE EDUCATION/TRAINING PROGRAM

## 2018-08-15 PROCEDURE — 25000128 H RX IP 250 OP 636: Performed by: STUDENT IN AN ORGANIZED HEALTH CARE EDUCATION/TRAINING PROGRAM

## 2018-08-15 PROCEDURE — 97110 THERAPEUTIC EXERCISES: CPT | Mod: GO | Performed by: OCCUPATIONAL THERAPIST

## 2018-08-15 PROCEDURE — 00000146 ZZHCL STATISTIC GLUCOSE BY METER IP

## 2018-08-15 PROCEDURE — 82947 ASSAY GLUCOSE BLOOD QUANT: CPT | Performed by: INTERNAL MEDICINE

## 2018-08-15 PROCEDURE — 40000275 ZZH STATISTIC RCP TIME EA 10 MIN

## 2018-08-15 PROCEDURE — 25000132 ZZH RX MED GY IP 250 OP 250 PS 637: Mod: GY | Performed by: STUDENT IN AN ORGANIZED HEALTH CARE EDUCATION/TRAINING PROGRAM

## 2018-08-15 PROCEDURE — 71045 X-RAY EXAM CHEST 1 VIEW: CPT

## 2018-08-15 PROCEDURE — 25000128 H RX IP 250 OP 636: Performed by: THORACIC SURGERY (CARDIOTHORACIC VASCULAR SURGERY)

## 2018-08-15 PROCEDURE — A9270 NON-COVERED ITEM OR SERVICE: HCPCS | Mod: GY | Performed by: THORACIC SURGERY (CARDIOTHORACIC VASCULAR SURGERY)

## 2018-08-15 PROCEDURE — 40000014 ZZH STATISTIC ARTERIAL MONITORING DAILY

## 2018-08-15 PROCEDURE — 86901 BLOOD TYPING SEROLOGIC RH(D): CPT | Performed by: THORACIC SURGERY (CARDIOTHORACIC VASCULAR SURGERY)

## 2018-08-15 PROCEDURE — 20000004 ZZH R&B ICU UMMC

## 2018-08-15 PROCEDURE — 82330 ASSAY OF CALCIUM: CPT | Performed by: STUDENT IN AN ORGANIZED HEALTH CARE EDUCATION/TRAINING PROGRAM

## 2018-08-15 PROCEDURE — A9270 NON-COVERED ITEM OR SERVICE: HCPCS | Mod: GY | Performed by: INTERNAL MEDICINE

## 2018-08-15 PROCEDURE — 25000132 ZZH RX MED GY IP 250 OP 250 PS 637: Mod: GY | Performed by: THORACIC SURGERY (CARDIOTHORACIC VASCULAR SURGERY)

## 2018-08-15 PROCEDURE — 25000128 H RX IP 250 OP 636: Performed by: SURGERY

## 2018-08-15 PROCEDURE — 82805 BLOOD GASES W/O2 SATURATION: CPT | Performed by: STUDENT IN AN ORGANIZED HEALTH CARE EDUCATION/TRAINING PROGRAM

## 2018-08-15 PROCEDURE — 25000132 ZZH RX MED GY IP 250 OP 250 PS 637: Mod: GY | Performed by: INTERNAL MEDICINE

## 2018-08-15 PROCEDURE — 85610 PROTHROMBIN TIME: CPT | Performed by: INTERNAL MEDICINE

## 2018-08-15 PROCEDURE — 99291 CRITICAL CARE FIRST HOUR: CPT | Mod: GC | Performed by: ANESTHESIOLOGY

## 2018-08-15 PROCEDURE — 84100 ASSAY OF PHOSPHORUS: CPT | Performed by: STUDENT IN AN ORGANIZED HEALTH CARE EDUCATION/TRAINING PROGRAM

## 2018-08-15 PROCEDURE — 40000048 ZZH STATISTIC DAILY SWAN MONITORING

## 2018-08-15 PROCEDURE — 25000125 ZZHC RX 250: Performed by: STUDENT IN AN ORGANIZED HEALTH CARE EDUCATION/TRAINING PROGRAM

## 2018-08-15 PROCEDURE — C9460 INJECTION, CANGRELOR: HCPCS | Performed by: STUDENT IN AN ORGANIZED HEALTH CARE EDUCATION/TRAINING PROGRAM

## 2018-08-15 PROCEDURE — 83735 ASSAY OF MAGNESIUM: CPT | Performed by: INTERNAL MEDICINE

## 2018-08-15 PROCEDURE — 83735 ASSAY OF MAGNESIUM: CPT | Performed by: STUDENT IN AN ORGANIZED HEALTH CARE EDUCATION/TRAINING PROGRAM

## 2018-08-15 PROCEDURE — 25000128 H RX IP 250 OP 636: Performed by: INTERNAL MEDICINE

## 2018-08-15 PROCEDURE — 27210437 ZZH NUTRITION PRODUCT SEMIELEM INTERMED LITER

## 2018-08-15 PROCEDURE — 94003 VENT MGMT INPAT SUBQ DAY: CPT

## 2018-08-15 PROCEDURE — 40000196 ZZH STATISTIC RAPCV CVP MONITORING

## 2018-08-15 PROCEDURE — 84100 ASSAY OF PHOSPHORUS: CPT | Performed by: INTERNAL MEDICINE

## 2018-08-15 RX ORDER — LANOLIN ALCOHOL/MO/W.PET/CERES
100 CREAM (GRAM) TOPICAL DAILY
Status: COMPLETED | OUTPATIENT
Start: 2018-08-15 | End: 2018-08-17

## 2018-08-15 RX ORDER — NICOTINE POLACRILEX 4 MG
15-30 LOZENGE BUCCAL
Status: DISCONTINUED | OUTPATIENT
Start: 2018-08-15 | End: 2018-09-11 | Stop reason: HOSPADM

## 2018-08-15 RX ORDER — DEXMEDETOMIDINE HYDROCHLORIDE 4 UG/ML
0.2-0.7 INJECTION, SOLUTION INTRAVENOUS CONTINUOUS
Status: DISCONTINUED | OUTPATIENT
Start: 2018-08-15 | End: 2018-08-17

## 2018-08-15 RX ORDER — HEPARIN SODIUM 10000 [USP'U]/100ML
400 INJECTION, SOLUTION INTRAVENOUS CONTINUOUS
Status: DISCONTINUED | OUTPATIENT
Start: 2018-08-15 | End: 2018-08-21

## 2018-08-15 RX ORDER — CLOPIDOGREL BISULFATE 75 MG/1
75 TABLET ORAL DAILY
Status: DISCONTINUED | OUTPATIENT
Start: 2018-08-16 | End: 2018-09-11 | Stop reason: HOSPADM

## 2018-08-15 RX ORDER — CLOPIDOGREL BISULFATE 75 MG/1
600 TABLET ORAL ONCE
Status: COMPLETED | OUTPATIENT
Start: 2018-08-15 | End: 2018-08-15

## 2018-08-15 RX ORDER — DEXTROSE MONOHYDRATE 25 G/50ML
25-50 INJECTION, SOLUTION INTRAVENOUS
Status: DISCONTINUED | OUTPATIENT
Start: 2018-08-15 | End: 2018-09-11 | Stop reason: HOSPADM

## 2018-08-15 RX ADMIN — POTASSIUM & SODIUM PHOSPHATES POWDER PACK 280-160-250 MG 1 PACKET: 280-160-250 PACK at 12:11

## 2018-08-15 RX ADMIN — HEPARIN SODIUM 400 UNITS/HR: 10000 INJECTION, SOLUTION INTRAVENOUS at 17:36

## 2018-08-15 RX ADMIN — PIPERACILLIN SODIUM AND TAZOBACTAM SODIUM 3.38 G: 3; .375 INJECTION, POWDER, LYOPHILIZED, FOR SOLUTION INTRAVENOUS at 01:51

## 2018-08-15 RX ADMIN — ASPIRIN 81 MG CHEWABLE TABLET 81 MG: 81 TABLET CHEWABLE at 09:07

## 2018-08-15 RX ADMIN — ATORVASTATIN CALCIUM 80 MG: 80 TABLET, FILM COATED ORAL at 20:02

## 2018-08-15 RX ADMIN — QUETIAPINE 50 MG: 50 TABLET ORAL at 22:30

## 2018-08-15 RX ADMIN — PANTOPRAZOLE SODIUM 40 MG: 40 INJECTION, POWDER, FOR SOLUTION INTRAVENOUS at 09:08

## 2018-08-15 RX ADMIN — CLOPIDOGREL 600 MG: 75 TABLET, FILM COATED ORAL at 15:20

## 2018-08-15 RX ADMIN — EPINEPHRINE 0.02 MCG/KG/MIN: 1 INJECTION PARENTERAL at 09:47

## 2018-08-15 RX ADMIN — LEVETIRACETAM 750 MG: 100 INJECTION, SOLUTION INTRAVENOUS at 17:46

## 2018-08-15 RX ADMIN — POTASSIUM & SODIUM PHOSPHATES POWDER PACK 280-160-250 MG 1 PACKET: 280-160-250 PACK at 15:21

## 2018-08-15 RX ADMIN — PIPERACILLIN SODIUM AND TAZOBACTAM SODIUM 3.38 G: 3; .375 INJECTION, POWDER, LYOPHILIZED, FOR SOLUTION INTRAVENOUS at 09:03

## 2018-08-15 RX ADMIN — DEXMEDETOMIDINE HYDROCHLORIDE 0.5 MCG/KG/HR: 4 INJECTION, SOLUTION INTRAVENOUS at 12:10

## 2018-08-15 RX ADMIN — VANCOMYCIN HYDROCHLORIDE 1500 MG: 10 INJECTION, POWDER, LYOPHILIZED, FOR SOLUTION INTRAVENOUS at 11:00

## 2018-08-15 RX ADMIN — PIPERACILLIN SODIUM AND TAZOBACTAM SODIUM 3.38 G: 3; .375 INJECTION, POWDER, LYOPHILIZED, FOR SOLUTION INTRAVENOUS at 15:20

## 2018-08-15 RX ADMIN — INSULIN ASPART 1 UNITS: 100 INJECTION, SOLUTION INTRAVENOUS; SUBCUTANEOUS at 16:13

## 2018-08-15 RX ADMIN — POTASSIUM PHOSPHATE, MONOBASIC AND POTASSIUM PHOSPHATE, DIBASIC 20 MMOL: 224; 236 INJECTION, SOLUTION INTRAVENOUS at 12:17

## 2018-08-15 RX ADMIN — QUETIAPINE FUMARATE 25 MG: 25 TABLET, FILM COATED ORAL at 09:07

## 2018-08-15 RX ADMIN — POTASSIUM PHOSPHATE, MONOBASIC AND POTASSIUM PHOSPHATE, DIBASIC 20 MMOL: 224; 236 INJECTION, SOLUTION INTRAVENOUS at 05:40

## 2018-08-15 RX ADMIN — POTASSIUM CHLORIDE 20 MEQ: 1.5 POWDER, FOR SOLUTION ORAL at 05:41

## 2018-08-15 RX ADMIN — POTASSIUM & SODIUM PHOSPHATES POWDER PACK 280-160-250 MG 1 PACKET: 280-160-250 PACK at 20:02

## 2018-08-15 RX ADMIN — CALCIUM GLUCONATE 1 G: 98 INJECTION, SOLUTION INTRAVENOUS at 09:29

## 2018-08-15 RX ADMIN — Medication 1000 MG: at 20:05

## 2018-08-15 RX ADMIN — Medication 0.5 MG: at 13:15

## 2018-08-15 RX ADMIN — CANGRELOR 0.75 MCG/KG/MIN: 50 INJECTION, POWDER, LYOPHILIZED, FOR SOLUTION INTRAVENOUS at 11:00

## 2018-08-15 RX ADMIN — LEVETIRACETAM 750 MG: 100 INJECTION, SOLUTION INTRAVENOUS at 05:42

## 2018-08-15 RX ADMIN — Medication 1000 MG: at 09:09

## 2018-08-15 RX ADMIN — Medication 100 MG: at 09:16

## 2018-08-15 RX ADMIN — DOBUTAMINE HYDROCHLORIDE 2.5 MCG/KG/MIN: 400 INJECTION INTRAVENOUS at 06:24

## 2018-08-15 RX ADMIN — MULTIVITAMIN 15 ML: LIQUID ORAL at 09:07

## 2018-08-15 ASSESSMENT — ACTIVITIES OF DAILY LIVING (ADL)
ADLS_ACUITY_SCORE: 13
ADLS_ACUITY_SCORE: 15
ADLS_ACUITY_SCORE: 13
ADLS_ACUITY_SCORE: 15

## 2018-08-15 ASSESSMENT — PAIN DESCRIPTION - DESCRIPTORS: DESCRIPTORS: PATIENT UNABLE TO DESCRIBE

## 2018-08-15 NOTE — PLAN OF CARE
Problem: Patient Care Overview  Goal: Plan of Care/Patient Progress Review  Outcome: No Change  D: Patient on unit 4E CVICU     I/A:   Neuro- follows commands in all extremities, pupils reactive - R bigger than L, sedated on propofol 15 and fentanyl 50.   CV- VVI temporary pacing set to rate of 70 (decreased from 76), atrial flutter with bundle branch block and frequent PACs. MAP maintained above 65 and cardiac index above 2.0 with epinephrine drip titration (currently at 0.03) and dobutamine straight rate 2.5. Hemodynamic monitoring through SWAN, see SAKSHI numbers in flowsheet. BP monitoring through L radial ART line. CVPs 13-15.   PV- edema throughout   Pulm- ETT CMV settings, , Rate 14, PEEP 5, FiO2 40% to maintain sats >92. Thick brown pink-tinged secretions, lungs coarse   GI- Loose BM X2. TF at 50mL/hr through NJ, to be advanced to goal of 60mL/hr at 1000. OG to LIS with brown output.   - Herring patent with adequate output.   Endo- BG checked q4h, WNL   ID- zosyn/vanco continue   Drips- Cangrelor 0.75. Propofol 15, Fentanyl 50, Epinephrine 0.03, Dobutamine 2.5, NS 10   Skin- small moisture slit in gluteal cleft, barrier cream applied.   Drains - 4 chest tubes, 2 mediastinal to one container with 20mL out overnight and 2 pleural to one container with 360mL out.   Pain- nods head when asked about pain, CPOT 0.   See flow sheets for further interventions and assessments.     P: fentanyl stopped 0445, propofol stopped 0530. Will attempt pressure support 0600.   Continue to monitor pt closely. Notify MD of significant changes.    Addendum: attempted pressure support trial, patient unable to tolerate longer than 2 minutes. Tachypnea into 40s and increased restlessness/decreased sats. Sedation restarted.

## 2018-08-15 NOTE — PROGRESS NOTES
CLINICAL NUTRITION SERVICES - brief note. See 8/13 note for full assessment.    FEN/GI: TF almost to goal (currently @ 50 mL/hr with goal 60 mL/hr). Phos dropped to 1.8 this AM, getting IV replacement. Pt at risk for refeeding syndrome w/ poor intakes prior to surgery.     Recommendations/Nutrition Prescription  Begin Neutraphos 1 pkt QID  Add thiamine supplementation x 3 days    Interventions  Collaboration with other providers - discussed w/ MDs on rounds who ordered neutraphos  Multivitamin/mineral supplement therapy - ordered thiamine 100 mg x 3 days for possible refeeding.     RD will continue to follow.    Elizabeth Berry, RD, LD, CNSC  CVICU Dietitian  Pager: [phone]

## 2018-08-15 NOTE — PROGRESS NOTES
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 Underwent repair of posterior VSD and TVR with bioprosthetic valve on 8/10, and was kept with open chest due to coagulopathy and RV dysfunction. Chest closed 8/13. IABP removed 8/14    TODAY'S PROGRESS:   -Wean propofol, start precedex and wean as able   -SvO2 drop with dobutamine wean. Restart dobutamine and discuss with Cards 2 team  -Weaned cangrelor, and started Plavix (with loading dose) for dual anti-platelet therapy  -started heparin 400U   -Consult EP regarding atrial flutter   -Continue PS trials  -Nutri-phos 4x/day    PLAN:  Neuro/ pain/ sedation:  #Post operative sedation  #Questionable seizure activity (temporal lobe seizures?)  - PRN Quetiapine 25mg for sleep, ativan PRN  - Monitor neurological status. Notify the MD for any acute changes in exam.  - Weaning Propofol, start precedex  - Fentanyl for analgesia  - Restart EEG post chest closure (8/14-8/15)  - Will discuss vEEG results with neurology on 8/16:    \"moderate electrographic encephalopathy, additional right frontotemporal neuronal dysfunction, evidence of a known skull defect, and an elevated risk of partial epilepsy.  Some of the marked events may represent simple partial seizures given that there were right frontotemporal frequency changes and increased spiking at these times, but transitory increases in generalized delta slowing are more likely to be accounted for by volitional hyperventilation.  Clinical correlation is recommended.\"    Pulmonary care:  #Post operative mechanical ventilation   - Intubated/sedated for open chest  - pressure support trials BID.    Cardiovascular:  #Post infarction 2.5cm infraseptal " VSD  #s/p HUSSEIN to distal RCA and proximal LAD  #STEMI, cardiogenic shock  #Dilated RV   #Pulmonary HTN  #IABP  #s/p repair of posterior VSD and TVR with bioprosthetic valve, s/p Chest closure 8/13  - Cards 2 reconsulted, appreciate assist  - Monitor hemodynamic status. Pacer at VVI at 60  - Infusions: low dose Epi; dobutamine 2.5  - ECHO from 8/8 prior to surgery: Inferior wall akinesis with inferoseptal muscular VAD. Mild to moderate right ventricular dilation is present. Global right ventricular function is moderately reduced.  - ASA 81mg  - EP consult regarding underlying atrial flutter    Heme:  #Anticoagluation s/p Drug Eluting Stent placement  #Thrombocythemia  -Cangrelor discontinued, heparin 400 U started  -81mg ASA  - Plavix loaded 8/15 (75mg daily thereafter)  -Hydroxyurea 1g BID, hematology consulted and following    GI care:  #Transaminitis   - NJ placed 8/13, TF at goal  - OG to LIS  - Rectal tube placed 8/15    Fluids/ Electrolytes/ Nutrition:   - mIVF for IV fluid hydration  - Nutri-phos 4x/day for lyte replacement  - Wiggins    Renal/ Fluid Balance:    #Acute Kidney injury  - Urine output stable overnight, Creatinine 1.34  - Will continue to monitor intake and output via wiggins    Endocrine:  #Stress hyperglycemia  - Insulin gtt    ID/ Antibiotics:  - Monitor fever curve, trend WBC    Prophylaxis:    - Mechanical prophylaxis for DVT.   - ASA, plavix, heparin straight rate  - Protonix    Lines/ tubes/ drains:  - ETT, arterial line, PIV, CVC/[place]    Disposition:  - CV ICU.    Patient seen, findings and plan discussed with CV ICU staff Dr. Gomez Viera MD   PGY-3, GEN SURG  ====================================    SUBJECTIVE:   -Paced at 70 overnight, has underlying atrial flutter. Increased stool output prompting rectal tube placement. Failed pressure support trials due to lethargy and tachypnea.    OBJECTIVE:   1. VITAL SIGNS:   Temp:  [97.2  F (36.2  C)-100  F (37.8  C)] 97.4  F (36.3   C)  Heart Rate:  [] 77  Resp:  [16-45] 21  BP: (133)/(76) 133/76  MAP:  [61 mmHg-92 mmHg] 80 mmHg  Arterial Line BP: ()/(40-76) 103/65  FiO2 (%):  [40 %] 40 %  SpO2:  [86 %-100 %] 100 %  Ventilation Mode: CMV/AC  (Continuous Mandatory Ventilation/ Assist Control)  FiO2 (%): 40 %  Rate Set (breaths/minute): 14 breaths/min  Tidal Volume Set (mL): 550 mL  PEEP (cm H2O): 5 cmH2O  Pressure Support (cm H2O): 7 cmH2O  Oxygen Concentration (%): 40 %  Resp: 21    2. INTAKE/ OUTPUT:   I/O last 3 completed shifts:  In: 4943.18 [I.V.:2853.18; NG/GT:680]  Out: 2015 [Urine:845; Emesis/NG output:120; Stool:350; Chest Tube:700]    3. PHYSICAL EXAMINATION:   General: Intubated, lightley sedated  Neuro: On minimal sedation, opens eyes in response to his name, follows commands  Resp: Mechanical ventilation, intubated, coarse breath sounds  CV: Normal rate with underlying atrial flutter  Abdomen: Soft, dressings in place  Incisions: Dressing over chest, chest tubes in place, open chest  Extremities: warm and well perfused    4. INVESTIGATIONS:   Arterial Blood Gases     Recent Labs  Lab 08/15/18  1331 08/15/18  0340 08/14/18  0331 08/13/18  1845   PH 7.40 7.41 7.41 7.39   PCO2 27* 32* 32* 33*   PO2 159* 145* 129* 101   HCO3 17* 21 20* 20*     Complete Blood Count     Recent Labs  Lab 08/15/18  1614 08/15/18  0339 08/14/18  2030 08/14/18  1611 08/14/18  0331   WBC 12.0* 12.9*  --  11.7* 10.6   HGB 9.0* 8.9* 8.7* 8.8* 7.8*    427  --  421 483*     Basic Metabolic Panel    Recent Labs  Lab 08/15/18  1614 08/15/18  0957 08/15/18  0339 08/14/18  0331  08/13/18  1511 08/13/18  1319  08/13/18  0433   NA  --   --  146* 142  --  144 140  --  143   POTASSIUM 4.3 4.2 3.9 4.6  < > 4.1 4.6  < > 4.0   CHLORIDE  --   --  119* 115*  --  112*  --   --  113*   CO2  --   --  21 20  --  21  --   --  21   BUN  --   --  28 25  --  28  --   --  29   CR  --   --  0.67 0.84  --  0.93  --   --  0.99   *  --  135* 128*  --  133* 127*   --  115*   < > = values in this interval not displayed.  Liver Function Tests    Recent Labs  Lab 08/15/18  0339 08/14/18  0331 08/13/18  1511 08/13/18  0433 08/12/18  0338   AST 47* 56*  --  68* 102*   ALT 16 20  --  21 27   ALKPHOS 139 115  --  82 60   BILITOTAL 0.6 0.6  --  0.7 0.7   ALBUMIN 1.7* 1.9*  --  1.9* 2.3*   INR 1.27* 1.32* 1.34* 1.44* 1.51*     Pancreatic Enzymes  No lab results found in last 7 days.  Coagulation Profile    Recent Labs  Lab 08/15/18  0339 08/14/18  0331 08/13/18  1511 08/13/18  0433  08/10/18  2243 08/10/18  2132   INR 1.27* 1.32* 1.34* 1.44*  < > 1.70* 1.67*   PTT  --   --   --   --   --  46* 41*   < > = values in this interval not displayed.      5. RADIOLOGY:   Recent Results (from the past 24 hour(s))   XR Chest Port 1 View    Narrative    Exam: XR CHEST PORT 1 VW, 8/15/2018 1:20 AM    Indication: CVICU, intubated;     Comparison: Chest x-ray 8/14/2018    Findings:     Supine frontal chest x-ray.     Stable position of endotracheal tube, basilar chest tubes, visualized  enteric tube and epicardial leads. Bilateral pump tip is not seen.  Unchanged cardiac silhouette. Unchanged pulmonary opacities stable  mediastinal wires and surgical clips. No pneumothorax. Right IJ  [place]-Owen catheter with tip projecting over the right pulmonary  artery.      Impression    Impression:   1. [place]-Owen catheter with tip projecting over the right and pulmonary  artery.  2. Aortic balloon pump has been removed, rest stable support devices.  3. Persistent small left pleural effusion and adjacent  atelectasis/consolidation.  4. Unchanged perihilar pulmonary opacities, representing pulmonary  edema and atelectasis.    I have personally reviewed the examination and initial interpretation  and I agree with the findings.    ALBERTO PERSAUD MD

## 2018-08-15 NOTE — PROCEDURES
[facility] EEG #-2 (Day 2 of Video-EEG Monitoring)    DATE OF RECORDIN/10/2018    DURATION OF RECORDING:  15 hours, 14 minutes.      CLINICAL SUMMARY:  This video-EEG monitoring procedure is performed in evaluation of encephalopathy in Castillo De Anda.  He was reported to be receiving levetiracetam and lorazepam on this day of monitoring.      TECHNICAL SUMMARY:  This continuous EEG monitoring procedure was performed with 23 scalp electrodes in 10-20 system placements, and additional scalp, precordial and other surface electrodes used for electrical referencing and artifact detection.  Video monitoring was utilized and periodically reviewed by EEG technologists and the physician for electroclinical correlation.     INTERICTAL EEG ACTIVITIES:  During waking, there was a bilateral 8 Hz posterior dominant rhythm, which was poorly sustained on the right.  Lower amplitude faster activities predominated anteriorly.  During waking, there was frequent occurrence of generalized irregular 2-8 Hz delta-theta slowing.  Drowsiness was manifested by increased rhythmicity of background theta activities.  Symmetric sleep spindles were seen during slow wave sleep.  During waking and drowsiness, there was very frequent occurrence of right frontotemporal polymorphic 2-4 Hz delta slowing.  There was a breach effect at the T4 electrode.  There were frequent right frontotemporal spike-wave complexes, which were independent of the breach effect in that spike frequencies were faster than the background activities subject to the breach effect.      ICTAL RECORDINGS:  The patient or his family members activated the event marking system several times for reasons that were not fully described; no definite behavioral or EEG changes were observed at these times.      SUMMARY OF DAY 2 OF VIDEO EEG MONITORING:    The interictal EEG recording was abnormal due to frequent right frontotemporal spike-wave complexes, with a focal breach effect  at the T4 electrode.    No electrographic seizures were recorded on this day of monitoring.      SUMMARY OF 2 DAYS OF VIDEO EEG MONITORING:         The interictal EEG recording was persistently abnormal due to generalized delta-theta slowing during waking, due to very frequent right frontotemporal delta slowing in waking and drowsiness, due to frequent right frontotemporal spike-wave complexes in waking and drowsiness, and due to a focal breach effect at the T4 electrode.    Multiple events with subjective alteration in wellbeing where family observed changes in heart rate were marked, and twice on day 1 of monitoring there was apparent hyperventilation associated with a buildup of generalized delta slowing, but also with monorhythmic nonevolving 1 Hz delta slowing over the right frontotemporal area and increased right frontotemporal spike frequency.           These findings indicate moderate electrographic encephalopathy, additional right frontotemporal neuronal dysfunction, evidence of a known skull defect, and an elevated risk of partial epilepsy.  Some of the marked events may represent simple partial seizures given that there were right frontotemporal frequency changes and increased spiking at these times, but transitory increases in generalized delta slowing are more likely to be accounted for by volitional hyperventilation.  Clinical correlation is recommended.   Joaquín Cisneros M.D., Professor of Neurology       D: 2018   T: 2018   MT: MODESTO      Name:     JOSE SOLER   MRN:      [id number]        Account:        [id number]   :      1950           Procedure Date: 08/10/2018      Document: [id number]

## 2018-08-15 NOTE — CONSULTS
Electrophysiology Consultation Note   EP Attending: .   Reason for consultation: Atrial flutter and evaluation for PPM.   Provider requesting consultation: .  Date of Service: 8/15/2018      HPI:   Mr. Castillo De Anda is a 68 year old male with a past medical history significant for STEMI s/p HUSSEIN, repair VSD and TVR. He underwent a HUSSEIN x2 to RCA and LAD for STEMI 8/4/2018.  Recovery c/b ischemic VSD and he underwent posterior VSD repair and TVR (bioprostetic) on 8/10. On 8/13 his chest was closed and on 8/14 IABP was pulled. Limited echocardiogram 8/10 shows EF 60-65%.  He has a temporary pacer in place set to VVI @ . EP consulted for development of atrial flutter and evaluation for PPM. Telemetry shows intrisic rhythm AFL 3:1 conduction at 70 bpm and intermittent pacing. He is currently on cangrelor gtt. CHADSVASC 2 (age+, CAD+). He is intubated and awake.     Past Medical History:   Past Medical History:   Diagnosis Date     DVT (deep venous thrombosis) (H) 2008    after craniotomy     Meningioma (H)      Polycythemia vera (H)      Prostate cancer (H)      Past Surgical History:   Past Surgical History:   Procedure Laterality Date     ANKLE SURGERY Right 04/2018     APPENDECTOMY       CRANIOTOMY Right 2008     IRRIGATION AND DEBRIDEMENT CHEST WASHOUT, COMBINED N/A 8/13/2018    Procedure: COMBINED IRRIGATION AND DEBRIDEMENT CHEST WASHOUT;  COMBINED IRRIGATION AND DEBRIDEMENT CHEST WASHOUT, Closure;  Surgeon: Jason Franco MD;  Location: [facility] OR     PROSTATECTOMY PERINEAL  2004     REPAIR VENTRICULAR SEPTAL DEFECT N/A 8/10/2018    Procedure: REPAIR VENTRICULAR SEPTAL DEFECT;  Median Sternotomy, REPAIR VENTRICULAR SEPTAL DEFECT on pump oxygenation, Tricuspid valve replacement ;  Surgeon: Jason Franco MD;  Location: [facility] OR     SPLENECTOMY      childhood     THYROID SURGERY  2012     Allergies: Per [facility]     Allergies   Allergen Reactions     Anagrelide Rash     Noted to have  small rash and nose bleeds after starting the prescription (prior to August 2018 hospitalization; prescribed by [facility] clinician); dosage had then been increased (by [facility] clinician) and subsequently developed full body rash within 4 hours of increased dose.     Medications:   Per MAR current outpatient cardiovascular medications include: No prescriptions prior to admission.     No current outpatient prescriptions on file.     Current Facility-Administered Medications   Medication Dose Route Frequency     aspirin  81 mg Oral or Feeding Tube Daily     atorvastatin  80 mg Oral or Feeding Tube QPM     clopidogrel  600 mg Oral Once     clopidogrel  75 mg Oral Daily     docusate  100 mg Oral or Feeding Tube BID     hydroxyurea  1,000 mg Oral or Feeding Tube BID     insulin aspart  1-6 Units Subcutaneous Q4H     levETIRAcetam  750 mg Intravenous Q12H     lidocaine (viscous)  5 mL Topical Once     multivitamins with minerals  15 mL Per Feeding Tube Daily     pantoprazole (PROTONIX) IV  40 mg Intravenous Daily     piperacillin-tazobactam  3.375 g Intravenous Q6H     polyethylene glycol  17 g Oral or Feeding Tube Daily     potassium & sodium phosphates  1 packet Oral 4x Daily     potassium phosphate (KPHOS) in D5W IV  20 mmol Intravenous Once     QUEtiapine  25 mg Oral or Feeding Tube QAM     QUEtiapine  50 mg Oral or Feeding Tube At Bedtime     senna-docusate  2 tablet Oral or Feeding Tube BID    Or     senna-docusate  1 tablet Oral or Feeding Tube BID     sodium chloride (PF)  3 mL Intracatheter Q8H     vitamin  B-1  100 mg Per Feeding Tube Daily     Family History:   Family History   Problem Relation Age of Onset     Hypertension Mother      Cerebrovascular Disease Paternal Uncle      Social History:   Social History   Substance Use Topics     Smoking status: Former Smoker     Smokeless tobacco: Never Used     Alcohol use 1.2 oz/week     2 Shots of liquor per week       ROS:   A comprehensive 10 point ROS was negative  "other than as mentioned in HPI.    Physical Examination:   VITALS: /76  Temp 97.2  F (36.2  C) (Oral)  Resp 25  Ht 1.753 m (5' 9\")  Wt 83 kg (182 lb 15.7 oz)  SpO2 100%  BMI 27.02 kg/m2    GENERAL APPEARANCE: A, NAD   HEENT: NCAT, EOMI, MMM.   NECK: Supple. No JVD or bruit. Good carotid upstroke.   CHEST: mechanical  CARDIOVASCULAR: S1S2, Reg, No m/r/g.   ABDOMEN: BS+, soft, No pulsatile masses or bruits.   EXTREMITIES: No pedal edema. Distal pulses intact.   NEURO: Grossly nonfocal.   PSYCH: Normal affect.  SKIN: Warm and dry.   Data:   Labs:  BMP  Recent Labs  Lab 08/15/18  0957 08/15/18  0339 08/14/18  0331 08/13/18  2340 08/13/18  1511 08/13/18  1319  08/13/18  0433   NA  --  146* 142  --  144 140  --  143   POTASSIUM 4.2 3.9 4.6 4.0 4.1 4.6  < > 4.0   CHLORIDE  --  119* 115*  --  112*  --   --  113*   GABRIELA  --  6.9* 7.6*  --  7.4*  --   --  7.8*   CO2  --  21 20  --  21  --   --  21   BUN  --  28 25  --  28  --   --  29   CR  --  0.67 0.84  --  0.93  --   --  0.99   GLC  --  135* 128*  --  133* 127*  --  115*   < > = values in this interval not displayed.  CBC  Recent Labs  Lab 08/15/18  0339 08/14/18  2030 08/14/18  1611 08/14/18  0331 08/13/18  1511   WBC 12.9*  --  11.7* 10.6 11.9*   RBC 3.07*  --  3.01* 2.68* 2.84*   HGB 8.9* 8.7* 8.8* 7.8* 8.4*   HCT 27.4*  --  27.1* 24.4* 26.1*   MCV 89  --  90 91 92   MCH 29.0  --  29.2 29.1 29.6   MCHC 32.5  --  32.5 32.0 32.2   RDW 18.7*  --  18.1* 18.4* 18.5*     --  421 483* 495*     INR  Recent Labs  Lab 08/15/18  0339 08/14/18  0331 08/13/18  1511 08/13/18  0433   INR 1.27* 1.32* 1.34* 1.44*     No results found for: CKTOTAL, CKMB, TROPN  Cholesterol (mg/dL)   Date Value   08/06/2018 76     HDL Cholesterol (mg/dL)   Date Value   08/06/2018 30 (L)     LDL Cholesterol Calculated (mg/dL)   Date Value   08/06/2018 34       Tele:   ECHO:   Recent Results (from the past 4320 hour(s))   Echo limited (Adults Only)    Narrative    " [id number]  [facility]  [id number]  [id number]^ALLEN^INGE^CONCHITA           [facility],[place]  Echocardiography Laboratory  [address] [place] [address]     Name: JOSE SOLER  MRN: [id number]  : 1950  Study Date: 08/10/2018 10:17 AM  Age: 68 yrs  Gender: Male  Patient Location: [facility]  Reason For Study: VSD  Ordering Physician: INGE VILLA  Referring Physician: SELF, REFERRED  Performed By: Monico Alexandra     BSA: 1.9 m2  Height: 69 in  Weight: 170 lb  HR: 96  BP: 93/56 mmHg  _____________________________________________________________________________  __        Procedure  Limited Portable Echo Adult.  _____________________________________________________________________________  __        Interpretation Summary  Limited study.  The Ejection Fraction is estimated at 60-65%. Mid-inferior wall is akinetic.  A mid inferoseptal muscular VSD is present.  VSD peak gradient is 35 mmHg. Peak velocity 3 m/s.  Mild to moderate right ventricular dilation is present.  RV function is moderately reduced.  Moderate to severe tricuspid insufficiency is present.  Right ventricular systolic pressure is 43mmHg above the right atrial pressure.  Moderate pulmonary hypertension is present (~57 mmHg).  The inferior vena cava was dilated at 2.4 cm without respiratory variability,  consistent with increased right atrial pressure.  Estimated mean right atrial pressure is 15 mmHg (significantly elevated).  No pericardial effusion is present.     This study was compared with the study from 2018 .  PAP appears slightly higher and TR appears to have worsened on this study.  Otherwise no other significant change.     _____________________________________________________________________________  __        Left Ventricle  The Ejection Fraction is estimated at 60-65%. Mid-inferior wall is akinetic. A  muscularventricular-septal defect is present. VSD peak gradient is 35 mmHg.  Peak velocity 3  m/s.     Right Ventricle  Mild to moderate right ventricular dilation is present. RV function is  moderately reduced. A right heart catheter is noted in the right ventricle.     Atria  The atria cannot be assessed.     Aortic Valve  The aortic valve cannot be assessed.        Tricuspid Valve  Moderate to severe tricuspid insufficiency is present. The right ventricular  systolic pressure is approximated at 42.8 mmHg plus the right atrial pressure.  Right ventricular systolic pressure is 43mmHg above the right atrial pressure.  Moderate (pulmonary artery systolic pressure 50-75mmHg) pulmonary hypertension  is present.     Vessels  The inferior vena cava was dilated at 2.4 cm without respiratory variability,  consistent with increased right atrial pressure. Estimated mean right atrial  pressure is 15 mmHg (significantly elevated).     Pericardium  No pericardial effusion is present.     Compared to Previous Study  This study was compared with the study from 2018 . PAP appears slightly  higher and TR appears to have worsened on this study. Otherwise no other  significant change.     _____________________________________________________________________________  __        Doppler Measurements & Calculations  TR max luisa: 327.0 cm/sec  TR max P.8 mmHg        _____________________________________________________________________________  __        Report approved by: Jacobo HERNANDEZ 08/10/2018 11:31 AM      Echo complete    Narrative    [id number]  [id number]  [id number]  [id number]^IVAN^SUSHIL^CASA           [facility],[place]  Echocardiography Laboratory  [address] [place] [id number]     Name: JOSE SOLER  MRN: [id number]  : 1950  Study Date: 2018 07:56 AM  Age: 68 yrs  Gender: Male  Patient Location: [facility]  Reason For Study: CHF, VSD  Ordering Physician: SUSHIL LOVE  Referring Physician: SELF, REFERRED  Performed By: Brian Bansal RDCS     BSA: 1.9 m2  Height: 69  in  Weight: 170 lb  BP: 112/90 mmHg  _____________________________________________________________________________  __        Procedure  Complete Portable Echo Adult.  _____________________________________________________________________________  __        Interpretation Summary  Left ventricular size is normal.  The Ejection Fraction is estimated at 55-60%.  Inferior wall akinesis with inferoseptal muscular VAD. Peak velocity 3.7m/sec.  Mild to moderate right ventricular dilation is present.  Global right ventricular function is moderately reduced.  Dilation of the inferior vena cava is present with abnormal respiratory  variation in diameter.  No pericardial effusion is present.  Previous study not available for comparison.  _____________________________________________________________________________  __        Left Ventricle  Left ventricular size is normal. Left ventricular wall thickness is normal.  The Ejection Fraction is estimated at 55-60%. Left ventricular diastolic  function is indeterminate. Diastolic Doppler findings (E/E' ratio and/or other  parameters) suggest left ventricular filling pressures are increased. Inferior  wall akinesis with inferoseptal muscular VAD. Peak velocity 3.7m/sec.     Right Ventricle  Mild to moderate right ventricular dilation is present. Global right  ventricular function is moderately reduced.     Atria  Both atria appear normal. The atrial septum is intact as assessed by color  Doppler .     Mitral Valve  The mitral valve is normal.        Aortic Valve  Aortic valve is normal in structure and function.     Tricuspid Valve  The tricuspid valve is normal. Mild tricuspid insufficiency is present. The  right ventricular systolic pressure is approximated at 30.0 mmHg plus the  right atrial pressure.     Pulmonic Valve  The pulmonic valve is normal.     Vessels  The aorta root is normal. The pulmonary artery is normal. Ascending aorta 3.8  cm. Dilation of the inferior vena cava  is present with abnormal respiratory  variation in diameter.     Pericardium  No pericardial effusion is present.        Compared to Previous Study  Previous study not available for comparison.  _____________________________________________________________________________  __  MMode/2D Measurements & Calculations     RVDd: 5.5 cm  IVSd: 0.89 cm  LVIDd: 4.9 cm  LVIDs: 3.1 cm  LVPWd: 0.87 cm  FS: 36.3 %  LV mass(C)d: 145.9 grams  LV mass(C)dI: 75.7 grams/m2  LA dimension: 3.8 cm  asc Aorta Diam: 3.8 cm  LVOT diam: 2.1 cm  LVOT area: 3.5 cm2  RVOT diam: 2.6 cm  LA Volume (BP): 40.3 ml  RWT: 0.36  TAPSE: 2.0 cm        Doppler Measurements & Calculations  MV E max luisa: 90.4 cm/sec  MV A max luisa: 98.0 cm/sec  MV E/A: 0.92  MV dec slope: 651.6 cm/sec2  MV dec time: 0.14 sec  LV V1 VTI: 8.8 cm     SV(LVOT): 30.5 ml  SI(LVOT): 15.8 ml/m2  TV max P.0 mmHg  PA V2 max: 138.0 cm/sec  PA max P.6 mmHg  PA acc time: 0.08 sec  TR max luisa: 272.7 cm/sec  TR max P.0 mmHg  Qp/Qs: 3.3/1.0  E/E' av.8  Lateral E/e': 11.3  Medial E/e': 18.3     _____________________________________________________________________________  __           Report approved by: Hilda Bashir 2018 10:22 AM          Assessment:   Mr. Castillo De Anda is a 68 year old male with a past medical history significant for STEMI s/p HUSSEIN, repair VSD and TVR. He underwent a HUSSEIN x2 to RCA and LAD for STEMI 2018.  Recovery c/b ischemic VSD and he underwent posterior VSD repair and TVR (bioprostetic) on 8/10. On  his chest was closed and on  IABP was pulled. Limited echocardiogram 8/10 shows EF 60-65%.  He has a temporary pacer in place set to VVI @ 60.  He does have a V lead and an A lead in place. EP consulted for development of atrial flutter and evaluation for PPM. Telemetry shows intrisic rhythm AFL 3:1 conduction at 70 bpm and intermittent pacing. He is currently on cangrelor gtt.  CHADSVASC 2 (age+, CAD+). He is intubated and awake.    EP Recommendations:  - His heart rate is currently well controlled.  - Once it is safe to anticoagulate from a surgical standpoint, we may consider overdrive pacing with his temporary pacer as he has an A lead in place to attempt to convert his rhythm.  - We will continue to follow this patient.     The patient family states understanding and is agreeable with plan.   Thank you for allowing us to participate in the care of this patient.     The patient was discussed w/ Dr. Sorensen.  The above note reflects our joint plan.    STEPHANIE Ledbetter CNP  Electrophysiology Consult Service  Pager: [phone]      ELECTROPHYSIOLOGY STAFF  Patient seen and examined by me.  History and physical examination discussed with Lavinia Wheeler whose note reflects our joint assessment and recommendation/plans.  68 year old gentleman s/p PCI for STEMI, repair of ischemic VSD and bioprosthetic TVR.  He has atrial flutter with controlled ventricular response.  We suggest anticoagulation for stroke prophyllaxis when safe to do so from a surgical standpoint. There is concern that he may need permanent pacing which would need to be considered carefully in view of his TVR.  When anticoagulated we can arrange ELISABETH/cardioversion.  He has an epicardial atrial lead (appropriately sensed flutter today) so we could try overdrive pacing prior to direct current cardioversion.  His post conversion pause may give an indication whether he needs pacing for sinus node dysfunction.  Deon Sorensen

## 2018-08-15 NOTE — PLAN OF CARE
Problem: Patient Care Overview  Goal: Plan of Care/Patient Progress Review    Discharge Planner OT   Patient plan for discharge: not stated  Current status: Pt still intubated this session, but following 75% commands, tolerating supine UE/LE PROM well with VSS.  Barriers to return to prior living situation: decreased ADL independence and activity tolerance  Recommendations for discharge: TCU  Rationale for recommendations: increase functional endurance and ADL independence at rehab       Entered by: Juan Alberto Puente 08/15/2018 3:38 PM

## 2018-08-16 ENCOUNTER — APPOINTMENT (OUTPATIENT)
Dept: CARDIOLOGY | Facility: CLINIC | Age: 68
DRG: 219 | End: 2018-08-16
Attending: STUDENT IN AN ORGANIZED HEALTH CARE EDUCATION/TRAINING PROGRAM
Payer: MEDICARE

## 2018-08-16 ENCOUNTER — ALLIED HEALTH/NURSE VISIT (OUTPATIENT)
Dept: NEUROLOGY | Facility: CLINIC | Age: 68
DRG: 219 | End: 2018-08-16
Attending: PSYCHIATRY & NEUROLOGY
Payer: MEDICARE

## 2018-08-16 ENCOUNTER — APPOINTMENT (OUTPATIENT)
Dept: GENERAL RADIOLOGY | Facility: CLINIC | Age: 68
DRG: 219 | End: 2018-08-16
Attending: INTERNAL MEDICINE
Payer: MEDICARE

## 2018-08-16 DIAGNOSIS — R57.0 CARDIOGENIC SHOCK (H): Primary | ICD-10-CM

## 2018-08-16 LAB
ALBUMIN SERPL-MCNC: 1.6 G/DL (ref 3.4–5)
ALP SERPL-CCNC: 200 U/L (ref 40–150)
ALT SERPL W P-5'-P-CCNC: 29 U/L (ref 0–70)
ANION GAP SERPL CALCULATED.3IONS-SCNC: 9 MMOL/L (ref 3–14)
APTT PPP: 52 SEC (ref 22–37)
AST SERPL W P-5'-P-CCNC: 71 U/L (ref 0–45)
BASE DEFICIT BLDA-SCNC: 3.7 MMOL/L
BASE DEFICIT BLDV-SCNC: 2.1 MMOL/L
BASE DEFICIT BLDV-SCNC: 3.2 MMOL/L
BASE DEFICIT BLDV-SCNC: 3.3 MMOL/L
BASE DEFICIT BLDV-SCNC: 3.4 MMOL/L
BASE DEFICIT BLDV-SCNC: 4 MMOL/L
BASE DEFICIT BLDV-SCNC: 4.4 MMOL/L
BILIRUB SERPL-MCNC: 0.4 MG/DL (ref 0.2–1.3)
BUN SERPL-MCNC: 29 MG/DL (ref 7–30)
CA-I BLD-MCNC: 4.3 MG/DL (ref 4.4–5.2)
CALCIUM SERPL-MCNC: 6.8 MG/DL (ref 8.5–10.1)
CHLORIDE SERPL-SCNC: 120 MMOL/L (ref 94–109)
CO2 SERPL-SCNC: 19 MMOL/L (ref 20–32)
CREAT SERPL-MCNC: 0.66 MG/DL (ref 0.66–1.25)
GFR SERPL CREATININE-BSD FRML MDRD: >90 ML/MIN/1.7M2
GLUCOSE BLDC GLUCOMTR-MCNC: 113 MG/DL (ref 70–99)
GLUCOSE BLDC GLUCOMTR-MCNC: 114 MG/DL (ref 70–99)
GLUCOSE BLDC GLUCOMTR-MCNC: 129 MG/DL (ref 70–99)
GLUCOSE BLDC GLUCOMTR-MCNC: 141 MG/DL (ref 70–99)
GLUCOSE BLDC GLUCOMTR-MCNC: 95 MG/DL (ref 70–99)
GLUCOSE SERPL-MCNC: 123 MG/DL (ref 70–99)
HCO3 BLD-SCNC: 20 MMOL/L (ref 21–28)
HCO3 BLDV-SCNC: 20 MMOL/L (ref 21–28)
HCO3 BLDV-SCNC: 20 MMOL/L (ref 21–28)
HCO3 BLDV-SCNC: 21 MMOL/L (ref 21–28)
HCO3 BLDV-SCNC: 22 MMOL/L (ref 21–28)
HGB BLD-MCNC: 8.8 G/DL (ref 13.3–17.7)
INR PPP: 1.26 (ref 0.86–1.14)
MAGNESIUM SERPL-MCNC: 2.1 MG/DL (ref 1.6–2.3)
O2/TOTAL GAS SETTING VFR VENT: 40 %
OXYHGB MFR BLD: 97 % (ref 92–100)
OXYHGB MFR BLDV: 54 %
OXYHGB MFR BLDV: 56 %
OXYHGB MFR BLDV: 56 %
OXYHGB MFR BLDV: 60 %
OXYHGB MFR BLDV: 63 %
OXYHGB MFR BLDV: 64 %
PCO2 BLD: 30 MM HG (ref 35–45)
PCO2 BLDV: 33 MM HG (ref 40–50)
PCO2 BLDV: 33 MM HG (ref 40–50)
PCO2 BLDV: 34 MM HG (ref 40–50)
PCO2 BLDV: 35 MM HG (ref 40–50)
PCO2 BLDV: 35 MM HG (ref 40–50)
PCO2 BLDV: 36 MM HG (ref 40–50)
PH BLD: 7.44 PH (ref 7.35–7.45)
PH BLDV: 7.39 PH (ref 7.32–7.43)
PH BLDV: 7.39 PH (ref 7.32–7.43)
PH BLDV: 7.4 PH (ref 7.32–7.43)
PHOSPHATE SERPL-MCNC: 2 MG/DL (ref 2.5–4.5)
PO2 BLD: 137 MM HG (ref 80–105)
PO2 BLDV: 29 MM HG (ref 25–47)
PO2 BLDV: 30 MM HG (ref 25–47)
PO2 BLDV: 32 MM HG (ref 25–47)
PO2 BLDV: 34 MM HG (ref 25–47)
PO2 BLDV: 35 MM HG (ref 25–47)
PO2 BLDV: 36 MM HG (ref 25–47)
POTASSIUM SERPL-SCNC: 3.9 MMOL/L (ref 3.4–5.3)
PROT SERPL-MCNC: 4.9 G/DL (ref 6.8–8.8)
SODIUM SERPL-SCNC: 148 MMOL/L (ref 133–144)

## 2018-08-16 PROCEDURE — A9270 NON-COVERED ITEM OR SERVICE: HCPCS | Mod: GY | Performed by: THORACIC SURGERY (CARDIOTHORACIC VASCULAR SURGERY)

## 2018-08-16 PROCEDURE — A9270 NON-COVERED ITEM OR SERVICE: HCPCS | Mod: GY | Performed by: STUDENT IN AN ORGANIZED HEALTH CARE EDUCATION/TRAINING PROGRAM

## 2018-08-16 PROCEDURE — 82805 BLOOD GASES W/O2 SATURATION: CPT | Performed by: THORACIC SURGERY (CARDIOTHORACIC VASCULAR SURGERY)

## 2018-08-16 PROCEDURE — 93321 DOPPLER ECHO F-UP/LMTD STD: CPT | Mod: 26 | Performed by: INTERNAL MEDICINE

## 2018-08-16 PROCEDURE — 25000132 ZZH RX MED GY IP 250 OP 250 PS 637: Mod: GY | Performed by: STUDENT IN AN ORGANIZED HEALTH CARE EDUCATION/TRAINING PROGRAM

## 2018-08-16 PROCEDURE — 25000132 ZZH RX MED GY IP 250 OP 250 PS 637: Mod: GY | Performed by: THORACIC SURGERY (CARDIOTHORACIC VASCULAR SURGERY)

## 2018-08-16 PROCEDURE — 00000146 ZZHCL STATISTIC GLUCOSE BY METER IP

## 2018-08-16 PROCEDURE — 94003 VENT MGMT INPAT SUBQ DAY: CPT

## 2018-08-16 PROCEDURE — 40000048 ZZH STATISTIC DAILY SWAN MONITORING

## 2018-08-16 PROCEDURE — 93308 TTE F-UP OR LMTD: CPT

## 2018-08-16 PROCEDURE — 20000004 ZZH R&B ICU UMMC

## 2018-08-16 PROCEDURE — 86850 RBC ANTIBODY SCREEN: CPT | Performed by: THORACIC SURGERY (CARDIOTHORACIC VASCULAR SURGERY)

## 2018-08-16 PROCEDURE — A9270 NON-COVERED ITEM OR SERVICE: HCPCS | Mod: GY | Performed by: INTERNAL MEDICINE

## 2018-08-16 PROCEDURE — 85049 AUTOMATED PLATELET COUNT: CPT | Performed by: HOSPITALIST

## 2018-08-16 PROCEDURE — 40000014 ZZH STATISTIC ARTERIAL MONITORING DAILY

## 2018-08-16 PROCEDURE — 71045 X-RAY EXAM CHEST 1 VIEW: CPT

## 2018-08-16 PROCEDURE — 93308 TTE F-UP OR LMTD: CPT | Mod: 26 | Performed by: INTERNAL MEDICINE

## 2018-08-16 PROCEDURE — 84100 ASSAY OF PHOSPHORUS: CPT | Performed by: INTERNAL MEDICINE

## 2018-08-16 PROCEDURE — 86901 BLOOD TYPING SEROLOGIC RH(D): CPT | Performed by: THORACIC SURGERY (CARDIOTHORACIC VASCULAR SURGERY)

## 2018-08-16 PROCEDURE — 93325 DOPPLER ECHO COLOR FLOW MAPG: CPT | Mod: 26 | Performed by: INTERNAL MEDICINE

## 2018-08-16 PROCEDURE — 40000196 ZZH STATISTIC RAPCV CVP MONITORING

## 2018-08-16 PROCEDURE — 25000125 ZZHC RX 250: Performed by: THORACIC SURGERY (CARDIOTHORACIC VASCULAR SURGERY)

## 2018-08-16 PROCEDURE — 25000128 H RX IP 250 OP 636: Performed by: STUDENT IN AN ORGANIZED HEALTH CARE EDUCATION/TRAINING PROGRAM

## 2018-08-16 PROCEDURE — 86900 BLOOD TYPING SEROLOGIC ABO: CPT | Performed by: THORACIC SURGERY (CARDIOTHORACIC VASCULAR SURGERY)

## 2018-08-16 PROCEDURE — 27210437 ZZH NUTRITION PRODUCT SEMIELEM INTERMED LITER

## 2018-08-16 PROCEDURE — 83735 ASSAY OF MAGNESIUM: CPT | Performed by: INTERNAL MEDICINE

## 2018-08-16 PROCEDURE — 85018 HEMOGLOBIN: CPT | Performed by: INTERNAL MEDICINE

## 2018-08-16 PROCEDURE — 85610 PROTHROMBIN TIME: CPT | Performed by: INTERNAL MEDICINE

## 2018-08-16 PROCEDURE — 25000128 H RX IP 250 OP 636: Performed by: THORACIC SURGERY (CARDIOTHORACIC VASCULAR SURGERY)

## 2018-08-16 PROCEDURE — 95951 ZZHC EEG VIDEO < 12 HR: CPT | Mod: 52,ZF

## 2018-08-16 PROCEDURE — 82805 BLOOD GASES W/O2 SATURATION: CPT | Performed by: STUDENT IN AN ORGANIZED HEALTH CARE EDUCATION/TRAINING PROGRAM

## 2018-08-16 PROCEDURE — 85730 THROMBOPLASTIN TIME PARTIAL: CPT | Performed by: INTERNAL MEDICINE

## 2018-08-16 PROCEDURE — 40000275 ZZH STATISTIC RCP TIME EA 10 MIN

## 2018-08-16 PROCEDURE — 82330 ASSAY OF CALCIUM: CPT | Performed by: THORACIC SURGERY (CARDIOTHORACIC VASCULAR SURGERY)

## 2018-08-16 PROCEDURE — 99291 CRITICAL CARE FIRST HOUR: CPT | Mod: GC | Performed by: ANESTHESIOLOGY

## 2018-08-16 PROCEDURE — 80053 COMPREHEN METABOLIC PANEL: CPT | Performed by: INTERNAL MEDICINE

## 2018-08-16 PROCEDURE — 25000132 ZZH RX MED GY IP 250 OP 250 PS 637: Mod: GY | Performed by: INTERNAL MEDICINE

## 2018-08-16 RX ORDER — FUROSEMIDE 10 MG/ML
20 INJECTION INTRAMUSCULAR; INTRAVENOUS ONCE
Status: COMPLETED | OUTPATIENT
Start: 2018-08-16 | End: 2018-08-16

## 2018-08-16 RX ORDER — FUROSEMIDE 10 MG/ML
20 INJECTION INTRAMUSCULAR; INTRAVENOUS EVERY 12 HOURS
Status: COMPLETED | OUTPATIENT
Start: 2018-08-16 | End: 2018-08-16

## 2018-08-16 RX ORDER — CARBOXYMETHYLCELLULOSE SODIUM 5 MG/ML
1 SOLUTION/ DROPS OPHTHALMIC 3 TIMES DAILY PRN
Status: DISCONTINUED | OUTPATIENT
Start: 2018-08-16 | End: 2018-09-11 | Stop reason: HOSPADM

## 2018-08-16 RX ADMIN — INSULIN ASPART 1 UNITS: 100 INJECTION, SOLUTION INTRAVENOUS; SUBCUTANEOUS at 08:19

## 2018-08-16 RX ADMIN — ASPIRIN 81 MG CHEWABLE TABLET 81 MG: 81 TABLET CHEWABLE at 07:54

## 2018-08-16 RX ADMIN — Medication 0.5 MG: at 02:03

## 2018-08-16 RX ADMIN — CLOPIDOGREL 75 MG: 75 TABLET, FILM COATED ORAL at 07:54

## 2018-08-16 RX ADMIN — QUETIAPINE FUMARATE 25 MG: 25 TABLET, FILM COATED ORAL at 07:54

## 2018-08-16 RX ADMIN — POTASSIUM PHOSPHATE, MONOBASIC AND POTASSIUM PHOSPHATE, DIBASIC 15 MMOL: 224; 236 INJECTION, SOLUTION INTRAVENOUS at 05:28

## 2018-08-16 RX ADMIN — MULTIVITAMIN 15 ML: LIQUID ORAL at 07:54

## 2018-08-16 RX ADMIN — FUROSEMIDE 20 MG: 10 INJECTION, SOLUTION INTRAVENOUS at 09:35

## 2018-08-16 RX ADMIN — ATORVASTATIN CALCIUM 80 MG: 80 TABLET, FILM COATED ORAL at 19:52

## 2018-08-16 RX ADMIN — POTASSIUM & SODIUM PHOSPHATES POWDER PACK 280-160-250 MG 1 PACKET: 280-160-250 PACK at 12:15

## 2018-08-16 RX ADMIN — POTASSIUM & SODIUM PHOSPHATES POWDER PACK 280-160-250 MG 1 PACKET: 280-160-250 PACK at 16:23

## 2018-08-16 RX ADMIN — EPINEPHRINE 0.02 MCG/KG/MIN: 1 INJECTION PARENTERAL at 06:59

## 2018-08-16 RX ADMIN — Medication 100 MG: at 07:54

## 2018-08-16 RX ADMIN — LEVETIRACETAM 750 MG: 100 INJECTION, SOLUTION INTRAVENOUS at 05:20

## 2018-08-16 RX ADMIN — Medication 1000 MG: at 07:54

## 2018-08-16 RX ADMIN — PANTOPRAZOLE SODIUM 40 MG: 40 INJECTION, POWDER, FOR SOLUTION INTRAVENOUS at 07:54

## 2018-08-16 RX ADMIN — LEVETIRACETAM 750 MG: 100 INJECTION, SOLUTION INTRAVENOUS at 17:55

## 2018-08-16 RX ADMIN — FUROSEMIDE 20 MG: 10 INJECTION, SOLUTION INTRAVENOUS at 22:24

## 2018-08-16 RX ADMIN — POTASSIUM & SODIUM PHOSPHATES POWDER PACK 280-160-250 MG 1 PACKET: 280-160-250 PACK at 07:54

## 2018-08-16 RX ADMIN — CARBOXYMETHYLCELLULOSE SODIUM 1 DROP: 5 SOLUTION/ DROPS OPHTHALMIC at 14:59

## 2018-08-16 RX ADMIN — POTASSIUM & SODIUM PHOSPHATES POWDER PACK 280-160-250 MG 1 PACKET: 280-160-250 PACK at 19:52

## 2018-08-16 RX ADMIN — ACETAMINOPHEN 650 MG: 325 TABLET, FILM COATED ORAL at 12:39

## 2018-08-16 RX ADMIN — FUROSEMIDE 20 MG: 10 INJECTION, SOLUTION INTRAVENOUS at 14:20

## 2018-08-16 RX ADMIN — QUETIAPINE 50 MG: 50 TABLET ORAL at 22:23

## 2018-08-16 RX ADMIN — Medication 1000 MG: at 19:50

## 2018-08-16 RX ADMIN — POTASSIUM CHLORIDE 20 MEQ: 1.5 POWDER, FOR SOLUTION ORAL at 05:20

## 2018-08-16 ASSESSMENT — ACTIVITIES OF DAILY LIVING (ADL)
ADLS_ACUITY_SCORE: 13
ADLS_ACUITY_SCORE: 12
ADLS_ACUITY_SCORE: 13
ADLS_ACUITY_SCORE: 13

## 2018-08-16 NOTE — PLAN OF CARE
Problem: Patient Care Overview  Goal: Plan of Care/Patient Progress Review  OT:  Pt with another care provider at time of attempt, will reschedule.

## 2018-08-16 NOTE — PLAN OF CARE
Problem: Patient Care Overview  Goal: Plan of Care/Patient Progress Review  Outcome: Improving  Patient alert and appears to be oriented to self, location. VSS, afebrile. A-flutter with frequent PVCs & PACs v. Pacing. Backup temporary pacemaker set at 60bpm. MAP goal <65, pt on 0.02mcg/kg/min of Epinephrine and 2.5 mcg/kg/min of Dobutamine. Patient was weaned off of Fentanyl and Propofol today and was started on low dose Precedex, which is currently off. Patient had 2 failed pressure support trials today due to rapid respiratory rates in the 40s & 50s. Will repeat another pressure support trial tomorrow a.m. (8/16). UOP remains adequat (40-50 mL/hr). Pt had frequent loose BMs today and had a rectal tube placed per MD orders. Pt denies pain at this time. Sternal incision is c/d/i. CT output is minimal, serosanguinous drainage. Pt was started on straight rate heparin at 400u/hr for possible cardioversion for a-flutter in the future. EP consulted today. No plans for PPM at this time. Will continue to monitor patient. Will update MD with further questions/concerns.

## 2018-08-16 NOTE — PROGRESS NOTES
Neuro: Pt's right pupil larger than left, alert, nodding yes/no appropriately, following commands, 3 hours of EEG performed. Off of sedation, calm, cooperative.  CV:  All pressors turned off by 1700, CI 2.9 SVO2 64, MAP stable in 70/80's, tele SR 90's.   Resp: Tolerated pressure support of 15 with PEEP of 5, unable to tolerate any lower pressure support, due to increasing RR into high 30's. On 40% fiO2 Satting well. Minimal amounts of secretions.  GI: TF at GR of 40 ml/hr through NJ, pt having continuous diarrhea.   : Pt responding well to diuretic, given 20 mg lasix X2, wiggins in place.  Up to chair once with lift. Heparin running at straight rate of 400 units/hr.

## 2018-08-16 NOTE — PROGRESS NOTES
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 Underwent repair of posterior VSD and TVR with bioprosthetic valve on 8/10, and was kept with open chest due to coagulopathy and RV dysfunction. Chest closed 8/13. IABP removed 8/14    TODAY'S PROGRESS:   -Wean propofol, start precedex and wean as able   -SvO2 drop with dobutamine wean. Restart dobutamine and discuss with Cards 2 team  -Weaned cangrelor, and started Plavix (with loading dose) for dual anti-platelet therapy  -started heparin 400U   -Consult EP regarding atrial flutter   -Continue PS trials  -Nutri-phos 4x/day    PLAN:  Neuro/ pain/ sedation:  #Post operative sedation  #Questionable seizure activity (temporal lobe seizures?)  - PRN Quetiapine 25mg for sleep, ativan PRN  - Monitor neurological status. Notify the MD for any acute changes in exam.  - Weaning Propofol, start precedex  - Fentanyl for analgesia  - Restart EEG post chest closure (8/14-8/15)  - Will discuss vEEG results with neurology on 8/16:    \"moderate electrographic encephalopathy, additional right frontotemporal neuronal dysfunction, evidence of a known skull defect, and an elevated risk of partial epilepsy.  Some of the marked events may represent simple partial seizures given that there were right frontotemporal frequency changes and increased spiking at these times, but transitory increases in generalized delta slowing are more likely to be accounted for by volitional hyperventilation.  Clinical correlation is recommended.\"    Pulmonary care:  #Post operative mechanical ventilation   - Intubated/sedated for open chest  - pressure support trials BID.    Cardiovascular:  #Post infarction 2.5cm infraseptal " VSD  #s/p HUSSEIN to distal RCA and proximal LAD  #STEMI, cardiogenic shock  #Dilated RV   #Pulmonary HTN  #IABP  #s/p repair of posterior VSD and TVR with bioprosthetic valve, s/p Chest closure 8/13  - Cards 2 reconsulted, appreciate assist  - Monitor hemodynamic status. Pacer at VVI at 60  - Infusions: low dose Epi; dobutamine 2.5  - ECHO from 8/8 prior to surgery: Inferior wall akinesis with inferoseptal muscular VAD. Mild to moderate right ventricular dilation is present. Global right ventricular function is moderately reduced.  - ASA 81mg  - EP consult regarding underlying atrial flutter    Heme:  #Anticoagluation s/p Drug Eluting Stent placement  #Thrombocythemia  -Cangrelor discontinued, heparin 400 U started  -81mg ASA  - Plavix loaded 8/15 (75mg daily thereafter)  -Hydroxyurea 1g BID, hematology consulted and following    GI care:  #Transaminitis   - NJ placed 8/13, TF at goal  - OG to LIS  - Rectal tube placed 8/15    Fluids/ Electrolytes/ Nutrition:   - mIVF for IV fluid hydration  - Nutri-phos 4x/day for lyte replacement  - Wiggins    Renal/ Fluid Balance:    #Acute Kidney injury  - Urine output stable overnight, Creatinine 1.34  - Will continue to monitor intake and output via wiggins    Endocrine:  #Stress hyperglycemia  - Insulin gtt    ID/ Antibiotics:  - Monitor fever curve, trend WBC    Prophylaxis:    - Mechanical prophylaxis for DVT.   - ASA, plavix, heparin straight rate  - Protonix    Lines/ tubes/ drains:  - ETT, arterial line, PIV, CVC/[place]    Disposition:  - CV ICU.    Patient seen, findings and plan discussed with CVTS team.    Arnaldo Viera MD   PGY-3, GEN SURG  ====================================    SUBJECTIVE:   -Paced at 70 overnight, has underlying atrial flutter. Increased stool output prompting rectal tube placement. Failed pressure support trials due to lethargy and tachypnea.    OBJECTIVE:   1. VITAL SIGNS:   Temp:  [97.2  F (36.2  C)-100  F (37.8  C)] 97.4  F (36.3  C)  Heart Rate:   [] 77  Resp:  [16-45] 21  BP: (133)/(76) 133/76  MAP:  [61 mmHg-92 mmHg] 80 mmHg  Arterial Line BP: ()/(40-76) 103/65  FiO2 (%):  [40 %] 40 %  SpO2:  [86 %-100 %] 100 %  Ventilation Mode: CMV/AC  (Continuous Mandatory Ventilation/ Assist Control)  FiO2 (%): 40 %  Rate Set (breaths/minute): 14 breaths/min  Tidal Volume Set (mL): 550 mL  PEEP (cm H2O): 5 cmH2O  Pressure Support (cm H2O): 7 cmH2O  Oxygen Concentration (%): 40 %  Resp: 21    2. INTAKE/ OUTPUT:   I/O last 3 completed shifts:  In: 4943.18 [I.V.:2853.18; NG/GT:680]  Out: 2015 [Urine:845; Emesis/NG output:120; Stool:350; Chest Tube:700]    3. PHYSICAL EXAMINATION:   General: Intubated, lightley sedated  Neuro: On minimal sedation, opens eyes in response to his name, follows commands  Resp: Mechanical ventilation, intubated, coarse breath sounds  CV: Normal rate with underlying atrial flutter  Abdomen: Soft, dressings in place  Incisions: Dressing over chest, chest tubes in place, open chest  Extremities: warm and well perfused    4. INVESTIGATIONS:   Arterial Blood Gases     Recent Labs  Lab 08/15/18  1331 08/15/18  0340 08/14/18  0331 08/13/18  1845   PH 7.40 7.41 7.41 7.39   PCO2 27* 32* 32* 33*   PO2 159* 145* 129* 101   HCO3 17* 21 20* 20*     Complete Blood Count     Recent Labs  Lab 08/15/18  1614 08/15/18  0339 08/14/18  2030 08/14/18  1611 08/14/18  0331   WBC 12.0* 12.9*  --  11.7* 10.6   HGB 9.0* 8.9* 8.7* 8.8* 7.8*    427  --  421 483*     Basic Metabolic Panel    Recent Labs  Lab 08/15/18  1614 08/15/18  0957 08/15/18  0339 08/14/18  0331  08/13/18  1511 08/13/18  1319  08/13/18  0433   NA  --   --  146* 142  --  144 140  --  143   POTASSIUM 4.3 4.2 3.9 4.6  < > 4.1 4.6  < > 4.0   CHLORIDE  --   --  119* 115*  --  112*  --   --  113*   CO2  --   --  21 20  --  21  --   --  21   BUN  --   --  28 25  --  28  --   --  29   CR  --   --  0.67 0.84  --  0.93  --   --  0.99   *  --  135* 128*  --  133* 127*  --  115*   < > =  values in this interval not displayed.  Liver Function Tests    Recent Labs  Lab 08/15/18  0339 08/14/18  0331 08/13/18  1511 08/13/18  0433 08/12/18  0338   AST 47* 56*  --  68* 102*   ALT 16 20  --  21 27   ALKPHOS 139 115  --  82 60   BILITOTAL 0.6 0.6  --  0.7 0.7   ALBUMIN 1.7* 1.9*  --  1.9* 2.3*   INR 1.27* 1.32* 1.34* 1.44* 1.51*     Pancreatic Enzymes  No lab results found in last 7 days.  Coagulation Profile    Recent Labs  Lab 08/15/18  0339 08/14/18  0331 08/13/18  1511 08/13/18  0433  08/10/18  2243 08/10/18  2132   INR 1.27* 1.32* 1.34* 1.44*  < > 1.70* 1.67*   PTT  --   --   --   --   --  46* 41*   < > = values in this interval not displayed.      5. RADIOLOGY:   Recent Results (from the past 24 hour(s))   XR Chest Port 1 View    Narrative    Exam: XR CHEST PORT 1 VW, 8/15/2018 1:20 AM    Indication: CVICU, intubated;     Comparison: Chest x-ray 8/14/2018    Findings:     Supine frontal chest x-ray.     Stable position of endotracheal tube, basilar chest tubes, visualized  enteric tube and epicardial leads. Bilateral pump tip is not seen.  Unchanged cardiac silhouette. Unchanged pulmonary opacities stable  mediastinal wires and surgical clips. No pneumothorax. Right IJ  [place]-Owen catheter with tip projecting over the right pulmonary  artery.      Impression    Impression:   1. [place]-Owen catheter with tip projecting over the right and pulmonary  artery.  2. Aortic balloon pump has been removed, rest stable support devices.  3. Persistent small left pleural effusion and adjacent  atelectasis/consolidation.  4. Unchanged perihilar pulmonary opacities, representing pulmonary  edema and atelectasis.    I have personally reviewed the examination and initial interpretation  and I agree with the findings.    ALBERTO PERSAUD MD

## 2018-08-16 NOTE — PROGRESS NOTES
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 Underwent repair of posterior VSD and TVR with bioprosthetic valve on 8/10, and was kept with open chest due to coagulopathy and RV dysfunction. Chest closed 8/13. IABP removed 8/14.     TODAY'S PROGRESS:   - Neurology rec's to perform 3 hour EEG today   -Continue keppra 750mg BID for now   -Follow up with neurology as outpatient in 3 months  - Lasix 20mg x2 today  - PS Trials after diuresis  - Wean Dobutamine and check PA values, then Epi as tolerated after diuresis  - EP with rec's to anticoagulate for stroke prophylaxis   - Continue DAPT, heparin 400U infusion  -Nutri-Phos 4x/day  - 2-4 hour EEG per Neurology team today.    PLAN:  Neuro/ pain/ sedation:  #Post operative sedation  #Questionable seizure activity (temporal lobe seizures?)  - PRN Quetiapine 25mg for sleep, ativan PRN  - Monitor neurological status. Notify the MD for any acute changes in exam.  - Weaned off Precedex  - Off Fentanyl gtt  - Will discuss with neurology regarding vEEG    -Continue keppra 750mg BID for now    -Follow up with neurology as outpatient in 3 months    Pulmonary care:  #Post operative mechanical ventilation   - Intubated/sedated for open chest  - pressure support trials ongoing    Cardiovascular:  #Post infarction 2.5cm infraseptal VSD  #s/p HUSSEIN to distal RCA and proximal LAD  #STEMI, cardiogenic shock  #Dilated RV   #Pulmonary HTN  #IABP  #s/p repair of posterior VSD and TVR with bioprosthetic valve, s/p Chest closure 8/13  #Atrial flutter  - Cards 2 reconsulted, appreciate assist   - Monitor hemodynamic status. Pacer at VVI at 60  - Infusions: low dose Epi; dobutamine 2.5  - ECHO from 8/8 prior  to surgery: Inferior wall akinesis with inferoseptal muscular VAD. Mild to moderate right ventricular dilation is present. Global right ventricular function is moderately reduced.  - ASA 81mg  - EP consult regarding underlying atrial flutter. Appreciate recs.    Heme:  #Anticoagulation s/p Drug Eluting Stent placement  #Thrombocythemia  - Cangrelor discontinued, heparin 400 U started  - 81mg ASA  - Plavix loaded 8/15 (75mg daily thereafter)  - Hydroxyurea 1g BID, hematology consulted and following    GI care:  #Transaminitis   - NJ placed 8/13, TF at goal  - OG to LIS  - Rectal tube placed 8/15    Fluids/ Electrolytes/ Nutrition:   - mIVF for IV fluid hydration  - Nutri-phos 4x/day for lyte replacement  - Wiggins    Renal/ Fluid Balance:    #Acute Kidney injury  - Urine output stable overnight, Creatinine reassuring  - Will continue to monitor intake and output via wiggins    Endocrine:  #Stress hyperglycemia  - off Insulin gtt, on ISS    ID/ Antibiotics:  - Monitor fever curve, trend WBC    Prophylaxis:    - Mechanical prophylaxis for DVT.   - ASA, plavix, heparin straight rate  - Protonix    Lines/ tubes/ drains:  - ETT, arterial line, PIV, CVC    Disposition:  - CV ICU.    Patient seen, findings and plan discussed with CV ICU staff Dr. Gomez Leslie MD  PGY-3 General Surgery      ====================================    SUBJECTIVE:   - NAEON. Minimally paced. More alert and responsive this morning, follows commands.    OBJECTIVE:   1. VITAL SIGNS:   Temp:  [97.2  F (36.2  C)-99.1  F (37.3  C)] 98.8  F (37.1  C)  Heart Rate:  [] 105  Resp:  [16-45] 23  MAP:  [62 mmHg-98 mmHg] 80 mmHg  Arterial Line BP: ()/(25-85) 97/68  FiO2 (%):  [40 %] 40 %  SpO2:  [95 %-100 %] 100 %  Ventilation Mode: CMV/AC  (Continuous Mandatory Ventilation/ Assist Control)  FiO2 (%): 40 %  Rate Set (breaths/minute): 14 breaths/min  Tidal Volume Set (mL): 550 mL  PEEP (cm H2O): 5 cmH2O  Pressure Support (cm H2O): 7  cmH2O  Oxygen Concentration (%): 40 %  Resp: 23    2. INTAKE/ OUTPUT:   I/O last 3 completed shifts:  In: 4293.54 [I.V.:2433.54; NG/GT:510]  Out: 2246 [Urine:1036; Stool:750; Chest Tube:460]    3. PHYSICAL EXAMINATION:   General: Intubated, comfortable  Neuro: Off sedation, opens eyes in response to his name, follows commands  Resp: Mechanical ventilation, intubated, coarse breath sounds  CV: Normal rate with underlying atrial flutter  Abdomen: Soft, dressings in place  Incisions: Dressing over chest, chest tubes in place, open chest  Extremities: warm and well perfused    4. INVESTIGATIONS:   Arterial Blood Gases     Recent Labs  Lab 08/16/18  0402 08/15/18  1331 08/15/18  0340 08/14/18  0331   PH 7.44 7.40 7.41 7.41   PCO2 30* 27* 32* 32*   PO2 137* 159* 145* 129*   HCO3 20* 17* 21 20*     Complete Blood Count     Recent Labs  Lab 08/16/18  0402 08/15/18  1614 08/15/18  0339 08/14/18  2030 08/14/18  1611 08/14/18  0331   WBC  --  12.0* 12.9*  --  11.7* 10.6   HGB 8.8* 9.0* 8.9* 8.7* 8.8* 7.8*   PLT  --  442 427  --  421 483*     Basic Metabolic Panel    Recent Labs  Lab 08/16/18  0402 08/15/18  1614 08/15/18  0957 08/15/18  0339 08/14/18  0331  08/13/18  1511   *  --   --  146* 142  --  144   POTASSIUM 3.9 4.3 4.2 3.9 4.6  < > 4.1   CHLORIDE 120*  --   --  119* 115*  --  112*   CO2 19*  --   --  21 20  --  21   BUN 29  --   --  28 25  --  28   CR 0.66  --   --  0.67 0.84  --  0.93   * 156*  --  135* 128*  --  133*   < > = values in this interval not displayed.  Liver Function Tests    Recent Labs  Lab 08/16/18  0402 08/15/18  0339 08/14/18  0331 08/13/18  1511 08/13/18  0433   AST 71* 47* 56*  --  68*   ALT 29 16 20  --  21   ALKPHOS 200* 139 115  --  82   BILITOTAL 0.4 0.6 0.6  --  0.7   ALBUMIN 1.6* 1.7* 1.9*  --  1.9*   INR 1.26* 1.27* 1.32* 1.34* 1.44*     Pancreatic Enzymes  No lab results found in last 7 days.  Coagulation Profile    Recent Labs  Lab 08/16/18  0402 08/15/18  0339 08/14/18  0331  08/13/18  1511  08/10/18  2243 08/10/18  2132   INR 1.26* 1.27* 1.32* 1.34*  < > 1.70* 1.67*   PTT 52*  --   --   --   --  46* 41*   < > = values in this interval not displayed.      5. RADIOLOGY:   No results found for this or any previous visit (from the past 24 hour(s)).

## 2018-08-16 NOTE — PLAN OF CARE
Problem: Patient Care Overview  Goal: Plan of Care/Patient Progress Review  Outcome: Improving                    Responsive to commands,CARRIZALES, Tmax 99.3. Flutter 's  's/70's PA 30/22 CVP 11, SVo2 56 (See hemodynamics flow sheet)                FiO2 40%, PS @0600  7/5, TF@goal, OG LIS, Rectal tube 200cc, 35-50cc/hr UOP                Continue to monitor and intervene as indicated.

## 2018-08-16 NOTE — PROGRESS NOTES
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 Underwent repair of posterior VSD and TVR with bioprosthetic valve on 8/10, and was kept with open chest due to coagulopathy and RV dysfunction. Chest closed 8/13. IABP removed 8/14.     TODAY'S PROGRESS:   - Discuss with Neurology on utility of EEG at this time  - PS Trials after diuresis  - Wean Dobutamine and check PA values, then Epi as tolerated  - EP with rec's to anticoagulate for stroke prophylaxis   - Continue DAPT, heparin 400U infusion  -Nutri-Phos 4x/day    PLAN:  Neuro/ pain/ sedation:  #Post operative sedation  #Questionable seizure activity (temporal lobe seizures?)  - PRN Quetiapine 25mg for sleep, ativan PRN  - Monitor neurological status. Notify the MD for any acute changes in exam.  - Weaned off Precedex  - Off Fentanyl gtt  - Will discuss with neurology regarding vEEG    Pulmonary care:  #Post operative mechanical ventilation   - Intubated/sedated for open chest  - pressure support trials ongoing    Cardiovascular:  #Post infarction 2.5cm infraseptal VSD  #s/p HUSSEIN to distal RCA and proximal LAD  #STEMI, cardiogenic shock  #Dilated RV   #Pulmonary HTN  #IABP  #s/p repair of posterior VSD and TVR with bioprosthetic valve, s/p Chest closure 8/13  #Atrial flutter  - Cards 2 reconsulted, appreciate assist   - Monitor hemodynamic status. Pacer at VVI at 60  - Infusions: low dose Epi; dobutamine 2.5  - ECHO from 8/8 prior to surgery: Inferior wall akinesis with inferoseptal muscular VAD. Mild to moderate right ventricular dilation is present. Global right ventricular function is moderately reduced.  - ASA 81mg  - EP consult regarding underlying atrial flutter. Appreciate  recs.    Heme:  #Anticoagulation s/p Drug Eluting Stent placement  #Thrombocythemia  - Cangrelor discontinued, heparin 400 U started  - 81mg ASA  - Plavix loaded 8/15 (75mg daily thereafter)  - Hydroxyurea 1g BID, hematology consulted and following    GI care:  #Transaminitis   - NJ placed 8/13, TF at goal  - OG to LIS  - Rectal tube placed 8/15    Fluids/ Electrolytes/ Nutrition:   - mIVF for IV fluid hydration  - Nutri-phos 4x/day for lyte replacement  - Wiggins    Renal/ Fluid Balance:    #Acute Kidney injury  - Urine output stable overnight, Creatinine reassuring  - Will continue to monitor intake and output via wiggins    Endocrine:  #Stress hyperglycemia  - off Insulin gtt, on ISS    ID/ Antibiotics:  - Monitor fever curve, trend WBC    Prophylaxis:    - Mechanical prophylaxis for DVT.   - ASA, plavix, heparin straight rate  - Protonix    Lines/ tubes/ drains:  - ETT, arterial line, PIV, CVC    Disposition:  - CV ICU.    Patient seen, findings and plan discussed with CVTS team.      Jose Leslie MD  PGY-3 General Surgery      ====================================    SUBJECTIVE:   - NAEON. Minimally paced. More alert and responsive this morning, follows commands.    OBJECTIVE:   1. VITAL SIGNS:   Temp:  [97.2  F (36.2  C)-99.1  F (37.3  C)] 98.8  F (37.1  C)  Heart Rate:  [] 105  Resp:  [16-45] 23  MAP:  [62 mmHg-98 mmHg] 80 mmHg  Arterial Line BP: ()/(25-85) 97/68  FiO2 (%):  [40 %] 40 %  SpO2:  [95 %-100 %] 100 %  Ventilation Mode: CMV/AC  (Continuous Mandatory Ventilation/ Assist Control)  FiO2 (%): 40 %  Rate Set (breaths/minute): 14 breaths/min  Tidal Volume Set (mL): 550 mL  PEEP (cm H2O): 5 cmH2O  Pressure Support (cm H2O): 7 cmH2O  Oxygen Concentration (%): 40 %  Resp: 23    2. INTAKE/ OUTPUT:   I/O last 3 completed shifts:  In: 4293.54 [I.V.:2433.54; NG/GT:510]  Out: 2246 [Urine:1036; Stool:750; Chest Tube:460]    3. PHYSICAL EXAMINATION:   General: Intubated, comfortable  Neuro: Off sedation,  opens eyes in response to his name, follows commands  Resp: Mechanical ventilation, intubated, coarse breath sounds  CV: Normal rate with underlying atrial flutter  Abdomen: Soft, dressings in place  Incisions: Dressing over chest, chest tubes in place, open chest  Extremities: warm and well perfused    4. INVESTIGATIONS:   Arterial Blood Gases     Recent Labs  Lab 08/16/18  0402 08/15/18  1331 08/15/18  0340 08/14/18  0331   PH 7.44 7.40 7.41 7.41   PCO2 30* 27* 32* 32*   PO2 137* 159* 145* 129*   HCO3 20* 17* 21 20*     Complete Blood Count     Recent Labs  Lab 08/16/18  0402 08/15/18  1614 08/15/18  0339 08/14/18  2030 08/14/18  1611 08/14/18  0331   WBC  --  12.0* 12.9*  --  11.7* 10.6   HGB 8.8* 9.0* 8.9* 8.7* 8.8* 7.8*   PLT  --  442 427  --  421 483*     Basic Metabolic Panel    Recent Labs  Lab 08/16/18  0402 08/15/18  1614 08/15/18  0957 08/15/18  0339 08/14/18  0331  08/13/18  1511   *  --   --  146* 142  --  144   POTASSIUM 3.9 4.3 4.2 3.9 4.6  < > 4.1   CHLORIDE 120*  --   --  119* 115*  --  112*   CO2 19*  --   --  21 20  --  21   BUN 29  --   --  28 25  --  28   CR 0.66  --   --  0.67 0.84  --  0.93   * 156*  --  135* 128*  --  133*   < > = values in this interval not displayed.  Liver Function Tests    Recent Labs  Lab 08/16/18  0402 08/15/18  0339 08/14/18  0331 08/13/18  1511 08/13/18  0433   AST 71* 47* 56*  --  68*   ALT 29 16 20  --  21   ALKPHOS 200* 139 115  --  82   BILITOTAL 0.4 0.6 0.6  --  0.7   ALBUMIN 1.6* 1.7* 1.9*  --  1.9*   INR 1.26* 1.27* 1.32* 1.34* 1.44*     Pancreatic Enzymes  No lab results found in last 7 days.  Coagulation Profile    Recent Labs  Lab 08/16/18  0402 08/15/18  0339 08/14/18  0331 08/13/18  1511  08/10/18  2243 08/10/18  2132   INR 1.26* 1.27* 1.32* 1.34*  < > 1.70* 1.67*   PTT 52*  --   --   --   --  46* 41*   < > = values in this interval not displayed.      5. RADIOLOGY:   No results found for this or any previous visit (from the past 24  hour(s)).

## 2018-08-16 NOTE — MR AVS SNAPSHOT
After Visit Summary   2018    Castillo De Anda    MRN: [id number]           Patient Information     Date Of Birth          1950        Visit Information        Provider Department      2018 11:00 AM [facility] EEG TECH 4 [facility] EEG        Today's Diagnoses     Cardiogenic shock (H)    -  1       Follow-ups after your visit        Who to contact     Please call your clinic at [phone] to:    Ask questions about your health    Make or cancel appointments    Discuss your medicines    Learn about your test results    Speak to your doctor            Additional Information About Your Visit        MyChart Information     [employer] is an electronic gateway that provides easy, online access to your medical records. With [employer], you can request a clinic appointment, read your test results, renew a prescription or communicate with your care team.     To sign up for [employer] visit the website at [email or web address]   You will be asked to enter the access code listed below, as well as some personal information. Please follow the directions to create your username and password.     Your access code is: [email or web address]  Expires: 2018  4:09 PM     Your access code will  in 90 days. If you need help or a new code, please contact your [facility] or call [phone] for assistance.        Care EveryWhere ID     This is your Care EveryWhere ID. This could be used by other organizations to access your [facility] medical records  [id number]         Blood Pressure from Last 3 Encounters:   08/15/18 133/76    Weight from Last 3 Encounters:   08/15/18 83 kg (182 lb 15.7 oz)              Today, you had the following     No orders found for display         Today's Medication Changes      Notice     This visit is during an admission. Changes to the med list made in this visit will be reflected in the After Visit Summary of the admission.             Primary Care Provider     None Specified       No primary provider on file.        Equal Access to Services     MORGAN SANCHEZ : Jaden Leong, dinora anand, giana nicholson. So [facility] [phone].    ATENCIÓN: Si habla español, tiene a durbin disposición servicios gratuitos de asistencia lingüística. Llame al [phone].    We comply with applicable federal civil rights laws and [place] laws. We do not discriminate on the basis of race, color, national origin, age, disability, sex, sexual orientation, or gender identity.            Thank you!     Thank you for choosing [facility]  for your care. Our goal is always to provide you with excellent care. Hearing back from our patients is one way we can continue to improve our services. Please take a few minutes to complete the written survey that you may receive in the mail after your visit with us. Thank you!             Your Updated Medication List - Protect others around you: Learn how to safely use, store and throw away your medicines at www.disposemymeds.org.      Notice     This visit is during an admission. Changes to the med list made in this visit will be reflected in the After Visit Summary of the admission.

## 2018-08-17 ENCOUNTER — APPOINTMENT (OUTPATIENT)
Dept: OCCUPATIONAL THERAPY | Facility: CLINIC | Age: 68
DRG: 219 | End: 2018-08-17
Attending: INTERNAL MEDICINE
Payer: MEDICARE

## 2018-08-17 LAB
ALBUMIN SERPL-MCNC: 1.5 G/DL (ref 3.4–5)
ALP SERPL-CCNC: 242 U/L (ref 40–150)
ALT SERPL W P-5'-P-CCNC: 123 U/L (ref 0–70)
ANION GAP SERPL CALCULATED.3IONS-SCNC: 7 MMOL/L (ref 3–14)
ANION GAP SERPL CALCULATED.3IONS-SCNC: 8 MMOL/L (ref 3–14)
APTT PPP: 50 SEC (ref 22–37)
AST SERPL W P-5'-P-CCNC: 262 U/L (ref 0–45)
BASE DEFICIT BLDV-SCNC: 0.3 MMOL/L
BASE DEFICIT BLDV-SCNC: 1.4 MMOL/L
BASE EXCESS BLDV CALC-SCNC: 0.3 MMOL/L
BILIRUB SERPL-MCNC: 0.4 MG/DL (ref 0.2–1.3)
BUN SERPL-MCNC: 35 MG/DL (ref 7–30)
BUN SERPL-MCNC: 38 MG/DL (ref 7–30)
CA-I BLD-MCNC: 4.4 MG/DL (ref 4.4–5.2)
CALCIUM SERPL-MCNC: 7 MG/DL (ref 8.5–10.1)
CALCIUM SERPL-MCNC: 7.3 MG/DL (ref 8.5–10.1)
CHLORIDE SERPL-SCNC: 121 MMOL/L (ref 94–109)
CHLORIDE SERPL-SCNC: 121 MMOL/L (ref 94–109)
CO2 SERPL-SCNC: 23 MMOL/L (ref 20–32)
CO2 SERPL-SCNC: 24 MMOL/L (ref 20–32)
CREAT SERPL-MCNC: 0.6 MG/DL (ref 0.66–1.25)
CREAT SERPL-MCNC: 0.68 MG/DL (ref 0.66–1.25)
ERYTHROCYTE [DISTWIDTH] IN BLOOD BY AUTOMATED COUNT: 18.5 % (ref 10–15)
GFR SERPL CREATININE-BSD FRML MDRD: >90 ML/MIN/1.7M2
GFR SERPL CREATININE-BSD FRML MDRD: >90 ML/MIN/1.7M2
GLUCOSE BLDC GLUCOMTR-MCNC: 106 MG/DL (ref 70–99)
GLUCOSE BLDC GLUCOMTR-MCNC: 108 MG/DL (ref 70–99)
GLUCOSE BLDC GLUCOMTR-MCNC: 108 MG/DL (ref 70–99)
GLUCOSE BLDC GLUCOMTR-MCNC: 111 MG/DL (ref 70–99)
GLUCOSE BLDC GLUCOMTR-MCNC: 114 MG/DL (ref 70–99)
GLUCOSE BLDC GLUCOMTR-MCNC: 99 MG/DL (ref 70–99)
GLUCOSE SERPL-MCNC: 114 MG/DL (ref 70–99)
GLUCOSE SERPL-MCNC: 117 MG/DL (ref 70–99)
HCO3 BLDV-SCNC: 23 MMOL/L (ref 21–28)
HCO3 BLDV-SCNC: 24 MMOL/L (ref 21–28)
HCO3 BLDV-SCNC: 25 MMOL/L (ref 21–28)
HCT VFR BLD AUTO: 25.8 % (ref 40–53)
HGB BLD-MCNC: 8.4 G/DL (ref 13.3–17.7)
HGB BLD-MCNC: 9.1 G/DL (ref 13.3–17.7)
INR PPP: 1.28 (ref 0.86–1.14)
MAGNESIUM SERPL-MCNC: 2 MG/DL (ref 1.6–2.3)
MAGNESIUM SERPL-MCNC: 2.2 MG/DL (ref 1.6–2.3)
MCH RBC QN AUTO: 29.7 PG (ref 26.5–33)
MCHC RBC AUTO-ENTMCNC: 32.6 G/DL (ref 31.5–36.5)
MCV RBC AUTO: 91 FL (ref 78–100)
O2/TOTAL GAS SETTING VFR VENT: 40 %
OXYHGB MFR BLDV: 59 %
OXYHGB MFR BLDV: 60 %
OXYHGB MFR BLDV: 60 %
PCO2 BLDV: 35 MM HG (ref 40–50)
PCO2 BLDV: 38 MM HG (ref 40–50)
PCO2 BLDV: 39 MM HG (ref 40–50)
PH BLDV: 7.41 PH (ref 7.32–7.43)
PH BLDV: 7.42 PH (ref 7.32–7.43)
PH BLDV: 7.43 PH (ref 7.32–7.43)
PHOSPHATE SERPL-MCNC: 1.8 MG/DL (ref 2.5–4.5)
PHOSPHATE SERPL-MCNC: 3 MG/DL (ref 2.5–4.5)
PLATELET # BLD AUTO: 444 10E9/L (ref 150–450)
PO2 BLDV: 32 MM HG (ref 25–47)
PO2 BLDV: 33 MM HG (ref 25–47)
PO2 BLDV: 35 MM HG (ref 25–47)
POTASSIUM SERPL-SCNC: 3.1 MMOL/L (ref 3.4–5.3)
POTASSIUM SERPL-SCNC: 4 MMOL/L (ref 3.4–5.3)
PROT SERPL-MCNC: 4.9 G/DL (ref 6.8–8.8)
RBC # BLD AUTO: 2.83 10E12/L (ref 4.4–5.9)
SODIUM SERPL-SCNC: 151 MMOL/L (ref 133–144)
SODIUM SERPL-SCNC: 152 MMOL/L (ref 133–144)
WBC # BLD AUTO: 4.9 10E9/L (ref 4–11)

## 2018-08-17 PROCEDURE — 25000128 H RX IP 250 OP 636: Performed by: STUDENT IN AN ORGANIZED HEALTH CARE EDUCATION/TRAINING PROGRAM

## 2018-08-17 PROCEDURE — 00000146 ZZHCL STATISTIC GLUCOSE BY METER IP

## 2018-08-17 PROCEDURE — A9270 NON-COVERED ITEM OR SERVICE: HCPCS | Mod: GY | Performed by: STUDENT IN AN ORGANIZED HEALTH CARE EDUCATION/TRAINING PROGRAM

## 2018-08-17 PROCEDURE — 25000132 ZZH RX MED GY IP 250 OP 250 PS 637: Mod: GY | Performed by: STUDENT IN AN ORGANIZED HEALTH CARE EDUCATION/TRAINING PROGRAM

## 2018-08-17 PROCEDURE — 82330 ASSAY OF CALCIUM: CPT | Performed by: STUDENT IN AN ORGANIZED HEALTH CARE EDUCATION/TRAINING PROGRAM

## 2018-08-17 PROCEDURE — 99291 CRITICAL CARE FIRST HOUR: CPT | Mod: GC | Performed by: ANESTHESIOLOGY

## 2018-08-17 PROCEDURE — 40000196 ZZH STATISTIC RAPCV CVP MONITORING

## 2018-08-17 PROCEDURE — 82805 BLOOD GASES W/O2 SATURATION: CPT | Performed by: STUDENT IN AN ORGANIZED HEALTH CARE EDUCATION/TRAINING PROGRAM

## 2018-08-17 PROCEDURE — 85610 PROTHROMBIN TIME: CPT | Performed by: INTERNAL MEDICINE

## 2018-08-17 PROCEDURE — A9270 NON-COVERED ITEM OR SERVICE: HCPCS | Mod: GY | Performed by: THORACIC SURGERY (CARDIOTHORACIC VASCULAR SURGERY)

## 2018-08-17 PROCEDURE — 94003 VENT MGMT INPAT SUBQ DAY: CPT

## 2018-08-17 PROCEDURE — 25000125 ZZHC RX 250: Performed by: THORACIC SURGERY (CARDIOTHORACIC VASCULAR SURGERY)

## 2018-08-17 PROCEDURE — 40000275 ZZH STATISTIC RCP TIME EA 10 MIN

## 2018-08-17 PROCEDURE — 85730 THROMBOPLASTIN TIME PARTIAL: CPT | Performed by: INTERNAL MEDICINE

## 2018-08-17 PROCEDURE — A9270 NON-COVERED ITEM OR SERVICE: HCPCS | Mod: GY | Performed by: INTERNAL MEDICINE

## 2018-08-17 PROCEDURE — 20000004 ZZH R&B ICU UMMC

## 2018-08-17 PROCEDURE — 97530 THERAPEUTIC ACTIVITIES: CPT | Mod: GO

## 2018-08-17 PROCEDURE — 85027 COMPLETE CBC AUTOMATED: CPT | Performed by: THORACIC SURGERY (CARDIOTHORACIC VASCULAR SURGERY)

## 2018-08-17 PROCEDURE — 80048 BASIC METABOLIC PNL TOTAL CA: CPT | Performed by: THORACIC SURGERY (CARDIOTHORACIC VASCULAR SURGERY)

## 2018-08-17 PROCEDURE — 40000133 ZZH STATISTIC OT WARD VISIT

## 2018-08-17 PROCEDURE — 25000132 ZZH RX MED GY IP 250 OP 250 PS 637: Mod: GY | Performed by: INTERNAL MEDICINE

## 2018-08-17 PROCEDURE — 97110 THERAPEUTIC EXERCISES: CPT | Mod: GO

## 2018-08-17 PROCEDURE — 80053 COMPREHEN METABOLIC PANEL: CPT | Performed by: INTERNAL MEDICINE

## 2018-08-17 PROCEDURE — 97535 SELF CARE MNGMENT TRAINING: CPT | Mod: GO

## 2018-08-17 PROCEDURE — 83735 ASSAY OF MAGNESIUM: CPT | Performed by: INTERNAL MEDICINE

## 2018-08-17 PROCEDURE — 40000014 ZZH STATISTIC ARTERIAL MONITORING DAILY

## 2018-08-17 PROCEDURE — 25000128 H RX IP 250 OP 636: Performed by: THORACIC SURGERY (CARDIOTHORACIC VASCULAR SURGERY)

## 2018-08-17 PROCEDURE — 40000048 ZZH STATISTIC DAILY SWAN MONITORING

## 2018-08-17 PROCEDURE — 85018 HEMOGLOBIN: CPT | Performed by: INTERNAL MEDICINE

## 2018-08-17 PROCEDURE — 83735 ASSAY OF MAGNESIUM: CPT | Performed by: THORACIC SURGERY (CARDIOTHORACIC VASCULAR SURGERY)

## 2018-08-17 PROCEDURE — 84100 ASSAY OF PHOSPHORUS: CPT | Performed by: THORACIC SURGERY (CARDIOTHORACIC VASCULAR SURGERY)

## 2018-08-17 PROCEDURE — 84100 ASSAY OF PHOSPHORUS: CPT | Performed by: INTERNAL MEDICINE

## 2018-08-17 PROCEDURE — 25000132 ZZH RX MED GY IP 250 OP 250 PS 637: Mod: GY | Performed by: THORACIC SURGERY (CARDIOTHORACIC VASCULAR SURGERY)

## 2018-08-17 RX ORDER — DEXMEDETOMIDINE HYDROCHLORIDE 4 UG/ML
0.2-0.7 INJECTION, SOLUTION INTRAVENOUS CONTINUOUS
Status: DISCONTINUED | OUTPATIENT
Start: 2018-08-17 | End: 2018-08-17

## 2018-08-17 RX ORDER — FUROSEMIDE 10 MG/ML
40 INJECTION INTRAMUSCULAR; INTRAVENOUS ONCE
Status: COMPLETED | OUTPATIENT
Start: 2018-08-17 | End: 2018-08-17

## 2018-08-17 RX ORDER — ALBUMIN, HUMAN INJ 5% 5 %
12.5 SOLUTION INTRAVENOUS ONCE
Status: DISCONTINUED | OUTPATIENT
Start: 2018-08-17 | End: 2018-08-18

## 2018-08-17 RX ORDER — FUROSEMIDE 10 MG/ML
20 INJECTION INTRAMUSCULAR; INTRAVENOUS EVERY 12 HOURS
Status: COMPLETED | OUTPATIENT
Start: 2018-08-17 | End: 2018-08-17

## 2018-08-17 RX ORDER — ALBUMIN, HUMAN INJ 5% 5 %
SOLUTION INTRAVENOUS
Status: DISCONTINUED
Start: 2018-08-17 | End: 2018-08-24 | Stop reason: HOSPADM

## 2018-08-17 RX ADMIN — ASPIRIN 81 MG CHEWABLE TABLET 81 MG: 81 TABLET CHEWABLE at 07:40

## 2018-08-17 RX ADMIN — CARBOXYMETHYLCELLULOSE SODIUM 1 DROP: 5 SOLUTION/ DROPS OPHTHALMIC at 19:34

## 2018-08-17 RX ADMIN — FUROSEMIDE 20 MG: 10 INJECTION, SOLUTION INTRAVENOUS at 10:49

## 2018-08-17 RX ADMIN — Medication 1000 MG: at 19:35

## 2018-08-17 RX ADMIN — SENNOSIDES AND DOCUSATE SODIUM 1 TABLET: 8.6; 5 TABLET ORAL at 19:34

## 2018-08-17 RX ADMIN — QUETIAPINE 50 MG: 50 TABLET ORAL at 21:39

## 2018-08-17 RX ADMIN — POTASSIUM PHOSPHATE, MONOBASIC AND POTASSIUM PHOSPHATE, DIBASIC 20 MMOL: 224; 236 INJECTION, SOLUTION INTRAVENOUS at 07:53

## 2018-08-17 RX ADMIN — ACETAMINOPHEN 650 MG: 325 TABLET, FILM COATED ORAL at 15:27

## 2018-08-17 RX ADMIN — POTASSIUM & SODIUM PHOSPHATES POWDER PACK 280-160-250 MG 2 PACKET: 280-160-250 PACK at 19:34

## 2018-08-17 RX ADMIN — LEVETIRACETAM 750 MG: 100 INJECTION, SOLUTION INTRAVENOUS at 05:36

## 2018-08-17 RX ADMIN — CARBOXYMETHYLCELLULOSE SODIUM 1 DROP: 5 SOLUTION/ DROPS OPHTHALMIC at 11:05

## 2018-08-17 RX ADMIN — FUROSEMIDE 40 MG: 10 INJECTION, SOLUTION INTRAVENOUS at 16:53

## 2018-08-17 RX ADMIN — CARBOXYMETHYLCELLULOSE SODIUM 1 DROP: 5 SOLUTION/ DROPS OPHTHALMIC at 06:12

## 2018-08-17 RX ADMIN — POTASSIUM CHLORIDE 20 MEQ: 1.5 POWDER, FOR SOLUTION ORAL at 07:40

## 2018-08-17 RX ADMIN — LEVETIRACETAM 750 MG: 100 INJECTION, SOLUTION INTRAVENOUS at 19:56

## 2018-08-17 RX ADMIN — POTASSIUM & SODIUM PHOSPHATES POWDER PACK 280-160-250 MG 2 PACKET: 280-160-250 PACK at 11:20

## 2018-08-17 RX ADMIN — CLOPIDOGREL 75 MG: 75 TABLET, FILM COATED ORAL at 07:40

## 2018-08-17 RX ADMIN — POTASSIUM & SODIUM PHOSPHATES POWDER PACK 280-160-250 MG 1 PACKET: 280-160-250 PACK at 07:40

## 2018-08-17 RX ADMIN — POTASSIUM & SODIUM PHOSPHATES POWDER PACK 280-160-250 MG 2 PACKET: 280-160-250 PACK at 15:27

## 2018-08-17 RX ADMIN — ATORVASTATIN CALCIUM 80 MG: 80 TABLET, FILM COATED ORAL at 19:34

## 2018-08-17 RX ADMIN — Medication 100 MG: at 07:40

## 2018-08-17 RX ADMIN — POTASSIUM CHLORIDE 40 MEQ: 1.5 POWDER, FOR SOLUTION ORAL at 05:40

## 2018-08-17 RX ADMIN — Medication 2 G: at 06:12

## 2018-08-17 RX ADMIN — QUETIAPINE FUMARATE 25 MG: 25 TABLET, FILM COATED ORAL at 07:40

## 2018-08-17 RX ADMIN — DOCUSATE SODIUM 100 MG: 50 LIQUID ORAL at 19:34

## 2018-08-17 RX ADMIN — POTASSIUM CHLORIDE 20 MEQ: 29.8 INJECTION, SOLUTION INTRAVENOUS at 07:58

## 2018-08-17 RX ADMIN — FUROSEMIDE 20 MG: 10 INJECTION, SOLUTION INTRAVENOUS at 20:53

## 2018-08-17 RX ADMIN — PANTOPRAZOLE SODIUM 40 MG: 40 INJECTION, POWDER, FOR SOLUTION INTRAVENOUS at 07:41

## 2018-08-17 RX ADMIN — MULTIVITAMIN 15 ML: LIQUID ORAL at 07:40

## 2018-08-17 RX ADMIN — Medication 1000 MG: at 09:38

## 2018-08-17 ASSESSMENT — ACTIVITIES OF DAILY LIVING (ADL)
ADLS_ACUITY_SCORE: 14
ADLS_ACUITY_SCORE: 13
ADLS_ACUITY_SCORE: 13
ADLS_ACUITY_SCORE: 14
ADLS_ACUITY_SCORE: 14
ADLS_ACUITY_SCORE: 13

## 2018-08-17 NOTE — PROGRESS NOTES
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 Underwent repair of posterior VSD and TVR with bioprosthetic valve on 8/10, and was kept with open chest due to coagulopathy and RV dysfunction. Chest closed 8/13. IABP removed 8/14.     TODAY'S PROGRESS:   -Wean Pressure support as tolerated, currently on 15/5   -Wean to 10/5 today as tolerated  -Requiring minimal sedatives, avoid long acting sedatives  -Continue Keppra 750mg BID  -Remove PA catheter  -Repeat EEG without seizure activity  -Lasix 20mg BID today, net negative goal 500  -Keep mediastinal chest tubes in for now    PLAN:  Neuro/ pain/ sedation:  #Post operative sedation  #Questionable seizure activity (negative follow up EEG 8/16)  - PRN Quetiapine 25mg/50mg for sleep, ativan PRN  - Monitor neurological status. Notify the MD for any acute changes in exam.  - Will discuss with neurology regarding vEEG    -Continue keppra 750mg BID for now    -Follow up with neurology as outpatient in 3 months    -Repeat EEG on 8/16 without seizure activity    Pulmonary care:  #Post operative mechanical ventilation   - Intubated/sedated, pressure support trials stable on 15/5, but tachypnic and fails when lowered to 10/5   -Keep on 15/5 throughout day, intermittently dropping to 10/5 to evaluate  - pressure support trials ongoing    Cardiovascular:  #Post infarction 2.5cm infraseptal VSD  #s/p HUSSEIN to distal RCA and proximal LAD  #STEMI, cardiogenic shock  #Dilated RV   #Pulmonary HTN  #IABP  #s/p repair of posterior VSD and TVR with bioprosthetic valve, s/p Chest closure 8/13  #Atrial flutter  - Cards 2 reconsulted, appreciate assist   - Monitor hemodynamic status. Pacer at VVI at 60  -  Infusions: Weaned off epi and dobutamine 8/16  - ECHO from 8/8 prior to surgery: Inferior wall akinesis with inferoseptal muscular VAD. Mild to moderate right ventricular dilation is present. Global right ventricular function is moderately reduced.  - ASA 81mg  - EP consult regarding underlying atrial flutter. Appreciate recs.    Heme:  #Anticoagulation s/p Drug Eluting Stent placement  #Thrombocythemia  - Cangrelor discontinued, heparin 400 U started  - 81mg ASA  - Plavix loaded 8/15 (75mg daily thereafter)  - Hydroxyurea 1g BID, hematology consulted and following    GI care:  #Transaminitis   - NJ placed 8/13, TF at goal  - OG to LIS  - Rectal tube placed 8/15    Fluids/ Electrolytes/ Nutrition:   - mIVF for IV fluid hydration  - Nutri-phos 4x/day for lyte replacement  - Wiggins    Renal/ Fluid Balance:    #Acute Kidney injury  - Urine output stable overnight, Creatinine reassuring  - Will continue to monitor intake and output via wiggins    Endocrine:  #Stress hyperglycemia  - off Insulin gtt, on ISS    ID/ Antibiotics:  - Monitor fever curve, trend WBC    Prophylaxis:    - Mechanical prophylaxis for DVT.   - ASA, plavix, heparin straight rate  - Protonix    Lines/ tubes/ drains:  - ETT, arterial line, PIV, CVC    Disposition:  - CV ICU.    Patient seen, findings and plan discussed with CVTS      Brad Hawkins MD  8/17/2018 10:47 AM  Anesthesia Resident  PGY4/CA-3      ====================================    SUBJECTIVE:   -Alert on minimal sedation, off precedex overnight. Follows commands, failed pressure support trial this AM due to tachypnea at PS 10/5.      OBJECTIVE:   1. VITAL SIGNS:   Temp:  [97.3  F (36.3  C)-98  F (36.7  C)] 97.3  F (36.3  C)  Heart Rate:  [] 99  Resp:  [17-23] 22  BP: (106)/(71) 106/71  MAP:  [71 mmHg-92 mmHg] 79 mmHg  Arterial Line BP: ()/(47-74) 96/66  FiO2 (%):  [40 %] 40 %  SpO2:  [57 %-100 %] 100 %  Ventilation Mode: CMV/AC  (Continuous Mandatory Ventilation/ Assist  Control)  FiO2 (%): 40 %  Rate Set (breaths/minute): 14 breaths/min  Tidal Volume Set (mL): 550 mL  PEEP (cm H2O): 5 cmH2O  Pressure Support (cm H2O): 10 cmH2O  Oxygen Concentration (%): 40 %  Resp: 22    2. INTAKE/ OUTPUT:   I/O last 3 completed shifts:  In: 2770.58 [I.V.:650.58; NG/GT:680]  Out: 3300 [Urine:2900; Emesis/NG output:100; Chest Tube:300]    3. PHYSICAL EXAMINATION:   General: Intubated, comfortable on minimal sedation  Neuro: Off sedation, opens eyes to voice, follows commands reliably  Resp: Mechanical ventilation, intubated, coarse breath sounds  CV: Normal rate with underlying atrial flutter  Abdomen: Soft, dressings in place  Incisions: Dressing over chest, chest tubes in place, closed chest  Extremities: warm and well perfused    4. INVESTIGATIONS:   Arterial Blood Gases     Recent Labs  Lab 08/16/18  0402 08/15/18  1331 08/15/18  0340 08/14/18  0331   PH 7.44 7.40 7.41 7.41   PCO2 30* 27* 32* 32*   PO2 137* 159* 145* 129*   HCO3 20* 17* 21 20*     Complete Blood Count     Recent Labs  Lab 08/17/18  0430 08/16/18  0402 08/15/18  1614 08/15/18  0339  08/14/18  1611 08/14/18  0331   WBC  --   --  12.0* 12.9*  --  11.7* 10.6   HGB 9.1* 8.8* 9.0* 8.9*  < > 8.8* 7.8*   PLT  --   --  442 427  --  421 483*   < > = values in this interval not displayed.  Basic Metabolic Panel    Recent Labs  Lab 08/17/18  0430 08/16/18  0402 08/15/18  1614 08/15/18  0957 08/15/18  0339 08/14/18  0331   * 148*  --   --  146* 142   POTASSIUM 3.1* 3.9 4.3 4.2 3.9 4.6   CHLORIDE 121* 120*  --   --  119* 115*   CO2 24 19*  --   --  21 20   BUN 35* 29  --   --  28 25   CR 0.60* 0.66  --   --  0.67 0.84   * 123* 156*  --  135* 128*     Liver Function Tests    Recent Labs  Lab 08/17/18  0430 08/16/18  0402 08/15/18  0339 08/14/18  0331   * 71* 47* 56*   * 29 16 20   ALKPHOS 242* 200* 139 115   BILITOTAL 0.4 0.4 0.6 0.6   ALBUMIN 1.5* 1.6* 1.7* 1.9*   INR 1.28* 1.26* 1.27* 1.32*     Pancreatic  Enzymes  No lab results found in last 7 days.  Coagulation Profile    Recent Labs  Lab 08/17/18  0430 08/16/18  0402 08/15/18  0339 08/14/18  0331  08/10/18  2243 08/10/18  2132   INR 1.28* 1.26* 1.27* 1.32*  < > 1.70* 1.67*   PTT 50* 52*  --   --   --  46* 41*   < > = values in this interval not displayed.      5. RADIOLOGY:   No results found for this or any previous visit (from the past 24 hour(s)).

## 2018-08-17 NOTE — PROGRESS NOTES
Pressure Support Trial Summary    Settings:  Ventilation Mode: CPAP/PS  PEEP (cm H2O): 5 cmH2O  Pressure Support (cm H2O): 10 cmH2O  Oxygen Concentration (%): 40 %    Patient Parameters:  Heart Rate: 104  BP: 106/71  BP - Mean: 83  Spontaneous Respiratory Rate: 33 breaths/min  Spontaneous Tidal Volume: 0.39 mL  Spontaneous Minute Ventilation: 12.8 mL  RSBI (calculation): 84.62  RSBI trend: rising  Breathing Trial Total Time   (min): 5 minutes  Weaning trial discontinued due to:: RR>35 for >5min   See RT [facility] for complete documentation.    Based on pressure support trial results and RT assessment, recommend do not extubate without further physician assessment. Patient is placed back on CMV mode after 5 mins of PST.  Will try again later when patient is more stable.      Beka Vargas, SANJANA  8/17/2018 6:24 AM

## 2018-08-17 NOTE — PROCEDURES
EEG #       DATE OF RECORDING/SERVICE DATE:  08/16/2018       TYPE OF STUDY:  Inpatient video-EEG monitoring       DURATION OF STUDY:  3 hours, 29 minutes, 12 seconds       CLINICAL SUMMARY:  Inpatient video-EEG monitoring performed on Castillo De Anda, a 68-year-old who is status post resection of a right temporal meningioma and who underwent extensive cardiac surgery and placement of a bioprosthetic valve.  He has had periods of altered mental status associated with tachycardia and concern is raised that these are epileptic seizures.  Previous video monitoring reported epileptiform abnormalities in the right temporal region and treatment with levetiracetam was initiated.  He is currently in ICU and intubated. The patient also had a right temporal meningioma with resection and right temporal encephalomalacia that could provide a focus for seizure activity.      TECHNICAL SUMMARY: EEG was recorded from scalp electrodes placed according to the 10-20 [employer] system.  Additional electrodes were utilized for referencing, artifact detection, and recording from other cerebral regions.  Video was continuously recorded.  Video was reviewed for clinical correlation and to assist with EEG interpretation.        FINDINGS:  While awake, with eye blinks, etc., a 10 Hz posterior dominant rhythm is seen.  There is excessive diffuse theta over the left hemisphere.  There is more extensive slowing over the right hemisphere. More persistent irregular delta is seen over the right temporal regions.  Higher amplitude rhythmic theta is seen over the right temporal regions with amplitude maximum at T4.  This has the appearance of a breach rhythm.  Occasional theta transients stand out and occasionally occur singly with a spiking negativity at T4.  However, these all have a wickety appearance and in this observer's opinion are not epileptiform appearing.      ICTAL:  No electrographic seizures.  No spells marked by staff or  patient.      IMPRESSION:  Abnormal.  There is evidence of a mild diffuse encephalopathy.  There is evidence of additional right temporal focal cerebral dysfunction.  This is consistent with known right temporal structural abnormalities.  Epileptiform discharges or seizures were not seen in this approximately 3-hour video-EEG study.         SERGE ALVARES MD             D: 2018   T: 2018   MT:       Name:     JOSE SOLER   MRN:      [id number]        Account:        [id number]   :      1950           Procedure Date: 2018      Document: [id number]

## 2018-08-17 NOTE — PLAN OF CARE
Problem: Patient Care Overview  Goal: Individualization & Mutuality  Outcome: Improving  Patient alert, opens eyes spontaneously and interacts with staff appropriately. Remains hemodynamically in a normal sinus rhythm to a sinus tachycardia in the low 100s. CVP: 7, PA: 26/10, SVO2: 60, CO: 5.1, CI: 2.5, SVR: 1172, SVRI: 2344, PVR: 327. Multiple pressure support trails completed this shift. Evening trial more successful than am with a pressure support of 14/5. Tolerates NJT feeds, loose stool noted. IV lasix given twice, diuresing well. Chest tubes draining minimal amounts of drainage. Removed PAC this shift.

## 2018-08-17 NOTE — PLAN OF CARE
Problem: Patient Care Overview  Goal: Plan of Care/Patient Progress Review  OT/CR:  4E:  Discharge Planner OT   Patient plan for discharge: none stated  Current status: Pt dependent lift from chair to bed with ceiling lift and assist x2.  Pt Mod A with rolling side to side for sling removal and bed pan placement, Min A to maintain sidelying.  Pt completed UE and LE exercises x10 reps across all joints/planes.  Pt following 100% of all commands during session.    Barriers to return to prior living situation: medical status, level of assist needed  Recommendations for discharge: TCU  Rationale for recommendations: Pt will need continued therapy to increase I with ADLs, functional mobility and transfers.       Entered by: Angie Fowler 08/17/2018 3:06 PM

## 2018-08-17 NOTE — PLAN OF CARE
Problem: Patient Care Overview  Goal: Plan of Care/Patient Progress Review    Neuro: Slept well overnight. Off sedation. Follows commands. Denies pain  CV: SR/ST 90s-100s. One episode where pt was vpaced@60 for ~1 min. BP remains stable off pressors, SvO2 54-60 w/ CI >2.0. See flowsheets for hemodynamics  Resp: CMV overnight. Failed PS trial this am, see RT note. Scant secretions  GI/: Loose stool x1. Herring patent w/ good urine output, received lasix x1 overnight. TF at goal, tolerating.  Skin: edematous/reddened throughtout, wound vac to sternum, CT x4 w/ minimal output otherwise intact.    Continue to monitor, wean vent as able, notify team w/ changes.

## 2018-08-17 NOTE — PROGRESS NOTES
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 Underwent repair of posterior VSD and TVR with bioprosthetic valve on 8/10, and was kept with open chest due to coagulopathy and RV dysfunction. Chest closed 8/13. IABP removed 8/14.     TODAY'S PROGRESS:   -Wean Pressure support as tolerated, currently on 15/5   -Wean to 10/5 today as tolerated  -Requiring minimal sedatives, avoid long acting sedatives  -Continue Keppra 750mg BID  -Remove PA catheter  -Repeat EEG without seizure activity  -Lasix 20mg BID today, net negative goal 500  -Keep mediastinal chest tubes in for now    PLAN:  Neuro/ pain/ sedation:  #Post operative sedation  #Questionable seizure activity (negative follow up EEG 8/16)  - PRN Quetiapine 25mg/50mg for sleep, ativan PRN  - Monitor neurological status. Notify the MD for any acute changes in exam.  - Will discuss with neurology regarding vEEG    -Continue keppra 750mg BID for now    -Follow up with neurology as outpatient in 3 months    -Repeat EEG on 8/16 without seizure activity    Pulmonary care:  #Post operative mechanical ventilation   - Intubated/sedated, pressure support trials stable on 15/5, but tachypnic and fails when lowered to 10/5   -Keep on 15/5 throughout day, intermittently dropping to 10/5 to evaluate  - pressure support trials ongoing    Cardiovascular:  #Post infarction 2.5cm infraseptal VSD  #s/p HUSSEIN to distal RCA and proximal LAD  #STEMI, cardiogenic shock  #Dilated RV   #Pulmonary HTN  #IABP  #s/p repair of posterior VSD and TVR with bioprosthetic valve, s/p Chest closure 8/13  #Atrial flutter  - Cards 2 reconsulted, appreciate assist   - Monitor hemodynamic status. Pacer at VVI at 60  -  Infusions: Weaned off epi and dobutamine 8/16  - ECHO from 8/8 prior to surgery: Inferior wall akinesis with inferoseptal muscular VAD. Mild to moderate right ventricular dilation is present. Global right ventricular function is moderately reduced.  - ASA 81mg  - EP consult regarding underlying atrial flutter. Appreciate recs.    Heme:  #Anticoagulation s/p Drug Eluting Stent placement  #Thrombocythemia  - Cangrelor discontinued, heparin 400 U started  - 81mg ASA  - Plavix loaded 8/15 (75mg daily thereafter)  - Hydroxyurea 1g BID, hematology consulted and following    GI care:  #Transaminitis   - NJ placed 8/13, TF at goal  - OG to LIS  - Rectal tube placed 8/15    Fluids/ Electrolytes/ Nutrition:   - mIVF for IV fluid hydration  - Nutri-phos 4x/day for lyte replacement  - Wiggins    Renal/ Fluid Balance:    #Acute Kidney injury  - Urine output stable overnight, Creatinine reassuring  - Will continue to monitor intake and output via wiggins    Endocrine:  #Stress hyperglycemia  - off Insulin gtt, on ISS    ID/ Antibiotics:  - Monitor fever curve, trend WBC    Prophylaxis:    - Mechanical prophylaxis for DVT.   - ASA, plavix, heparin straight rate  - Protonix    Lines/ tubes/ drains:  - ETT, arterial line, PIV, CVC    Disposition:  - CV ICU.    Patient seen, findings and plan discussed with CV ICU staff Dr. Gomez Hawkins MD  8/17/2018 10:47 AM  Anesthesia Resident  PGY4/CA-3      ====================================    SUBJECTIVE:   -Alert on minimal sedation, off precedex overnight. Follows commands, failed pressure support trial this AM due to tachypnea at PS 10/5.      OBJECTIVE:   1. VITAL SIGNS:   Temp:  [97.3  F (36.3  C)-98.8  F (37.1  C)] 97.9  F (36.6  C)  Heart Rate:  [] 99  Resp:  [17-24] 22  BP: (106)/(71) 106/71  MAP:  [71 mmHg-97 mmHg] 79 mmHg  Arterial Line BP: ()/(47-81) 96/66  FiO2 (%):  [40 %] 40 %  SpO2:  [57 %-100 %] 100 %  Ventilation Mode: CMV/AC  (Continuous Mandatory  Ventilation/ Assist Control)  FiO2 (%): 40 %  Rate Set (breaths/minute): 14 breaths/min  Tidal Volume Set (mL): 550 mL  PEEP (cm H2O): 5 cmH2O  Pressure Support (cm H2O): 10 cmH2O  Oxygen Concentration (%): 40 %  Resp: 22    2. INTAKE/ OUTPUT:   I/O last 3 completed shifts:  In: 2770.58 [I.V.:650.58; NG/GT:680]  Out: 3300 [Urine:2900; Emesis/NG output:100; Chest Tube:300]    3. PHYSICAL EXAMINATION:   General: Intubated, comfortable on minimal sedation  Neuro: Off sedation, opens eyes to voice, follows commands reliably  Resp: Mechanical ventilation, intubated, coarse breath sounds  CV: Normal rate with underlying atrial flutter  Abdomen: Soft, dressings in place  Incisions: Dressing over chest, chest tubes in place, closed chest  Extremities: warm and well perfused    4. INVESTIGATIONS:   Arterial Blood Gases     Recent Labs  Lab 08/16/18  0402 08/15/18  1331 08/15/18  0340 08/14/18  0331   PH 7.44 7.40 7.41 7.41   PCO2 30* 27* 32* 32*   PO2 137* 159* 145* 129*   HCO3 20* 17* 21 20*     Complete Blood Count     Recent Labs  Lab 08/17/18  0430 08/16/18  0402 08/15/18  1614 08/15/18  0339  08/14/18  1611 08/14/18  0331   WBC  --   --  12.0* 12.9*  --  11.7* 10.6   HGB 9.1* 8.8* 9.0* 8.9*  < > 8.8* 7.8*   PLT  --   --  442 427  --  421 483*   < > = values in this interval not displayed.  Basic Metabolic Panel    Recent Labs  Lab 08/17/18  0430 08/16/18  0402 08/15/18  1614 08/15/18  0957 08/15/18  0339 08/14/18  0331   * 148*  --   --  146* 142   POTASSIUM 3.1* 3.9 4.3 4.2 3.9 4.6   CHLORIDE 121* 120*  --   --  119* 115*   CO2 24 19*  --   --  21 20   BUN 35* 29  --   --  28 25   CR 0.60* 0.66  --   --  0.67 0.84   * 123* 156*  --  135* 128*     Liver Function Tests    Recent Labs  Lab 08/17/18  0430 08/16/18  0402 08/15/18  0339 08/14/18  0331   * 71* 47* 56*   * 29 16 20   ALKPHOS 242* 200* 139 115   BILITOTAL 0.4 0.4 0.6 0.6   ALBUMIN 1.5* 1.6* 1.7* 1.9*   INR 1.28* 1.26* 1.27* 1.32*      Pancreatic Enzymes  No lab results found in last 7 days.  Coagulation Profile    Recent Labs  Lab 08/17/18  0430 08/16/18  0402 08/15/18  0339 08/14/18  0331  08/10/18  2243 08/10/18  2132   INR 1.28* 1.26* 1.27* 1.32*  < > 1.70* 1.67*   PTT 50* 52*  --   --   --  46* 41*   < > = values in this interval not displayed.      5. RADIOLOGY:   Recent Results (from the past 24 hour(s))   XR Chest Port 1 View    Narrative    Exam: XR CHEST PORT 1 VW, 8/16/2018 10:20 AM    Indication: evaluate for interval change, [facility]ICU, intubated     Comparison: Chest x-ray 8/15/2018    Findings:   [place] catheter tip projects over the right main pulmonary artery.  Endotracheal tube tip projects over the mid trachea. Gastric tube tip  projects over the GE junction. Enteric tube course below the field of  view. Mediastinal drains and bilateral chest tubes are stable.     Heart is enlarged and there are unchanged hazy left cardiac opacities.  Small bilateral pleural effusions, left larger than right. Unchanged  bilateral interstitial opacities. No pneumothorax.      Impression    Impression:   1. Gastric tube tip projects over the GE junction. Recommend  advancing. Otherwise stable support devices.  2. Cardiomegaly with pulmonary edema and small bilateral pleural  effusions, left larger than right.    ZOLTAN CHIRINOS MD

## 2018-08-18 ENCOUNTER — APPOINTMENT (OUTPATIENT)
Dept: OCCUPATIONAL THERAPY | Facility: CLINIC | Age: 68
DRG: 219 | End: 2018-08-18
Attending: INTERNAL MEDICINE
Payer: MEDICARE

## 2018-08-18 LAB
ALBUMIN SERPL-MCNC: 1.6 G/DL (ref 3.4–5)
ALP SERPL-CCNC: 296 U/L (ref 40–150)
ALT SERPL W P-5'-P-CCNC: 259 U/L (ref 0–70)
ANION GAP SERPL CALCULATED.3IONS-SCNC: 5 MMOL/L (ref 3–14)
ANION GAP SERPL CALCULATED.3IONS-SCNC: 6 MMOL/L (ref 3–14)
APTT PPP: 45 SEC (ref 22–37)
AST SERPL W P-5'-P-CCNC: 390 U/L (ref 0–45)
BILIRUB SERPL-MCNC: 0.4 MG/DL (ref 0.2–1.3)
BUN SERPL-MCNC: 39 MG/DL (ref 7–30)
BUN SERPL-MCNC: 43 MG/DL (ref 7–30)
CA-I BLD-MCNC: 4.4 MG/DL (ref 4.4–5.2)
CALCIUM SERPL-MCNC: 7.4 MG/DL (ref 8.5–10.1)
CALCIUM SERPL-MCNC: 7.7 MG/DL (ref 8.5–10.1)
CHLORIDE SERPL-SCNC: 120 MMOL/L (ref 94–109)
CHLORIDE SERPL-SCNC: 121 MMOL/L (ref 94–109)
CO2 SERPL-SCNC: 24 MMOL/L (ref 20–32)
CO2 SERPL-SCNC: 28 MMOL/L (ref 20–32)
COPATH REPORT: NORMAL
CREAT SERPL-MCNC: 0.67 MG/DL (ref 0.66–1.25)
CREAT SERPL-MCNC: 0.68 MG/DL (ref 0.66–1.25)
GFR SERPL CREATININE-BSD FRML MDRD: >90 ML/MIN/1.7M2
GFR SERPL CREATININE-BSD FRML MDRD: >90 ML/MIN/1.7M2
GLUCOSE BLDC GLUCOMTR-MCNC: 104 MG/DL (ref 70–99)
GLUCOSE BLDC GLUCOMTR-MCNC: 111 MG/DL (ref 70–99)
GLUCOSE BLDC GLUCOMTR-MCNC: 113 MG/DL (ref 70–99)
GLUCOSE BLDC GLUCOMTR-MCNC: 115 MG/DL (ref 70–99)
GLUCOSE BLDC GLUCOMTR-MCNC: 116 MG/DL (ref 70–99)
GLUCOSE BLDC GLUCOMTR-MCNC: 99 MG/DL (ref 70–99)
GLUCOSE SERPL-MCNC: 100 MG/DL (ref 70–99)
GLUCOSE SERPL-MCNC: 110 MG/DL (ref 70–99)
INR PPP: 1.37 (ref 0.86–1.14)
MAGNESIUM SERPL-MCNC: 2.3 MG/DL (ref 1.6–2.3)
POTASSIUM SERPL-SCNC: 3.8 MMOL/L (ref 3.4–5.3)
POTASSIUM SERPL-SCNC: 3.9 MMOL/L (ref 3.4–5.3)
PROT SERPL-MCNC: 4.9 G/DL (ref 6.8–8.8)
SODIUM SERPL-SCNC: 151 MMOL/L (ref 133–144)
SODIUM SERPL-SCNC: 153 MMOL/L (ref 133–144)

## 2018-08-18 PROCEDURE — 86923 COMPATIBILITY TEST ELECTRIC: CPT | Performed by: INTERNAL MEDICINE

## 2018-08-18 PROCEDURE — A9270 NON-COVERED ITEM OR SERVICE: HCPCS | Mod: GY | Performed by: STUDENT IN AN ORGANIZED HEALTH CARE EDUCATION/TRAINING PROGRAM

## 2018-08-18 PROCEDURE — A9270 NON-COVERED ITEM OR SERVICE: HCPCS | Mod: GY | Performed by: INTERNAL MEDICINE

## 2018-08-18 PROCEDURE — 25000128 H RX IP 250 OP 636: Performed by: THORACIC SURGERY (CARDIOTHORACIC VASCULAR SURGERY)

## 2018-08-18 PROCEDURE — 25000132 ZZH RX MED GY IP 250 OP 250 PS 637: Mod: GY | Performed by: STUDENT IN AN ORGANIZED HEALTH CARE EDUCATION/TRAINING PROGRAM

## 2018-08-18 PROCEDURE — 97530 THERAPEUTIC ACTIVITIES: CPT | Mod: GO | Performed by: OCCUPATIONAL THERAPIST

## 2018-08-18 PROCEDURE — 00000146 ZZHCL STATISTIC GLUCOSE BY METER IP

## 2018-08-18 PROCEDURE — 40000141 ZZH STATISTIC PERIPHERAL IV START W/O US GUIDANCE

## 2018-08-18 PROCEDURE — 25000125 ZZHC RX 250: Performed by: THORACIC SURGERY (CARDIOTHORACIC VASCULAR SURGERY)

## 2018-08-18 PROCEDURE — 20000004 ZZH R&B ICU UMMC

## 2018-08-18 PROCEDURE — 40000014 ZZH STATISTIC ARTERIAL MONITORING DAILY

## 2018-08-18 PROCEDURE — 80048 BASIC METABOLIC PNL TOTAL CA: CPT | Performed by: STUDENT IN AN ORGANIZED HEALTH CARE EDUCATION/TRAINING PROGRAM

## 2018-08-18 PROCEDURE — 85610 PROTHROMBIN TIME: CPT | Performed by: INTERNAL MEDICINE

## 2018-08-18 PROCEDURE — 40000133 ZZH STATISTIC OT WARD VISIT: Performed by: OCCUPATIONAL THERAPIST

## 2018-08-18 PROCEDURE — 86900 BLOOD TYPING SEROLOGIC ABO: CPT | Performed by: INTERNAL MEDICINE

## 2018-08-18 PROCEDURE — 85730 THROMBOPLASTIN TIME PARTIAL: CPT | Performed by: INTERNAL MEDICINE

## 2018-08-18 PROCEDURE — 80053 COMPREHEN METABOLIC PANEL: CPT | Performed by: INTERNAL MEDICINE

## 2018-08-18 PROCEDURE — 40000275 ZZH STATISTIC RCP TIME EA 10 MIN

## 2018-08-18 PROCEDURE — 25000132 ZZH RX MED GY IP 250 OP 250 PS 637: Mod: GY | Performed by: INTERNAL MEDICINE

## 2018-08-18 PROCEDURE — 94003 VENT MGMT INPAT SUBQ DAY: CPT

## 2018-08-18 PROCEDURE — 86901 BLOOD TYPING SEROLOGIC RH(D): CPT | Performed by: INTERNAL MEDICINE

## 2018-08-18 PROCEDURE — 82330 ASSAY OF CALCIUM: CPT | Performed by: STUDENT IN AN ORGANIZED HEALTH CARE EDUCATION/TRAINING PROGRAM

## 2018-08-18 PROCEDURE — 83735 ASSAY OF MAGNESIUM: CPT | Performed by: INTERNAL MEDICINE

## 2018-08-18 PROCEDURE — A9270 NON-COVERED ITEM OR SERVICE: HCPCS | Mod: GY | Performed by: THORACIC SURGERY (CARDIOTHORACIC VASCULAR SURGERY)

## 2018-08-18 PROCEDURE — 25000128 H RX IP 250 OP 636

## 2018-08-18 PROCEDURE — 25000128 H RX IP 250 OP 636: Performed by: STUDENT IN AN ORGANIZED HEALTH CARE EDUCATION/TRAINING PROGRAM

## 2018-08-18 PROCEDURE — 25000132 ZZH RX MED GY IP 250 OP 250 PS 637: Mod: GY | Performed by: THORACIC SURGERY (CARDIOTHORACIC VASCULAR SURGERY)

## 2018-08-18 PROCEDURE — 86850 RBC ANTIBODY SCREEN: CPT | Performed by: INTERNAL MEDICINE

## 2018-08-18 PROCEDURE — 27210437 ZZH NUTRITION PRODUCT SEMIELEM INTERMED LITER

## 2018-08-18 PROCEDURE — 99291 CRITICAL CARE FIRST HOUR: CPT | Mod: GC | Performed by: ANESTHESIOLOGY

## 2018-08-18 RX ORDER — LEVOTHYROXINE SODIUM 75 UG/1
75 TABLET ORAL DAILY
Status: DISCONTINUED | OUTPATIENT
Start: 2018-08-18 | End: 2018-09-11 | Stop reason: HOSPADM

## 2018-08-18 RX ORDER — LEVETIRACETAM 100 MG/ML
750 SOLUTION ORAL EVERY 12 HOURS
Status: DISCONTINUED | OUTPATIENT
Start: 2018-08-18 | End: 2018-08-20

## 2018-08-18 RX ORDER — FUROSEMIDE 10 MG/ML
40 INJECTION INTRAMUSCULAR; INTRAVENOUS EVERY 12 HOURS
Status: DISCONTINUED | OUTPATIENT
Start: 2018-08-18 | End: 2018-08-20

## 2018-08-18 RX ORDER — SODIUM CHLORIDE 9 MG/ML
INJECTION, SOLUTION INTRAVENOUS
Status: COMPLETED
Start: 2018-08-18 | End: 2018-08-18

## 2018-08-18 RX ADMIN — CLOPIDOGREL 75 MG: 75 TABLET, FILM COATED ORAL at 08:29

## 2018-08-18 RX ADMIN — POTASSIUM & SODIUM PHOSPHATES POWDER PACK 280-160-250 MG 2 PACKET: 280-160-250 PACK at 12:09

## 2018-08-18 RX ADMIN — POTASSIUM CHLORIDE 20 MEQ: 1.5 POWDER, FOR SOLUTION ORAL at 05:05

## 2018-08-18 RX ADMIN — LEVETIRACETAM 750 MG: 100 INJECTION, SOLUTION INTRAVENOUS at 06:24

## 2018-08-18 RX ADMIN — ONDANSETRON 4 MG: 2 INJECTION INTRAMUSCULAR; INTRAVENOUS at 16:39

## 2018-08-18 RX ADMIN — ACETAMINOPHEN 650 MG: 325 TABLET, FILM COATED ORAL at 08:28

## 2018-08-18 RX ADMIN — HEPARIN SODIUM 400 UNITS/HR: 10000 INJECTION, SOLUTION INTRAVENOUS at 02:38

## 2018-08-18 RX ADMIN — CARBOXYMETHYLCELLULOSE SODIUM 1 DROP: 5 SOLUTION/ DROPS OPHTHALMIC at 16:15

## 2018-08-18 RX ADMIN — PANTOPRAZOLE SODIUM 40 MG: 40 INJECTION, POWDER, FOR SOLUTION INTRAVENOUS at 08:23

## 2018-08-18 RX ADMIN — POTASSIUM CHLORIDE 20 MEQ: 1.5 POWDER, FOR SOLUTION ORAL at 16:39

## 2018-08-18 RX ADMIN — FUROSEMIDE 40 MG: 10 INJECTION, SOLUTION INTRAVENOUS at 21:03

## 2018-08-18 RX ADMIN — SODIUM CHLORIDE, PRESERVATIVE FREE 3 ML: 5 INJECTION INTRAVENOUS at 21:04

## 2018-08-18 RX ADMIN — SENNOSIDES AND DOCUSATE SODIUM 1 TABLET: 8.6; 5 TABLET ORAL at 08:48

## 2018-08-18 RX ADMIN — QUETIAPINE FUMARATE 25 MG: 25 TABLET, FILM COATED ORAL at 08:22

## 2018-08-18 RX ADMIN — ATORVASTATIN CALCIUM 80 MG: 80 TABLET, FILM COATED ORAL at 21:00

## 2018-08-18 RX ADMIN — Medication 1000 MG: at 21:00

## 2018-08-18 RX ADMIN — POTASSIUM & SODIUM PHOSPHATES POWDER PACK 280-160-250 MG 2 PACKET: 280-160-250 PACK at 20:59

## 2018-08-18 RX ADMIN — ASPIRIN 81 MG CHEWABLE TABLET 81 MG: 81 TABLET CHEWABLE at 08:30

## 2018-08-18 RX ADMIN — Medication 1000 MG: at 08:50

## 2018-08-18 RX ADMIN — LEVOTHYROXINE SODIUM 75 MCG: 75 TABLET ORAL at 16:39

## 2018-08-18 RX ADMIN — DOCUSATE SODIUM 100 MG: 50 LIQUID ORAL at 08:48

## 2018-08-18 RX ADMIN — MULTIVITAMIN 15 ML: LIQUID ORAL at 08:30

## 2018-08-18 RX ADMIN — LEVETIRACETAM 750 MG: 100 SOLUTION ORAL at 18:32

## 2018-08-18 RX ADMIN — QUETIAPINE 50 MG: 50 TABLET ORAL at 21:30

## 2018-08-18 RX ADMIN — FUROSEMIDE 40 MG: 10 INJECTION, SOLUTION INTRAVENOUS at 10:53

## 2018-08-18 RX ADMIN — POTASSIUM & SODIUM PHOSPHATES POWDER PACK 280-160-250 MG 2 PACKET: 280-160-250 PACK at 08:28

## 2018-08-18 RX ADMIN — ACETAMINOPHEN 650 MG: 325 TABLET, FILM COATED ORAL at 16:39

## 2018-08-18 RX ADMIN — POTASSIUM & SODIUM PHOSPHATES POWDER PACK 280-160-250 MG 2 PACKET: 280-160-250 PACK at 16:39

## 2018-08-18 ASSESSMENT — ACTIVITIES OF DAILY LIVING (ADL)
ADLS_ACUITY_SCORE: 13

## 2018-08-18 ASSESSMENT — PAIN DESCRIPTION - DESCRIPTORS: DESCRIPTORS: PATIENT UNABLE TO DESCRIBE

## 2018-08-18 NOTE — PLAN OF CARE
Problem: Patient Care Overview  Goal: Plan of Care/Patient Progress Review  Pt with stable vitals, lasix given with good results; pacer wires intact, dressings intact chest tubes to suction, wound vac in situ, pt tolerated PS on vent 15/5 and for short time 12/5, total 4 hours, pt pivot transferred to chair with therapy this morning, tolerated chair well, transferred back to bed with ceiling lift; pt's family at bedside this afternoon; Continue to monitor vitals, pt comfort, labs respiratory status, continue with pressure support trials as tolerated as per plan of care, see also flow sheets for details

## 2018-08-18 NOTE — PLAN OF CARE
Problem: Patient Care Overview  Goal: Plan of Care/Patient Progress Review  Outcome: No Change    Pt alert, follows commands, no deficits noted. Pupils unequal and service aware. Sinus tachycardia 100-110bpm, temporary pacemaker with backup rate 60bpm.  MAPs maintained >65 based on Right arm BP cuff at times due to Left radial Nohemi dampened and not correlating. Vent settings overnight: RR 14, , 40%, peep 5. RR 19-23 w/ sats 99%. LS clear, minimal ETT secretions. TF at goal 60cc/hr through NJ. OG to suction w/ minmal output. Pleural CT x2 and Mediastinal CT x2 w/ minimal output. Lasix 20mg given w/ 500cc output in 1hour. UO 50-100cc/hr. No BM. Heparin 400 units/hr gtt.     Skin: Midsternal Prevena, chest tube sites    Plan: Pressure support this morning. Pt did well on P/S 14/5 yesterday. Sodium levels remain elevated 151 (Free water flushes currently 30cc/q4h). Notify CVTS with any changes.

## 2018-08-18 NOTE — PROGRESS NOTES
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 Underwent repair of posterior VSD and TVR with bioprosthetic valve on 8/10, and was kept with open chest due to coagulopathy and RV dysfunction. Chest closed 8/13. IABP removed 8/14.     TODAY'S PROGRESS:   -Wean Pressure support as tolerated  -Requiring minimal sedatives, avoid long acting sedatives  -Increase Lasix to 40 BID IV, net goal negative 500  -Keep mediastinal chest tubes in for now  -Keep pacer  -Remove CVC   -Increase FWF to 75 Q4    PLAN:  Neuro/ pain/ sedation:  #Post operative sedation  #Questionable seizure activity (negative follow up EEG 8/16)  - PRN Quetiapine 25mg/50mg for sleep, ativan PRN  - Monitor neurological status. Notify the MD for any acute changes in exam.  - Discussed with neurology:    -Continue keppra 750mg BID for now    -Follow up with neurology as outpatient in 3 months    -Repeat EEG on 8/16 without seizure activity    -CT head if acute neuro changes    Pulmonary care:  #Post operative mechanical ventilation   - Intubated/sedated, pressure support trials stable on 15/5, but tachypnic and fails when lowered to 10/5  - pressure support trials ongoing    Cardiovascular:  #Post infarction 2.5cm infraseptal VSD  #s/p HUSSEIN to distal RCA and proximal LAD  #STEMI, cardiogenic shock  #Dilated RV   #Pulmonary HTN  #IABP  #s/p repair of posterior VSD and TVR with bioprosthetic valve, s/p Chest closure 8/13  #Atrial flutter  - Cards 2 reconsulted, appreciate assist   - Monitor hemodynamic status. Pacer at VVI at 60  - Infusions: Weaned off epi and dobutamine 8/16  - ECHO from 8/8 prior to surgery: Inferior wall akinesis with inferoseptal muscular VAD. Mild to  moderate right ventricular dilation is present. Global right ventricular function is moderately reduced.  - ASA 81mg  - EP consult regarding underlying atrial flutter. Appreciate recs.    Heme:  #Anticoagulation s/p Drug Eluting Stent placement  #Thrombocythemia  - Cangrelor discontinued, heparin 400 U started  - 81mg ASA  - Plavix loaded 8/15 (75mg daily thereafter)  - Hydroxyurea 1g BID, hematology consulted and following    GI care:  #Transaminitis   - NJ placed 8/13, TF at goal  - OG to LIS  - Rectal tube placed 8/15    Fluids/ Electrolytes/ Nutrition:   - mIVF for IV fluid hydration  - Nutri-phos 4x/day for lyte replacement  - TFs with FWF, titrating for sodium  - Wiggins    Renal/ Fluid Balance:    #Acute Kidney injury  - Urine output stable overnight, Creatinine reassuring  - Will continue to monitor intake and output via wiggins    Endocrine:  #Stress hyperglycemia  - off Insulin gtt, on ISS    ID/ Antibiotics:  - Monitor fever curve, trend WBC    Prophylaxis:    - Mechanical prophylaxis for DVT.   - ASA, plavix, heparin straight rate  - Protonix    Lines/ tubes/ drains:  - ETT, arterial line, PIV, CVC    Disposition:  - CV ICU.    Patient seen, findings and plan discussed with CV ICU staff Dr. Gomez Leslie MD  PGY-3 General Surgery        ====================================    SUBJECTIVE:   -Alert off sedation. Follows commands.      OBJECTIVE:   1. VITAL SIGNS:   Temp:  [96.8  F (36  C)-97.5  F (36.4  C)] 96.9  F (36.1  C)  Heart Rate:  [] 106  Resp:  [18-27] 18  BP: ()/(43-81) 105/81  MAP:  [69 mmHg-114 mmHg] 78 mmHg  Arterial Line BP: ()/(45-98) 95/67  FiO2 (%):  [40 %] 40 %  SpO2:  [78 %-100 %] 100 %  Ventilation Mode: CMV/AC  (Continuous Mandatory Ventilation/ Assist Control)  FiO2 (%): 40 %  Rate Set (breaths/minute): 14 breaths/min  Tidal Volume Set (mL): 550 mL  PEEP (cm H2O): 5 cmH2O  Pressure Support (cm H2O): 14 cmH2O  Oxygen Concentration (%): 40 %  Resp: 18    2.  INTAKE/ OUTPUT:   I/O last 3 completed shifts:  In: 2516 [I.V.:746; NG/GT:330]  Out: 2865 [Urine:2555; Emesis/NG output:50; Chest Tube:260]    3. PHYSICAL EXAMINATION:   General: Intubated, comfortable on minimal sedation  Neuro: Off sedation, opens eyes to voice, follows commands reliably  Resp: Mechanical ventilation, intubated, coarse breath sounds  CV: Normal rate with underlying atrial flutter  Abdomen: Soft, dressings in place  Incisions: Dressing over chest, chest tubes in place, closed chest  Extremities: warm and well perfused    4. INVESTIGATIONS:   Arterial Blood Gases     Recent Labs  Lab 08/16/18  0402 08/15/18  1331 08/15/18  0340 08/14/18  0331   PH 7.44 7.40 7.41 7.41   PCO2 30* 27* 32* 32*   PO2 137* 159* 145* 129*   HCO3 20* 17* 21 20*     Complete Blood Count     Recent Labs  Lab 08/17/18  2221 08/17/18  0430 08/16/18  0402 08/15/18  1614 08/15/18  0339  08/14/18  1611   WBC 4.9  --   --  12.0* 12.9*  --  11.7*   HGB 8.4* 9.1* 8.8* 9.0* 8.9*  < > 8.8*     --   --  442 427  --  421   < > = values in this interval not displayed.  Basic Metabolic Panel    Recent Labs  Lab 08/18/18  0346 08/17/18  2221 08/17/18  0430 08/16/18  0402   * 151* 152* 148*   POTASSIUM 3.9 4.0 3.1* 3.9   CHLORIDE 121* 121* 121* 120*   CO2 24 23 24 19*   BUN 39* 38* 35* 29   CR 0.67 0.68 0.60* 0.66   * 114* 117* 123*     Liver Function Tests    Recent Labs  Lab 08/18/18  0346 08/17/18  0430 08/16/18  0402 08/15/18  0339   * 262* 71* 47*   * 123* 29 16   ALKPHOS 296* 242* 200* 139   BILITOTAL 0.4 0.4 0.4 0.6   ALBUMIN 1.6* 1.5* 1.6* 1.7*   INR 1.37* 1.28* 1.26* 1.27*     Pancreatic Enzymes  No lab results found in last 7 days.  Coagulation Profile    Recent Labs  Lab 08/18/18  0346 08/17/18  0430 08/16/18  0402 08/15/18  0339   INR 1.37* 1.28* 1.26* 1.27*   PTT 45* 50* 52*  --          5. RADIOLOGY:   No results found for this or any previous visit (from the past 24 hour(s)).

## 2018-08-18 NOTE — PLAN OF CARE
Problem: Patient Care Overview  Goal: Plan of Care/Patient Progress Review  PT 4E: Pt working with OT in morning, had just gotten back to bed upon afternoon check in. PT will reschedule for tomorrow to initiate services.

## 2018-08-18 NOTE — PROGRESS NOTES
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 Underwent repair of posterior VSD and TVR with bioprosthetic valve on 8/10, and was kept with open chest due to coagulopathy and RV dysfunction. Chest closed 8/13. IABP removed 8/14.     TODAY'S PROGRESS:   -Wean Pressure support as tolerated  -Requiring minimal sedatives, avoid long acting sedatives  -Increase Lasix to 40 BID IV, net goal negative 500  -Keep mediastinal chest tubes in for now  -Keep pacer  -Remove CVC   -Increase FWF to 75 Q4    PLAN:  Neuro/ pain/ sedation:  #Post operative sedation  #Questionable seizure activity (negative follow up EEG 8/16)  - PRN Quetiapine 25mg/50mg for sleep, ativan PRN  - Monitor neurological status. Notify the MD for any acute changes in exam.  - Discussed with neurology:    -Continue keppra 750mg BID for now    -Follow up with neurology as outpatient in 3 months    -Repeat EEG on 8/16 without seizure activity    -CT head if acute neuro changes    Pulmonary care:  #Post operative mechanical ventilation   - Intubated/sedated, pressure support trials stable on 15/5, but tachypnic and fails when lowered to 10/5  - pressure support trials ongoing    Cardiovascular:  #Post infarction 2.5cm infraseptal VSD  #s/p HUSSEIN to distal RCA and proximal LAD  #STEMI, cardiogenic shock  #Dilated RV   #Pulmonary HTN  #IABP  #s/p repair of posterior VSD and TVR with bioprosthetic valve, s/p Chest closure 8/13  #Atrial flutter  - Cards 2 reconsulted, appreciate assist   - Monitor hemodynamic status. Pacer at VVI at 60  - Infusions: Weaned off epi and dobutamine 8/16  - ECHO from 8/8 prior to surgery: Inferior wall akinesis with inferoseptal muscular VAD. Mild to  moderate right ventricular dilation is present. Global right ventricular function is moderately reduced.  - ASA 81mg  - EP consult regarding underlying atrial flutter. Appreciate recs.    Heme:  #Anticoagulation s/p Drug Eluting Stent placement  #Thrombocythemia  - Cangrelor discontinued, heparin 400 U started  - 81mg ASA  - Plavix loaded 8/15 (75mg daily thereafter)  - Hydroxyurea 1g BID, hematology consulted and following    GI care:  #Transaminitis   - NJ placed 8/13, TF at goal  - OG to LIS  - Rectal tube placed 8/15    Fluids/ Electrolytes/ Nutrition:   - mIVF for IV fluid hydration  - Nutri-phos 4x/day for lyte replacement  - TFs with FWF, titrating for sodium  - Wiggins    Renal/ Fluid Balance:    #Acute Kidney injury  - Urine output stable overnight, Creatinine reassuring  - Will continue to monitor intake and output via wiggins    Endocrine:  #Stress hyperglycemia  - off Insulin gtt, on ISS    ID/ Antibiotics:  - Monitor fever curve, trend WBC    Prophylaxis:    - Mechanical prophylaxis for DVT.   - ASA, plavix, heparin straight rate  - Protonix    Lines/ tubes/ drains:  - ETT, arterial line, PIV, CVC    Disposition:  - CV ICU.    Patient seen, findings and plan discussed with CVTS team.    Jose Leslie MD  PGY-3 General Surgery        ====================================    SUBJECTIVE:   -Alert off sedation. Follows commands.      OBJECTIVE:   1. VITAL SIGNS:   Temp:  [96.8  F (36  C)-98.4  F (36.9  C)] 98.4  F (36.9  C)  Heart Rate:  [102-152] 103  Resp:  [18-29] 26  BP: ()/(43-81) 91/57  MAP:  [70 mmHg-105 mmHg] 75 mmHg  Arterial Line BP: ()/(45-93) 92/65  FiO2 (%):  [40 %] 40 %  SpO2:  [96 %-100 %] 100 %  Ventilation Mode: CPAP/PS  (Continuous positive airway pressure with Pressure Support)  FiO2 (%): 40 %  Rate Set (breaths/minute): 14 breaths/min  Tidal Volume Set (mL): 550 mL  PEEP (cm H2O): 5 cmH2O  Pressure Support (cm H2O): 15 cmH2O (by MD)  Oxygen Concentration (%): 40 %  Resp: 26    2.  INTAKE/ OUTPUT:   I/O last 3 completed shifts:  In: 2516 [I.V.:746; NG/GT:330]  Out: 2865 [Urine:2555; Emesis/NG output:50; Chest Tube:260]    3. PHYSICAL EXAMINATION:   General: Intubated, comfortable on minimal sedation  Neuro: Off sedation, opens eyes to voice, follows commands reliably  Resp: Mechanical ventilation, intubated, coarse breath sounds  CV: Normal rate with underlying atrial flutter  Abdomen: Soft, dressings in place  Incisions: Dressing over chest, chest tubes in place, closed chest  Extremities: warm and well perfused    4. INVESTIGATIONS:   Arterial Blood Gases     Recent Labs  Lab 08/16/18  0402 08/15/18  1331 08/15/18  0340 08/14/18  0331   PH 7.44 7.40 7.41 7.41   PCO2 30* 27* 32* 32*   PO2 137* 159* 145* 129*   HCO3 20* 17* 21 20*     Complete Blood Count     Recent Labs  Lab 08/17/18  2221 08/17/18  0430 08/16/18  0402 08/15/18  1614 08/15/18  0339  08/14/18  1611   WBC 4.9  --   --  12.0* 12.9*  --  11.7*   HGB 8.4* 9.1* 8.8* 9.0* 8.9*  < > 8.8*     --   --  442 427  --  421   < > = values in this interval not displayed.  Basic Metabolic Panel    Recent Labs  Lab 08/18/18  0346 08/17/18  2221 08/17/18  0430 08/16/18  0402   * 151* 152* 148*   POTASSIUM 3.9 4.0 3.1* 3.9   CHLORIDE 121* 121* 121* 120*   CO2 24 23 24 19*   BUN 39* 38* 35* 29   CR 0.67 0.68 0.60* 0.66   * 114* 117* 123*     Liver Function Tests    Recent Labs  Lab 08/18/18  0346 08/17/18  0430 08/16/18  0402 08/15/18  0339   * 262* 71* 47*   * 123* 29 16   ALKPHOS 296* 242* 200* 139   BILITOTAL 0.4 0.4 0.4 0.6   ALBUMIN 1.6* 1.5* 1.6* 1.7*   INR 1.37* 1.28* 1.26* 1.27*     Pancreatic Enzymes  No lab results found in last 7 days.  Coagulation Profile    Recent Labs  Lab 08/18/18  0346 08/17/18  0430 08/16/18  0402 08/15/18  0339   INR 1.37* 1.28* 1.26* 1.27*   PTT 45* 50* 52*  --          5. RADIOLOGY:   No results found for this or any previous visit (from the past 24 hour(s)).

## 2018-08-19 ENCOUNTER — APPOINTMENT (OUTPATIENT)
Dept: PHYSICAL THERAPY | Facility: CLINIC | Age: 68
DRG: 219 | End: 2018-08-19
Attending: THORACIC SURGERY (CARDIOTHORACIC VASCULAR SURGERY)
Payer: MEDICARE

## 2018-08-19 LAB
ALBUMIN SERPL-MCNC: 1.6 G/DL (ref 3.4–5)
ALP SERPL-CCNC: 248 U/L (ref 40–150)
ALT SERPL W P-5'-P-CCNC: 183 U/L (ref 0–70)
ANION GAP SERPL CALCULATED.3IONS-SCNC: 6 MMOL/L (ref 3–14)
ANION GAP SERPL CALCULATED.3IONS-SCNC: 6 MMOL/L (ref 3–14)
APTT PPP: 47 SEC (ref 22–37)
AST SERPL W P-5'-P-CCNC: 188 U/L (ref 0–45)
BILIRUB SERPL-MCNC: 0.4 MG/DL (ref 0.2–1.3)
BUN SERPL-MCNC: 44 MG/DL (ref 7–30)
BUN SERPL-MCNC: 46 MG/DL (ref 7–30)
CA-I BLD-MCNC: 4.6 MG/DL (ref 4.4–5.2)
CALCIUM SERPL-MCNC: 7.4 MG/DL (ref 8.5–10.1)
CALCIUM SERPL-MCNC: 7.6 MG/DL (ref 8.5–10.1)
CHLORIDE SERPL-SCNC: 118 MMOL/L (ref 94–109)
CHLORIDE SERPL-SCNC: 119 MMOL/L (ref 94–109)
CO2 SERPL-SCNC: 27 MMOL/L (ref 20–32)
CO2 SERPL-SCNC: 27 MMOL/L (ref 20–32)
CREAT SERPL-MCNC: 0.61 MG/DL (ref 0.66–1.25)
CREAT SERPL-MCNC: 0.75 MG/DL (ref 0.66–1.25)
GFR SERPL CREATININE-BSD FRML MDRD: >90 ML/MIN/1.7M2
GFR SERPL CREATININE-BSD FRML MDRD: >90 ML/MIN/1.7M2
GLUCOSE BLDC GLUCOMTR-MCNC: 101 MG/DL (ref 70–99)
GLUCOSE BLDC GLUCOMTR-MCNC: 102 MG/DL (ref 70–99)
GLUCOSE BLDC GLUCOMTR-MCNC: 103 MG/DL (ref 70–99)
GLUCOSE BLDC GLUCOMTR-MCNC: 107 MG/DL (ref 70–99)
GLUCOSE BLDC GLUCOMTR-MCNC: 111 MG/DL (ref 70–99)
GLUCOSE SERPL-MCNC: 108 MG/DL (ref 70–99)
GLUCOSE SERPL-MCNC: 108 MG/DL (ref 70–99)
INR PPP: 1.26 (ref 0.86–1.14)
MAGNESIUM SERPL-MCNC: 2 MG/DL (ref 1.6–2.3)
PHOSPHATE SERPL-MCNC: 3.9 MG/DL (ref 2.5–4.5)
POTASSIUM SERPL-SCNC: 3.8 MMOL/L (ref 3.4–5.3)
POTASSIUM SERPL-SCNC: 4 MMOL/L (ref 3.4–5.3)
PROT SERPL-MCNC: 4.8 G/DL (ref 6.8–8.8)
SODIUM SERPL-SCNC: 151 MMOL/L (ref 133–144)
SODIUM SERPL-SCNC: 152 MMOL/L (ref 133–144)

## 2018-08-19 PROCEDURE — 97162 PT EVAL MOD COMPLEX 30 MIN: CPT | Mod: GP | Performed by: PHYSICAL THERAPIST

## 2018-08-19 PROCEDURE — A9270 NON-COVERED ITEM OR SERVICE: HCPCS | Mod: GY | Performed by: INTERNAL MEDICINE

## 2018-08-19 PROCEDURE — 40000193 ZZH STATISTIC PT WARD VISIT: Performed by: PHYSICAL THERAPIST

## 2018-08-19 PROCEDURE — A9270 NON-COVERED ITEM OR SERVICE: HCPCS | Mod: GY | Performed by: STUDENT IN AN ORGANIZED HEALTH CARE EDUCATION/TRAINING PROGRAM

## 2018-08-19 PROCEDURE — 25000125 ZZHC RX 250: Performed by: THORACIC SURGERY (CARDIOTHORACIC VASCULAR SURGERY)

## 2018-08-19 PROCEDURE — 25000132 ZZH RX MED GY IP 250 OP 250 PS 637: Mod: GY | Performed by: STUDENT IN AN ORGANIZED HEALTH CARE EDUCATION/TRAINING PROGRAM

## 2018-08-19 PROCEDURE — 84100 ASSAY OF PHOSPHORUS: CPT | Performed by: INTERNAL MEDICINE

## 2018-08-19 PROCEDURE — 97530 THERAPEUTIC ACTIVITIES: CPT | Mod: GP | Performed by: PHYSICAL THERAPIST

## 2018-08-19 PROCEDURE — A9270 NON-COVERED ITEM OR SERVICE: HCPCS | Mod: GY | Performed by: THORACIC SURGERY (CARDIOTHORACIC VASCULAR SURGERY)

## 2018-08-19 PROCEDURE — 20000004 ZZH R&B ICU UMMC

## 2018-08-19 PROCEDURE — 80048 BASIC METABOLIC PNL TOTAL CA: CPT | Performed by: STUDENT IN AN ORGANIZED HEALTH CARE EDUCATION/TRAINING PROGRAM

## 2018-08-19 PROCEDURE — 25000132 ZZH RX MED GY IP 250 OP 250 PS 637: Mod: GY | Performed by: THORACIC SURGERY (CARDIOTHORACIC VASCULAR SURGERY)

## 2018-08-19 PROCEDURE — 85610 PROTHROMBIN TIME: CPT | Performed by: INTERNAL MEDICINE

## 2018-08-19 PROCEDURE — 25000128 H RX IP 250 OP 636: Performed by: THORACIC SURGERY (CARDIOTHORACIC VASCULAR SURGERY)

## 2018-08-19 PROCEDURE — 83735 ASSAY OF MAGNESIUM: CPT | Performed by: INTERNAL MEDICINE

## 2018-08-19 PROCEDURE — 25000132 ZZH RX MED GY IP 250 OP 250 PS 637: Mod: GY | Performed by: INTERNAL MEDICINE

## 2018-08-19 PROCEDURE — 82330 ASSAY OF CALCIUM: CPT | Performed by: STUDENT IN AN ORGANIZED HEALTH CARE EDUCATION/TRAINING PROGRAM

## 2018-08-19 PROCEDURE — 99291 CRITICAL CARE FIRST HOUR: CPT | Mod: GC | Performed by: ANESTHESIOLOGY

## 2018-08-19 PROCEDURE — 40000275 ZZH STATISTIC RCP TIME EA 10 MIN

## 2018-08-19 PROCEDURE — 25000128 H RX IP 250 OP 636: Performed by: STUDENT IN AN ORGANIZED HEALTH CARE EDUCATION/TRAINING PROGRAM

## 2018-08-19 PROCEDURE — 27210437 ZZH NUTRITION PRODUCT SEMIELEM INTERMED LITER

## 2018-08-19 PROCEDURE — 00000146 ZZHCL STATISTIC GLUCOSE BY METER IP

## 2018-08-19 PROCEDURE — 85730 THROMBOPLASTIN TIME PARTIAL: CPT | Performed by: INTERNAL MEDICINE

## 2018-08-19 PROCEDURE — 80053 COMPREHEN METABOLIC PANEL: CPT | Performed by: INTERNAL MEDICINE

## 2018-08-19 PROCEDURE — 40000014 ZZH STATISTIC ARTERIAL MONITORING DAILY

## 2018-08-19 PROCEDURE — 94003 VENT MGMT INPAT SUBQ DAY: CPT

## 2018-08-19 RX ADMIN — POTASSIUM & SODIUM PHOSPHATES POWDER PACK 280-160-250 MG 1 PACKET: 280-160-250 PACK at 11:49

## 2018-08-19 RX ADMIN — POTASSIUM & SODIUM PHOSPHATES POWDER PACK 280-160-250 MG 1 PACKET: 280-160-250 PACK at 20:15

## 2018-08-19 RX ADMIN — PANTOPRAZOLE SODIUM 40 MG: 40 INJECTION, POWDER, FOR SOLUTION INTRAVENOUS at 07:54

## 2018-08-19 RX ADMIN — CARBOXYMETHYLCELLULOSE SODIUM 1 DROP: 5 SOLUTION/ DROPS OPHTHALMIC at 15:45

## 2018-08-19 RX ADMIN — Medication 1000 MG: at 20:15

## 2018-08-19 RX ADMIN — Medication 1000 MG: at 07:58

## 2018-08-19 RX ADMIN — POTASSIUM & SODIUM PHOSPHATES POWDER PACK 280-160-250 MG 1 PACKET: 280-160-250 PACK at 15:33

## 2018-08-19 RX ADMIN — QUETIAPINE 50 MG: 50 TABLET ORAL at 21:37

## 2018-08-19 RX ADMIN — ASPIRIN 81 MG CHEWABLE TABLET 81 MG: 81 TABLET CHEWABLE at 07:54

## 2018-08-19 RX ADMIN — LEVETIRACETAM 750 MG: 100 SOLUTION ORAL at 18:08

## 2018-08-19 RX ADMIN — FUROSEMIDE 40 MG: 10 INJECTION, SOLUTION INTRAVENOUS at 21:37

## 2018-08-19 RX ADMIN — POTASSIUM & SODIUM PHOSPHATES POWDER PACK 280-160-250 MG 2 PACKET: 280-160-250 PACK at 07:55

## 2018-08-19 RX ADMIN — ONDANSETRON 4 MG: 2 INJECTION INTRAMUSCULAR; INTRAVENOUS at 07:11

## 2018-08-19 RX ADMIN — MULTIVITAMIN 15 ML: LIQUID ORAL at 07:55

## 2018-08-19 RX ADMIN — FUROSEMIDE 40 MG: 10 INJECTION, SOLUTION INTRAVENOUS at 09:09

## 2018-08-19 RX ADMIN — POTASSIUM CHLORIDE 20 MEQ: 1.5 POWDER, FOR SOLUTION ORAL at 21:52

## 2018-08-19 RX ADMIN — LEVOTHYROXINE SODIUM 75 MCG: 75 TABLET ORAL at 07:56

## 2018-08-19 RX ADMIN — QUETIAPINE FUMARATE 25 MG: 25 TABLET, FILM COATED ORAL at 07:56

## 2018-08-19 RX ADMIN — ATORVASTATIN CALCIUM 80 MG: 80 TABLET, FILM COATED ORAL at 20:15

## 2018-08-19 RX ADMIN — QUETIAPINE FUMARATE 25 MG: 25 TABLET ORAL at 01:00

## 2018-08-19 RX ADMIN — CLOPIDOGREL 75 MG: 75 TABLET, FILM COATED ORAL at 07:57

## 2018-08-19 RX ADMIN — LEVETIRACETAM 750 MG: 100 SOLUTION ORAL at 06:10

## 2018-08-19 RX ADMIN — DOCUSATE SODIUM 100 MG: 50 LIQUID ORAL at 07:55

## 2018-08-19 ASSESSMENT — ACTIVITIES OF DAILY LIVING (ADL)
ADLS_ACUITY_SCORE: 13

## 2018-08-19 NOTE — PROGRESS NOTES
08/19/18 0955   Quick Adds   Type of Visit Initial PT Evaluation   Living Environment   Lives With spouse   Living Arrangements house   Number of Stairs to Enter Home 2   Number of Stairs Within Home 7   Living Environment Comment pt intubated, no family present, info taken from OT eval   Self-Care   Usual Activity Tolerance good   Current Activity Tolerance fair   Regular Exercise yes   Activity/Exercise/Self-Care Comment pt has a walker per chart that he does not use   Functional Level Prior   Ambulation 0-->independent   Transferring 0-->independent   Toileting 0-->independent   Bathing 0-->independent   Dressing 0-->independent   Eating 0-->independent   Communication 0-->understands/communicates without difficulty   Swallowing 0-->swallows foods/liquids without difficulty   Cognition 0 - no cognition issues reported   Prior Functional Level Comment Per chart pt IND at baseline, pt intubated during session and no family present    General Information   Onset of Illness/Injury or Date of Surgery - Date 08/06/18   Referring Physician Hiram Meyers MD   Patient/Family Goals Statement unable to state   Pertinent History of Current Problem (include personal factors and/or comorbidities that impact the POC) 68 year old male s/p HUSESIN to RCA and LAD for STEMI on 8/4 found to have post infarction basal VSD. Underwent repair of posterior VSD and TVR with bioprosthetic valve on 8/10, and was kept with open chest due to coagulopathy and RV dysfunction. Chest closed 8/13. IABP removed 8/14.    Precautions/Limitations oxygen therapy device and L/min;fall precautions;sternal precautions   Heart Disease Risk Factors Gender;Medical history;Age   General Observations pt supine, sleeping, arousable and agreeable to session, orally intubated but alert   General Info Comments activity orders: ambulate with assist    Cognitive Status Examination   Level of Consciousness alert   Follows Commands and Answers Questions 100% of the  "time   Personal Safety and Judgment intact   Cognitive Comment pt alert, intubated so orientation questions not asked    Pain Assessment   Patient Currently in Pain Yes, see Vital Sign flowsheet   Integumentary/Edema   Integumentary/Edema Comments sternal incision with wound vac   Posture    Posture Forward head position;Protracted shoulders   Range of Motion (ROM)   ROM Quick Adds No deficits were identified   Strength   Strength Comments pt with >3/5 strength per mobility    Bed Mobility   Bed Mobility Comments pt needs min assist for supine to sit transfer   Transfer Skills   Transfer Comments used lift for lateral transfers today due to fatigue    Balance   Balance Comments needs min assist while sitting EOB for balance/support    General Therapy Interventions   Planned Therapy Interventions progressive activity/exercise;home program guidelines;risk factor education;transfer training;strengthening;gait training;bed mobility training;balance training   Clinical Impression   Criteria for Skilled Therapeutic Intervention yes, treatment indicated   PT Diagnosis impaired mobility    Influenced by the following impairments O2 needs, deconditioning    Functional limitations due to impairments impaired transfers, gait    Clinical Presentation Evolving/Changing   Clinical Presentation Rationale pt with changing presentation, high O2 needs   Clinical Decision Making (Complexity) Moderate complexity   Therapy Frequency` 5 times/week   Predicted Duration of Therapy Intervention (days/wks) 1 week    Anticipated Discharge Disposition Transitional Care Facility   Risk & Benefits of therapy have been explained Yes   Patient, Family & other staff in agreement with plan of care Yes   Clinical Impression Comments pt with below baseline mobility    [facility] AM-PAC TM \"6 Clicks\"   2016, Trustees of [facility], under license to [employer].  All rights reserved.   6 Clicks Short Forms Basic Mobility Inpatient Short " "Form   [facility] AM-PAC  \"6 Clicks\" V.2 Basic Mobility Inpatient Short Form   1. Turning from your back to your side while in a flat bed without using bedrails? 3 - A Little   2. Moving from lying on your back to sitting on the side of a flat bed without using bedrails? 3 - A Little   3. Moving to and from a bed to a chair (including a wheelchair)? 2 - A Lot   4. Standing up from a chair using your arms (e.g., wheelchair, or bedside chair)? 2 - A Lot   5. To walk in hospital room? 1 - Total   6. Climbing 3-5 steps with a railing? 1 - Total   Basic Mobility Raw Score (Score out of 24.Lower scores equate to lower levels of function) 12   Total Evaluation Time   Total Evaluation Time (Minutes) 5     "

## 2018-08-19 NOTE — PROGRESS NOTES
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 Underwent repair of posterior VSD and TVR with bioprosthetic valve on 8/10, and was kept with open chest due to coagulopathy and RV dysfunction. Chest closed 8/13. IABP removed 8/14.     TODAY'S PROGRESS:   -Wean Pressure support as tolerated  -Requiring minimal sedatives, avoid long acting sedatives  - increase FWF to 200 q4  - BMP recheck tonight, may need to keep increasing FWF  -Keep mediastinal chest tubes in for now  -Keep pacer  -keep heparin to straight rate      PLAN:  Neuro/ pain/ sedation:  #Post operative sedation  #Questionable seizure activity (negative follow up EEG 8/16)  - PRN Quetiapine 25mg/50mg for sleep, ativan PRN  - Monitor neurological status. Notify the MD for any acute changes in exam.  - Discussed with neurology:    -Continue keppra 750mg BID for now    -Follow up with neurology as outpatient in 3 months    -Repeat EEG on 8/16 without seizure activity    -CT head if acute neuro changes    Pulmonary care:  #Post operative mechanical ventilation   - Intubated/sedated, pressure support trials stable on 15/5, but tachypnic and fails when lowered to 10/5  - pressure support trials ongoing    Cardiovascular:  #Post infarction 2.5cm infraseptal VSD  #s/p HUSSEIN to distal RCA and proximal LAD  #STEMI, cardiogenic shock  #Dilated RV   #Pulmonary HTN  #IABP  #s/p repair of posterior VSD and TVR with bioprosthetic valve, s/p Chest closure 8/13  #Atrial flutter  - Cards 2 reconsulted, appreciate assist   - Monitor hemodynamic status. Pacer at VVI at 60  - Infusions: Weaned off epi and dobutamine 8/16  - ECHO from 8/8 prior to surgery: Inferior wall akinesis with inferoseptal  muscular VAD. Mild to moderate right ventricular dilation is present. Global right ventricular function is moderately reduced.  - ASA 81mg  - EP consult regarding underlying atrial flutter. Appreciate recs.    Heme:  #Anticoagulation s/p Drug Eluting Stent placement  #Thrombocythemia  - Cangrelor discontinued, heparin 400 U started  - 81mg ASA  - Plavix loaded 8/15 (75mg daily thereafter)  - Hydroxyurea 1g BID, hematology consulted and following    GI care:  #Transaminitis   - NJ placed 8/13, TF at goal  - OG to LIS  - Rectal tube placed 8/15    Fluids/ Electrolytes/ Nutrition:   - mIVF for IV fluid hydration  - Nutri-phos 4x/day for lyte replacement  - TFs with FWF, titrating for sodium  - Wiggins    Renal/ Fluid Balance:    #Acute Kidney injury  - Urine output stable overnight, Creatinine reassuring  - Will continue to monitor intake and output via wiggins    Endocrine:  #Stress hyperglycemia  - off Insulin gtt, on ISS    ID/ Antibiotics:  - Monitor fever curve, trend WBC    Prophylaxis:    - Mechanical prophylaxis for DVT.   - ASA, plavix, heparin straight rate  - Protonix    Lines/ tubes/ drains:  - ETT, arterial line, PIV, CVC    Disposition:  - CV ICU.    Patient seen, findings and plan discussed with CVTS team.    Jose Leslie MD  PGY-3 General Surgery        ====================================    SUBJECTIVE:   -Alert off sedation. Follows commands.      OBJECTIVE:   1. VITAL SIGNS:   Temp:  [97.5  F (36.4  C)-99.1  F (37.3  C)] 98  F (36.7  C)  Heart Rate:  [] 103  Resp:  [17-38] 20  BP: ()/(46-78) 86/63  MAP:  [60 mmHg-98 mmHg] 94 mmHg  Arterial Line BP: ()/(53-85) 121/79  FiO2 (%):  [40 %] 40 %  SpO2:  [90 %-100 %] 100 %  Ventilation Mode: CMV/AC  (Continuous Mandatory Ventilation/ Assist Control)  FiO2 (%): 40 %  Rate Set (breaths/minute): 14 breaths/min  Tidal Volume Set (mL): 550 mL  PEEP (cm H2O): 5 cmH2O  Pressure Support (cm H2O): 12 cmH2O  Oxygen Concentration (%): 40 %  Resp:  20    2. INTAKE/ OUTPUT:   I/O last 3 completed shifts:  In: 2543 [I.V.:173; NG/GT:990]  Out: 3860 [Urine:3165; Emesis/NG output:450; Chest Tube:245]    3. PHYSICAL EXAMINATION:   General: Intubated, comfortable on minimal sedation  Neuro: Off sedation, opens eyes to voice, follows commands reliably  Resp: Mechanical ventilation, intubated, coarse breath sounds  CV: Normal rate with underlying atrial flutter  Abdomen: Soft, dressings in place  Incisions: Dressing over chest, chest tubes in place, closed chest  Extremities: warm and well perfused    4. INVESTIGATIONS:   Arterial Blood Gases     Recent Labs  Lab 08/16/18  0402 08/15/18  1331 08/15/18  0340 08/14/18  0331   PH 7.44 7.40 7.41 7.41   PCO2 30* 27* 32* 32*   PO2 137* 159* 145* 129*   HCO3 20* 17* 21 20*     Complete Blood Count     Recent Labs  Lab 08/17/18  2221 08/17/18  0430 08/16/18  0402 08/15/18  1614 08/15/18  0339  08/14/18  1611   WBC 4.9  --   --  12.0* 12.9*  --  11.7*   HGB 8.4* 9.1* 8.8* 9.0* 8.9*  < > 8.8*     --   --  442 427  --  421   < > = values in this interval not displayed.  Basic Metabolic Panel    Recent Labs  Lab 08/19/18  0332 08/18/18  1700 08/18/18  0346 08/17/18  2221   * 153* 151* 151*   POTASSIUM 4.0 3.8 3.9 4.0   CHLORIDE 119* 120* 121* 121*   CO2 27 28 24 23   BUN 44* 43* 39* 38*   CR 0.75 0.68 0.67 0.68   * 100* 110* 114*     Liver Function Tests    Recent Labs  Lab 08/19/18  0332 08/18/18  0346 08/17/18  0430 08/16/18  0402   * 390* 262* 71*   * 259* 123* 29   ALKPHOS 248* 296* 242* 200*   BILITOTAL 0.4 0.4 0.4 0.4   ALBUMIN 1.6* 1.6* 1.5* 1.6*   INR 1.26* 1.37* 1.28* 1.26*     Pancreatic Enzymes  No lab results found in last 7 days.  Coagulation Profile    Recent Labs  Lab 08/19/18  0332 08/18/18  0346 08/17/18  0430 08/16/18  0402   INR 1.26* 1.37* 1.28* 1.26*   PTT 47* 45* 50* 52*         5. RADIOLOGY:   No results found for this or any previous visit (from the past 24 hour(s)).

## 2018-08-19 NOTE — PLAN OF CARE
Problem: Cardiac Surgery (Adult)  Goal: Signs and Symptoms of Listed Potential Problems Will be Absent, Minimized or Managed (Cardiac Surgery)  Signs and symptoms of listed potential problems will be absent, minimized or managed by discharge/transition of care (reference Cardiac Surgery (Adult) CPG).   Outcome: No Change  VSS and has denied any pain throughout the day. Has andrez alert and able to write down needs to be able to communicate. Was able to pressure support at 12/5 for 4 hours and 10/5 for 3 hours. Placed back and CMV to let patient rest and plan for PS trial in morning. Tube feedings at goal and tolerating with no problems. Was able to sit in chair for total of 5 hours today. Requires mechanical lift for transfer. Will continue to encourage activity as tolerated. Will continue to monitor and refer to flowsheets for documentation.

## 2018-08-19 NOTE — PLAN OF CARE
Problem: Patient Care Overview  Goal: Plan of Care/Patient Progress Review  Discharge Planner OT   Patient plan for discharge: rehab  Current status: Pt performed bed mobility and functional transfers EOB> chair w/ mod A x2, A from RNs for line mgmt throughout, VSS throughout.   Barriers to return to prior living situation: medical status  Recommendations for discharge: TCU  Rationale for recommendations: to increase ind in ADLS/IADLS       Entered by: Rebeca Nunez 08/19/2018 9:40 AM

## 2018-08-19 NOTE — PROGRESS NOTES
Patient c/o nausea and incisional pain in abdomen at start of shift. Tylenol and Zofran administered, and also has a bowl movement. Upon reassessment, patient denied abdominal discomfort and pain. VSS this shift except hypotensive with administration of lasix. MAP sustained over 65. Nursing will continue to monitor, assess and provide cares per orders and as tolerated.

## 2018-08-19 NOTE — PLAN OF CARE
Problem: Patient Care Overview  Goal: Plan of Care/Patient Progress Review  Outcome: No Change  D/I/A: Cardiogenic shock  Neuro: Alert, follows commands, uses letter board to communicate.  CV: Sunis Tachycardia 100-110. MAP maintained greater than 65.  Resp: CMV 40% PEEP 5. LS clear minimal ETT secretions. Chest tubes minimal output.  GI/: TF goal 60 mL/hour. FWF increased to 125 every 4 hours. Herring patent good UOP.  Skin: Midsternal wound vac and chest tube sites.      P: Pressure support this morning.Sodium levels remain elevated 152 (Free water flushes currently 125cc/q4h). Notify CVTS with any changes

## 2018-08-19 NOTE — PROGRESS NOTES
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 Underwent repair of posterior VSD and TVR with bioprosthetic valve on 8/10, and was kept with open chest due to coagulopathy and RV dysfunction. Chest closed 8/13. IABP removed 8/14.     TODAY'S PROGRESS:   -Wean Pressure support as tolerated  -Requiring minimal sedatives, avoid long acting sedatives  - increase FWF to 200 q4  - BMP recheck tonight, may need to keep increasing FWF  -Keep mediastinal chest tubes in for now  -Keep pacer  -Consider heparin to low intensity dosing, start warfarin      PLAN:  Neuro/ pain/ sedation:  #Post operative sedation  #Questionable seizure activity (negative follow up EEG 8/16)  - PRN Quetiapine 25mg/50mg for sleep, ativan PRN  - Monitor neurological status. Notify the MD for any acute changes in exam.  - Discussed with neurology:    -Continue keppra 750mg BID for now    -Follow up with neurology as outpatient in 3 months    -Repeat EEG on 8/16 without seizure activity    -CT head if acute neuro changes    Pulmonary care:  #Post operative mechanical ventilation   - Intubated/sedated, pressure support trials stable on 15/5, but tachypnic and fails when lowered to 10/5  - pressure support trials ongoing    Cardiovascular:  #Post infarction 2.5cm infraseptal VSD  #s/p HUSSEIN to distal RCA and proximal LAD  #STEMI, cardiogenic shock  #Dilated RV   #Pulmonary HTN  #IABP  #s/p repair of posterior VSD and TVR with bioprosthetic valve, s/p Chest closure 8/13  #Atrial flutter  - Cards 2 reconsulted, appreciate assist   - Monitor hemodynamic status. Pacer at VVI at 60  - Infusions: Weaned off epi and dobutamine 8/16  - ECHO from 8/8 prior to surgery: Inferior  wall akinesis with inferoseptal muscular VAD. Mild to moderate right ventricular dilation is present. Global right ventricular function is moderately reduced.  - ASA 81mg  - EP consult regarding underlying atrial flutter. Appreciate recs.    Heme:  #Anticoagulation s/p Drug Eluting Stent placement  #Thrombocythemia  - Cangrelor discontinued, heparin 400 U started  - 81mg ASA  - Plavix loaded 8/15 (75mg daily thereafter)  - Hydroxyurea 1g BID, hematology consulted and following    GI care:  #Transaminitis   - NJ placed 8/13, TF at goal  - OG to LIS  - Rectal tube placed 8/15    Fluids/ Electrolytes/ Nutrition:   - mIVF for IV fluid hydration  - Nutri-phos 4x/day for lyte replacement  - TFs with FWF, titrating for sodium  - Wiggins    Renal/ Fluid Balance:    #Acute Kidney injury  - Urine output stable overnight, Creatinine reassuring  - Will continue to monitor intake and output via wiggins    Endocrine:  #Stress hyperglycemia  - off Insulin gtt, on ISS    ID/ Antibiotics:  - Monitor fever curve, trend WBC    Prophylaxis:    - Mechanical prophylaxis for DVT.   - ASA, plavix, heparin straight rate  - Protonix    Lines/ tubes/ drains:  - ETT, arterial line, PIV, CVC    Disposition:  - [facility] ICU.    Patient seen, findings and plan discussed with CV ICU staff Dr. Gomez Leslie MD  PGY-3 General Surgery        ====================================    SUBJECTIVE:   -Alert off sedation. Follows commands.      OBJECTIVE:   1. VITAL SIGNS:   Temp:  [97.5  F (36.4  C)-99.1  F (37.3  C)] 98.6  F (37  C)  Heart Rate:  [] 103  Resp:  [17-38] 19  BP: ()/(46-78) 86/63  MAP:  [60 mmHg-98 mmHg] 82 mmHg  Arterial Line BP: ()/(53-85) 103/69  FiO2 (%):  [40 %] 40 %  SpO2:  [90 %-100 %] 100 %  Ventilation Mode: CMV/AC  (Continuous Mandatory Ventilation/ Assist Control)  FiO2 (%): 40 %  Rate Set (breaths/minute): 14 breaths/min  Tidal Volume Set (mL): 550 mL  PEEP (cm H2O): 5 cmH2O  Pressure Support (cm H2O): 12  cmH2O  Oxygen Concentration (%): 40 %  Resp: 19    2. INTAKE/ OUTPUT:   I/O last 3 completed shifts:  In: 2543 [I.V.:173; NG/GT:990]  Out: 3860 [Urine:3165; Emesis/NG output:450; Chest Tube:245]    3. PHYSICAL EXAMINATION:   General: Intubated, comfortable on minimal sedation  Neuro: Off sedation, opens eyes to voice, follows commands reliably  Resp: Mechanical ventilation, intubated, coarse breath sounds  CV: Normal rate with underlying atrial flutter  Abdomen: Soft, dressings in place  Incisions: Dressing over chest, chest tubes in place, closed chest  Extremities: warm and well perfused    4. INVESTIGATIONS:   Arterial Blood Gases     Recent Labs  Lab 08/16/18  0402 08/15/18  1331 08/15/18  0340 08/14/18  0331   PH 7.44 7.40 7.41 7.41   PCO2 30* 27* 32* 32*   PO2 137* 159* 145* 129*   HCO3 20* 17* 21 20*     Complete Blood Count     Recent Labs  Lab 08/17/18  2221 08/17/18  0430 08/16/18  0402 08/15/18  1614 08/15/18  0339  08/14/18  1611   WBC 4.9  --   --  12.0* 12.9*  --  11.7*   HGB 8.4* 9.1* 8.8* 9.0* 8.9*  < > 8.8*     --   --  442 427  --  421   < > = values in this interval not displayed.  Basic Metabolic Panel    Recent Labs  Lab 08/19/18  0332 08/18/18  1700 08/18/18  0346 08/17/18  2221   * 153* 151* 151*   POTASSIUM 4.0 3.8 3.9 4.0   CHLORIDE 119* 120* 121* 121*   CO2 27 28 24 23   BUN 44* 43* 39* 38*   CR 0.75 0.68 0.67 0.68   * 100* 110* 114*     Liver Function Tests    Recent Labs  Lab 08/19/18  0332 08/18/18  0346 08/17/18  0430 08/16/18  0402   * 390* 262* 71*   * 259* 123* 29   ALKPHOS 248* 296* 242* 200*   BILITOTAL 0.4 0.4 0.4 0.4   ALBUMIN 1.6* 1.6* 1.5* 1.6*   INR 1.26* 1.37* 1.28* 1.26*     Pancreatic Enzymes  No lab results found in last 7 days.  Coagulation Profile    Recent Labs  Lab 08/19/18  0332 08/18/18  0346 08/17/18  0430 08/16/18  0402   INR 1.26* 1.37* 1.28* 1.26*   PTT 47* 45* 50* 52*         5. RADIOLOGY:   No results found for this or any  previous visit (from the past 24 hour(s)).

## 2018-08-19 NOTE — PLAN OF CARE
Problem: Patient Care Overview  Goal: Plan of Care/Patient Progress Review  PT 4E: PT evaluation completed.   Discharge Planner PT   Patient plan for discharge: unable to discuss  Current status: pt orally intubated and pressure supporting throughout session, mod assist for supine to sit transfer, able to sit EOB with min assist for balance. Pt fatiguing while sitting EOB with increased work of breathing, RR up to mid 40s. Lift used to transfer pt up to recliner chair.  Barriers to return to prior living situation: O2 needs, assist for mobility, deconditioning   Recommendations for discharge: rehab  Rationale for recommendations: anticipate rehab when medically appropriate as pt far below baseline mobility needing further therapy and strengthening to return to PLOF.       Entered by: Nica Christiansen 08/19/2018 1:39 PM

## 2018-08-20 ENCOUNTER — APPOINTMENT (OUTPATIENT)
Dept: GENERAL RADIOLOGY | Facility: CLINIC | Age: 68
DRG: 219 | End: 2018-08-20
Attending: INTERNAL MEDICINE
Payer: MEDICARE

## 2018-08-20 ENCOUNTER — APPOINTMENT (OUTPATIENT)
Dept: CARDIOLOGY | Facility: CLINIC | Age: 68
DRG: 219 | End: 2018-08-20
Attending: INTERNAL MEDICINE
Payer: MEDICARE

## 2018-08-20 LAB
ALBUMIN SERPL-MCNC: 1.6 G/DL (ref 3.4–5)
ALP SERPL-CCNC: 220 U/L (ref 40–150)
ALT SERPL W P-5'-P-CCNC: 132 U/L (ref 0–70)
ANION GAP SERPL CALCULATED.3IONS-SCNC: 5 MMOL/L (ref 3–14)
APTT PPP: 48 SEC (ref 22–37)
AST SERPL W P-5'-P-CCNC: 108 U/L (ref 0–45)
BASE EXCESS BLDA CALC-SCNC: 6.4 MMOL/L
BILIRUB SERPL-MCNC: 0.3 MG/DL (ref 0.2–1.3)
BUN SERPL-MCNC: 55 MG/DL (ref 7–30)
CA-I BLD-MCNC: 4.8 MG/DL (ref 4.4–5.2)
CALCIUM SERPL-MCNC: 8.1 MG/DL (ref 8.5–10.1)
CHLORIDE SERPL-SCNC: 114 MMOL/L (ref 94–109)
CO2 SERPL-SCNC: 29 MMOL/L (ref 20–32)
CREAT SERPL-MCNC: 0.68 MG/DL (ref 0.66–1.25)
CRP SERPL-MCNC: 45 MG/L (ref 0–8)
ERYTHROCYTE [DISTWIDTH] IN BLOOD BY AUTOMATED COUNT: 19.6 % (ref 10–15)
GFR SERPL CREATININE-BSD FRML MDRD: >90 ML/MIN/1.7M2
GLUCOSE BLDC GLUCOMTR-MCNC: 104 MG/DL (ref 70–99)
GLUCOSE BLDC GLUCOMTR-MCNC: 106 MG/DL (ref 70–99)
GLUCOSE BLDC GLUCOMTR-MCNC: 112 MG/DL (ref 70–99)
GLUCOSE BLDC GLUCOMTR-MCNC: 112 MG/DL (ref 70–99)
GLUCOSE BLDC GLUCOMTR-MCNC: 116 MG/DL (ref 70–99)
GLUCOSE BLDC GLUCOMTR-MCNC: 116 MG/DL (ref 70–99)
GLUCOSE SERPL-MCNC: 108 MG/DL (ref 70–99)
HCO3 BLD-SCNC: 30 MMOL/L (ref 21–28)
HCT VFR BLD AUTO: 25.2 % (ref 40–53)
HGB BLD-MCNC: 8 G/DL (ref 13.3–17.7)
INR PPP: 1.16 (ref 0.86–1.14)
MAGNESIUM SERPL-MCNC: 2 MG/DL (ref 1.6–2.3)
MCH RBC QN AUTO: 29.1 PG (ref 26.5–33)
MCHC RBC AUTO-ENTMCNC: 31.7 G/DL (ref 31.5–36.5)
MCV RBC AUTO: 92 FL (ref 78–100)
O2/TOTAL GAS SETTING VFR VENT: 40 %
OXYHGB MFR BLD: 98 % (ref 92–100)
PCO2 BLD: 35 MM HG (ref 35–45)
PH BLD: 7.53 PH (ref 7.35–7.45)
PHOSPHATE SERPL-MCNC: 4.6 MG/DL (ref 2.5–4.5)
PLATELET # BLD AUTO: 506 10E9/L (ref 150–450)
PO2 BLD: 141 MM HG (ref 80–105)
POTASSIUM SERPL-SCNC: 3.6 MMOL/L (ref 3.4–5.3)
POTASSIUM SERPL-SCNC: 4 MMOL/L (ref 3.4–5.3)
PROT SERPL-MCNC: 5 G/DL (ref 6.8–8.8)
RBC # BLD AUTO: 2.75 10E12/L (ref 4.4–5.9)
SODIUM SERPL-SCNC: 148 MMOL/L (ref 133–144)
WBC # BLD AUTO: 8.1 10E9/L (ref 4–11)

## 2018-08-20 PROCEDURE — 20000004 ZZH R&B ICU UMMC

## 2018-08-20 PROCEDURE — 93308 TTE F-UP OR LMTD: CPT | Mod: 26 | Performed by: INTERNAL MEDICINE

## 2018-08-20 PROCEDURE — A9270 NON-COVERED ITEM OR SERVICE: HCPCS | Mod: GY | Performed by: STUDENT IN AN ORGANIZED HEALTH CARE EDUCATION/TRAINING PROGRAM

## 2018-08-20 PROCEDURE — A9270 NON-COVERED ITEM OR SERVICE: HCPCS | Mod: GY | Performed by: INTERNAL MEDICINE

## 2018-08-20 PROCEDURE — 25000132 ZZH RX MED GY IP 250 OP 250 PS 637: Mod: GY | Performed by: STUDENT IN AN ORGANIZED HEALTH CARE EDUCATION/TRAINING PROGRAM

## 2018-08-20 PROCEDURE — A9270 NON-COVERED ITEM OR SERVICE: HCPCS | Mod: GY | Performed by: THORACIC SURGERY (CARDIOTHORACIC VASCULAR SURGERY)

## 2018-08-20 PROCEDURE — 25000125 ZZHC RX 250: Performed by: THORACIC SURGERY (CARDIOTHORACIC VASCULAR SURGERY)

## 2018-08-20 PROCEDURE — 99291 CRITICAL CARE FIRST HOUR: CPT | Mod: GC | Performed by: ANESTHESIOLOGY

## 2018-08-20 PROCEDURE — 85027 COMPLETE CBC AUTOMATED: CPT | Performed by: INTERNAL MEDICINE

## 2018-08-20 PROCEDURE — 80053 COMPREHEN METABOLIC PANEL: CPT | Performed by: INTERNAL MEDICINE

## 2018-08-20 PROCEDURE — 94681 O2 UPTK CO2 OUTP % O2 XTRC: CPT

## 2018-08-20 PROCEDURE — 93321 DOPPLER ECHO F-UP/LMTD STD: CPT | Mod: 26 | Performed by: INTERNAL MEDICINE

## 2018-08-20 PROCEDURE — 84100 ASSAY OF PHOSPHORUS: CPT | Performed by: STUDENT IN AN ORGANIZED HEALTH CARE EDUCATION/TRAINING PROGRAM

## 2018-08-20 PROCEDURE — 93325 DOPPLER ECHO COLOR FLOW MAPG: CPT | Mod: 26 | Performed by: INTERNAL MEDICINE

## 2018-08-20 PROCEDURE — 00000146 ZZHCL STATISTIC GLUCOSE BY METER IP

## 2018-08-20 PROCEDURE — 85730 THROMBOPLASTIN TIME PARTIAL: CPT | Performed by: INTERNAL MEDICINE

## 2018-08-20 PROCEDURE — 83735 ASSAY OF MAGNESIUM: CPT | Performed by: INTERNAL MEDICINE

## 2018-08-20 PROCEDURE — 40000014 ZZH STATISTIC ARTERIAL MONITORING DAILY

## 2018-08-20 PROCEDURE — 71045 X-RAY EXAM CHEST 1 VIEW: CPT

## 2018-08-20 PROCEDURE — 25000128 H RX IP 250 OP 636: Performed by: THORACIC SURGERY (CARDIOTHORACIC VASCULAR SURGERY)

## 2018-08-20 PROCEDURE — 25000128 H RX IP 250 OP 636: Performed by: STUDENT IN AN ORGANIZED HEALTH CARE EDUCATION/TRAINING PROGRAM

## 2018-08-20 PROCEDURE — 85610 PROTHROMBIN TIME: CPT | Performed by: INTERNAL MEDICINE

## 2018-08-20 PROCEDURE — 27210437 ZZH NUTRITION PRODUCT SEMIELEM INTERMED LITER

## 2018-08-20 PROCEDURE — 94003 VENT MGMT INPAT SUBQ DAY: CPT

## 2018-08-20 PROCEDURE — 84100 ASSAY OF PHOSPHORUS: CPT | Performed by: INTERNAL MEDICINE

## 2018-08-20 PROCEDURE — 84132 ASSAY OF SERUM POTASSIUM: CPT | Performed by: THORACIC SURGERY (CARDIOTHORACIC VASCULAR SURGERY)

## 2018-08-20 PROCEDURE — 27210429 ZZH NUTRITION PRODUCT INTERMEDIATE LITER

## 2018-08-20 PROCEDURE — 82330 ASSAY OF CALCIUM: CPT | Performed by: STUDENT IN AN ORGANIZED HEALTH CARE EDUCATION/TRAINING PROGRAM

## 2018-08-20 PROCEDURE — 25000132 ZZH RX MED GY IP 250 OP 250 PS 637: Mod: GY | Performed by: THORACIC SURGERY (CARDIOTHORACIC VASCULAR SURGERY)

## 2018-08-20 PROCEDURE — 93308 TTE F-UP OR LMTD: CPT

## 2018-08-20 PROCEDURE — 86140 C-REACTIVE PROTEIN: CPT | Performed by: INTERNAL MEDICINE

## 2018-08-20 PROCEDURE — 40000275 ZZH STATISTIC RCP TIME EA 10 MIN

## 2018-08-20 PROCEDURE — 82805 BLOOD GASES W/O2 SATURATION: CPT | Performed by: STUDENT IN AN ORGANIZED HEALTH CARE EDUCATION/TRAINING PROGRAM

## 2018-08-20 PROCEDURE — 25000132 ZZH RX MED GY IP 250 OP 250 PS 637: Mod: GY | Performed by: INTERNAL MEDICINE

## 2018-08-20 RX ORDER — AMINO ACIDS/PROTEIN HYDROLYS 11G-40/45
1 LIQUID IN PACKET (ML) ORAL 3 TIMES DAILY
Status: DISCONTINUED | OUTPATIENT
Start: 2018-08-20 | End: 2018-08-29

## 2018-08-20 RX ORDER — FUROSEMIDE 10 MG/ML
40 INJECTION INTRAMUSCULAR; INTRAVENOUS EVERY 8 HOURS
Status: DISCONTINUED | OUTPATIENT
Start: 2018-08-20 | End: 2018-08-20

## 2018-08-20 RX ORDER — FUROSEMIDE 10 MG/ML
40 INJECTION INTRAMUSCULAR; INTRAVENOUS EVERY 8 HOURS
Status: DISCONTINUED | OUTPATIENT
Start: 2018-08-20 | End: 2018-08-22

## 2018-08-20 RX ADMIN — POTASSIUM CHLORIDE 20 MEQ: 1.5 POWDER, FOR SOLUTION ORAL at 04:26

## 2018-08-20 RX ADMIN — CARBOXYMETHYLCELLULOSE SODIUM 1 DROP: 5 SOLUTION/ DROPS OPHTHALMIC at 17:48

## 2018-08-20 RX ADMIN — Medication 1 PACKET: at 19:37

## 2018-08-20 RX ADMIN — POTASSIUM & SODIUM PHOSPHATES POWDER PACK 280-160-250 MG 1 PACKET: 280-160-250 PACK at 11:16

## 2018-08-20 RX ADMIN — CARBOXYMETHYLCELLULOSE SODIUM 1 DROP: 5 SOLUTION/ DROPS OPHTHALMIC at 20:13

## 2018-08-20 RX ADMIN — FUROSEMIDE 40 MG: 10 INJECTION, SOLUTION INTRAVENOUS at 18:32

## 2018-08-20 RX ADMIN — Medication 2 G: at 06:04

## 2018-08-20 RX ADMIN — QUETIAPINE FUMARATE 25 MG: 25 TABLET, FILM COATED ORAL at 07:30

## 2018-08-20 RX ADMIN — QUETIAPINE 50 MG: 50 TABLET ORAL at 22:14

## 2018-08-20 RX ADMIN — MULTIVITAMIN 15 ML: LIQUID ORAL at 07:31

## 2018-08-20 RX ADMIN — LEVOTHYROXINE SODIUM 75 MCG: 75 TABLET ORAL at 07:30

## 2018-08-20 RX ADMIN — ASPIRIN 81 MG CHEWABLE TABLET 81 MG: 81 TABLET CHEWABLE at 07:30

## 2018-08-20 RX ADMIN — POTASSIUM CHLORIDE 40 MEQ: 1.5 POWDER, FOR SOLUTION ORAL at 22:20

## 2018-08-20 RX ADMIN — Medication 1000 MG: at 07:45

## 2018-08-20 RX ADMIN — PANTOPRAZOLE SODIUM 40 MG: 40 TABLET, DELAYED RELEASE ORAL at 07:31

## 2018-08-20 RX ADMIN — POTASSIUM & SODIUM PHOSPHATES POWDER PACK 280-160-250 MG 1 PACKET: 280-160-250 PACK at 07:31

## 2018-08-20 RX ADMIN — Medication 1 PACKET: at 17:32

## 2018-08-20 RX ADMIN — FUROSEMIDE 40 MG: 10 INJECTION, SOLUTION INTRAVENOUS at 11:16

## 2018-08-20 RX ADMIN — LEVETIRACETAM 750 MG: 100 SOLUTION ORAL at 06:04

## 2018-08-20 RX ADMIN — Medication 1000 MG: at 19:37

## 2018-08-20 RX ADMIN — HEPARIN SODIUM 400 UNITS/HR: 10000 INJECTION, SOLUTION INTRAVENOUS at 11:31

## 2018-08-20 RX ADMIN — CLOPIDOGREL 75 MG: 75 TABLET, FILM COATED ORAL at 07:31

## 2018-08-20 RX ADMIN — ATORVASTATIN CALCIUM 80 MG: 80 TABLET, FILM COATED ORAL at 19:37

## 2018-08-20 ASSESSMENT — ACTIVITIES OF DAILY LIVING (ADL)
ADLS_ACUITY_SCORE: 13

## 2018-08-20 NOTE — PROGRESS NOTES
Pressure Support Trial Summary    Settings:  Ventilation Mode: CMV/AC  (Continuous Mandatory Ventilation/ Assist Control)  PEEP (cm H2O): 5 cmH2O  Pressure Support (cm H2O): 7 cmH2O  Oxygen Concentration (%): 30 %    Patient Parameters:  Heart Rate: 108  BP: 86/63  BP - Mean: 69  Spontaneous Respiratory Rate: 36 breaths/min  Spontaneous Tidal Volume: 0.33 mL  Spontaneous Minute Ventilation: 11.8 mL  RSBI (calculation): 109.09  RSBI trend: steady  Breathing Trial Total Time   (min): 142 minutes  Weaning trial discontinued due to:: End of designated trial.  See RT Accordian for complete documentation.    Based on pressure support trial results and RT assessment, recommend extubation following physician assessment.    Mike Schultz, RRT  8/20/2018 10:57 AM

## 2018-08-20 NOTE — PLAN OF CARE
Problem: Patient Care Overview  Goal: Plan of Care/Patient Progress Review  OT:  Pt with another care provider during attempt, will reschedule.

## 2018-08-20 NOTE — PLAN OF CARE
Problem: Patient Care Overview  Goal: Plan of Care/Patient Progress Review  Outcome: No Change  No significant changes overnight. Potasium and Magnesium replaced. Heparin flat rate continued. Plan to PS again today, small amount thick secretions from ET tube. ST, no pacing noted, wired maintained. Chest tube sites noted to be reddened with dressing change. Feels fullness with medications and flushes, OG output red/brown, free water continued, sodium slowly trending down. Ongoing warm feeling, afebrile. Denies pain. Patient will continue to be monitored and assessed. Please see MAR, flow sheets, and remainder of chart for further details.

## 2018-08-20 NOTE — PROGRESS NOTES
CLINICAL NUTRITION SERVICES - REASSESSMENT NOTE     Nutrition Prescription    RECOMMENDATIONS FOR MDs/PROVIDERS TO ORDER:  Discontinue Neutraphos packets  Free water per team pending Na trends    Malnutrition Status:    Severe malnutrition in the context of acute illness    Recommendations already ordered by Registered Dietitian (RD):  1. Change to Nutren 1.5 @ 60 mL/hr to provide 2160 kcals, 98 g PRO, 1094 mL H2O, 253 g CHO and no fiber daily.    2. Add 1 pkt ProSource TID (120 kcal, 33 g PRO) for total 2280 kcal (30 kcal/kg) and 131 g PRO (1.7 g/kg)    3. Ordered metabolic cart study.     Future/Additional Recommendations:  Adjust TF provisions pending results of metabolic cart study.      EVALUATION OF THE PROGRESS TOWARD GOALS   Diet: NPO, intubated  Enteral Access: NDT placed 8/13 by RD  Nutrition Support: Impact Peptide @ 60 ml/hr (1440 ml/day) to provide 2160 kcals (28 kcal/kg/day), 135 g PRO (1.8 g/kg/day), 1109 ml free H2O, 92 g Fat (50% from MCTs), 202 g CHO and no Fiber daily.  Intake: TF started 8/13 and advanced to goal 8/15. Average intakes over past 5 days = 2088 kcal (27 kcal/kg) and 131 g PRO (1.7 g/kg). This met 100% of estimated needs.       NEW FINDINGS   -Resp/CV: remains on ventilator.   -GI: No BM since admission. On bowel regimen.   -Skin: Lee 16. On certavite.  -Labs/Meds: on 1 pkt neutraphos QID for previous hypophosphatemia - Phos today was 4.6; Na 148, on 50 mL/hr free water flushes. CRP 45 (down from 270 on 8/13). Likely appropriate to transition off immune-modulating formula at this time.     MALNUTRITION  % Intake: No decreased intake noted over past 5 days  % Weight Loss: None noted   Subcutaneous Fat Loss: Facial region, Upper arm and Lower arm: moderate  Muscle Loss: Temporal, Scapular bone, Thoracic region (clavicle, acromium bone, deltoid, trapezius, pectoral), Upper arm (bicep, tricep), Lower arm  (forearm) and Dorsal hand: severe  Fluid Accumulation/Edema: Trace (does not  meet criteria)  Malnutrition Diagnosis: Severe malnutrition in the context of acute illness    Previous Goals   Total avg nutritional intake to meet a minimum of 25 kcal/kg and 1.5 g PRO/kg daily (per dosing wt 77 kg).  Evaluation: met    Previous Nutrition Diagnosis  Inadequate protein-energy intake related to poor appetite, now NPO/vented as evidenced by eating 25-75% of meals with documented poor appetite over past week prior to surgery, now NPO/vented x 2 days with no TF yet started    Evaluation:  resolved    CURRENT NUTRITION DIAGNOSIS  Predicted inadequate nutrient intake (kcal/PRO) related to reliance on TF to meet needs while intubated AEB potential for changes to metabolic needs, interruptions to TF infusion    INTERVENTIONS  Implementation  Collaboration with other providers - ICU rounds, requested MD to discontinue neutraphos  Enteral Nutrition - change to Nutren 1.5 formula   Ordered metabolic cart study    Goals  Total avg nutritional intake to meet a minimum of 25 kcal/kg (or needs specified by indirect calorimetry) and 1.5 g PRO/kg daily (per dosing wt 77 kg).    Monitoring/Evaluation  Progress toward goals will be monitored and evaluated per protocol.    Elizabeth Berry RD, LD, CNSC  CVICU Dietitian  Pager: [phone]

## 2018-08-20 NOTE — PROGRESS NOTES
Pt pressure supported twice on pressure support of 7 PEEP of 5, lasted a little over 2 hours both times, respirations increased to 40 and TV dipped to 200 after 2 hours.  Pt up to chair for a couple of hours.  Afebrile.  Sinus tach 100-110.  Pt lethargic after am dose of Seroquel.  MAPs labile around 65 in the am, lasix given after MAP increased, pt responding to lasix.

## 2018-08-20 NOTE — PLAN OF CARE
Problem: Patient Care Overview  Goal: Plan of Care/Patient Progress Review  PT 4E: Cancel. Pt working on extubation soon. Will reschedule tomorrow.

## 2018-08-20 NOTE — PROGRESS NOTES
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 Underwent repair of posterior VSD and TVR with bioprosthetic valve on 8/10, and was kept with open chest due to coagulopathy and RV dysfunction. Chest closed 8/13. IABP removed 8/14.     TODAY'S PROGRESS:   -continue pressure support trials as tolerated, wean from 10/5 to 7/5 as tolerated  -Echo to evaluate EF  -Discuss with CVTS on removal of mediastinal chest tube  -Discontinue PRN ativan  -Continue heparin straight rate for now at 400 units/hr      PLAN:  Neuro/ pain/ sedation:  #Post operative sedation  #Questionable seizure activity (negative follow up EEG 8/16)  - PRN Quetiapine 50mg for sleep at night  - Monitor neurological status. Notify the MD for any acute changes in exam.  - Discussed with neurology:    -Continue keppra 750mg BID for now    -Follow up with neurology as outpatient in 3 months    -Repeat EEG on 8/16 without seizure activity    -CT head if acute neuro changes    Pulmonary care:  #Post operative mechanical ventilation   - Intubated/sedated, pressure support trials as able  - pressure support trials ongoing    Cardiovascular:  #Post infarction 2.5cm infraseptal VSD  #s/p HUSSEIN to distal RCA and proximal LAD  #STEMI, cardiogenic shock  #Dilated RV   #Pulmonary HTN  #IABP  #s/p repair of posterior VSD and TVR with bioprosthetic valve, s/p Chest closure 8/13  #Atrial flutter  - Cards 2 reconsulted, appreciate assist   - Monitor hemodynamic status. Pacer at VVI at 60  - Infusions: Weaned off epi and dobutamine 8/16  - ECHO from 8/8 prior to surgery: Inferior wall akinesis with inferoseptal muscular VAD. Mild to moderate right ventricular dilation is present. Global  right ventricular function is moderately reduced.   -Follow up ECHO 8/20  - ASA 81mg  - EP consult regarding underlying atrial flutter. Appreciate recs.    Heme:  #Anticoagulation s/p Drug Eluting Stent placement  #Thrombocythemia  - Cangrelor discontinued, heparin 400 U started  - 81mg ASA  - Plavix loaded 8/15 (75mg daily thereafter)  - Hydroxyurea 1g BID, hematology consulted and following    GI care:  #Transaminitis   - NJ placed 8/13, TF at goal  - OG to LIS  - Rectal tube placed 8/15    Fluids/ Electrolytes/ Nutrition:   - mIVF for IV fluid hydration  - Nutri-phos 4x/day for lyte replacement  - TFs with [id number]  - Wiggins    Renal/ Fluid Balance:    #Acute Kidney injury  - Urine output stable overnight, Creatinine reassuring  - Will continue to monitor intake and output via wiggins  - Diuresis with 40mg TID - net negative goal ~1 liter    Endocrine:  #Stress hyperglycemia  - off Insulin gtt, on ISS    ID/ Antibiotics:  - Monitor fever curve, trend WBC    Prophylaxis:    - Mechanical prophylaxis for DVT.   - ASA, plavix, heparin straight rate  - Protonix    Lines/ tubes/ drains:  - ETT, arterial line, PIV, CVC    Disposition:  - [facility] ICU.    Patient seen, findings and plan discussed with [facility] ICU staff Dr. Terrence Hawkins MD  8/20/2018 10:44 AM  Anesthesia Resident  PGY4/CA-3      ====================================    SUBJECTIVE:   Alert, interactive, tolerating longer intervals on pressure support        OBJECTIVE:   1. VITAL SIGNS:   Temp:  [96.8  F (36  C)-98.3  F (36.8  C)] 96.8  F (36  C)  Heart Rate:  [102-113] 111  Resp:  [17-35] 20  MAP:  [60 mmHg-92 mmHg] 68 mmHg  Arterial Line BP: ()/(51-86) 85/58  FiO2 (%):  [30 %-40 %] 30 %  SpO2:  [95 %-100 %] 100 %  Ventilation Mode: CMV/AC  (Continuous Mandatory Ventilation/ Assist Control)  FiO2 (%): 30 %  Rate Set (breaths/minute): 14 breaths/min  Tidal Volume Set (mL): 550 mL  PEEP (cm H2O): 5 cmH2O  Pressure Support (cm H2O): 7 cmH2O  Oxygen  Concentration (%): 30 %  Resp: 20    2. INTAKE/ OUTPUT:   I/O last 3 completed shifts:  In: 3333 [I.V.:168; NG/GT:1725]  Out: 3245 [Urine:2775; Emesis/NG output:300; Chest Tube:170]    3. PHYSICAL EXAMINATION:   General: Intubated, comfortable on minimal sedation  Neuro: Off sedation, opens eyes to voice, follows commands reliably  Resp: Mechanical ventilation, intubated, coarse breath sounds  CV: Normal rate with underlying atrial flutter  Abdomen: Soft, dressings in place  Incisions: Dressing over chest, chest tubes in place, closed chest  Extremities: warm and well perfused    4. INVESTIGATIONS:   Arterial Blood Gases     Recent Labs  Lab 08/20/18  0315 08/16/18  0402 08/15/18  1331 08/15/18  0340   PH 7.53* 7.44 7.40 7.41   PCO2 35 30* 27* 32*   PO2 141* 137* 159* 145*   HCO3 30* 20* 17* 21     Complete Blood Count     Recent Labs  Lab 08/20/18  0315 08/17/18  2221 08/17/18  0430 08/16/18  0402 08/15/18  1614 08/15/18  0339   WBC 8.1 4.9  --   --  12.0* 12.9*   HGB 8.0* 8.4* 9.1* 8.8* 9.0* 8.9*   * 444  --   --  442 427     Basic Metabolic Panel    Recent Labs  Lab 08/20/18  0315 08/19/18 2000 08/19/18  0332 08/18/18  1700   * 151* 152* 153*   POTASSIUM 4.0 3.8 4.0 3.8   CHLORIDE 114* 118* 119* 120*   CO2 29 27 27 28   BUN 55* 46* 44* 43*   CR 0.68 0.61* 0.75 0.68   * 108* 108* 100*     Liver Function Tests    Recent Labs  Lab 08/20/18  0315 08/19/18  0332 08/18/18  0346 08/17/18  0430   * 188* 390* 262*   * 183* 259* 123*   ALKPHOS 220* 248* 296* 242*   BILITOTAL 0.3 0.4 0.4 0.4   ALBUMIN 1.6* 1.6* 1.6* 1.5*   INR 1.16* 1.26* 1.37* 1.28*     Pancreatic Enzymes  No lab results found in last 7 days.  Coagulation Profile    Recent Labs  Lab 08/20/18  0315 08/19/18  0332 08/18/18  0346 08/17/18  0430   INR 1.16* 1.26* 1.37* 1.28*   PTT 48* 47* 45* 50*         5. RADIOLOGY:   Recent Results (from the past 24 hour(s))   XR Chest Port 1 View    Narrative    Exam: XR CHEST PORT 1 VW,  8/20/2018 8:18 AM    Indication: cvicu, intubated, working towards extubation;     Comparison: Chest x-ray 8/16/2018    Findings:   Portable AP radiograph of the chest at 50 degrees. Post surgical  changes of VSD repair, including median sternotomy wires and  mediastinal surgical clips. Interval removal of the right IJ Fulton-Owen  catheter. Endotracheal tube tip projects 4.6 cm above the audelia.  Enteric tube courses below the diaphragm, its tip projects beyond the  field-of-view.  Bilateral chest tubes are stable in position. The  cardiomediastinal silhouette is borderline enlarged. The pulmonary  vasculature is engorged. No pneumothorax. Small bilateral pleural  effusions, left greater than right. Streaky bibasilar opacities. The  visualized upper abdomen appears unremarkable.      Impression    Impression:   1. Postsurgical changes of the ventricular septal defect repair, with  bilateral chest tubes in stable position. No pneumothorax.  2.  Small bilateral pleural effusions with overlying streaky  opacities, likely atelectasis.  3. Stable enlargement of the cardiac silhouette and pulmonary vascular  congestion.    I have personally reviewed the examination and initial interpretation  and I agree with the findings.    TARAN LOCKE MD

## 2018-08-20 NOTE — PROGRESS NOTES
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 Underwent repair of posterior VSD and TVR with bioprosthetic valve on 8/10, and was kept with open chest due to coagulopathy and RV dysfunction. Chest closed 8/13. IABP removed 8/14.     TODAY'S PROGRESS:   -continue pressure support trials as tolerated, wean from 10/5 to 7/5 as tolerated  -Echo to evaluate EF  -Discuss with CVTS on removal of mediastinal chest tube  -Discontinue PRN ativan  -Continue heparin straight rate for now at 400 units/hr      PLAN:  Neuro/ pain/ sedation:  #Post operative sedation  #Questionable seizure activity (negative follow up EEG 8/16)  - PRN Quetiapine 50mg for sleep at night  - Monitor neurological status. Notify the MD for any acute changes in exam.  - Discussed with neurology:    -Continue keppra 750mg BID for now    -Follow up with neurology as outpatient in 3 months    -Repeat EEG on 8/16 without seizure activity    -CT head if acute neuro changes    Pulmonary care:  #Post operative mechanical ventilation   - Intubated/sedated, pressure support trials as able  - pressure support trials ongoing    Cardiovascular:  #Post infarction 2.5cm infraseptal VSD  #s/p HUSSEIN to distal RCA and proximal LAD  #STEMI, cardiogenic shock  #Dilated RV   #Pulmonary HTN  #IABP  #s/p repair of posterior VSD and TVR with bioprosthetic valve, s/p Chest closure 8/13  #Atrial flutter  - Cards 2 reconsulted, appreciate assist   - Monitor hemodynamic status. Pacer at VVI at 60  - Infusions: Weaned off epi and dobutamine 8/16  - ECHO from 8/8 prior to surgery: Inferior wall akinesis with inferoseptal muscular VAD. Mild to moderate right ventricular dilation is present. Global  right ventricular function is moderately reduced.   -Follow up ECHO 8/20  - ASA 81mg  - EP consult regarding underlying atrial flutter. Appreciate recs.    Heme:  #Anticoagulation s/p Drug Eluting Stent placement  #Thrombocythemia  - Cangrelor discontinued, heparin 400 U started  - 81mg ASA  - Plavix loaded 8/15 (75mg daily thereafter)  - Hydroxyurea 1g BID, hematology consulted and following    GI care:  #Transaminitis   - NJ placed 8/13, TF at goal  - OG to LIS  - Rectal tube placed 8/15    Fluids/ Electrolytes/ Nutrition:   - mIVF for IV fluid hydration  - Nutri-phos 4x/day for lyte replacement  - TFs with [id number]  - Wiggins    Renal/ Fluid Balance:    #Acute Kidney injury  - Urine output stable overnight, Creatinine reassuring  - Will continue to monitor intake and output via wiggins  - Diuresis with 40mg TID - net negative goal ~1 liter    Endocrine:  #Stress hyperglycemia  - off Insulin gtt, on ISS    ID/ Antibiotics:  - Monitor fever curve, trend WBC    Prophylaxis:    - Mechanical prophylaxis for DVT.   - ASA, plavix, heparin straight rate  - Protonix    Lines/ tubes/ drains:  - ETT, arterial line, PIV, CVC    Disposition:  - CV ICU.    Patient seen, findings and plan discussed with CVTS    Brad Hawkins MD  8/20/2018 10:44 AM  Anesthesia Resident  PGY4/CA-3      ====================================    SUBJECTIVE:   Alert, interactive, tolerating longer intervals on pressure support        OBJECTIVE:   1. VITAL SIGNS:   Temp:  [96.8  F (36  C)-98.3  F (36.8  C)] 96.8  F (36  C)  Heart Rate:  [102-113] 111  Resp:  [17-35] 20  MAP:  [60 mmHg-92 mmHg] 68 mmHg  Arterial Line BP: ()/(51-86) 85/58  FiO2 (%):  [30 %-40 %] 30 %  SpO2:  [95 %-100 %] 100 %  Ventilation Mode: CMV/AC  (Continuous Mandatory Ventilation/ Assist Control)  FiO2 (%): 30 %  Rate Set (breaths/minute): 14 breaths/min  Tidal Volume Set (mL): 550 mL  PEEP (cm H2O): 5 cmH2O  Pressure Support (cm H2O): 7 cmH2O  Oxygen Concentration (%): 30  %  Resp: 20    2. INTAKE/ OUTPUT:   I/O last 3 completed shifts:  In: 3333 [I.V.:168; NG/GT:1725]  Out: 3245 [Urine:2775; Emesis/NG output:300; Chest Tube:170]    3. PHYSICAL EXAMINATION:   General: Intubated, comfortable on minimal sedation  Neuro: Off sedation, opens eyes to voice, follows commands reliably  Resp: Mechanical ventilation, intubated, coarse breath sounds  CV: Normal rate with underlying atrial flutter  Abdomen: Soft, dressings in place  Incisions: Dressing over chest, chest tubes in place, closed chest  Extremities: warm and well perfused    4. INVESTIGATIONS:   Arterial Blood Gases     Recent Labs  Lab 08/20/18  0315 08/16/18  0402 08/15/18  1331 08/15/18  0340   PH 7.53* 7.44 7.40 7.41   PCO2 35 30* 27* 32*   PO2 141* 137* 159* 145*   HCO3 30* 20* 17* 21     Complete Blood Count     Recent Labs  Lab 08/20/18  0315 08/17/18  2221 08/17/18  0430 08/16/18  0402 08/15/18  1614 08/15/18  0339   WBC 8.1 4.9  --   --  12.0* 12.9*   HGB 8.0* 8.4* 9.1* 8.8* 9.0* 8.9*   * 444  --   --  442 427     Basic Metabolic Panel    Recent Labs  Lab 08/20/18 0315 08/19/18 2000 08/19/18  0332 08/18/18  1700   * 151* 152* 153*   POTASSIUM 4.0 3.8 4.0 3.8   CHLORIDE 114* 118* 119* 120*   CO2 29 27 27 28   BUN 55* 46* 44* 43*   CR 0.68 0.61* 0.75 0.68   * 108* 108* 100*     Liver Function Tests    Recent Labs  Lab 08/20/18 0315 08/19/18  0332 08/18/18  0346 08/17/18  0430   * 188* 390* 262*   * 183* 259* 123*   ALKPHOS 220* 248* 296* 242*   BILITOTAL 0.3 0.4 0.4 0.4   ALBUMIN 1.6* 1.6* 1.6* 1.5*   INR 1.16* 1.26* 1.37* 1.28*     Pancreatic Enzymes  No lab results found in last 7 days.  Coagulation Profile    Recent Labs  Lab 08/20/18  0315 08/19/18  0332 08/18/18  0346 08/17/18  0430   INR 1.16* 1.26* 1.37* 1.28*   PTT 48* 47* 45* 50*         5. RADIOLOGY:   Recent Results (from the past 24 hour(s))   XR Chest Port 1 View    Narrative    Exam: XR CHEST PORT 1 VW, 8/20/2018 8:18  AM    Indication: cvicu, intubated, working towards extubation;     Comparison: Chest x-ray 8/16/2018    Findings:   Portable AP radiograph of the chest at 50 degrees. Post surgical  changes of VSD repair, including median sternotomy wires and  mediastinal surgical clips. Interval removal of the right IJ Wayne-Owen  catheter. Endotracheal tube tip projects 4.6 cm above the audelia.  Enteric tube courses below the diaphragm, its tip projects beyond the  field-of-view.  Bilateral chest tubes are stable in position. The  cardiomediastinal silhouette is borderline enlarged. The pulmonary  vasculature is engorged. No pneumothorax. Small bilateral pleural  effusions, left greater than right. Streaky bibasilar opacities. The  visualized upper abdomen appears unremarkable.      Impression    Impression:   1. Postsurgical changes of the ventricular septal defect repair, with  bilateral chest tubes in stable position. No pneumothorax.  2.  Small bilateral pleural effusions with overlying streaky  opacities, likely atelectasis.  3. Stable enlargement of the cardiac silhouette and pulmonary vascular  congestion.    I have personally reviewed the examination and initial interpretation  and I agree with the findings.    TARAN LOCKE MD

## 2018-08-21 ENCOUNTER — APPOINTMENT (OUTPATIENT)
Dept: GENERAL RADIOLOGY | Facility: CLINIC | Age: 68
DRG: 219 | End: 2018-08-21
Attending: INTERNAL MEDICINE
Payer: MEDICARE

## 2018-08-21 ENCOUNTER — APPOINTMENT (OUTPATIENT)
Dept: OCCUPATIONAL THERAPY | Facility: CLINIC | Age: 68
DRG: 219 | End: 2018-08-21
Attending: INTERNAL MEDICINE
Payer: MEDICARE

## 2018-08-21 ENCOUNTER — APPOINTMENT (OUTPATIENT)
Dept: PHYSICAL THERAPY | Facility: CLINIC | Age: 68
DRG: 219 | End: 2018-08-21
Attending: INTERNAL MEDICINE
Payer: MEDICARE

## 2018-08-21 LAB
ABO + RH BLD: NORMAL
ABO + RH BLD: NORMAL
ALBUMIN SERPL-MCNC: 1.6 G/DL (ref 3.4–5)
ALP SERPL-CCNC: 196 U/L (ref 40–150)
ALT SERPL W P-5'-P-CCNC: 106 U/L (ref 0–70)
ANION GAP SERPL CALCULATED.3IONS-SCNC: 7 MMOL/L (ref 3–14)
ANION GAP SERPL CALCULATED.3IONS-SCNC: 9 MMOL/L (ref 3–14)
APTT PPP: 46 SEC (ref 22–37)
AST SERPL W P-5'-P-CCNC: 86 U/L (ref 0–45)
BASE EXCESS BLDA CALC-SCNC: 3.1 MMOL/L
BASE EXCESS BLDA CALC-SCNC: 3.2 MMOL/L
BASE EXCESS BLDA CALC-SCNC: 3.9 MMOL/L
BASE EXCESS BLDA CALC-SCNC: 6.4 MMOL/L
BILIRUB SERPL-MCNC: 0.2 MG/DL (ref 0.2–1.3)
BLD GP AB SCN SERPL QL: NORMAL
BLD PROD TYP BPU: NORMAL
BLD PROD TYP BPU: NORMAL
BLD UNIT ID BPU: 0
BLOOD BANK CMNT PATIENT-IMP: NORMAL
BLOOD PRODUCT CODE: NORMAL
BPU ID: NORMAL
BUN SERPL-MCNC: 64 MG/DL (ref 7–30)
BUN SERPL-MCNC: 66 MG/DL (ref 7–30)
CA-I BLD-MCNC: 4.7 MG/DL (ref 4.4–5.2)
CALCIUM SERPL-MCNC: 8 MG/DL (ref 8.5–10.1)
CALCIUM SERPL-MCNC: 8.4 MG/DL (ref 8.5–10.1)
CHLORIDE SERPL-SCNC: 110 MMOL/L (ref 94–109)
CHLORIDE SERPL-SCNC: 112 MMOL/L (ref 94–109)
CO2 SERPL-SCNC: 26 MMOL/L (ref 20–32)
CO2 SERPL-SCNC: 28 MMOL/L (ref 20–32)
CREAT SERPL-MCNC: 0.71 MG/DL (ref 0.66–1.25)
CREAT SERPL-MCNC: 0.74 MG/DL (ref 0.66–1.25)
ERYTHROCYTE [DISTWIDTH] IN BLOOD BY AUTOMATED COUNT: 20.4 % (ref 10–15)
ERYTHROCYTE [DISTWIDTH] IN BLOOD BY AUTOMATED COUNT: 20.9 % (ref 10–15)
ERYTHROCYTE [DISTWIDTH] IN BLOOD BY AUTOMATED COUNT: 21.9 % (ref 10–15)
GASTROCULT GAST QL: POSITIVE
GFR SERPL CREATININE-BSD FRML MDRD: >90 ML/MIN/1.7M2
GFR SERPL CREATININE-BSD FRML MDRD: >90 ML/MIN/1.7M2
GLUCOSE BLDC GLUCOMTR-MCNC: 106 MG/DL (ref 70–99)
GLUCOSE BLDC GLUCOMTR-MCNC: 108 MG/DL (ref 70–99)
GLUCOSE BLDC GLUCOMTR-MCNC: 110 MG/DL (ref 70–99)
GLUCOSE BLDC GLUCOMTR-MCNC: 112 MG/DL (ref 70–99)
GLUCOSE BLDC GLUCOMTR-MCNC: 115 MG/DL (ref 70–99)
GLUCOSE BLDC GLUCOMTR-MCNC: 116 MG/DL (ref 70–99)
GLUCOSE SERPL-MCNC: 108 MG/DL (ref 70–99)
GLUCOSE SERPL-MCNC: 113 MG/DL (ref 70–99)
HCO3 BLD-SCNC: 26 MMOL/L (ref 21–28)
HCO3 BLD-SCNC: 27 MMOL/L (ref 21–28)
HCO3 BLD-SCNC: 28 MMOL/L (ref 21–28)
HCO3 BLD-SCNC: 29 MMOL/L (ref 21–28)
HCT VFR BLD AUTO: 22.9 % (ref 40–53)
HCT VFR BLD AUTO: 25.5 % (ref 40–53)
HCT VFR BLD AUTO: 26.4 % (ref 40–53)
HGB BLD-MCNC: 7.2 G/DL (ref 13.3–17.7)
HGB BLD-MCNC: 7.7 G/DL (ref 13.3–17.7)
HGB BLD-MCNC: 8.1 G/DL (ref 13.3–17.7)
HGB BLD-MCNC: 8.1 G/DL (ref 13.3–17.7)
INR PPP: 1.21 (ref 0.86–1.14)
LMWH PPP CHRO-ACNC: 0.32 IU/ML
MAGNESIUM SERPL-MCNC: 2 MG/DL (ref 1.6–2.3)
MAGNESIUM SERPL-MCNC: 2.3 MG/DL (ref 1.6–2.3)
MCH RBC QN AUTO: 28.8 PG (ref 26.5–33)
MCH RBC QN AUTO: 29 PG (ref 26.5–33)
MCH RBC QN AUTO: 29.9 PG (ref 26.5–33)
MCHC RBC AUTO-ENTMCNC: 30.7 G/DL (ref 31.5–36.5)
MCHC RBC AUTO-ENTMCNC: 31.4 G/DL (ref 31.5–36.5)
MCHC RBC AUTO-ENTMCNC: 31.8 G/DL (ref 31.5–36.5)
MCV RBC AUTO: 91 FL (ref 78–100)
MCV RBC AUTO: 94 FL (ref 78–100)
MCV RBC AUTO: 95 FL (ref 78–100)
NUM BPU REQUESTED: 1
O2/TOTAL GAS SETTING VFR VENT: 21 %
O2/TOTAL GAS SETTING VFR VENT: 30 %
O2/TOTAL GAS SETTING VFR VENT: 30 %
O2/TOTAL GAS SETTING VFR VENT: ABNORMAL %
OXYHGB MFR BLD: 93 % (ref 92–100)
OXYHGB MFR BLD: 95 % (ref 92–100)
OXYHGB MFR BLD: 97 % (ref 92–100)
PCO2 BLD: 33 MM HG (ref 35–45)
PCO2 BLD: 35 MM HG (ref 35–45)
PCO2 BLD: 36 MM HG (ref 35–45)
PCO2 BLD: 37 MM HG (ref 35–45)
PH BLD: 7.48 PH (ref 7.35–7.45)
PH BLD: 7.48 PH (ref 7.35–7.45)
PH BLD: 7.51 PH (ref 7.35–7.45)
PH BLD: 7.54 PH (ref 7.35–7.45)
PH GAST: 3 PH
PHOSPHATE SERPL-MCNC: 4.6 MG/DL (ref 2.5–4.5)
PHOSPHATE SERPL-MCNC: 4.9 MG/DL (ref 2.5–4.5)
PLATELET # BLD AUTO: 460 10E9/L (ref 150–450)
PLATELET # BLD AUTO: 461 10E9/L (ref 150–450)
PLATELET # BLD AUTO: 469 10E9/L (ref 150–450)
PO2 BLD: 109 MM HG (ref 80–105)
PO2 BLD: 117 MM HG (ref 80–105)
PO2 BLD: 70 MM HG (ref 80–105)
PO2 BLD: 84 MM HG (ref 80–105)
POTASSIUM SERPL-SCNC: 3.4 MMOL/L (ref 3.4–5.3)
POTASSIUM SERPL-SCNC: 3.7 MMOL/L (ref 3.4–5.3)
POTASSIUM SERPL-SCNC: 4.3 MMOL/L (ref 3.4–5.3)
PROT SERPL-MCNC: 5 G/DL (ref 6.8–8.8)
RBC # BLD AUTO: 2.41 10E12/L (ref 4.4–5.9)
RBC # BLD AUTO: 2.79 10E12/L (ref 4.4–5.9)
RBC # BLD AUTO: 2.81 10E12/L (ref 4.4–5.9)
SODIUM SERPL-SCNC: 145 MMOL/L (ref 133–144)
SODIUM SERPL-SCNC: 148 MMOL/L (ref 133–144)
SPECIMEN EXP DATE BLD: NORMAL
TRANSFUSION STATUS PATIENT QL: NORMAL
TRANSFUSION STATUS PATIENT QL: NORMAL
VWF MULTIMERS PPP QL: NORMAL
WBC # BLD AUTO: 8.1 10E9/L (ref 4–11)
WBC # BLD AUTO: 8.4 10E9/L (ref 4–11)
WBC # BLD AUTO: 9.2 10E9/L (ref 4–11)

## 2018-08-21 PROCEDURE — 25000132 ZZH RX MED GY IP 250 OP 250 PS 637: Mod: GY | Performed by: STUDENT IN AN ORGANIZED HEALTH CARE EDUCATION/TRAINING PROGRAM

## 2018-08-21 PROCEDURE — 25000128 H RX IP 250 OP 636: Performed by: SURGERY

## 2018-08-21 PROCEDURE — 99291 CRITICAL CARE FIRST HOUR: CPT | Mod: GC | Performed by: ANESTHESIOLOGY

## 2018-08-21 PROCEDURE — 40000014 ZZH STATISTIC ARTERIAL MONITORING DAILY

## 2018-08-21 PROCEDURE — 85027 COMPLETE CBC AUTOMATED: CPT | Performed by: INTERNAL MEDICINE

## 2018-08-21 PROCEDURE — 85018 HEMOGLOBIN: CPT | Performed by: STUDENT IN AN ORGANIZED HEALTH CARE EDUCATION/TRAINING PROGRAM

## 2018-08-21 PROCEDURE — A9270 NON-COVERED ITEM OR SERVICE: HCPCS | Mod: GY | Performed by: STUDENT IN AN ORGANIZED HEALTH CARE EDUCATION/TRAINING PROGRAM

## 2018-08-21 PROCEDURE — 82805 BLOOD GASES W/O2 SATURATION: CPT | Performed by: THORACIC SURGERY (CARDIOTHORACIC VASCULAR SURGERY)

## 2018-08-21 PROCEDURE — 20000004 ZZH R&B ICU UMMC

## 2018-08-21 PROCEDURE — 97535 SELF CARE MNGMENT TRAINING: CPT | Mod: GO

## 2018-08-21 PROCEDURE — 25000132 ZZH RX MED GY IP 250 OP 250 PS 637: Mod: GY | Performed by: THORACIC SURGERY (CARDIOTHORACIC VASCULAR SURGERY)

## 2018-08-21 PROCEDURE — 85610 PROTHROMBIN TIME: CPT | Performed by: INTERNAL MEDICINE

## 2018-08-21 PROCEDURE — 84100 ASSAY OF PHOSPHORUS: CPT | Performed by: STUDENT IN AN ORGANIZED HEALTH CARE EDUCATION/TRAINING PROGRAM

## 2018-08-21 PROCEDURE — 80048 BASIC METABOLIC PNL TOTAL CA: CPT | Performed by: STUDENT IN AN ORGANIZED HEALTH CARE EDUCATION/TRAINING PROGRAM

## 2018-08-21 PROCEDURE — 85027 COMPLETE CBC AUTOMATED: CPT | Performed by: STUDENT IN AN ORGANIZED HEALTH CARE EDUCATION/TRAINING PROGRAM

## 2018-08-21 PROCEDURE — P9041 ALBUMIN (HUMAN),5%, 50ML: HCPCS

## 2018-08-21 PROCEDURE — 97530 THERAPEUTIC ACTIVITIES: CPT | Mod: GO

## 2018-08-21 PROCEDURE — 27210429 ZZH NUTRITION PRODUCT INTERMEDIATE LITER

## 2018-08-21 PROCEDURE — 84132 ASSAY OF SERUM POTASSIUM: CPT | Performed by: STUDENT IN AN ORGANIZED HEALTH CARE EDUCATION/TRAINING PROGRAM

## 2018-08-21 PROCEDURE — 25000128 H RX IP 250 OP 636: Performed by: STUDENT IN AN ORGANIZED HEALTH CARE EDUCATION/TRAINING PROGRAM

## 2018-08-21 PROCEDURE — 80053 COMPREHEN METABOLIC PANEL: CPT | Performed by: INTERNAL MEDICINE

## 2018-08-21 PROCEDURE — A9270 NON-COVERED ITEM OR SERVICE: HCPCS | Mod: GY | Performed by: INTERNAL MEDICINE

## 2018-08-21 PROCEDURE — 82805 BLOOD GASES W/O2 SATURATION: CPT | Performed by: STUDENT IN AN ORGANIZED HEALTH CARE EDUCATION/TRAINING PROGRAM

## 2018-08-21 PROCEDURE — 84100 ASSAY OF PHOSPHORUS: CPT | Performed by: INTERNAL MEDICINE

## 2018-08-21 PROCEDURE — 85520 HEPARIN ASSAY: CPT | Performed by: STUDENT IN AN ORGANIZED HEALTH CARE EDUCATION/TRAINING PROGRAM

## 2018-08-21 PROCEDURE — 40000133 ZZH STATISTIC OT WARD VISIT

## 2018-08-21 PROCEDURE — 40000275 ZZH STATISTIC RCP TIME EA 10 MIN

## 2018-08-21 PROCEDURE — A9270 NON-COVERED ITEM OR SERVICE: HCPCS | Mod: GY | Performed by: THORACIC SURGERY (CARDIOTHORACIC VASCULAR SURGERY)

## 2018-08-21 PROCEDURE — 25000125 ZZHC RX 250

## 2018-08-21 PROCEDURE — 94003 VENT MGMT INPAT SUBQ DAY: CPT

## 2018-08-21 PROCEDURE — 40000193 ZZH STATISTIC PT WARD VISIT

## 2018-08-21 PROCEDURE — P9016 RBC LEUKOCYTES REDUCED: HCPCS | Performed by: INTERNAL MEDICINE

## 2018-08-21 PROCEDURE — 82271 OCCULT BLOOD OTHER SOURCES: CPT | Performed by: STUDENT IN AN ORGANIZED HEALTH CARE EDUCATION/TRAINING PROGRAM

## 2018-08-21 PROCEDURE — 84484 ASSAY OF TROPONIN QUANT: CPT | Performed by: STUDENT IN AN ORGANIZED HEALTH CARE EDUCATION/TRAINING PROGRAM

## 2018-08-21 PROCEDURE — 82330 ASSAY OF CALCIUM: CPT | Performed by: STUDENT IN AN ORGANIZED HEALTH CARE EDUCATION/TRAINING PROGRAM

## 2018-08-21 PROCEDURE — P9041 ALBUMIN (HUMAN),5%, 50ML: HCPCS | Performed by: SURGERY

## 2018-08-21 PROCEDURE — 93010 ELECTROCARDIOGRAM REPORT: CPT | Performed by: INTERNAL MEDICINE

## 2018-08-21 PROCEDURE — 40000556 ZZH STATISTIC PERIPHERAL IV START W US GUIDANCE

## 2018-08-21 PROCEDURE — 25000132 ZZH RX MED GY IP 250 OP 250 PS 637: Mod: GY | Performed by: INTERNAL MEDICINE

## 2018-08-21 PROCEDURE — 97530 THERAPEUTIC ACTIVITIES: CPT | Mod: GP

## 2018-08-21 PROCEDURE — 25000128 H RX IP 250 OP 636

## 2018-08-21 PROCEDURE — 93005 ELECTROCARDIOGRAM TRACING: CPT

## 2018-08-21 PROCEDURE — 85730 THROMBOPLASTIN TIME PARTIAL: CPT | Performed by: INTERNAL MEDICINE

## 2018-08-21 PROCEDURE — 83735 ASSAY OF MAGNESIUM: CPT | Performed by: INTERNAL MEDICINE

## 2018-08-21 PROCEDURE — 82803 BLOOD GASES ANY COMBINATION: CPT | Performed by: STUDENT IN AN ORGANIZED HEALTH CARE EDUCATION/TRAINING PROGRAM

## 2018-08-21 PROCEDURE — 00000146 ZZHCL STATISTIC GLUCOSE BY METER IP

## 2018-08-21 PROCEDURE — 83735 ASSAY OF MAGNESIUM: CPT | Performed by: STUDENT IN AN ORGANIZED HEALTH CARE EDUCATION/TRAINING PROGRAM

## 2018-08-21 PROCEDURE — 71045 X-RAY EXAM CHEST 1 VIEW: CPT

## 2018-08-21 RX ORDER — ALBUMIN, HUMAN INJ 5% 5 %
SOLUTION INTRAVENOUS
Status: COMPLETED
Start: 2018-08-21 | End: 2018-08-22

## 2018-08-21 RX ORDER — METOPROLOL TARTRATE 1 MG/ML
5 INJECTION, SOLUTION INTRAVENOUS ONCE
Status: COMPLETED | OUTPATIENT
Start: 2018-08-21 | End: 2018-08-21

## 2018-08-21 RX ORDER — HEPARIN SODIUM 10000 [USP'U]/100ML
0-3500 INJECTION, SOLUTION INTRAVENOUS CONTINUOUS
Status: DISCONTINUED | OUTPATIENT
Start: 2018-08-21 | End: 2018-08-22

## 2018-08-21 RX ORDER — ADENOSINE 3 MG/ML
INJECTION, SOLUTION INTRAVENOUS
Status: COMPLETED
Start: 2018-08-21 | End: 2018-08-21

## 2018-08-21 RX ORDER — ALBUMIN, HUMAN INJ 5% 5 %
SOLUTION INTRAVENOUS
Status: COMPLETED
Start: 2018-08-21 | End: 2018-08-21

## 2018-08-21 RX ORDER — ALBUMIN, HUMAN INJ 5% 5 %
12.5 SOLUTION INTRAVENOUS ONCE
Status: COMPLETED | OUTPATIENT
Start: 2018-08-21 | End: 2018-08-21

## 2018-08-21 RX ORDER — ACETAZOLAMIDE 250 MG/1
500 TABLET ORAL ONCE
Status: COMPLETED | OUTPATIENT
Start: 2018-08-21 | End: 2018-08-21

## 2018-08-21 RX ORDER — ADENOSINE 3 MG/ML
6 INJECTION, SOLUTION INTRAVENOUS ONCE
Status: COMPLETED | OUTPATIENT
Start: 2018-08-21 | End: 2018-08-21

## 2018-08-21 RX ORDER — METOPROLOL TARTRATE 1 MG/ML
INJECTION, SOLUTION INTRAVENOUS
Status: COMPLETED
Start: 2018-08-21 | End: 2018-08-21

## 2018-08-21 RX ADMIN — QUETIAPINE 50 MG: 50 TABLET ORAL at 23:04

## 2018-08-21 RX ADMIN — PANTOPRAZOLE SODIUM 40 MG: 40 TABLET, DELAYED RELEASE ORAL at 19:47

## 2018-08-21 RX ADMIN — ACETAZOLAMIDE 500 MG: 250 TABLET ORAL at 10:00

## 2018-08-21 RX ADMIN — POTASSIUM CHLORIDE 40 MEQ: 1.5 POWDER, FOR SOLUTION ORAL at 23:51

## 2018-08-21 RX ADMIN — FUROSEMIDE 40 MG: 10 INJECTION, SOLUTION INTRAVENOUS at 19:47

## 2018-08-21 RX ADMIN — MULTIVITAMIN 15 ML: LIQUID ORAL at 08:04

## 2018-08-21 RX ADMIN — ALBUMIN HUMAN 12.5 G: 0.05 INJECTION, SOLUTION INTRAVENOUS at 23:30

## 2018-08-21 RX ADMIN — Medication 1000 MG: at 19:43

## 2018-08-21 RX ADMIN — ATORVASTATIN CALCIUM 80 MG: 80 TABLET, FILM COATED ORAL at 19:47

## 2018-08-21 RX ADMIN — ADENOSINE 6 MG: 3 INJECTION, SOLUTION INTRAVENOUS at 23:02

## 2018-08-21 RX ADMIN — CLOPIDOGREL 75 MG: 75 TABLET, FILM COATED ORAL at 08:04

## 2018-08-21 RX ADMIN — CARBOXYMETHYLCELLULOSE SODIUM 1 DROP: 5 SOLUTION/ DROPS OPHTHALMIC at 19:47

## 2018-08-21 RX ADMIN — POTASSIUM CHLORIDE 20 MEQ: 1.5 POWDER, FOR SOLUTION ORAL at 17:19

## 2018-08-21 RX ADMIN — Medication 1 PACKET: at 08:07

## 2018-08-21 RX ADMIN — METOPROLOL TARTRATE 5 MG: 1 INJECTION, SOLUTION INTRAVENOUS at 22:56

## 2018-08-21 RX ADMIN — FUROSEMIDE 40 MG: 10 INJECTION, SOLUTION INTRAVENOUS at 11:15

## 2018-08-21 RX ADMIN — ASPIRIN 81 MG CHEWABLE TABLET 81 MG: 81 TABLET CHEWABLE at 08:04

## 2018-08-21 RX ADMIN — Medication 1 PACKET: at 19:56

## 2018-08-21 RX ADMIN — LEVOTHYROXINE SODIUM 75 MCG: 75 TABLET ORAL at 08:04

## 2018-08-21 RX ADMIN — Medication 1 PACKET: at 14:31

## 2018-08-21 RX ADMIN — ALBUMIN HUMAN 12.5 G: 0.05 INJECTION, SOLUTION INTRAVENOUS at 23:44

## 2018-08-21 RX ADMIN — PANTOPRAZOLE SODIUM 40 MG: 40 TABLET, DELAYED RELEASE ORAL at 08:05

## 2018-08-21 RX ADMIN — FUROSEMIDE 40 MG: 10 INJECTION, SOLUTION INTRAVENOUS at 03:32

## 2018-08-21 RX ADMIN — Medication 1000 MG: at 08:07

## 2018-08-21 ASSESSMENT — ACTIVITIES OF DAILY LIVING (ADL)
ADLS_ACUITY_SCORE: 13
ADLS_ACUITY_SCORE: 12
ADLS_ACUITY_SCORE: 13
ADLS_ACUITY_SCORE: 12

## 2018-08-21 NOTE — PLAN OF CARE
Problem: Patient Care Overview  Goal: Plan of Care/Patient Progress Review  Discharge Planner PT   Patient plan for discharge: Rehab  Current status: Performed multiple sit<>stands with mod A. Amb 12' in room with walker. Improved standing/activity tolerance with 6L O2 donned. Fatigues quickly  Barriers to return to prior living situation: Deconditioning, fall risk  Recommendations for discharge: ARC  Rationale for recommendations: Current level of function and rehab trajectory       Entered by: Nas Munoz 08/21/2018 4:56 PM

## 2018-08-21 NOTE — PROGRESS NOTES
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 Underwent repair of posterior VSD and TVR with bioprosthetic valve on 8/10, and was kept with open chest due to coagulopathy and RV dysfunction. Chest closed 8/13. IABP removed 8/14. Extubated 8/21.    TODAY'S PROGRESS:   -1 PRBC for Hgb 7.2  -Extubated today after stable pressure support trials   -BiPAP as needed  -Continue diuresis with Lasix 40mg TID, Diamox 500mg ONCE  -Start low intensity heparin today, consider warfarin when plans on pacing wires made   -Backup pacing rate at 60, monitor for stability over next 24 hours, if no need for pacing, can consider capping/removing wires   -If having issues with needing pacing, discuss again with EP on interventions  -Hold removal of CT as long as pacing wires are in  -CVTS with plans to monitor RV effusion noted on ECHO (8/20) - repeat ELISABETH in one week (8/27) to evaluate RV effusion  -Keppra discontinued 8/20 PM      PLAN:  Neuro/ pain/ sedation:  #Post operative sedation  #Questionable seizure activity (negative follow up EEG 8/16)  - PRN Quetiapine 50mg for sleep at night  - Monitor neurological status. Notify the MD for any acute changes in exam.  - Discussed with neurology:    -Keppra discontinued 8/20    -Follow up with neurology as outpatient in 3 months    -Repeat EEG on 8/16 without seizure activity    -CT head if acute neuro changes    Pulmonary care:  #Post operative mechanical ventilation   - Extubated 8/21, BiPAP if needed    Cardiovascular:  #Post infarction 2.5cm infraseptal VSD  #s/p HUSSEIN to distal RCA and proximal LAD  #STEMI, cardiogenic shock  #Dilated RV   #Pulmonary HTN  #IABP  #s/p repair of posterior VSD and TVR with  bioprosthetic valve, s/p Chest closure 8/13  #Atrial flutter  - Cards 2 reconsulted, appreciate assist   - Monitor hemodynamic status. Pacer at VVI at 60  - Infusions: Weaned off epi and dobutamine 8/16  - ECHO from 8/8 prior to surgery: Inferior wall akinesis with inferoseptal muscular VAD. Mild to moderate right ventricular dilation is present. Global right ventricular function is moderately reduced.   -Follow up ECHO 8/20 -CVTS with plans to monitor RV effusion noted on ECHO (8/20) - repeat ELISABETH in one week (8/27) to evaluate RV effusion (orders in)  - ASA 81mg  - EP consult regarding underlying atrial flutter.    Heme:  #Anticoagulation s/p Drug Eluting Stent placement  #Thrombocythemia  - Cangrelor discontinued, heparin 400 U started  - 81mg ASA  - Plavix loaded 8/15 (75mg daily thereafter)  - Hydroxyurea 1g BID, hematology consulted and following    GI care:  #Transaminitis   - NJ placed 8/13, TF at goal  - Rectal tube placed 8/15    Fluids/ Electrolytes/ Nutrition:  #Hypernatremia   - mIVF for IV fluid hydration  - Nutri-phos 4x/day for lyte replacement  - TFs with [id number]  - Wiggins    Renal/ Fluid Balance:    #Acute Kidney injury  - Urine output stable overnight, Creatinine reassuring  - Will continue to monitor intake and output via wiggins  - Diuresis with 40mg TID - net negative goal ~1 liter  - Diamox 500mg once (8/21)     Endocrine:  #Stress hyperglycemia  #Hypothyroidism  - off Insulin gtt, on ISS  - Levothyroxine    ID/ Antibiotics:  - Monitor fever curve, trend WBC    Prophylaxis:    - Mechanical prophylaxis for DVT.   - ASA, plavix, heparin low intensity started (8/21), plan for warfarin initiation once definitive plans made on current V-pacing wires in place  - Protonix    Lines/ tubes/ drains:  - arterial line, PIV, CVC    Disposition:  - CV ICU.    Patient seen, findings and plan discussed with CVTS    Brad Hawkins MD  8/21/2018 1:05 PM  Anesthesia  Resident  PGY4/CA-3    ====================================    SUBJECTIVE:   Extubated this AM after strong pressure support trials. Received one unit PRBC for Hgb 7.2.    OBJECTIVE:   1. VITAL SIGNS:   Temp:  [97  F (36.1  C)-97.8  F (36.6  C)] 97.8  F (36.6  C)  Heart Rate:  [102-112] 106  Resp:  [16-35] 29  MAP:  [65 mmHg-88 mmHg] 71 mmHg  Arterial Line BP: ()/(56-78) 93/62  FiO2 (%):  [30 %] 30 %  SpO2:  [99 %-100 %] 99 %  Ventilation Mode: CPAP/PS  (Continuous positive airway pressure with Pressure Support)  FiO2 (%): 30 %  Rate Set (breaths/minute): 14 breaths/min  Tidal Volume Set (mL): 550 mL  PEEP (cm H2O): 5 cmH2O  Pressure Support (cm H2O): 7 cmH2O  Oxygen Concentration (%): 30 %  Resp: 29    2. INTAKE/ OUTPUT:   I/O last 3 completed shifts:  In: 3283 [I.V.:168; NG/GT:1675]  Out: 3800 [Urine:3415; Emesis/NG output:225; Chest Tube:160]    3. PHYSICAL EXAMINATION:   General: Stable on nasal cannula post extubation  Neuro: interactive, alert, following commands  Resp: On nasal cannula after extubation, no distress  CV: tachycardia in 110's, some irregularity  Abdomen: Soft, dressings in place  Incisions: Dressing over chest, chest tubes in place, closed chest  Extremities: warm and well perfused    4. INVESTIGATIONS:   Arterial Blood Gases     Recent Labs  Lab 08/21/18  1240 08/21/18  1105 08/21/18  0339 08/20/18  0315   PH 7.48* 7.48* 7.54* 7.53*   PCO2 37 36 35 35   PO2 84 117* 109* 141*   HCO3 28 27 29* 30*     Complete Blood Count     Recent Labs  Lab 08/21/18  1105 08/21/18  0339 08/20/18  0315 08/17/18  2221   WBC 8.4 8.1 8.1 4.9   HGB 8.1* 7.2* 8.0* 8.4*   * 461* 506* 444     Basic Metabolic Panel    Recent Labs  Lab 08/21/18 0339 08/20/18 2022 08/20/18 0315 08/19/18 2000 08/19/18 0332   *  --  148* 151* 152*   POTASSIUM 4.3 3.6 4.0 3.8 4.0   CHLORIDE 112*  --  114* 118* 119*   CO2 28  --  29 27 27   BUN 64*  --  55* 46* 44*   CR 0.71  --  0.68 0.61* 0.75   *  --  108*  108* 108*     Liver Function Tests    Recent Labs  Lab 08/21/18  0339 08/20/18  0315 08/19/18  0332 08/18/18  0346   AST 86* 108* 188* 390*   * 132* 183* 259*   ALKPHOS 196* 220* 248* 296*   BILITOTAL 0.2 0.3 0.4 0.4   ALBUMIN 1.6* 1.6* 1.6* 1.6*   INR 1.21* 1.16* 1.26* 1.37*     Pancreatic Enzymes  No lab results found in last 7 days.  Coagulation Profile    Recent Labs  Lab 08/21/18  0339 08/20/18  0315 08/19/18  0332 08/18/18  0346   INR 1.21* 1.16* 1.26* 1.37*   PTT 46* 48* 47* 45*         5. RADIOLOGY:   Recent Results (from the past 24 hour(s))   XR Chest Port 1 View    Narrative    Exam: XR CHEST PORT 1 VW, 8/21/2018 8:45 AM    Indication: cvicu, intubated, interval change;     Comparison: Chest x-ray 22,018    Findings:   Portable upright AP radiograph of the chest. Postsurgical changes of  ventricular septal defect repair, including median sternotomy wires  and mediastinal surgical clips. Endotracheal tube tip projects 4.5 cm  above the audelia. Bilateral chest tubes are in stable position.  Mediastinal drains. Enteric tubes course below the diaphragm, their  tips projecting beyond the field-of-view. The cardiomediastinal  silhouette borderline enlarged. The pulmonary vasculature is distinct.  No pneumothorax. Small left pleural effusion and overlying streaky  left basilar opacities.       Impression    Impression:   1. Postsurgical changes of ventricular septal defect repair, with  bilateral chest tubes in stable position. No pneumothorax.  2. Small left pleural effusion with overlying left basilar opacities,  which likely represent atelectasis.

## 2018-08-21 NOTE — PROGRESS NOTES
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 Underwent repair of posterior VSD and TVR with bioprosthetic valve on 8/10, and was kept with open chest due to coagulopathy and RV dysfunction. Chest closed 8/13. IABP removed 8/14. Extubated 8/21.    TODAY'S PROGRESS:   -1 PRBC for Hgb 7.2  -Extubated today after stable pressure support trials   -BiPAP as needed  -Continue diuresis with Lasix 40mg TID, Diamox 500mg ONCE  -Start low intensity heparin today, consider warfarin when plans on pacing wires made   -Backup pacing rate at 60, monitor for stability over next 24 hours, if no need for pacing, can consider capping/removing wires   -If having issues with needing pacing, discuss again with EP on interventions  -Hold removal of CT as long as pacing wires are in  -CVTS with plans to monitor RV effusion noted on ECHO (8/20) - repeat ELISABETH in one week (8/27) to evaluate RV effusion  -Keppra discontinued 8/20 PM      PLAN:  Neuro/ pain/ sedation:  #Post operative sedation  #Questionable seizure activity (negative follow up EEG 8/16)  - PRN Quetiapine 50mg for sleep at night  - Monitor neurological status. Notify the MD for any acute changes in exam.  - Discussed with neurology:    -Keppra discontinued 8/20    -Follow up with neurology as outpatient in 3 months    -Repeat EEG on 8/16 without seizure activity    -CT head if acute neuro changes    Pulmonary care:  #Post operative mechanical ventilation   - Extubated 8/21, BiPAP if needed    Cardiovascular:  #Post infarction 2.5cm infraseptal VSD  #s/p HUSSEIN to distal RCA and proximal LAD  #STEMI, cardiogenic shock  #Dilated RV   #Pulmonary HTN  #IABP  #s/p repair of posterior VSD and TVR with  bioprosthetic valve, s/p Chest closure 8/13  #Atrial flutter  - Cards 2 reconsulted, appreciate assist   - Monitor hemodynamic status. Pacer at VVI at 60  - Infusions: Weaned off epi and dobutamine 8/16  - ECHO from 8/8 prior to surgery: Inferior wall akinesis with inferoseptal muscular VAD. Mild to moderate right ventricular dilation is present. Global right ventricular function is moderately reduced.   -Follow up ECHO 8/20 -CVTS with plans to monitor RV effusion noted on ECHO (8/20) - repeat ELISABETH in one week (8/27) to evaluate RV effusion (orders in)  - ASA 81mg  - EP consult regarding underlying atrial flutter.    Heme:  #Anticoagulation s/p Drug Eluting Stent placement  #Thrombocythemia  - Cangrelor discontinued, heparin 400 U started  - 81mg ASA  - Plavix loaded 8/15 (75mg daily thereafter)  - Hydroxyurea 1g BID, hematology consulted and following    GI care:  #Transaminitis   - NJ placed 8/13, TF at goal  - Rectal tube placed 8/15    Fluids/ Electrolytes/ Nutrition:  #Hypernatremia   - mIVF for IV fluid hydration  - Nutri-phos 4x/day for lyte replacement  - TFs with [id number]  - Wiggins    Renal/ Fluid Balance:    #Acute Kidney injury  - Urine output stable overnight, Creatinine reassuring  - Will continue to monitor intake and output via wiggins  - Diuresis with 40mg TID - net negative goal ~1 liter  - Diamox 500mg once (8/21)     Endocrine:  #Stress hyperglycemia  #Hypothyroidism  - off Insulin gtt, on ISS  - Levothyroxine    ID/ Antibiotics:  - Monitor fever curve, trend WBC    Prophylaxis:    - Mechanical prophylaxis for DVT.   - ASA, plavix, heparin low intensity started (8/21), plan for warfarin initiation once definitive plans made on current V-pacing wires in place  - Protonix    Lines/ tubes/ drains:  - arterial line, PIV, CVC    Disposition:  - CV ICU.    Patient seen, findings and plan discussed with CV ICU staff Dr. Terrence Hawkins MD  8/21/2018 1:05 PM  Anesthesia  Resident  PGY4/CA-3    ====================================    SUBJECTIVE:   Extubated this AM after strong pressure support trials. Received one unit PRBC for Hgb 7.2.    OBJECTIVE:   1. VITAL SIGNS:   Temp:  [97  F (36.1  C)-97.8  F (36.6  C)] 97.8  F (36.6  C)  Heart Rate:  [102-112] 106  Resp:  [16-35] 29  MAP:  [65 mmHg-88 mmHg] 71 mmHg  Arterial Line BP: ()/(56-78) 93/62  FiO2 (%):  [30 %] 30 %  SpO2:  [99 %-100 %] 99 %  Ventilation Mode: CPAP/PS  (Continuous positive airway pressure with Pressure Support)  FiO2 (%): 30 %  Rate Set (breaths/minute): 14 breaths/min  Tidal Volume Set (mL): 550 mL  PEEP (cm H2O): 5 cmH2O  Pressure Support (cm H2O): 7 cmH2O  Oxygen Concentration (%): 30 %  Resp: 29    2. INTAKE/ OUTPUT:   I/O last 3 completed shifts:  In: 3283 [I.V.:168; NG/GT:1675]  Out: 3800 [Urine:3415; Emesis/NG output:225; Chest Tube:160]    3. PHYSICAL EXAMINATION:   General: Stable on nasal cannula post extubation  Neuro: interactive, alert, following commands  Resp: On nasal cannula after extubation, no distress  CV: tachycardia in 110's, some irregularity  Abdomen: Soft, dressings in place  Incisions: Dressing over chest, chest tubes in place, closed chest  Extremities: warm and well perfused    4. INVESTIGATIONS:   Arterial Blood Gases     Recent Labs  Lab 08/21/18  1240 08/21/18  1105 08/21/18  0339 08/20/18  0315   PH 7.48* 7.48* 7.54* 7.53*   PCO2 37 36 35 35   PO2 84 117* 109* 141*   HCO3 28 27 29* 30*     Complete Blood Count     Recent Labs  Lab 08/21/18  1105 08/21/18  0339 08/20/18  0315 08/17/18  2221   WBC 8.4 8.1 8.1 4.9   HGB 8.1* 7.2* 8.0* 8.4*   * 461* 506* 444     Basic Metabolic Panel    Recent Labs  Lab 08/21/18 0339 08/20/18 2022 08/20/18 0315 08/19/18 2000 08/19/18 0332   *  --  148* 151* 152*   POTASSIUM 4.3 3.6 4.0 3.8 4.0   CHLORIDE 112*  --  114* 118* 119*   CO2 28  --  29 27 27   BUN 64*  --  55* 46* 44*   CR 0.71  --  0.68 0.61* 0.75   *  --  108*  108* 108*     Liver Function Tests    Recent Labs  Lab 08/21/18  0339 08/20/18  0315 08/19/18  0332 08/18/18  0346   AST 86* 108* 188* 390*   * 132* 183* 259*   ALKPHOS 196* 220* 248* 296*   BILITOTAL 0.2 0.3 0.4 0.4   ALBUMIN 1.6* 1.6* 1.6* 1.6*   INR 1.21* 1.16* 1.26* 1.37*     Pancreatic Enzymes  No lab results found in last 7 days.  Coagulation Profile    Recent Labs  Lab 08/21/18  0339 08/20/18  0315 08/19/18  0332 08/18/18  0346   INR 1.21* 1.16* 1.26* 1.37*   PTT 46* 48* 47* 45*         5. RADIOLOGY:   Recent Results (from the past 24 hour(s))   XR Chest Port 1 View    Narrative    Exam: XR CHEST PORT 1 VW, 8/21/2018 8:45 AM    Indication: cvicu, intubated, interval change;     Comparison: Chest x-ray 22,018    Findings:   Portable upright AP radiograph of the chest. Postsurgical changes of  ventricular septal defect repair, including median sternotomy wires  and mediastinal surgical clips. Endotracheal tube tip projects 4.5 cm  above the audelia. Bilateral chest tubes are in stable position.  Mediastinal drains. Enteric tubes course below the diaphragm, their  tips projecting beyond the field-of-view. The cardiomediastinal  silhouette borderline enlarged. The pulmonary vasculature is distinct.  No pneumothorax. Small left pleural effusion and overlying streaky  left basilar opacities.       Impression    Impression:   1. Postsurgical changes of ventricular septal defect repair, with  bilateral chest tubes in stable position. No pneumothorax.  2. Small left pleural effusion with overlying left basilar opacities,  which likely represent atelectasis.

## 2018-08-21 NOTE — PROGRESS NOTES
[facility], [place]  Procedure Note          Extubation:       Castillo De Anda  MRN# [id number]   August 21, 2018, 12:28 PM         Patient extubated at: August 21, 2018, 12:28 PM   Supplemental Oxygen: Via nasal cannula at 3 liters per minute   Cough: The cough is strong   Secretion Mode: PRN suction with assistance   Secretion Amount: Moderate amount, moderately thick and tan in color   Respiratory Exam:: Breath sounds: good aeration     Location: bilaterally   Skin Exam:: Patient color: pink   Patient Status: Currently appears comfortable   Arterial Blood Gasses: pH Arterial (pH)   Date Value   08/21/2018 7.48 (H)     pO2 Arterial (mm Hg)   Date Value   08/21/2018 117 (H)     pCO2 Arterial (mm Hg)   Date Value   08/21/2018 36     Bicarbonate Arterial (mmol/L)   Date Value   08/21/2018 27            Recorded by Micah Morataya

## 2018-08-21 NOTE — PLAN OF CARE
Problem: Patient Care Overview  Goal: Plan of Care/Patient Progress Review  Outcome: Therapy, progress toward functional goals is gradual  CVTS updated regarding hemoglobin drop and slight thickening of red/brown OG output, soft blood pressure, no orders for blood at this time or changes to flat rate heparin infusion, to be readdressed by primary day team. Prevena woundvac dressing leak noted at 0530, dressing removed as it has run its 7 day course, sternal incision cleansed, left open to air, no drainage noted,  sutures in place. Daughter bedside in the evening, wife will return this morning. Plans to PS and continue ventilator weaning as tolerated. Patient will continue to be monitored and assessed. Please see MAR, flow sheets, and remainder of chart for further details. .

## 2018-08-21 NOTE — PROGRESS NOTES
CLINICAL NUTRITION SERVICES - brief note. See 8/20 note for full assessment.    Resp: Obtained metabolic cart study 8/20 @ 1545 with the following results: MREE = 2205 kcals/day (equiv to 29 kcal/kg/day) with RQ = 0.9.  Pt received 1440 ml of TF in 24 hours preceding the study providing 2160 kcals (98% MREE).  RQ is within physiologic range and logical given provisions received prior to study.  Would aim energy intakes minimally at 80% of this MREE (equiv to 23 kcal/kg/day), up to 100% of the MREE (29 kcal/kg).    Current TF regimen: Nutren 1.5 @ 60 mL/hr + 1 pkt ProSource TID to provide total 2280 kcal (30 kcal/kg or 103% MREE) and 131 g PRO (1.7 g/kg)    Recommendations/Nutrition Prescription  Continue current TF regimen at ~100% MREE.     RD will continue to follow.    Elizabeth Berry, GUILHERME, LD, [facility]  CVICU Dietitian  Pager: [phone]

## 2018-08-21 NOTE — PLAN OF CARE
Problem: Patient Care Overview  Goal: Plan of Care/Patient Progress Review  Discharge Planner OT   Patient plan for discharge: Home w/ A vs. Rehab  Current status: Pt provided heart surgery folder and educated on contents. Pt completed 1 STS transfer with CGA-min A x 2 with pt standing for ~15 seconds before c/o fatigue. Pt educated on adaptive LB dressing techniques, pt was unable to complete task and required max A to don socks.  Barriers to return to prior living situation: Medical status  Recommendations for discharge: Home w/ A and OP phase II CR pending progress in therapy  Rationale for recommendations: To further increase pt's strength and endurance for ind participation in ADLs/IADLs       Entered by: Sommer Farmer 08/21/2018 2:00 PM

## 2018-08-21 NOTE — PLAN OF CARE
Problem: Patient Care Overview  Goal: Plan of Care/Patient Progress Review  Outcome: Improving  D: Afebrile. Sinus tachycardia, low 100s. Normotensive. A&O. On 3L NC.  I/A: Pacer wires capped. Extubated around 1150 this morning with no complications. Follow up ABG good. Encouraging IS. Plan for Bipap over night. Adequate UOP, lasix TID given. Gastric occult positive, BID protonix initiated, will trend hemoglobin. 1 unit RBCs given this morning. Lytes replaced, continue to monitor. Hep 10a therapeutic, recheck in am, at 950 units/hr. No BM this shift. Free water flush continues to be 200ml q4 hr. Minimal output from chest tubes. Up with OT/PT to chair and took a few steps in room .  P: Continue to monitor and assess pt. Consult CVTS with any new changes/concerns. Follow up with chest tube removal and pacer wire removal in morning.

## 2018-08-22 ENCOUNTER — APPOINTMENT (OUTPATIENT)
Dept: GENERAL RADIOLOGY | Facility: CLINIC | Age: 68
DRG: 219 | End: 2018-08-22
Attending: SURGERY
Payer: MEDICARE

## 2018-08-22 ENCOUNTER — APPOINTMENT (OUTPATIENT)
Dept: SPEECH THERAPY | Facility: CLINIC | Age: 68
DRG: 219 | End: 2018-08-22
Attending: INTERNAL MEDICINE
Payer: MEDICARE

## 2018-08-22 ENCOUNTER — APPOINTMENT (OUTPATIENT)
Dept: GENERAL RADIOLOGY | Facility: CLINIC | Age: 68
DRG: 219 | End: 2018-08-22
Attending: INTERNAL MEDICINE
Payer: MEDICARE

## 2018-08-22 ENCOUNTER — APPOINTMENT (OUTPATIENT)
Dept: CARDIOLOGY | Facility: CLINIC | Age: 68
DRG: 219 | End: 2018-08-22
Attending: SURGERY
Payer: MEDICARE

## 2018-08-22 LAB
ALBUMIN SERPL-MCNC: 2.2 G/DL (ref 3.4–5)
ALP SERPL-CCNC: 158 U/L (ref 40–150)
ALT SERPL W P-5'-P-CCNC: 101 U/L (ref 0–70)
ANION GAP SERPL CALCULATED.3IONS-SCNC: 5 MMOL/L (ref 3–14)
ANION GAP SERPL CALCULATED.3IONS-SCNC: 8 MMOL/L (ref 3–14)
AST SERPL W P-5'-P-CCNC: 92 U/L (ref 0–45)
BASE EXCESS BLDA CALC-SCNC: 1.9 MMOL/L
BILIRUB SERPL-MCNC: 0.3 MG/DL (ref 0.2–1.3)
BLD PROD TYP BPU: NORMAL
BLD PROD TYP BPU: NORMAL
BLD UNIT ID BPU: 0
BLD UNIT ID BPU: 0
BLOOD PRODUCT CODE: NORMAL
BLOOD PRODUCT CODE: NORMAL
BPU ID: NORMAL
BPU ID: NORMAL
BUN SERPL-MCNC: 62 MG/DL (ref 7–30)
BUN SERPL-MCNC: 68 MG/DL (ref 7–30)
CA-I BLD-MCNC: 4.6 MG/DL (ref 4.4–5.2)
CALCIUM SERPL-MCNC: 7.5 MG/DL (ref 8.5–10.1)
CALCIUM SERPL-MCNC: 7.5 MG/DL (ref 8.5–10.1)
CHLORIDE SERPL-SCNC: 112 MMOL/L (ref 94–109)
CHLORIDE SERPL-SCNC: 112 MMOL/L (ref 94–109)
CO2 SERPL-SCNC: 24 MMOL/L (ref 20–32)
CO2 SERPL-SCNC: 27 MMOL/L (ref 20–32)
CREAT SERPL-MCNC: 0.67 MG/DL (ref 0.66–1.25)
CREAT SERPL-MCNC: 0.72 MG/DL (ref 0.66–1.25)
ERYTHROCYTE [DISTWIDTH] IN BLOOD BY AUTOMATED COUNT: 20.5 % (ref 10–15)
ERYTHROCYTE [DISTWIDTH] IN BLOOD BY AUTOMATED COUNT: 22.7 % (ref 10–15)
GFR SERPL CREATININE-BSD FRML MDRD: >90 ML/MIN/1.7M2
GFR SERPL CREATININE-BSD FRML MDRD: >90 ML/MIN/1.7M2
GLUCOSE BLDC GLUCOMTR-MCNC: 103 MG/DL (ref 70–99)
GLUCOSE BLDC GLUCOMTR-MCNC: 105 MG/DL (ref 70–99)
GLUCOSE BLDC GLUCOMTR-MCNC: 108 MG/DL (ref 70–99)
GLUCOSE BLDC GLUCOMTR-MCNC: 118 MG/DL (ref 70–99)
GLUCOSE BLDC GLUCOMTR-MCNC: 81 MG/DL (ref 70–99)
GLUCOSE SERPL-MCNC: 107 MG/DL (ref 70–99)
GLUCOSE SERPL-MCNC: 128 MG/DL (ref 70–99)
HCO3 BLD-SCNC: 26 MMOL/L (ref 21–28)
HCT VFR BLD AUTO: 19.4 % (ref 40–53)
HCT VFR BLD AUTO: 21.3 % (ref 40–53)
HEMOCCULT STL QL IA: POSITIVE
HGB BLD-MCNC: 6.1 G/DL (ref 13.3–17.7)
HGB BLD-MCNC: 7 G/DL (ref 13.3–17.7)
HGB BLD-MCNC: 8 G/DL (ref 13.3–17.7)
HGB BLD-MCNC: 8.1 G/DL (ref 13.3–17.7)
INR PPP: 1.27 (ref 0.86–1.14)
INTERPRETATION ECG - MUSE: NORMAL
LMWH PPP CHRO-ACNC: 0.3 IU/ML
Lab: NORMAL
MAGNESIUM SERPL-MCNC: 2 MG/DL (ref 1.6–2.3)
MAGNESIUM SERPL-MCNC: 2.1 MG/DL (ref 1.6–2.3)
MCH RBC QN AUTO: 29 PG (ref 26.5–33)
MCH RBC QN AUTO: 29.8 PG (ref 26.5–33)
MCHC RBC AUTO-ENTMCNC: 31.4 G/DL (ref 31.5–36.5)
MCHC RBC AUTO-ENTMCNC: 32.9 G/DL (ref 31.5–36.5)
MCV RBC AUTO: 91 FL (ref 78–100)
MCV RBC AUTO: 92 FL (ref 78–100)
O2/TOTAL GAS SETTING VFR VENT: ABNORMAL %
OXYHGB MFR BLD: 97 % (ref 92–100)
PCO2 BLD: 38 MM HG (ref 35–45)
PH BLD: 7.45 PH (ref 7.35–7.45)
PHOSPHATE SERPL-MCNC: 3.2 MG/DL (ref 2.5–4.5)
PHOSPHATE SERPL-MCNC: 3.8 MG/DL (ref 2.5–4.5)
PLATELET # BLD AUTO: 347 10E9/L (ref 150–450)
PLATELET # BLD AUTO: 358 10E9/L (ref 150–450)
PO2 BLD: 125 MM HG (ref 80–105)
POTASSIUM SERPL-SCNC: 3.8 MMOL/L (ref 3.4–5.3)
POTASSIUM SERPL-SCNC: 4.1 MMOL/L (ref 3.4–5.3)
PROT SERPL-MCNC: 4.8 G/DL (ref 6.8–8.8)
RBC # BLD AUTO: 2.1 10E12/L (ref 4.4–5.9)
RBC # BLD AUTO: 2.35 10E12/L (ref 4.4–5.9)
SODIUM SERPL-SCNC: 144 MMOL/L (ref 133–144)
SODIUM SERPL-SCNC: 144 MMOL/L (ref 133–144)
SPECIMEN SOURCE: NORMAL
TRANSFUSION STATUS PATIENT QL: NORMAL
TROPONIN I SERPL-MCNC: 1.7 UG/L (ref 0–0.04)
WBC # BLD AUTO: 8.1 10E9/L (ref 4–11)
WBC # BLD AUTO: 8.3 10E9/L (ref 4–11)
YEAST SPEC QL CULT: NO GROWTH

## 2018-08-22 PROCEDURE — A9270 NON-COVERED ITEM OR SERVICE: HCPCS | Mod: GY | Performed by: THORACIC SURGERY (CARDIOTHORACIC VASCULAR SURGERY)

## 2018-08-22 PROCEDURE — A9270 NON-COVERED ITEM OR SERVICE: HCPCS | Mod: GY | Performed by: STUDENT IN AN ORGANIZED HEALTH CARE EDUCATION/TRAINING PROGRAM

## 2018-08-22 PROCEDURE — 74018 RADEX ABDOMEN 1 VIEW: CPT

## 2018-08-22 PROCEDURE — 83735 ASSAY OF MAGNESIUM: CPT | Performed by: INTERNAL MEDICINE

## 2018-08-22 PROCEDURE — 25000132 ZZH RX MED GY IP 250 OP 250 PS 637: Mod: GY | Performed by: THORACIC SURGERY (CARDIOTHORACIC VASCULAR SURGERY)

## 2018-08-22 PROCEDURE — 25000132 ZZH RX MED GY IP 250 OP 250 PS 637: Mod: GY | Performed by: INTERNAL MEDICINE

## 2018-08-22 PROCEDURE — P9016 RBC LEUKOCYTES REDUCED: HCPCS | Performed by: INTERNAL MEDICINE

## 2018-08-22 PROCEDURE — 40000196 ZZH STATISTIC RAPCV CVP MONITORING

## 2018-08-22 PROCEDURE — 99233 SBSQ HOSP IP/OBS HIGH 50: CPT | Performed by: INTERNAL MEDICINE

## 2018-08-22 PROCEDURE — 00000146 ZZHCL STATISTIC GLUCOSE BY METER IP

## 2018-08-22 PROCEDURE — 86923 COMPATIBILITY TEST ELECTRIC: CPT | Performed by: INTERNAL MEDICINE

## 2018-08-22 PROCEDURE — 25000132 ZZH RX MED GY IP 250 OP 250 PS 637: Mod: GY | Performed by: STUDENT IN AN ORGANIZED HEALTH CARE EDUCATION/TRAINING PROGRAM

## 2018-08-22 PROCEDURE — 93321 DOPPLER ECHO F-UP/LMTD STD: CPT | Mod: 26 | Performed by: INTERNAL MEDICINE

## 2018-08-22 PROCEDURE — 84100 ASSAY OF PHOSPHORUS: CPT | Performed by: THORACIC SURGERY (CARDIOTHORACIC VASCULAR SURGERY)

## 2018-08-22 PROCEDURE — 92526 ORAL FUNCTION THERAPY: CPT | Mod: GN

## 2018-08-22 PROCEDURE — 85018 HEMOGLOBIN: CPT | Performed by: STUDENT IN AN ORGANIZED HEALTH CARE EDUCATION/TRAINING PROGRAM

## 2018-08-22 PROCEDURE — 82330 ASSAY OF CALCIUM: CPT | Performed by: STUDENT IN AN ORGANIZED HEALTH CARE EDUCATION/TRAINING PROGRAM

## 2018-08-22 PROCEDURE — 83735 ASSAY OF MAGNESIUM: CPT | Performed by: THORACIC SURGERY (CARDIOTHORACIC VASCULAR SURGERY)

## 2018-08-22 PROCEDURE — 25000128 H RX IP 250 OP 636

## 2018-08-22 PROCEDURE — 85520 HEPARIN ASSAY: CPT | Performed by: INTERNAL MEDICINE

## 2018-08-22 PROCEDURE — C1769 GUIDE WIRE: HCPCS

## 2018-08-22 PROCEDURE — 86850 RBC ANTIBODY SCREEN: CPT | Performed by: INTERNAL MEDICINE

## 2018-08-22 PROCEDURE — 92610 EVALUATE SWALLOWING FUNCTION: CPT | Mod: GN

## 2018-08-22 PROCEDURE — P9041 ALBUMIN (HUMAN),5%, 50ML: HCPCS

## 2018-08-22 PROCEDURE — 85027 COMPLETE CBC AUTOMATED: CPT | Performed by: THORACIC SURGERY (CARDIOTHORACIC VASCULAR SURGERY)

## 2018-08-22 PROCEDURE — 93010 ELECTROCARDIOGRAM REPORT: CPT | Performed by: INTERNAL MEDICINE

## 2018-08-22 PROCEDURE — 80048 BASIC METABOLIC PNL TOTAL CA: CPT | Performed by: THORACIC SURGERY (CARDIOTHORACIC VASCULAR SURGERY)

## 2018-08-22 PROCEDURE — 93325 DOPPLER ECHO COLOR FLOW MAPG: CPT | Mod: 26 | Performed by: INTERNAL MEDICINE

## 2018-08-22 PROCEDURE — 25000125 ZZHC RX 250: Performed by: STUDENT IN AN ORGANIZED HEALTH CARE EDUCATION/TRAINING PROGRAM

## 2018-08-22 PROCEDURE — 85027 COMPLETE CBC AUTOMATED: CPT | Performed by: INTERNAL MEDICINE

## 2018-08-22 PROCEDURE — 85610 PROTHROMBIN TIME: CPT | Performed by: INTERNAL MEDICINE

## 2018-08-22 PROCEDURE — 27210195 ZZH KIT POWER PICC DOUBLE LUMEN

## 2018-08-22 PROCEDURE — 25000128 H RX IP 250 OP 636: Performed by: STUDENT IN AN ORGANIZED HEALTH CARE EDUCATION/TRAINING PROGRAM

## 2018-08-22 PROCEDURE — 36569 INSJ PICC 5 YR+ W/O IMAGING: CPT

## 2018-08-22 PROCEDURE — 80053 COMPREHEN METABOLIC PANEL: CPT | Performed by: INTERNAL MEDICINE

## 2018-08-22 PROCEDURE — 84100 ASSAY OF PHOSPHORUS: CPT | Performed by: INTERNAL MEDICINE

## 2018-08-22 PROCEDURE — 25000128 H RX IP 250 OP 636: Performed by: SURGERY

## 2018-08-22 PROCEDURE — 20000004 ZZH R&B ICU UMMC

## 2018-08-22 PROCEDURE — 71045 X-RAY EXAM CHEST 1 VIEW: CPT

## 2018-08-22 PROCEDURE — A9270 NON-COVERED ITEM OR SERVICE: HCPCS | Mod: GY | Performed by: INTERNAL MEDICINE

## 2018-08-22 PROCEDURE — 40000225 ZZH STATISTIC SLP WARD VISIT

## 2018-08-22 PROCEDURE — 27210429 ZZH NUTRITION PRODUCT INTERMEDIATE LITER

## 2018-08-22 PROCEDURE — 93308 TTE F-UP OR LMTD: CPT | Mod: 26 | Performed by: INTERNAL MEDICINE

## 2018-08-22 PROCEDURE — 25000125 ZZHC RX 250: Performed by: SURGERY

## 2018-08-22 PROCEDURE — 40000275 ZZH STATISTIC RCP TIME EA 10 MIN

## 2018-08-22 PROCEDURE — 40000014 ZZH STATISTIC ARTERIAL MONITORING DAILY

## 2018-08-22 PROCEDURE — 82274 ASSAY TEST FOR BLOOD FECAL: CPT | Performed by: THORACIC SURGERY (CARDIOTHORACIC VASCULAR SURGERY)

## 2018-08-22 PROCEDURE — 86900 BLOOD TYPING SEROLOGIC ABO: CPT | Performed by: INTERNAL MEDICINE

## 2018-08-22 PROCEDURE — 93325 DOPPLER ECHO COLOR FLOW MAPG: CPT

## 2018-08-22 PROCEDURE — 86901 BLOOD TYPING SEROLOGIC RH(D): CPT | Performed by: INTERNAL MEDICINE

## 2018-08-22 PROCEDURE — 99291 CRITICAL CARE FIRST HOUR: CPT | Mod: GC | Performed by: ANESTHESIOLOGY

## 2018-08-22 PROCEDURE — 40000986 XR CHEST PORT 1 VW

## 2018-08-22 PROCEDURE — 93005 ELECTROCARDIOGRAM TRACING: CPT

## 2018-08-22 PROCEDURE — 82805 BLOOD GASES W/O2 SATURATION: CPT | Performed by: STUDENT IN AN ORGANIZED HEALTH CARE EDUCATION/TRAINING PROGRAM

## 2018-08-22 RX ORDER — SODIUM CHLORIDE 9 MG/ML
INJECTION, SOLUTION INTRAVENOUS
Status: DISCONTINUED
Start: 2018-08-22 | End: 2018-08-24 | Stop reason: HOSPADM

## 2018-08-22 RX ORDER — LIDOCAINE 40 MG/G
CREAM TOPICAL
Status: DISCONTINUED | OUTPATIENT
Start: 2018-08-22 | End: 2018-09-11 | Stop reason: HOSPADM

## 2018-08-22 RX ORDER — HEPARIN SODIUM,PORCINE 10 UNIT/ML
5-10 VIAL (ML) INTRAVENOUS
Status: DISCONTINUED | OUTPATIENT
Start: 2018-08-22 | End: 2018-09-11 | Stop reason: HOSPADM

## 2018-08-22 RX ORDER — HEPARIN SODIUM,PORCINE 10 UNIT/ML
2-5 VIAL (ML) INTRAVENOUS
Status: COMPLETED | OUTPATIENT
Start: 2018-08-22 | End: 2018-09-02

## 2018-08-22 RX ORDER — DOPAMINE HYDROCHLORIDE 160 MG/100ML
2-20 INJECTION, SOLUTION INTRAVENOUS CONTINUOUS
Status: DISCONTINUED | OUTPATIENT
Start: 2018-08-22 | End: 2018-08-22

## 2018-08-22 RX ORDER — DOPAMINE HYDROCHLORIDE 160 MG/100ML
INJECTION, SOLUTION INTRAVENOUS
Status: DISCONTINUED
Start: 2018-08-22 | End: 2018-08-24 | Stop reason: HOSPADM

## 2018-08-22 RX ORDER — HEPARIN SODIUM,PORCINE 10 UNIT/ML
5-10 VIAL (ML) INTRAVENOUS EVERY 24 HOURS
Status: DISCONTINUED | OUTPATIENT
Start: 2018-08-22 | End: 2018-09-11 | Stop reason: HOSPADM

## 2018-08-22 RX ORDER — LIDOCAINE 40 MG/G
CREAM TOPICAL
Status: DISCONTINUED | OUTPATIENT
Start: 2018-08-22 | End: 2018-08-30 | Stop reason: CLARIF

## 2018-08-22 RX ADMIN — LEVOTHYROXINE SODIUM 75 MCG: 75 TABLET ORAL at 07:46

## 2018-08-22 RX ADMIN — LIDOCAINE HYDROCHLORIDE 3.5 ML: 10 INJECTION, SOLUTION EPIDURAL; INFILTRATION; INTRACAUDAL; PERINEURAL at 11:42

## 2018-08-22 RX ADMIN — ATORVASTATIN CALCIUM 80 MG: 80 TABLET, FILM COATED ORAL at 20:46

## 2018-08-22 RX ADMIN — NOREPINEPHRINE BITARTRATE 0.06 MCG/KG/MIN: 1 INJECTION INTRAVENOUS at 01:02

## 2018-08-22 RX ADMIN — Medication 1 PACKET: at 14:21

## 2018-08-22 RX ADMIN — CLOPIDOGREL 75 MG: 75 TABLET, FILM COATED ORAL at 07:46

## 2018-08-22 RX ADMIN — MULTIVITAMIN 15 ML: LIQUID ORAL at 07:46

## 2018-08-22 RX ADMIN — Medication 1000 MG: at 07:48

## 2018-08-22 RX ADMIN — PANTOPRAZOLE SODIUM 40 MG: 40 TABLET, DELAYED RELEASE ORAL at 07:49

## 2018-08-22 RX ADMIN — QUETIAPINE 50 MG: 50 TABLET ORAL at 22:17

## 2018-08-22 RX ADMIN — SODIUM CHLORIDE 8 MG/HR: 9 INJECTION, SOLUTION INTRAVENOUS at 10:28

## 2018-08-22 RX ADMIN — Medication 1 PACKET: at 07:46

## 2018-08-22 RX ADMIN — DOPAMINE HYDROCHLORIDE 2 MCG/KG/MIN: 160 INJECTION, SOLUTION INTRAVENOUS at 00:13

## 2018-08-22 RX ADMIN — Medication 1 PACKET: at 20:47

## 2018-08-22 RX ADMIN — Medication 1000 MG: at 20:49

## 2018-08-22 RX ADMIN — ALBUMIN HUMAN 12.5 G: 0.05 INJECTION, SOLUTION INTRAVENOUS at 00:15

## 2018-08-22 RX ADMIN — POTASSIUM CHLORIDE 20 MEQ: 1.5 POWDER, FOR SOLUTION ORAL at 11:01

## 2018-08-22 RX ADMIN — ASPIRIN 81 MG CHEWABLE TABLET 81 MG: 81 TABLET CHEWABLE at 07:46

## 2018-08-22 RX ADMIN — SODIUM CHLORIDE 8 MG/HR: 9 INJECTION, SOLUTION INTRAVENOUS at 18:33

## 2018-08-22 RX ADMIN — SODIUM CHLORIDE, PRESERVATIVE FREE 10 ML: 5 INJECTION INTRAVENOUS at 14:21

## 2018-08-22 ASSESSMENT — ACTIVITIES OF DAILY LIVING (ADL)
ADLS_ACUITY_SCORE: 12
ADLS_ACUITY_SCORE: 13
ADLS_ACUITY_SCORE: 12

## 2018-08-22 NOTE — PLAN OF CARE
Problem: Patient Care Overview  Goal: Plan of Care/Patient Progress Review  Outcome: No Change  D: Afebrile. Sinus tachycardia. Normotensive. A&O. On 3L NC.  I/A: No pacing noted this shift. EP consulted. Off pressors since 0700. Working on pulmonary toileting. Dark/black stool noted this morning, sample sent to lab. Protonix gtt ordered, running at 8mg/hr. Complaint of some nausea, resolved. No need for antiemetics. GI wanting to monitor to see how pt does overnight, trending hemoglobins. 2 units RBCs given today. Holding lasix today. Adequate UOP. 1 large BM, some smearing. Minimal output from chest tubes. Informed to hold tube feeds overnight, held at 1400, Ok to give meds. Prior to holding tube feeds, at goal at 60 and free water flushed changed to 100cc q4 hours today.  K replaced. New PICC in right arm, verified OK to use. Purple port is hep locked. Up in chair today, with some stands. Left groin CVC taken out. Heparin gtt is off.   P: Continues to monitor and assess pt. Consult CVTS with any new changes/concerns.

## 2018-08-22 NOTE — CONSULTS
GASTROENTEROLOGY CONSULTATION      Date of Admission:  8/6/2018  Date of Service:   8/22/2018          ASSESSMENT AND RECOMMENDATIONS:   Assessment:  Castillo De Anda is a 68 year old male with h/o thrombocytosis, meningioma s/p radiation and surgery, and STEMI 8/4/18, treated with HUSSEIN to RCA and LAD, c/b basal VSD s/p posterior repair and TVR 8/10, with chest closure 8/13, IABP removal 8/14, and extubation 8/21. Continued on ASA and Plavix.  He had signs of hemodynamically significant GI bleeding in the past day, with coffee ground material from OG prior to extubation and an episode of melena with associated hypotension.  However, hypotension quickly resolved and he responded appropriately to transfusion.  Discussed risks and benefits of pursuing EGD today.  Would likely be a diagnostic exam at this point, as he has no ongoing signs of rapid, or hemodynamically significant bleeding.  Expect upper GI bleeding is most likely related to a stress ulcer, considering recent critical illness and intubation, but differential also includes OG trauma, esophagitis, AVM, GAVE, dieulafoy lesion, and less likely malignancy.  Would defer diagnostic exam to a time with Mr. De Anda is a little less tenuous, as risk of EGD outweighs benefit at this time.  Will continue IV PPI ggt to help treat likely ulceration vs esophagitis/gastritis.  If there is any sign of recurrent, hemodynamically significant bleeding, remain available for EGD.    Recommendations  - Continue NPO and hold tube feeds for tonight, can resume in the morning if no concerns for bleeding overnight  - Continue PPI IV ggt  - Continue to trend hgb, transfuse as needed  - Monitor and document stool output closely  - Continue monitoring vitals closely    Gastroenterology follow up recommendations: Will continue to follow    Thank you for involving us in this patient's care. Please do not hesitate to contact the GI service with any questions or concerns, or if   Neetu develops signs of hemodynamically significant bleeding    Pt care plan discussed with Dr. Guzmán, GI staff physician.    Stacy Engel MD  GI Fellow  [phone]  -------------------------------------------------------------------------------------------------------------------          Reason for Consult:   We were asked by Dr. Franco to evaluate this patient with GI bleeding    History is obtained from the patient and the medical record.          History of Present Illness:     Castillo De Anda is a 68 year old male with h/o thrombocytosis, meningioma s/p radiation and surgery, and STEMI 8/4/18, treated with HUSSEIN to RCA and LAD, c/b basal VSD s/p posterior repair and TVR 8/10, with chest closure 8/13, IABP removal 8/14, and extubation 8/21.  He was noted to have some coffee ground material through g-tube prior to extubation, but there was no change in vitals or hgb at that time.  Following extubation, he was noted to have hgb decrease to 6.1 from 7.7 with associated hypotension requiring pressors.  He had one episode of melena.  Received blood transfusion, and hypotension resolved and hgb increased appropriately.  He reports a baseline of formed, daily, brown stool.  Denies abd pain, nausea or vomiting.  He has been tolerating tube feeds.  Through the day today has had one smear of black stool.  On ASA and plavix, and denies other NSAIDS.  Has been on pantoprazole 40mg daily through IV and then PO in the last few days.  Says breathing is better today than it was yesterday.           Review of Systems:   A 10 point review of systems was performed and is negative except as noted in the HPI and generalized weakness, discomfort related to recent surgery          Past Medical History:   Reviewed and edited as appropriate  Past Medical History:   Diagnosis Date     DVT (deep venous thrombosis) (H) 2008    after craniotomy     Meningioma (H)      Polycythemia vera (H)      Prostate cancer (H)             Past  Surgical History:   Reviewed and edited as appropriate   Past Surgical History:   Procedure Laterality Date     ANKLE SURGERY Right 04/2018     APPENDECTOMY       CRANIOTOMY Right 2008     IRRIGATION AND DEBRIDEMENT CHEST WASHOUT, COMBINED N/A 8/13/2018    Procedure: COMBINED IRRIGATION AND DEBRIDEMENT CHEST WASHOUT;  COMBINED IRRIGATION AND DEBRIDEMENT CHEST WASHOUT, Closure;  Surgeon: Jason Franco MD;  Location: [facility] OR     PROSTATECTOMY PERINEAL  2004     REPAIR VENTRICULAR SEPTAL DEFECT N/A 8/10/2018    Procedure: REPAIR VENTRICULAR SEPTAL DEFECT;  Median Sternotomy, REPAIR VENTRICULAR SEPTAL DEFECT on pump oxygenation, Tricuspid valve replacement ;  Surgeon: Jason Franco MD;  Location: [facility] OR     SPLENECTOMY      childhood     THYROID SURGERY  2012            Previous Endoscopy:   None         Social History:   Reviewed and edited as appropriate  Social History     Social History     Marital status:      Spouse name: Radha     Number of children: N/A     Years of education: N/A     Occupational History     Not on file.     Social History Main Topics     Smoking status: Former Smoker     Smokeless tobacco: Never Used     Alcohol use 1.2 oz/week     2 Shots of liquor per week     Drug use: Not on file     Sexual activity: Not on file     Other Topics Concern     Not on file     Social History Narrative    Wife is Radha            Family History:   Reviewed and edited as appropriate  Family History   Problem Relation Age of Onset     Hypertension Mother      Cerebrovascular Disease Paternal Uncle            Allergies:   Reviewed and edited as appropriate     Allergies   Allergen Reactions     Anagrelide Rash     Noted to have small rash and nose bleeds after starting the prescription (prior to August 2018 hospitalization; prescribed by [facility] clinician); dosage had then been increased (by [facility] clinician) and subsequently developed full body rash within 4 hours of increased dose.             Medications:       Current Facility-Administered Medications:      acetaminophen (TYLENOL) tablet 650 mg, 650 mg, Oral or Feeding Tube, Q4H PRN, Camilo Hutchison MD, 650 mg at 08/18/18 1639     aspirin chewable tablet 81 mg, 81 mg, Oral or Feeding Tube, Daily, Camilo Hutchison MD, 81 mg at 08/22/18 0746     atorvastatin (LIPITOR) tablet 80 mg, 80 mg, Oral or Feeding Tube, QPM, Brad Hawkins MD, 80 mg at 08/21/18 1947     benzocaine-menthol (CEPACOL) 15-3.6 MG lozenge 1 lozenge, 1 lozenge, Buccal, Q1H PRN, Arnaldo Viera MD, 1 lozenge at 08/08/18 1805     calcium gluconate 1 g in D5W 100 mL intermittent infusion, 1 g, Intravenous, Continuous PRN, Sal Chun MD, 1 g at 08/15/18 0929     Carboxymethylcellulose Sod PF (REFRESH PLUS) 0.5 % ophthalmic solution 1 drop, 1 drop, Both Eyes, TID PRN, Brad Hawkins MD, 1 drop at 08/21/18 1947     clopidogrel (PLAVIX) tablet 75 mg, 75 mg, Oral, Daily, Arnaldo Viera MD, 75 mg at 08/22/18 0746     glucose gel 15-30 g, 15-30 g, Oral, Q15 Min PRN **OR** dextrose 50 % injection 25-50 mL, 25-50 mL, Intravenous, Q15 Min PRN **OR** glucagon injection 1 mg, 1 mg, Subcutaneous, Q15 Min PRN, Arnaldo Viera MD     docusate (COLACE) 50 MG/5ML liquid 100 mg, 100 mg, Oral or Feeding Tube, BID, Brad Hawkins MD, 100 mg at 08/19/18 0755     heparin lock flush 10 UNIT/ML injection 2-5 mL, 2-5 mL, Intracatheter, Once PRN, Brad Hawkins MD     heparin lock flush 10 UNIT/ML injection 5-10 mL, 5-10 mL, Intracatheter, Q24H, Brad Hawkins MD, 10 mL at 08/22/18 1421     heparin lock flush 10 UNIT/ML injection 5-10 mL, 5-10 mL, Intracatheter, Q1H PRN, Brad Hawkins MD     HOLD nitroGLYcerin IF, , Does not apply, HOLD, Camilo Hutchison MD     hydrALAZINE (APRESOLINE) injection 10 mg, 10 mg, Intravenous, Q30 Min PRN, Hiram Meyers MD     HYDROmorphone (PF) (DILAUDID) injection 0.3-0.5 mg, 0.3-0.5 mg,  Intravenous, Q2H PRN, Hiram Meyers MD, 0.5 mg at 08/16/18 0203     hydroxyurea (HYDREA/DROXIA) suspension CHEMOTHERAPY 1,000 mg, 1,000 mg, Oral or Feeding Tube, BID, Brad Hawkins MD, 1,000 mg at 08/22/18 0748     insulin aspart (NovoLOG) inj (RAPID ACTING), 1-6 Units, Subcutaneous, Q4H, Arnaldo Viera MD, Stopped at 08/17/18 1659     levothyroxine (SYNTHROID/LEVOTHROID) tablet 75 mcg, 75 mcg, Oral or Feeding Tube, Daily, Qi, Jose Mreino MD, 75 mcg at 08/22/18 0746     lidocaine (LMX4) cream, , Topical, Once PRN, Brad Hawkins MD     lidocaine (LMX4) cream, , Topical, Q1H PRN, Brad Hawkins MD     lidocaine (LMX4) cream, , Topical, Q1H PRN, Hiram Meyers MD     lidocaine 1 % 1 mL, 1 mL, Other, Q1H PRN, Brad Hawkins MD     lidocaine 1 % 1 mL, 1 mL, Other, Q1H PRN, Hiram Meyers MD     magnesium sulfate 2 g in NS intermittent infusion (PharMEDium or FV Cmpd), 2 g, Intravenous, Daily PRN, Hiram Meyers MD, 2 g at 08/20/18 0604     magnesium sulfate 4 g in 100 mL sterile water (premade), 4 g, Intravenous, Q4H PRN, Hiram Meyers MD     melatonin tablet 1 mg, 1 mg, Oral, At Bedtime PRN, Hiram Meyers MD     methocarbamol (ROBAXIN) tablet 500 mg, 500 mg, Oral or Feeding Tube, 4x Daily PRN, Camilo Hutchison MD     multivitamins with minerals (CERTAVITE/CEROVITE) liquid 15 mL, 15 mL, Per Feeding Tube, Daily, Arnaldo Viera MD, 15 mL at 08/22/18 0746     naloxone (NARCAN) injection 0.1-0.4 mg, 0.1-0.4 mg, Intravenous, Q2 Min PRN, Hiram Meyers MD     norepinephrine (LEVOPHED) 16 mg in D5W 250 mL infusion, 0.03-0.4 mcg/kg/min (Dosing Weight), Intravenous, Continuous, Bernice Farris MD, Stopped at 08/22/18 0700     ondansetron (ZOFRAN-ODT) ODT tab 4 mg, 4 mg, Oral, Q6H PRN **OR** ondansetron (ZOFRAN) injection 4 mg, 4 mg, Intravenous, Q6H PRN, Hiram Meyers MD, 4 mg at 08/19/18 0711     oxyCODONE IR (ROXICODONE) tablet 5-10 mg, 5-10 mg, Per Feeding Tube, Q4H PRN,  Camilo Hutchison MD     pantoprazole (PROTONIX) 80 mg in sodium chloride 0.9 % 100 mL infusion, 8 mg/hr, Intravenous, Continuous, Brad Hawkins MD, Last Rate: 10 mL/hr at 08/22/18 1500, 8 mg/hr at 08/22/18 1500     polyethylene glycol (MIRALAX/GLYCOLAX) Packet 17 g, 17 g, Oral or Feeding Tube, Daily, Brad Hawkins MD, 17 g at 08/14/18 0815     potassium chloride (KLOR-CON) Packet 20-40 mEq, 20-40 mEq, Oral or Feeding Tube, Q2H PRN, Hiram Meyers MD, 20 mEq at 08/22/18 1101     potassium chloride 10 mEq in 100 mL intermittent infusion with 10 mg lidocaine, 10 mEq, Intravenous, Q1H PRN, Hiram Meyers MD     potassium chloride 10 mEq in 100 mL sterile water intermittent infusion (premix), 10 mEq, Intravenous, Q1H PRN, Hiram Meyers MD     potassium chloride 20 mEq in 50 mL intermittent infusion, 20 mEq, Intravenous, Q1H PRN, Hiram Meyers MD, Last Rate: 50 mL/hr at 08/17/18 0758, 20 mEq at 08/17/18 0758     potassium chloride SA (K-DUR/KLOR-CON M) CR tablet 20-40 mEq, 20-40 mEq, Oral, Q2H PRN, Hiram Meyers MD     potassium phosphate 15 mmol in D5W 250 mL intermittent infusion, 15 mmol, Intravenous, Daily PRN, Hiram Meyers MD, 15 mmol at 08/16/18 0528     potassium phosphate 20 mmol in D5W 250 mL intermittent infusion, 20 mmol, Intravenous, Q6H PRN, Hiram Meyers MD, Last Rate: 62.5 mL/hr at 08/15/18 0540, 20 mmol at 08/17/18 0753     potassium phosphate 20 mmol in D5W 500 mL intermittent infusion, 20 mmol, Intravenous, Q6H PRN, Hiram Meyers MD     potassium phosphate 25 mmol in D5W 500 mL intermittent infusion, 25 mmol, Intravenous, Q8H PRN, Hiram Meyers MD     prochlorperazine (COMPAZINE) injection 5 mg, 5 mg, Intravenous, Q6H PRN **OR** prochlorperazine (COMPAZINE) tablet 5 mg, 5 mg, Oral, Q6H PRN, Hiram Meyers MD     protein modular (PROSource TF) 1 packet, 1 packet, Per Feeding Tube, TID, Arnaldo Viera MD, 1 packet at 08/22/18 1421     QUEtiapine (SEROquel) tablet 25  "mg, 25 mg, Oral or Feeding Tube, BID PRN, Camilo Hutchison MD, 25 mg at 08/19/18 0100     QUEtiapine (SEROquel) tablet 50 mg, 50 mg, Oral or Feeding Tube, At Bedtime, Brad Hawkins MD, 50 mg at 08/21/18 2304     Reason ACE/ARB/ARNI order not selected, , Other, DOES NOT GO TO [facility] Marc Richard, MD     Reason beta blocker not prescribed, , Does not apply, DOES NOT GO TO [facility] Marc Richard, MD     Reason beta blocker order not selected, , Does not apply, DOES NOT GO TO [facility] Ryan C, MD     senna-docusate (SENOKOT-S;PERICOLACE) 8.6-50 MG per tablet 1 tablet, 1 tablet, Oral or Feeding Tube, BID, Stopped at 08/18/18 2027 **OR** senna-docusate (SENOKOT-S;PERICOLACE) 8.6-50 MG per tablet 2 tablet, 2 tablet, Oral or Feeding Tube, BID, Camilo Hutchison MD     sodium chloride (PF) 0.9% PF flush 10-20 mL, 10-20 mL, Intracatheter, Q1H PRN, Brad Hawkins MD     sodium chloride (PF) 0.9% PF flush 3 mL, 3 mL, Intracatheter, Q1H PRN, Hiram Meeyrs MD, 3 mL at 08/21/18 0333     sodium chloride (PF) 0.9% PF flush 3 mL, 3 mL, Intracatheter, Q8H, Hiram Meyers MD, 3 mL at 08/22/18 0749     sodium chloride (PF) 0.9% PF flush 5-50 mL, 5-50 mL, Intracatheter, Once PRN, Brad Hawkins MD     sodium chloride 0.9 % infusion, , , ,            Physical Exam:   BP (!) 86/63  Temp 97.9  F (36.6  C) (Axillary)  Resp (P) 20  Ht 1.753 m (5' 9\")  Wt 79.2 kg (174 lb 9.7 oz)  SpO2 90%  BMI 25.78 kg/m2  Wt:   Wt Readings from Last 2 Encounters:   08/21/18 79.2 kg (174 lb 9.7 oz)      Constitutional: Cooperative, no acute distress, initially tachypnic and this improved on subsequent exam  Eyes: Sclera anicteric  Ears/nose/mouth/throat: Mucus membranes moist, hearing intact  CV: Tachycardic  Respiratory: Initially tachypnic, and this improved on subsequent exam. On oxygen by NC.    Abd: Soft, nontender, nondistended, no peritoneal signs  Skin: Warm, perfused, no jaundice  Neuro: AAO, answers questions " appropriately  MSK: No gross deformities, surgical incision over chest c/d/i           Data:   All available labs, imaging, and procedure notes were independently reviewed and interpreted, pertinent values are as follow:    BMP    Recent Labs  Lab 08/22/18  0915 08/22/18  0340 08/21/18  2250 08/21/18  1550 08/21/18  0339    144  --  145* 148*   POTASSIUM 3.8 4.1 3.4 3.7 4.3   CHLORIDE 112* 112*  --  110* 112*   GABRIELA 7.5* 7.5*  --  8.4* 8.0*   CO2 24 27  --  26 28   BUN 62* 68*  --  66* 64*   CR 0.67 0.72  --  0.74 0.71   * 128*  --  108* 113*     CBC  Recent Labs  Lab 08/22/18  1221 08/22/18  0915 08/22/18  0340  08/21/18  1550 08/21/18  1105   WBC  --  8.3 8.1  --  9.2 8.4   RBC  --  2.35* 2.10*  --  2.79* 2.81*   HGB 8.0* 7.0* 6.1*  < > 8.1* 8.1*   HCT  --  21.3* 19.4*  --  25.5* 26.4*   MCV  --  91 92  --  91 94   MCH  --  29.8 29.0  --  29.0 28.8   MCHC  --  32.9 31.4*  --  31.8 30.7*   RDW  --  20.5* 22.7*  --  21.9* 20.9*   PLT  --  347 358  --  469* 460*   < > = values in this interval not displayed.  INR    Recent Labs  Lab 08/22/18 0340 08/21/18  0339 08/20/18  0315 08/19/18  0332   INR 1.27* 1.21* 1.16* 1.26*     LFTs    Recent Labs  Lab 08/22/18  0340 08/21/18  0339 08/20/18  0315 08/19/18  0332   ALKPHOS 158* 196* 220* 248*   AST 92* 86* 108* 188*   * 106* 132* 183*   BILITOTAL 0.3 0.2 0.3 0.4   PROTTOTAL 4.8* 5.0* 5.0* 4.8*   ALBUMIN 2.2* 1.6* 1.6* 1.6*        Imaging:  Exam: XR CHEST PORT 1 VW, 8/22/2018 1:17 AM     Indication: hypotension in post op patient;      Comparison: Radiograph of the chest 8/21/2018     Findings:   AP view of the chest. Interval removal of the endotracheal tube.  Enteric tube courses below level the diaphragm and outside the  inferior field of view. Bilateral chest tubes are stable. 3 drains  project over the mediastinum. Defibrillator pads project over the  inferior heart. Multiple epicardial pacemaker leads. The  cardiomediastinal silhouette is enlarged.  The pulmonary vasculature is  indistinct, and the main pulmonary artery is prominent. Bilateral  mixed airspace opacities increased since the prior radiograph. Left  lung base opacity. Small left pleural effusion. No pneumothorax. The  upper abdomen is unremarkable .          Impression:   1.  Interval extubation. Remaining support devices are stable,  including bilateral chest tubes and mediastinal drains.  2.  Mixed airspace opacities throughout both lungs, unchanged small  left pleural effusion, and prominent main pulmonary artery, most  compatible with pulmonary edema.

## 2018-08-22 NOTE — PROGRESS NOTES
08/22/18 0900   General Information   Onset Date 08/06/18   Start of Care Date 08/22/18   Referring Physician Brad Hawkins MD   Patient Profile Review/OT: Additional Occupational Profile Info See Profile for full history and prior level of function   Patient/Family Goals Statement Not stated    Swallowing Evaluation Bedside swallow evaluation   Behaviorial Observations WFL (within functional limits)   Mode of current nutrition NG   Respiratory Status O2 Supply   Type of O2 supply Nasal cannula   Comments Castillo De Anda is a 68 year old male s/p HUSSEIN to RCA and LAD for STEMI on 8/4 found to have post infarction basal VSD. Underwent repair of posterior VSD and TVR with bioprosthetic valve on 8/10, and was kept with open chest due to coagulopathy and RV dysfunction. Chest closed 8/13. IABP removed 8/14. Extubated 8/21. Pt alert and able to participate in bedside swallow evaluation. no family present    Clinical Swallow Evaluation   Oral Musculature generally intact   Structural Abnormalities none present   Dentition present and adequate   Mucosal Quality dry;adequate   Oral Labial Strength and Mobility WFL   Lingual Strength and Mobility WFL   Velar Elevation intact   Laryngeal Function Cough;Swallow;Voicing initiated  (Vocal quality is hoarse )   Additional Documentation Yes   Swallow Eval   Feeding Assistance frequent cues/help required   Clinical Swallow Eval: Thin Liquid Texture Trial   Mode of Presentation, Thin Liquids spoon;straw   Volume of Liquid or Food Presented 1 oz    Oral Phase of Swallow Premature pharyngeal entry   Pharyngeal Phase of Swallow throat clearing  (with a subsequent drop in O2 sats )   Diagnostic Statement Weak cough with thin liquids by straw in 1/3 trials, likely related to premature spillage from oral cavity and reduced airway protection    Clinical Swallow Eval: Nectar Thick Liquid Texture Trial   Mode of Presentation, Nectar straw   Volume of Nectar Presented 4 oz    Oral Phase,  Nectar Premature pharyngeal entry   Pharyngeal Phase, Sarben intact   Diagnostic Statement Pt tolerated 4 oz of nectar thick liquids by straw sip without s/sx of aspiration. Pt's vocal quality remained within dry, no changes in breath sounds    Clinical Swallow Eval: Puree Solid Texture Trial   Mode of Presentation, Puree spoon   Volume of Puree Presented 2 oz    Oral Phase, Puree (Prolonged bolus formation and posterior propulsion )   Oral Residue, Puree left anterior lateral sulci;right anterior lateral sulci  (very mild )   Pharyngeal Phase, Puree intact   Diagnostic Statement Prolonged oral manipulation and posterior propulsion. No s/sx of aspiraatoin    Swallow Compensations   Swallow Compensations Alternate viscosity of consistencies;Reduce amounts;Multiple swallow   Results No difficulties noted   Esophageal Phase of Swallow   Patient reports or presents with symptoms of esophageal dysphagia No   General Therapy Interventions   Planned Therapy Interventions Dysphagia Treatment   Dysphagia treatment Oropharyngeal exercise training;Modified diet education;Instruction of safe swallow strategies;Compensatory strategies for swallowing   Swallow Eval: Clinical Impressions   Skilled Criteria for Therapy Intervention Skilled criteria met.  Treatment indicated.   Functional Assessment Scale (FAS) 4   Treatment Diagnosis Mild-moderate oropharyngeal dysphagia    Diet texture recommendations Clear liquid;Nectar thick liquids   Recommended Feeding/Eating Techniques alternate between small bites and sips of food/liquid;maintain upright posture during/after eating for 30 mins;small sips/bites   Therapy Frequency 5 times/wk   Predicted Duration of Therapy Intervention (days/wks) 2 weeks   Anticipated Discharge Disposition inpatient rehabilitation facility   Risks and Benefits of Treatment have been explained. Yes   Patient, family and/or staff in agreement with Plan of Care Yes   Clinical Impression Comments Pt seen at  bedside for swallow evaluation post to evaluate for safest diet recommendation post multiple surgeries, a complex medical course and multiple intubations. Pt denies a hx of dysphagia, no family present. Pt assessed with thin and nectar thick liquids as well as puree solids. Oral phase is functional in bolus acceptance, disorganized bolus manipulation, prolonged posterior propulsion and premature spillage of liquids to the pharynx. Weak cough following thin liquid trial - suspect related to premature spillage from oral cavity in combination with reduced pharyngeal strength and decreased airway protection given vocal quality following intubation. Pt tolerated 4 oz of nectar thick liquids when given by small/single straw sip. Prolonged oral phase with puree, no s/sx of aspiration. Recommend initiating a clear liquid diet with nectar thick liquids. Pt to have suspervision with oral intake, sitting upright at 90 degrees, and allowed small/single sips.    Total Evaluation Time   Total Evaluation Time (Minutes) 15

## 2018-08-22 NOTE — PROGRESS NOTES
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 Underwent repair of posterior VSD and TVR with bioprosthetic valve on 8/10, and was kept with open chest due to coagulopathy and RV dysfunction. Chest closed 8/13. IABP removed 8/14. Extubated 8/21.    TODAY'S PROGRESS:   -Hgb trend overnight to 6.1, transfuse two PRBC this morning  -Likely GI Bleed due to guaiac+ OG output, stool guaiac sent   -Consult GI for evaluation   -Heparin gtt stopped   -Will continue ASA/Plavix for HUSSEIN   -IV Protonix infusion started  -Consult EP Cardiology given overnight events (SVT to 120-130's with MAP in 50's, s/p adenosine with prolonged asystole and \"brief\" CPR with ROSC, no intubation needed)  -Monitor respiratory status  -Abdominal XR to evaluate stomach   -Consider OG tube placement if distended  -Hold lasix for today      PLAN:  Neuro/ pain/ sedation:  #Post operative sedation  #Questionable seizure activity (negative follow up EEG 8/16)  - PRN Quetiapine 50mg for sleep at night  - Monitor neurological status. Notify the MD for any acute changes in exam.  - Discussed with neurology:    -Keppra discontinued 8/20    -Follow up with neurology as outpatient in 3 months    -Repeat EEG on 8/16 without seizure activity    -CT head if acute neuro changes    Pulmonary care:  #Post operative mechanical ventilation   - Extubated 8/21, BiPAP if needed    Cardiovascular:  #Post infarction 2.5cm infraseptal VSD  #s/p HUSSEIN to distal RCA and proximal LAD  #STEMI, cardiogenic shock  #Dilated RV   #Pulmonary HTN  #IABP  #s/p repair of posterior VSD and TVR with bioprosthetic valve, s/p Chest closure 8/13  #Atrial flutter  - Cards 2 reconsulted, appreciate assist   - " Monitor hemodynamic status. Pacer at VVI at 60  - Infusions: Weaned off epi and dobutamine 8/16  - ECHO from 8/8 prior to surgery: Inferior wall akinesis with inferoseptal muscular VAD. Mild to moderate right ventricular dilation is present. Global right ventricular function is moderately reduced.   -Follow up ECHO 8/20 -CVTS with plans to monitor RV effusion noted on ECHO (8/20) - repeat ELISABETH in one week (8/27) to evaluate RV effusion (orders in)  - ASA 81mg  - EP consult regarding underlying atrial flutter.    Heme:  #Anticoagulation s/p Drug Eluting Stent placement  #Thrombocythemia  - Cangrelor discontinued, heparin 400 U started  - 81mg ASA  - Plavix loaded 8/15 (75mg daily thereafter)  - Hydroxyurea 1g BID, hematology consulted and following    GI care:  - NJ placed 8/13, TF at goal  - Rectal tube placed 8/15  - GI consult for GI bleed    Fluids/ Electrolytes/ Nutrition:  #Hypernatremia   - mIVF for IV fluid hydration  - Nutri-phos 4x/day for lyte replacement  - TFs with  Q4h  - Wiggins    Renal/ Fluid Balance:    #Acute Kidney injury  - Urine output stable overnight, Creatinine reassuring  - Will continue to monitor intake and output via wiggins  - Diuresis with 40mg TID - net negative goal ~1 liter  - Diamox 500mg once (8/21)     Endocrine:  #Stress hyperglycemia  #Hypothyroidism  - off Insulin gtt, on ISS  - Levothyroxine    ID/ Antibiotics:  - Monitor fever curve, trend WBC    Prophylaxis:    - Mechanical prophylaxis for DVT.   - ASA, plavix, heparin low intensity started (8/21), plan for warfarin initiation once definitive plans made on current V-pacing wires in place  - Protonix    Lines/ tubes/ drains:  - arterial line, PIV, CVC    Disposition:  - [facility].    Patient seen, findings and plan discussed with [facility] ICU staff Dr. Terrence Leslie MD  PGY-3 General Surgery        ====================================    SUBJECTIVE:   Overnight with episode of unstable SVT which was treated with Adenosine.  Patient subsequently had asystole of >15 seconds, compressions started with return of rhythm after 10 compressions. Rhythm returned as Aflutter, metoprolol given and he became hypotensive. Left fem CVC emergently placed and norepi started. Patient remained extubated throughout. This morning improving and off norepi, breathing well on NC.    OBJECTIVE:   1. VITAL SIGNS:   Temp:  [97  F (36.1  C)-97.8  F (36.6  C)] 97  F (36.1  C)  Heart Rate:  [] 102  Resp:  [6-50] 36  MAP:  [47 mmHg-276 mmHg] 75 mmHg  Arterial Line BP: ()/(40-76) 102/60  FiO2 (%):  [3 %] 3 %  SpO2:  [37 %-100 %] 100 %  Ventilation Mode: CPAP/PS  (Continuous positive airway pressure with Pressure Support)  FiO2 (%): 3 %  Rate Set (breaths/minute): 14 breaths/min  Tidal Volume Set (mL): 550 mL  PEEP (cm H2O): 5 cmH2O  Pressure Support (cm H2O): 7 cmH2O  Oxygen Concentration (%): 30 %  Resp: 36    2. INTAKE/ OUTPUT:   I/O last 3 completed shifts:  In: 4358.12 [I.V.:1098.12; NG/GT:1530]  Out: 3845 [Urine:3465; Emesis/NG output:100; Chest Tube:280]    3. PHYSICAL EXAMINATION:   General: Stable on nasal cannula post extubation  Neuro: interactive, alert, following commands  Resp: On nasal cannula after extubation, no distress  CV: tachycardia, some irregularity  Abdomen: Soft, dressings in place  Incisions: Dressing over chest, chest tubes in place, closed chest  Extremities: warm and well perfused    4. INVESTIGATIONS:   Arterial Blood Gases     Recent Labs  Lab 08/22/18  0350 08/21/18  2335 08/21/18  1240 08/21/18  1105   PH 7.45 7.51* 7.48* 7.48*   PCO2 38 33* 37 36   PO2 125* 70* 84 117*   HCO3 26 26 28 27     Complete Blood Count     Recent Labs  Lab 08/22/18  0915 08/22/18  0340 08/21/18  2250 08/21/18  1550 08/21/18  1105   WBC 8.3 8.1  --  9.2 8.4   HGB 7.0* 6.1* 7.7* 8.1* 8.1*    358  --  469* 460*     Basic Metabolic Panel    Recent Labs  Lab 08/22/18  0340 08/21/18  2250 08/21/18  1550 08/21/18  0339  08/20/18  0315      --  145* 148*  --  148*   POTASSIUM 4.1 3.4 3.7 4.3  < > 4.0   CHLORIDE 112*  --  110* 112*  --  114*   CO2 27  --  26 28  --  29   BUN 68*  --  66* 64*  --  55*   CR 0.72  --  0.74 0.71  --  0.68   *  --  108* 113*  --  108*   < > = values in this interval not displayed.  Liver Function Tests    Recent Labs  Lab 08/22/18  0340 08/21/18  0339 08/20/18  0315 08/19/18  0332   AST 92* 86* 108* 188*   * 106* 132* 183*   ALKPHOS 158* 196* 220* 248*   BILITOTAL 0.3 0.2 0.3 0.4   ALBUMIN 2.2* 1.6* 1.6* 1.6*   INR 1.27* 1.21* 1.16* 1.26*     Pancreatic Enzymes  No lab results found in last 7 days.  Coagulation Profile    Recent Labs  Lab 08/22/18  0340 08/21/18  0339 08/20/18  0315 08/19/18  0332 08/18/18  0346   INR 1.27* 1.21* 1.16* 1.26* 1.37*   PTT  --  46* 48* 47* 45*         5. RADIOLOGY:   Recent Results (from the past 24 hour(s))   XR Chest Port 1 View    Narrative    Exam: XR CHEST PORT 1 VW, 8/22/2018 1:17 AM    Indication: hypotension in post op patient;     Comparison: Radiograph of the chest 8/21/2018    Findings:   AP view of the chest. Interval removal of the endotracheal tube.  Enteric tube courses below level the diaphragm and outside the  inferior field of view. Bilateral chest tubes are stable. 3 drains  project over the mediastinum. Defibrillator pads project over the  inferior heart. Multiple epicardial pacemaker leads. The  cardiomediastinal silhouette is enlarged. The pulmonary vasculature is  indistinct, and the main pulmonary artery is prominent. Bilateral  mixed airspace opacities increased since the prior radiograph. Left  lung base opacity. Small left pleural effusion. No pneumothorax. The  upper abdomen is unremarkable .       Impression    Impression:   1.  Interval extubation. Remaining support devices are stable,  including bilateral chest tubes and mediastinal drains.  2.  Mixed airspace opacities throughout both lungs, unchanged small  left pleural effusion, and prominent main  pulmonary artery, most  compatible with pulmonary edema.    I have personally reviewed the examination and initial interpretation  and I agree with the findings.    LORENA GUZMNA MD

## 2018-08-22 NOTE — PLAN OF CARE
Problem: Patient Care Overview  Goal: Plan of Care/Patient Progress Review  Discharge Planner SLP   Patient plan for discharge: Did not discuss  Current status: Pt seen at bedside for swallow evaluation post to evaluate for safest diet recommendation post multiple surgeries, a complex medical course and multiple intubations. Pt denies a hx of dysphagia, no family present. Pt assessed with thin and nectar thick liquids as well as puree solids. Oral phase is functional in bolus acceptance, disorganized bolus manipulation, prolonged posterior propulsion and premature spillage of liquids to the pharynx. Weak cough following thin liquid trial - suspect related to premature spillage from oral cavity in combination with reduced pharyngeal strength and decreased airway protection given vocal quality following intubation. Pt tolerated 4 oz of nectar thick liquids when given by small/single straw sip. Prolonged oral phase with puree, no s/sx of aspiration.     Mild-moderate dysphagia characterized by reduced oral control, weak pharyngeal phase of swallow and reduced airway protection. Recommend initiating a clear liquid diet with nectar thick liquids. Pt to have suspervision with oral intake, sitting upright at 90 degrees, and allowed small/single sips.     Barriers to return to prior living situation: Below baseline, medical complexity   Recommendations for discharge: Likely need for going ST  Rationale for recommendations: Modified diet, aspiration risk        Entered by: Felisha Sorto 08/22/2018 10:02 AM

## 2018-08-22 NOTE — PLAN OF CARE
Problem: Patient Care Overview  Goal: Plan of Care/Patient Progress Review  Outcome: Improving  Events of the night: pt HR went from 100's to 40's, then to 100 - 140 which lasted no more than 1 min. Then patient started maintaining 's. MD paged. Lytes, EKG, and adenosine ordered. Adenosine given, pt then went asystole for >15 sec. Compressions were started. After about 10 compressions patient developed a rhythm and came to. Rhythm showed a-flutter. 5 mg of metoprolol given. Pressures then fell into 60's w/ MAPs 50's. No improvement in BP after 750 ml of albumin given. Dopamine started - No improvement. V-wires were reconnected to bedside pacer. Initiation of pacer to a HR of 100 only further dropped SBP. MD placed L femoral CVC and levo was started. MAP > 65 immediately after initiation of Levo. Levo titrated off @ 0700.     Hgb 6.1 this am. MD notified. 1 unit of PRBC infusing.     Neuro: Alert and oriented x 4.   CV: MAP > 65 - levo off. CVP 14. 's. V pacer wires attached but on stand-by. Pulses + 1   Resp: Patient did not tolerate bipap - he became tachypnic into the 40-50's. Patient is now on 4 L NC. Lung sounds clear/dim. CT output WDL - see flowsheet for details  GI/: wiggins w/ adequate UOP. Large BM overnight - dark brown/green. NJ w/ TF @ 60 ml/ hr.   Skin: see flowsheet.     Drips: heparin  Lines: L radial ART line, L femoral CVC, 2 R PIV.     Comments: Will continue to monitor and assess.

## 2018-08-22 NOTE — CONSULTS
Electrophysiology Consultation Note   EP Attending: .   Reason for consultation: episode prolonged asystole s/p adenosine, assess current rhythm   Provider requesting consultation: .  Date of Service: 8/22/2018      HPI:   Mr. Castillo De Anda is a 68 year old male with a past medical history significant for STEMI s/p HUSSEIN, repair VSD and TVR. He underwent a HUSSEIN x2 to RCA and LAD for STEMI 8/4/2018.  Recovery c/b ischemic VSD and he underwent posterior VSD repair and TVR (bioprostetic) on 8/10. On 8/13 his chest was closed and on 8/14 IABP was pulled. He was extubated 8/21. Limited echocardiogram today shows EF 60-65%.  He has a temporary pacer in place set to VVI @ 60.  He is on aspirin and plavix.  CHADSVASC 2 (age+, CAD+).  EP has consulted on this patient for atrial flutter on 8/15 with recommendations that once it is safe to anticoagulate from a surgical standpoint, we may consider overdrive pacing with his temporary pacer as he has an A lead in place to attempt to convert his rhythm.  Last night patient was having narrow complex tachycardia with MAP in 50's so adenosine 6 mg was administered and s/p adenosine he had a period of approximately 20 seconds of asystole with brief CPR with ROSC. Review of telemetry and EKG today show possible SR. EP was consulted for prolonged asystole s/p adenosine and to assess current rhythm.    Past Medical History:   Past Medical History:   Diagnosis Date     DVT (deep venous thrombosis) (H) 2008    after craniotomy     Meningioma (H)      Polycythemia vera (H)      Prostate cancer (H)      Past Surgical History:   Past Surgical History:   Procedure Laterality Date     ANKLE SURGERY Right 04/2018     APPENDECTOMY       CRANIOTOMY Right 2008     IRRIGATION AND DEBRIDEMENT CHEST WASHOUT, COMBINED N/A 8/13/2018    Procedure: COMBINED IRRIGATION AND DEBRIDEMENT CHEST WASHOUT;  COMBINED IRRIGATION AND DEBRIDEMENT CHEST WASHOUT, Closure;  Surgeon: Jason Franco  MD Arvin;  Location: [facility] OR     PROSTATECTOMY PERINEAL  2004     REPAIR VENTRICULAR SEPTAL DEFECT N/A 8/10/2018    Procedure: REPAIR VENTRICULAR SEPTAL DEFECT;  Median Sternotomy, REPAIR VENTRICULAR SEPTAL DEFECT on pump oxygenation, Tricuspid valve replacement ;  Surgeon: Jason Franco MD;  Location: [facility] OR     SPLENECTOMY      childhood     THYROID SURGERY  2012     Allergies: Per MAR     Allergies   Allergen Reactions     Anagrelide Rash     Noted to have small rash and nose bleeds after starting the prescription (prior to August 2018 hospitalization; prescribed by [facility] clinician); dosage had then been increased (by [facility] clinician) and subsequently developed full body rash within 4 hours of increased dose.     Medications:   Per MAR current outpatient cardiovascular medications include: No prescriptions prior to admission.     No current outpatient prescriptions on file.     Current Facility-Administered Medications   Medication Dose Route Frequency     aspirin  81 mg Oral or Feeding Tube Daily     atorvastatin  80 mg Oral or Feeding Tube QPM     clopidogrel  75 mg Oral Daily     docusate  100 mg Oral or Feeding Tube BID     hydroxyurea  1,000 mg Oral or Feeding Tube BID     insulin aspart  1-6 Units Subcutaneous Q4H     levothyroxine  75 mcg Oral or Feeding Tube Daily     multivitamins with minerals  15 mL Per Feeding Tube Daily     polyethylene glycol  17 g Oral or Feeding Tube Daily     protein modular  1 packet Per Feeding Tube TID     QUEtiapine  50 mg Oral or Feeding Tube At Bedtime     senna-docusate  2 tablet Oral or Feeding Tube BID    Or     senna-docusate  1 tablet Oral or Feeding Tube BID     sodium chloride (PF)  3 mL Intracatheter Q8H     sodium chloride         Family History:   Family History   Problem Relation Age of Onset     Hypertension Mother      Cerebrovascular Disease Paternal Uncle      Social History:   Social History   Substance Use Topics     Smoking status: Former  "Smoker     Smokeless tobacco: Never Used     Alcohol use 1.2 oz/week     2 Shots of liquor per week       ROS:   A comprehensive 10 point ROS was negative other than as mentioned in HPI.    Physical Examination:   VITALS: BP (!) 86/63  Temp 97.6  F (36.4  C) (Axillary)  Resp (!) 34  Ht 1.753 m (5' 9\")  Wt 79.2 kg (174 lb 9.7 oz)  SpO2 100%  BMI 25.78 kg/m2     GENERAL APPEARANCE: AxO, NAD   HEENT: NCAT, EOMI, MMM.   NECK: Supple. No JVD or bruit. Good carotid upstroke.   CHEST: CTAB   CARDIOVASCULAR: S1S2, Reg, No m/r/g. Chest tubes noted.  ABDOMEN: BS+, soft, No pulsatile masses or bruits.   EXTREMITIES: No pedal edema. Distal pulses intact.   NEURO: Grossly nonfocal.   PSYCH: Normal affect.  SKIN: Warm and dry.   Data:   Labs:  BMP  Recent Labs  Lab 08/22/18  0915 08/22/18  0340 08/21/18  2250 08/21/18  1550 08/21/18  0339    144  --  145* 148*   POTASSIUM 3.8 4.1 3.4 3.7 4.3   CHLORIDE 112* 112*  --  110* 112*   GABRIELA 7.5* 7.5*  --  8.4* 8.0*   CO2 24 27  --  26 28   BUN 62* 68*  --  66* 64*   CR 0.67 0.72  --  0.74 0.71   * 128*  --  108* 113*     CBC  Recent Labs  Lab 08/22/18  1221 08/22/18  0915 08/22/18  0340 08/21/18  2250 08/21/18  1550 08/21/18  1105   WBC  --  8.3 8.1  --  9.2 8.4   RBC  --  2.35* 2.10*  --  2.79* 2.81*   HGB 8.0* 7.0* 6.1* 7.7* 8.1* 8.1*   HCT  --  21.3* 19.4*  --  25.5* 26.4*   MCV  --  91 92  --  91 94   MCH  --  29.8 29.0  --  29.0 28.8   MCHC  --  32.9 31.4*  --  31.8 30.7*   RDW  --  20.5* 22.7*  --  21.9* 20.9*   PLT  --  347 358  --  469* 460*     INR  Recent Labs  Lab 08/22/18  0340 08/21/18  0339 08/20/18  0315 08/19/18  0332   INR 1.27* 1.21* 1.16* 1.26*     No results found for: CKTOTAL, CKMB, TROPN  Cholesterol (mg/dL)   Date Value   08/06/2018 76     HDL Cholesterol (mg/dL)   Date Value   08/06/2018 30 (L)     LDL Cholesterol Calculated (mg/dL)   Date Value   08/06/2018 34     EKG:    Tele:       ECHO:   Recent Results (from the past 4320 hour(s))   Echo " limited (Adults Only)    Narrative    [id number]  [facility]ly [facility]  [id number]  [id number]^KIMBERLI^DUKE^[facility],[place]  Echocardiography Laboratory  [address] [place] [address]     Name: JOSE SOLER  MRN: [id number]  : 1950  Study Date: 2018 01:22 AM  Age: 68 yrs  Gender: Male  Patient Location: Pending sale to [facility]  Reason For Study: Heart Failure  History: VSD  Ordering Physician: DUKE AG  Referring Physician: 1-[facility]  Performed By: Norberto Uriarte MD     BSA: 1.9 m2  Height: 69 in  Weight: 174 lb  HR: 101  BP: 98/58 mmHg  _____________________________________________________________________________  __        Procedure  Limited Echocardiogram with portions of two-dimensional, color and spectral  Doppler performed. Adequate quality two-dimensional was performed and  interpreted. ECG shows normal sinus rhythm.  _____________________________________________________________________________  __        Interpretation Summary  Limited study.  Moderate sized (max dimension 1.8 cm anterior to the RV), predominantly  anterior, partially-organized pericardial effusion. Echocardiographic findings  collectively do not support a diagnosis of pericardial tamponade.     Global and regional left ventricular function is normal with an EF of 60-65%.  Global right ventricular function is moderately reduced. The right ventricle  is normal size.  This study was compared with the study from 18 . There has been no  change.  Compared with 18, there has been no significant change.  _____________________________________________________________________________  __        Left Ventricle  Left ventricular size is normal. Global and regional left ventricular function  is normal with an EF of 60-65%.     Right Ventricle  The right ventricle is normal size. Global right ventricular function is  moderately reduced.     Atria  The atria cannot be assessed.     Mitral Valve  The  mitral leaflets are not well visualized, but mitral valve function appears  normal on Doppler interrogation.        Aortic Valve  The aortic valve cannot be assessed.     Tricuspid Valve  The tricuspid leaflets are not well visualized, but mitral valve function  appears normal on Doppler interrogation.     Pulmonic Valve  The pulmonic valve cannot be assessed.     Vessels  The aorta root cannot be assessed. The pulmonary artery and bifurcation cannot  be assessed. Dilation of the inferior vena cava is present with normal  respiratory variation in diameter. The IVC is dilated; respiratory variability  is not assessed (RA pressure at least 8 mmHg.).     Pericardium  Moderate sized (max dimension 1.8 cm anterior to the RV), predominantly  anterior, partially-organized pericardial effusion. Echocardiographic findings  collectively do not support a diagnosis of pericardial tamponade. Features  suggesting against a diagnosis of pericardial tamponade are: absence of  diastolic RV collpase, absence of systolic RA collpase, absence of respiratory  variation in mitral inflows, absence of respiratory variation in tricuspid  inflows, absence of respirophasic septal shift.  Features supporting a diagnosis of pericardial tamponade are: dilated IVC.        Compared to Previous Study  This study was compared with the study from 18 . There has been no  change.     Attestation  I have personally viewed the imaging and agree with the interpretation and  report as documented by the fellow, Norberto Uriarte, and/or edited by me.  _____________________________________________________________________________  __           Doppler Measurements & Calculations  TR max luisa: 63.8 cm/sec  TR max P.8 mmHg     _____________________________________________________________________________  __           Report approved by: Hilda Jensen 2018 08:38 AM      Echo limited (Adults Only)    Narrative     [id number]  [facility]  [id number]  [id number]^KEVIN^BONY^           [facility],[place]  Echocardiography Laboratory  [address] [place] [id number]     Name: JOSE SOLER  MRN: [id number]  : 1950  Study Date: 2018 12:11 PM  Age: 68 yrs  Gender: Male  Patient Location: [facility]  Reason For Study: Eval LV/RV FX/ S/P VSC closure  History: Eval LV/RV FX/ S/P VSC closure  Ordering Physician: BONY BROWN  Referring Physician: SELF, REFERRED  Performed By: Marcie Buitrago RDCS     BSA: 2.0 m2  Height: 69 in  Weight: 175 lb  BP: 86/63 mmHg  _____________________________________________________________________________  __        Procedure  Limited Echocardiogram with portions of two-dimensional, color and spectral  Doppler performed.  _____________________________________________________________________________  __        Interpretation Summary  Moderate size pericardial effusion predominently loculated along RV free wall.  No definitive echo evidence for Tamponade. Recommend clinical correlation.  This effusion is new when compared to previous study.  _____________________________________________________________________________  __        Left Ventricle  Left ventricular wall thickness is normal. Left ventricular size is normal.  Diastolic function not assessed due to tachycardia. The Ejection Fraction is  estimated at 55-60%. Basal to mid inferoseptal wall akinesis. Basal to mid  anteroseptal wall hypokinesis.     Right Ventricle  The right ventricle is normal size. Global right ventricular function is  moderately to severely reduced.     Atria  Both atria appear normal. Atrial septum can't be assessed.     Mitral Valve  The mitral valve is normal.        Aortic Valve  Aortic valve is normal in structure and function.     Tricuspid Valve  The tricuspid valve is normal.     Pulmonic Valve  The pulmonic valve cannot be assessed.     Vessels  The inferior vena cava cannot be  assessed. The aorta root cannot be assessed.  The thoracic aorta cannot be assessed.     Pericardium  Moderate size pericardial effusion predominently loculated along RV free wall.  No definitive echo evidence for Tamponade. Recommend clinical correlation.  This effusion is new when compared to previous study.        Attestation  I have personally viewed the imaging and agree with the interpretation and  report as documented by the fellow, Richard Horton, and/or edited by me.  _____________________________________________________________________________  __                          Report approved by: Hilda Bashir 2018 01:46 PM              _____________________________________________________________________________  __      Echo limited (Adults Only)    Narrative    [id number]  [facility]  [id number]  [id number]^DANYA RIVERA^ENDY^           [facility],[place]  Echocardiography Laboratory  [address] [place] [id number]     Name: JOSE SOLER  MRN: [id number]  : 1950  Study Date: 2018 08:17 AM  Age: 68 yrs  Gender: Male  Patient Location: [facility]  Reason For Study: VSD  Ordering Physician: ENDY WARD  Referring Physician: SELF, REFERRED  Performed By: Brian Bansal RDCS     BSA: 2.0 m2  Height: 69 in  Weight: 182 lb  HR: 105  BP: 96/66 mmHg  _____________________________________________________________________________  __        Procedure  Limited Portable Echo Adult. Technically difficult study. Poor acoustic  windows.  _____________________________________________________________________________  __        Interpretation Summary  Limited echocardiogram.Technically difficult study.     s/p repair of inferior membranous VSD. There is no flow seen across repair.  There is a non-mobile mass (1 cm) protruding into the LV at the site of patch  repair. At the edge of it there is a small filamentous mobile mass that likely  represents surgical  material like a suture, less likely thrombus. These  findings appear unchanged from intra-op ELISABETH after repair. Consider cardiac CT  for further delineation of repair and mass. Findings discussed with Dr Tanner.  Left ventricular EF is 55-60%.  Inferior wall and basal to mid inferoseptal wall akinesis is present.  Global right ventricular function is mildly reduced.  No pericardial effusion is present.     This study was compared with the study from 8/10/2018: s/p VSD repair,  improvement RV function, and improvement in TR.        _____________________________________________________________________________  __        Left Ventricle  Left ventricular function is normal.The EF is 55-60%. Left ventricular size is  normal. Relative wall thickness is increased consistent with concentric  remodeling. Inferior wall akinesis is present. Basal to mid inferoseptal wall  akinesis. Interval resolution of VSD.     Right Ventricle  The right ventricle is normal size. Global right ventricular function is  mildly reduced.     Atria  The atria cannot be assessed. The atrial septum is intact as assessed by color  Doppler .     Mitral Valve  The mitral valve cannot be assessed.        Aortic Valve  The aortic valve cannot be assessed.     Tricuspid Valve  The tricuspid valve is normal. Trace tricuspid insufficiency is present. The  peak velocity of the tricuspid regurgitant jet is not obtainable. Pulmonary  artery systolic pressure cannot be assessed.     Pulmonic Valve  The pulmonic valve cannot be assessed.     Vessels  The aorta root cannot be assessed. The thoracic aorta cannot be assessed. The  inferior vena cava cannot be assessed.     Pericardium  No pericardial effusion is present.        Compared to Previous Study  This study was compared with the study from 8/10/2018 .     Attestation  I have personally viewed the imaging and agree with the interpretation and  report as documented by the fellow, Ирина Drake, and/or  edited by me.  _____________________________________________________________________________  __     MMode/2D Measurements & Calculations  IVSd: 1.1 cm  LVIDd: 4.2 cm  LVIDs: 2.7 cm  LVPWd: 1.0 cm  FS: 37.0 %  LV mass(C)d: 156.1 grams  LV mass(C)dI: 78.7 grams/m2  RWT: 0.49           _____________________________________________________________________________  __           Report approved by: Hilda Moser 2018 02:11 PM      Echo limited (Adults Only)    Narrative    [id number]  [facility]  [id number]  [id number]^ALLEN^INGE^[facility],[place]  Echocardiography Laboratory  [address] [place] [address]     Name: JOSE SOLER  MRN: [id number]  : 1950  Study Date: 08/10/2018 10:17 AM  Age: 68 yrs  Gender: Male  Patient Location: [facility]  Reason For Study: VSD  Ordering Physician: INGE VILLA  Referring Physician: SELF, REFERRED  Performed By: Monico Alexandra     BSA: 1.9 m2  Height: 69 in  Weight: 170 lb  HR: 96  BP: 93/56 mmHg  _____________________________________________________________________________  __        Procedure  Limited Portable Echo Adult.  _____________________________________________________________________________  __        Interpretation Summary  Limited study.  The Ejection Fraction is estimated at 60-65%. Mid-inferior wall is akinetic.  A mid inferoseptal muscular VSD is present.  VSD peak gradient is 35 mmHg. Peak velocity 3 m/s.  Mild to moderate right ventricular dilation is present.  RV function is moderately reduced.  Moderate to severe tricuspid insufficiency is present.  Right ventricular systolic pressure is 43mmHg above the right atrial pressure.  Moderate pulmonary hypertension is present (~57 mmHg).  The inferior vena cava was dilated at 2.4 cm without respiratory variability,  consistent with increased right atrial pressure.  Estimated mean right atrial pressure is 15 mmHg (significantly elevated).  No  pericardial effusion is present.     This study was compared with the study from 2018 .  PAP appears slightly higher and TR appears to have worsened on this study.  Otherwise no other significant change.     _____________________________________________________________________________  __        Left Ventricle  The Ejection Fraction is estimated at 60-65%. Mid-inferior wall is akinetic. A  muscularventricular-septal defect is present. VSD peak gradient is 35 mmHg.  Peak velocity 3 m/s.     Right Ventricle  Mild to moderate right ventricular dilation is present. RV function is  moderately reduced. A right heart catheter is noted in the right ventricle.     Atria  The atria cannot be assessed.     Aortic Valve  The aortic valve cannot be assessed.        Tricuspid Valve  Moderate to severe tricuspid insufficiency is present. The right ventricular  systolic pressure is approximated at 42.8 mmHg plus the right atrial pressure.  Right ventricular systolic pressure is 43mmHg above the right atrial pressure.  Moderate (pulmonary artery systolic pressure 50-75mmHg) pulmonary hypertension  is present.     Vessels  The inferior vena cava was dilated at 2.4 cm without respiratory variability,  consistent with increased right atrial pressure. Estimated mean right atrial  pressure is 15 mmHg (significantly elevated).     Pericardium  No pericardial effusion is present.     Compared to Previous Study  This study was compared with the study from 2018 . PAP appears slightly  higher and TR appears to have worsened on this study. Otherwise no other  significant change.     _____________________________________________________________________________  __        Doppler Measurements & Calculations  TR max luisa: 327.0 cm/sec  TR max P.8 mmHg        _____________________________________________________________________________  __        Report approved by: Jacobo HERNANDEZ 08/10/2018 11:31 AM      Echo complete    Narrative     [id number]  [facility]  [id number]  [id number]^IVAN^SUSHIL^CASA           [facility],[place]  Echocardiography Laboratory  [address] [place] [id number]     Name: JOSE SOLER  MRN: [id number]  : 1950  Study Date: 2018 07:56 AM  Age: 68 yrs  Gender: Male  Patient Location: [facility]  Reason For Study: CHF, VSD  Ordering Physician: SUSHIL LOVE  Referring Physician: SELF, REFERRED  Performed By: Brian Bansal RDCS     BSA: 1.9 m2  Height: 69 in  Weight: 170 lb  BP: 112/90 mmHg  _____________________________________________________________________________  __        Procedure  Complete Portable Echo Adult.  _____________________________________________________________________________  __        Interpretation Summary  Left ventricular size is normal.  The Ejection Fraction is estimated at 55-60%.  Inferior wall akinesis with inferoseptal muscular VAD. Peak velocity 3.7m/sec.  Mild to moderate right ventricular dilation is present.  Global right ventricular function is moderately reduced.  Dilation of the inferior vena cava is present with abnormal respiratory  variation in diameter.  No pericardial effusion is present.  Previous study not available for comparison.  _____________________________________________________________________________  __        Left Ventricle  Left ventricular size is normal. Left ventricular wall thickness is normal.  The Ejection Fraction is estimated at 55-60%. Left ventricular diastolic  function is indeterminate. Diastolic Doppler findings (E/E' ratio and/or other  parameters) suggest left ventricular filling pressures are increased. Inferior  wall akinesis with inferoseptal muscular VAD. Peak velocity 3.7m/sec.     Right Ventricle  Mild to moderate right ventricular dilation is present. Global right  ventricular function is moderately reduced.     Atria  Both atria appear normal. The atrial septum is intact as assessed by color  Doppler .      Mitral Valve  The mitral valve is normal.        Aortic Valve  Aortic valve is normal in structure and function.     Tricuspid Valve  The tricuspid valve is normal. Mild tricuspid insufficiency is present. The  right ventricular systolic pressure is approximated at 30.0 mmHg plus the  right atrial pressure.     Pulmonic Valve  The pulmonic valve is normal.     Vessels  The aorta root is normal. The pulmonary artery is normal. Ascending aorta 3.8  cm. Dilation of the inferior vena cava is present with abnormal respiratory  variation in diameter.     Pericardium  No pericardial effusion is present.        Compared to Previous Study  Previous study not available for comparison.  _____________________________________________________________________________  __  MMode/2D Measurements & Calculations     RVDd: 5.5 cm  IVSd: 0.89 cm  LVIDd: 4.9 cm  LVIDs: 3.1 cm  LVPWd: 0.87 cm  FS: 36.3 %  LV mass(C)d: 145.9 grams  LV mass(C)dI: 75.7 grams/m2  LA dimension: 3.8 cm  asc Aorta Diam: 3.8 cm  LVOT diam: 2.1 cm  LVOT area: 3.5 cm2  RVOT diam: 2.6 cm  LA Volume (BP): 40.3 ml  RWT: 0.36  TAPSE: 2.0 cm        Doppler Measurements & Calculations  MV E max luisa: 90.4 cm/sec  MV A max luisa: 98.0 cm/sec  MV E/A: 0.92  MV dec slope: 651.6 cm/sec2  MV dec time: 0.14 sec  LV V1 VTI: 8.8 cm     SV(LVOT): 30.5 ml  SI(LVOT): 15.8 ml/m2  TV max P.0 mmHg  PA V2 max: 138.0 cm/sec  PA max P.6 mmHg  PA acc time: 0.08 sec  TR max luisa: 272.7 cm/sec  TR max P.0 mmHg  Qp/Qs: 3.3/1.0  E/E' av.8  Lateral E/e': 11.3  Medial E/e': 18.3     _____________________________________________________________________________  __           Report approved by: Hilda Bashir 2018 10:22 AM          Assessment:   Mr. Castillo De Anda is a 68 year old male with a past medical history significant for STEMI s/p HUSSEIN, repair VSD and TVR. He underwent a HUSSEIN x2 to RCA and LAD for STEMI 2018.  Recovery c/b ischemic VSD and he underwent posterior  VSD repair and TVR (bioprostetic) on 8/10. On 8/13 his chest was closed and on 8/14 IABP was pulled. He was extubated 8/21. Limited echocardiogram today shows EF 60-65%.  He has a temporary pacer in place set to VVI @ 60.  He is on aspirin and plavix.  CHADSVASC 2 (age+, CAD+).  EP has consulted on this patient for atrial flutter on 8/15 with recommendations that once it is safe to anticoagulate from a surgical standpoint, we may consider overdrive pacing with his temporary pacer as he has an A lead in place to attempt to convert his rhythm.  Last night patient was having narrow complex tachycardia with MAP in 50's so adenosine 6 mg was administered and s/p adenosine he had a period of approximately 20 seconds of asystole with brief CPR with ROSC. Review of telemetry and EKG today show likely SR. EP was consulted for prolonged asystole s/p adenosine and to assess current rhythm.  EP Recommendations:  1. Asystole s/p adenosine is an expected outcome of administration of the medication.  No further pauses noted on telemetry.    2. Review of telemetry and EKG today show likely SR.  Will review overnight telemetry to assess rhythm at possibly a slower rate.   3. Continue to recommend anticoagulation when safe from a surgical standpoint.     The patient states understanding and is agreeable with plan.   Thank you for allowing us to participate in the care of this patient.     The patient was discussed w/ Dr. Sorensen.  The above note reflects our joint plan.    STEPHANIE Ledbetter CNP  Electrophysiology Consult Service  Pager: [phone]      ELECTROPHYSIOLOGY STAFF  Patient seen and examined by me.  History and physical examination discussed with Lavinia Wheeler whose note reflects our joint assessment and recommendation/plans.  We have been consulted for a new issue: asystole upon administration of IV adenosine.  AV block is the expected response to adenosine, in this case there was no ventricular escape.  A little over  a minute after administration sinus rhythm appears to ensue.  12 lead ECG now is consistent.  His HR has been about 103 bpm.  We will see if there is any variation in rate later, e.g during sleep.  We still recommend anticoagulation (for AF/flutter) when safe to do so from a surgical standpoint.  Deon Sorensen

## 2018-08-22 NOTE — PROGRESS NOTES
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 Underwent repair of posterior VSD and TVR with bioprosthetic valve on 8/10, and was kept with open chest due to coagulopathy and RV dysfunction. Chest closed 8/13. IABP removed 8/14. Extubated 8/21.    TODAY'S PROGRESS:   -Hgb trend overnight to 6.1, transfuse two PRBC this morning  -Likely GI Bleed due to guaiac+ OG output, stool guaiac sent   -Consult GI for evaluation   -Heparin gtt stopped   -Will continue ASA/Plavix for HUSSEIN   -IV Protonix infusion started  -Consult EP Cardiology given overnight events (SVT to 120-130's with MAP in 50's, s/p adenosine with prolonged asystole and \"brief\" CPR with ROSC, no intubation needed)  -Monitor respiratory status  -Abdominal XR to evaluate stomach   -Consider OG tube placement if distended  -Hold lasix for today      PLAN:  Neuro/ pain/ sedation:  #Post operative sedation  #Questionable seizure activity (negative follow up EEG 8/16)  - PRN Quetiapine 50mg for sleep at night  - Monitor neurological status. Notify the MD for any acute changes in exam.  - Discussed with neurology:    -Keppra discontinued 8/20    -Follow up with neurology as outpatient in 3 months    -Repeat EEG on 8/16 without seizure activity    -CT head if acute neuro changes    Pulmonary care:  #Post operative mechanical ventilation   - Extubated 8/21, BiPAP if needed    Cardiovascular:  #Post infarction 2.5cm infraseptal VSD  #s/p HUSSEIN to distal RCA and proximal LAD  #STEMI, cardiogenic shock  #Dilated RV   #Pulmonary HTN  #IABP  #s/p repair of posterior VSD and TVR with bioprosthetic valve, s/p Chest closure 8/13  #Atrial flutter  - Cards 2 reconsulted, appreciate assist   - " Monitor hemodynamic status. Pacer at VVI at 60  - Infusions: Weaned off epi and dobutamine 8/16  - ECHO from 8/8 prior to surgery: Inferior wall akinesis with inferoseptal muscular VAD. Mild to moderate right ventricular dilation is present. Global right ventricular function is moderately reduced.   -Follow up ECHO 8/20 -CVTS with plans to monitor RV effusion noted on ECHO (8/20) - repeat ELISABETH in one week (8/27) to evaluate RV effusion (orders in)  - ASA 81mg  - EP consult regarding underlying atrial flutter.    Heme:  #Anticoagulation s/p Drug Eluting Stent placement  #Thrombocythemia  - Cangrelor discontinued, heparin 400 U started  - 81mg ASA  - Plavix loaded 8/15 (75mg daily thereafter)  - Hydroxyurea 1g BID, hematology consulted and following    GI care:  - NJ placed 8/13, TF at goal  - Rectal tube placed 8/15  - GI consult for GI bleed    Fluids/ Electrolytes/ Nutrition:  #Hypernatremia   - mIVF for IV fluid hydration  - Nutri-phos 4x/day for lyte replacement  - TFs with  Q4h  - Wiggins    Renal/ Fluid Balance:    #Acute Kidney injury  - Urine output stable overnight, Creatinine reassuring  - Will continue to monitor intake and output via wiggins  - Diuresis with 40mg TID - net negative goal ~1 liter  - Diamox 500mg once (8/21)     Endocrine:  #Stress hyperglycemia  #Hypothyroidism  - off Insulin gtt, on ISS  - Levothyroxine    ID/ Antibiotics:  - Monitor fever curve, trend WBC    Prophylaxis:    - Mechanical prophylaxis for DVT.   - ASA, plavix, heparin low intensity started (8/21), plan for warfarin initiation once definitive plans made on current V-pacing wires in place  - Protonix    Lines/ tubes/ drains:  - arterial line, PIV, CVC    Disposition:  - CV ICU.    Patient seen, findings and plan discussed with CVTS team    Jose Leslie MD  PGY-3 General Surgery        ====================================    SUBJECTIVE:   Overnight with episode of unstable SVT which was treated with Adenosine. Patient  subsequently had asystole of >15 seconds, compressions started with return of rhythm after 10 compressions. Rhythm returned as Aflutter, metoprolol given and he became hypotensive. Left fem CVC emergently placed and norepi started. Patient remained extubated throughout. This morning improving and off norepi, breathing well on NC.    OBJECTIVE:   1. VITAL SIGNS:   Temp:  [97  F (36.1  C)-97.8  F (36.6  C)] 97.6  F (36.4  C)  Heart Rate:  [] 104  Resp:  [6-50] 34  MAP:  [47 mmHg-276 mmHg] 88 mmHg  Arterial Line BP: ()/(40-76) 119/69  FiO2 (%):  [3 %] 3 %  SpO2:  [37 %-100 %] 100 %  Ventilation Mode: CPAP/PS  (Continuous positive airway pressure with Pressure Support)  FiO2 (%): 3 %  Rate Set (breaths/minute): 14 breaths/min  Tidal Volume Set (mL): 550 mL  PEEP (cm H2O): 5 cmH2O  Pressure Support (cm H2O): 7 cmH2O  Oxygen Concentration (%): 30 %  Resp: 34    2. INTAKE/ OUTPUT:   I/O last 3 completed shifts:  In: 4358.12 [I.V.:1098.12; NG/GT:1530]  Out: 3845 [Urine:3465; Emesis/NG output:100; Chest Tube:280]    3. PHYSICAL EXAMINATION:   General: Stable on nasal cannula post extubation  Neuro: interactive, alert, following commands  Resp: On nasal cannula after extubation, no distress  CV: tachycardia, some irregularity  Abdomen: Soft, dressings in place  Incisions: Dressing over chest, chest tubes in place, closed chest  Extremities: warm and well perfused    4. INVESTIGATIONS:   Arterial Blood Gases     Recent Labs  Lab 08/22/18  0350 08/21/18  2335 08/21/18  1240 08/21/18  1105   PH 7.45 7.51* 7.48* 7.48*   PCO2 38 33* 37 36   PO2 125* 70* 84 117*   HCO3 26 26 28 27     Complete Blood Count     Recent Labs  Lab 08/22/18  0915 08/22/18  0340 08/21/18  2250 08/21/18  1550 08/21/18  1105   WBC 8.3 8.1  --  9.2 8.4   HGB 7.0* 6.1* 7.7* 8.1* 8.1*    358  --  469* 460*     Basic Metabolic Panel    Recent Labs  Lab 08/22/18  0915 08/22/18  0340 08/21/18  2250 08/21/18  1550 08/21/18  0339    144  --   145* 148*   POTASSIUM 3.8 4.1 3.4 3.7 4.3   CHLORIDE 112* 112*  --  110* 112*   CO2 24 27  --  26 28   BUN 62* 68*  --  66* 64*   CR 0.67 0.72  --  0.74 0.71   * 128*  --  108* 113*     Liver Function Tests    Recent Labs  Lab 08/22/18  0340 08/21/18  0339 08/20/18  0315 08/19/18  0332   AST 92* 86* 108* 188*   * 106* 132* 183*   ALKPHOS 158* 196* 220* 248*   BILITOTAL 0.3 0.2 0.3 0.4   ALBUMIN 2.2* 1.6* 1.6* 1.6*   INR 1.27* 1.21* 1.16* 1.26*     Pancreatic Enzymes  No lab results found in last 7 days.  Coagulation Profile    Recent Labs  Lab 08/22/18  0340 08/21/18  0339 08/20/18  0315 08/19/18  0332 08/18/18  0346   INR 1.27* 1.21* 1.16* 1.26* 1.37*   PTT  --  46* 48* 47* 45*         5. RADIOLOGY:   Recent Results (from the past 24 hour(s))   XR Chest Port 1 View    Narrative    Exam: XR CHEST PORT 1 VW, 8/22/2018 1:17 AM    Indication: hypotension in post op patient;     Comparison: Radiograph of the chest 8/21/2018    Findings:   AP view of the chest. Interval removal of the endotracheal tube.  Enteric tube courses below level the diaphragm and outside the  inferior field of view. Bilateral chest tubes are stable. 3 drains  project over the mediastinum. Defibrillator pads project over the  inferior heart. Multiple epicardial pacemaker leads. The  cardiomediastinal silhouette is enlarged. The pulmonary vasculature is  indistinct, and the main pulmonary artery is prominent. Bilateral  mixed airspace opacities increased since the prior radiograph. Left  lung base opacity. Small left pleural effusion. No pneumothorax. The  upper abdomen is unremarkable .       Impression    Impression:   1.  Interval extubation. Remaining support devices are stable,  including bilateral chest tubes and mediastinal drains.  2.  Mixed airspace opacities throughout both lungs, unchanged small  left pleural effusion, and prominent main pulmonary artery, most  compatible with pulmonary edema.    I have personally reviewed  the examination and initial interpretation  and I agree with the findings.    LORENA GUZMAN MD

## 2018-08-22 NOTE — PROCEDURES
Central line placement    Indication for procedure: Hypotension requiring pressors  Procedure was considered emergent. Patient verbally agreed to procedure. Timeout was performed.   Due to emergent nature of procedure and plavix/heparin use, elected to place left femoral CVC.   The patient was prepped and fully draped in usual sterile fashion. The skin of the left groin was anesthetized with 1% lidocaine. The femoral pulse was barely palpable given blood pressure. Ultrasound was used to visualize the left femoral vein. After puncturing the vein, a wire was passed through the needle and the needle removed. A skin knick was made over the wire and the dilator was passed. Next the previously flushed CVC was placed over the wire and the wire was removed. All hubs withdrew blood and flushed easily. The catheter was sutured in place and a sterile dressing was applied. EBL was 5 ml. Patient tolerated procedure with no apparent complications.     Bernice Farris MD, PhD  CV surgery

## 2018-08-23 ENCOUNTER — APPOINTMENT (OUTPATIENT)
Dept: OCCUPATIONAL THERAPY | Facility: CLINIC | Age: 68
DRG: 219 | End: 2018-08-23
Attending: INTERNAL MEDICINE
Payer: MEDICARE

## 2018-08-23 ENCOUNTER — APPOINTMENT (OUTPATIENT)
Dept: PHYSICAL THERAPY | Facility: CLINIC | Age: 68
DRG: 219 | End: 2018-08-23
Attending: INTERNAL MEDICINE
Payer: MEDICARE

## 2018-08-23 ENCOUNTER — APPOINTMENT (OUTPATIENT)
Dept: SPEECH THERAPY | Facility: CLINIC | Age: 68
DRG: 219 | End: 2018-08-23
Attending: INTERNAL MEDICINE
Payer: MEDICARE

## 2018-08-23 LAB
ABO + RH BLD: NORMAL
ABO + RH BLD: NORMAL
ALBUMIN SERPL-MCNC: 2.1 G/DL (ref 3.4–5)
ALP SERPL-CCNC: 137 U/L (ref 40–150)
ALT SERPL W P-5'-P-CCNC: 86 U/L (ref 0–70)
ANION GAP SERPL CALCULATED.3IONS-SCNC: 6 MMOL/L (ref 3–14)
AST SERPL W P-5'-P-CCNC: 62 U/L (ref 0–45)
BASOPHILS # BLD AUTO: 0.1 10E9/L (ref 0–0.2)
BASOPHILS NFR BLD AUTO: 0.5 %
BILIRUB SERPL-MCNC: 0.4 MG/DL (ref 0.2–1.3)
BLD GP AB SCN SERPL QL: NORMAL
BLD PROD TYP BPU: NORMAL
BLD PROD TYP BPU: NORMAL
BLD UNIT ID BPU: 0
BLOOD BANK CMNT PATIENT-IMP: NORMAL
BLOOD PRODUCT CODE: NORMAL
BPU ID: NORMAL
BUN SERPL-MCNC: 55 MG/DL (ref 7–30)
CALCIUM SERPL-MCNC: 7.8 MG/DL (ref 8.5–10.1)
CHLORIDE SERPL-SCNC: 114 MMOL/L (ref 94–109)
CO2 SERPL-SCNC: 26 MMOL/L (ref 20–32)
CREAT SERPL-MCNC: 0.68 MG/DL (ref 0.66–1.25)
DIFFERENTIAL METHOD BLD: ABNORMAL
EOSINOPHIL # BLD AUTO: 0.3 10E9/L (ref 0–0.7)
EOSINOPHIL NFR BLD AUTO: 2.6 %
ERYTHROCYTE [DISTWIDTH] IN BLOOD BY AUTOMATED COUNT: 20.9 % (ref 10–15)
ERYTHROCYTE [DISTWIDTH] IN BLOOD BY AUTOMATED COUNT: 21.1 % (ref 10–15)
GFR SERPL CREATININE-BSD FRML MDRD: >90 ML/MIN/1.7M2
GLUCOSE BLDC GLUCOMTR-MCNC: 81 MG/DL (ref 70–99)
GLUCOSE BLDC GLUCOMTR-MCNC: 91 MG/DL (ref 70–99)
GLUCOSE BLDC GLUCOMTR-MCNC: 93 MG/DL (ref 70–99)
GLUCOSE BLDC GLUCOMTR-MCNC: 94 MG/DL (ref 70–99)
GLUCOSE BLDC GLUCOMTR-MCNC: 99 MG/DL (ref 70–99)
GLUCOSE SERPL-MCNC: 89 MG/DL (ref 70–99)
HCT VFR BLD AUTO: 24.1 % (ref 40–53)
HCT VFR BLD AUTO: 24.3 % (ref 40–53)
HGB BLD-MCNC: 7.6 G/DL (ref 13.3–17.7)
HGB BLD-MCNC: 8 G/DL (ref 13.3–17.7)
HGB BLD-MCNC: 8.6 G/DL (ref 13.3–17.7)
HGB BLD-MCNC: 8.7 G/DL (ref 13.3–17.7)
IMM GRANULOCYTES # BLD: 0 10E9/L (ref 0–0.4)
IMM GRANULOCYTES NFR BLD: 0.2 %
INR PPP: 1.21 (ref 0.86–1.14)
INTERPRETATION ECG - MUSE: NORMAL
LYMPHOCYTES # BLD AUTO: 2.7 10E9/L (ref 0.8–5.3)
LYMPHOCYTES NFR BLD AUTO: 25.5 %
MAGNESIUM SERPL-MCNC: 2.1 MG/DL (ref 1.6–2.3)
MCH RBC QN AUTO: 29.7 PG (ref 26.5–33)
MCH RBC QN AUTO: 30.2 PG (ref 26.5–33)
MCHC RBC AUTO-ENTMCNC: 31.5 G/DL (ref 31.5–36.5)
MCHC RBC AUTO-ENTMCNC: 32.9 G/DL (ref 31.5–36.5)
MCV RBC AUTO: 92 FL (ref 78–100)
MCV RBC AUTO: 94 FL (ref 78–100)
MONOCYTES # BLD AUTO: 1 10E9/L (ref 0–1.3)
MONOCYTES NFR BLD AUTO: 9.2 %
NEUTROPHILS # BLD AUTO: 6.7 10E9/L (ref 1.6–8.3)
NEUTROPHILS NFR BLD AUTO: 62 %
NRBC # BLD AUTO: 0.2 10*3/UL
NRBC BLD AUTO-RTO: 1 /100
NUM BPU REQUESTED: 3
PLATELET # BLD AUTO: 372 10E9/L (ref 150–450)
PLATELET # BLD AUTO: 421 10E9/L (ref 150–450)
PLATELET # BLD EST: ABNORMAL 10*3/UL
POTASSIUM SERPL-SCNC: 4 MMOL/L (ref 3.4–5.3)
PROT SERPL-MCNC: 5.2 G/DL (ref 6.8–8.8)
RBC # BLD AUTO: 2.56 10E12/L (ref 4.4–5.9)
RBC # BLD AUTO: 2.65 10E12/L (ref 4.4–5.9)
SODIUM SERPL-SCNC: 146 MMOL/L (ref 133–144)
SPECIMEN EXP DATE BLD: NORMAL
TRANSFUSION STATUS PATIENT QL: NORMAL
TRANSFUSION STATUS PATIENT QL: NORMAL
WBC # BLD AUTO: 10.7 10E9/L (ref 4–11)
WBC # BLD AUTO: 9.9 10E9/L (ref 4–11)

## 2018-08-23 PROCEDURE — 25000132 ZZH RX MED GY IP 250 OP 250 PS 637: Mod: GY | Performed by: STUDENT IN AN ORGANIZED HEALTH CARE EDUCATION/TRAINING PROGRAM

## 2018-08-23 PROCEDURE — 85018 HEMOGLOBIN: CPT | Performed by: THORACIC SURGERY (CARDIOTHORACIC VASCULAR SURGERY)

## 2018-08-23 PROCEDURE — A9270 NON-COVERED ITEM OR SERVICE: HCPCS | Mod: GY | Performed by: STUDENT IN AN ORGANIZED HEALTH CARE EDUCATION/TRAINING PROGRAM

## 2018-08-23 PROCEDURE — 80053 COMPREHEN METABOLIC PANEL: CPT | Performed by: INTERNAL MEDICINE

## 2018-08-23 PROCEDURE — 97110 THERAPEUTIC EXERCISES: CPT | Mod: GO

## 2018-08-23 PROCEDURE — 83735 ASSAY OF MAGNESIUM: CPT | Performed by: INTERNAL MEDICINE

## 2018-08-23 PROCEDURE — 40000133 ZZH STATISTIC OT WARD VISIT

## 2018-08-23 PROCEDURE — 40000193 ZZH STATISTIC PT WARD VISIT: Performed by: PHYSICAL THERAPIST

## 2018-08-23 PROCEDURE — 25000125 ZZHC RX 250

## 2018-08-23 PROCEDURE — 85025 COMPLETE CBC W/AUTO DIFF WBC: CPT | Performed by: STUDENT IN AN ORGANIZED HEALTH CARE EDUCATION/TRAINING PROGRAM

## 2018-08-23 PROCEDURE — P9016 RBC LEUKOCYTES REDUCED: HCPCS | Performed by: INTERNAL MEDICINE

## 2018-08-23 PROCEDURE — 25000125 ZZHC RX 250: Performed by: STUDENT IN AN ORGANIZED HEALTH CARE EDUCATION/TRAINING PROGRAM

## 2018-08-23 PROCEDURE — 85027 COMPLETE CBC AUTOMATED: CPT | Performed by: INTERNAL MEDICINE

## 2018-08-23 PROCEDURE — 25000128 H RX IP 250 OP 636: Performed by: STUDENT IN AN ORGANIZED HEALTH CARE EDUCATION/TRAINING PROGRAM

## 2018-08-23 PROCEDURE — 40000014 ZZH STATISTIC ARTERIAL MONITORING DAILY

## 2018-08-23 PROCEDURE — 20000004 ZZH R&B ICU UMMC

## 2018-08-23 PROCEDURE — 00000146 ZZHCL STATISTIC GLUCOSE BY METER IP

## 2018-08-23 PROCEDURE — 27210429 ZZH NUTRITION PRODUCT INTERMEDIATE LITER

## 2018-08-23 PROCEDURE — 92526 ORAL FUNCTION THERAPY: CPT | Mod: GN

## 2018-08-23 PROCEDURE — 97530 THERAPEUTIC ACTIVITIES: CPT | Mod: GP | Performed by: PHYSICAL THERAPIST

## 2018-08-23 PROCEDURE — A9270 NON-COVERED ITEM OR SERVICE: HCPCS | Mod: GY | Performed by: INTERNAL MEDICINE

## 2018-08-23 PROCEDURE — 93010 ELECTROCARDIOGRAM REPORT: CPT | Performed by: INTERNAL MEDICINE

## 2018-08-23 PROCEDURE — 97116 GAIT TRAINING THERAPY: CPT | Mod: GP | Performed by: PHYSICAL THERAPIST

## 2018-08-23 PROCEDURE — 85610 PROTHROMBIN TIME: CPT | Performed by: INTERNAL MEDICINE

## 2018-08-23 PROCEDURE — 93005 ELECTROCARDIOGRAM TRACING: CPT

## 2018-08-23 PROCEDURE — 85018 HEMOGLOBIN: CPT | Performed by: STUDENT IN AN ORGANIZED HEALTH CARE EDUCATION/TRAINING PROGRAM

## 2018-08-23 PROCEDURE — 40000275 ZZH STATISTIC RCP TIME EA 10 MIN

## 2018-08-23 PROCEDURE — 25000132 ZZH RX MED GY IP 250 OP 250 PS 637: Mod: GY | Performed by: INTERNAL MEDICINE

## 2018-08-23 PROCEDURE — 40000225 ZZH STATISTIC SLP WARD VISIT

## 2018-08-23 PROCEDURE — 99233 SBSQ HOSP IP/OBS HIGH 50: CPT | Mod: GC | Performed by: ANESTHESIOLOGY

## 2018-08-23 PROCEDURE — 97530 THERAPEUTIC ACTIVITIES: CPT | Mod: GO

## 2018-08-23 PROCEDURE — 97535 SELF CARE MNGMENT TRAINING: CPT | Mod: GO

## 2018-08-23 RX ORDER — FENTANYL CITRATE 50 UG/ML
INJECTION, SOLUTION INTRAMUSCULAR; INTRAVENOUS
Status: DISCONTINUED
Start: 2018-08-23 | End: 2018-08-24 | Stop reason: HOSPADM

## 2018-08-23 RX ORDER — METOPROLOL TARTRATE 1 MG/ML
INJECTION, SOLUTION INTRAVENOUS
Status: COMPLETED
Start: 2018-08-23 | End: 2018-08-23

## 2018-08-23 RX ORDER — METOPROLOL TARTRATE 1 MG/ML
5 INJECTION, SOLUTION INTRAVENOUS ONCE
Status: DISCONTINUED | OUTPATIENT
Start: 2018-08-23 | End: 2018-08-23

## 2018-08-23 RX ORDER — METOPROLOL TARTRATE 1 MG/ML
2.5 INJECTION, SOLUTION INTRAVENOUS EVERY 4 HOURS PRN
Status: DISCONTINUED | OUTPATIENT
Start: 2018-08-23 | End: 2018-09-11 | Stop reason: HOSPADM

## 2018-08-23 RX ADMIN — CARBOXYMETHYLCELLULOSE SODIUM 1 DROP: 5 SOLUTION/ DROPS OPHTHALMIC at 20:27

## 2018-08-23 RX ADMIN — Medication 1000 MG: at 20:19

## 2018-08-23 RX ADMIN — LEVOTHYROXINE SODIUM 75 MCG: 75 TABLET ORAL at 08:06

## 2018-08-23 RX ADMIN — Medication 1 PACKET: at 15:02

## 2018-08-23 RX ADMIN — METOPROLOL TARTRATE 5 MG: 1 INJECTION, SOLUTION INTRAVENOUS at 12:14

## 2018-08-23 RX ADMIN — ASPIRIN 81 MG CHEWABLE TABLET 81 MG: 81 TABLET CHEWABLE at 08:06

## 2018-08-23 RX ADMIN — ATORVASTATIN CALCIUM 80 MG: 80 TABLET, FILM COATED ORAL at 20:19

## 2018-08-23 RX ADMIN — Medication 1000 MG: at 08:05

## 2018-08-23 RX ADMIN — Medication 1 PACKET: at 08:06

## 2018-08-23 RX ADMIN — SODIUM CHLORIDE 8 MG/HR: 9 INJECTION, SOLUTION INTRAVENOUS at 13:59

## 2018-08-23 RX ADMIN — Medication 1 PACKET: at 20:20

## 2018-08-23 RX ADMIN — MULTIVITAMIN 15 ML: LIQUID ORAL at 08:06

## 2018-08-23 RX ADMIN — SODIUM CHLORIDE 8 MG/HR: 9 INJECTION, SOLUTION INTRAVENOUS at 04:27

## 2018-08-23 RX ADMIN — CLOPIDOGREL 75 MG: 75 TABLET, FILM COATED ORAL at 08:07

## 2018-08-23 ASSESSMENT — ACTIVITIES OF DAILY LIVING (ADL)
ADLS_ACUITY_SCORE: 12

## 2018-08-23 NOTE — PLAN OF CARE
Problem: Patient Care Overview  Goal: Plan of Care/Patient Progress Review  Discharge Planner SLP   Patient plan for discharge: Did not discuss  Current status: Pt remains a high aspiration risk with thin liquids d/t overt, weak cough response. Tolerated nectar thick liquids by straw, when sitting upright without s/sx of aspiration. Pt's airway protection continues to be compromised following his multiple intubations. His vocal quality is stronger than previous day but remains hoarse and deep in quality.      Continue clear liquid diet, with nectar thick liquids. Pt to be sitting fully upright at 90 degrees, take small sips, and slow rate of intake.     Barriers to return to prior living situation: Below baseline, defer to PT/OT  Recommendations for discharge: Ongoing speech services  Rationale for recommendations: Aspiration risk        Entered by: Felisha Sorto 08/23/2018 2:24 PM

## 2018-08-23 NOTE — PROGRESS NOTES
GASTROENTEROLOGY PROGRESS NOTE      Date of Admission:  8/6/2018  Date of Service:   8/23/2018          ASSESSMENT AND RECOMMENDATIONS:     Assessment:  Castillo De Anda is a 68 year old male with h/o thrombocytosis, meningioma s/p radiation and surgery, and STEMI 8/4/18, treated with HUSSEIN to RCA and LAD, c/b basal VSD s/p posterior repair and TVR 8/10, with chest closure 8/13, IABP removal 8/14, and extubation 8/21. Continued on ASA and Plavix.  He had signs of hemodynamically significant GI bleeding 8/21/18, with coffee ground material from OG prior to extubation and an episode of melena with associated hypotension that night.  However, hypotension quickly resolved and he responded appropriately to transfusion.  No ongoing signs of rapid, or hemodynamically significant bleeding. Expect upper GI bleeding is most likely related to a stress ulcer, considering recent critical illness and intubation, but differential also includes OG trauma, esophagitis, AVM, GAVE, dieulafoy lesion, and less likely malignancy.    Had planned to pursue diagnostic EGD today to assess re-bleeding risk prior to restarting heparin drip, but Mr. De Anda went into SV with associated hypotension prior to exam.  He responded to medical therapy, but with frequent arrhythmias, felt risk of diagnostic exam would outweigh the benefit.  Will continue to monitor clinically while treating with IV PPI.     Recommendations  - Okay to resume tube feeds and diet  - Continue PPI IV ggt  - Continue to trend hgb, transfuse as needed  - Monitor and document stool output closely  - Continue monitoring vitals closely     Gastroenterology follow up recommendations: Will continue to follow    Thank you for involving us in this patient's care. Please do not hesitate to contact the GI service with any questions or concerns.     Pt care plan discussed with Dr. Guzmán, GI staff physician.    Stacy Engel MD  GI  "Fellow  [phone]    -------------------------------------------------------------------------------------------------------------------         Interval Events:     Reports feeling better today.  Respiratory failure is improving.  Had one black stool in the early morning with no ongoing melena through the day.  Hgb has remained stable.  Denies abd pain, n/v.           Physical Exam:   BP (!) 86/63  Temp 96.4  F (35.8  C) (Axillary)  Resp 25  Ht 1.753 m (5' 9\")  Wt 79.2 kg (174 lb 9.7 oz)  SpO2 93%  BMI 25.78 kg/m2  Wt:   Wt Readings from Last 2 Encounters:   08/21/18 79.2 kg (174 lb 9.7 oz)      Constitutional: Cooperative, not dyspneic/diaphoretic, no acute distress  Eyes: Sclera anicteric  Ears/nose/mouth/throat: Mucus membranes moist, hearing intact  CV: RRR  Respiratory: Unlabored breathing on NC  Abd: Soft, nontender, nondistended, no peritoneal signs, dressing in place  Skin: Warm, perfused, no jaundice  Neuro: AAO  Psych: Normal affect  MSK: No gross deformities           Data:   All available labs, imaging, and procedure notes were independently reviewed and interpreted, pertinent values are as follow:    BMP  Recent Labs  Lab 08/23/18  0450 08/22/18  0915 08/22/18  0340 08/21/18  2250 08/21/18  1550   * 144 144  --  145*   POTASSIUM 4.0 3.8 4.1 3.4 3.7   CHLORIDE 114* 112* 112*  --  110*   GABRIELA 7.8* 7.5* 7.5*  --  8.4*   CO2 26 24 27  --  26   BUN 55* 62* 68*  --  66*   CR 0.68 0.67 0.72  --  0.74   GLC 89 107* 128*  --  108*     CBC  Recent Labs  Lab 08/23/18  1611 08/23/18  1130 08/23/18  0450  08/22/18  0915 08/22/18  0340   WBC  --  10.7 9.9  --  8.3 8.1   RBC  --  2.56* 2.65*  --  2.35* 2.10*   HGB 8.7* 7.6* 8.0*  < > 7.0* 6.1*   HCT  --  24.1* 24.3*  --  21.3* 19.4*   MCV  --  94 92  --  91 92   MCH  --  29.7 30.2  --  29.8 29.0   MCHC  --  31.5 32.9  --  32.9 31.4*   RDW  --  21.1* 20.9*  --  20.5* 22.7*   PLT  --  421 372  --  633 358   < > = values in this interval not " displayed.  INR  Recent Labs  Lab 08/23/18  0450 08/22/18  0340 08/21/18  0339 08/20/18  0315   INR 1.21* 1.27* 1.21* 1.16*     LFTs  Recent Labs  Lab 08/23/18  0450 08/22/18  0340 08/21/18  0339 08/20/18  0315   ALKPHOS 137 158* 196* 220*   AST 62* 92* 86* 108*   ALT 86* 101* 106* 132*   BILITOTAL 0.4 0.3 0.2 0.3   PROTTOTAL 5.2* 4.8* 5.0* 5.0*   ALBUMIN 2.1* 2.2* 1.6* 1.6*

## 2018-08-23 NOTE — PLAN OF CARE
"Problem: Patient Care Overview  Goal: Plan of Care/Patient Progress Review  Discharge Planner PT   Patient plan for discharge: not discussed  Current status: Pt transferred supine>sitting with mod A. Sit>stand with min A. Ambulated 20 ft with FWW, CGA-min A, assist from RN for line management/monitoring vitals, SO present for wheelchair follow. Pt states having \"tunnel vision\" and feeling faint post ambulation. RN reporting rhythm changes on EKG with ambulation. No further activity 2/2 medical status. Pt on 2L O2 via NC throughout session, HR  with activity.   Barriers to return to prior living situation: medical status, post surgical precautions, impaired balance, decreased activity tolerance  Recommendations for discharge: ARU  Rationale for recommendations: improve strength, activity tolerance and independence with mobility. Currently has needs for PT/OT/SLP. Below baseline mobility and motivated to participate with therapies to regain PLOF.        Entered by: Sommer Kasper 08/23/2018 3:34 PM           "

## 2018-08-23 NOTE — PLAN OF CARE
Problem: Patient Care Overview  Goal: Plan of Care/Patient Progress Review  Outcome: Improving  D: Cardiogenic shock  I/A: Pt AOx4 today. Denied pain throughout day. Pt currently in sinus rhythm. Pt went into atrial fibrillation around 1200 today and SVT shortly after with HR consistenly 140's, MD notified and metroprolol IV push given, pt converted back to sinus rhythm shortly after. Blood pressure stable. Afebrile. 2L nasal cannula continued today. Tube feedings restarted this afternoon and pt placed on clear liquid diet with nectar thick liquids. Herring discontinued this afternoon. No bloody stools during day shift, hemoglobin 7.6 this afternoon, 1 unit RBC's given, recheck hemoglobin 8.7. Protonix drip continued. Pt walked in hallway with therapy, and got up to chair twice today.  P: Continue to monitor hemoglobin and for signs of GI bleed. Continue to advance tube feeds to goal as tolerated. Report to be given to oncoming RN.

## 2018-08-23 NOTE — PROGRESS NOTES
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 Underwent repair of posterior VSD and TVR with bioprosthetic valve on 8/10, and was kept with open chest due to coagulopathy and RV dysfunction. Chest closed 8/13. IABP removed 8/14. Extubated 8/21.    TODAY'S PROGRESS:   -Remove arterial line after GI procedure  -Attempt Condom cath, remove wiggins if feasible  -Monitoring Hgb, stable hemoglobin after concern for GI bleed                         -GI consult placed - continue PPI infusion, GI holding off on scope for now                         -Continue PPI infusion x 48 hours more (72h total), restart tube feeds, monitor Hgb  -Hold off lasix for now  -Hgb check Q8h for today  -Potential transfer to floor if stable later today  -Initiate CLD today and restart TF      PLAN:  Neuro/ pain/ sedation:  #Post operative sedation  #Questionable seizure activity (negative follow up EEG 8/16)  - PRN Quetiapine 50mg for sleep at night  - Monitor neurological status. Notify the MD for any acute changes in exam.  - Discussed with neurology:    -Keppra discontinued 8/20    -Follow up with neurology as outpatient in 3 months    -Repeat EEG on 8/16 without seizure activity    -CT head if acute neuro changes    Pulmonary care:  #Post operative mechanical ventilation   - Extubated 8/21, BiPAP if needed    Cardiovascular:  #Post infarction 2.5cm infraseptal VSD  #s/p HUSSEIN to distal RCA and proximal LAD  #STEMI, cardiogenic shock  #Dilated RV   #Pulmonary HTN  #IABP  #s/p repair of posterior VSD and TVR with bioprosthetic valve, s/p Chest closure 8/13  #Atrial flutter  - Cards 2 reconsulted, appreciate assist   - Monitor hemodynamic status. Pacer at VVI at  60  - Infusions: Weaned off epi and dobutamine 8/16  - ECHO from 8/8 prior to surgery: Inferior wall akinesis with inferoseptal muscular VAD. Mild to moderate right ventricular dilation is present. Global right ventricular function is moderately reduced.   -Follow up ECHO 8/20 -CVTS with plans to monitor RV effusion noted on ECHO (8/20) - repeat ELISABETH in one week (8/27) to evaluate RV effusion (orders in)  - ASA 81mg  - EP consult regarding underlying atrial flutter.    Heme:  #Anticoagulation s/p Drug Eluting Stent placement  #Thrombocythemia  - Cangrelor discontinued, Heparin 400 units/hr drip stopped after concern for GI bleed 8/22  - 81mg ASA  - Plavix loaded 8/15 (75mg daily thereafter)  - Hydroxyurea 1g BID, hematology consulted and following    GI care:  - NJ placed 8/13, TF at goal  - Rectal tube placed 8/15  - GI consult for GI bleed - hold off on GI scope for now, continue PPI infusion for 72h total (from 8/22), recheck Hgb    Fluids/ Electrolytes/ Nutrition:  #Hypernatremia   - mIVF for IV fluid hydration  - Nutri-phos 4x/day for lyte replacement  - TFs with  Q4h  - Wiggins    Renal/ Fluid Balance:    #Acute Kidney injury  - Urine output stable overnight, Creatinine reassuring  - Will continue to monitor intake and output via wiggins  - Diuresis with 40mg TID - net negative goal ~1 liter  - Diamox 500mg once (8/21)     Endocrine:  #Stress hyperglycemia  #Hypothyroidism  - off Insulin gtt, on ISS  - Levothyroxine    ID/ Antibiotics:  - Monitor fever curve, trend WBC    Prophylaxis:    - Mechanical prophylaxis for DVT.   - ASA, plavix, heparin low intensity started (8/21), plan for warfarin initiation once definitive plans made on current V-pacing wires in place  - Protonix    Lines/ tubes/ drains:  - arterial line, PIV, CVC    Disposition:  - CV ICU.    Patient seen, findings and plan discussed with CVTS    Brad Hawkins MD  8/23/2018 12:15 PM  Anesthesia  Resident  PGY4/CA-3        ====================================    SUBJECTIVE:   Stable heart rates in AM, no overnight issues. Stable after transfusion yesterday, no ongoing concern for acute bleed.      OBJECTIVE:   1. VITAL SIGNS:   Temp:  [96.7  F (35.9  C)-97.9  F (36.6  C)] 97.9  F (36.6  C)  Heart Rate:  [] 98  Resp:  [16-36] 28  MAP:  [65 mmHg-96 mmHg] 87 mmHg  Arterial Line BP: ()/(52-81) 114/74  SpO2:  [71 %-100 %] 100 %  Resp: 28    2. INTAKE/ OUTPUT:   I/O last 3 completed shifts:  In: 2026.66 [I.V.:641.66; NG/GT:615]  Out: 2120 [Urine:1770; Chest Tube:350]    3. PHYSICAL EXAMINATION:   General: Stable on nasal cannula, comfortable  Neuro: interactive, alert, following commands  Resp: On nasal cannula after extubation, no distress  CV: tachycardia, some irregularity  Abdomen: Soft, dressings in place  Incisions: Dressing over chest, chest tubes in place, closed chest  Extremities: warm and well perfused    4. INVESTIGATIONS:   Arterial Blood Gases     Recent Labs  Lab 08/22/18  0350 08/21/18  2335 08/21/18  1240 08/21/18  1105   PH 7.45 7.51* 7.48* 7.48*   PCO2 38 33* 37 36   PO2 125* 70* 84 117*   HCO3 26 26 28 27     Complete Blood Count     Recent Labs  Lab 08/23/18  1130 08/23/18  0450 08/22/18  2115 08/22/18  1221 08/22/18  0915 08/22/18  0340   WBC 10.7 9.9  --   --  8.3 8.1   HGB 7.6* 8.0* 8.1* 8.0* 7.0* 6.1*    372  --   --  347 358     Basic Metabolic Panel    Recent Labs  Lab 08/23/18  0450 08/22/18  0915 08/22/18  0340 08/21/18  2250 08/21/18  1550   * 144 144  --  145*   POTASSIUM 4.0 3.8 4.1 3.4 3.7   CHLORIDE 114* 112* 112*  --  110*   CO2 26 24 27  --  26   BUN 55* 62* 68*  --  66*   CR 0.68 0.67 0.72  --  0.74   GLC 89 107* 128*  --  108*     Liver Function Tests    Recent Labs  Lab 08/23/18  0450 08/22/18  0340 08/21/18  0339 08/20/18  0315   AST 62* 92* 86* 108*   ALT 86* 101* 106* 132*   ALKPHOS 137 158* 196* 220*   BILITOTAL 0.4 0.3 0.2 0.3   ALBUMIN 2.1* 2.2*  1.6* 1.6*   INR 1.21* 1.27* 1.21* 1.16*     Pancreatic Enzymes  No lab results found in last 7 days.  Coagulation Profile    Recent Labs  Lab 08/23/18  0450 08/22/18  0340 08/21/18  0339 08/20/18  0315 08/19/18  0332 08/18/18  0346   INR 1.21* 1.27* 1.21* 1.16* 1.26* 1.37*   PTT  --   --  46* 48* 47* 45*         5. RADIOLOGY:   Recent Results (from the past 24 hour(s))   XR Abdomen Port 1 View    Narrative    EXAMINATION: XR ABDOMEN PORT 1 VW, 8/22/2018 1:22 PM    COMPARISON: 8/13/2018    HISTORY: eval for abd distension and distension recent GI bleed;     FINDINGS: Chest tubes and mediastinal drains are seen. Feeding tube at  the ligament of Treitz. No air-filled dilated loops of small bowel. No  pneumatosis. Surgical clips in the pelvis.      Impression    IMPRESSION: Nonobstructed bowel gas pattern.     FELICITAS MEDINA MD   XR Chest Port 1 View    Narrative    Exam: XR CHEST PORT 1 VW, 8/22/2018 1:23 PM    Indication: RN placed PICC - verify tip placement;     Comparison: Chest x-ray 8/22/2018 and 1:08 AM    Findings:   Portable semiupright AP radiograph of the chest. Right upper extremity  PICC with tip projecting over the low SVC. Mediastinal drains. Enteric  tube courses below the diaphragm, its tip extends beyond the  field-of-view. Bilateral chest tubes are stable in position.  Epicardial pacing wires. Median sternotomy wires and mediastinal  surgical clips. The cardiac mediastinal silhouette is mildly enlarged.  The pulmonary vasculature is indistinct. No pneumothorax. Stable  moderate left pleural effusion with overlying atelectasis  consolidation. Mixed interstitial and airspace opacities, slightly  improved compared to the prior exam. The visualized upper abdomen  appears unremarkable.      Impression    Impression:   1. Right upper stomach PICC with tip projecting over the low SVC.  Other support devices are stable as described above.  2. Diffuse interstitial and airspace opacities appear  slightly  improved compared to the prior radiograph, likely represents improved  pulmonary edema. Stable moderate left pleural effusion with overlying  atelectasis/consolidation.    I have personally reviewed the examination and initial interpretation  and I agree with the findings.    TARAN LOCKE MD

## 2018-08-23 NOTE — PROGRESS NOTES
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 Underwent repair of posterior VSD and TVR with bioprosthetic valve on 8/10, and was kept with open chest due to coagulopathy and RV dysfunction. Chest closed 8/13. IABP removed 8/14. Extubated 8/21.    TODAY'S PROGRESS:   -Remove arterial line after GI procedure  -Attempt Condom cath, remove wiggins if feasible  -Monitoring Hgb, stable hemoglobin after concern for GI bleed   -GI consult placed - continue PPI infusion, GI holding off on scope for now   -Continue PPI infusion x 48 hours more (72h total), restart tube feeds, monitor Hgb  -Hold heparin low intensity drip for 48h more  -Hold off lasix for now  -Hgb check Q8h for today  -Potential transfer to floor if stable later today  -Initiate CLD today and restart TF      PLAN:  Neuro/ pain/ sedation:  #Post operative sedation  #Questionable seizure activity (negative follow up EEG 8/16)  - PRN Quetiapine 50mg for sleep at night  - Monitor neurological status. Notify the MD for any acute changes in exam.  - Discussed with neurology:    -Keppra discontinued 8/20    -Follow up with neurology as outpatient in 3 months    -Repeat EEG on 8/16 without seizure activity    -CT head if acute neuro changes    Pulmonary care:  #Post operative mechanical ventilation   - Extubated 8/21, BiPAP if needed    Cardiovascular:  #Post infarction 2.5cm infraseptal VSD  #s/p HUSSEIN to distal RCA and proximal LAD  #STEMI, cardiogenic shock  #Dilated RV   #Pulmonary HTN  #IABP  #s/p repair of posterior VSD and TVR with bioprosthetic valve, s/p Chest closure 8/13  #Atrial flutter  - Cards 2 reconsulted, appreciate assist   - Monitor hemodynamic status. Pacer at VVI  at 60  - Infusions: Weaned off epi and dobutamine 8/16  - ECHO from 8/8 prior to surgery: Inferior wall akinesis with inferoseptal muscular VAD. Mild to moderate right ventricular dilation is present. Global right ventricular function is moderately reduced.   -Follow up ECHO 8/20 -CVTS with plans to monitor RV effusion noted on ECHO (8/20) - repeat ELISABETH in one week (8/27) to evaluate RV effusion (orders in)  - ASA 81mg  - EP consult regarding underlying atrial flutter.    Heme:  #Anticoagulation s/p Drug Eluting Stent placement  #Thrombocythemia  - Cangrelor discontinued, Heparin 400 units/hr drip stopped after concern for GI bleed 8/22  - 81mg ASA  - Plavix loaded 8/15 (75mg daily thereafter)  - Hydroxyurea 1g BID, hematology consulted and following    GI care:  - NJ placed 8/13, TF at goal  - Rectal tube placed 8/15  - GI consult for GI bleed - hold off on GI scope for now, continue PPI infusion for 72h total (from 8/22), recheck Hgb    Fluids/ Electrolytes/ Nutrition:  #Hypernatremia   - mIVF for IV fluid hydration  - Nutri-phos 4x/day for lyte replacement  - TFs with  Q4h  - Wiggins    Renal/ Fluid Balance:    #Acute Kidney injury  - Urine output stable overnight, Creatinine reassuring  - Will continue to monitor intake and output via wiggins  - Diuresis with 40mg TID - net negative goal ~1 liter  - Diamox 500mg once (8/21)     Endocrine:  #Stress hyperglycemia  #Hypothyroidism  - off Insulin gtt, on ISS  - Levothyroxine    ID/ Antibiotics:  - Monitor fever curve, trend WBC    Prophylaxis:    - Mechanical prophylaxis for DVT.   - ASA, plavix, heparin low intensity STOPPED 8/22, restart 8/25 (72h after concern for GI bleed)   - plan for warfarin initiation once definitive plans made on current V-pacing wires in place  - Protonix    Lines/ tubes/ drains:  - arterial line, PIV, CVC    Disposition:  - [facility] ICU.    Patient seen, findings and plan discussed with CV ICU staff Dr. Terrence Hawkins MD  8/23/2018  12:15 PM  Anesthesia Resident  PGY4/CA-3        ====================================    SUBJECTIVE:   Stable heart rates in AM, no overnight issues. Stable after transfusion yesterday, no ongoing concern for acute bleed.      OBJECTIVE:   1. VITAL SIGNS:   Temp:  [96.7  F (35.9  C)-97.9  F (36.6  C)] 97.9  F (36.6  C)  Heart Rate:  [] 98  Resp:  [16-36] 28  MAP:  [65 mmHg-96 mmHg] 87 mmHg  Arterial Line BP: ()/(52-81) 114/74  SpO2:  [71 %-100 %] 100 %  Resp: 28    2. INTAKE/ OUTPUT:   I/O last 3 completed shifts:  In: 2026.66 [I.V.:641.66; NG/GT:615]  Out: 2120 [Urine:1770; Chest Tube:350]    3. PHYSICAL EXAMINATION:   General: Stable on nasal cannula, comfortable  Neuro: interactive, alert, following commands  Resp: On nasal cannula after extubation, no distress  CV: tachycardia, some irregularity  Abdomen: Soft, dressings in place  Incisions: Dressing over chest, chest tubes in place, closed chest  Extremities: warm and well perfused    4. INVESTIGATIONS:   Arterial Blood Gases     Recent Labs  Lab 08/22/18  0350 08/21/18  2335 08/21/18  1240 08/21/18  1105   PH 7.45 7.51* 7.48* 7.48*   PCO2 38 33* 37 36   PO2 125* 70* 84 117*   HCO3 26 26 28 27     Complete Blood Count     Recent Labs  Lab 08/23/18  1130 08/23/18  0450 08/22/18  2115 08/22/18  1221 08/22/18  0915 08/22/18  0340   WBC 10.7 9.9  --   --  8.3 8.1   HGB 7.6* 8.0* 8.1* 8.0* 7.0* 6.1*    372  --   --  347 358     Basic Metabolic Panel    Recent Labs  Lab 08/23/18  0450 08/22/18  0915 08/22/18  0340 08/21/18  2250 08/21/18  1550   * 144 144  --  145*   POTASSIUM 4.0 3.8 4.1 3.4 3.7   CHLORIDE 114* 112* 112*  --  110*   CO2 26 24 27  --  26   BUN 55* 62* 68*  --  66*   CR 0.68 0.67 0.72  --  0.74   GLC 89 107* 128*  --  108*     Liver Function Tests    Recent Labs  Lab 08/23/18  0450 08/22/18  0340 08/21/18  0339 08/20/18  0315   AST 62* 92* 86* 108*   ALT 86* 101* 106* 132*   ALKPHOS 137 158* 196* 220*   BILITOTAL 0.4 0.3 0.2  0.3   ALBUMIN 2.1* 2.2* 1.6* 1.6*   INR 1.21* 1.27* 1.21* 1.16*     Pancreatic Enzymes  No lab results found in last 7 days.  Coagulation Profile    Recent Labs  Lab 08/23/18  0450 08/22/18  0340 08/21/18  0339 08/20/18  0315 08/19/18  0332 08/18/18  0346   INR 1.21* 1.27* 1.21* 1.16* 1.26* 1.37*   PTT  --   --  46* 48* 47* 45*         5. RADIOLOGY:   Recent Results (from the past 24 hour(s))   XR Abdomen Port 1 View    Narrative    EXAMINATION: XR ABDOMEN PORT 1 VW, 8/22/2018 1:22 PM    COMPARISON: 8/13/2018    HISTORY: eval for abd distension and distension recent GI bleed;     FINDINGS: Chest tubes and mediastinal drains are seen. Feeding tube at  the ligament of Treitz. No air-filled dilated loops of small bowel. No  pneumatosis. Surgical clips in the pelvis.      Impression    IMPRESSION: Nonobstructed bowel gas pattern.     FELICITAS MEDINA MD   XR Chest Port 1 View    Narrative    Exam: XR CHEST PORT 1 VW, 8/22/2018 1:23 PM    Indication: RN placed PICC - verify tip placement;     Comparison: Chest x-ray 8/22/2018 and 1:08 AM    Findings:   Portable semiupright AP radiograph of the chest. Right upper extremity  PICC with tip projecting over the low SVC. Mediastinal drains. Enteric  tube courses below the diaphragm, its tip extends beyond the  field-of-view. Bilateral chest tubes are stable in position.  Epicardial pacing wires. Median sternotomy wires and mediastinal  surgical clips. The cardiac mediastinal silhouette is mildly enlarged.  The pulmonary vasculature is indistinct. No pneumothorax. Stable  moderate left pleural effusion with overlying atelectasis  consolidation. Mixed interstitial and airspace opacities, slightly  improved compared to the prior exam. The visualized upper abdomen  appears unremarkable.      Impression    Impression:   1. Right upper stomach PICC with tip projecting over the low SVC.  Other support devices are stable as described above.  2. Diffuse interstitial and airspace  opacities appear slightly  improved compared to the prior radiograph, likely represents improved  pulmonary edema. Stable moderate left pleural effusion with overlying  atelectasis/consolidation.    I have personally reviewed the examination and initial interpretation  and I agree with the findings.    TARAN LOCKE MD

## 2018-08-23 NOTE — PLAN OF CARE
Problem: Cardiac Surgery (Adult)  Goal: Signs and Symptoms of Listed Potential Problems Will be Absent, Minimized or Managed (Cardiac Surgery)  Signs and symptoms of listed potential problems will be absent, minimized or managed by discharge/transition of care (reference Cardiac Surgery (Adult) CPG).   Outcome: Improving  Pt is very pleasant, alert and oriented x4. VSS. Denies pain. 1 - 2L NC. Midline incision C/D/I. CT x4 to suction; pleural with most output; serous. Herring with great UOP; 100 - 150cc q2h. Black loose BM x1; Hgb stable this AM. Will continue to assess and notify MD of any changes.

## 2018-08-24 ENCOUNTER — APPOINTMENT (OUTPATIENT)
Dept: OCCUPATIONAL THERAPY | Facility: CLINIC | Age: 68
DRG: 219 | End: 2018-08-24
Attending: INTERNAL MEDICINE
Payer: MEDICARE

## 2018-08-24 ENCOUNTER — ANESTHESIA EVENT (OUTPATIENT)
Dept: SURGERY | Facility: CLINIC | Age: 68
DRG: 219 | End: 2018-08-24
Payer: MEDICARE

## 2018-08-24 ENCOUNTER — ANESTHESIA (OUTPATIENT)
Dept: SURGERY | Facility: CLINIC | Age: 68
DRG: 219 | End: 2018-08-24
Payer: MEDICARE

## 2018-08-24 ENCOUNTER — APPOINTMENT (OUTPATIENT)
Dept: GENERAL RADIOLOGY | Facility: CLINIC | Age: 68
DRG: 219 | End: 2018-08-24
Attending: PHYSICIAN ASSISTANT
Payer: MEDICARE

## 2018-08-24 LAB
ALBUMIN SERPL-MCNC: 2 G/DL (ref 3.4–5)
ALP SERPL-CCNC: 177 U/L (ref 40–150)
ALT SERPL W P-5'-P-CCNC: 147 U/L (ref 0–70)
ANION GAP SERPL CALCULATED.3IONS-SCNC: 7 MMOL/L (ref 3–14)
AST SERPL W P-5'-P-CCNC: 151 U/L (ref 0–45)
BILIRUB SERPL-MCNC: 0.4 MG/DL (ref 0.2–1.3)
BUN SERPL-MCNC: 44 MG/DL (ref 7–30)
CALCIUM SERPL-MCNC: 7.4 MG/DL (ref 8.5–10.1)
CHLORIDE SERPL-SCNC: 112 MMOL/L (ref 94–109)
CO2 SERPL-SCNC: 23 MMOL/L (ref 20–32)
CREAT SERPL-MCNC: 0.65 MG/DL (ref 0.66–1.25)
ERYTHROCYTE [DISTWIDTH] IN BLOOD BY AUTOMATED COUNT: 21.1 % (ref 10–15)
GFR SERPL CREATININE-BSD FRML MDRD: >90 ML/MIN/1.7M2
GLUCOSE BLDC GLUCOMTR-MCNC: 108 MG/DL (ref 70–99)
GLUCOSE BLDC GLUCOMTR-MCNC: 110 MG/DL (ref 70–99)
GLUCOSE BLDC GLUCOMTR-MCNC: 122 MG/DL (ref 70–99)
GLUCOSE BLDC GLUCOMTR-MCNC: 46 MG/DL (ref 70–99)
GLUCOSE BLDC GLUCOMTR-MCNC: 59 MG/DL (ref 70–99)
GLUCOSE BLDC GLUCOMTR-MCNC: 79 MG/DL (ref 70–99)
GLUCOSE BLDC GLUCOMTR-MCNC: 91 MG/DL (ref 70–99)
GLUCOSE BLDC GLUCOMTR-MCNC: 97 MG/DL (ref 70–99)
GLUCOSE BLDC GLUCOMTR-MCNC: 97 MG/DL (ref 70–99)
GLUCOSE SERPL-MCNC: 91 MG/DL (ref 70–99)
HCT VFR BLD AUTO: 26.5 % (ref 40–53)
HGB BLD-MCNC: 8.5 G/DL (ref 13.3–17.7)
INR PPP: 1.24 (ref 0.86–1.14)
INTERPRETATION ECG - MUSE: NORMAL
MAGNESIUM SERPL-MCNC: 2.1 MG/DL (ref 1.6–2.3)
MCH RBC QN AUTO: 30 PG (ref 26.5–33)
MCHC RBC AUTO-ENTMCNC: 32.1 G/DL (ref 31.5–36.5)
MCV RBC AUTO: 94 FL (ref 78–100)
PHOSPHATE SERPL-MCNC: 2.6 MG/DL (ref 2.5–4.5)
PLATELET # BLD AUTO: 433 10E9/L (ref 150–450)
POTASSIUM SERPL-SCNC: 3.6 MMOL/L (ref 3.4–5.3)
PROT SERPL-MCNC: 5.4 G/DL (ref 6.8–8.8)
RBC # BLD AUTO: 2.83 10E12/L (ref 4.4–5.9)
SODIUM SERPL-SCNC: 143 MMOL/L (ref 133–144)
UPPER GI ENDOSCOPY: NORMAL
WBC # BLD AUTO: 10.1 10E9/L (ref 4–11)

## 2018-08-24 PROCEDURE — 97110 THERAPEUTIC EXERCISES: CPT | Mod: GO

## 2018-08-24 PROCEDURE — 25000132 ZZH RX MED GY IP 250 OP 250 PS 637: Mod: GY | Performed by: THORACIC SURGERY (CARDIOTHORACIC VASCULAR SURGERY)

## 2018-08-24 PROCEDURE — 27210794 ZZH OR GENERAL SUPPLY STERILE: Performed by: INTERNAL MEDICINE

## 2018-08-24 PROCEDURE — 00000146 ZZHCL STATISTIC GLUCOSE BY METER IP

## 2018-08-24 PROCEDURE — A9270 NON-COVERED ITEM OR SERVICE: HCPCS | Mod: GY | Performed by: STUDENT IN AN ORGANIZED HEALTH CARE EDUCATION/TRAINING PROGRAM

## 2018-08-24 PROCEDURE — A9270 NON-COVERED ITEM OR SERVICE: HCPCS | Mod: GY | Performed by: INTERNAL MEDICINE

## 2018-08-24 PROCEDURE — 37000008 ZZH ANESTHESIA TECHNICAL FEE, 1ST 30 MIN: Performed by: INTERNAL MEDICINE

## 2018-08-24 PROCEDURE — 80053 COMPREHEN METABOLIC PANEL: CPT | Performed by: INTERNAL MEDICINE

## 2018-08-24 PROCEDURE — A9270 NON-COVERED ITEM OR SERVICE: HCPCS | Mod: GY | Performed by: THORACIC SURGERY (CARDIOTHORACIC VASCULAR SURGERY)

## 2018-08-24 PROCEDURE — 25000128 H RX IP 250 OP 636: Performed by: STUDENT IN AN ORGANIZED HEALTH CARE EDUCATION/TRAINING PROGRAM

## 2018-08-24 PROCEDURE — 25000125 ZZHC RX 250: Performed by: STUDENT IN AN ORGANIZED HEALTH CARE EDUCATION/TRAINING PROGRAM

## 2018-08-24 PROCEDURE — 25000128 H RX IP 250 OP 636: Performed by: PHYSICIAN ASSISTANT

## 2018-08-24 PROCEDURE — 85610 PROTHROMBIN TIME: CPT | Performed by: INTERNAL MEDICINE

## 2018-08-24 PROCEDURE — 36000051 ZZH SURGERY LEVEL 2 1ST 30 MIN - UMMC: Performed by: INTERNAL MEDICINE

## 2018-08-24 PROCEDURE — 84100 ASSAY OF PHOSPHORUS: CPT | Performed by: INTERNAL MEDICINE

## 2018-08-24 PROCEDURE — C9399 UNCLASSIFIED DRUGS OR BIOLOG: HCPCS | Performed by: STUDENT IN AN ORGANIZED HEALTH CARE EDUCATION/TRAINING PROGRAM

## 2018-08-24 PROCEDURE — 25000125 ZZHC RX 250: Performed by: PHYSICIAN ASSISTANT

## 2018-08-24 PROCEDURE — 27210995 ZZH RX 272: Performed by: INTERNAL MEDICINE

## 2018-08-24 PROCEDURE — 40000171 ZZH STATISTIC PRE-PROCEDURE ASSESSMENT III: Performed by: INTERNAL MEDICINE

## 2018-08-24 PROCEDURE — A9270 NON-COVERED ITEM OR SERVICE: HCPCS | Performed by: INTERNAL MEDICINE

## 2018-08-24 PROCEDURE — 25000125 ZZHC RX 250: Performed by: INTERNAL MEDICINE

## 2018-08-24 PROCEDURE — 37000009 ZZH ANESTHESIA TECHNICAL FEE, EACH ADDTL 15 MIN: Performed by: INTERNAL MEDICINE

## 2018-08-24 PROCEDURE — 71000014 ZZH RECOVERY PHASE 1 LEVEL 2 FIRST HR: Performed by: INTERNAL MEDICINE

## 2018-08-24 PROCEDURE — 97535 SELF CARE MNGMENT TRAINING: CPT | Mod: GO

## 2018-08-24 PROCEDURE — 0DJ08ZZ INSPECTION OF UPPER INTESTINAL TRACT, VIA NATURAL OR ARTIFICIAL OPENING ENDOSCOPIC: ICD-10-PCS | Performed by: INTERNAL MEDICINE

## 2018-08-24 PROCEDURE — 25000132 ZZH RX MED GY IP 250 OP 250 PS 637: Mod: GY | Performed by: INTERNAL MEDICINE

## 2018-08-24 PROCEDURE — 40000133 ZZH STATISTIC OT WARD VISIT

## 2018-08-24 PROCEDURE — 25000132 ZZH RX MED GY IP 250 OP 250 PS 637: Mod: GY | Performed by: STUDENT IN AN ORGANIZED HEALTH CARE EDUCATION/TRAINING PROGRAM

## 2018-08-24 PROCEDURE — 85027 COMPLETE CBC AUTOMATED: CPT | Performed by: INTERNAL MEDICINE

## 2018-08-24 PROCEDURE — 83735 ASSAY OF MAGNESIUM: CPT | Performed by: INTERNAL MEDICINE

## 2018-08-24 PROCEDURE — 99232 SBSQ HOSP IP/OBS MODERATE 35: CPT | Mod: GC | Performed by: ANESTHESIOLOGY

## 2018-08-24 PROCEDURE — 25000566 ZZH SEVOFLURANE, EA 15 MIN: Performed by: INTERNAL MEDICINE

## 2018-08-24 PROCEDURE — 25800025 ZZH RX 258: Performed by: ANESTHESIOLOGY

## 2018-08-24 PROCEDURE — 97530 THERAPEUTIC ACTIVITIES: CPT | Mod: GO

## 2018-08-24 PROCEDURE — 21400006 ZZH R&B CCU INTERMEDIATE UMMC

## 2018-08-24 PROCEDURE — 40000275 ZZH STATISTIC RCP TIME EA 10 MIN

## 2018-08-24 PROCEDURE — 71000015 ZZH RECOVERY PHASE 1 LEVEL 2 EA ADDTL HR: Performed by: INTERNAL MEDICINE

## 2018-08-24 PROCEDURE — 36000053 ZZH SURGERY LEVEL 2 EA 15 ADDTL MIN - UMMC: Performed by: INTERNAL MEDICINE

## 2018-08-24 PROCEDURE — 71045 X-RAY EXAM CHEST 1 VIEW: CPT

## 2018-08-24 PROCEDURE — 25800025 ZZH RX 258: Performed by: STUDENT IN AN ORGANIZED HEALTH CARE EDUCATION/TRAINING PROGRAM

## 2018-08-24 PROCEDURE — 40000014 ZZH STATISTIC ARTERIAL MONITORING DAILY

## 2018-08-24 RX ORDER — DEXTROSE MONOHYDRATE 25 G/50ML
12.5 INJECTION, SOLUTION INTRAVENOUS ONCE
Status: COMPLETED | OUTPATIENT
Start: 2018-08-24 | End: 2018-08-24

## 2018-08-24 RX ORDER — SODIUM CHLORIDE, SODIUM LACTATE, POTASSIUM CHLORIDE, CALCIUM CHLORIDE 600; 310; 30; 20 MG/100ML; MG/100ML; MG/100ML; MG/100ML
INJECTION, SOLUTION INTRAVENOUS CONTINUOUS PRN
Status: DISCONTINUED | OUTPATIENT
Start: 2018-08-24 | End: 2018-08-24

## 2018-08-24 RX ORDER — SODIUM CHLORIDE, SODIUM LACTATE, POTASSIUM CHLORIDE, CALCIUM CHLORIDE 600; 310; 30; 20 MG/100ML; MG/100ML; MG/100ML; MG/100ML
INJECTION, SOLUTION INTRAVENOUS CONTINUOUS
Status: DISCONTINUED | OUTPATIENT
Start: 2018-08-24 | End: 2018-08-24 | Stop reason: HOSPADM

## 2018-08-24 RX ORDER — ONDANSETRON 2 MG/ML
4 INJECTION INTRAMUSCULAR; INTRAVENOUS EVERY 30 MIN PRN
Status: DISCONTINUED | OUTPATIENT
Start: 2018-08-24 | End: 2018-08-24 | Stop reason: HOSPADM

## 2018-08-24 RX ORDER — PROPOFOL 10 MG/ML
INJECTION, EMULSION INTRAVENOUS PRN
Status: DISCONTINUED | OUTPATIENT
Start: 2018-08-24 | End: 2018-08-24

## 2018-08-24 RX ORDER — NALOXONE HYDROCHLORIDE 0.4 MG/ML
.1-.4 INJECTION, SOLUTION INTRAMUSCULAR; INTRAVENOUS; SUBCUTANEOUS
Status: DISCONTINUED | OUTPATIENT
Start: 2018-08-24 | End: 2018-09-12

## 2018-08-24 RX ORDER — FENTANYL CITRATE 50 UG/ML
INJECTION, SOLUTION INTRAMUSCULAR; INTRAVENOUS PRN
Status: DISCONTINUED | OUTPATIENT
Start: 2018-08-24 | End: 2018-08-24

## 2018-08-24 RX ORDER — FLUMAZENIL 0.1 MG/ML
0.2 INJECTION, SOLUTION INTRAVENOUS
Status: DISCONTINUED | OUTPATIENT
Start: 2018-08-24 | End: 2018-09-12

## 2018-08-24 RX ORDER — FENTANYL CITRATE 50 UG/ML
25 INJECTION, SOLUTION INTRAMUSCULAR; INTRAVENOUS
Status: DISCONTINUED | OUTPATIENT
Start: 2018-08-24 | End: 2018-08-24 | Stop reason: HOSPADM

## 2018-08-24 RX ORDER — ATORVASTATIN CALCIUM 40 MG/1
40 TABLET, FILM COATED ORAL EVERY EVENING
Status: DISCONTINUED | OUTPATIENT
Start: 2018-08-24 | End: 2018-09-05

## 2018-08-24 RX ORDER — ONDANSETRON 2 MG/ML
INJECTION INTRAMUSCULAR; INTRAVENOUS PRN
Status: DISCONTINUED | OUTPATIENT
Start: 2018-08-24 | End: 2018-08-24

## 2018-08-24 RX ORDER — ONDANSETRON 4 MG/1
4 TABLET, ORALLY DISINTEGRATING ORAL EVERY 30 MIN PRN
Status: DISCONTINUED | OUTPATIENT
Start: 2018-08-24 | End: 2018-08-24 | Stop reason: HOSPADM

## 2018-08-24 RX ADMIN — DEXTROSE MONOHYDRATE 12.5 G: 25 INJECTION, SOLUTION INTRAVENOUS at 11:51

## 2018-08-24 RX ADMIN — PHENYLEPHRINE HYDROCHLORIDE 100 MCG: 10 INJECTION, SOLUTION INTRAMUSCULAR; INTRAVENOUS; SUBCUTANEOUS at 14:00

## 2018-08-24 RX ADMIN — ONDANSETRON 4 MG: 2 INJECTION INTRAMUSCULAR; INTRAVENOUS at 14:11

## 2018-08-24 RX ADMIN — ROCURONIUM BROMIDE 60 MG: 10 INJECTION INTRAVENOUS at 13:52

## 2018-08-24 RX ADMIN — DEXTROSE MONOHYDRATE 25 ML: 25 INJECTION, SOLUTION INTRAVENOUS at 16:52

## 2018-08-24 RX ADMIN — SODIUM CHLORIDE 8 MG/HR: 9 INJECTION, SOLUTION INTRAVENOUS at 01:04

## 2018-08-24 RX ADMIN — MULTIVITAMIN 15 ML: LIQUID ORAL at 09:04

## 2018-08-24 RX ADMIN — Medication 1000 MG: at 20:49

## 2018-08-24 RX ADMIN — Medication 1 PACKET: at 09:04

## 2018-08-24 RX ADMIN — SODIUM CHLORIDE, POTASSIUM CHLORIDE, SODIUM LACTATE AND CALCIUM CHLORIDE: 600; 310; 30; 20 INJECTION, SOLUTION INTRAVENOUS at 13:30

## 2018-08-24 RX ADMIN — POTASSIUM CHLORIDE 20 MEQ: 1.5 POWDER, FOR SOLUTION ORAL at 09:04

## 2018-08-24 RX ADMIN — Medication 1000 MG: at 09:23

## 2018-08-24 RX ADMIN — SUGAMMADEX 200 MG: 100 INJECTION, SOLUTION INTRAVENOUS at 14:17

## 2018-08-24 RX ADMIN — LEVOTHYROXINE SODIUM 75 MCG: 75 TABLET ORAL at 09:04

## 2018-08-24 RX ADMIN — PHENYLEPHRINE HYDROCHLORIDE 0.3 MCG/KG/MIN: 10 INJECTION, SOLUTION INTRAMUSCULAR; INTRAVENOUS; SUBCUTANEOUS at 14:02

## 2018-08-24 RX ADMIN — ASPIRIN 81 MG CHEWABLE TABLET 81 MG: 81 TABLET CHEWABLE at 09:04

## 2018-08-24 RX ADMIN — PHENYLEPHRINE HYDROCHLORIDE 100 MCG: 10 INJECTION, SOLUTION INTRAMUSCULAR; INTRAVENOUS; SUBCUTANEOUS at 14:02

## 2018-08-24 RX ADMIN — Medication 1 PACKET: at 20:51

## 2018-08-24 RX ADMIN — CLOPIDOGREL 75 MG: 75 TABLET, FILM COATED ORAL at 09:04

## 2018-08-24 RX ADMIN — SODIUM CHLORIDE 8 MG/HR: 9 INJECTION, SOLUTION INTRAVENOUS at 22:42

## 2018-08-24 RX ADMIN — PHENYLEPHRINE HYDROCHLORIDE 100 MCG: 10 INJECTION, SOLUTION INTRAMUSCULAR; INTRAVENOUS; SUBCUTANEOUS at 13:58

## 2018-08-24 RX ADMIN — ATORVASTATIN CALCIUM 40 MG: 40 TABLET, FILM COATED ORAL at 20:50

## 2018-08-24 RX ADMIN — QUETIAPINE 50 MG: 50 TABLET ORAL at 22:36

## 2018-08-24 RX ADMIN — PROPOFOL 110 MG: 10 INJECTION, EMULSION INTRAVENOUS at 13:51

## 2018-08-24 RX ADMIN — FENTANYL CITRATE 75 MCG: 50 INJECTION, SOLUTION INTRAMUSCULAR; INTRAVENOUS at 13:51

## 2018-08-24 ASSESSMENT — ACTIVITIES OF DAILY LIVING (ADL)
ADLS_ACUITY_SCORE: 12
ADLS_ACUITY_SCORE: 17

## 2018-08-24 ASSESSMENT — LIFESTYLE VARIABLES: TOBACCO_USE: 1

## 2018-08-24 ASSESSMENT — ENCOUNTER SYMPTOMS: DYSRHYTHMIAS: 1

## 2018-08-24 ASSESSMENT — COPD QUESTIONNAIRES: COPD: 0

## 2018-08-24 NOTE — OR NURSING
Dr. Rodriguez at bedside when patient arrived to PACU from OR.  Dr. Rodriguez notified patient blood pressure 86/63.  MAP 75.  Dr. Rodriguez stated he was fine with blood pressure with MAP >65.      Dr. Rodriguez returned to bedside in PACU to assess patient.  Informed Dr. Miguel Salas would be monitoring patient now.

## 2018-08-24 NOTE — PROGRESS NOTES
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 Underwent repair of posterior VSD and TVR with bioprosthetic valve on 8/10, and was kept with open chest due to coagulopathy and RV dysfunction. Chest closed 8/13. IABP removed 8/14. Extubated 8/21.    TODAY'S PROGRESS:   -GI to perform EGD today. NPO, TFs continuing.  -Plan to continue PPI infusion until tomorrow (72 hour total course from time of bleeding concern)  -Plan to restart Low intensity heparin tomorrow (72h after initial identified bleeding concern)  -Reconsult, follow up EP rec's on permanent pacemaker vs removal of pacer wires   -Will hold removal of chest tubes until decision made  -Decrease atorvastatin to 40, monitor LFT's  -Transfer to floor today      PLAN:  Neuro/ pain/ sedation:  #Post operative sedation  #Questionable seizure activity (negative follow up EEG 8/16)  - PRN Quetiapine 50mg for sleep at night  - Monitor neurological status. Notify the MD for any acute changes in exam.  - Discussed with neurology:    -Keppra discontinued 8/20    -Follow up with neurology as outpatient in 3 months    -Repeat EEG on 8/16 without seizure activity    -CT head if acute neuro changes    Pulmonary care:  #Post operative mechanical ventilation   - Extubated 8/21, BiPAP if needed    Cardiovascular:  #Post infarction 2.5cm infraseptal VSD  #s/p HUSSEIN to distal RCA and proximal LAD  #STEMI, cardiogenic shock  #Dilated RV   #Pulmonary HTN  #IABP  #s/p repair of posterior VSD and TVR with bioprosthetic valve, s/p Chest closure 8/13  #Atrial flutter  - Cards 2 reconsulted, appreciate assist   - Monitor hemodynamic status. Pacer at VVI at 60  - Infusions: Weaned off epi and dobutamine  8/16  - ECHO from 8/8 prior to surgery: Inferior wall akinesis with inferoseptal muscular VAD. Mild to moderate right ventricular dilation is present. Global right ventricular function is moderately reduced.   -Follow up ECHO 8/20 -CVTS with plans to monitor RV effusion noted on ECHO (8/20) - repeat ELISABETH in one week (8/27) to evaluate RV effusion (orders in)  - ASA 81mg  - EP consult regarding underlying atrial flutter.    Heme:  #Anticoagulation s/p Drug Eluting Stent placement  #Thrombocythemia  - Cangrelor discontinued, Heparin 400 units/hr drip stopped after concern for GI bleed 8/22  - 81mg ASA  - Plavix loaded 8/15 (75mg daily thereafter)  - Hydroxyurea 1g BID, hematology consulted and following    GI care:  - NJ placed 8/13, TF at goal  - Rectal tube placed 8/15  - GI consult for GI bleed    - continue PPI infusion for 72h total (from 8/22)   - EGD pending today    Fluids/ Electrolytes/ Nutrition:  #Hypernatremia   - mIVF for IV fluid hydration  - Nutri-phos 4x/day for lyte replacement  - TFs with  Q4h  - Wiggins out    Renal/ Fluid Balance:    #Acute Kidney injury  - Urine output stable overnight, Creatinine reassuring  - Will continue to monitor intake and output via wiggins  - Diuresis with 40mg TID - net negative goal ~1 liter  - Diamox 500mg once (8/21)     Endocrine:  #Stress hyperglycemia  #Hypothyroidism  - off Insulin gtt, on ISS  - Levothyroxine    ID/ Antibiotics:  - Monitor fever curve, trend WBC    Prophylaxis:    - Mechanical prophylaxis for DVT.   - ASA, plavix, heparin low intensity STOPPED 8/22, restart 8/25 (72h after concern for GI bleed)   - plan for warfarin initiation once definitive plans made on current V-pacing wires in place  - Protonix    Lines/ tubes/ drains:  - PIV    Disposition:  - [facility] ICU.    Patient seen, findings and plan discussed with [facility] ICU staff Dr. Terrence Leslie MD  PGY-3 General Surgery      ====================================    SUBJECTIVE:   NAEON.  Continues with dark stools. More alert and interactive.      OBJECTIVE:   1. VITAL SIGNS:   Temp:  [96.4  F (35.8  C)-98.5  F (36.9  C)] 98.4  F (36.9  C)  Heart Rate:  [] 106  Resp:  [20-40] 40  MAP:  [67 mmHg-89 mmHg] 84 mmHg  Arterial Line BP: ()/(56-74) 108/68  SpO2:  [90 %-100 %] 96 %  Resp: 40    2. INTAKE/ OUTPUT:   I/O last 3 completed shifts:  In: 2124 [P.O.:400; I.V.:309; NG/GT:715]  Out: 1225 [Urine:805; Chest Tube:420]    3. PHYSICAL EXAMINATION:   General: Stable on nasal cannula, comfortable  Neuro: interactive, alert, following commands  Resp: NLB on room air  CV: tachycardia, some irregularity  Abdomen: Soft, dressings in place  Incisions: Dressing over chest, closed chest, CTs in place  Extremities: warm and well perfused    4. INVESTIGATIONS:   Arterial Blood Gases     Recent Labs  Lab 08/22/18  0350 08/21/18  2335 08/21/18  1240 08/21/18  1105   PH 7.45 7.51* 7.48* 7.48*   PCO2 38 33* 37 36   PO2 125* 70* 84 117*   HCO3 26 26 28 27     Complete Blood Count     Recent Labs  Lab 08/24/18  0457 08/23/18 2010 08/23/18  1611 08/23/18  1130 08/23/18  0450  08/22/18  0915   WBC 10.1  --   --  10.7 9.9  --  8.3   HGB 8.5* 8.6* 8.7* 7.6* 8.0*  < > 7.0*     --   --  421 372  --  347   < > = values in this interval not displayed.  Basic Metabolic Panel    Recent Labs  Lab 08/24/18  0457 08/23/18  0450 08/22/18  0915 08/22/18  0340    146* 144 144   POTASSIUM 3.6 4.0 3.8 4.1   CHLORIDE 112* 114* 112* 112*   CO2 23 26 24 27   BUN 44* 55* 62* 68*   CR 0.65* 0.68 0.67 0.72   GLC 91 89 107* 128*     Liver Function Tests    Recent Labs  Lab 08/24/18  0457 08/23/18  0450 08/22/18  0340 08/21/18  0339   * 62* 92* 86*   * 86* 101* 106*   ALKPHOS 177* 137 158* 196*   BILITOTAL 0.4 0.4 0.3 0.2   ALBUMIN 2.0* 2.1* 2.2* 1.6*   INR 1.24* 1.21* 1.27* 1.21*     Pancreatic Enzymes  No lab results found in last 7 days.  Coagulation Profile    Recent Labs  Lab 08/24/18  0455  08/23/18  0450 08/22/18  0340 08/21/18  0339 08/20/18  0315 08/19/18  0332 08/18/18  0346   INR 1.24* 1.21* 1.27* 1.21* 1.16* 1.26* 1.37*   PTT  --   --   --  46* 48* 47* 45*         5. RADIOLOGY:   No results found for this or any previous visit (from the past 24 hour(s)).

## 2018-08-24 NOTE — BRIEF OP NOTE
[facility], [place]    Brief Operative Note    Pre-operative diagnosis: Bleed  Post-operative diagnosis Gastric ulcer  Procedure: Procedure(s):  Upper Endoscopy with No Intervention; Two Gastric Ulcers - Wound Class: II-Clean Contaminated  Surgeon: Surgeon(s) and Role:     * Rocael Barber MD - Primary     * Stacy Engel MD - Assisting  Anesthesia: General   Estimated blood loss: None  Drains:  None  Specimens: None  Findings:  Two gastric ulcers with pigmented material present.  No active bleeding or high-risk features.  Exam otherwise normal.  Complications: None.  Implants: None.  Recommendations:  - Diet as tolerated  - Resume tube feeds  - Hold heparin drip for at least additional 24 hours  - Minimize anti-coagulation as able  - Monitor closely for bleeding, particularly has heparin is resumed  - Continue IV PPI BID while admitted followed by high dose PPI PO BID x 8 weeks  - Repeat EGD in 8 weeks is indicated to evaluate for gastric malignancy, but expect his gastric ulcers are provoked by recent illness and malignancy is less likely.  Pursue repeat EGD based on risk/benefit discussion with cardiology and PCP.

## 2018-08-24 NOTE — ANESTHESIA PREPROCEDURE EVALUATION
Anesthesia Evaluation     . Pt has had prior anesthetic. Type: General    No history of anesthetic complications          ROS/MED HX    ENT/Pulmonary: Comment: Extubated 8/21.    08/10/18; 1615; Mask Ventilation: Easy; Ease of Intubation: Easy; Airway Size: 8;  Cuffed;  Oral;  Blade Type: Fletcher;  Blade Size: 2;  Place by: ROSA Aguilar;  Insertion Attempts: 1;  Secured at (cm)to lip: 24 cm;  Breath Sounds: Equal, clear and bilateral;  End Tidal CO2: Present;  Dentition: Intact, Unchanged;  Grade View of Cords: 1    (+)tobacco use, Past use , . .   (-) asthma and COPD   Neurologic:      (-) CVA and TIA   Cardiovascular: Comment: s/p HUSSEIN to RCA and LAD for STEMI on 8/4 found to have post infarction basal VSD.    S/p repair of posterior VSD and TVR with bioprosthetic valve on 8/10    Chest tubes in place    TTE 8/22/2018:  Mod pericardial effusion; no tamponade physiology  LVEF 60-65%  RV moderately decreased in function, nl size    (+) --CAD, -past MI (s/p 2 HUSSEIN early 8/2018),-stent,. Taking blood thinners : . . . :. dysrhythmias (a-flutter, SVT) valvular problems/murmurs . Previous cardiac testing Echodate:8/6/2018results:Interpretation Summary  Limited study.  Moderate sized (max dimension 1.8 cm anterior to the RV), predominantly  anterior, partially-organized pericardial effusion. Echocardiographic findings  collectively do not support a diagnosis of pericardial tamponade.     Global and regional left ventricular function is normal with an EF of 60-65%.  Global right ventricular function is moderately reduced. The right ventricle  is normal size.  This study was compared with the study from 8/20/18 . There has been no  change.  Compared with 8/20/18, there has been no significant change.date: results: date: results: date: results:          METS/Exercise Tolerance:     Hematologic:     (+) Anemia, History of Transfusion -      Musculoskeletal:         GI/Hepatic: Comment: Upper GI bleed       (-) GERD and hepatitis    Renal/Genitourinary:         Endo:     (+) thyroid problem hypothyroidism, .      Psychiatric:         Infectious Disease:         Malignancy:         Other:                     Physical Exam      Airway   Mallampati: III  TM distance: >3 FB  Neck ROM: full    Dental     Cardiovascular   Rhythm and rate: regular and normal  (-) no weak pulses and no murmur    Pulmonary (+) rhonchi                       Anesthesia Plan      History & Physical Review  History and physical reviewed and following examination; no interval change.    ASA Status:  3 .    NPO Status:  > 8 hours (Post pyloric tube feeds continued given critical illness)    Plan for General and ETT with Intravenous and Propofol induction. Maintenance will be Balanced.    PONV prophylaxis:  Ondansetron (or other 5HT-3)  GETA. PICC + PIVs. Standard ASA montiors. IV opioids. PACU postop    Risks and benefits of anesthetic discussed with patient including sore throat, voice hoarseness, injury to vocal cords, throat, mouth, teeth, tongue, and lips from intubation; nausea/vomiting; cardiac arrest, respiratory complications, MI, stroke, blood clots, death.    Discussed potential prolonged intubation and return to ICU.    Presented opportunity to answer patient and family questions. Questions addressed.        Postoperative Care  Postoperative pain management:  Multi-modal analgesia.      Consents  Anesthetic plan, risks, benefits and alternatives discussed with:  Patient..                          .

## 2018-08-24 NOTE — OR NURSING
Pt revcd from 4E via bed monitored with RN accompanying pt, pt O2Sat 87% on room air on arrival pt placed on )2@2L/NC and O2Sat now 98%, pt very lethargic, answers questions approriately but returns to dozing in just few seconds

## 2018-08-24 NOTE — OR NURSING
Pt has two mediastinal chest tube to two separate water seal containers, left chest tube has 1640cc bloody drainage noted in collection chamber and right chest tube connect to water seal chamber noted with 1630cc dark bloody drainage noted in collection chamber both to neg 20 water seal , no s/s airleak, pt arrived with external temporary pacer set @70/min w/ out put U&A @10mas, sensing but pt overriding presently @94 beats/min no pacing noted at this time

## 2018-08-24 NOTE — PLAN OF CARE
Problem: Patient Care Overview  Goal: Plan of Care/Patient Progress Review  PT / 4E - Cancel.  Patient off unit for all of afternoon.  Patient rescheduled per POC.

## 2018-08-24 NOTE — PLAN OF CARE
Problem: Patient Care Overview  Goal: Plan of Care/Patient Progress Review  Outcome: Improving  Neuro: A&Ox4. Follows commands. Denies any pain.  Cardiac: NSR-ST (). Denies any CP, dizziness or lightheadedness. Peripheral pulses palpable. Temporary pacer wires in place, DDD set to pace at 70, no episodes of pacing noted. Arterial line d/c'd. Normotensive. Afebrile.   Resp: Sating mid 90s on RA. Lung sounds clear. Continue with aggressive pulmonary toilet.  GI: Continues to have loose black stools. GI consulted and following, plan for EGD today. Keep NPO, last PO intake was at 0500. TF held for procedure.   : Voiding. Replace electrolytes per MD order.   Skin: Sternal incision CDI, closed with sutures. No drainage noted. CT and temporary pacer dressings changed overnight.   Musc: PT/OT following. Up to chair with assist of 1 to 2. Tolerating activity fairly well, becomes dyspneic with exertion.   LADs: R DL PICC. R PIV. NJ. CTx4 to - 20 wall suction, OK to be off suction in transport.    Plan: Transfer to  for EGD then 6C after. Continue to monitor. Update MD with any changes in pt condition.

## 2018-08-24 NOTE — PLAN OF CARE
Problem: Patient Care Overview  Goal: Plan of Care/Patient Progress Review  SLP: Treatment session cancelled, per RN holding PO until pt is seen by GI. Will follow up with pt over the weekend as appropriate.

## 2018-08-24 NOTE — PLAN OF CARE
Problem: Patient Care Overview  Goal: Plan of Care/Patient Progress Review  Discharge Planner OT   Patient plan for discharge: Not discussed  Current status: Pt completed bed mobility with SBA-min A and CGA-min A x 2 for EOB > chair pivot transfer with education on post-surgical precautions. Pt stood for 9 minutes total with 3 seated rest breaks, pt completing marching in place x5 for 10 steps and 3 exercises of BUE AROM therex. Pt ind in g/h tasks with environmental setup from OT. VSS throughout.  Barriers to return to prior living situation: Medical status, generalized weakness, post-surgical precautions  Recommendations for discharge: ARU  Rationale for recommendations: To further increase pt's strength and endurance for ind participation in ADLs/IADLs as pt requires more assistance compared to PLOF       Entered by: Sommer Farmer 08/24/2018 9:33 AM

## 2018-08-24 NOTE — OR NURSING
Dr. Barber notified that pt's tube feeds were turned off at 10:30 am. He is okay with proceeding since the feeding tube is post pyloric.

## 2018-08-24 NOTE — PLAN OF CARE
Problem: Patient Care Overview  Goal: Plan of Care/Patient Progress Review  D: Pt transferred from  via OR for upper endoscopy. Pt stable and comfortable. SR. Hypoglycemic after 6 hours off TF for procedure. BG recovered with 25 mL D50. No pain or shortness of breath noted. CT x 4 to sxn with serous output. TF at 40 mL/hr (goal 60 mL/hr, next titration at midnight), 50 mL flushes T3uzene. Clear liquid diet, nectar thick. Protonix gtt at 8 mg/hr. Goal to keep hemoglobin >8. Coarse lung sounds with productive cough.  I: Monitored/assessed pt. Assisted with cares.  A: Pt stable and comfortable.  P: Continue to monitor/assess pt, contact provider with concerns.

## 2018-08-24 NOTE — ANESTHESIA POSTPROCEDURE EVALUATION
Patient: Castillo De Anda    Procedure(s):  Upper Endoscopy with No Intervention; Two Gastric Ulcers - Wound Class: II-Clean Contaminated    Diagnosis:Bleed  Diagnosis Additional Information: No value filed.    Anesthesia Type:  General, ETT    Note:  Anesthesia Post Evaluation    Patient location during evaluation: PACU  Patient participation: Able to fully participate in evaluation  Level of consciousness: awake  Pain management: adequate  Airway patency: patent  Respiratory status: nasal cannula, acceptable and airway suctioned  Hydration status: acceptable  PONV: none             Last vitals:  Vitals:    08/24/18 1430 08/24/18 1445 08/24/18 1500   BP: (!) 88/63 (!) 86/63 91/70   Resp: 24 18 16   Temp:  36.1  C (97  F) 36.3  C (97.3  F)   SpO2: 99% 98% 99%         Electronically Signed By: Miguel Salas MD  August 24, 2018  3:35 PM

## 2018-08-24 NOTE — OR NURSING
Advised Dr. Rodriguez, [facility] pt glucose now 59mgs, order recd to administer 12.5gms of D50 now, see MAR, will recheck glucose in half hour

## 2018-08-24 NOTE — PROGRESS NOTES
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 Underwent repair of posterior VSD and TVR with bioprosthetic valve on 8/10, and was kept with open chest due to coagulopathy and RV dysfunction. Chest closed 8/13. IABP removed 8/14. Extubated 8/21.    TODAY'S PROGRESS:   -GI to perform EGD today. NPO, TFs continuing.  -Plan to continue PPI infusion until tomorrow (72 hour total course from time of bleeding concern)  -Plan to restart Low intensity heparin tomorrow (72h after initial identified bleeding concern)  -Reconsult, follow up EP rec's on permanent pacemaker vs removal of pacer wires   -Will hold removal of chest tubes until decision made  -Decrease atorvastatin to 40, monitor LFT's  -Transfer to floor today      PLAN:  Neuro/ pain/ sedation:  #Post operative sedation  #Questionable seizure activity (negative follow up EEG 8/16)  - PRN Quetiapine 50mg for sleep at night  - Monitor neurological status. Notify the MD for any acute changes in exam.  - Discussed with neurology:    -Keppra discontinued 8/20    -Follow up with neurology as outpatient in 3 months    -Repeat EEG on 8/16 without seizure activity    -CT head if acute neuro changes    Pulmonary care:  #Post operative mechanical ventilation   - Extubated 8/21, BiPAP if needed    Cardiovascular:  #Post infarction 2.5cm infraseptal VSD  #s/p HUSSEIN to distal RCA and proximal LAD  #STEMI, cardiogenic shock  #Dilated RV   #Pulmonary HTN  #IABP  #s/p repair of posterior VSD and TVR with bioprosthetic valve, s/p Chest closure 8/13  #Atrial flutter  - Cards 2 reconsulted, appreciate assist   - Monitor hemodynamic status. Pacer at VVI at 60  - Infusions: Weaned off epi and dobutamine  8/16  - ECHO from 8/8 prior to surgery: Inferior wall akinesis with inferoseptal muscular VAD. Mild to moderate right ventricular dilation is present. Global right ventricular function is moderately reduced.   -Follow up ECHO 8/20 -CVTS with plans to monitor RV effusion noted on ECHO (8/20) - repeat ELISABETH in one week (8/27) to evaluate RV effusion (orders in)  - ASA 81mg  - EP consult regarding underlying atrial flutter.    Heme:  #Anticoagulation s/p Drug Eluting Stent placement  #Thrombocythemia  - Cangrelor discontinued, Heparin 400 units/hr drip stopped after concern for GI bleed 8/22  - 81mg ASA  - Plavix loaded 8/15 (75mg daily thereafter)  - Hydroxyurea 1g BID, hematology consulted and following    GI care:  - NJ placed 8/13, TF at goal  - Rectal tube placed 8/15  - GI consult for GI bleed    - continue PPI infusion for 72h total (from 8/22)   - EGD pending today    Fluids/ Electrolytes/ Nutrition:  #Hypernatremia   - mIVF for IV fluid hydration  - Nutri-phos 4x/day for lyte replacement  - TFs with  Q4h  - Wiggins out    Renal/ Fluid Balance:    #Acute Kidney injury  - Urine output stable overnight, Creatinine reassuring  - Will continue to monitor intake and output via wiggins  - Diuresis with 40mg TID - net negative goal ~1 liter  - Diamox 500mg once (8/21)     Endocrine:  #Stress hyperglycemia  #Hypothyroidism  - off Insulin gtt, on ISS  - Levothyroxine    ID/ Antibiotics:  - Monitor fever curve, trend WBC    Prophylaxis:    - Mechanical prophylaxis for DVT.   - ASA, plavix, heparin low intensity STOPPED 8/22, restart 8/25 (72h after concern for GI bleed)   - plan for warfarin initiation once definitive plans made on current V-pacing wires in place  - Protonix    Lines/ tubes/ drains:  - PIV    Disposition:  - CV ICU.    Patient seen, findings and plan discussed with CVTS team.    Jose Leslie MD  PGY-3 General Surgery      ====================================    SUBJECTIVE:   NAEON. Continues with dark  stools. More alert and interactive.      OBJECTIVE:   1. VITAL SIGNS:   Temp:  [96.4  F (35.8  C)-98.5  F (36.9  C)] 97.8  F (36.6  C)  Heart Rate:  [] 96  Resp:  [16-40] 16  BP: (90-98)/(65-75) 93/72  MAP:  [70 mmHg-89 mmHg] 84 mmHg  Arterial Line BP: ()/(56-72) 108/68  SpO2:  [79 %-100 %] 95 %  Resp: 16    2. INTAKE/ OUTPUT:   I/O last 3 completed shifts:  In: 2124 [P.O.:400; I.V.:309; NG/GT:715]  Out: 1225 [Urine:805; Chest Tube:420]    3. PHYSICAL EXAMINATION:   General: Stable on nasal cannula, comfortable  Neuro: interactive, alert, following commands  Resp: NLB on room air  CV: tachycardia, some irregularity  Abdomen: Soft, dressings in place  Incisions: Dressing over chest, closed chest, CTs in place  Extremities: warm and well perfused    4. INVESTIGATIONS:   Arterial Blood Gases     Recent Labs  Lab 08/22/18  0350 08/21/18  2335 08/21/18  1240 08/21/18  1105   PH 7.45 7.51* 7.48* 7.48*   PCO2 38 33* 37 36   PO2 125* 70* 84 117*   HCO3 26 26 28 27     Complete Blood Count     Recent Labs  Lab 08/24/18  0457 08/23/18 2010 08/23/18  1611 08/23/18  1130 08/23/18  0450  08/22/18  0915   WBC 10.1  --   --  10.7 9.9  --  8.3   HGB 8.5* 8.6* 8.7* 7.6* 8.0*  < > 7.0*     --   --  421 372  --  347   < > = values in this interval not displayed.  Basic Metabolic Panel    Recent Labs  Lab 08/24/18  0457 08/23/18  0450 08/22/18  0915 08/22/18  0340    146* 144 144   POTASSIUM 3.6 4.0 3.8 4.1   CHLORIDE 112* 114* 112* 112*   CO2 23 26 24 27   BUN 44* 55* 62* 68*   CR 0.65* 0.68 0.67 0.72   GLC 91 89 107* 128*     Liver Function Tests    Recent Labs  Lab 08/24/18  0457 08/23/18  0450 08/22/18  0340 08/21/18  0339   * 62* 92* 86*   * 86* 101* 106*   ALKPHOS 177* 137 158* 196*   BILITOTAL 0.4 0.4 0.3 0.2   ALBUMIN 2.0* 2.1* 2.2* 1.6*   INR 1.24* 1.21* 1.27* 1.21*     Pancreatic Enzymes  No lab results found in last 7 days.  Coagulation Profile    Recent Labs  Lab 08/24/18  0458  08/23/18  0450 08/22/18  0340 08/21/18  0339 08/20/18  0315 08/19/18  0332 08/18/18  0346   INR 1.24* 1.21* 1.27* 1.21* 1.16* 1.26* 1.37*   PTT  --   --   --  46* 48* 47* 45*         5. RADIOLOGY:   No results found for this or any previous visit (from the past 24 hour(s)).

## 2018-08-24 NOTE — PLAN OF CARE
Problem: Cardiac Surgery (Adult)  Goal: Signs and Symptoms of Listed Potential Problems Will be Absent, Minimized or Managed (Cardiac Surgery)  Signs and symptoms of listed potential problems will be absent, minimized or managed by discharge/transition of care (reference Cardiac Surgery (Adult) CPG).   Outcome: Improving  Pt remains alert and oriented x4; VSS. NSR; no ectopy; L radial A-line. Room air; sats >94%. Denies pain. Protonix gtt at 8. Tolerating clear liquid diet and TF advancement. 2 black loose BMs; Hgb stabilized >8. Adequate UOP. Midline incision POOJA; sutured but C/D/I. CT x4; pleural CTs with most output; serous. No pertinent issues overnight. Plan for follow up EGD today and consider transfer to floor. Will continue to assess and notify MD of any changes in exam.

## 2018-08-24 NOTE — OR NURSING
Dr. Rodriguez notified that patient's Tube feedings were turned off at 10:30. Dr. Rodriguez is going to check to images to determine if it is post pyloric or not. No new orders at this time.

## 2018-08-24 NOTE — ANESTHESIA CARE TRANSFER NOTE
Patient: Castillo De Anda    Procedure(s):  Upper Endoscopy with No Intervention; Two Gastric Ulcers - Wound Class: II-Clean Contaminated    Diagnosis: Bleed  Diagnosis Additional Information: No value filed.    Anesthesia Type:   General, ETT     Note:  Airway :Face Mask  Patient transferred to:PACU  Handoff Report: Identifed the Patient, Identified the Reponsible Provider, Reviewed the pertinent medical history, Discussed the surgical course, Reviewed Intra-OP anesthesia mangement and issues during anesthesia, Set expectations for post-procedure period and Allowed opportunity for questions and acknowledgement of understanding      Vitals: (Last set prior to Anesthesia Care Transfer)    CRNA VITALS  8/24/2018 1350 - 8/24/2018 1439      8/24/2018             Resp Rate (observed): 13                Electronically Signed By: Beatriz Wilkinson MD  August 24, 2018  2:39 PM

## 2018-08-25 ENCOUNTER — APPOINTMENT (OUTPATIENT)
Dept: GENERAL RADIOLOGY | Facility: CLINIC | Age: 68
DRG: 219 | End: 2018-08-25
Attending: NURSE PRACTITIONER
Payer: MEDICARE

## 2018-08-25 ENCOUNTER — APPOINTMENT (OUTPATIENT)
Dept: SPEECH THERAPY | Facility: CLINIC | Age: 68
DRG: 219 | End: 2018-08-25
Attending: INTERNAL MEDICINE
Payer: MEDICARE

## 2018-08-25 ENCOUNTER — APPOINTMENT (OUTPATIENT)
Dept: OCCUPATIONAL THERAPY | Facility: CLINIC | Age: 68
DRG: 219 | End: 2018-08-25
Attending: INTERNAL MEDICINE
Payer: MEDICARE

## 2018-08-25 LAB
ANION GAP SERPL CALCULATED.3IONS-SCNC: 5 MMOL/L (ref 3–14)
ANION GAP SERPL CALCULATED.3IONS-SCNC: 6 MMOL/L (ref 3–14)
BUN SERPL-MCNC: 36 MG/DL (ref 7–30)
BUN SERPL-MCNC: 38 MG/DL (ref 7–30)
CA-I BLD-MCNC: 4.3 MG/DL (ref 4.4–5.2)
CALCIUM SERPL-MCNC: 7.3 MG/DL (ref 8.5–10.1)
CALCIUM SERPL-MCNC: 7.5 MG/DL (ref 8.5–10.1)
CHLORIDE SERPL-SCNC: 113 MMOL/L (ref 94–109)
CHLORIDE SERPL-SCNC: 113 MMOL/L (ref 94–109)
CO2 SERPL-SCNC: 23 MMOL/L (ref 20–32)
CO2 SERPL-SCNC: 24 MMOL/L (ref 20–32)
CREAT SERPL-MCNC: 0.67 MG/DL (ref 0.66–1.25)
CREAT SERPL-MCNC: 0.72 MG/DL (ref 0.66–1.25)
ERYTHROCYTE [DISTWIDTH] IN BLOOD BY AUTOMATED COUNT: 22.2 % (ref 10–15)
GFR SERPL CREATININE-BSD FRML MDRD: >90 ML/MIN/1.7M2
GFR SERPL CREATININE-BSD FRML MDRD: >90 ML/MIN/1.7M2
GLUCOSE BLDC GLUCOMTR-MCNC: 101 MG/DL (ref 70–99)
GLUCOSE BLDC GLUCOMTR-MCNC: 112 MG/DL (ref 70–99)
GLUCOSE BLDC GLUCOMTR-MCNC: 114 MG/DL (ref 70–99)
GLUCOSE BLDC GLUCOMTR-MCNC: 122 MG/DL (ref 70–99)
GLUCOSE BLDC GLUCOMTR-MCNC: 124 MG/DL (ref 70–99)
GLUCOSE BLDC GLUCOMTR-MCNC: 124 MG/DL (ref 70–99)
GLUCOSE BLDC GLUCOMTR-MCNC: 129 MG/DL (ref 70–99)
GLUCOSE SERPL-MCNC: 108 MG/DL (ref 70–99)
GLUCOSE SERPL-MCNC: 115 MG/DL (ref 70–99)
HCT VFR BLD AUTO: 28.2 % (ref 40–53)
HGB BLD-MCNC: 8.8 G/DL (ref 13.3–17.7)
INR PPP: 1.25 (ref 0.86–1.14)
LMWH PPP CHRO-ACNC: 0.19 IU/ML
LMWH PPP CHRO-ACNC: <0.1 IU/ML
MAGNESIUM SERPL-MCNC: 2.1 MG/DL (ref 1.6–2.3)
MAGNESIUM SERPL-MCNC: 2.2 MG/DL (ref 1.6–2.3)
MCH RBC QN AUTO: 30.4 PG (ref 26.5–33)
MCHC RBC AUTO-ENTMCNC: 31.2 G/DL (ref 31.5–36.5)
MCV RBC AUTO: 98 FL (ref 78–100)
PHOSPHATE SERPL-MCNC: 2.8 MG/DL (ref 2.5–4.5)
PLATELET # BLD AUTO: 563 10E9/L (ref 150–450)
POTASSIUM SERPL-SCNC: 3.7 MMOL/L (ref 3.4–5.3)
POTASSIUM SERPL-SCNC: 4.1 MMOL/L (ref 3.4–5.3)
RBC # BLD AUTO: 2.89 10E12/L (ref 4.4–5.9)
SODIUM SERPL-SCNC: 142 MMOL/L (ref 133–144)
SODIUM SERPL-SCNC: 142 MMOL/L (ref 133–144)
WBC # BLD AUTO: 9.4 10E9/L (ref 4–11)

## 2018-08-25 PROCEDURE — 97530 THERAPEUTIC ACTIVITIES: CPT | Mod: GO

## 2018-08-25 PROCEDURE — 25000128 H RX IP 250 OP 636: Performed by: THORACIC SURGERY (CARDIOTHORACIC VASCULAR SURGERY)

## 2018-08-25 PROCEDURE — 93005 ELECTROCARDIOGRAM TRACING: CPT

## 2018-08-25 PROCEDURE — 83735 ASSAY OF MAGNESIUM: CPT | Performed by: STUDENT IN AN ORGANIZED HEALTH CARE EDUCATION/TRAINING PROGRAM

## 2018-08-25 PROCEDURE — 25000128 H RX IP 250 OP 636: Performed by: NURSE PRACTITIONER

## 2018-08-25 PROCEDURE — 36415 COLL VENOUS BLD VENIPUNCTURE: CPT | Performed by: STUDENT IN AN ORGANIZED HEALTH CARE EDUCATION/TRAINING PROGRAM

## 2018-08-25 PROCEDURE — A9270 NON-COVERED ITEM OR SERVICE: HCPCS | Mod: GY | Performed by: THORACIC SURGERY (CARDIOTHORACIC VASCULAR SURGERY)

## 2018-08-25 PROCEDURE — 84100 ASSAY OF PHOSPHORUS: CPT | Performed by: STUDENT IN AN ORGANIZED HEALTH CARE EDUCATION/TRAINING PROGRAM

## 2018-08-25 PROCEDURE — 40000133 ZZH STATISTIC OT WARD VISIT

## 2018-08-25 PROCEDURE — 36415 COLL VENOUS BLD VENIPUNCTURE: CPT | Performed by: THORACIC SURGERY (CARDIOTHORACIC VASCULAR SURGERY)

## 2018-08-25 PROCEDURE — 85027 COMPLETE CBC AUTOMATED: CPT | Performed by: INTERNAL MEDICINE

## 2018-08-25 PROCEDURE — 80048 BASIC METABOLIC PNL TOTAL CA: CPT | Performed by: INTERNAL MEDICINE

## 2018-08-25 PROCEDURE — 25000125 ZZHC RX 250: Performed by: STUDENT IN AN ORGANIZED HEALTH CARE EDUCATION/TRAINING PROGRAM

## 2018-08-25 PROCEDURE — 27210429 ZZH NUTRITION PRODUCT INTERMEDIATE LITER

## 2018-08-25 PROCEDURE — 25000132 ZZH RX MED GY IP 250 OP 250 PS 637: Mod: GY | Performed by: STUDENT IN AN ORGANIZED HEALTH CARE EDUCATION/TRAINING PROGRAM

## 2018-08-25 PROCEDURE — 83735 ASSAY OF MAGNESIUM: CPT | Performed by: INTERNAL MEDICINE

## 2018-08-25 PROCEDURE — A9270 NON-COVERED ITEM OR SERVICE: HCPCS | Mod: GY | Performed by: NURSE PRACTITIONER

## 2018-08-25 PROCEDURE — 36592 COLLECT BLOOD FROM PICC: CPT | Performed by: INTERNAL MEDICINE

## 2018-08-25 PROCEDURE — 00000146 ZZHCL STATISTIC GLUCOSE BY METER IP

## 2018-08-25 PROCEDURE — A9270 NON-COVERED ITEM OR SERVICE: HCPCS | Mod: GY | Performed by: STUDENT IN AN ORGANIZED HEALTH CARE EDUCATION/TRAINING PROGRAM

## 2018-08-25 PROCEDURE — 25000132 ZZH RX MED GY IP 250 OP 250 PS 637: Mod: GY | Performed by: THORACIC SURGERY (CARDIOTHORACIC VASCULAR SURGERY)

## 2018-08-25 PROCEDURE — 25000132 ZZH RX MED GY IP 250 OP 250 PS 637: Mod: GY | Performed by: NURSE PRACTITIONER

## 2018-08-25 PROCEDURE — 71045 X-RAY EXAM CHEST 1 VIEW: CPT

## 2018-08-25 PROCEDURE — 21400006 ZZH R&B CCU INTERMEDIATE UMMC

## 2018-08-25 PROCEDURE — 40000225 ZZH STATISTIC SLP WARD VISIT

## 2018-08-25 PROCEDURE — A9270 NON-COVERED ITEM OR SERVICE: HCPCS | Mod: GY | Performed by: INTERNAL MEDICINE

## 2018-08-25 PROCEDURE — 80048 BASIC METABOLIC PNL TOTAL CA: CPT | Performed by: STUDENT IN AN ORGANIZED HEALTH CARE EDUCATION/TRAINING PROGRAM

## 2018-08-25 PROCEDURE — 25000125 ZZHC RX 250: Performed by: PHYSICIAN ASSISTANT

## 2018-08-25 PROCEDURE — 40000275 ZZH STATISTIC RCP TIME EA 10 MIN

## 2018-08-25 PROCEDURE — 85520 HEPARIN ASSAY: CPT | Performed by: INTERNAL MEDICINE

## 2018-08-25 PROCEDURE — 25000128 H RX IP 250 OP 636: Performed by: STUDENT IN AN ORGANIZED HEALTH CARE EDUCATION/TRAINING PROGRAM

## 2018-08-25 PROCEDURE — 85610 PROTHROMBIN TIME: CPT | Performed by: INTERNAL MEDICINE

## 2018-08-25 PROCEDURE — 25000132 ZZH RX MED GY IP 250 OP 250 PS 637: Mod: GY | Performed by: INTERNAL MEDICINE

## 2018-08-25 PROCEDURE — 85520 HEPARIN ASSAY: CPT | Performed by: THORACIC SURGERY (CARDIOTHORACIC VASCULAR SURGERY)

## 2018-08-25 PROCEDURE — 82330 ASSAY OF CALCIUM: CPT | Performed by: INTERNAL MEDICINE

## 2018-08-25 PROCEDURE — 85520 HEPARIN ASSAY: CPT | Performed by: STUDENT IN AN ORGANIZED HEALTH CARE EDUCATION/TRAINING PROGRAM

## 2018-08-25 PROCEDURE — 92526 ORAL FUNCTION THERAPY: CPT | Mod: GN

## 2018-08-25 RX ORDER — POTASSIUM CHLORIDE 750 MG/1
20 TABLET, EXTENDED RELEASE ORAL 2 TIMES DAILY
Status: COMPLETED | OUTPATIENT
Start: 2018-08-25 | End: 2018-08-26

## 2018-08-25 RX ORDER — HEPARIN SODIUM 10000 [USP'U]/100ML
0-3500 INJECTION, SOLUTION INTRAVENOUS CONTINUOUS
Status: DISCONTINUED | OUTPATIENT
Start: 2018-08-25 | End: 2018-09-02

## 2018-08-25 RX ORDER — FUROSEMIDE 10 MG/ML
40 INJECTION INTRAMUSCULAR; INTRAVENOUS 2 TIMES DAILY
Status: COMPLETED | OUTPATIENT
Start: 2018-08-25 | End: 2018-08-26

## 2018-08-25 RX ADMIN — POTASSIUM CHLORIDE 20 MEQ: 750 TABLET, EXTENDED RELEASE ORAL at 15:43

## 2018-08-25 RX ADMIN — Medication 1000 MG: at 09:28

## 2018-08-25 RX ADMIN — SODIUM CHLORIDE, PRESERVATIVE FREE 5 ML: 5 INJECTION INTRAVENOUS at 01:12

## 2018-08-25 RX ADMIN — Medication 1 PACKET: at 19:54

## 2018-08-25 RX ADMIN — AMIODARONE HYDROCHLORIDE 1 MG/MIN: 50 INJECTION, SOLUTION INTRAVENOUS at 11:26

## 2018-08-25 RX ADMIN — PANTOPRAZOLE SODIUM 40 MG: 40 INJECTION, POWDER, FOR SOLUTION INTRAVENOUS at 09:45

## 2018-08-25 RX ADMIN — ATORVASTATIN CALCIUM 40 MG: 40 TABLET, FILM COATED ORAL at 19:54

## 2018-08-25 RX ADMIN — QUETIAPINE 50 MG: 50 TABLET ORAL at 22:10

## 2018-08-25 RX ADMIN — METOPROLOL TARTRATE 2.5 MG: 1 INJECTION, SOLUTION INTRAVENOUS at 10:22

## 2018-08-25 RX ADMIN — Medication 1 PACKET: at 09:42

## 2018-08-25 RX ADMIN — FUROSEMIDE 40 MG: 10 INJECTION, SOLUTION INTRAVENOUS at 16:52

## 2018-08-25 RX ADMIN — ASPIRIN 81 MG CHEWABLE TABLET 81 MG: 81 TABLET CHEWABLE at 09:35

## 2018-08-25 RX ADMIN — PANTOPRAZOLE SODIUM 40 MG: 40 INJECTION, POWDER, FOR SOLUTION INTRAVENOUS at 15:43

## 2018-08-25 RX ADMIN — MULTIVITAMIN 15 ML: LIQUID ORAL at 09:35

## 2018-08-25 RX ADMIN — POTASSIUM CHLORIDE 20 MEQ: 750 TABLET, EXTENDED RELEASE ORAL at 10:11

## 2018-08-25 RX ADMIN — HEPARIN SODIUM 950 UNITS/HR: 10000 INJECTION, SOLUTION INTRAVENOUS at 08:24

## 2018-08-25 RX ADMIN — FUROSEMIDE 40 MG: 10 INJECTION, SOLUTION INTRAVENOUS at 09:46

## 2018-08-25 RX ADMIN — AMIODARONE HYDROCHLORIDE 150 MG: 1.5 INJECTION, SOLUTION INTRAVENOUS at 11:10

## 2018-08-25 RX ADMIN — LEVOTHYROXINE SODIUM 75 MCG: 75 TABLET ORAL at 09:35

## 2018-08-25 RX ADMIN — AMIODARONE HYDROCHLORIDE 0.5 MG/MIN: 50 INJECTION, SOLUTION INTRAVENOUS at 15:52

## 2018-08-25 RX ADMIN — CLOPIDOGREL 75 MG: 75 TABLET, FILM COATED ORAL at 09:35

## 2018-08-25 RX ADMIN — Medication 1 PACKET: at 15:43

## 2018-08-25 RX ADMIN — Medication 1000 MG: at 19:55

## 2018-08-25 RX ADMIN — ONDANSETRON 4 MG: 2 INJECTION INTRAMUSCULAR; INTRAVENOUS at 10:17

## 2018-08-25 RX ADMIN — POTASSIUM CHLORIDE 20 MEQ: 1.5 POWDER, FOR SOLUTION ORAL at 03:40

## 2018-08-25 ASSESSMENT — ACTIVITIES OF DAILY LIVING (ADL)
ADLS_ACUITY_SCORE: 16
ADLS_ACUITY_SCORE: 17
ADLS_ACUITY_SCORE: 16
ADLS_ACUITY_SCORE: 16

## 2018-08-25 ASSESSMENT — PAIN DESCRIPTION - DESCRIPTORS
DESCRIPTORS: HEAVINESS
DESCRIPTORS: HEAVINESS

## 2018-08-25 NOTE — PLAN OF CARE
Problem: Patient Care Overview  Goal: Plan of Care/Patient Progress Review  D: Pt went into a-fib then SVT with rates in 140s at 0930. NP notified and 2.5 IV metoprolol given with rate effect only. Pt hypotensive and symptomatic, SOB and diaphoretic. Amiodarone gtt initiated and pt converted to SR at 1600. Currently running at 0.5 mg/hr. Pt c/o nausea. TF stopped and IV zofran given with effect. TF resumed at 1200 at 40 mL/hr. Pt unable to tolerate higher rate later when asked. No pain noted. CT x 4 to sxn with serous output. Speech consult today recommended keeping clear liquid diet, nectar thick. Heparin gtt at 950 units/hr, therapeutic at recheck.  I: Monitored/assessed pt. Assisted with cares.  A: Pt stable and comfortable.  P: Continue to monitor/assess pt, contact provider with concerns.

## 2018-08-25 NOTE — PLAN OF CARE
Problem: Patient Care Overview  Goal: Plan of Care/Patient Progress Review  Discharge Planner SLP   Patient plan for discharge: Did not discuss  Current status: Pt assessed with nectar thick liquids and puree solids. Limited trials completed per pt request as he was feeling SOB. No overt s/sx of aspiration with nectar thick liquids, continues to be an aspiration risk with thin liquids. Prolonged bolus manipulation and pt report of effortful swallow with puree, pt admits to some difficulty.     Continue clear liquid diet with nectar thick liquids, pt must be sitting fully upright and alert to reduce aspiration risk.     Barriers to return to prior living situation: Medical complexity   Recommendations for discharge: Ongoing ST at next level of care  Rationale for recommendations: Aspiration risk        Entered by: Felisha Sorto 08/25/2018 10:19 AM

## 2018-08-25 NOTE — PROGRESS NOTES
CARDIOTHORACIC SURGERY PROGRESS NOTE  08/25/2018      SUBJECTIVE:    No acute issues over night.   Early this morning, patient converted from NSR to a-fib, rates 80s-100s and was asymptomatic.   While working with PT this morning, patient's rate become more uncontrolled, EKG appreciable for a-fib with RVR in the 140s.  Patient symptomatic and reports feeling diaphoretic and nauseated.  Nurse obtained a secondary EKG concerning for SVT with incomplete RBBB. Of note, patient given adenosine in the ICU for SVT, converting to asystole and requiring brief CPR.   Nurse administered metoprolol 2.5 mg IV, rate now somewhat more controlled (80s-100s).  Temporary pacemaker DDD at 60 bpm.   Discussed case with EP, plan to start amio bolus with gtt.      Patient denies  SOB, CP, fever, and chills.      Patient reports feeling a little more hungry. Currently tolerating TF and clear liquid diet with nectar thick liquids. + BM. + flatus.  Ambulating in halls.     OBJECTIVE:   Temp:  [97  F (36.1  C)-97.8  F (36.6  C)] 97.4  F (36.3  C)  Heart Rate:  [] 101  Resp:  [16-24] 16  BP: ()/(41-83) 94/63  SpO2:  [79 %-100 %] 98 %    MAPs: 63-87  Gen: A&Ox3, NAD  CV: RRR, normal S1, S2, no murmurs, rubs or gallops   Pulm:  CTA, no wheezing, no rhonchi  Abd: Soft, NT, ND, +BS  Ext: trace LE edema  Neuro:  Nonfocal  Incision: c/d/i, no erythema, sternum stable  Tubes/drains: Dressing clean and dry    I&O's:  I/O last 3 completed shifts:  In: 2234.83 [P.O.:240; I.V.:654.83; NG/GT:550]  Out: 765 [Urine:400; Chest Tube:365]    + 4 kg since admission, trending up    Labs:   BMP  Recent Labs  Lab 08/25/18  0542 08/25/18  0115 08/24/18  0457 08/23/18  0450    142 143 146*   POTASSIUM 4.1 3.7 3.6 4.0   CHLORIDE 113* 113* 112* 114*   GABRIELA 7.3* 7.5* 7.4* 7.8*   CO2 23 24 23 26   BUN 36* 38* 44* 55*   CR 0.67 0.72 0.65* 0.68   * 108* 91 89     CBC  Recent Labs  Lab 08/25/18  0542 08/24/18  0457 08/23/18 2010 08/23/18  1616  08/23/18  1130 08/23/18  0450   WBC 9.4 10.1  --   --  10.7 9.9   RBC 2.89* 2.83*  --   --  2.56* 2.65*   HGB 8.8* 8.5* 8.6* 8.7* 7.6* 8.0*   HCT 28.2* 26.5*  --   --  24.1* 24.3*   MCV 98 94  --   --  94 92   MCH 30.4 30.0  --   --  29.7 30.2   MCHC 31.2* 32.1  --   --  31.5 32.9   RDW 22.2* 21.1*  --   --  21.1* 20.9*   * 433  --   --  421 372     INR  Recent Labs  Lab 08/25/18  0542 08/24/18  0457 08/23/18  0450 08/22/18  0340   INR 1.25* 1.24* 1.21* 1.27*      Hepatic Panel   Recent Labs  Lab 08/24/18  0457 08/23/18  0450 08/22/18  0340 08/21/18  0339   * 62* 92* 86*   * 86* 101* 106*   ALKPHOS 177* 137 158* 196*   BILITOTAL 0.4 0.4 0.3 0.2   ALBUMIN 2.0* 2.1* 2.2* 1.6*     Glucose  Recent Labs  Lab 08/25/18  0542 08/25/18  0333 08/25/18  0115 08/25/18  0022 08/24/18  2058 08/24/18  1707 08/24/18  1650 08/24/18  1328  08/24/18  0457  08/23/18  0450  08/22/18  0915  08/22/18  0340   *  --  108*  --   --   --   --   --   --  91  --  89  --  107*  --  128*   BGM  --  124*  --  101* 110* 108* 46* 97  < >  --   < >  --   < >  --   < >  --    < > = values in this interval not displayed.    Imaging:   Recent Results (from the past 24 hour(s))   XR Chest Port 1 View    Narrative    Exam: XR CHEST PORT 1 VW, 8/24/2018 6:26 PM    Indication: s/p LVAD exchange;     Comparison: Chest x-ray 8/22/2018    Findings:   Portable chest x-ray. Position of the right upper extremity PICC,  mediastinal drains and chest tubes enteric tube courses below the  diaphragm. Epicardial pacing wires. Median sternotomy wires and  mediastinal surgical clips. Unchanged mediastinal silhouette. No  pneumothorax. Persistent left moderate pleural effusion with overlying  atelectasis/consolidation. Mixed interstitial and airspace opacities.  Visualized upper abdomen and osseous structures are unremarkable      Impression    Impression:   1. Stable support devices.  2. Persistent moderate left pleural effusion with  basal  atelectasis/consolidation.  3. Persistent patchy pulmonary opacities, representing pulmonary edema  versus atelectasis.     I have personally reviewed the examination and initial interpretation  and I agree with the findings.    EFREN CORDOBA MD           ASSESSMENT/PLAN: Patient is a 68 year old male with a history of s/p HUSSEIN to RCA and LAD for STEMI with post infarction basal VSD, who on 8/10/2018 underwent repair of posterior VSD and TVR with bioprosthetic valve.  He was kept open chest due to coagulopathy and RV dysfunction. Chest closed on 8/13/2018, IABP removed 8/14/18 and he was extubated on POD #11.     Neuro:  -Acute post-op pain: IV dilaudid, tylenol and oxycodone prn    CV:  - ASA 81 mg, atorvastatin 40 mg daily,   - Systolic Blood pressure: 83-99  - TPW attached to pacer, DDD at 60 bpm  - ICU course complicated by a-flutter, EP consult with plan to anticoagulate and then attempt to overdrive pace with A lead.  While in the ICU, patient also went into narrow complex SVT with MAPs in 50s. Adenosine 6 mg was administered, resulting in 20 seconds of asystole and brief CPR with ROSC.    - 8/25 patient converted from NSR to a-fib overnight. During morning therapy, patient had a-fib with RVR (rates 140s) and symptomatic. EKGs appreciable for a-fib and SVT. Metoprolol 2.5 mg IV administered with improved rate control. Discussed case with EP fellow, amio bolus and gtt started.  Patient converted to NSR in afternoon.   - Discuss plan for possible PPM with EP      Resp: extubated POD 11  - IS, encourage activity  - Mediastinal CT with minimal output, Pleural chest tubes with moderate, serosanguinous output. Plan to keep chest tubes until safe to remove TPW.       FEN/GI:   - Clear liquid diet with nectar thickened liquids, speech following.   - Continuous TF, consider calorie counts once patient's diet advances   - Bowel regimen, + BM  - 8/24 EGD: two gastric ulcers, no active bleeding or high-risk  features.      Renal:   - Creatinine 0.67, adequate UOP  - Volume status: hypervolemic, dry weight ~ 170 lbs. Weight 178.   - 8/25 diuresing with Lasix 40 mg IV BID .      ID: Completed alban-op abx,   - WBC 9.4 and trending down, afebrile, no signs or symptoms of infection    Heme:   - Hgb 8.8, no signs or symptoms of bleeding, Plt 563  - Continue to monitor for signs of GI bleed, transfuse for Hgb < 7      Endo:   - BG 100s, on sliding scale insulin. Will discontinue once diet improves.       PPx:   - PPI      Anticoagulation:   - 8/25 start low intensity heparin   - Need to discuss PPM plans and determine warfarin initiation       Dispo:   - 6C since 8/24  - Family requesting care conference/meeting with  to discuss community resources available at discharge    - Therapy recommending d/c to ARU    Staff surgeons have been informed of changes through both verbal and written communication.         Phyllis BROWN NP-C  Cardiothoracic Surgery  August 25, 2018 9:24 AM   p: [phone]

## 2018-08-25 NOTE — PLAN OF CARE
Problem: Patient Care Overview  Goal: Plan of Care/Patient Progress Review  Discharge Planner OT    OT/CR - 6C  Patient plan for discharge: Not formally discussed at this time  Current status: Pt able to recall 3/4 sternal precautions and required one general cue to recall other precaution. Pt educated on method for getting out of bed while adhering to sternal precautions. Pt demonstrated understanding and transferred sup <> EOB min A x2. Pt became tachycardiac and O2 levels dropped <90% on 2L. Did not initiate any further treatment. Pt STS CGA x2 and took ~6 small side steps to HOB to better position self. Pt returned to lying supine in bed min Ax2.   Barriers to return to prior living situation: medical status, sternal precautions, decreased activity tolerance for ADL/IADL IND  Recommendations for discharge: ARU  Rationale for recommendations: Pt to benefit from additional skilled OT intervention to progress activity tolerance and endurance and maximize ADL/IADL IND.        Entered by: Sonja Alatorre 08/25/2018 9:27 AM

## 2018-08-25 NOTE — PROGRESS NOTES
SPIRITUAL HEALTH SERVICES  SPIRITUAL ASSESSMENT Progress Note  [facility] ([place]) 6C   ON-CALL VISIT    REFERRAL SOURCE: Hospital  request     Reviewed documentation and consulted with unit RN. Brief visit with patient and three children at bedside. Introduced SHS to pt/family. Pt had not requested  visit but daughter inquired about emotional/spiritual support for pt. Pt states no spiritual health concerns at this time.    PLAN: Will advise unit  of visit. SHS will continue to be available to pt/family for emotional/spiritual support.    Justice Ceja  Chaplain Resident  Pager [phone]

## 2018-08-25 NOTE — PLAN OF CARE
Problem: Patient Care Overview  Goal: Plan of Care/Patient Progress Review  D: Pt in a-fib with RVR then SVT with rates up to 140s starting at 0930. stable and comfortable. No pain or shortness of breath noted.   I: Monitored/assessed pt. Assisted with cares.  A: Pt stable and comfortable.  P: Continue to monitor/assess pt, contact provider with concerns.

## 2018-08-25 NOTE — PLAN OF CARE
Problem: Patient Care Overview  Goal: Plan of Care/Patient Progress Review  Outcome: No Change  Pt admitted 8/8 s/p HUSSEIN x2 c/b infarct VSD s/p posterior VSD and TVR repair.  TPM DDD at 70, went into Aflutter with RBBB around 100's most of the night, but ticking upward (highest 140) and CVTS notified.  BP low around midnight and asymptomatic.  3-4 L NC and denied pain.  Protonix gtt continues at 8 mg/hr (10 ml/hr).  Nutren TF now at goal rate 60 ml/hr.   CT's had 155 ml out of serous and the other had 0 ml out of serosanguinous.  2-3+ LLE and 3+ RLE edema.  Clear Nector thick diet.  Otherwise, pt slept well and up assist of two.  Continue to monitor and with POC.

## 2018-08-26 ENCOUNTER — APPOINTMENT (OUTPATIENT)
Dept: SPEECH THERAPY | Facility: CLINIC | Age: 68
DRG: 219 | End: 2018-08-26
Attending: INTERNAL MEDICINE
Payer: MEDICARE

## 2018-08-26 ENCOUNTER — APPOINTMENT (OUTPATIENT)
Dept: OCCUPATIONAL THERAPY | Facility: CLINIC | Age: 68
DRG: 219 | End: 2018-08-26
Attending: INTERNAL MEDICINE
Payer: MEDICARE

## 2018-08-26 LAB
ALBUMIN SERPL-MCNC: 2 G/DL (ref 3.4–5)
ALP SERPL-CCNC: 197 U/L (ref 40–150)
ALT SERPL W P-5'-P-CCNC: 153 U/L (ref 0–70)
ANION GAP SERPL CALCULATED.3IONS-SCNC: 8 MMOL/L (ref 3–14)
AST SERPL W P-5'-P-CCNC: 86 U/L (ref 0–45)
BILIRUB DIRECT SERPL-MCNC: <0.1 MG/DL (ref 0–0.2)
BILIRUB SERPL-MCNC: 0.2 MG/DL (ref 0.2–1.3)
BUN SERPL-MCNC: 34 MG/DL (ref 7–30)
CA-I BLD-MCNC: 4.3 MG/DL (ref 4.4–5.2)
CALCIUM SERPL-MCNC: 7.5 MG/DL (ref 8.5–10.1)
CHLORIDE SERPL-SCNC: 108 MMOL/L (ref 94–109)
CO2 SERPL-SCNC: 24 MMOL/L (ref 20–32)
CREAT SERPL-MCNC: 0.8 MG/DL (ref 0.66–1.25)
ERYTHROCYTE [DISTWIDTH] IN BLOOD BY AUTOMATED COUNT: 23.3 % (ref 10–15)
GFR SERPL CREATININE-BSD FRML MDRD: >90 ML/MIN/1.7M2
GLUCOSE BLDC GLUCOMTR-MCNC: 115 MG/DL (ref 70–99)
GLUCOSE BLDC GLUCOMTR-MCNC: 117 MG/DL (ref 70–99)
GLUCOSE BLDC GLUCOMTR-MCNC: 118 MG/DL (ref 70–99)
GLUCOSE BLDC GLUCOMTR-MCNC: 129 MG/DL (ref 70–99)
GLUCOSE BLDC GLUCOMTR-MCNC: 134 MG/DL (ref 70–99)
GLUCOSE BLDC GLUCOMTR-MCNC: 163 MG/DL (ref 70–99)
GLUCOSE SERPL-MCNC: 112 MG/DL (ref 70–99)
HCT VFR BLD AUTO: 32.7 % (ref 40–53)
HGB BLD-MCNC: 9.9 G/DL (ref 13.3–17.7)
INR PPP: 1.16 (ref 0.86–1.14)
LMWH PPP CHRO-ACNC: 0.26 IU/ML
MAGNESIUM SERPL-MCNC: 2.1 MG/DL (ref 1.6–2.3)
MCH RBC QN AUTO: 30.2 PG (ref 26.5–33)
MCHC RBC AUTO-ENTMCNC: 30.3 G/DL (ref 31.5–36.5)
MCV RBC AUTO: 100 FL (ref 78–100)
PLATELET # BLD AUTO: 741 10E9/L (ref 150–450)
POTASSIUM SERPL-SCNC: 4.3 MMOL/L (ref 3.4–5.3)
PROT SERPL-MCNC: 5.6 G/DL (ref 6.8–8.8)
RBC # BLD AUTO: 3.28 10E12/L (ref 4.4–5.9)
SODIUM SERPL-SCNC: 141 MMOL/L (ref 133–144)
WBC # BLD AUTO: 11.7 10E9/L (ref 4–11)

## 2018-08-26 PROCEDURE — 40000802 ZZH SITE CHECK

## 2018-08-26 PROCEDURE — 25000132 ZZH RX MED GY IP 250 OP 250 PS 637: Mod: GY | Performed by: INTERNAL MEDICINE

## 2018-08-26 PROCEDURE — 00000146 ZZHCL STATISTIC GLUCOSE BY METER IP

## 2018-08-26 PROCEDURE — A9270 NON-COVERED ITEM OR SERVICE: HCPCS | Mod: GY | Performed by: STUDENT IN AN ORGANIZED HEALTH CARE EDUCATION/TRAINING PROGRAM

## 2018-08-26 PROCEDURE — 25000132 ZZH RX MED GY IP 250 OP 250 PS 637: Mod: GY | Performed by: NURSE PRACTITIONER

## 2018-08-26 PROCEDURE — 25000125 ZZHC RX 250: Performed by: PHYSICIAN ASSISTANT

## 2018-08-26 PROCEDURE — 25000132 ZZH RX MED GY IP 250 OP 250 PS 637: Mod: GY | Performed by: STUDENT IN AN ORGANIZED HEALTH CARE EDUCATION/TRAINING PROGRAM

## 2018-08-26 PROCEDURE — 80048 BASIC METABOLIC PNL TOTAL CA: CPT | Performed by: INTERNAL MEDICINE

## 2018-08-26 PROCEDURE — 40000133 ZZH STATISTIC OT WARD VISIT

## 2018-08-26 PROCEDURE — 82330 ASSAY OF CALCIUM: CPT | Performed by: INTERNAL MEDICINE

## 2018-08-26 PROCEDURE — 85520 HEPARIN ASSAY: CPT | Performed by: INTERNAL MEDICINE

## 2018-08-26 PROCEDURE — 40000225 ZZH STATISTIC SLP WARD VISIT

## 2018-08-26 PROCEDURE — 80076 HEPATIC FUNCTION PANEL: CPT | Performed by: INTERNAL MEDICINE

## 2018-08-26 PROCEDURE — 27210429 ZZH NUTRITION PRODUCT INTERMEDIATE LITER

## 2018-08-26 PROCEDURE — 85027 COMPLETE CBC AUTOMATED: CPT | Performed by: INTERNAL MEDICINE

## 2018-08-26 PROCEDURE — 36415 COLL VENOUS BLD VENIPUNCTURE: CPT | Performed by: INTERNAL MEDICINE

## 2018-08-26 PROCEDURE — A9270 NON-COVERED ITEM OR SERVICE: HCPCS | Mod: GY | Performed by: NURSE PRACTITIONER

## 2018-08-26 PROCEDURE — 92526 ORAL FUNCTION THERAPY: CPT | Mod: GN

## 2018-08-26 PROCEDURE — 85610 PROTHROMBIN TIME: CPT | Performed by: INTERNAL MEDICINE

## 2018-08-26 PROCEDURE — 25000128 H RX IP 250 OP 636: Performed by: NURSE PRACTITIONER

## 2018-08-26 PROCEDURE — 21400006 ZZH R&B CCU INTERMEDIATE UMMC

## 2018-08-26 PROCEDURE — 97530 THERAPEUTIC ACTIVITIES: CPT | Mod: GO

## 2018-08-26 PROCEDURE — 97110 THERAPEUTIC EXERCISES: CPT | Mod: GO

## 2018-08-26 PROCEDURE — A9270 NON-COVERED ITEM OR SERVICE: HCPCS | Mod: GY | Performed by: THORACIC SURGERY (CARDIOTHORACIC VASCULAR SURGERY)

## 2018-08-26 PROCEDURE — 83735 ASSAY OF MAGNESIUM: CPT | Performed by: INTERNAL MEDICINE

## 2018-08-26 PROCEDURE — 25000132 ZZH RX MED GY IP 250 OP 250 PS 637: Mod: GY | Performed by: THORACIC SURGERY (CARDIOTHORACIC VASCULAR SURGERY)

## 2018-08-26 PROCEDURE — A9270 NON-COVERED ITEM OR SERVICE: HCPCS | Mod: GY | Performed by: INTERNAL MEDICINE

## 2018-08-26 RX ORDER — AMIODARONE HYDROCHLORIDE 200 MG/1
400 TABLET ORAL 2 TIMES DAILY
Status: DISCONTINUED | OUTPATIENT
Start: 2018-08-26 | End: 2018-08-31

## 2018-08-26 RX ORDER — WARFARIN SODIUM 1 MG/1
2 TABLET ORAL
Status: COMPLETED | OUTPATIENT
Start: 2018-08-26 | End: 2018-08-26

## 2018-08-26 RX ORDER — FUROSEMIDE 10 MG/ML
40 INJECTION INTRAMUSCULAR; INTRAVENOUS 2 TIMES DAILY
Status: COMPLETED | OUTPATIENT
Start: 2018-08-26 | End: 2018-08-27

## 2018-08-26 RX ADMIN — AMIODARONE HYDROCHLORIDE 400 MG: 200 TABLET ORAL at 20:54

## 2018-08-26 RX ADMIN — DOCUSATE SODIUM 100 MG: 50 LIQUID ORAL at 08:29

## 2018-08-26 RX ADMIN — POTASSIUM CHLORIDE 20 MEQ: 750 TABLET, EXTENDED RELEASE ORAL at 08:43

## 2018-08-26 RX ADMIN — Medication 1 PACKET: at 20:55

## 2018-08-26 RX ADMIN — FUROSEMIDE 40 MG: 10 INJECTION, SOLUTION INTRAVENOUS at 15:43

## 2018-08-26 RX ADMIN — WARFARIN SODIUM 2 MG: 1 TABLET ORAL at 17:54

## 2018-08-26 RX ADMIN — AMIODARONE HYDROCHLORIDE 0.5 MG/MIN: 50 INJECTION, SOLUTION INTRAVENOUS at 00:04

## 2018-08-26 RX ADMIN — ATORVASTATIN CALCIUM 40 MG: 40 TABLET, FILM COATED ORAL at 20:54

## 2018-08-26 RX ADMIN — Medication 1 PACKET: at 14:30

## 2018-08-26 RX ADMIN — CLOPIDOGREL 75 MG: 75 TABLET, FILM COATED ORAL at 08:43

## 2018-08-26 RX ADMIN — AMIODARONE HYDROCHLORIDE 0.5 MG/MIN: 50 INJECTION, SOLUTION INTRAVENOUS at 16:33

## 2018-08-26 RX ADMIN — PANTOPRAZOLE SODIUM 40 MG: 40 INJECTION, POWDER, FOR SOLUTION INTRAVENOUS at 15:35

## 2018-08-26 RX ADMIN — ASPIRIN 81 MG CHEWABLE TABLET 81 MG: 81 TABLET CHEWABLE at 08:43

## 2018-08-26 RX ADMIN — QUETIAPINE 50 MG: 50 TABLET ORAL at 20:54

## 2018-08-26 RX ADMIN — HEPARIN SODIUM 950 UNITS/HR: 10000 INJECTION, SOLUTION INTRAVENOUS at 09:49

## 2018-08-26 RX ADMIN — Medication 1 PACKET: at 08:40

## 2018-08-26 RX ADMIN — INSULIN ASPART 1 UNITS: 100 INJECTION, SOLUTION INTRAVENOUS; SUBCUTANEOUS at 00:10

## 2018-08-26 RX ADMIN — Medication 1000 MG: at 20:58

## 2018-08-26 RX ADMIN — POLYETHYLENE GLYCOL 3350 17 G: 17 POWDER, FOR SOLUTION ORAL at 08:28

## 2018-08-26 RX ADMIN — FUROSEMIDE 40 MG: 10 INJECTION, SOLUTION INTRAVENOUS at 08:48

## 2018-08-26 RX ADMIN — MULTIVITAMIN 15 ML: LIQUID ORAL at 08:47

## 2018-08-26 RX ADMIN — LEVOTHYROXINE SODIUM 75 MCG: 75 TABLET ORAL at 08:43

## 2018-08-26 RX ADMIN — PANTOPRAZOLE SODIUM 40 MG: 40 INJECTION, POWDER, FOR SOLUTION INTRAVENOUS at 08:47

## 2018-08-26 RX ADMIN — Medication 1000 MG: at 08:34

## 2018-08-26 RX ADMIN — AMIODARONE HYDROCHLORIDE 0.5 MG/MIN: 50 INJECTION, SOLUTION INTRAVENOUS at 09:03

## 2018-08-26 ASSESSMENT — ACTIVITIES OF DAILY LIVING (ADL)
ADLS_ACUITY_SCORE: 16
ADLS_ACUITY_SCORE: 17
ADLS_ACUITY_SCORE: 16
ADLS_ACUITY_SCORE: 17
ADLS_ACUITY_SCORE: 16
ADLS_ACUITY_SCORE: 17

## 2018-08-26 NOTE — PROGRESS NOTES
CARDIOTHORACIC SURGERY PROGRESS NOTE  08/26/2018      SUBJECTIVE:    No acute issues over night.   Patient reports feeling much better today. Slept well.     Denies CP, palpitations, SOB. Does not endorse nausea, lightheadedness, chills, sweats.  Amio gtt infusing. Patient remains in NSR. Temporary pacer DDD at 50 bpm.     Patient reports feeling a little more hungry. Currently tolerating TF and has been cleared by speech to start a regular diet with thin liquids. + BM. + flatus.  Will work with therapies today, sit and stand at side of bed.     OBJECTIVE:   Temp:  [97.4  F (36.3  C)-98.3  F (36.8  C)] 97.9  F (36.6  C)  Pulse:  [87] 87  Heart Rate:  [82-94] 90  Resp:  [16] 16  BP: ()/(60-87) 105/87  SpO2:  [95 %-98 %] 96 %    MAPs: 63-87  Gen: A&Ox3, NAD  CV: RRR, normal S1, S2, no murmurs, rubs or gallops   Pulm:  CTA, no wheezing, no rhonchi  Abd: Soft, NT, ND, +BS  Ext: trace LE edema, improved with compression stockings  Neuro:  Nonfocal  Incision: c/d/i, no erythema, sternum stable  Tubes/drains: Dressing clean and dry    I&O's:  I/O last 3 completed shifts:  In: 2757.54 [P.O.:820; I.V.:667.54; NG/GT:360]  Out: 1590 [Urine:1500; Chest Tube:90]    + 1 kg since admission, trending up    Labs:   BMP    Recent Labs  Lab 08/26/18  0600 08/25/18  0542 08/25/18  0115 08/24/18  0457    142 142 143   POTASSIUM 4.3 4.1 3.7 3.6   CHLORIDE 108 113* 113* 112*   GABRIELA 7.5* 7.3* 7.5* 7.4*   CO2 24 23 24 23   BUN 34* 36* 38* 44*   CR 0.80 0.67 0.72 0.65*   * 115* 108* 91     CBC  Recent Labs  Lab 08/26/18  0600 08/25/18  0542 08/24/18  0457 08/23/18 2010 08/23/18  1130   WBC 11.7* 9.4 10.1  --   --  10.7   RBC 3.28* 2.89* 2.83*  --   --  2.56*   HGB 9.9* 8.8* 8.5* 8.6*  < > 7.6*   HCT 32.7* 28.2* 26.5*  --   --  24.1*    98 94  --   --  94   MCH 30.2 30.4 30.0  --   --  29.7   MCHC 30.3* 31.2* 32.1  --   --  31.5   RDW 23.3* 22.2* 21.1*  --   --  21.1*   * 563* 433  --   --  421   < > =  values in this interval not displayed.  INR    Recent Labs  Lab 08/26/18  0600 08/25/18  0542 08/24/18  0457 08/23/18  0450   INR 1.16* 1.25* 1.24* 1.21*      Hepatic Panel     Recent Labs  Lab 08/26/18  0600 08/24/18  0457 08/23/18  0450 08/22/18  0340   AST 86* 151* 62* 92*   * 147* 86* 101*   ALKPHOS 197* 177* 137 158*   BILITOTAL 0.2 0.4 0.4 0.3   ALBUMIN 2.0* 2.0* 2.1* 2.2*     Glucose  Recent Labs  Lab 08/26/18  0903 08/26/18  0600 08/26/18  0411 08/26/18  0007 08/25/18  2111 08/25/18  2005 08/25/18  1559  08/25/18  0542  08/25/18  0115  08/24/18  0457  08/23/18  0450  08/22/18  0915   GLC  --  112*  --   --   --   --   --   --  115*  --  108*  --  91  --  89  --  107*   *  --  115* 163* 112* 122* 114*  < >  --   < >  --   < >  --   < >  --   < >  --    < > = values in this interval not displayed.    Imaging:   Recent Results (from the past 24 hour(s))   XR Chest Port 1 View    Narrative    XR CHEST PORT 1 VW  8/25/2018 11:55 AM      HISTORY: interval;     COMPARISON: 8/24/2018    FINDINGS: Stable right-sided PICC, bibasilar chest tubes, 2  mediastinal drains, enteric tube. Postsurgical chest. Dense  retrocardiac opacity and small left pleural effusion. Streaky  perihilar opacities are unchanged.      Impression    IMPRESSION: Persistent small left pleural effusion with atelectasis  versus infection in the left base.    I have personally reviewed the examination and initial interpretation  and I agree with the findings.    EFREN CORDOBA MD         ASSESSMENT/PLAN: Patient is a 68 year old male with a history of s/p HUSSEIN to RCA and LAD for STEMI with post infarction basal VSD, who on 8/10/2018 underwent repair of posterior VSD and TVR with bioprosthetic valve.  He was kept open chest due to coagulopathy and RV dysfunction. Chest closed on 8/13/2018, IABP removed 8/14/18 and he was extubated on POD #11.     Neuro:  -Acute post-op pain: IV dilaudid, tylenol and oxycodone prn    CV:  - ASA 81  mg, atorvastatin 40 mg daily,   - Systolic Blood pressure:   - TPW capped  - ICU course complicated by a-flutter, EP consult with plan to anticoagulate and then attempt to overdrive pace with A lead.  While in the ICU, patient also went into narrow complex SVT with MAPs in 50s. Adenosine 6 mg was administered, resulting in 20 seconds of asystole and brief CPR with ROSC.    - 8/25 patient converted from NSR to a-fib overnight. During morning therapy, patient had a-fib with RVR (rates 140s) and symptomatic. EKGs appreciable for a-fib and SVT. Metoprolol 2.5 mg IV administered with improved rate control. Discussed case with EP fellow, amio bolus and gtt started.  Patient converted to NSR in afternoon on 8/25. Transition to amiodarone 400 mg PO BID 8/26.   - 8/27 Discussed plan for possible PPM with EP. Will start warfarin and EP will re-assess 8/27     Resp: extubated POD 11  - IS, encourage activity  - Mediastinal CT with minimal output, Pleural chest tubes with moderate, serosanguinous output. Plan to keep chest tubes until safe to remove TPW.       FEN/GI:   - Clear liquid diet with nectar thickened liquids, speech following.   - Continuous TF, consider calorie counts once patient's diet advances   - Bowel regimen, + BM  - 8/24 EGD: two gastric ulcers, no active bleeding or high-risk features.      Renal:   - Creatinine 0.67, adequate UOP  - Volume status: hypervolemic, dry weight ~ 170 lbs. Weight 178.   - 8/26 diuresing with Lasix 40 mg IV BID, re-assess 8/27 .      ID: Completed alban-op abx,   - WBC 11.7  and trending up, afebrile, no signs or symptoms of infection    Heme:   - Hgb 9.9, no signs or symptoms of bleeding, Plt 741  - Continue to monitor for signs of GI bleed, transfuse for Hgb < 7      Endo:   - BG 100s, on sliding scale insulin. Will discontinue once diet improves.       PPx:   - PPI      Anticoagulation:   - 8/25 start low intensity heparin  - 8/26 start warfarin, INR 2-3. INR 1.16  - Plavix  75 mg      Dispo:   - 6C since 8/24  - Family requesting care conference/meeting with SW to discuss community resources available at discharge    - Therapy recommending d/c to ARU    Staff surgeons have been informed of changes through both verbal and written communication.         Phyllis BROWN NP-C  Cardiothoracic Surgery  August 26, 2018 10:32 AM   p: [phone]

## 2018-08-26 NOTE — PLAN OF CARE
Problem: Patient Care Overview  Goal: Plan of Care/Patient Progress Review  Discharge Planner OT    OT 6C/CR  Patient plan for discharge: Rehab  Current status: Pt able to recall 3/4 sternal precautions with one specific v/c to recall last precaution. Pt supine <> EOB min A x1 with min v/c to complete each step in transfer. Pt STS min A x1 and tolerated standing in place ~4 minutes. Pt ambulated ~100' x2 CGA using FWW and w/c follow. Pt required mod v/c to relax shoulders and widen base of support for safety during ambulation. Pt required one seated rest break ~5 minutes. Pt returned to lying supine in bed min A x2. Vitals remained stable throughout on 3L.   Barriers to return to prior living situation: medical status, sternal precautions, decreased activity tolerance which impacts ADL/IADL IND  Recommendations for discharge: TCU  Rationale for recommendations: Pt to benefit from further skilled OT intervention to progress activity tolerance and maximize ADL/IADL IND and safety.        Entered by: Sonja Alatorre 08/26/2018 3:00 PM

## 2018-08-26 NOTE — PLAN OF CARE
Problem: Patient Care Overview  Goal: Plan of Care/Patient Progress Review  Discharge Planner SLP   Patient plan for discharge: Did not discuss  Current status: Pt assessed with regular solids and thin liquids. Adequate oral manipulation and clearance. Overt cough x1 in 4 oz of thin liquid, pt otherwise tolerated without s/sx of aspiration, no changes in breath sounds or vocal quality. Recommend pt follow swallow precautions strictly d/t his compromised airway protection following intubations, evident by weak cough and hoarse/weak vocal quality.     Recommend pt consume regular solids and thin liquids. Pt to be sitting up at 90 degrees with oral intake, small bites/sips, alternate solids and liquids. Pt does use swallow strategies independently.     Barriers to return to prior living situation: Medical complexity   Recommendations for discharge: Pt likely will meet dysphagia goals prior to discharge, but will continue to monitor vocal quality   Rationale for recommendations: Aspiration risk, compromised airway protection/impaired vocal quality        Entered by: Felisha Sorto 08/26/2018 10:28 AM

## 2018-08-26 NOTE — PLAN OF CARE
Problem: Patient Care Overview  Goal: Plan of Care/Patient Progress Review  Outcome: No Change  Pt admitted 8/8 s/p HUSSEIN x2 c/b infarct VSD s/p posterior VSD and TVR repair.  TPM DDD at 70 in SR, VS'S on 2.5 L NC with incisional pain and denied tx.  Nutren TF continues at 40 ml/hr and refused titrate up.  Medial CT had 10 ml out of serosanguinous and Pleural CT had 70 ml out of serious.  2-3+ LLE and 3+ RLE edema. New numbness/tingling in b/l LE and cold, CVTS notified.    Otherwise, pt slept well and up assist of two.  Continue to monitor and with POC.

## 2018-08-26 NOTE — PROGRESS NOTES
Social Work: Assessment with Discharge Plan    Patient Name: Castillo De Anda  : 1950  Age: 68 year old  MRN: [id number]  Completed assessment with: pt. And dtr    Presenting Information   Reason for Referral: needs rehab post hospitalization  Date of intake: 2018  Referral Source: md team  Decision Maker: pt.  Alternate Decision Maker: wife, Radha  Health Care Directive: no  Living Situation: with wife in a home in [place]  Previous Functional Status: independent  Patient and family understanding of hospitalization: they feel they are getting good care here  Cultural/Language/Spiritual Considerations: none identified  Adjustment to Illness: appeared in good spirita    Physical Health  Reason for admission: s/p valve replacement  Services Needed/Recommended: ARU per therapies    Mental Health/Chemical Dependency:  none      Support System  Significant Relationship at Present time:  Wife Radha  Family of Origin is available for support: 3 children, 1 in [place] and 2 in [facility]. area  Other support available:  Family and friends  Current in home services: none  Gaps in Support System: none identified    Financial   Income Source: Social Security  Financial Concerns:  None idenfied  Insurance: Medicare      Discharge Plan   Patient and family discharge goal: ARU or TCU  Provided Education on discharge plan: Yes - explained differences of ARU versus TCU  Patient agreeable to discharge plan:  yes  A list of Medicare Certified Facilities was provided to the patient and/or family to encourage patient choice. Patient's choices for facility are: Pt. Needs to speak to wife before choosing a facility  Will NH provide Skilled rehabilitation or complex medical:  yes  General information regarding anticipated insurance coverage and possible out of pocket cost was discussed. Patient and patient's family are aware patient may incur the cost of transportation to the facility, pending insurance payment:   Barriers to  discharge: medical readiness and bed availability    Discharge Recommendations   Disposition: likely ARU, discharge timeframe unknown  Transportation Needs: family will likely choose [facility]; they were informed Medicare will not pay for transport back to [place] for rehab stay  Name of Transportation Company and Phone: [employer]    Additional comments   At this time, pt. And dtr who was present during visit are leaning towards staying locally for rehab stay. 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 Wife has been staying with dtr in [facility] (they were unsure of name). No referrals have been made yet but pt. Has list of ARU facilties.

## 2018-08-26 NOTE — PLAN OF CARE
Problem: Patient Care Overview  Goal: Plan of Care/Patient Progress Review  D: Pt stable and comfortable. AVSS, NSR even during activity. Amiodarone gtt initiated 8/25, currently running at 0.5 mg/hr. PO bridge at 2000, gtt off at 2200. TF at 40 mL/hr. Pt unable to tolerate higher rate later when asked. [place] counts initiated. No pain noted. CT x 4 to sxn with serous output. Heparin gtt at 950 units/hr. Incontinent of stool x 1, black/brown loose. Temporary pacer on IV pole, no pacing noted this shift.  I: Monitored/assessed pt. Assisted with cares.  A: Pt stable and comfortable.  P: Continue to monitor/assess pt, contact provider with concerns.

## 2018-08-26 NOTE — PROGRESS NOTES
Cross-cover note: 9:39 PM 8/25/2018 08/25/2018    General Surgery Cross Cover Note    Was called by nursing regarding lower extremity numbness that began this AM after working with PT, and L-sided chest heaviness. Saw and assessed patient. Lower extremity numbness - felt as though his feet were not on the ground when they actually were, this was while PT had him dangle at edge of bed. Chest pain was a heavy feeling on the left side, midaxillary. He states lower extremity numbness and L sided chest heaviness has occurred with previous PT visits on other days, and has resolved spontaneously. He feels that the chest pain was related to breathing pain, as he has chest tubes in place  Denies nausea, vomiting, diaphoresis, lightheadedness, palpitations. Chest pain resolved at this point. He requested to wear his compression stockings, which usually help with numbness, but have not helped this time.     B/P: 102/72, T: 98.2, P: 87, R: 16    PE:  A&O x3, NAD, resting comfortably in bed, recently repositioned  Breathing non-labored on 4.5 LNC, sternal incisions C/D/I, chest pain not reproducible on palpation  Extr. Toes bilaterally cool, bilateral dorsalis pedis pulses 2+, pitting edema BL 2+     Plan:  Patient is stable, has been on 4 LNC, was just ambulating at time of my visit. Suspect difficulties with PT due to deconditioning, sitting upright. Chest pain likely related to chest-tube associated discomfort with ambulation. Per nursing, patient is at baseline. This is not likely an acute process - do not suspect embolic or thrombotic event in LE as symptoms are bilateral, pulses are present. Chest pain not typical for ACS.     Abundio Sawyer MD  PGY-1 Surgery Crosscover  [phone]

## 2018-08-26 NOTE — PROVIDER NOTIFICATION
Time: 2045  Notified: Dr. Del Castillo [id number]  Reason: Pt c/o LE numbness (per pt it has started this am after working with therapy). Also, pt c/o L side chest heaviness that lasted for about 5min. AVSS.   MD came and saw/assessed pt.  Pt is now chest pressure free. No new orders at this time.  Pt appeared to be stable.

## 2018-08-26 NOTE — PHARMACY-ANTICOAGULATION SERVICE
Clinical Pharmacy - Warfarin Dosing Consult     Pharmacy has been consulted to manage this patient s warfarin therapy.  Indication: Atrial Fibrillation  Therapy Goal: INR 2-3  Warfarin Prior to Admission: No  Significant drug interactions: Amiodarone (strong interaction, can increase INR), Heparin (increase risk of bleeding)    INR   Date Value Ref Range Status   08/26/2018 1.16 (H) 0.86 - 1.14 Final   08/25/2018 1.25 (H) 0.86 - 1.14 Final       Recommend warfarin 2 mg today. Starting conservatively given elevated LFTs and interaction with amiodarone. Pharmacy will monitor Castillo De Anda daily and order warfarin doses to achieve specified goal.      Please contact pharmacy as soon as possible if the warfarin needs to be held for a procedure or if the warfarin goals change.   Chey Marr, PharmD, BCPS  Pager [phone]

## 2018-08-27 ENCOUNTER — APPOINTMENT (OUTPATIENT)
Dept: GENERAL RADIOLOGY | Facility: CLINIC | Age: 68
DRG: 219 | End: 2018-08-27
Attending: PHYSICIAN ASSISTANT
Payer: MEDICARE

## 2018-08-27 ENCOUNTER — APPOINTMENT (OUTPATIENT)
Dept: CARDIOLOGY | Facility: CLINIC | Age: 68
DRG: 219 | End: 2018-08-27
Attending: INTERNAL MEDICINE
Payer: MEDICARE

## 2018-08-27 ENCOUNTER — APPOINTMENT (OUTPATIENT)
Dept: PHYSICAL THERAPY | Facility: CLINIC | Age: 68
DRG: 219 | End: 2018-08-27
Attending: INTERNAL MEDICINE
Payer: MEDICARE

## 2018-08-27 LAB
ANION GAP SERPL CALCULATED.3IONS-SCNC: 8 MMOL/L (ref 3–14)
BUN SERPL-MCNC: 31 MG/DL (ref 7–30)
CA-I BLD-MCNC: 4.3 MG/DL (ref 4.4–5.2)
CALCIUM SERPL-MCNC: 7.9 MG/DL (ref 8.5–10.1)
CHLORIDE SERPL-SCNC: 104 MMOL/L (ref 94–109)
CO2 SERPL-SCNC: 26 MMOL/L (ref 20–32)
CREAT SERPL-MCNC: 0.74 MG/DL (ref 0.66–1.25)
ERYTHROCYTE [DISTWIDTH] IN BLOOD BY AUTOMATED COUNT: 23.3 % (ref 10–15)
GFR SERPL CREATININE-BSD FRML MDRD: >90 ML/MIN/1.7M2
GLUCOSE BLDC GLUCOMTR-MCNC: 110 MG/DL (ref 70–99)
GLUCOSE BLDC GLUCOMTR-MCNC: 110 MG/DL (ref 70–99)
GLUCOSE BLDC GLUCOMTR-MCNC: 123 MG/DL (ref 70–99)
GLUCOSE BLDC GLUCOMTR-MCNC: 124 MG/DL (ref 70–99)
GLUCOSE BLDC GLUCOMTR-MCNC: 126 MG/DL (ref 70–99)
GLUCOSE BLDC GLUCOMTR-MCNC: 131 MG/DL (ref 70–99)
GLUCOSE SERPL-MCNC: 102 MG/DL (ref 70–99)
HCT VFR BLD AUTO: 31.5 % (ref 40–53)
HGB BLD-MCNC: 9.7 G/DL (ref 13.3–17.7)
INR PPP: 1.19 (ref 0.86–1.14)
INTERPRETATION ECG - MUSE: NORMAL
LMWH PPP CHRO-ACNC: 0.34 IU/ML
MAGNESIUM SERPL-MCNC: 2.2 MG/DL (ref 1.6–2.3)
MCH RBC QN AUTO: 30.5 PG (ref 26.5–33)
MCHC RBC AUTO-ENTMCNC: 30.8 G/DL (ref 31.5–36.5)
MCV RBC AUTO: 99 FL (ref 78–100)
PLATELET # BLD AUTO: 972 10E9/L (ref 150–450)
POTASSIUM SERPL-SCNC: 4.2 MMOL/L (ref 3.4–5.3)
RBC # BLD AUTO: 3.18 10E12/L (ref 4.4–5.9)
SODIUM SERPL-SCNC: 138 MMOL/L (ref 133–144)
VON WILLEBRAND INTERPRETATION: NORMAL
WBC # BLD AUTO: 9.9 10E9/L (ref 4–11)

## 2018-08-27 PROCEDURE — 25000132 ZZH RX MED GY IP 250 OP 250 PS 637: Mod: GY | Performed by: PHYSICIAN ASSISTANT

## 2018-08-27 PROCEDURE — A9270 NON-COVERED ITEM OR SERVICE: HCPCS | Mod: GY | Performed by: STUDENT IN AN ORGANIZED HEALTH CARE EDUCATION/TRAINING PROGRAM

## 2018-08-27 PROCEDURE — 99232 SBSQ HOSP IP/OBS MODERATE 35: CPT | Mod: GC | Performed by: INTERNAL MEDICINE

## 2018-08-27 PROCEDURE — 40000275 ZZH STATISTIC RCP TIME EA 10 MIN

## 2018-08-27 PROCEDURE — 93321 DOPPLER ECHO F-UP/LMTD STD: CPT | Mod: 26 | Performed by: INTERNAL MEDICINE

## 2018-08-27 PROCEDURE — 71045 X-RAY EXAM CHEST 1 VIEW: CPT

## 2018-08-27 PROCEDURE — 97116 GAIT TRAINING THERAPY: CPT | Mod: GP

## 2018-08-27 PROCEDURE — 00000146 ZZHCL STATISTIC GLUCOSE BY METER IP

## 2018-08-27 PROCEDURE — 27210429 ZZH NUTRITION PRODUCT INTERMEDIATE LITER

## 2018-08-27 PROCEDURE — 80048 BASIC METABOLIC PNL TOTAL CA: CPT | Performed by: INTERNAL MEDICINE

## 2018-08-27 PROCEDURE — 25000132 ZZH RX MED GY IP 250 OP 250 PS 637: Mod: GY | Performed by: STUDENT IN AN ORGANIZED HEALTH CARE EDUCATION/TRAINING PROGRAM

## 2018-08-27 PROCEDURE — 25000132 ZZH RX MED GY IP 250 OP 250 PS 637: Mod: GY | Performed by: NURSE PRACTITIONER

## 2018-08-27 PROCEDURE — 40000193 ZZH STATISTIC PT WARD VISIT

## 2018-08-27 PROCEDURE — 93325 DOPPLER ECHO COLOR FLOW MAPG: CPT | Mod: 26 | Performed by: INTERNAL MEDICINE

## 2018-08-27 PROCEDURE — A9270 NON-COVERED ITEM OR SERVICE: HCPCS | Mod: GY | Performed by: NURSE PRACTITIONER

## 2018-08-27 PROCEDURE — 25000128 H RX IP 250 OP 636: Performed by: NURSE PRACTITIONER

## 2018-08-27 PROCEDURE — 25000132 ZZH RX MED GY IP 250 OP 250 PS 637: Mod: GY | Performed by: INTERNAL MEDICINE

## 2018-08-27 PROCEDURE — 25000125 ZZHC RX 250: Performed by: PHYSICIAN ASSISTANT

## 2018-08-27 PROCEDURE — 36415 COLL VENOUS BLD VENIPUNCTURE: CPT | Performed by: INTERNAL MEDICINE

## 2018-08-27 PROCEDURE — 83735 ASSAY OF MAGNESIUM: CPT | Performed by: INTERNAL MEDICINE

## 2018-08-27 PROCEDURE — 85520 HEPARIN ASSAY: CPT | Performed by: INTERNAL MEDICINE

## 2018-08-27 PROCEDURE — 25000132 ZZH RX MED GY IP 250 OP 250 PS 637: Mod: GY | Performed by: THORACIC SURGERY (CARDIOTHORACIC VASCULAR SURGERY)

## 2018-08-27 PROCEDURE — 40000802 ZZH SITE CHECK

## 2018-08-27 PROCEDURE — 82330 ASSAY OF CALCIUM: CPT | Performed by: INTERNAL MEDICINE

## 2018-08-27 PROCEDURE — 25000128 H RX IP 250 OP 636: Performed by: STUDENT IN AN ORGANIZED HEALTH CARE EDUCATION/TRAINING PROGRAM

## 2018-08-27 PROCEDURE — 93308 TTE F-UP OR LMTD: CPT | Mod: 26 | Performed by: INTERNAL MEDICINE

## 2018-08-27 PROCEDURE — A9270 NON-COVERED ITEM OR SERVICE: HCPCS | Mod: GY | Performed by: INTERNAL MEDICINE

## 2018-08-27 PROCEDURE — 85027 COMPLETE CBC AUTOMATED: CPT | Performed by: INTERNAL MEDICINE

## 2018-08-27 PROCEDURE — 97530 THERAPEUTIC ACTIVITIES: CPT | Mod: GP

## 2018-08-27 PROCEDURE — 21400006 ZZH R&B CCU INTERMEDIATE UMMC

## 2018-08-27 PROCEDURE — 25000128 H RX IP 250 OP 636: Performed by: SURGERY

## 2018-08-27 PROCEDURE — A9270 NON-COVERED ITEM OR SERVICE: HCPCS | Mod: GY | Performed by: PHYSICIAN ASSISTANT

## 2018-08-27 PROCEDURE — 25000128 H RX IP 250 OP 636: Performed by: THORACIC SURGERY (CARDIOTHORACIC VASCULAR SURGERY)

## 2018-08-27 PROCEDURE — 25000128 H RX IP 250 OP 636: Performed by: PHYSICIAN ASSISTANT

## 2018-08-27 PROCEDURE — 93325 DOPPLER ECHO COLOR FLOW MAPG: CPT

## 2018-08-27 PROCEDURE — 85610 PROTHROMBIN TIME: CPT | Performed by: INTERNAL MEDICINE

## 2018-08-27 PROCEDURE — A9270 NON-COVERED ITEM OR SERVICE: HCPCS | Mod: GY | Performed by: THORACIC SURGERY (CARDIOTHORACIC VASCULAR SURGERY)

## 2018-08-27 RX ORDER — FUROSEMIDE 10 MG/ML
40 INJECTION INTRAMUSCULAR; INTRAVENOUS 2 TIMES DAILY
Status: COMPLETED | OUTPATIENT
Start: 2018-08-27 | End: 2018-08-28

## 2018-08-27 RX ORDER — QUETIAPINE FUMARATE 25 MG/1
25 TABLET, FILM COATED ORAL AT BEDTIME
Status: DISCONTINUED | OUTPATIENT
Start: 2018-08-27 | End: 2018-08-30

## 2018-08-27 RX ORDER — MULTIVITAMIN,THERAPEUTIC
1 TABLET ORAL DAILY
Status: DISCONTINUED | OUTPATIENT
Start: 2018-08-28 | End: 2018-09-07

## 2018-08-27 RX ORDER — HYDROXYUREA 500 MG/1
1500 CAPSULE ORAL 2 TIMES DAILY
Status: DISCONTINUED | OUTPATIENT
Start: 2018-08-27 | End: 2018-09-10

## 2018-08-27 RX ORDER — DOCUSATE SODIUM 100 MG/1
100 CAPSULE, LIQUID FILLED ORAL 2 TIMES DAILY
Status: DISCONTINUED | OUTPATIENT
Start: 2018-08-27 | End: 2018-08-28

## 2018-08-27 RX ORDER — PANTOPRAZOLE SODIUM 40 MG/1
40 TABLET, DELAYED RELEASE ORAL
Status: DISCONTINUED | OUTPATIENT
Start: 2018-08-27 | End: 2018-09-11 | Stop reason: HOSPADM

## 2018-08-27 RX ORDER — WARFARIN SODIUM 1 MG/1
2 TABLET ORAL
Status: COMPLETED | OUTPATIENT
Start: 2018-08-27 | End: 2018-08-27

## 2018-08-27 RX ORDER — HYDROXYUREA 500 MG/1
1000 CAPSULE ORAL 2 TIMES DAILY
Status: DISCONTINUED | OUTPATIENT
Start: 2018-08-27 | End: 2018-08-27

## 2018-08-27 RX ADMIN — AMIODARONE HYDROCHLORIDE 400 MG: 200 TABLET ORAL at 20:03

## 2018-08-27 RX ADMIN — HEPARIN SODIUM 950 UNITS/HR: 10000 INJECTION, SOLUTION INTRAVENOUS at 14:21

## 2018-08-27 RX ADMIN — PANTOPRAZOLE SODIUM 40 MG: 40 TABLET, DELAYED RELEASE ORAL at 16:51

## 2018-08-27 RX ADMIN — Medication 1 PACKET: at 16:46

## 2018-08-27 RX ADMIN — CALCIUM GLUCONATE 1 G: 98 INJECTION, SOLUTION INTRAVENOUS at 14:10

## 2018-08-27 RX ADMIN — ATORVASTATIN CALCIUM 40 MG: 40 TABLET, FILM COATED ORAL at 20:04

## 2018-08-27 RX ADMIN — DOCUSATE SODIUM 100 MG: 100 CAPSULE, LIQUID FILLED ORAL at 20:05

## 2018-08-27 RX ADMIN — WARFARIN SODIUM 2 MG: 1 TABLET ORAL at 17:39

## 2018-08-27 RX ADMIN — Medication 2 G: at 17:31

## 2018-08-27 RX ADMIN — SENNOSIDES AND DOCUSATE SODIUM 1 TABLET: 8.6; 5 TABLET ORAL at 09:26

## 2018-08-27 RX ADMIN — AMIODARONE HYDROCHLORIDE 400 MG: 200 TABLET ORAL at 09:26

## 2018-08-27 RX ADMIN — CARBOXYMETHYLCELLULOSE SODIUM 1 DROP: 5 SOLUTION/ DROPS OPHTHALMIC at 09:27

## 2018-08-27 RX ADMIN — Medication 1 PACKET: at 11:32

## 2018-08-27 RX ADMIN — Medication 1 PACKET: at 20:06

## 2018-08-27 RX ADMIN — CLOPIDOGREL 75 MG: 75 TABLET, FILM COATED ORAL at 09:26

## 2018-08-27 RX ADMIN — SENNOSIDES AND DOCUSATE SODIUM 2 TABLET: 8.6; 5 TABLET ORAL at 20:04

## 2018-08-27 RX ADMIN — SODIUM CHLORIDE, PRESERVATIVE FREE 5 ML: 5 INJECTION INTRAVENOUS at 20:05

## 2018-08-27 RX ADMIN — LEVOTHYROXINE SODIUM 75 MCG: 75 TABLET ORAL at 09:26

## 2018-08-27 RX ADMIN — HYDROXYUREA 1500 MG: 500 CAPSULE ORAL at 20:05

## 2018-08-27 RX ADMIN — MULTIVITAMIN 15 ML: LIQUID ORAL at 09:27

## 2018-08-27 RX ADMIN — FUROSEMIDE 40 MG: 10 INJECTION, SOLUTION INTRAVENOUS at 16:47

## 2018-08-27 RX ADMIN — FUROSEMIDE 40 MG: 10 INJECTION, SOLUTION INTRAVENOUS at 09:43

## 2018-08-27 RX ADMIN — ONDANSETRON 4 MG: 2 INJECTION INTRAMUSCULAR; INTRAVENOUS at 17:58

## 2018-08-27 RX ADMIN — QUETIAPINE FUMARATE 25 MG: 25 TABLET ORAL at 22:23

## 2018-08-27 ASSESSMENT — ACTIVITIES OF DAILY LIVING (ADL)
ADLS_ACUITY_SCORE: 16
ADLS_ACUITY_SCORE: 17
ADLS_ACUITY_SCORE: 16
ADLS_ACUITY_SCORE: 16

## 2018-08-27 NOTE — PLAN OF CARE
Problem: Patient Care Overview  Goal: Plan of Care/Patient Progress Review  OT/6C: Cancel. Attempted this PM, pt with alternate provider, will check back as able.

## 2018-08-27 NOTE — PROGRESS NOTES
HEMATOLOGY FOLLOW UP NOTE    Castillo Soler is a 68 year old man with JAK2+ polycythemia vera, recently switched by primary heme/onc provider from Hydrea to Anagrelide with very poor tolerance. Reason for switching apparently was that his anemia was felt to be due to Hydrea.  He is admitted s/p STEMI with ischemia-related VSD requiring surgical intervention. He is high-risk for ongoing thrombocytosis and we restarted hydrea on 8/7/18. Platelet count has continued to increase, hydrea dose increased to 1000 mg bid on 8/10. Work-up for platelet dysfunction has been negative.    His PLT counts came down initially after increased dose of hydrea of 1000 mg bid, but it started to up trending again since last week, his PLT today is 972K    Patient was seen and examed. He reports feeling better, started to get up and moving with PT/OT, denies any signs of bleeding, no hematemesis or melena, no petechia. No fever/chill, no headache, no chest pain. No sob. Still feels weak.     Physical Examination:   Temp: 97.3  F (36.3  C) Temp src: Oral BP: 96/68   Heart Rate: 92 Resp: 16 SpO2: 93 % O2 Device: None (Room air) Oxygen Delivery: 2 LPM  Constitutional: Awake and alert, not in acute distress.  Eyes: No scleral icterus. Eyes exhibit no discharge.  ENT/Mouth: Oral mucosa pink and moist  Cardiovascular: Normal rate, regular rhythm, S1, S2. No murmur or rub. No leg edema  Respiratory: Mild respiratory distress. No wheezes. Crackle in bibasilar area, right side is worse  Gastrointestinal: soft, no distention, no tenderness, active BS  Neurological: AAOX3, grossly non-focal  Psychiatric: Mentation and affect appear normal      Results for CASTILLO SOLER (MRN [id number]) as of 8/27/2018 11:51   Ref. Range 8/23/2018 04:50 8/23/2018 11:30 8/24/2018 04:57 8/25/2018 05:42 8/26/2018 06:00 8/27/2018 07:03   Platelet Count Latest Ref Range: 150 - 450 10e9/L 372 421 433 563 (H) 741 (H) 972 (H)     Hemoglobin Latest Ref Range: 13.3 - 17.7  g/dL 8.7 (L) 8.6 (L) 8.5 (L) 8.8 (L) 9.9 (L) 9.7 (L)       Recommendation:  - Please increase hydrea to 1500mg bid  - follow daily CBC +diff      We will continue to follow. Please page with any questions/concerns.      Plan was discussed with attending physician Dr. Elvis Quezada.      Balaji Rodriguez MD/MPH  Heme/Onc Fellow, PGY-4      HEMATOLOGY STAFF:  Patient seen / examined, discussed with fellow, whose note reflects our joint evaluation, assessment, and plan.    Elvis Quezada MD  Associate Professor of Medicine  Division of Hematology, Oncology, and Transplantation  Director, [facility]

## 2018-08-27 NOTE — PROGRESS NOTES
CLINICAL NUTRITION SERVICES - REASSESSMENT NOTE     Nutrition Prescription    RECOMMENDATIONS FOR MDs/PROVIDERS TO ORDER:  1. Once kcal counts reveal that pt is consuming at least 1323 kcals and 90 g protein daily on average, discontinue TFs.   2. Adjust free water flushes via feeding tube as needed, pending fluid status. Currently, free water flushes are minimal, or for patency.     Malnutrition Status:    Severe malnutrition in the context of acute illness    Recommendations already ordered by Registered Dietitian (RD):  Oral supplements  Changed TFs to cycled, supplemental.     Future/Additional Recommendations:  1. Continue regular diet as ordered, liberalized to help encourage oral intake. Encourage intake of oral supplements and high protein options.  2. Continue certavite, as ordered, to help meet micronutrient needs.   3. Monitor BG control. Hgb A1c of 6.3 on 8/6.   4. Consider changing scheduled senna, miralax, and colace to prn.      EVALUATION OF THE PROGRESS TOWARD GOALS   Diet: Regular diet order and thin liquids since 8/26. Has prn supplement order. Extubated on 8/21. Pt previously on nectar-thick clear liquids 8/23 and then full liquids or clear liquids on 8/23. SLP is following pt.   Intake: Good diet tolerance. Flowsheets indicate pt consumed 25% of a meal with a poor appetite 8/26. Pt ordered some clear liquids on 8/23. His first solid meal was yesterday (8/26) and it provided 649 kcals and 36 g protein. He ordered breakfast today. Per team, no nausea yesterday (8/26) and he had a little more of an appetite. Pt was busy with RN or nursing cares when attempting to see this morning. Factors affecting nutrition intake include recent diet advancement and lack of appetite.    Kcal counts:   8/26   501 kcals and 20 g protein (meal/s and no supplement/s recorded)   8/27-8/28 Pending   * Not meeting estimated needs below.     Nutrition Support:    - Feeding Tube (FT) access: NDT placed on 8/13 with a  noam.   - TF regimen: Nutren 1.5 with goal rate of 60 mL/hr to provide 2160+ kcals, 98+ g PRO, 1094 mL H2O, 253 g CHO and no fiber daily.  - TF modulars: ProSource TF modular, 1 pkt TID. Each packet of ProSource TF modular provides 40 kcals and 11 g protein.   - Feeding Tube Flushes: 100 mL Q 4 hrs  - Intake: Pt received a seven-day TF average which provided 1375 kcals and 72 g protein and does not meet pt's estimated needs (despite receiving ProSource TF modulars which provide additional kcals/protein) of 1764 - 2205 kcals/day (% MREE, 23-29 kcals/kg) and 116 - 154 grams protein/day (1.5 - 2 grams of pro/kg). TFs were started on 8/13 and were changed to Nutren 1.5 on 8/20. Note, TFs have been infusing at 50 mL/hr as opposed to goal rate.       NEW FINDINGS   Wt Hx: 79.7 kg (8/22/17), 81.4 kg (5/22/18), 77.3 kg (7/30/18) - No significant/severe wt loss PTA.      Resp (as per prior nutrition note): Obtained metabolic cart study 8/20 at 1545 with the following results: MREE = 2205 kcals/day (equiv to 29 kcal/kg/day) with RQ = 0.9.  Pt received 1440 ml of TF in 24 hours preceding the study providing 2160 kcals (98% MREE).  RQ is within physiologic range and logical given provisions received prior to study. Estimated needs as noted above.    MALNUTRITION  % Intake: Decreased intake does not meet criteria  % Weight Loss: Weight loss does not meet criteria  Subcutaneous Fat Loss: Facial region, Upper arm and Lower arm: moderate per prior nutrition note. Pt was busy during attempts x 2.  Muscle Loss: Temporal, Scapular bone, Thoracic region (clavicle, acromium bone, deltoid, trapezius, pectoral), Upper arm (bicep, tricep), Lower arm  (forearm) and Dorsal hand: severe per prior nutrition note. Pt was busy during attempts x 2.  Fluid Accumulation/Edema: Does not meet criteria  Malnutrition Diagnosis: Severe malnutrition in the context of acute illness    Previous Goals   Total avg nutritional intake to meet a minimum  of 25 kcal/kg (or needs specified by indirect calorimetry) and 1.5 g PRO/kg daily (per dosing wt 77 kg).  Evaluation: Not met consistently, but improving as TF rate increased and pt has started to eat.     Previous Nutrition Diagnosis  Predicted inadequate nutrient intake (kcal/PRO) related to reliance on TF to meet needs while intubated AEB potential for changes to metabolic needs, interruptions to TF infusion  Evaluation: Unresolved. Changed to new nutrition dx below.    CURRENT NUTRITION DIAGNOSIS  Inadequate oral intake related to recent diet advancement and lack of appetite as evidenced by kcal counts on 8/26 indicate pt consumed 501 kcals and 20 g protein which does not meet estimated needs of 1764 - 2205 kcals/day (% MREE, 23-29 kcals/kg) and 116 - 154 grams protein/day (1.5 - 2 grams of pro/kg).     INTERVENTIONS  Implementation  Medical food supplement therapy: Ordered Boost Plus at 14:00, Magic Cup at HS, and Boost Breeze at 10:00.   Collaboration with other providers, Enteral Nutrition: Changed Nutren 1.5 TFs to cycled, supplemental feeds (to help encourage oral intake) of 60 mL/hr x 14 hours (6p-8a) as per discussion with team and RN. TF regimen provides 840 mL TF, 1260+ kcals (16+ kcals/kg), 57+ g protein (0.7+ g protein/kg), 638 mL H2O, 148 g CHO, and no fiber daily. Will continue ProSource TF as ordered.       Goals  Total average nutrition intake to meet 1764 - 2205 kcals/day (% MREE, 23-29 kcals/kg) and 116 - 154 grams protein/day (1.5 - 2 grams of pro/kg).    Monitoring/Evaluation  Progress toward goals will be monitored and evaluated per protocol.    Nutrition will continue to follow.      Betty Jennings, MS, RD, LD, [facility]   6C Pgr: [phone]

## 2018-08-27 NOTE — PLAN OF CARE
Problem: Patient Care Overview  Goal: Plan of Care/Patient Progress Review  SLP: Attempted to see pt for dysphagia tx, however pt busy requiring nursing cares at time of attempt and then receiving ECHO. RN reports good tolerance of current diet level. Will follow up as appropriate.

## 2018-08-27 NOTE — PLAN OF CARE
Problem: Patient Care Overview  Goal: Plan of Care/Patient Progress Review  Discharge Planner PT   Patient plan for discharge: not discussed   Current status:  Ambulates 80ft x2 with FWW and w/c follow requiring CGAx1.  No LOB or bucklig present today. Requries 1 standing break x30 sec 2/2 SOB and 2 sitting breaks. Supine to sit with HOB elevated 45 deg with min Ax1.  Scoots IND to EOB.  STS x4 with FWW and CGAx1.  2 instances of posterior lean causing LOB requiring brief min Ax1 to correct.   Barriers to return to prior living situation: impaired balance, A with mobilty, fatigue with short gait, medical stability  Recommendations for discharge: ARU  Rationale for recommendations: improve strength, activity tolerance and independence with mobility. Currently has needs for PT/OT/SLP. Below baseline mobility and motivated to participate with therapies to regain PLOF.        Entered by: Eric Josue 08/27/2018 1:50 PM

## 2018-08-27 NOTE — PROGRESS NOTES
Brief GI Follow-up Note    No bleeding note since restarting anticoagulation. Hgb stable.     Recommendations:   68 year old male with h/o thrombocytosis, meningioma s/p radiation and surgery, and STEMI 8/4/18, treated with HUSSEIN to RCA and LAD, c/b basal VSD s/p posterior repair and TVR 8/10, with chest closure 8/13, IABP removal 8/14, and extubation 8/21. Continued on ASA and Plavix.  He had signs of hemodynamically significant GI bleeding 8/21/18, with coffee ground material from OG prior to extubation and an episode of melena with associated hypotension that night. EGD 8/24 with 2 ulcers, not treated  - Continue IV PPI BID while admitted followed by high dose PPI PO BID x 8 weeks  - Repeat EGD in 8 weeks is indicated to evaluate for gastric malignancy, but expect his gastric ulcers are provoked by recent illness and malignancy is less likely.  Pursue repeat EGD based on risk/benefit discussion with cardiology and PCP - outpatient team to order after this discussion.     GI will sign off    Discussed with Dr. Barber
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
## 2018-08-27 NOTE — PROGRESS NOTES
Calorie Count  Intake recorded for: 8/26/2018   Kcals: 501  Protein: 20g  # Meals Recorded:  100% 8oz soymilk, 50% apple pie, green beans, mashed potatoes, 25% grilled chicken breast  # Supplements Recorded: 0

## 2018-08-27 NOTE — PLAN OF CARE
Problem: Patient Care Overview  Goal: Plan of Care/Patient Progress Review  Outcome: Improving  Pt admitted 8/8 s/p HUSSEIN x2 c/b infarct VSD s/p posterior VSD and TVR repair.  TPM Pacer capped yesterday.  SR, VS'S on 2 L NC with incisional pain and denied tx.  Nutren TF continues at 50 ml/hr (goal 60 ml/hr).  [place] counts started yesterday.  Medial CT's had 0 ml out of serosanguinous and Pleural CT had 70 ml out of serious.  2-3+ b/l LE edema.  Numbness/tingling better in b/l LE.  Otherwise, pt slept well and up assist of two.  Continue to monitor and with POC.

## 2018-08-27 NOTE — PROGRESS NOTES
CVTS Daily Note  8/27/2018  Attending: Jason Franco, *    S:   No overnight events.   Pt seen at bedside resting comfortably.    Does complain of fatigue, otherwise no acute complaints.      Denies F/C/Sweats.  No CP, SOB, or calf pain.    Tolerating diet with tube feeds. Cycle tube feeds tonight.  + BM.  + Flatus.    Ambulated well with assistance.    Pain level tolerable. Plan as per Neuro section below.     O:   Vitals:    08/27/18 0354 08/27/18 0538 08/27/18 0716 08/27/18 1115   BP: (!) 89/64  94/69 96/68   BP Location: Left arm  Left arm Left arm   Pulse:       Resp:   18 16   Temp:   98.5  F (36.9  C) 97.3  F (36.3  C)   TempSrc:   Oral Oral   SpO2: 96%  94% 93%   Weight:  78 kg (172 lb)     Height:         Vitals:    08/25/18 0615 08/26/18 0641 08/27/18 0538   Weight: 81 kg (178 lb 9.2 oz) 78.5 kg (173 lb 1 oz) 78 kg (172 lb)     + 5 kg since admit and trending up      Intake/Output Summary (Last 24 hours) at 08/27/18 1412  Last data filed at 08/27/18 0600   Gross per 24 hour   Intake           2294.5 ml   Output             1595 ml   Net            699.5 ml       MAPs: 66 - 78  Gen: AAO x 3, pleasant, NAD  CV: RRR, S1S2 normal, no murmurs, rubs, or gallops.    * cut TPW with one wire falling out.   Pulm: CTA, no rhonchi or wheezes  Abd: soft, non-tender, no guarding  Ext: no peripheral edema  Incision: clean, dry, intact, no erythema  Chest Tube sites: dressings clean and dry, serosanguinous, no air leak, output 435 cc    * pulled mediastinal tubes and  pleural tubes    Labs:  [facility]    Recent Labs  Lab 08/27/18  0703 08/26/18  0600 08/25/18  0542 08/25/18  0115    141 142 142   POTASSIUM 4.2 4.3 4.1 3.7   CHLORIDE 104 108 113* 113*   GABRIELA 7.9* 7.5* 7.3* 7.5*   CO2 26 24 23 24   BUN 31* 34* 36* 38*   CR 0.74 0.80 0.67 0.72   * 112* 115* 108*     CBC    Recent Labs  Lab 08/27/18  0703 08/26/18  0600 08/25/18  0542 08/24/18  0457   WBC 9.9 11.7* 9.4 10.1   RBC 3.18* 3.28* 2.89*  2.83*   HGB 9.7* 9.9* 8.8* 8.5*   HCT 31.5* 32.7* 28.2* 26.5*   MCV 99 100 98 94   MCH 30.5 30.2 30.4 30.0   MCHC 30.8* 30.3* 31.2* 32.1   RDW 23.3* 23.3* 22.2* 21.1*   * 741* 563* 433     INR    Recent Labs  Lab 08/27/18  0703 08/26/18  0600 08/25/18  0542 08/24/18  0457   INR 1.19* 1.16* 1.25* 1.24*      Hepatic Panel   Lab Results   Component Value Date    AST 86 08/26/2018     Lab Results   Component Value Date     08/26/2018     Lab Results   Component Value Date    ALBUMIN 2.0 08/26/2018     GLUCOSE:     Recent Labs  Lab 08/27/18  1121 08/27/18  0703 08/27/18  0357 08/27/18  0045 08/26/18  2057 08/26/18  1601 08/26/18  1224  08/26/18  0600  08/25/18  0542  08/25/18  0115  08/24/18  0457  08/23/18  0450   GLC  --  102*  --   --   --   --   --   --  112*  --  115*  --  108*  --  91  --  89   *  --  124* 131* 134* 117* 129*  < >  --   < >  --   < >  --   < >  --   < >  --    < > = values in this interval not displayed.      Imaging:  reviewed recent imaging, ECHO 8/27 showing 1 cm mobile density with EF 50-55% and RV reduced function, stable pericardial effusion        ASSESSMENT/PLAN: Patient is a 68 year old male with a history of s/p HUSSEIN to RCA and LAD for STEMI with post infarction basal VSD, who on 8/10/2018 underwent repair of posterior VSD and TVR with bioprosthetic valve.  He was kept open chest due to coagulopathy and RV dysfunction. Chest closed on 8/13/2018, IABP removed 8/14/18 and he was extubated on POD #11.      Neuro:  -Acute post-op pain: IV dilaudid, tylenol and oxycodone prn  - Wean seroquel to off as tolerated      CV:  - ASA 81 mg, atorvastatin 40 mg daily,   - Systolic Blood pressure:   - TPW capped  - ICU course complicated by a-flutter, EP consult with plan to anticoagulate and then attempt to overdrive pace with A lead.  While in the ICU, patient also went into narrow complex SVT with MAPs in 50s. Adenosine 6 mg was administered, resulting in 20 seconds of  asystole and brief CPR with ROSC.    - 8/25 patient converted from NSR to a-fib overnight. During morning therapy, patient had a-fib with RVR (rates 140s) and symptomatic. EKGs appreciable for a-fib and SVT. Metoprolol 2.5 mg IV administered with improved rate control. Discussed case with EP fellow, amio bolus and gtt started.  Patient converted to NSR in afternoon on 8/25. Transition to amiodarone 400 mg PO BID 8/26.   - Cut TPW 8/27, no bradycardia on PO Amio.        Resp:   - Extubated POD 11  - IS, encourage activity  - Mediastinal CT with minimal output, Pleural chest tubes with moderate, serosanguinous output. Plan to keep chest tubes until safe to remove TPW.       FEN/GI:   - Reg Diet with thin liquids, speech following.   - Continuous TF, consider calorie counts once patient's diet advances   - Bowel regimen, + BM  - 8/24 EGD: two gastric ulcers, no active bleeding or high-risk features.       Renal:   - Creatinine 0.67, adequate UOP  - Volume status: hypervolemic, dry weight ~ 170 lbs. Weight 178.   - 8/26 diuresing with Lasix 40 mg IV daily, re-assess 8/28.      ID:   - Completed alban-op abx  - WBC 9.9  and trending up, afebrile, no signs or symptoms of infection     Heme:   - Hgb 9.7, no signs or symptoms of bleeding   - Continue to monitor for signs of GI bleed, transfuse for Hgb < 7  - Hematology following: Plt 972, treating with hydrea       Endo:   - BG 100s, on sliding scale insulin. Will discontinue once diet improves.       PPx:   - PPI      Anticoagulation:   - 8/25 start low intensity heparin gtt   - 8/26 start warfarin, INR 2-3. INR 1.19  - Plavix 75 mg daily      Dispo:   - 6C since 8/24  - Family requesting care conference/meeting with SW to discuss community resources available at discharge    - Therapy recommending d/c to ARU      Discussed with CVTS Fellow   Staff surgeons have been informed of changes through both  verbal and written communication.      Torsten Ruiz PA-C  Cardiothoracic  Surgery  Pager [phone]    2:12 PM   August 27, 2018

## 2018-08-28 ENCOUNTER — APPOINTMENT (OUTPATIENT)
Dept: GENERAL RADIOLOGY | Facility: CLINIC | Age: 68
DRG: 219 | End: 2018-08-28
Attending: PHYSICIAN ASSISTANT
Payer: MEDICARE

## 2018-08-28 ENCOUNTER — APPOINTMENT (OUTPATIENT)
Dept: PHYSICAL THERAPY | Facility: CLINIC | Age: 68
DRG: 219 | End: 2018-08-28
Attending: INTERNAL MEDICINE
Payer: MEDICARE

## 2018-08-28 LAB
ANION GAP SERPL CALCULATED.3IONS-SCNC: 9 MMOL/L (ref 3–14)
ANISOCYTOSIS BLD QL SMEAR: ABNORMAL
BASOPHILS # BLD AUTO: 0.1 10E9/L (ref 0–0.2)
BASOPHILS NFR BLD AUTO: 0.9 %
BUN SERPL-MCNC: 31 MG/DL (ref 7–30)
BURR CELLS BLD QL SMEAR: ABNORMAL
CA-I BLD-MCNC: 4.2 MG/DL (ref 4.4–5.2)
CALCIUM SERPL-MCNC: 7.9 MG/DL (ref 8.5–10.1)
CHLORIDE SERPL-SCNC: 101 MMOL/L (ref 94–109)
CO2 SERPL-SCNC: 26 MMOL/L (ref 20–32)
CREAT SERPL-MCNC: 0.77 MG/DL (ref 0.66–1.25)
DIFFERENTIAL METHOD BLD: ABNORMAL
EOSINOPHIL # BLD AUTO: 0.8 10E9/L (ref 0–0.7)
EOSINOPHIL NFR BLD AUTO: 8.3 %
ERYTHROCYTE [DISTWIDTH] IN BLOOD BY AUTOMATED COUNT: 23 % (ref 10–15)
GFR SERPL CREATININE-BSD FRML MDRD: >90 ML/MIN/1.7M2
GLUCOSE BLDC GLUCOMTR-MCNC: 120 MG/DL (ref 70–99)
GLUCOSE BLDC GLUCOMTR-MCNC: 121 MG/DL (ref 70–99)
GLUCOSE BLDC GLUCOMTR-MCNC: 138 MG/DL (ref 70–99)
GLUCOSE BLDC GLUCOMTR-MCNC: 94 MG/DL (ref 70–99)
GLUCOSE SERPL-MCNC: 98 MG/DL (ref 70–99)
HCT VFR BLD AUTO: 30.9 % (ref 40–53)
HGB BLD-MCNC: 9.4 G/DL (ref 13.3–17.7)
INR PPP: 1.13 (ref 0.86–1.14)
LMWH PPP CHRO-ACNC: 0.21 IU/ML
LYMPHOCYTES # BLD AUTO: 1.5 10E9/L (ref 0.8–5.3)
LYMPHOCYTES NFR BLD AUTO: 15.8 %
MACROCYTES BLD QL SMEAR: PRESENT
MAGNESIUM SERPL-MCNC: 2.3 MG/DL (ref 1.6–2.3)
MCH RBC QN AUTO: 30.3 PG (ref 26.5–33)
MCHC RBC AUTO-ENTMCNC: 30.4 G/DL (ref 31.5–36.5)
MCV RBC AUTO: 100 FL (ref 78–100)
MONOCYTES # BLD AUTO: 0.6 10E9/L (ref 0–1.3)
MONOCYTES NFR BLD AUTO: 6.5 %
NEUTROPHILS # BLD AUTO: 6.5 10E9/L (ref 1.6–8.3)
NEUTROPHILS NFR BLD AUTO: 68.5 %
NRBC # BLD AUTO: 0.8 10*3/UL
NRBC BLD AUTO-RTO: 8 /100
PLATELET # BLD AUTO: 1237 10E9/L (ref 150–450)
PLATELET # BLD EST: ABNORMAL 10*3/UL
POIKILOCYTOSIS BLD QL SMEAR: ABNORMAL
POTASSIUM SERPL-SCNC: 4.2 MMOL/L (ref 3.4–5.3)
RADIOLOGIST FLAGS: ABNORMAL
RBC # BLD AUTO: 3.1 10E12/L (ref 4.4–5.9)
RBC INCLUSIONS BLD: SLIGHT
SODIUM SERPL-SCNC: 135 MMOL/L (ref 133–144)
WBC # BLD AUTO: 9.5 10E9/L (ref 4–11)

## 2018-08-28 PROCEDURE — 25000132 ZZH RX MED GY IP 250 OP 250 PS 637: Mod: GY | Performed by: STUDENT IN AN ORGANIZED HEALTH CARE EDUCATION/TRAINING PROGRAM

## 2018-08-28 PROCEDURE — 25000128 H RX IP 250 OP 636: Performed by: PHYSICIAN ASSISTANT

## 2018-08-28 PROCEDURE — A9270 NON-COVERED ITEM OR SERVICE: HCPCS | Mod: GY | Performed by: THORACIC SURGERY (CARDIOTHORACIC VASCULAR SURGERY)

## 2018-08-28 PROCEDURE — 93005 ELECTROCARDIOGRAM TRACING: CPT

## 2018-08-28 PROCEDURE — 21400006 ZZH R&B CCU INTERMEDIATE UMMC

## 2018-08-28 PROCEDURE — 25000125 ZZHC RX 250: Performed by: PHYSICIAN ASSISTANT

## 2018-08-28 PROCEDURE — A9270 NON-COVERED ITEM OR SERVICE: HCPCS | Mod: GY | Performed by: INTERNAL MEDICINE

## 2018-08-28 PROCEDURE — 25000128 H RX IP 250 OP 636: Performed by: THORACIC SURGERY (CARDIOTHORACIC VASCULAR SURGERY)

## 2018-08-28 PROCEDURE — 85004 AUTOMATED DIFF WBC COUNT: CPT | Performed by: INTERNAL MEDICINE

## 2018-08-28 PROCEDURE — 85610 PROTHROMBIN TIME: CPT | Performed by: INTERNAL MEDICINE

## 2018-08-28 PROCEDURE — A9270 NON-COVERED ITEM OR SERVICE: HCPCS | Mod: GY | Performed by: STUDENT IN AN ORGANIZED HEALTH CARE EDUCATION/TRAINING PROGRAM

## 2018-08-28 PROCEDURE — 25000132 ZZH RX MED GY IP 250 OP 250 PS 637: Mod: GY | Performed by: PHYSICIAN ASSISTANT

## 2018-08-28 PROCEDURE — 25000128 H RX IP 250 OP 636: Performed by: STUDENT IN AN ORGANIZED HEALTH CARE EDUCATION/TRAINING PROGRAM

## 2018-08-28 PROCEDURE — 71045 X-RAY EXAM CHEST 1 VIEW: CPT

## 2018-08-28 PROCEDURE — 25000132 ZZH RX MED GY IP 250 OP 250 PS 637: Mod: GY | Performed by: THORACIC SURGERY (CARDIOTHORACIC VASCULAR SURGERY)

## 2018-08-28 PROCEDURE — 00000146 ZZHCL STATISTIC GLUCOSE BY METER IP

## 2018-08-28 PROCEDURE — 85027 COMPLETE CBC AUTOMATED: CPT | Performed by: INTERNAL MEDICINE

## 2018-08-28 PROCEDURE — 80048 BASIC METABOLIC PNL TOTAL CA: CPT | Performed by: INTERNAL MEDICINE

## 2018-08-28 PROCEDURE — 82330 ASSAY OF CALCIUM: CPT | Performed by: INTERNAL MEDICINE

## 2018-08-28 PROCEDURE — 83735 ASSAY OF MAGNESIUM: CPT | Performed by: INTERNAL MEDICINE

## 2018-08-28 PROCEDURE — 25000132 ZZH RX MED GY IP 250 OP 250 PS 637: Mod: GY | Performed by: NURSE PRACTITIONER

## 2018-08-28 PROCEDURE — 40000802 ZZH SITE CHECK

## 2018-08-28 PROCEDURE — 36415 COLL VENOUS BLD VENIPUNCTURE: CPT | Performed by: INTERNAL MEDICINE

## 2018-08-28 PROCEDURE — 25000128 H RX IP 250 OP 636: Performed by: NURSE PRACTITIONER

## 2018-08-28 PROCEDURE — 40000193 ZZH STATISTIC PT WARD VISIT: Performed by: REHABILITATION PRACTITIONER

## 2018-08-28 PROCEDURE — 25000132 ZZH RX MED GY IP 250 OP 250 PS 637: Mod: GY | Performed by: INTERNAL MEDICINE

## 2018-08-28 PROCEDURE — 85520 HEPARIN ASSAY: CPT | Performed by: INTERNAL MEDICINE

## 2018-08-28 PROCEDURE — A9270 NON-COVERED ITEM OR SERVICE: HCPCS | Mod: GY | Performed by: PHYSICIAN ASSISTANT

## 2018-08-28 PROCEDURE — A9270 NON-COVERED ITEM OR SERVICE: HCPCS | Mod: GY | Performed by: NURSE PRACTITIONER

## 2018-08-28 PROCEDURE — 97116 GAIT TRAINING THERAPY: CPT | Mod: GP | Performed by: REHABILITATION PRACTITIONER

## 2018-08-28 RX ORDER — FUROSEMIDE 10 MG/ML
60 INJECTION INTRAMUSCULAR; INTRAVENOUS ONCE
Status: COMPLETED | OUTPATIENT
Start: 2018-08-28 | End: 2018-08-28

## 2018-08-28 RX ORDER — POTASSIUM CHLORIDE 750 MG/1
20 TABLET, EXTENDED RELEASE ORAL ONCE
Status: COMPLETED | OUTPATIENT
Start: 2018-08-28 | End: 2018-08-28

## 2018-08-28 RX ORDER — WARFARIN SODIUM 4 MG/1
4 TABLET ORAL
Status: COMPLETED | OUTPATIENT
Start: 2018-08-28 | End: 2018-08-28

## 2018-08-28 RX ORDER — ALBUTEROL SULFATE 0.83 MG/ML
2.5 SOLUTION RESPIRATORY (INHALATION) EVERY 4 HOURS PRN
Status: DISCONTINUED | OUTPATIENT
Start: 2018-08-28 | End: 2018-09-11 | Stop reason: HOSPADM

## 2018-08-28 RX ADMIN — Medication 1 PACKET: at 22:01

## 2018-08-28 RX ADMIN — HEPARIN SODIUM 950 UNITS/HR: 10000 INJECTION, SOLUTION INTRAVENOUS at 21:14

## 2018-08-28 RX ADMIN — ACETAMINOPHEN 650 MG: 325 TABLET, FILM COATED ORAL at 10:41

## 2018-08-28 RX ADMIN — Medication 1 PACKET: at 11:00

## 2018-08-28 RX ADMIN — THERA TABS 1 TABLET: TAB at 08:17

## 2018-08-28 RX ADMIN — HYDROXYUREA 1500 MG: 500 CAPSULE ORAL at 21:10

## 2018-08-28 RX ADMIN — LEVOTHYROXINE SODIUM 75 MCG: 75 TABLET ORAL at 08:17

## 2018-08-28 RX ADMIN — AMIODARONE HYDROCHLORIDE 400 MG: 200 TABLET ORAL at 21:10

## 2018-08-28 RX ADMIN — ACETAMINOPHEN 650 MG: 325 TABLET, FILM COATED ORAL at 15:01

## 2018-08-28 RX ADMIN — WARFARIN SODIUM 4 MG: 4 TABLET ORAL at 17:36

## 2018-08-28 RX ADMIN — PANTOPRAZOLE SODIUM 40 MG: 40 TABLET, DELAYED RELEASE ORAL at 17:36

## 2018-08-28 RX ADMIN — FUROSEMIDE 40 MG: 10 INJECTION, SOLUTION INTRAVENOUS at 09:23

## 2018-08-28 RX ADMIN — SODIUM CHLORIDE, PRESERVATIVE FREE 5 ML: 5 INJECTION INTRAVENOUS at 17:30

## 2018-08-28 RX ADMIN — HYDROXYUREA 1500 MG: 500 CAPSULE ORAL at 08:18

## 2018-08-28 RX ADMIN — OXYCODONE HYDROCHLORIDE 5 MG: 5 TABLET ORAL at 15:01

## 2018-08-28 RX ADMIN — QUETIAPINE FUMARATE 25 MG: 25 TABLET ORAL at 21:11

## 2018-08-28 RX ADMIN — FUROSEMIDE 60 MG: 10 INJECTION, SOLUTION INTRAVENOUS at 12:22

## 2018-08-28 RX ADMIN — ATORVASTATIN CALCIUM 40 MG: 40 TABLET, FILM COATED ORAL at 21:11

## 2018-08-28 RX ADMIN — PANTOPRAZOLE SODIUM 40 MG: 40 TABLET, DELAYED RELEASE ORAL at 08:17

## 2018-08-28 RX ADMIN — CLOPIDOGREL 75 MG: 75 TABLET, FILM COATED ORAL at 08:17

## 2018-08-28 RX ADMIN — Medication 0.3 MG: at 09:23

## 2018-08-28 RX ADMIN — Medication 2 G: at 12:22

## 2018-08-28 RX ADMIN — Medication 1 PACKET: at 19:09

## 2018-08-28 RX ADMIN — SENNOSIDES AND DOCUSATE SODIUM 1 TABLET: 8.6; 5 TABLET ORAL at 21:12

## 2018-08-28 RX ADMIN — OXYCODONE HYDROCHLORIDE 5 MG: 5 TABLET ORAL at 10:41

## 2018-08-28 RX ADMIN — AMIODARONE HYDROCHLORIDE 400 MG: 200 TABLET ORAL at 08:18

## 2018-08-28 RX ADMIN — POTASSIUM CHLORIDE 20 MEQ: 750 TABLET, EXTENDED RELEASE ORAL at 12:21

## 2018-08-28 RX ADMIN — SENNOSIDES AND DOCUSATE SODIUM 1 TABLET: 8.6; 5 TABLET ORAL at 08:30

## 2018-08-28 ASSESSMENT — ACTIVITIES OF DAILY LIVING (ADL)
ADLS_ACUITY_SCORE: 16

## 2018-08-28 NOTE — PROGRESS NOTES
"Social Work Services Progress Note    Hospital Day: 23  Collaborated with:  Pt     Data:  Met with pt to follow up on discussion from yesterday. Completed short form health care directive with pt. Pt listed his daughter Christine as primary health care agent and his son Cornelio as an alternate. SW to call notary to get notarized WITHOUT wife present.     Inquired about use of a code word so that pt can say it and staff know that he is feeling overwhelmed/stressed by wife and can try to get her out of the room. Pt thought about this for a bit and said that if pt starts talking about \"Shanita\" it means staff should do what they can to get pt's wife out of the room.     SW working on rehab placement and will follow-up with available options.     Intervention:  Health care directive, discharge planning, pt support/safety planning     Assessment:  Pt is pleasant and receptive to SW, open to SW engagement. He continues to have fear about his wife and is open to safety planning to minimize stress/feeling overwhelmed. Adult children appear supportive and involved.    Plan:    Anticipated Disposition:  ARU - SW working on referrals.    Barriers to d/c plan:  Medical stability, ARU referrals    Follow Up: SW will continue to follow, support and assist with ongoing social service and discharge planning needs.     Felisha Ceja, MSW, [facility]  - Coverage for DALILA Lula Virk   Ph. [phone]  [email or web address]     NO LETTER          "

## 2018-08-28 NOTE — PROGRESS NOTES
Calorie Count  Intake recorded for 8/27/2018   Kcals: 1123  Protein: 42g  # Meals Recorded:  100% strawberry yogurt, 1 piece whole wheat toast,  4oz apple juice, 50% oatmeal, 25% garden vegetable soup)  # Supplements Recorded: 3 - 100% Boost Breeze, Boost Plus, 75% Boost Breeze

## 2018-08-28 NOTE — PLAN OF CARE
Problem: Cardiac Surgery (Adult)  Goal: Signs and Symptoms of Listed Potential Problems Will be Absent, Minimized or Managed (Cardiac Surgery)  Signs and symptoms of listed potential problems will be absent, minimized or managed by discharge/transition of care (reference Cardiac Surgery (Adult) CPG).   Outcome: Improving  D/I: Monitor shows SR 80s. RA sats 93-98%. TF continued of Nutren 1.5 at 50ml/hour. Drank 2 breezes and 1 boost. Ate appx 1/2 of breakfast and lunch. C/O emesis after eating denser foods for dinner. Nausea relieved with zofran. 2 mediastinal CTs removed. 2 pleural CTs split into 2 containers. C/O shortness of breath when standing for prolonged periods or getting in/out of bed. Up in chair for 3 hours this morning and afternoon. Up to chair and to commode with assist of 1-2. Sit to stand with assist of 1. Denies pain. Daughters here to visit. See flowsheets for assessments and additional data.  A: Improving post-op. Improving appetite and activity.  P: Encourage continued PO intake. Cycle TF 6p-8a. Encourage increased activity and participation in cares. Continue current cares and notify providers with questions or concerns.    08/27/18 1930   Cardiac Surgery   Problems Assessed (Cardiac Surgery) all   Problems Present (Cardiac Surgery) postoperative nausea and vomiting;situational response;respiratory compromise

## 2018-08-28 NOTE — PLAN OF CARE
Problem: Patient Care Overview  Goal: Plan of Care/Patient Progress Review  PT - per plan established by the Physical Therapist, according to functional mobility the  discharge recommendation is TCU for cont skilled PT session. Pt up in chair for 2Hr before PT session. Pt willing to work with PT needing min A for all sit to stand to WW for standing support. Pt amb with W/c WW and assist for IV pole on 2L 02 up to 150'x 1. Pt amb at slow but stable pace with increased instability with increased fatigue. Pt needing min A for getting back to bed at end of session.   Discharge Planner PT   Patient plan for discharge: TCU  Current status: see above.   Barriers to return to prior living situation: weakness fatigue.   Recommendations for discharge: TCU   Rationale for recommendations: cont skilled PT to progress functional mobility.        Entered by: Salomón Flores 08/28/2018 1:50 PM

## 2018-08-28 NOTE — PROGRESS NOTES
CVTS Daily Note  8/28/2018  Attending: Jason Franco, *    S:   No overnight events. No bradycardia overnight, infrequent PACs, no arrhythmia.    Pt seen at bedside resting comfortably.    Does complain of R sided CP worse with breathing, otherwise no acute complaints.      Denies F/C/Sweats.  No CP, SOB, or calf pain.    Tolerating diet better and cycling tube feeds.  + BM.      Ambulated well with assistance.    Pain level tolerable. Plan as per Neuro section below.     O:   Vitals:    08/28/18 0422 08/28/18 0522 08/28/18 0645 08/28/18 0725   BP: (!) 85/58 (!) 86/59  103/79   BP Location: Left arm Left arm  Right arm   Pulse:       Resp: 18 18   Temp: 96.7  F (35.9  C)   97.5  F (36.4  C)   TempSrc: Axillary   Oral   SpO2: 93%   92%   Weight:   78.1 kg (172 lb 1.6 oz)    Height:         Vitals:    08/26/18 0641 08/27/18 0538 08/28/18 0645   Weight: 78.5 kg (173 lb 1 oz) 78 kg (172 lb) 78.1 kg (172 lb 1.6 oz)     + 0.4 kg since admit and trending stable      Intake/Output Summary (Last 24 hours) at 08/28/18 0959  Last data filed at 08/28/18 0900   Gross per 24 hour   Intake          4090.76 ml   Output             1810 ml   Net          2280.76 ml       MAPs: 67 - 85   Gen: AAO x 3, pleasant, NAD  CV: RRR, S1S2 normal, no murmurs, rubs, or gallops.   Pulm: CTA, no rhonchi or wheezes  Abd: soft, non-tender, no guarding  Ext: trace peripheral edema, 1 + pitting  Incision: clean, dry, intact, no erythema  Chest Tube sites: dressings clean and dry, serosanguinous, no air leak, output 480 mL from L side and minimal from right.      * removed R pleural tube today without immediate complication     Labs:  BMP    Recent Labs  Lab 08/28/18  0731 08/27/18  0703 08/26/18  0600 08/25/18  0542    138 141 142   POTASSIUM 4.2 4.2 4.3 4.1   CHLORIDE 101 104 108 113*   GABRIELA 7.9* 7.9* 7.5* 7.3*   CO2 26 26 24 23   BUN 31* 31* 34* 36*   CR 0.77 0.74 0.80 0.67   GLC 98 102* 112* 115*     CBC    Recent Labs  Lab  08/28/18  0731 08/27/18  0703 08/26/18  0600 08/25/18  0542   WBC 9.5 9.9 11.7* 9.4   RBC 3.10* 3.18* 3.28* 2.89*   HGB 9.4* 9.7* 9.9* 8.8*   HCT 30.9* 31.5* 32.7* 28.2*    99 100 98   MCH 30.3 30.5 30.2 30.4   MCHC 30.4* 30.8* 30.3* 31.2*   RDW 23.0* 23.3* 23.3* 22.2*   PLT 1237* 972* 741* 563*     INR    Recent Labs  Lab 08/28/18  0731 08/27/18  0703 08/26/18  0600 08/25/18  0542   INR 1.13 1.19* 1.16* 1.25*      Hepatic Panel   Lab Results   Component Value Date    AST 86 08/26/2018     Lab Results   Component Value Date     08/26/2018     Lab Results   Component Value Date    ALBUMIN 2.0 08/26/2018     GLUCOSE:     Recent Labs  Lab 08/28/18  0744 08/28/18  0731 08/28/18  0421 08/27/18  2345 08/27/18  1946 08/27/18  1624 08/27/18  1121 08/27/18  0703  08/26/18  0600  08/25/18  0542  08/25/18  0115  08/24/18  0457   GLC  --  98  --   --   --   --   --  102*  --  112*  --  115*  --  108*  --  91   *  --  121* 126* 123* 110* 110*  --   < >  --   < >  --   < >  --   < >  --    < > = values in this interval not displayed.      Imaging:  reviewed recent imaging, persistent L pleural effusion        ASSESSMENT/PLAN: Patient is a 68 year old male with a history of s/p HUSSEIN to RCA and LAD for STEMI with post infarction basal VSD, who on 8/10/2018 underwent repair of posterior VSD and TVR with bioprosthetic valve.  He was kept open chest due to coagulopathy and RV dysfunction. Chest closed on 8/13/2018, IABP removed 8/14/18 and he was extubated on POD #11.       Neuro:  -Acute post-op pain: IV dilaudid, tylenol and oxycodone prn  - Wean seroquel to off as tolerated       CV:  - ASA 81 mg, atorvastatin 40 mg daily,   - Systolic Blood pressure:   - TPW capped  - ICU course complicated by a-flutter, EP consult with plan to anticoagulate and then attempt to overdrive pace with A lead.  While in the ICU, patient also went into narrow complex SVT with MAPs in 50s. Adenosine 6 mg was administered,  resulting in 20 seconds of asystole and brief CPR with ROSC.    - 8/25 patient converted from NSR to a-fib overnight. During morning therapy, patient had a-fib with RVR (rates 140s) and symptomatic. EKGs appreciable for a-fib and SVT. Metoprolol 2.5 mg IV administered with improved rate control. Discussed case with EP fellow, amio bolus and gtt started.  Patient converted to NSR afternoon on 8/25. Amiodarone 400 mg PO BID 8/26.   - Cut TPW 8/27, no bradycardia on PO Amio but prolonged QTc.       Resp:   - Extubated POD 11  - IS, encourage activity  - Mediastinal CT with minimal output, Pleural chest tubes with moderate, serosanguinous output. Plan to keep chest tubes 8/28 and recheck 8/29.       FEN/GI:   - Reg Diet with thin liquids, speech following.   - Continuous TF, consider calorie counts once patient's diet advances   - Bowel regimen, + BM  - 8/24 EGD: two gastric ulcers, no active bleeding or high-risk features.        Renal:   - Creatinine WNL, adequate UOP  - Volume status: hypervolemic, dry weight ~ 170 lbs. Weight 178.   - 8/26 diuresing with Lasix 60 mg IV x 1, re-assess 8/29.      ID:   - Completed alban-op abx  - WBC 9.9  and trending up, afebrile, no signs or symptoms of infection      Heme:   - Hgb 9.7, no signs or symptoms of bleeding   - Continue to monitor for signs of GI bleed, transfuse for Hgb < 7  - Hematology following: Plt 1237, increasing hydrea dosing       Endo:   - BG 100s, on sliding scale insulin. Will discontinue once diet improves.       PPx:   - PPI      Anticoagulation:   - 8/25 start low intensity heparin gtt   - 8/26 start warfarin, INR 2-3. INR 1.13  - Plavix 75 mg daily      Dispo:   - 6C since 8/24  - Family requesting care conference/meeting with SW to discuss community resources available at discharge    - Therapy recommending d/c to ARU      Discussed with CVTS Fellow   Staff surgeons have been informed of changes through both  verbal and written communication.      Torsten  Sara GIBSON  Cardiothoracic Surgery  Pager [phone]    9:59 AM   August 28, 2018

## 2018-08-28 NOTE — PLAN OF CARE
Problem: Patient Care Overview  Goal: Plan of Care/Patient Progress Review  Outcome: Improving  D: Pt s/p VSD repair and TVR 8/10 following recent STEMI.  I: Monitored vitals and assessed pt status. Heparin gtt. Cycled TF 6p-8a. Assessed BG q4h.  A: A0x4. VSS on RA. Soft BPs at 0400 vitals, pt asymptomatic; rechecked per Dr. Villafana's direction and will continue monitoring, no other new orders at this time. SR on tele, HR 80s-90s. Denies pain, SOB, dizziness. Pleural CT x2 to sxn, no airleaks; no output from R pleural and 50cc output from L pleural over 12 hrs. Dressing c/d/i. No SSI required for q4h BG checks. Cycled TF well tolerated at 60mL/hr per order. Up Ax1-2. Pt slept between cares, making needs known.  P: Continue to monitor and report changes/concerns to CVTS.

## 2018-08-28 NOTE — PLAN OF CARE
Problem: Patient Care Overview  Goal: Plan of Care/Patient Progress Review  SLP: Attempted to see pt for dysphagia tx, however pt declined participation d/t increased pain; RN updated. RN reports good tolerance of current diet level. Will follow up as appropriate.

## 2018-08-28 NOTE — PLAN OF CARE
Problem: Cardiac Surgery (Adult)  Goal: Signs and Symptoms of Listed Potential Problems Will be Absent, Minimized or Managed (Cardiac Surgery)  Signs and symptoms of listed potential problems will be absent, minimized or managed by discharge/transition of care (reference Cardiac Surgery (Adult) CPG).   Outcome: No Change  D/I: Monitor shows SR/ST 90-100s. Increases with activity. ON heparin drip at 950 units/hour per protocol. 2 CTs to suction. Right CT output 200ml/mn, L CT 10ml/mn. O2 on RA 94 at rest, decreased to 88 when up to commode. 94% at rest on 2L. C/O LOWRY when up in halls with therapy on O2. C/O right sided CP this AM. Providers aware. Relieved with tylenol and oxycodone. Up to chair for breakfast and lunch with good intake. Wife present for much of shift. See flowsheets for assessments and additional data.  A: Decreased respiratory function, requiring increased oxygen. Improved PO intake. Continued CT output from right side.   P: Encourage continued PO protein intake. Medicate for pain as needed and encourage IS use. Continue current cares and notify providers with questions or concerns.          08/28/18 1513   Cardiac Surgery   Problems Assessed (Cardiac Surgery) all   Problems Present (Cardiac Surgery) pain;respiratory compromise;situational response

## 2018-08-29 ENCOUNTER — APPOINTMENT (OUTPATIENT)
Dept: GENERAL RADIOLOGY | Facility: CLINIC | Age: 68
DRG: 219 | End: 2018-08-29
Attending: NURSE PRACTITIONER
Payer: MEDICARE

## 2018-08-29 ENCOUNTER — APPOINTMENT (OUTPATIENT)
Dept: GENERAL RADIOLOGY | Facility: CLINIC | Age: 68
DRG: 219 | End: 2018-08-29
Attending: PHYSICIAN ASSISTANT
Payer: MEDICARE

## 2018-08-29 ENCOUNTER — APPOINTMENT (OUTPATIENT)
Dept: CT IMAGING | Facility: CLINIC | Age: 68
DRG: 219 | End: 2018-08-29
Attending: NURSE PRACTITIONER
Payer: MEDICARE

## 2018-08-29 ENCOUNTER — APPOINTMENT (OUTPATIENT)
Dept: SPEECH THERAPY | Facility: CLINIC | Age: 68
DRG: 219 | End: 2018-08-29
Attending: INTERNAL MEDICINE
Payer: MEDICARE

## 2018-08-29 LAB
ANION GAP SERPL CALCULATED.3IONS-SCNC: 8 MMOL/L (ref 3–14)
BUN SERPL-MCNC: 29 MG/DL (ref 7–30)
CA-I BLD-MCNC: 4.1 MG/DL (ref 4.4–5.2)
CALCIUM SERPL-MCNC: 7.8 MG/DL (ref 8.5–10.1)
CHLORIDE SERPL-SCNC: 101 MMOL/L (ref 94–109)
CO2 SERPL-SCNC: 24 MMOL/L (ref 20–32)
CREAT SERPL-MCNC: 0.7 MG/DL (ref 0.66–1.25)
ERYTHROCYTE [DISTWIDTH] IN BLOOD BY AUTOMATED COUNT: 23.1 % (ref 10–15)
GFR SERPL CREATININE-BSD FRML MDRD: >90 ML/MIN/1.7M2
GLUCOSE BLDC GLUCOMTR-MCNC: 107 MG/DL (ref 70–99)
GLUCOSE BLDC GLUCOMTR-MCNC: 116 MG/DL (ref 70–99)
GLUCOSE BLDC GLUCOMTR-MCNC: 121 MG/DL (ref 70–99)
GLUCOSE BLDC GLUCOMTR-MCNC: 122 MG/DL (ref 70–99)
GLUCOSE BLDC GLUCOMTR-MCNC: 126 MG/DL (ref 70–99)
GLUCOSE BLDC GLUCOMTR-MCNC: 129 MG/DL (ref 70–99)
GLUCOSE SERPL-MCNC: 109 MG/DL (ref 70–99)
HCT VFR BLD AUTO: 29.8 % (ref 40–53)
HGB BLD-MCNC: 9.2 G/DL (ref 13.3–17.7)
INR PPP: 1.23 (ref 0.86–1.14)
INTERPRETATION ECG - MUSE: NORMAL
LMWH PPP CHRO-ACNC: 0.17 IU/ML
MAGNESIUM SERPL-MCNC: 2.2 MG/DL (ref 1.6–2.3)
MCH RBC QN AUTO: 30.9 PG (ref 26.5–33)
MCHC RBC AUTO-ENTMCNC: 30.9 G/DL (ref 31.5–36.5)
MCV RBC AUTO: 100 FL (ref 78–100)
PLATELET # BLD AUTO: 1386 10E9/L (ref 150–450)
POTASSIUM SERPL-SCNC: 4.7 MMOL/L (ref 3.4–5.3)
RBC # BLD AUTO: 2.98 10E12/L (ref 4.4–5.9)
SODIUM SERPL-SCNC: 133 MMOL/L (ref 133–144)
WBC # BLD AUTO: 9.2 10E9/L (ref 4–11)

## 2018-08-29 PROCEDURE — 99232 SBSQ HOSP IP/OBS MODERATE 35: CPT | Mod: GC | Performed by: INTERNAL MEDICINE

## 2018-08-29 PROCEDURE — 25000132 ZZH RX MED GY IP 250 OP 250 PS 637: Mod: GY | Performed by: PHYSICIAN ASSISTANT

## 2018-08-29 PROCEDURE — 80048 BASIC METABOLIC PNL TOTAL CA: CPT | Performed by: INTERNAL MEDICINE

## 2018-08-29 PROCEDURE — 85520 HEPARIN ASSAY: CPT | Performed by: INTERNAL MEDICINE

## 2018-08-29 PROCEDURE — 92526 ORAL FUNCTION THERAPY: CPT | Mod: GN

## 2018-08-29 PROCEDURE — 85027 COMPLETE CBC AUTOMATED: CPT | Performed by: INTERNAL MEDICINE

## 2018-08-29 PROCEDURE — A9270 NON-COVERED ITEM OR SERVICE: HCPCS | Mod: GY | Performed by: STUDENT IN AN ORGANIZED HEALTH CARE EDUCATION/TRAINING PROGRAM

## 2018-08-29 PROCEDURE — 00000146 ZZHCL STATISTIC GLUCOSE BY METER IP

## 2018-08-29 PROCEDURE — 25000132 ZZH RX MED GY IP 250 OP 250 PS 637: Mod: GY | Performed by: STUDENT IN AN ORGANIZED HEALTH CARE EDUCATION/TRAINING PROGRAM

## 2018-08-29 PROCEDURE — 27210429 ZZH NUTRITION PRODUCT INTERMEDIATE LITER

## 2018-08-29 PROCEDURE — 0W9B3ZZ DRAINAGE OF LEFT PLEURAL CAVITY, PERCUTANEOUS APPROACH: ICD-10-PCS | Performed by: INTERNAL MEDICINE

## 2018-08-29 PROCEDURE — 21400006 ZZH R&B CCU INTERMEDIATE UMMC

## 2018-08-29 PROCEDURE — 82330 ASSAY OF CALCIUM: CPT | Performed by: INTERNAL MEDICINE

## 2018-08-29 PROCEDURE — 25000132 ZZH RX MED GY IP 250 OP 250 PS 637: Mod: GY | Performed by: NURSE PRACTITIONER

## 2018-08-29 PROCEDURE — A9270 NON-COVERED ITEM OR SERVICE: HCPCS | Mod: GY | Performed by: THORACIC SURGERY (CARDIOTHORACIC VASCULAR SURGERY)

## 2018-08-29 PROCEDURE — 85610 PROTHROMBIN TIME: CPT | Performed by: INTERNAL MEDICINE

## 2018-08-29 PROCEDURE — 25000132 ZZH RX MED GY IP 250 OP 250 PS 637: Mod: GY | Performed by: INTERNAL MEDICINE

## 2018-08-29 PROCEDURE — 83735 ASSAY OF MAGNESIUM: CPT | Performed by: INTERNAL MEDICINE

## 2018-08-29 PROCEDURE — 25000128 H RX IP 250 OP 636: Performed by: STUDENT IN AN ORGANIZED HEALTH CARE EDUCATION/TRAINING PROGRAM

## 2018-08-29 PROCEDURE — A9270 NON-COVERED ITEM OR SERVICE: HCPCS | Mod: GY | Performed by: PHYSICIAN ASSISTANT

## 2018-08-29 PROCEDURE — 25000132 ZZH RX MED GY IP 250 OP 250 PS 637: Mod: GY | Performed by: THORACIC SURGERY (CARDIOTHORACIC VASCULAR SURGERY)

## 2018-08-29 PROCEDURE — 25000128 H RX IP 250 OP 636: Performed by: NURSE PRACTITIONER

## 2018-08-29 PROCEDURE — 71046 X-RAY EXAM CHEST 2 VIEWS: CPT

## 2018-08-29 PROCEDURE — A9270 NON-COVERED ITEM OR SERVICE: HCPCS | Mod: GY | Performed by: NURSE PRACTITIONER

## 2018-08-29 PROCEDURE — 71045 X-RAY EXAM CHEST 1 VIEW: CPT

## 2018-08-29 PROCEDURE — 71250 CT THORAX DX C-: CPT

## 2018-08-29 PROCEDURE — A9270 NON-COVERED ITEM OR SERVICE: HCPCS | Mod: GY | Performed by: INTERNAL MEDICINE

## 2018-08-29 PROCEDURE — 40000225 ZZH STATISTIC SLP WARD VISIT

## 2018-08-29 RX ORDER — FUROSEMIDE 10 MG/ML
60 INJECTION INTRAMUSCULAR; INTRAVENOUS ONCE
Status: COMPLETED | OUTPATIENT
Start: 2018-08-29 | End: 2018-08-29

## 2018-08-29 RX ORDER — WARFARIN SODIUM 4 MG/1
4 TABLET ORAL
Status: COMPLETED | OUTPATIENT
Start: 2018-08-29 | End: 2018-08-29

## 2018-08-29 RX ORDER — FUROSEMIDE 10 MG/ML
40 INJECTION INTRAMUSCULAR; INTRAVENOUS 2 TIMES DAILY
Status: COMPLETED | OUTPATIENT
Start: 2018-08-29 | End: 2018-08-30

## 2018-08-29 RX ORDER — DIPHENHYDRAMINE HYDROCHLORIDE 50 MG/ML
50 INJECTION INTRAMUSCULAR; INTRAVENOUS
Status: DISCONTINUED | OUTPATIENT
Start: 2018-08-29 | End: 2018-08-30 | Stop reason: CLARIF

## 2018-08-29 RX ORDER — POTASSIUM CHLORIDE 750 MG/1
20 TABLET, EXTENDED RELEASE ORAL ONCE
Status: COMPLETED | OUTPATIENT
Start: 2018-08-29 | End: 2018-08-29

## 2018-08-29 RX ORDER — METHYLPREDNISOLONE SODIUM SUCCINATE 125 MG/2ML
125 INJECTION, POWDER, LYOPHILIZED, FOR SOLUTION INTRAMUSCULAR; INTRAVENOUS
Status: DISCONTINUED | OUTPATIENT
Start: 2018-08-29 | End: 2018-08-30 | Stop reason: CLARIF

## 2018-08-29 RX ADMIN — AMIODARONE HYDROCHLORIDE 400 MG: 200 TABLET ORAL at 20:57

## 2018-08-29 RX ADMIN — HYDROXYUREA 1500 MG: 500 CAPSULE ORAL at 20:58

## 2018-08-29 RX ADMIN — HYDROXYUREA 1500 MG: 500 CAPSULE ORAL at 08:15

## 2018-08-29 RX ADMIN — POTASSIUM CHLORIDE 20 MEQ: 750 TABLET, EXTENDED RELEASE ORAL at 15:43

## 2018-08-29 RX ADMIN — LEVOTHYROXINE SODIUM 75 MCG: 75 TABLET ORAL at 08:10

## 2018-08-29 RX ADMIN — OXYCODONE HYDROCHLORIDE 5 MG: 5 TABLET ORAL at 03:50

## 2018-08-29 RX ADMIN — PANTOPRAZOLE SODIUM 40 MG: 40 TABLET, DELAYED RELEASE ORAL at 08:10

## 2018-08-29 RX ADMIN — CLOPIDOGREL 75 MG: 75 TABLET, FILM COATED ORAL at 08:10

## 2018-08-29 RX ADMIN — IRON SUCROSE 200 MG: 20 INJECTION, SOLUTION INTRAVENOUS at 21:30

## 2018-08-29 RX ADMIN — POLYETHYLENE GLYCOL 3350 17 G: 17 POWDER, FOR SOLUTION ORAL at 08:13

## 2018-08-29 RX ADMIN — SODIUM CHLORIDE, PRESERVATIVE FREE 5 ML: 5 INJECTION INTRAVENOUS at 15:50

## 2018-08-29 RX ADMIN — QUETIAPINE FUMARATE 25 MG: 25 TABLET ORAL at 21:59

## 2018-08-29 RX ADMIN — THERA TABS 1 TABLET: TAB at 08:27

## 2018-08-29 RX ADMIN — Medication 1 PACKET: at 08:13

## 2018-08-29 RX ADMIN — POTASSIUM CHLORIDE 20 MEQ: 750 TABLET, EXTENDED RELEASE ORAL at 20:57

## 2018-08-29 RX ADMIN — HEPARIN SODIUM 950 UNITS/HR: 10000 INJECTION, SOLUTION INTRAVENOUS at 21:58

## 2018-08-29 RX ADMIN — ATORVASTATIN CALCIUM 40 MG: 40 TABLET, FILM COATED ORAL at 20:57

## 2018-08-29 RX ADMIN — PANTOPRAZOLE SODIUM 40 MG: 40 TABLET, DELAYED RELEASE ORAL at 15:43

## 2018-08-29 RX ADMIN — AMIODARONE HYDROCHLORIDE 400 MG: 200 TABLET ORAL at 08:10

## 2018-08-29 RX ADMIN — SENNOSIDES AND DOCUSATE SODIUM 2 TABLET: 8.6; 5 TABLET ORAL at 08:10

## 2018-08-29 RX ADMIN — FUROSEMIDE 60 MG: 10 INJECTION, SOLUTION INTRAVENOUS at 15:44

## 2018-08-29 RX ADMIN — WARFARIN SODIUM 4 MG: 4 TABLET ORAL at 19:02

## 2018-08-29 RX ADMIN — FUROSEMIDE 40 MG: 10 INJECTION, SOLUTION INTRAVENOUS at 20:57

## 2018-08-29 ASSESSMENT — ACTIVITIES OF DAILY LIVING (ADL)
ADLS_ACUITY_SCORE: 16

## 2018-08-29 ASSESSMENT — PAIN DESCRIPTION - DESCRIPTORS
DESCRIPTORS: SORE
DESCRIPTORS: CRAMPING
DESCRIPTORS: CRAMPING

## 2018-08-29 NOTE — PROGRESS NOTES
HEMATOLOGY FOLLOW UP NOTE    Castillo Soler is a 68 year old man with JAK2+ polycythemia vera, recently switched by primary heme/onc provider from Hydrea to Anagrelide with very poor tolerance. Reason for switching apparently was that his anemia was felt to be due to Hydrea.  He is admitted s/p STEMI with ischemia-related VSD requiring surgical intervention. He is high-risk for ongoing thrombocytosis and we restarted hydrea on 8/7/18. Platelet count has continued to increase, hydrea dose increased to 1000 mg bid on 8/10. Work-up for platelet dysfunction has been negative.    His PLT counts came down initially after increased dose of hydrea of 1000 mg bid, but it started to up trending again since last week. We increased hydrea dose to 1500mg bid on 8/27, yet his PLT  Counts continue to climb up, PLT is 1386 today.     Patient was seen and examed. He reports feeling better, started to get up and moving with PT/OT, denies any signs of bleeding, no hematemesis or melena, no petechia. No fever/chill, no headache, no chest pain. No sob. Still feels weak.     Physical Examination:   Temp: 97.8  F (36.6  C) Temp src: Oral BP: 108/72 Pulse: 96 Heart Rate: 93 Resp: 22 SpO2: 94 % O2 Device: Nasal cannula Oxygen Delivery: 2 LPM  Constitutional: Awake and alert, not in acute distress.  Eyes: No scleral icterus. Eyes exhibit no discharge.  ENT/Mouth: Oral mucosa pink and moist  Cardiovascular: Normal rate, regular rhythm, S1, S2. No murmur or rub. No leg edema  Respiratory: Mild respiratory distress. No wheezes. Crackle in bibasilar area, right side is worse  Gastrointestinal: soft, no distention, no tenderness, active BS  Neurological: AAOX3, grossly non-focal  Psychiatric: Mentation and affect appear normal      Results for CASTILLO SOLER (MRN [id number]) as of 8/29/2018 12:38   Ref. Range 8/26/2018 06:00 8/27/2018 07:03 8/28/2018 07:31 8/29/2018 08:29   Platelet Count Latest Ref Range: 150 - 450 10e9/L 741 (H) 972 (H)  1237 () 1386 ()         Recommendation:  - continue with hydrea to 1500mg bid  - planning for plateletpheresis if PLT keeps up trending (if PLT>1500K), notified blood bank  - patient has iron deficiency anemia which may stimulate his bone marrow, please start him IV iron replacement (Venofer 200mg IV daily x 2 -ordered for today)  - consider JAK2 inhibitor if he failed hydrea tx      We will continue to follow. Please page with any questions/concerns.      Plan was discussed with attending physician Dr. Oconnell.        Balaji Rodriguez MD/MPH  Heme/Onc Fellow, PGY-4

## 2018-08-29 NOTE — PROGRESS NOTES
CLINICAL NUTRITION SERVICES     Nutrition Prescription    Recommendations already ordered by Registered Dietitian (RD):  Modified oral supplements  Discontinued TFs, free water flushes, and ProSource TF modular    Future/Additional Recommendations:  For all recommendations, see prior nutrition notes.     Diet: Regular diet order and thin liquids since 8/26. Has oral supplement orders.    Kcal counts:   8/26     501 kcals and 20 g protein (meal/s and no supplement/s recorded)   8/27   1123 kcals and 42 g protein (meal/s and oral supplement/s including Boost Breeze and Boost Plus   8/28     682 kcals and 23 g protein (meal/s and 100% of one Boost Breeze supplement/s recorded) - Per RN from 8/28 pm, pt consumed 100% of supper except for bread (which would provide another 414 kcals and 4 g protein in addition to documented kcal counts in EMR). RN noted that pt consumes his oral supplements (pt may have consumed more than one oral supplement 8/28). [employer] indicates food/beverages sent 8/28 totaled 2468 kcals and 109 g protein. Per flowsheets, pt consumed 100% of his first meal with a fair appetite, 50% of second meal with a fair appetite, and 75% of his last meal with a good appetite.    * Pt consumed a three-day average of 740 kcals and 30 g protein daily (per adjusted 8/28 kcal counts). Total average nutrition intake not meeting 1764 - 2205 kcals/day (% MREE, 23-29 kcals/kg) and 116 - 154 grams protein/day (1.5 - 2 grams of pro/kg). However, oral intake is improving.       TF regimen: Nutren 1.5 at 60 mL/hr x 14 hours (6p-8a) provides 840 mL TF, 1260+ kcals (16+ kcals/kg), 57+ g protein (0.7+ g protein/kg), 638 mL H2O, 148 g CHO, and no fiber daily. Ordered to receive ProSource TF modular, 1 pkt TID. Each packet of ProSource TF modular provides 40 kcals and 11 g protein.     INTERVENTIONS:  Implementation:  Collaboration with other providers, Enteral Nutrition, Feeding tube flush: Discussed pt with team and  RN. Team ok with discontinuation of TFs. Discontinued TFs, free water flushes, and ProSource TF modular.   Medical food supplement therapy, Nutrition education: Provided oral supplement menu. Modified oral supplements as per pt preference to vanilla Boost Plus at 14:00 and HS, berry Boost Breeze at 10:00. Encouraged oral intake and intake of oral supplements. Encouraged high protein foods/beverages. Gave ideas of ways to soften foods and self-select well-tolerated foods (pt better tolerating soups and soft items).      Follow up/Monitoring:  Will continue to follow pt.    Betty Jennings, MS, RD, LD, [facility]   6C Pgr: [phone]

## 2018-08-29 NOTE — PLAN OF CARE
Problem: Patient Care Overview  Goal: Plan of Care/Patient Progress Review  OT/6C: Pt requesting check back due to recently returning from Xray and significant fatigue, will check back as able.

## 2018-08-29 NOTE — PLAN OF CARE
Problem: Patient Care Overview  Goal: Plan of Care/Patient Progress Review  Discharge Planner SLP   Patient plan for discharge: rehab  Current status: Recommend continue regular diet with thin liquids as tolerated. Pt to sit upright for all PO intake, take small bites/sips and alternate consistencies. Pt continues to demonstrate hoarse vocal quality; training of VF adduction exercises initiated.   Barriers to return to prior living situation: none from ST standpoint  Recommendations for discharge: Will defer to PT/OT  Rationale for recommendations: Pt may benefit from ST intervention at next level of care for VF adduction/voice, pending progress. Could be completed in inpatient rehab vs. OP tx as appropriate.       Entered by: Gale Yu 08/29/2018 12:21 PM

## 2018-08-29 NOTE — PLAN OF CARE
Problem: Cardiac Surgery (Adult)  Goal: Signs and Symptoms of Listed Potential Problems Will be Absent, Minimized or Managed (Cardiac Surgery)  Signs and symptoms of listed potential problems will be absent, minimized or managed by discharge/transition of care (reference Cardiac Surgery (Adult) CPG).   Outcome: No Change    Patient a/o x 4. VSSA. 2L O2, Left CT removed. US Left side. Catscan of chest. Rt chest tube with good amount of serous drainage. Assist of 1 for transfers. Gave 60 lasix with minimal urine output. Bladder scanned for 560 and patient voided immediately after for 300.  Dyspnea with exertion. Pleasant and cooperative. Able to make needs known. New POA documentation sent to desk for scanning to patients file.   P: Per oncology: administer IV Iron tonight, continue with Hydrea, Monitor platelet count if elevated then possible platelet pheresis. Possible new targeted medication if Hydrea doesn't work. Notify surgery with any concerns.

## 2018-08-29 NOTE — PLAN OF CARE
Problem: Cardiac Output Decreased (Adult)  Goal: Identify Related Risk Factors and Signs and Symptoms  Related risk factors and signs and symptoms are identified upon initiation of Human Response Clinical Practice Guideline (CPG).   Shift: 4024-8046:  Neuro: A&Ox4, intact.   Cardiac: Afebrile, VSS. NSR.    Respiratory: sating high 90s on 2L NC, LS CTA with diminished bases.   GI/: Voiding spontaneously using beside urinal, 1 episode of incontinence. No BM this shift.   Diet/appetite: Regular diet with cycle TF infusing at 60ml/hr, tolerating. Denies nausea.   Activity: Up with A2.   Pain: C/o of R chest pain near the insertion site of CT, PRN Oxycodone   Skin: No new deficits noted.  Lines: DL PICC.   Drains: 2 CT y-sided to LIS at -20. Small output.     Cyto precautions. Heparing gtt infusing at 950units/hr. Calls appropriately and makes needs known. Will continue to monitor and follow POC.

## 2018-08-29 NOTE — PROGRESS NOTES
CVTS Daily Note  8/29/2018  Attending: Jason Franco, *    S:   No overnight events. No bradycardia overnight, infrequent PACs, no arrhythmia.    Pt seen at bedside resting comfortably.    Does complain of R sided CP worse when laying in bed, better when sitting upright, otherwise no acute complaints.      Denies F/C/Sweats.  No CP, SOB, or calf pain.    Tolerating diet better and cycling tube feeds.  + BM.      Ambulated well with assistance.    Pain level tolerable. Plan as per Neuro section below.     O:   Vitals:    08/28/18 2032 08/29/18 0045 08/29/18 0355 08/29/18 0726   BP: 97/60 96/67 100/70 93/70   BP Location: Left arm Left arm Left arm Left arm   Pulse:       Resp: 24 24 24 24   Temp: 97.5  F (36.4  C) 98.3  F (36.8  C) 97.3  F (36.3  C) 97.6  F (36.4  C)   TempSrc: Oral Oral Oral Oral   SpO2: 97% 93% 99% 94%   Weight:   79.2 kg (174 lb 9.6 oz)    Height:         Vitals:    08/27/18 0538 08/28/18 0645 08/29/18 0355   Weight: 78 kg (172 lb) 78.1 kg (172 lb 1.6 oz) 79.2 kg (174 lb 9.6 oz)     + 1.9 kg since admit and trending up      Intake/Output Summary (Last 24 hours) at 08/28/18 0959  Last data filed at 08/28/18 0900   Gross per 24 hour   Intake          4090.76 ml   Output             1810 ml   Net          2280.76 ml       MAPs: 67 - 85   Gen: AAO x 3, pleasant, NAD  CV: RRR, S1S2 normal, no murmurs, rubs, or gallops.   Pulm: CTA, no rhonchi or wheezes  Abd: soft, non-tender, no guarding  Ext: trace peripheral edema, 1 + pitting  Incision: clean, dry, intact, no erythema  Chest Tube sites: dressings clean and dry, serosanguinous, no air leak, output R side: 220 mL, L side 10 mL.        Labs:  BMP    Recent Labs  Lab 08/29/18  0829 08/28/18  0731 08/27/18  0703 08/26/18  0600    135 138 141   POTASSIUM 4.7 4.2 4.2 4.3   CHLORIDE 101 101 104 108   GABRIELA 7.8* 7.9* 7.9* 7.5*   CO2 24 26 26 24   BUN 29 31* 31* 34*   CR 0.70 0.77 0.74 0.80   * 98 102* 112*     CBC    Recent Labs  Lab  08/29/18  0829 08/28/18  0731 08/27/18  0703 08/26/18  0600   WBC 9.2 9.5 9.9 11.7*   RBC 2.98* 3.10* 3.18* 3.28*   HGB 9.2* 9.4* 9.7* 9.9*   HCT 29.8* 30.9* 31.5* 32.7*    100 99 100   MCH 30.9 30.3 30.5 30.2   MCHC 30.9* 30.4* 30.8* 30.3*   RDW 23.1* 23.0* 23.3* 23.3*   PLT 1386* 1237* 972* 741*     INR    Recent Labs  Lab 08/29/18  0829 08/28/18  0731 08/27/18  0703 08/26/18  0600   INR 1.23* 1.13 1.19* 1.16*      Hepatic Panel   Lab Results   Component Value Date    AST 86 08/26/2018     Lab Results   Component Value Date     08/26/2018     Lab Results   Component Value Date    ALBUMIN 2.0 08/26/2018     GLUCOSE:     Recent Labs  Lab 08/29/18  0829 08/29/18  0729 08/29/18  0354 08/29/18  0050 08/28/18  2205 08/28/18  1740 08/28/18  0744 08/28/18  0731  08/27/18  0703  08/26/18  0600  08/25/18  0542  08/25/18  0115   *  --   --   --   --   --   --  98  --  102*  --  112*  --  115*  --  108*   BGM  --  129* 121* 126* 138* 94 120*  --   < >  --   < >  --   < >  --   < >  --    < > = values in this interval not displayed.      Imaging:  reviewed recent imaging, persistent L pleural effusion        ASSESSMENT/PLAN: Patient is a 68 year old male with a history of s/p HUSSEIN to RCA and LAD for STEMI with post infarction basal VSD, who on 8/10/2018 underwent repair of posterior VSD and TVR with bioprosthetic valve.  He was kept open chest due to coagulopathy and RV dysfunction. Chest closed on 8/13/2018, IABP removed 8/14/18 and he was extubated on POD #11.       Neuro:  -Acute post-op pain: IV dilaudid, tylenol and oxycodone prn  - Wean seroquel to off as tolerated       CV:  - ASA 81 mg, atorvastatin 40 mg daily,   - Systolic Blood pressure:   - TPW capped  - ICU course complicated by a-flutter, EP consult with plan to anticoagulate and then attempt to overdrive pace with A lead.  While in the ICU, patient also went into narrow complex SVT with MAPs in 50s. Adenosine 6 mg was administered,  resulting in 20 seconds of asystole and brief CPR with ROSC.    -  patient converted from NSR to a-fib overnight. During morning therapy, patient had a-fib with RVR (rates 140s) and symptomatic. EKGs appreciable for a-fib and SVT. Metoprolol 2.5 mg IV administered with improved rate control. Discussed case with EP fellow, amio bolus and gtt started.  Patient converted to NSR afternoon on . Amiodarone 400 mg PO BID .   - Cut TPW , no bradycardia on PO Amio but prolonged QTc.       Resp:   - Extubated POD 11  - IS, encourage activity  - Mediastinal CT with minimal output, Pleural chest tubes with moderate, serosanguinous output. : Right Output 240 mL, Left output 10 mL   -  Pulled Left pleural tube, f/u CXR.  POC U/S by Medicine for Left pleural effusion today-findings appreciable for loculated fluid. IR consult with CT chest w/o contrast. Possible chest tube placement with IR on .     FEN/GI:   - Reg Diet with thin liquids, speech following.   - Continuous TF, consider calorie counts once patient's diet advances   - Bowel regimen, + BM  -  EGD: two gastric ulcers, no active bleeding or high-risk features.        Renal:   - Creatinine WNL, adequate UOP  - Volume status: hypervolemic, dry weight ~ 170 lbs.  -  diuresing with Lasix 60 mg IV x 1  -  Weight 174lbs, diurese with Lasix 60 mg IV x 1, followed by 40 mg IV x 1 in PM + KCL      ID:   - Completed alban-op abx  - WBC 9.2  and trending down, afebrile, no signs or symptoms of infection      Heme:   - Hgb 9.7, no signs or symptoms of bleeding   - Continue to monitor for signs of GI bleed, transfuse for Hgb < 7  - Hematology followin/29 Plt 1386, Hydrea 1500 mg BID, Continue to trend plts, appreciate heme recs, will continue with current POC.    Endo:   - BG 100s, on sliding scale insulin. Will discontinue once diet improves.       PPx:   - PPI      Anticoagulation:   -  start low intensity heparin gtt   -  start  warfarin, INR 2-3. INR 1.23  - Plavix 75 mg daily      Dispo:   - 6C since 8/24  - Therapy recommending d/c to TCU      Discussed with CVTS Fellow   Staff surgeons have been informed of changes through both  verbal and written communication.      STEPHANIE Conte CNP   Cardiothoracic Surgery  8/29/2018 at 1:33 PM

## 2018-08-29 NOTE — PLAN OF CARE
Problem: Cardiac Surgery (Adult)  Goal: Signs and Symptoms of Listed Potential Problems Will be Absent, Minimized or Managed (Cardiac Surgery)  Signs and symptoms of listed potential problems will be absent, minimized or managed by discharge/transition of care (reference Cardiac Surgery (Adult) CPG).   Outcome: No Change    Patient a/o x 4. VSSA. 2L O2, Complained of pain once while shifting in bed but did not require intervention. No CT output during this shift. Taking cytotoxic medication. Be careful in handling urine/feces. Started TF @ 1900. 12 hour TF cycle. Good oral intake. Ate 75% of meal. On calorie counts. See social work notes regarding patient and spouse relationship. Patient is changing POA from spouse to daughter. Forms filled out and needs notary/witness. SW aware. Daughter will arrive in the evening. Patient pleasant and cooperative with cares. Continue to monitor. Notify MD with any concerns.

## 2018-08-29 NOTE — PROGRESS NOTES
Care Coordinator Progress Note    Admission Date/Time:  8/6/2018  Attending MD:  Jason Franco  Data  Chart reviewed, discussed with interdisciplinary team.   Patient with h/o s/p HUSSEIN to RCA and LAD for STEMI with post infarction basal VSD; transferred from [facility].   Pt is now s/p repair of posterior VSD and TVR with bioprosthetic valve.     Full assessment competed in previous CC note:  Concerns with insurance coverage for discharge needs: None.  Current Living Situation: Patient lives with spouse.  Support System: Supportive and Involved  Services Involved: None.  Transportation at Discharge: Family or friend will provide  Barriers to Discharge: Pt is not medically ready for discharge.     Assessment  PT is recommending TCU.  Pt is currently receiving Heparin drip, (New) Warfarin, Lasix IV.    Plan  Anticipated Discharge Date:  TBD    Unable to determine pt's discharge needs at this time.  Anticipated Discharge Plan:  TBD  CC will continue to monitor pt's medical condition and progress toward discharge.   ARBEN DINH RN BSN  Care Coordinator Unit   [phone]

## 2018-08-29 NOTE — PROGRESS NOTES
Calorie Counts  Intake recorded for: 8/28  Kcals: 682  Protein: 23g  # Meals Recorded: 100% cream of wheat w/ brown sugar, yogurt, tomato soup, 33% cheese pizza  # Supplements Recorded: 100% 1 Boost Breeze

## 2018-08-29 NOTE — CONSULTS
Patient with left pleural effusion. Ordering team referred to CAPs for bedside thoracentesis.     Re-consult IR if procedural team is unable to accomodate.         Lazaro Hartmann PA-C  Interventional Radiology

## 2018-08-30 ENCOUNTER — APPOINTMENT (OUTPATIENT)
Dept: PHYSICAL THERAPY | Facility: CLINIC | Age: 68
DRG: 219 | End: 2018-08-30
Attending: INTERNAL MEDICINE
Payer: MEDICARE

## 2018-08-30 ENCOUNTER — APPOINTMENT (OUTPATIENT)
Dept: INTERVENTIONAL RADIOLOGY/VASCULAR | Facility: CLINIC | Age: 68
DRG: 219 | End: 2018-08-30
Attending: PHYSICIAN ASSISTANT
Payer: MEDICARE

## 2018-08-30 LAB
ANION GAP SERPL CALCULATED.3IONS-SCNC: 7 MMOL/L (ref 3–14)
BASOPHILS # BLD AUTO: 0 10E9/L (ref 0–0.2)
BASOPHILS # BLD AUTO: 0.1 10E9/L (ref 0–0.2)
BASOPHILS NFR BLD AUTO: 0.5 %
BASOPHILS NFR BLD AUTO: 0.7 %
BUN SERPL-MCNC: 27 MG/DL (ref 7–30)
CA-I BLD-MCNC: 4.3 MG/DL (ref 4.4–5.2)
CALCIUM SERPL-MCNC: 7.8 MG/DL (ref 8.5–10.1)
CHLORIDE SERPL-SCNC: 100 MMOL/L (ref 94–109)
CO2 SERPL-SCNC: 26 MMOL/L (ref 20–32)
CREAT SERPL-MCNC: 0.83 MG/DL (ref 0.66–1.25)
DIFFERENTIAL METHOD BLD: ABNORMAL
DIFFERENTIAL METHOD BLD: ABNORMAL
EOSINOPHIL # BLD AUTO: 0.3 10E9/L (ref 0–0.7)
EOSINOPHIL # BLD AUTO: 0.4 10E9/L (ref 0–0.7)
EOSINOPHIL NFR BLD AUTO: 4.2 %
EOSINOPHIL NFR BLD AUTO: 4.6 %
ERYTHROCYTE [DISTWIDTH] IN BLOOD BY AUTOMATED COUNT: 22 % (ref 10–15)
ERYTHROCYTE [DISTWIDTH] IN BLOOD BY AUTOMATED COUNT: 22.7 % (ref 10–15)
ERYTHROCYTE [DISTWIDTH] IN BLOOD BY AUTOMATED COUNT: 23.2 % (ref 10–15)
GFR SERPL CREATININE-BSD FRML MDRD: >90 ML/MIN/1.7M2
GLUCOSE BLDC GLUCOMTR-MCNC: 100 MG/DL (ref 70–99)
GLUCOSE BLDC GLUCOMTR-MCNC: 87 MG/DL (ref 70–99)
GLUCOSE BLDC GLUCOMTR-MCNC: 88 MG/DL (ref 70–99)
GLUCOSE BLDC GLUCOMTR-MCNC: 94 MG/DL (ref 70–99)
GLUCOSE BLDC GLUCOMTR-MCNC: 99 MG/DL (ref 70–99)
GLUCOSE SERPL-MCNC: 88 MG/DL (ref 70–99)
HCT VFR BLD AUTO: 27.5 % (ref 40–53)
HCT VFR BLD AUTO: 29.8 % (ref 40–53)
HCT VFR BLD AUTO: 30.4 % (ref 40–53)
HGB BLD-MCNC: 8.6 G/DL (ref 13.3–17.7)
HGB BLD-MCNC: 9.2 G/DL (ref 13.3–17.7)
HGB BLD-MCNC: 9.5 G/DL (ref 13.3–17.7)
IMM GRANULOCYTES # BLD: 0 10E9/L (ref 0–0.4)
IMM GRANULOCYTES # BLD: 0 10E9/L (ref 0–0.4)
IMM GRANULOCYTES NFR BLD: 0.2 %
IMM GRANULOCYTES NFR BLD: 0.3 %
INR PPP: 1.32 (ref 0.86–1.14)
LMWH PPP CHRO-ACNC: 0.22 IU/ML
LMWH PPP CHRO-ACNC: 0.46 IU/ML
LYMPHOCYTES # BLD AUTO: 1.5 10E9/L (ref 0.8–5.3)
LYMPHOCYTES # BLD AUTO: 1.8 10E9/L (ref 0.8–5.3)
LYMPHOCYTES NFR BLD AUTO: 20.7 %
LYMPHOCYTES NFR BLD AUTO: 20.7 %
MAGNESIUM SERPL-MCNC: 2.2 MG/DL (ref 1.6–2.3)
MCH RBC QN AUTO: 30.3 PG (ref 26.5–33)
MCH RBC QN AUTO: 30.5 PG (ref 26.5–33)
MCH RBC QN AUTO: 30.8 PG (ref 26.5–33)
MCHC RBC AUTO-ENTMCNC: 30.9 G/DL (ref 31.5–36.5)
MCHC RBC AUTO-ENTMCNC: 31.3 G/DL (ref 31.5–36.5)
MCHC RBC AUTO-ENTMCNC: 31.3 G/DL (ref 31.5–36.5)
MCV RBC AUTO: 100 FL (ref 78–100)
MCV RBC AUTO: 97 FL (ref 78–100)
MCV RBC AUTO: 98 FL (ref 78–100)
MONOCYTES # BLD AUTO: 0.5 10E9/L (ref 0–1.3)
MONOCYTES # BLD AUTO: 0.8 10E9/L (ref 0–1.3)
MONOCYTES NFR BLD AUTO: 7.2 %
MONOCYTES NFR BLD AUTO: 9.2 %
NEUTROPHILS # BLD AUTO: 4.7 10E9/L (ref 1.6–8.3)
NEUTROPHILS # BLD AUTO: 5.5 10E9/L (ref 1.6–8.3)
NEUTROPHILS NFR BLD AUTO: 65.2 %
NEUTROPHILS NFR BLD AUTO: 66.5 %
NRBC # BLD AUTO: 0.3 10*3/UL
NRBC # BLD AUTO: 0.4 10*3/UL
NRBC BLD AUTO-RTO: 4 /100
NRBC BLD AUTO-RTO: 4 /100
PLATELET # BLD AUTO: 1534 10E9/L (ref 150–450)
PLATELET # BLD AUTO: 1666 10E9/L (ref 150–450)
PLATELET # BLD AUTO: 586 10E9/L (ref 150–450)
PLATELET # BLD EST: ABNORMAL 10*3/UL
PLATELET # BLD EST: ABNORMAL 10*3/UL
POTASSIUM SERPL-SCNC: 4.4 MMOL/L (ref 3.4–5.3)
RBC # BLD AUTO: 2.84 10E12/L (ref 4.4–5.9)
RBC # BLD AUTO: 2.99 10E12/L (ref 4.4–5.9)
RBC # BLD AUTO: 3.11 10E12/L (ref 4.4–5.9)
SODIUM SERPL-SCNC: 133 MMOL/L (ref 133–144)
WBC # BLD AUTO: 7.1 10E9/L (ref 4–11)
WBC # BLD AUTO: 8 10E9/L (ref 4–11)
WBC # BLD AUTO: 8.5 10E9/L (ref 4–11)

## 2018-08-30 PROCEDURE — A9270 NON-COVERED ITEM OR SERVICE: HCPCS | Mod: GY | Performed by: STUDENT IN AN ORGANIZED HEALTH CARE EDUCATION/TRAINING PROGRAM

## 2018-08-30 PROCEDURE — 97116 GAIT TRAINING THERAPY: CPT | Mod: GP | Performed by: PHYSICAL THERAPIST

## 2018-08-30 PROCEDURE — 25000132 ZZH RX MED GY IP 250 OP 250 PS 637: Mod: GY | Performed by: STUDENT IN AN ORGANIZED HEALTH CARE EDUCATION/TRAINING PROGRAM

## 2018-08-30 PROCEDURE — 99232 SBSQ HOSP IP/OBS MODERATE 35: CPT | Mod: GC | Performed by: INTERNAL MEDICINE

## 2018-08-30 PROCEDURE — 25000128 H RX IP 250 OP 636: Performed by: NURSE PRACTITIONER

## 2018-08-30 PROCEDURE — 21400006 ZZH R&B CCU INTERMEDIATE UMMC

## 2018-08-30 PROCEDURE — 27210738 ZZH ACCESSORY CR2

## 2018-08-30 PROCEDURE — 36592 COLLECT BLOOD FROM PICC: CPT | Performed by: INTERNAL MEDICINE

## 2018-08-30 PROCEDURE — 82330 ASSAY OF CALCIUM: CPT | Performed by: INTERNAL MEDICINE

## 2018-08-30 PROCEDURE — 85027 COMPLETE CBC AUTOMATED: CPT | Performed by: INTERNAL MEDICINE

## 2018-08-30 PROCEDURE — 85025 COMPLETE CBC W/AUTO DIFF WBC: CPT | Performed by: PATHOLOGY

## 2018-08-30 PROCEDURE — 36415 COLL VENOUS BLD VENIPUNCTURE: CPT | Performed by: INTERNAL MEDICINE

## 2018-08-30 PROCEDURE — 77001 FLUOROGUIDE FOR VEIN DEVICE: CPT

## 2018-08-30 PROCEDURE — 25000128 H RX IP 250 OP 636: Performed by: STUDENT IN AN ORGANIZED HEALTH CARE EDUCATION/TRAINING PROGRAM

## 2018-08-30 PROCEDURE — 25000128 H RX IP 250 OP 636: Performed by: RADIOLOGY

## 2018-08-30 PROCEDURE — 76937 US GUIDE VASCULAR ACCESS: CPT

## 2018-08-30 PROCEDURE — 40000193 ZZH STATISTIC PT WARD VISIT: Performed by: PHYSICAL THERAPIST

## 2018-08-30 PROCEDURE — 25000132 ZZH RX MED GY IP 250 OP 250 PS 637: Mod: GY | Performed by: THORACIC SURGERY (CARDIOTHORACIC VASCULAR SURGERY)

## 2018-08-30 PROCEDURE — 25000125 ZZHC RX 250: Performed by: PATHOLOGY

## 2018-08-30 PROCEDURE — A9270 NON-COVERED ITEM OR SERVICE: HCPCS | Mod: GY | Performed by: THORACIC SURGERY (CARDIOTHORACIC VASCULAR SURGERY)

## 2018-08-30 PROCEDURE — 25000125 ZZHC RX 250: Performed by: RADIOLOGY

## 2018-08-30 PROCEDURE — 25000128 H RX IP 250 OP 636: Performed by: PATHOLOGY

## 2018-08-30 PROCEDURE — 83735 ASSAY OF MAGNESIUM: CPT | Performed by: INTERNAL MEDICINE

## 2018-08-30 PROCEDURE — 00000146 ZZHCL STATISTIC GLUCOSE BY METER IP

## 2018-08-30 PROCEDURE — C1752 CATH,HEMODIALYSIS,SHORT-TERM: HCPCS

## 2018-08-30 PROCEDURE — 85610 PROTHROMBIN TIME: CPT | Performed by: INTERNAL MEDICINE

## 2018-08-30 PROCEDURE — 80048 BASIC METABOLIC PNL TOTAL CA: CPT | Performed by: INTERNAL MEDICINE

## 2018-08-30 PROCEDURE — 97110 THERAPEUTIC EXERCISES: CPT | Mod: GP | Performed by: PHYSICAL THERAPIST

## 2018-08-30 PROCEDURE — 25000132 ZZH RX MED GY IP 250 OP 250 PS 637: Mod: GY | Performed by: INTERNAL MEDICINE

## 2018-08-30 PROCEDURE — A9270 NON-COVERED ITEM OR SERVICE: HCPCS | Mod: GY | Performed by: NURSE PRACTITIONER

## 2018-08-30 PROCEDURE — 85520 HEPARIN ASSAY: CPT | Performed by: INTERNAL MEDICINE

## 2018-08-30 PROCEDURE — 36513 APHERESIS PLATELETS: CPT

## 2018-08-30 PROCEDURE — 40000802 ZZH SITE CHECK

## 2018-08-30 PROCEDURE — 25000132 ZZH RX MED GY IP 250 OP 250 PS 637: Mod: GY | Performed by: NURSE PRACTITIONER

## 2018-08-30 PROCEDURE — A9270 NON-COVERED ITEM OR SERVICE: HCPCS | Mod: GY | Performed by: INTERNAL MEDICINE

## 2018-08-30 PROCEDURE — 97530 THERAPEUTIC ACTIVITIES: CPT | Mod: GP | Performed by: PHYSICAL THERAPIST

## 2018-08-30 RX ORDER — HEPARIN SODIUM (PORCINE) LOCK FLUSH IV SOLN 100 UNIT/ML 100 UNIT/ML
3 SOLUTION INTRAVENOUS
Status: COMPLETED | OUTPATIENT
Start: 2018-08-30 | End: 2018-08-30

## 2018-08-30 RX ORDER — WARFARIN SODIUM 4 MG/1
4 TABLET ORAL
Status: COMPLETED | OUTPATIENT
Start: 2018-08-30 | End: 2018-08-30

## 2018-08-30 RX ORDER — DIPHENHYDRAMINE HYDROCHLORIDE 50 MG/ML
50 INJECTION INTRAMUSCULAR; INTRAVENOUS
Status: CANCELLED | OUTPATIENT
Start: 2018-08-30

## 2018-08-30 RX ORDER — HEPARIN SODIUM (PORCINE) LOCK FLUSH IV SOLN 100 UNIT/ML 100 UNIT/ML
3 SOLUTION INTRAVENOUS EVERY 24 HOURS
Status: CANCELLED | OUTPATIENT
Start: 2018-08-30

## 2018-08-30 RX ORDER — ALBUMIN HUMAN 25 %
250 INTRAVENOUS SOLUTION INTRAVENOUS
Status: CANCELLED | OUTPATIENT
Start: 2018-08-30

## 2018-08-30 RX ORDER — HEPARIN SODIUM (PORCINE) LOCK FLUSH IV SOLN 100 UNIT/ML 100 UNIT/ML
3 SOLUTION INTRAVENOUS ONCE
Status: DISCONTINUED | OUTPATIENT
Start: 2018-08-30 | End: 2018-08-30 | Stop reason: CLARIF

## 2018-08-30 RX ORDER — FUROSEMIDE 10 MG/ML
40 INJECTION INTRAMUSCULAR; INTRAVENOUS ONCE
Status: COMPLETED | OUTPATIENT
Start: 2018-08-30 | End: 2018-08-30

## 2018-08-30 RX ORDER — HEPARIN SODIUM (PORCINE) LOCK FLUSH IV SOLN 100 UNIT/ML 100 UNIT/ML
3 SOLUTION INTRAVENOUS
Status: CANCELLED | OUTPATIENT
Start: 2018-08-30

## 2018-08-30 RX ORDER — POTASSIUM CHLORIDE 750 MG/1
20 TABLET, EXTENDED RELEASE ORAL ONCE
Status: COMPLETED | OUTPATIENT
Start: 2018-08-30 | End: 2018-08-30

## 2018-08-30 RX ORDER — LIDOCAINE HYDROCHLORIDE 10 MG/ML
1-30 INJECTION, SOLUTION EPIDURAL; INFILTRATION; INTRACAUDAL; PERINEURAL
Status: DISCONTINUED | OUTPATIENT
Start: 2018-08-30 | End: 2018-08-30 | Stop reason: CLARIF

## 2018-08-30 RX ADMIN — HYDROXYUREA 1500 MG: 500 CAPSULE ORAL at 09:23

## 2018-08-30 RX ADMIN — SENNOSIDES AND DOCUSATE SODIUM 2 TABLET: 8.6; 5 TABLET ORAL at 09:23

## 2018-08-30 RX ADMIN — FUROSEMIDE 40 MG: 10 INJECTION, SOLUTION INTRAVENOUS at 16:20

## 2018-08-30 RX ADMIN — SENNOSIDES AND DOCUSATE SODIUM 1 TABLET: 8.6; 5 TABLET ORAL at 21:00

## 2018-08-30 RX ADMIN — FUROSEMIDE 40 MG: 10 INJECTION, SOLUTION INTRAVENOUS at 09:27

## 2018-08-30 RX ADMIN — Medication 12.5 MG: at 21:00

## 2018-08-30 RX ADMIN — CALCIUM GLUCONATE 1542 MG/HR: 98 INJECTION, SOLUTION INTRAVENOUS at 19:20

## 2018-08-30 RX ADMIN — SODIUM CHLORIDE, PRESERVATIVE FREE 2 ML: 5 INJECTION INTRAVENOUS at 15:26

## 2018-08-30 RX ADMIN — SODIUM CHLORIDE, PRESERVATIVE FREE 2 ML: 5 INJECTION INTRAVENOUS at 15:25

## 2018-08-30 RX ADMIN — LEVOTHYROXINE SODIUM 75 MCG: 75 TABLET ORAL at 09:22

## 2018-08-30 RX ADMIN — ANTICOAGULANT CITRATE DEXTROSE SOLUTION FORMULA A 1251 ML: 12.25; 11; 3.65 SOLUTION INTRAVENOUS at 19:21

## 2018-08-30 RX ADMIN — PANTOPRAZOLE SODIUM 40 MG: 40 TABLET, DELAYED RELEASE ORAL at 09:22

## 2018-08-30 RX ADMIN — POLYETHYLENE GLYCOL 3350 17 G: 17 POWDER, FOR SOLUTION ORAL at 09:27

## 2018-08-30 RX ADMIN — Medication 5000 UNITS: at 15:25

## 2018-08-30 RX ADMIN — SODIUM CHLORIDE, PRESERVATIVE FREE 3 ML: 5 INJECTION INTRAVENOUS at 19:23

## 2018-08-30 RX ADMIN — LIDOCAINE HYDROCHLORIDE 6 ML: 10 INJECTION, SOLUTION EPIDURAL; INFILTRATION; INTRACAUDAL; PERINEURAL at 15:24

## 2018-08-30 RX ADMIN — POTASSIUM CHLORIDE 20 MEQ: 750 TABLET, EXTENDED RELEASE ORAL at 16:19

## 2018-08-30 RX ADMIN — AMIODARONE HYDROCHLORIDE 400 MG: 200 TABLET ORAL at 09:22

## 2018-08-30 RX ADMIN — AMIODARONE HYDROCHLORIDE 400 MG: 200 TABLET ORAL at 21:00

## 2018-08-30 RX ADMIN — THERA TABS 1 TABLET: TAB at 09:22

## 2018-08-30 RX ADMIN — CLOPIDOGREL 75 MG: 75 TABLET, FILM COATED ORAL at 09:23

## 2018-08-30 RX ADMIN — SODIUM CHLORIDE, PRESERVATIVE FREE 5 ML: 5 INJECTION INTRAVENOUS at 16:30

## 2018-08-30 RX ADMIN — ATORVASTATIN CALCIUM 40 MG: 40 TABLET, FILM COATED ORAL at 21:00

## 2018-08-30 RX ADMIN — HYDROXYUREA 1500 MG: 500 CAPSULE ORAL at 21:01

## 2018-08-30 RX ADMIN — WARFARIN SODIUM 4 MG: 4 TABLET ORAL at 21:02

## 2018-08-30 RX ADMIN — PANTOPRAZOLE SODIUM 40 MG: 40 TABLET, DELAYED RELEASE ORAL at 16:26

## 2018-08-30 ASSESSMENT — ACTIVITIES OF DAILY LIVING (ADL)
ADLS_ACUITY_SCORE: 12
ADLS_ACUITY_SCORE: 17
ADLS_ACUITY_SCORE: 17
ADLS_ACUITY_SCORE: 13
ADLS_ACUITY_SCORE: 17
ADLS_ACUITY_SCORE: 17

## 2018-08-30 ASSESSMENT — PAIN DESCRIPTION - DESCRIPTORS: DESCRIPTORS: SORE

## 2018-08-30 NOTE — PLAN OF CARE
Problem: Patient Care Overview  Goal: Individualization & Mutuality  Outcome: No Change    D. Pt is stable. ALOx4. Pain free. On RA. CT to sxn. Using IS. Appetite is poor this evening. Adequate UO. Pt tolerated Iron infusion.    I. Keeping a strict I and O's.   A. Pt is stable.   P. Continue to monitor. Monitor PLT counts (Hemo-Onc  is F/u).

## 2018-08-30 NOTE — PROGRESS NOTES
HEMATOLOGY FOLLOW UP NOTE    Castillo Soler is a 68 year old man with JAK2+ polycythemia vera, recently switched by primary heme/onc provider from Hydrea to Anagrelide with very poor tolerance. Reason for switching apparently was that his anemia was felt to be due to Hydrea.  He is admitted s/p STEMI with ischemia-related VSD requiring surgical intervention. He is high-risk for ongoing thrombocytosis and we restarted hydrea on 8/7/18. Platelet count has continued to increase, hydrea dose increased to 1000 mg bid on 8/10. Work-up for platelet dysfunction has been negative.    His PLT is up to 1534K today.   Patient reports feeling well. Offers no complains.      Physical Examination:   Temp: 97.8  F (36.6  C) Temp src: Oral BP: 98/68 Pulse: 84 Heart Rate: 96 Resp: 20 SpO2: 97 % O2 Device: Nasal cannula with humidification Oxygen Delivery: 3 LPM  Constitutional: Awake and alert, not in acute distress.  Eyes: No scleral icterus. Eyes exhibit no discharge.  ENT/Mouth: Oral mucosa pink and moist  Cardiovascular: Normal rate, regular rhythm, S1, S2. No murmur or rub. No leg edema  Respiratory: Mild respiratory distress. No wheezes. Crackle in bibasilar area, right side is worse  Gastrointestinal: soft, no distention, no tenderness, active BS  Neurological: AAOX3, grossly non-focal  Psychiatric: Mentation and affect appear normal      Results for CASTILLO SOLER (MRN [id number]) as of 8/30/2018 10:30   Ref. Range 8/27/2018 07:03 8/28/2018 07:31 8/29/2018 08:29 8/30/2018 06:52   Platelet Count Latest Ref Range: 150 - 450 10e9/L 972 (H) 1237 (HH) 1386 (HH) 1534 (HH)           Recommendation:  - spoke to transfusion medicine, will do plateletpheresis today  - will need a dialysis capable central line for pheresis, communicated with team. Will need line to be placed ASAP  - planning to start Jakafi after plateletpheresis.       We will continue to follow. Please page with any questions/concerns.      Plan was discussed  with attending physician Dr. Oconnell.        Balaji Rodriguez MD/MPH  Heme/Onc Fellow, PGY-4

## 2018-08-30 NOTE — PROCEDURES
Interventional Radiology Brief Post Procedure Note    Procedure: IR CVC NON TUNNEL PLACEMENT    Proceduralist: Hermes Ortiz MD    Assistant: None    Time Out: Prior to the start of the procedure and with procedural staff participation, I verbally confirmed the patient s identity using two indicators, relevant allergies, that the procedure was appropriate and matched the consent or emergent situation, and that the correct equipment/implants were available. Immediately prior to starting the procedure I conducted the Time Out with the procedural staff and re-confirmed the patient s name, procedure, and site/side. (The Joint Commission universal protocol was followed.)  Yes    Medications   Medication Event Details Admin User Admin Time   heparin  drip 25,000 units in 0.45% NaCl 250 mL (see additional administration details for dose) Medication Rate/Dose Verify Dose: 800 Units/hr; Rate: 8 mL/hr; Route: Intravenous; Scheduled Time:  3:11 PM; Comment: xa=0.22 no change in rate Elliott Echeverria RN 8/30/2018  3:11 PM   lidocaine (PF) (XYLOCAINE) 1 % injection 1-30 mL Medication Given by Other Dose: 6 mL; Route: Subcutaneous Marissa Michelle RN 8/30/2018  3:24 PM   heparin 5,000 units in 0.9% sodium chloride 1000 mL Medication New Bag by Other Clinician Dose: 5,000 Units; Route: TABLE SOLN Marissa Michelle RN 8/30/2018  3:25 PM   heparin 100 UNIT/ML injection 3 mL Medication Given by Other Dose: 2 mL; Route: Intracatheter; Scheduled Time:  2:30 PM Marissa Michelle RN 8/30/2018  3:25 PM   heparin 100 UNIT/ML injection 3 mL Medication Given by Other Dose: 2 mL; Route: Intracatheter; Scheduled Time:  2:30 PM Marissa Michelle RN 8/30/2018  3:26 PM       Sedation: None. Local Anesthestic used    Findings: 15cm 14F DL Schon apheresis catheter R IJV SVC/RA jxn under U/S and fluoro guidance.  Functions well, is hep-locked and ready for immediate use.     Estimated Blood Loss:  Minimal    Fluoroscopy Time: 0.4 minute(s)    SPECIMENS: None    Complications: 1. None     Condition: Stable    Plan: As above.    Comments: See dictated procedure note for full details.    Hermes Ortiz MD

## 2018-08-30 NOTE — PLAN OF CARE
Problem: Patient Care Overview  Goal: Plan of Care/Patient Progress Review  OT: Pt politely refused OT tx session 2/2 upcoming procedure and fatigue. OT will reschedule for 8/31/19.

## 2018-08-30 NOTE — PLAN OF CARE
Problem: Patient Care Overview  Goal: Plan of Care/Patient Progress Review  OT: Pt in IR when OT attempted. OT will reschedule for 8/31/18.

## 2018-08-30 NOTE — CONSULTS
Patient is on IR schedule 8/30/2018 for a non-tunneled central venous catheter placement for plateletpheresis.   Labs WNL for procedure.    No NPO required.  Medications to be held include: none  Consent will be done prior to procedure.      Please contact the IR charge RN at [phone] for estimated time of procedure.     Case discussed with Dr. Sadler and Phyllis BROWN.    Lazaro Hartmann PA-C  Interventional Radiology  Phone: [phone]  Pager: [phone]

## 2018-08-30 NOTE — PROGRESS NOTES
CVTS Daily Note  8/30/2018  Attending: Jason Franco, *    S:   No overnight events. No bradycardia overnight, infrequent PACs, no arrhythmia.    Pt seen at bedside resting comfortably.    Had a much better night, right sided chest pain is nearly gone and has felt more energetic since getting his iron infusion.  no acute complaints.      Denies F/C/Sweats.  No CP, SOB, or calf pain.    Tolerating diet better and cycling tube feeds.  + BM.      Ambulated well with assistance.    Pain level tolerable. Plan as per Neuro section below.     O:   Vitals:    08/30/18 0303 08/30/18 0559 08/30/18 0636 08/30/18 0718   BP: (!) 85/56 90/65  98/68   BP Location:    Left arm   Pulse:       Resp: 20   20   Temp:    97.8  F (36.6  C)   TempSrc:    Oral   SpO2: 99%   97%   Weight:   77 kg (169 lb 12.8 oz)    Height:         Vitals:    08/28/18 0645 08/29/18 0355 08/30/18 0636   Weight: 78.1 kg (172 lb 1.6 oz) 79.2 kg (174 lb 9.6 oz) 77 kg (169 lb 12.8 oz)     -2.2 kg since admit and trending down      Intake/Output Summary (Last 24 hours) at 08/30/18 1219  Last data filed at 08/30/18 0624   Gross per 24 hour   Intake            438.5 ml   Output             1875 ml   Net          -1436.5 ml       MAPs: 65 - 77   Gen: AAO x 3, pleasant, NAD  CV: RRR, S1S2 normal, no murmurs, rubs, or gallops.   Pulm: CTA, no rhonchi or wheezes  Abd: soft, non-tender, no guarding  Ext: trace peripheral edema, 1 + pitting  Incision: clean, dry, intact, no erythema  Chest Tube sites: dressings clean and dry, serosanguinous, no air leak    Labs:  [facility]    Recent Labs  Lab 08/30/18  0652 08/29/18  0829 08/28/18  0731 08/27/18  0703    133 135 138   POTASSIUM 4.4 4.7 4.2 4.2   CHLORIDE 100 101 101 104   GABRIELA 7.8* 7.8* 7.9* 7.9*   CO2 26 24 26 26   BUN 27 29 31* 31*   CR 0.83 0.70 0.77 0.74   GLC 88 109* 98 102*     CBC    Recent Labs  Lab 08/30/18  0652 08/29/18  0829 08/28/18  0731 08/27/18  0703   WBC 8.0 9.2 9.5 9.9   RBC 2.99* 2.98* 3.10*  3.18*   HGB 9.2* 9.2* 9.4* 9.7*   HCT 29.8* 29.8* 30.9* 31.5*    100 100 99   MCH 30.8 30.9 30.3 30.5   MCHC 30.9* 30.9* 30.4* 30.8*   RDW 23.2* 23.1* 23.0* 23.3*   PLT 1534* 1386* 1237* 972*     INR    Recent Labs  Lab 08/30/18  0652 08/29/18  0829 08/28/18  0731 08/27/18  0703   INR 1.32* 1.23* 1.13 1.19*      Hepatic Panel   Lab Results   Component Value Date    AST 86 08/26/2018     Lab Results   Component Value Date     08/26/2018     Lab Results   Component Value Date    ALBUMIN 2.0 08/26/2018     GLUCOSE:     Recent Labs  Lab 08/30/18  0720 08/30/18  0652 08/30/18  0307 08/30/18  0001 08/29/18  2042 08/29/18  1607 08/29/18  1305 08/29/18  0829  08/28/18  0731  08/27/18  0703  08/26/18  0600  08/25/18  0542   GLC  --  88  --   --   --   --   --  109*  --  98  --  102*  --  112*  --  115*   *  --  99 94 107* 116* 122*  --   < >  --   < >  --   < >  --   < >  --    < > = values in this interval not displayed.      Imaging:  reviewed recent imaging, persistent L pleural effusion        ASSESSMENT/PLAN: Patient is a 68 year old male with a history of s/p HUSSEIN to RCA and LAD for STEMI with post infarction basal VSD, who on 8/10/2018 underwent repair of posterior VSD and TVR with bioprosthetic valve.  He was kept open chest due to coagulopathy and RV dysfunction. Chest closed on 8/13/2018, IABP removed 8/14/18 and he was extubated on POD #11.       Neuro:  -Acute post-op pain: IV dilaudid, tylenol and oxycodone prn  - Wean Seroquel as tolerated, decreased to 12.5 mg HS      CV:  - ASA 81 mg, atorvastatin 40 mg daily,   - Systolic Blood pressure: 80-98  - TPW capped  - ICU course complicated by a-flutter, EP consult with plan to anticoagulate and then attempt to overdrive pace with A lead.  While in the ICU, patient also went into narrow complex SVT with MAPs in 50s. Adenosine 6 mg was administered, resulting in 20 seconds of asystole and brief CPR with ROSC.    - 8/25 patient converted from NSR  to a-fib overnight. During morning therapy, patient had a-fib with RVR (rates 140s) and symptomatic. EKGs appreciable for a-fib and SVT. Metoprolol 2.5 mg IV administered with improved rate control. Discussed case with EP fellow, amio bolus and gtt started.  Patient converted to NSR afternoon on . Amiodarone 400 mg PO BID .   - Cut TPW , no bradycardia on PO Amio but prolonged QTc.       Resp:   - Extubated POD 11  - IS, encourage activity  - Mediastinal CT with minimal output, Pleural chest tubes with moderate, serosanguinous output. : Right Output 60 mL   -  Pulled Left pleural tube, f/u CXR.  POC U/S by Medicine for Left pleural effusion today-findings appreciable for loculated fluid. IR consult with CT chest w/o contrast. Possible chest tube placement with IR on .   -  IR recommended thoracentesis for left pleural effusion.  Per Dr. Franco, no thoracentesis necessary at this time.      FEN/GI:   - Reg Diet with thin liquids, speech following.   - Continuous TF, consider calorie counts once patient's diet advances   - Bowel regimen, + BM  -  EGD: two gastric ulcers, no active bleeding or high-risk features.        Renal:   - Creatinine WNL, adequate UOP  - Volume status: hypervolemic, dry weight ~ 170 lbs, current weight 169.  - Diurese Lasix 40 mg IV x 1, re-assess         ID:   - Completed alban-op abx  - WBC 8.0  and trending down, afebrile, no signs or symptoms of infection      Heme:   - Hgb 9.7, no signs or symptoms of bleeding   - Continue to monitor for signs of GI bleed, transfuse for Hgb < 7  - Hematology followin/30 Plt 1534, Hydrea 1500 mg BID, Continue to trend plts, Heme recommended Iron replacement, started .    -  Plateletpheresis for Plts >1500, IR to place double lumen non-tunneled catheter prior to procedure    Endo:   - BG 100s, on sliding scale insulin. Will discontinue once diet improves.       PPx:   - PPI      Anticoagulation:   -  start  low intensity heparin gtt   - 8/26 start warfarin, INR 2-3. INR 1.32  - Plavix 75 mg daily      Dispo:   - 6C since 8/24  - Therapy recommending d/c to TCU      Discussed with CVTS Fellow   Staff surgeons have been informed of changes through both  verbal and written communication.      STEPHANIE Conte CNP   Cardiothoracic Surgery  8/29/2018 at 1:33 PM

## 2018-08-30 NOTE — PLAN OF CARE
Problem: Patient Care Overview  Goal: Plan of Care/Patient Progress Review  Discharge Planner PT   Patient plan for discharge: rehab  Current status: Pt needs min-mod A to stand with cardiac pillow. Pt ambulated 120 ft x 3 with and without AD, CGA-min A. Recommend ambulation with assist from RN/NST 3x/day. Pt also weaing home compression garments for LE edema management. RN/NST to assits with removal 1x/day for skin cares and assist with re-donning.    Barriers to return to prior living situation: post-surgical precautions, fatigue, impaired balance, LE weakness  Recommendations for discharge: TCU  Rationale for recommendations: improve strength and independence with mobility       Entered by: Sommer Kasper 08/30/2018 9:43 AM

## 2018-08-30 NOTE — SUMMARY OF CARE
-Pt had a central line dialysis port placed in interventional radiology.  -Pt to have platelet pheresis in his room this afternoon.  -Pt working with therapies in the morning.  -Pt reports that his pain is under control.

## 2018-08-30 NOTE — PLAN OF CARE
Problem: Patient Care Overview  Goal: Plan of Care/Patient Progress Review  D: Pt with h/y of  Salbador 2,polycythemia vera on hydrea, .STEMI, had posterior VSD and TVR with bioprosthetic valve on 8/10. Open Chest due to RV dys and coagulopathy. Chest closed on 8/13. H/y of Aflutter/Afib,SVT  Per MD note,on amiodarone PO.     I: Monitored vitals and assessed pt status.   Running: heparin gtt at 950 U/hr, hep 10 A therap,recheck in a.m.     A: A0x4. VSS, on 3L . SR. Afebrile. Denies any pain. Blood sugars Q4 wnl,no insulin needed.    SBP 79 with MAP 61 at midnight, pt asymptomatic,denies lightlessness, no pain ,no nausea reported,SR with HR 88, MD cross cover notified,recheck in one hour (recheck 80/56 with MAP 65), MD cross cover notified,monitor BP, recheck in couple hours,notifie MD if MAP<60 and pt symptomatic per MD verbal.  BP 90/65,MAP 73.  R CT to suc with output  25 ml output.     Temp:  [97.2  F (36.2  C)-98.4  F (36.9  C)] 98.3  F (36.8  C)  Pulse:  [84-96] 84  Heart Rate:  [83-93] 83  Resp:  [20-24] 20  BP: ()/(52-83) 80/56  SpO2:  [92 %-100 %] 92 %      P: Continue to monitor Pt status and report changes to treatment team. Monitor for PLT , SBP.

## 2018-08-30 NOTE — CONSULTS
Patient's imaging was reviewed and the pleural effusion is very minimal and the benefits do not outweigh the risks for intervention at this time.     Case discussed with both Dr. Sadler and Phyllis BROWN CNP.     Lazaro Hartmann PA-C  Interventional Radiology  Phone: [phone]  Pager: [phone]

## 2018-08-30 NOTE — PROGRESS NOTES
Patient Name: Castillo De Anda  Medical Record Number: [id number]  Today's Date: 8/30/2018    Procedure: Non Tunneled Central Venous Catheter placement  Proceduralist: Dr ROSA Ortiz    Total sedation time: No sedation given    Procedure start time: 1515  Puncture time: 1516  Procedure end time: 1525    Report given to: LUPE Alonso 6C      Other Notes: Pt arrived to IR room 3 from . Pt denies any questions or concerns regarding procedure, consent signed. Pt positioned supine and monitored per protocol. Pt tolerated procedure without any noted complications. Catheter is on right neck, flushed and hep-locked, ready for use. Pt transferred back to .

## 2018-08-31 ENCOUNTER — APPOINTMENT (OUTPATIENT)
Dept: SPEECH THERAPY | Facility: CLINIC | Age: 68
DRG: 219 | End: 2018-08-31
Attending: INTERNAL MEDICINE
Payer: MEDICARE

## 2018-08-31 ENCOUNTER — APPOINTMENT (OUTPATIENT)
Dept: PHYSICAL THERAPY | Facility: CLINIC | Age: 68
DRG: 219 | End: 2018-08-31
Attending: INTERNAL MEDICINE
Payer: MEDICARE

## 2018-08-31 ENCOUNTER — APPOINTMENT (OUTPATIENT)
Dept: GENERAL RADIOLOGY | Facility: CLINIC | Age: 68
DRG: 219 | End: 2018-08-31
Attending: PHYSICIAN ASSISTANT
Payer: MEDICARE

## 2018-08-31 LAB
ALBUMIN SERPL-MCNC: 2 G/DL (ref 3.4–5)
ALP SERPL-CCNC: 161 U/L (ref 40–150)
ALT SERPL W P-5'-P-CCNC: 68 U/L (ref 0–70)
ANION GAP SERPL CALCULATED.3IONS-SCNC: 6 MMOL/L (ref 3–14)
AST SERPL W P-5'-P-CCNC: 41 U/L (ref 0–45)
BASOPHILS # BLD AUTO: 0 10E9/L (ref 0–0.2)
BASOPHILS # BLD AUTO: 0 10E9/L (ref 0–0.2)
BASOPHILS NFR BLD AUTO: 0.5 %
BASOPHILS NFR BLD AUTO: 0.6 %
BILIRUB DIRECT SERPL-MCNC: <0.1 MG/DL (ref 0–0.2)
BILIRUB SERPL-MCNC: 0.2 MG/DL (ref 0.2–1.3)
BUN SERPL-MCNC: 22 MG/DL (ref 7–30)
CA-I BLD-MCNC: 4.8 MG/DL (ref 4.4–5.2)
CALCIUM SERPL-MCNC: 8.8 MG/DL (ref 8.5–10.1)
CHLORIDE SERPL-SCNC: 96 MMOL/L (ref 94–109)
CO2 SERPL-SCNC: 30 MMOL/L (ref 20–32)
CREAT SERPL-MCNC: 0.91 MG/DL (ref 0.66–1.25)
DIFFERENTIAL METHOD BLD: ABNORMAL
DIFFERENTIAL METHOD BLD: ABNORMAL
EOSINOPHIL # BLD AUTO: 0.3 10E9/L (ref 0–0.7)
EOSINOPHIL # BLD AUTO: 0.4 10E9/L (ref 0–0.7)
EOSINOPHIL NFR BLD AUTO: 5 %
EOSINOPHIL NFR BLD AUTO: 5.1 %
ERYTHROCYTE [DISTWIDTH] IN BLOOD BY AUTOMATED COUNT: 21.8 % (ref 10–15)
ERYTHROCYTE [DISTWIDTH] IN BLOOD BY AUTOMATED COUNT: 22 % (ref 10–15)
ERYTHROCYTE [DISTWIDTH] IN BLOOD BY AUTOMATED COUNT: 22.6 % (ref 10–15)
GFR SERPL CREATININE-BSD FRML MDRD: 83 ML/MIN/1.7M2
GLUCOSE SERPL-MCNC: 142 MG/DL (ref 70–99)
HCT VFR BLD AUTO: 27.1 % (ref 40–53)
HCT VFR BLD AUTO: 27.9 % (ref 40–53)
HCT VFR BLD AUTO: 31.9 % (ref 40–53)
HGB BLD-MCNC: 8.4 G/DL (ref 13.3–17.7)
HGB BLD-MCNC: 8.7 G/DL (ref 13.3–17.7)
HGB BLD-MCNC: 9.8 G/DL (ref 13.3–17.7)
IMM GRANULOCYTES # BLD: 0 10E9/L (ref 0–0.4)
IMM GRANULOCYTES # BLD: 0 10E9/L (ref 0–0.4)
IMM GRANULOCYTES NFR BLD: 0.1 %
IMM GRANULOCYTES NFR BLD: 0.2 %
INR PPP: 1.47 (ref 0.86–1.14)
LMWH PPP CHRO-ACNC: 0.23 IU/ML
LYMPHOCYTES # BLD AUTO: 1.7 10E9/L (ref 0.8–5.3)
LYMPHOCYTES # BLD AUTO: 1.8 10E9/L (ref 0.8–5.3)
LYMPHOCYTES NFR BLD AUTO: 25.1 %
LYMPHOCYTES NFR BLD AUTO: 25.5 %
MAGNESIUM SERPL-MCNC: 1.9 MG/DL (ref 1.6–2.3)
MCH RBC QN AUTO: 30.5 PG (ref 26.5–33)
MCH RBC QN AUTO: 30.8 PG (ref 26.5–33)
MCH RBC QN AUTO: 30.8 PG (ref 26.5–33)
MCHC RBC AUTO-ENTMCNC: 30.7 G/DL (ref 31.5–36.5)
MCHC RBC AUTO-ENTMCNC: 31 G/DL (ref 31.5–36.5)
MCHC RBC AUTO-ENTMCNC: 31.2 G/DL (ref 31.5–36.5)
MCV RBC AUTO: 100 FL (ref 78–100)
MCV RBC AUTO: 98 FL (ref 78–100)
MCV RBC AUTO: 99 FL (ref 78–100)
MONOCYTES # BLD AUTO: 0.3 10E9/L (ref 0–1.3)
MONOCYTES # BLD AUTO: 0.4 10E9/L (ref 0–1.3)
MONOCYTES NFR BLD AUTO: 3.8 %
MONOCYTES NFR BLD AUTO: 5.2 %
NEUTROPHILS # BLD AUTO: 4.3 10E9/L (ref 1.6–8.3)
NEUTROPHILS # BLD AUTO: 4.6 10E9/L (ref 1.6–8.3)
NEUTROPHILS NFR BLD AUTO: 64 %
NEUTROPHILS NFR BLD AUTO: 64.9 %
NRBC # BLD AUTO: 0.3 10*3/UL
NRBC # BLD AUTO: 0.3 10*3/UL
NRBC BLD AUTO-RTO: 4 /100
NRBC BLD AUTO-RTO: 4 /100
PLATELET # BLD AUTO: 359 10E9/L (ref 150–450)
PLATELET # BLD AUTO: 685 10E9/L (ref 150–450)
PLATELET # BLD AUTO: 714 10E9/L (ref 150–450)
POTASSIUM SERPL-SCNC: 3.9 MMOL/L (ref 3.4–5.3)
PROT SERPL-MCNC: 6.1 G/DL (ref 6.8–8.8)
RBC # BLD AUTO: 2.73 10E12/L (ref 4.4–5.9)
RBC # BLD AUTO: 2.85 10E12/L (ref 4.4–5.9)
RBC # BLD AUTO: 3.18 10E12/L (ref 4.4–5.9)
SODIUM SERPL-SCNC: 133 MMOL/L (ref 133–144)
WBC # BLD AUTO: 6.6 10E9/L (ref 4–11)
WBC # BLD AUTO: 7.3 10E9/L (ref 4–11)
WBC # BLD AUTO: 7.3 10E9/L (ref 4–11)

## 2018-08-31 PROCEDURE — 25000132 ZZH RX MED GY IP 250 OP 250 PS 637: Mod: GY | Performed by: STUDENT IN AN ORGANIZED HEALTH CARE EDUCATION/TRAINING PROGRAM

## 2018-08-31 PROCEDURE — 40000225 ZZH STATISTIC SLP WARD VISIT

## 2018-08-31 PROCEDURE — A9270 NON-COVERED ITEM OR SERVICE: HCPCS | Mod: GY | Performed by: PHYSICIAN ASSISTANT

## 2018-08-31 PROCEDURE — 25000132 ZZH RX MED GY IP 250 OP 250 PS 637: Mod: GY | Performed by: THORACIC SURGERY (CARDIOTHORACIC VASCULAR SURGERY)

## 2018-08-31 PROCEDURE — 85025 COMPLETE CBC W/AUTO DIFF WBC: CPT | Performed by: PATHOLOGY

## 2018-08-31 PROCEDURE — 80048 BASIC METABOLIC PNL TOTAL CA: CPT | Performed by: INTERNAL MEDICINE

## 2018-08-31 PROCEDURE — A9270 NON-COVERED ITEM OR SERVICE: HCPCS | Mod: GY | Performed by: THORACIC SURGERY (CARDIOTHORACIC VASCULAR SURGERY)

## 2018-08-31 PROCEDURE — A9270 NON-COVERED ITEM OR SERVICE: HCPCS | Mod: GY | Performed by: STUDENT IN AN ORGANIZED HEALTH CARE EDUCATION/TRAINING PROGRAM

## 2018-08-31 PROCEDURE — 82330 ASSAY OF CALCIUM: CPT | Performed by: INTERNAL MEDICINE

## 2018-08-31 PROCEDURE — 92526 ORAL FUNCTION THERAPY: CPT | Mod: GN

## 2018-08-31 PROCEDURE — 83735 ASSAY OF MAGNESIUM: CPT | Performed by: INTERNAL MEDICINE

## 2018-08-31 PROCEDURE — 25000128 H RX IP 250 OP 636: Performed by: NURSE PRACTITIONER

## 2018-08-31 PROCEDURE — 85520 HEPARIN ASSAY: CPT | Performed by: INTERNAL MEDICINE

## 2018-08-31 PROCEDURE — 40000802 ZZH SITE CHECK

## 2018-08-31 PROCEDURE — 25000128 H RX IP 250 OP 636: Performed by: PATHOLOGY

## 2018-08-31 PROCEDURE — 99232 SBSQ HOSP IP/OBS MODERATE 35: CPT | Mod: GC | Performed by: INTERNAL MEDICINE

## 2018-08-31 PROCEDURE — 36513 APHERESIS PLATELETS: CPT

## 2018-08-31 PROCEDURE — 25000128 H RX IP 250 OP 636: Performed by: STUDENT IN AN ORGANIZED HEALTH CARE EDUCATION/TRAINING PROGRAM

## 2018-08-31 PROCEDURE — 85610 PROTHROMBIN TIME: CPT | Performed by: INTERNAL MEDICINE

## 2018-08-31 PROCEDURE — 40000193 ZZH STATISTIC PT WARD VISIT

## 2018-08-31 PROCEDURE — 21400006 ZZH R&B CCU INTERMEDIATE UMMC

## 2018-08-31 PROCEDURE — 85027 COMPLETE CBC AUTOMATED: CPT | Performed by: INTERNAL MEDICINE

## 2018-08-31 PROCEDURE — 97110 THERAPEUTIC EXERCISES: CPT | Mod: GP

## 2018-08-31 PROCEDURE — 80076 HEPATIC FUNCTION PANEL: CPT | Performed by: INTERNAL MEDICINE

## 2018-08-31 PROCEDURE — 97530 THERAPEUTIC ACTIVITIES: CPT | Mod: GP

## 2018-08-31 PROCEDURE — 36415 COLL VENOUS BLD VENIPUNCTURE: CPT | Performed by: INTERNAL MEDICINE

## 2018-08-31 PROCEDURE — A9270 NON-COVERED ITEM OR SERVICE: HCPCS | Mod: GY | Performed by: NURSE PRACTITIONER

## 2018-08-31 PROCEDURE — 25000125 ZZHC RX 250: Performed by: THORACIC SURGERY (CARDIOTHORACIC VASCULAR SURGERY)

## 2018-08-31 PROCEDURE — 97116 GAIT TRAINING THERAPY: CPT | Mod: GP

## 2018-08-31 PROCEDURE — 25000132 ZZH RX MED GY IP 250 OP 250 PS 637: Mod: GY | Performed by: NURSE PRACTITIONER

## 2018-08-31 PROCEDURE — 25000128 H RX IP 250 OP 636: Performed by: PHYSICIAN ASSISTANT

## 2018-08-31 PROCEDURE — 71045 X-RAY EXAM CHEST 1 VIEW: CPT

## 2018-08-31 PROCEDURE — 25000125 ZZHC RX 250: Performed by: PATHOLOGY

## 2018-08-31 PROCEDURE — 25000132 ZZH RX MED GY IP 250 OP 250 PS 637: Mod: GY | Performed by: PHYSICIAN ASSISTANT

## 2018-08-31 RX ORDER — WARFARIN SODIUM 3 MG/1
6 TABLET ORAL
Status: COMPLETED | OUTPATIENT
Start: 2018-08-31 | End: 2018-08-31

## 2018-08-31 RX ORDER — AMIODARONE HYDROCHLORIDE 200 MG/1
200 TABLET ORAL DAILY
Status: DISCONTINUED | OUTPATIENT
Start: 2018-09-06 | End: 2018-08-31

## 2018-08-31 RX ORDER — HEPARIN SODIUM (PORCINE) LOCK FLUSH IV SOLN 100 UNIT/ML 100 UNIT/ML
3 SOLUTION INTRAVENOUS
Status: COMPLETED | OUTPATIENT
Start: 2018-08-31 | End: 2018-08-31

## 2018-08-31 RX ORDER — DIPHENHYDRAMINE HYDROCHLORIDE 50 MG/ML
50 INJECTION INTRAMUSCULAR; INTRAVENOUS
Status: DISCONTINUED | OUTPATIENT
Start: 2018-08-31 | End: 2018-09-04

## 2018-08-31 RX ORDER — FUROSEMIDE 20 MG
20 TABLET ORAL DAILY
Status: DISCONTINUED | OUTPATIENT
Start: 2018-08-31 | End: 2018-09-11

## 2018-08-31 RX ORDER — ALBUMIN HUMAN 25 %
250 INTRAVENOUS SOLUTION INTRAVENOUS
Status: CANCELLED | OUTPATIENT
Start: 2018-08-31

## 2018-08-31 RX ORDER — DIPHENHYDRAMINE HYDROCHLORIDE 50 MG/ML
50 INJECTION INTRAMUSCULAR; INTRAVENOUS
Status: CANCELLED | OUTPATIENT
Start: 2018-08-31

## 2018-08-31 RX ORDER — POTASSIUM CHLORIDE 750 MG/1
10 TABLET, EXTENDED RELEASE ORAL DAILY
Status: DISCONTINUED | OUTPATIENT
Start: 2018-08-31 | End: 2018-09-11

## 2018-08-31 RX ORDER — AMIODARONE HYDROCHLORIDE 200 MG/1
200 TABLET ORAL DAILY
Status: DISCONTINUED | OUTPATIENT
Start: 2018-09-02 | End: 2018-09-11 | Stop reason: HOSPADM

## 2018-08-31 RX ORDER — METHYLPREDNISOLONE SODIUM SUCCINATE 125 MG/2ML
125 INJECTION, POWDER, LYOPHILIZED, FOR SOLUTION INTRAMUSCULAR; INTRAVENOUS
Status: DISCONTINUED | OUTPATIENT
Start: 2018-08-31 | End: 2018-09-04

## 2018-08-31 RX ORDER — AMIODARONE HYDROCHLORIDE 200 MG/1
400 TABLET ORAL 2 TIMES DAILY
Status: DISCONTINUED | OUTPATIENT
Start: 2018-08-31 | End: 2018-08-31

## 2018-08-31 RX ORDER — AMIODARONE HYDROCHLORIDE 200 MG/1
200 TABLET ORAL 2 TIMES DAILY
Status: DISCONTINUED | OUTPATIENT
Start: 2018-08-31 | End: 2018-09-12

## 2018-08-31 RX ORDER — AMIODARONE HYDROCHLORIDE 200 MG/1
200 TABLET ORAL 2 TIMES DAILY
Status: DISCONTINUED | OUTPATIENT
Start: 2018-09-04 | End: 2018-08-31

## 2018-08-31 RX ADMIN — POTASSIUM CHLORIDE 20 MEQ: 750 TABLET, EXTENDED RELEASE ORAL at 10:55

## 2018-08-31 RX ADMIN — POTASSIUM CHLORIDE 10 MEQ: 750 TABLET, EXTENDED RELEASE ORAL at 11:40

## 2018-08-31 RX ADMIN — CLOPIDOGREL 75 MG: 75 TABLET, FILM COATED ORAL at 07:51

## 2018-08-31 RX ADMIN — SODIUM CHLORIDE, PRESERVATIVE FREE 3 ML: 5 INJECTION INTRAVENOUS at 15:56

## 2018-08-31 RX ADMIN — HEPARIN SODIUM 800 UNITS/HR: 10000 INJECTION, SOLUTION INTRAVENOUS at 08:02

## 2018-08-31 RX ADMIN — SODIUM CHLORIDE, PRESERVATIVE FREE 3 ML: 5 INJECTION INTRAVENOUS at 15:57

## 2018-08-31 RX ADMIN — IRON SUCROSE 200 MG: 20 INJECTION, SOLUTION INTRAVENOUS at 13:01

## 2018-08-31 RX ADMIN — HYDROXYUREA 1500 MG: 500 CAPSULE ORAL at 20:41

## 2018-08-31 RX ADMIN — ANTICOAGULANT CITRATE DEXTROSE SOLUTION FORMULA A 765 ML: 12.25; 11; 3.65 SOLUTION INTRAVENOUS at 13:50

## 2018-08-31 RX ADMIN — SODIUM CHLORIDE, PRESERVATIVE FREE 10 ML: 5 INJECTION INTRAVENOUS at 17:03

## 2018-08-31 RX ADMIN — HYDROXYUREA 1500 MG: 500 CAPSULE ORAL at 08:15

## 2018-08-31 RX ADMIN — THERA TABS 1 TABLET: TAB at 07:50

## 2018-08-31 RX ADMIN — FUROSEMIDE 20 MG: 20 TABLET ORAL at 11:40

## 2018-08-31 RX ADMIN — PANTOPRAZOLE SODIUM 40 MG: 40 TABLET, DELAYED RELEASE ORAL at 17:03

## 2018-08-31 RX ADMIN — LEVOTHYROXINE SODIUM 75 MCG: 75 TABLET ORAL at 07:51

## 2018-08-31 RX ADMIN — AMIODARONE HYDROCHLORIDE 400 MG: 200 TABLET ORAL at 07:50

## 2018-08-31 RX ADMIN — ATORVASTATIN CALCIUM 40 MG: 40 TABLET, FILM COATED ORAL at 20:10

## 2018-08-31 RX ADMIN — CALCIUM GLUCONATE 1542 MG/HR: 94 INJECTION, SOLUTION INTRAVENOUS at 14:03

## 2018-08-31 RX ADMIN — Medication 12.5 MG: at 11:40

## 2018-08-31 RX ADMIN — Medication 12.5 MG: at 20:14

## 2018-08-31 RX ADMIN — PANTOPRAZOLE SODIUM 40 MG: 40 TABLET, DELAYED RELEASE ORAL at 07:50

## 2018-08-31 RX ADMIN — Medication 2 G: at 11:00

## 2018-08-31 RX ADMIN — Medication 12.5 MG: at 21:42

## 2018-08-31 RX ADMIN — WARFARIN SODIUM 6 MG: 3 TABLET ORAL at 18:14

## 2018-08-31 RX ADMIN — AMIODARONE HYDROCHLORIDE 200 MG: 200 TABLET ORAL at 20:10

## 2018-08-31 ASSESSMENT — ACTIVITIES OF DAILY LIVING (ADL)
ADLS_ACUITY_SCORE: 11
ADLS_ACUITY_SCORE: 12
ADLS_ACUITY_SCORE: 11
ADLS_ACUITY_SCORE: 12
ADLS_ACUITY_SCORE: 11
ADLS_ACUITY_SCORE: 12

## 2018-08-31 NOTE — PROGRESS NOTES
"Social Work Services Progress Note    Hospital Day: 26  Collaborated with:  Patient, attended rounds     Data:  Per discussion in rounds and chart review, pt is now more appropriate for TCU (rather than ARU). Met with pt to discuss further and provided TCU list from Medicare.gov. Asked that he call SW with preferences and that [facility] SW will likely follow up. Pt reported that things were going well but \"slow\" otherwise. Reminded pt that BÁRBARA Cotton will be returning next week. He expressed understanding and thanked SW for stopping by.     Intervention:  Discharge planning     Assessment:  Pt is pleasant and receptive to SW, open to SW involvement, agreeable to dc recs. Per pt and staff, wife has been visiting, but no issues have seemed to arisen.     Plan:    Anticipated Disposition:  Facility:  TCU - need pt preferences and will send referrals.    Barriers to d/c plan:  Medical stability, TCU bed     Follow Up:  SW will continue to follow, support and assist with ongoing social service and discharge planning needs. Placed on [facility] SW list for follow-up.    Felisha Ceja, GRIFFIN, [facility]  - Coverage for  Lula Virk   Ph. [phone]  [email or web address]     NO LETTER            "

## 2018-08-31 NOTE — PLAN OF CARE
"Problem: Patient Care Overview  Goal: Plan of Care/Patient Progress Review  Outcome: No Change  BP 98/71 (BP Location: Left arm)  Pulse 95  Temp 98  F (36.7  C) (Oral)  Resp 18  Ht 1.753 m (5' 9\")  Wt 76.6 kg (168 lb 14.4 oz)  SpO2 97%  BMI 24.94 kg/m2    D: S/p VSD and TVR 8/10.  I/A: Monitored vitals and assessed pt status. A&O x4. VSS see flowsheet. SR. 2L O2 via NC. Denies pain and SOB. Heparin gtt 800 units/hr. x1 CT to -20 suction. Sternal incision open to air. Old CT dressing c/d/i.  Voiding adequate amount in bedside urinal. x1 episode urine continence. x1 episode fecal incontinence. Up with assist x1 and walker. Sleeping between cares.  P: Continue to monitor and follow POC. Notify CVTS with changes.        "

## 2018-08-31 NOTE — PROGRESS NOTES
8/31/2018   CVTS Progress Note  Attending provider: Jason Franco, *        S: No acute issues over night. Patient denies SOB, CP, fever, chills, sweats.  Breathing is better while sitting up.   Patient eating well with improving appetite  Ambulating in halls with assistance. + BM. Sinus rythym.  Pain tolerable on current regime, plan as per neuro section below     O:   Vitals:    08/30/18 2100 08/31/18 0114 08/31/18 0711 08/31/18 1055   BP: 99/63 99/66 98/71 108/75   BP Location:  Left arm Left arm Left arm   Pulse:       Resp:  18 18 18   Temp:  98.1  F (36.7  C) 98  F (36.7  C) 98  F (36.7  C)   TempSrc:  Oral Oral Oral   SpO2:  93% 97% 95%   Weight:  76.6 kg (168 lb 14.4 oz)     Height:         Vitals:    08/29/18 0355 08/30/18 0636 08/31/18 0114   Weight: 79.2 kg (174 lb 9.6 oz) 77 kg (169 lb 12.8 oz) 76.6 kg (168 lb 14.4 oz)       Intake/Output Summary (Last 24 hours) at 08/31/18 1118  Last data filed at 08/31/18 1000   Gross per 24 hour   Intake          1447.06 ml   Output             1664 ml   Net          -216.94 ml       Gen: AxOx3, NAD  CV: RRR, no murmurs, rubs, or gallops, HR 85  Pulm: CTA, no wheezing, or rhonchi  Abd: soft, NT, ND, +BS, +BM  Ext: bilateral LE 1+ edema  Incision: c/d/i, no erythema, sternal stable  Tubes/drains: dressing clean and dry, serosanguinous drainage, no air leak, Right chest tube ~300 mL output in last 24 hrs.    Labs:  BMP  Recent Labs  Lab 08/31/18  0822 08/30/18  0652 08/29/18  0829 08/28/18  0731    133 133 135   POTASSIUM 3.9 4.4 4.7 4.2   CHLORIDE 96 100 101 101   GABRIELA 8.8 7.8* 7.8* 7.9*   CO2 30 26 24 26   BUN 22 27 29 31*   CR 0.91 0.83 0.70 0.77   * 88 109* 98     CBC  Recent Labs  Lab 08/31/18  0822 08/30/18  2121 08/30/18  1730 08/30/18  0652   WBC 7.3 7.1 8.5 8.0   RBC 3.18* 2.84* 3.11* 2.99*   HGB 9.8* 8.6* 9.5* 9.2*   HCT 31.9* 27.5* 30.4* 29.8*    97 98 100   MCH 30.8 30.3 30.5 30.8   MCHC 30.7* 31.3* 31.3* 30.9*   RDW 22.6* 22.0*  22.7* 23.2*   * 586* 1666* 1534*     INR  Recent Labs  Lab 18  0822 18  0652 18  0829 18  0731   INR 1.47* 1.32* 1.23* 1.13      Hepatic Panel   Lab Results   Component Value Date    AST 41 2018     Lab Results   Component Value Date    ALT 68 2018     No results found for: BILICONJ   Lab Results   Component Value Date    BILITOTAL 0.2 2018     Lab Results   Component Value Date    ALBUMIN 2.0 2018     Lab Results   Component Value Date    PROTTOTAL 6.1 2018      Lab Results   Component Value Date    ALKPHOS 161 2018         Recent Labs  Lab 18  0822 18  1633 18  1212 18  0720 18  0652 18  0307 18  0001 18  2042  18  0829  18  0731  18  0703  18  0600   *  --   --   --  88  --   --   --   --  109*  --  98  --  102*  --  112*   BGM  --  88 87 100*  --  99 94 107*  < >  --   < >  --   < >  --   < >  --    < > = values in this interval not displayed.      Imagin/31 CXR 1 view Portable: unchanged small right hydropneumothorax and small left pleural effusion.    ASSESSMENT/PLAN: Patient is a 68 year old male with a history of s/p HUSSEIN to RCA and LAD for STEMI with post infarction basal VSD, who on 8/10/2018 underwent repair of posterior VSD and TVR with bioprosthetic valve.  He was kept open chest due to coagulopathy and RV dysfunction. Chest closed on 2018, IABP removed 18 and he was extubated on POD #11.       Neuro:  -Acute post-op pain: IV dilaudid, tylenol and oxycodone prn  - Wean Seroquel as tolerated, decreased to 12.5 mg HS      CV:  - ASA 81 mg, atorvastatin 40 mg daily,   - Systolic Blood pressure: 80-98  - TPW capped  - ICU course complicated by a-flutter, EP consult with plan to anticoagulate and then attempt to overdrive pace with A lead.  While in the ICU, patient also went into narrow complex SVT with MAPs in 50s. Adenosine 6 mg was administered,  resulting in 20 seconds of asystole and brief CPR with ROSC.    -  patient converted from NSR to a-fib overnight. During morning therapy, patient had a-fib with RVR (rates 140s) and symptomatic. EKGs appreciable for a-fib and SVT. Metoprolol 2.5 mg IV administered with improved rate control. Discussed case with EP fellow, amio bolus and gtt started.  Patient converted to NSR afternoon on . Amiodarone 400 mg PO BID .   Start Amiodarone taper 200 mg BID x 4 doses, then 200 mg daily for 21 doses   - Cut TPW , no bradycardia on PO Amio but prolonged QTc.   - Start Metoprolol 12.5 mg po bid.       Resp:   - Extubated POD 11  - IS, encourage activity  - Mediastinal CT with minimal output, Pleural chest tubes with moderate, serosanguinous output. : Right Output 300 mL, keep today.   -  Pulled Left pleural tube, f/u CXR.  POC U/S by Medicine for Left pleural effusion today-findings appreciable for loculated fluid. IR consult with CT chest w/o contrast. Possible chest tube placement with IR on .   -  IR recommended thoracentesis for left pleural effusion.  Per Dr. Franco, no thoracentesis necessary at this time.    - :  f/u 2 view CXR on , for pleural effusion and eval for Chest Tube removal     FEN/GI:   - Reg Diet with thin liquids, speech following.   - Continuous TF, consider calorie counts once patient's diet advances   - Bowel regimen, + BM  -  EGD: two gastric ulcers, no active bleeding or high-risk features.        Renal:   - Creatinine WNL, adequate UOP  - Volume status: hypervolemic, dry weight ~ 170 lbs, current weight 169.  - Diurese  Lasix 20 mg po daily.          ID:   - Completed alban-op abx  - WBC 7.3  and trending down, afebrile, no signs or symptoms of infection      Heme:   - Hgb 9.7, no signs or symptoms of bleeding   - Continue to monitor for signs of GI bleed, transfuse for Hgb < 7  - Hematology followin/30 Plt 1534, Hydrea 1500 mg BID, Continue to  trend plts, Heme recommended Iron replacement, started 8/29.    - 8/30 Plateletpheresis for Plts >1500, IR to place double lumen non-tunneled catheter prior to procedure  - 8/31 Plts 714 this AM s/p plateletpheresis, repeat plateletpheresis x 1 today, give 2nd dose of 200 mg Iron Sucrose.  Hematology will convert oral medications 9/1 or 9/2, appreciate Heme rec, ongoing. Does not need to stay inpatient from hematology standpoint. If patient discharges over weekend, call hematology for hydroxyurea dosing.     Endo:   - BG 100s, on sliding scale insulin. Will discontinue once diet improves.       PPx:   - PPI      Anticoagulation:   - 8/25 start low intensity heparin gtt   - 8/26 start warfarin, INR 2-3. INR 1.47  - Plavix 75 mg daily      Dispo:   - 6C since 8/24  - Therapy recommending d/c to TCU  - Must be therapeutic INR before discharge due to high clotting risk.     Will discuss with staff    Janice Ellis PA-C  Cardiothoracic Surgery   Pager [phone]  August 31, 2018

## 2018-08-31 NOTE — PLAN OF CARE
Problem: Patient Care Overview  Goal: Plan of Care/Patient Progress Review  Discharge Planner SLP   Patient plan for discharge: none stated  Current status: SLP: Pt was able to recall and implement vocal fold adduction exercises independently. Recommend continue regular textures and thin liquids. Caregivers to encourage independent completion of exercises. Goals met.   Barriers to return to prior living situation: none from ST standpoint  Recommendations for discharge: no further IP ST needs. Consider OP ENT/Voice referral if pts vocal quality does not continue to improve  Rationale for recommendations: pt is currently at baseline swallow function and is independent with exercises program. Will complete ST orders.        Entered by: Aaliyah Mobley 08/31/2018 12:03 PM       Speech Language Therapy Discharge Summary    Reason for therapy discharge:    All goals and outcomes met, no further needs identified.    Progress towards therapy goal(s). See goals on [facility] Plan [facility] electronic health record for goal details.  Goals met    Therapy recommendation(s):    Continue home exercise program.

## 2018-08-31 NOTE — PLAN OF CARE
Problem: Patient Care Overview  Goal: Plan of Care/Patient Progress Review  OT 6C Cx Pt with in room procedure x2 attempts.

## 2018-08-31 NOTE — PROGRESS NOTES
CLINICAL NUTRITION SERVICES - REASSESSMENT NOTE     Nutrition Prescription    RECOMMENDATIONS FOR MDs/PROVIDERS TO ORDER:  None at this time.     Malnutrition Status:    Severe malnutrition in the context of acute illness    Recommendations already ordered by Registered Dietitian (RD):  None at this time    Future/Additional Recommendations:  1. ContinueThera-vit, as ordered, to help meet micronutrient needs.  2. Continue to monitor PO intake        EVALUATION OF THE PROGRESS TOWARD GOALS   Diet: Regular diet order and thin liquids since 8/26. Has prn supplement order, Boost plus @ 2pm and HS and Boost Breeze @10am.  - Extubated on 8/21. Pt previously on nectar-thick clear liquids 8/23 and then full liquids or clear liquids on 8/23. SLP was following, pt was discharged from service on 8/31 (today).     Intake:Good diet tolerance. Flowsheets indicate pt consumed 100% of his morning meal, 50% of his afternoon meal and 75% of his evening meal with fair/good appetite on 8/28. He consumed 50% of afternoon meal and 75% of his evening meal on 8/30. Pt consumed 100% of his breakfast today (8/31).     - Per brief note on 8/29:   Kcal counts:   8/26     501 kcals and 20 g protein (meal/s and no supplement/s recorded)   8/27   1123 kcals and 42 g protein (meal/s and oral supplement/s including Boost Breeze and Boost Plus    8/28     682 kcals and 23 g protein (meal/s and 100% of one Boost Breeze supplement/s recorded)    - Per RN from 8/28 pm, pt consumed 100% of supper except for bread (which would provide another 414 kcals and 4 g protein in addition to documented kcal counts in EMR). RN noted that pt consumes his oral supplements (pt may have consumed more than one oral supplement 8/28). [employer] indicates food/beverages sent 8/28 totaled 2468 kcals and 109 g protein. Per flowsheets, pt consumed 100% of his first meal with a fair appetite, 50% of second meal with a fair appetite, and 75% of his last meal with a good  appetite.   * Pt consumed a three-day average of 740 kcals and 30 g protein daily (per adjusted 8/28 kcal counts). Total average nutrition intake not meeting 1764 - 2205 kcals/day (% MREE, 23-29 kcals/kg) and 116 - 154 grams protein/day (1.5 - 2 grams of pro/kg). However, oral intake is improving.     Nutrition Support: Discontinued on 8/29. 8/27-8/29: TF regimen: Nutren 1.5 at 60 mL/hr x 14 hours provides 840 mL TF, 1260+ kcals (16+ kcals/kg), 57+ g protein (0.7+ g protein/kg), 638 mL H2O, 148 g CHO, and no fiber daily    - Intake:  From 8/27-8/29 pt received a 3 day TF average of 722 ml/day which provided 1083 kcals and 49 g protein and does not meet pt's estimated needs (despite receiving Pro[employer] TF modulars which provide additional 120 kcals and 33 g protein) of 1764 - 2205 kcals/day (% MREE on 8/20, 23-29 kcals/kg) and 116 - 154 grams protein/day (1.5 - 2 grams of pro/kg).      NEW FINDINGS   Wt Hx: 76.6 kg (8/31/18), 79.7 kg (8/21/17), 77.3 kg (7/30/18), 81.4 kg (5/22/18). Weight is down 4.8 kg (5.8%) over the past three months.     Labs:  Ionized: 8/30: 4.3 (L), 8/31: 4.8 (WNL).     Meds; Lasix 20 mg daily, Thera-vit 1 tab daily, potassium chloride 10 mEq daily    GI: pt having 0-2 BM's per day, per flowsheets stools have been, loose, formed and soft.     MALNUTRITION  % Intake: Decreased intake does not meet criteria  % Weight Loss: Up to 7.5% in 3 months (non-severe)  Subcutaneous Fat Loss: Facial region, Upper arm and Lower arm: moderate per prior nutrition note on 8/20 Pt was busy during attempts x 2.  Muscle Loss: Temporal, Scapular bone, Thoracic region (clavicle, acromium bone, deltoid, trapezius, pectoral), Upper arm (bicep, tricep), Lower arm  (forearm) and Dorsal hand: severe per prior nutrition note on 8/20. Pt was busy during attempts x 2.  Fluid Accumulation/Edema: Does not meet criteria  Malnutrition Diagnosis: Severe malnutrition in the context of acute illness    Previous Goals    Total average nutrition intake to meet 1764 - 2205 kcals/day (% MREE, 23-29 kcals/kg) and 116 - 154 grams protein/day (1.5 - 2 grams of pro/kg).  Evaluation:Not met     Previous Nutrition Diagnosis  Inadequate oral intake related to recent diet advancement and lack of appetite as evidenced by kcal counts on 8/26 indicate pt consumed 501 kcals and 20 g protein which does not meet estimated needs of 1764 - 2205 kcals/day (% MREE, 23-29 kcals/kg) and 116 - 154 grams protein/day (1.5 - 2 grams of pro/kg).   Evaluation: Continues     CURRENT NUTRITION DIAGNOSIS  Inadequate oral intake related to decreased appetite as evidenced by kcal counts average of 740 kcals and 30 g protein daily (per adjusted 8/28 kcal counts). Total average nutrition intake not meeting 1764 - 2205 kcals/day (% MREE, 23-29 kcals/kg) and 116 - 154 grams protein/day (1.5 - 2 grams of pro/kg). However, oral intake is improving.      INTERVENTIONS  Implementation  Continue with current Regular diet + MNT supplements   Encouraged PO intake: recommended 5-6 small meals daily.       Goals  Total average nutrition intake to meet 1764 - 2205 kcals/day (% MREE, 23-29 kcals/kg) and 116 - 154 grams protein/day (1.5 - 2 grams of pro/kg)    Monitoring/Evaluation  Progress toward goals will be monitored and evaluated per protocol.    Nutrition will continue to follow.      Suzette Quintero RD, DEBBIE   6C pgr: [phone]

## 2018-08-31 NOTE — PROCEDURES
Transfusion Medicine  Apheresis Procedure Note    Castillo De Anda [id number]   YOB: 1950 Age: 68 year old   Date of Consult: 8/30/2018      Reason for consult: Plateletpheresis            Assessment and Plan:   68 year old male is seen for plateletpheresis for thrombocytosis (PLT 1534 on 08/30).  He has a history of a myleoproliferative disorder. The plan is to complete the depletion procedure once, with clinical re-evaluation subsequently. Please feel free to contact us if there is a clinical need for additional plateletpheresis procedure.    The patient tolerated the plateletpheresis procedure well without complication.  He felt a little cold during the procedure, this improved with some warm blankets.  He noted some tingling and paresthesias, increased calcium gluconate replacement.  Denies nausea, vomiting, fevers.    Pre procedure platelet count: 1666  Post procedure platelet count: 586           History of Present Illness:   Castillo De Anda is a 68 year old male who is seen for plateletpheresis for thrombocytosis.  His past medical history includes Polycythemia vera.  He is in hospital status post stenting for ST elevation myocardial infarction. He has ongoing thrombocytosis, with PLT 1534 on 08/30 AM labs. He is currently well.                 Past Medical History:   Polycythemia vera  STEMI s/p HUSSEIN          Past Surgical History:     Past Surgical History:   Procedure Laterality Date     ANKLE SURGERY Right 04/2018     APPENDECTOMY       CRANIOTOMY Right 2008     ESOPHAGOSCOPY, GASTROSCOPY, DUODENOSCOPY (EGD), COMBINED N/A 8/24/2018    Procedure: COMBINED ESOPHAGOSCOPY, GASTROSCOPY, DUODENOSCOPY (EGD);  Upper Endoscopy with No Intervention; Two Gastric Ulcers;  Surgeon: Rocael Barber MD;  Location: [facility] OR     IRRIGATION AND DEBRIDEMENT CHEST WASHOUT, COMBINED N/A 8/13/2018    Procedure: COMBINED IRRIGATION AND DEBRIDEMENT CHEST WASHOUT;  COMBINED IRRIGATION AND DEBRIDEMENT CHEST  WASHOUT, Closure;  Surgeon: Jason Franco MD;  Location: [facility] OR     PROSTATECTOMY PERINEAL  2004     REPAIR VENTRICULAR SEPTAL DEFECT N/A 8/10/2018    Procedure: REPAIR VENTRICULAR SEPTAL DEFECT;  Median Sternotomy, REPAIR VENTRICULAR SEPTAL DEFECT on pump oxygenation, Tricuspid valve replacement ;  Surgeon: Jason Franco MD;  Location: [facility] OR     SPLENECTOMY      childhood     THYROID SURGERY  2012            Allergies:     Allergies   Allergen Reactions     Anagrelide Rash     Noted to have small rash and nose bleeds after starting the prescription (prior to August 2018 hospitalization; prescribed by [facility] clinician); dosage had then been increased (by [facility] clinician) and subsequently developed full body rash within 4 hours of increased dose.             Medications:     Current Facility-Administered Medications   Medication     acetaminophen (TYLENOL) tablet 650 mg     albuterol neb solution 2.5 mg     amiodarone (PACERONE/CODARONE) tablet 400 mg     atorvastatin (LIPITOR) tablet 40 mg     benzocaine-menthol (CEPACOL) 15-3.6 MG lozenge 1 lozenge     calcium gluconate 1 g in D5W 100 mL intermittent infusion     Carboxymethylcellulose Sod PF (REFRESH PLUS) 0.5 % ophthalmic solution 1 drop     clopidogrel (PLAVIX) tablet 75 mg     glucose gel 15-30 g    Or     dextrose 50 % injection 25-50 mL    Or     glucagon injection 1 mg     heparin  drip 25,000 units in 0.45% NaCl 250 mL (see additional administration details for dose)     heparin bolus from infusion pump     heparin lock flush 10 UNIT/ML injection 2-5 mL     heparin lock flush 10 UNIT/ML injection 5-10 mL     heparin lock flush 10 UNIT/ML injection 5-10 mL     hydrALAZINE (APRESOLINE) injection 10 mg     HYDROmorphone (PF) (DILAUDID) injection 0.3-0.5 mg     hydroxyurea (HYDREA) capsule CHEMO 1,500 mg     levothyroxine (SYNTHROID/LEVOTHROID) tablet 75 mcg     lidocaine (LMX4) cream     lidocaine (LMX4) cream     lidocaine 1 % 1 mL      magnesium sulfate 2 g in NS intermittent infusion ([facility] or [facility])     magnesium sulfate 4 g in 100 mL sterile water (premade)     May continue current IV fluids if patient has IV fluids infusing.     melatonin tablet 1 mg     methocarbamol (ROBAXIN) tablet 500 mg     metoprolol (LOPRESSOR) injection 2.5 mg     multivitamin, therapeutic (THERA-VIT) tablet 1 tablet     naloxone (NARCAN) injection 0.1-0.4 mg     ondansetron (ZOFRAN-ODT) ODT tab 4 mg    Or     ondansetron (ZOFRAN) injection 4 mg     oxyCODONE IR (ROXICODONE) tablet 5-10 mg     pantoprazole (PROTONIX) EC tablet 40 mg     polyethylene glycol (MIRALAX/GLYCOLAX) Packet 17 g     potassium chloride (KLOR-CON) Packet 20-40 mEq     potassium chloride 10 mEq in 100 mL intermittent infusion with 10 mg lidocaine     potassium chloride 10 mEq in 100 mL sterile water intermittent infusion (premix)     potassium chloride 20 mEq in 50 mL intermittent infusion     potassium chloride SA (K-DUR/KLOR-CON M) CR tablet 20-40 mEq     potassium phosphate 15 mmol in D5W 250 mL intermittent infusion     potassium phosphate 20 mmol in D5W 250 mL intermittent infusion     potassium phosphate 20 mmol in D5W 500 mL intermittent infusion     potassium phosphate 25 mmol in D5W 500 mL intermittent infusion     prochlorperazine (COMPAZINE) injection 5 mg    Or     prochlorperazine (COMPAZINE) tablet 5 mg     QUEtiapine (SEROquel) half-tab 12.5 mg     QUEtiapine (SEROquel) half-tab 12.5 mg     Reason ACE/ARB/ARNI order not selected     Reason beta blocker not prescribed     Reason beta blocker order not selected     senna-docusate (SENOKOT-S;PERICOLACE) 8.6-50 MG per tablet 2 tablet    Or     senna-docusate (SENOKOT-S;PERICOLACE) 8.6-50 MG per tablet 1 tablet     sodium chloride (PF) 0.9% PF flush 10-20 mL     sodium chloride (PF) 0.9% PF flush 3 mL     sodium chloride (PF) 0.9% PF flush 3 mL     sodium chloride (PF) 0.9% PF flush 3 mL     sodium chloride (PF) 0.9% PF flush 5-50  "mL     Warfarin Therapy Reminder (Check START DATE - warfarin may be starting in the FUTURE)             Review of Systems:   See above           Exam:   BP 99/63  Pulse 95  Temp 98.2  F (36.8  C) (Oral)  Resp 18  Ht 1.753 m (5' 9\")  Wt 77 kg (169 lb 12.8 oz)  SpO2 100%  BMI 25.08 kg/m2   Alert, no acute distress.  On supplemental oxygen.  Central line accessed for the procedure.              Data:     ROUTINE IP LABS (Last four results)  BMP  Recent Labs  Lab 08/30/18  0652 08/29/18  0829 08/28/18  0731 08/27/18  0703    133 135 138   POTASSIUM 4.4 4.7 4.2 4.2   CHLORIDE 100 101 101 104   GABRIELA 7.8* 7.8* 7.9* 7.9*   CO2 26 24 26 26   BUN 27 29 31* 31*   CR 0.83 0.70 0.77 0.74   GLC 88 109* 98 102*     CBC    Recent Labs  Lab 08/30/18  2121 08/30/18  1730 08/30/18  0652 08/29/18  0829   WBC 7.1 8.5 8.0 9.2   RBC 2.84* 3.11* 2.99* 2.98*   HGB 8.6* 9.5* 9.2* 9.2*   HCT 27.5* 30.4* 29.8* 29.8*   MCV 97 98 100 100   MCH 30.3 30.5 30.8 30.9   MCHC 31.3* 31.3* 30.9* 30.9*   RDW 22.0* 22.7* 23.2* 23.1*   * 1666* 1534* 1386*       INR  Recent Labs  Lab 08/30/18  0652 08/29/18  0829 08/28/18  0731 08/27/18  0703   INR 1.32* 1.23* 1.13 1.19*             Procedure Summary:   A Plateletpheresis procedure was performed..  A dual lumen central line was used for access and allowed for appropriate flow during the procedure.  ACD-A was used for anticoagulation. To offset the effects of the citrate, calcium gluconate was given in the return line.  The patient's vital signs were stable throughout.  The patient tolerated the procedure well without complication.  See apheresis flowsheet for additional details.    Attestation: During the procedure the patient was directly seen and evaluated by me, Jason Fowler MD.    Jason Fowler MD  Transfusion Medicine Attending  Laboratory Medicine & Pathology  Pager: [phone]            "

## 2018-08-31 NOTE — PLAN OF CARE
Problem: Cardiac Surgery (Adult)  Goal: Signs and Symptoms of Listed Potential Problems Will be Absent, Minimized or Managed (Cardiac Surgery)  Signs and symptoms of listed potential problems will be absent, minimized or managed by discharge/transition of care (reference Cardiac Surgery (Adult) CPG).   Outcome: No Change    Patient a/o x 4. VSSA. 2L O2. SR. Non-Tunneled right internal jugular line placed in IR. No pain. Platelet pheresis performed without complications. PL: 1666 > 586. Assist of 1 with transfers and walks with walker. Patient feeling physically better today. Spouse was pleasant today with no evidence of tension. Patient pleasant and cooperative with cares. Denied pain. Continue to monitor. Notify provider with any concerns.

## 2018-08-31 NOTE — PROGRESS NOTES
HEMATOLOGY FOLLOW UP NOTE    Castillo Soler is a 68 year old man with JAK2+ polycythemia vera, recently switched by primary heme/onc provider from Hydrea to Anagrelide with very poor tolerance. Reason for switching apparently was that his anemia was felt to be due to Hydrea.  He is admitted s/p STEMI with ischemia-related VSD requiring surgical intervention. He is high-risk for ongoing thrombocytosis and we restarted hydrea on 8/7/18. Platelet count has continued to increase, hydrea dose increased to 1000 mg bid on 8/10. Work-up for platelet dysfunction has been negative.    Patient reports feeling well. Offers no complains.    He had plateletpheresis yesterday, PLT up to 1666 before pheresis, and down to 586 after.   PLT is 714 this morning    Physical Examination:   Temp: 98  F (36.7  C) Temp src: Oral BP: 98/71 Pulse: 95 Heart Rate: 89 Resp: 18 SpO2: 97 % O2 Device: Nasal cannula with humidification Oxygen Delivery: 4 LPM  Constitutional: Awake and alert, not in acute distress.  Eyes: No scleral icterus. Eyes exhibit no discharge.  ENT/Mouth: Oral mucosa pink and moist  Cardiovascular: Normal rate, regular rhythm, S1, S2. No murmur or rub. No leg edema  Respiratory: Mild respiratory distress. No wheezes. Crackle in bibasilar area, right side is worse  Gastrointestinal: soft, no distention, no tenderness, active BS  Neurological: AAOX3, grossly non-focal  Psychiatric: Mentation and affect appear normal  Lines: PICC line in R arm, temporary HD cath in R IJ      Results for CASTILLO SOLER (MRN [id number]) as of 8/31/2018 09:32   Ref. Range 8/29/2018 08:29 8/30/2018 06:52 8/30/2018 17:30 8/30/2018 21:21 8/31/2018 08:22   Platelet Count Latest Ref Range: 150 - 450 10e9/L 1386 (HH) 1534 (HH) 1666 (HH) 586 (H) 714 (H)             Recommendation:  - will again do plateletpheresis today, contacted transfusion medicine  - will watch PLT counts over the weekend  - planning to start Jakafi(Ruxolitinib) in 1-2 days after  plateletpheresis, will need to follow QT-c after starting Jakafi       We will continue to follow. Please page with any questions/concerns.      Plan was discussed with attending physician Dr. Oconnell.        Balaji Rodriguez MD/MPH  Heme/Onc Fellow, PGY-4

## 2018-08-31 NOTE — PLAN OF CARE
Problem: Patient Care Overview  Goal: Plan of Care/Patient Progress Review  Discharge Planner PT 6C  Patient plan for discharge: rehab  Current status: pt completes 2 x 120' gait with FWW and SBA; cued for posture and stepping technique. X 1 LOB in which pt recovers with stepping strategy. Min-Mod A for sit <> stand transfers depending on height of surface; uses cardiac pillow and follows precautions well. 3LPM O2 throughout, reports mild SOB.   Barriers to return to prior living situation: functional mobility status, post surgical precautions, level of assist  Recommendations for discharge: TCU  Rationale for recommendations: to progress safety with independent mobility, functional strength and activity tolerance.        Entered by: Cornelio De Dios 08/31/2018 9:19 AM

## 2018-08-31 NOTE — PROCEDURES
Transfusion Medicine  Apheresis Procedure Note    Castillo De Anda [id number]   YOB: 1950 Age: 68 year old       Procedure: Plateletpheresis for thrombocytosis           Assessment and Plan:   68 year old male is seen for a second plateletpheresis procedure for thrombocytosis (PLT 1534 on 08/30).  He has a history of a myleoproliferative disorder.     The patient tolerated the plateletpheresis procedure well without complication.  Denies nausea, vomiting, fevers, chills.  Depletion procedure from yesterday was effective. Pre procedure platelet count: 1666.  Post procedure platelet count: 586.  His platelet count is already beginning to drop independent of the depletion procedures.  Do not anticipate a need for additional procedures.     Pre procedure platelet count: 685  Post procedure platelet count: 359             History of Present Illness:   Castillo De Anda is a 68 year old male who is seen for plateletpheresis for thrombocytosis.  His past medical history includes Polycythemia vera.  He is in hospital status post stenting for ST elevation myocardial infarction. He has ongoing thrombocytosis, with PLT 1534 on 08/30 AM labs. He is currently well.                 Past Medical History:   Polycythemia vera  STEMI s/p HUSSEIN          Past Surgical History:     Past Surgical History:   Procedure Laterality Date     ANKLE SURGERY Right 04/2018     APPENDECTOMY       CRANIOTOMY Right 2008     ESOPHAGOSCOPY, GASTROSCOPY, DUODENOSCOPY (EGD), COMBINED N/A 8/24/2018    Procedure: COMBINED ESOPHAGOSCOPY, GASTROSCOPY, DUODENOSCOPY (EGD);  Upper Endoscopy with No Intervention; Two Gastric Ulcers;  Surgeon: Rocael Barber MD;  Location: [facility] OR     IRRIGATION AND DEBRIDEMENT CHEST WASHOUT, COMBINED N/A 8/13/2018    Procedure: COMBINED IRRIGATION AND DEBRIDEMENT CHEST WASHOUT;  COMBINED IRRIGATION AND DEBRIDEMENT CHEST WASHOUT, Closure;  Surgeon: Jason Franco MD;  Location: [facility] OR      PROSTATECTOMY PERINEAL  2004     REPAIR VENTRICULAR SEPTAL DEFECT N/A 8/10/2018    Procedure: REPAIR VENTRICULAR SEPTAL DEFECT;  Median Sternotomy, REPAIR VENTRICULAR SEPTAL DEFECT on pump oxygenation, Tricuspid valve replacement ;  Surgeon: Jason Franco MD;  Location: [facility] OR     SPLENECTOMY      childhood     THYROID SURGERY  2012            Allergies:     Allergies   Allergen Reactions     Anagrelide Rash     Noted to have small rash and nose bleeds after starting the prescription (prior to August 2018 hospitalization; prescribed by [facility] clinician); dosage had then been increased (by [facility] clinician) and subsequently developed full body rash within 4 hours of increased dose.             Medications:     Current Facility-Administered Medications   Medication     acetaminophen (TYLENOL) tablet 650 mg     albuterol neb solution 2.5 mg     amiodarone (PACERONE/CODARONE) tablet 200 mg     [START ON 9/2/2018] amiodarone (PACERONE/CODARONE) tablet 200 mg     atorvastatin (LIPITOR) tablet 40 mg     benzocaine-menthol (CEPACOL) 15-3.6 MG lozenge 1 lozenge     calcium gluconate 1 g in D5W 100 mL intermittent infusion     Carboxymethylcellulose Sod PF (REFRESH PLUS) 0.5 % ophthalmic solution 1 drop     clopidogrel (PLAVIX) tablet 75 mg     glucose gel 15-30 g    Or     dextrose 50 % injection 25-50 mL    Or     glucagon injection 1 mg     diphenhydrAMINE (BENADRYL) injection 50 mg     EPINEPHrine (ADRENALIN) kit 0.3 mg     furosemide (LASIX) tablet 20 mg     heparin  drip 25,000 units in 0.45% NaCl 250 mL (see additional administration details for dose)     heparin 100 UNIT/ML injection 3 mL     heparin 100 UNIT/ML injection 3 mL     heparin bolus from infusion pump     heparin lock flush 10 UNIT/ML injection 2-5 mL     heparin lock flush 10 UNIT/ML injection 5-10 mL     heparin lock flush 10 UNIT/ML injection 5-10 mL     hydrALAZINE (APRESOLINE) injection 10 mg     HYDROmorphone (PF) (DILAUDID) injection  0.3-0.5 mg     hydroxyurea (HYDREA) capsule CHEMO 1,500 mg     levothyroxine (SYNTHROID/LEVOTHROID) tablet 75 mcg     lidocaine (LMX4) cream     lidocaine (LMX4) cream     lidocaine 1 % 1 mL     magnesium sulfate 2 g in NS intermittent infusion ([facility] or [facility])     magnesium sulfate 4 g in 100 mL sterile water (premade)     May continue current IV fluids if patient has IV fluids infusing.     melatonin tablet 1 mg     methocarbamol (ROBAXIN) tablet 500 mg     methylPREDNISolone sodium succinate (solu-MEDROL) injection 125 mg     metoprolol (LOPRESSOR) injection 2.5 mg     metoprolol tartrate (LOPRESSOR) half-tab 12.5 mg     multivitamin, therapeutic (THERA-VIT) tablet 1 tablet     naloxone (NARCAN) injection 0.1-0.4 mg     ondansetron (ZOFRAN-ODT) ODT tab 4 mg    Or     ondansetron (ZOFRAN) injection 4 mg     oxyCODONE IR (ROXICODONE) tablet 5-10 mg     pantoprazole (PROTONIX) EC tablet 40 mg     potassium chloride (KLOR-CON) Packet 20-40 mEq     potassium chloride 10 mEq in 100 mL intermittent infusion with 10 mg lidocaine     potassium chloride 10 mEq in 100 mL sterile water intermittent infusion (premix)     potassium chloride 20 mEq in 50 mL intermittent infusion     potassium chloride SA (K-DUR/KLOR-CON M) CR tablet 10 mEq     potassium chloride SA (K-DUR/KLOR-CON M) CR tablet 20-40 mEq     potassium phosphate 15 mmol in D5W 250 mL intermittent infusion     potassium phosphate 20 mmol in D5W 250 mL intermittent infusion     potassium phosphate 20 mmol in D5W 500 mL intermittent infusion     potassium phosphate 25 mmol in D5W 500 mL intermittent infusion     prochlorperazine (COMPAZINE) injection 5 mg    Or     prochlorperazine (COMPAZINE) tablet 5 mg     QUEtiapine (SEROquel) half-tab 12.5 mg     QUEtiapine (SEROquel) half-tab 12.5 mg     ranitidine (ZANTAC) injection 50 mg     Reason ACE/ARB/ARNI order not selected     Reason beta blocker not prescribed     Reason beta blocker order not selected      "senna-docusate (SENOKOT-S;PERICOLACE) 8.6-50 MG per tablet 2 tablet    Or     senna-docusate (SENOKOT-S;PERICOLACE) 8.6-50 MG per tablet 1 tablet     sodium chloride (PF) 0.9% PF flush 10 mL     sodium chloride (PF) 0.9% PF flush 10 mL     sodium chloride (PF) 0.9% PF flush 10-20 mL     sodium chloride (PF) 0.9% PF flush 3 mL     sodium chloride (PF) 0.9% PF flush 3 mL     sodium chloride (PF) 0.9% PF flush 3 mL     sodium chloride (PF) 0.9% PF flush 5-50 mL     warfarin (COUMADIN) tablet 6 mg     Warfarin Therapy Reminder (Check START DATE - warfarin may be starting in the FUTURE)             Review of Systems:   See above           Exam:   /74  Pulse 78  Temp 97.9  F (36.6  C) (Oral)  Resp 20  Ht 1.753 m (5' 9\")  Wt 76.6 kg (168 lb 14.4 oz)  SpO2 95%  BMI 24.94 kg/m2   Alert, no acute distress.  Central line accessed for the procedure.              Data:     ROUTINE IP LABS (Last four results)  BMP    Recent Labs  Lab 08/31/18  0822 08/30/18  0652 08/29/18  0829 08/28/18  0731    133 133 135   POTASSIUM 3.9 4.4 4.7 4.2   CHLORIDE 96 100 101 101   GABRIELA 8.8 7.8* 7.8* 7.9*   CO2 30 26 24 26   BUN 22 27 29 31*   CR 0.91 0.83 0.70 0.77   * 88 109* 98     CBC    Recent Labs  Lab 08/31/18  1350 08/31/18  0822 08/30/18  2121 08/30/18  1730   WBC 7.3 7.3 7.1 8.5   RBC 2.85* 3.18* 2.84* 3.11*   HGB 8.7* 9.8* 8.6* 9.5*   HCT 27.9* 31.9* 27.5* 30.4*   MCV 98 100 97 98   MCH 30.5 30.8 30.3 30.5   MCHC 31.2* 30.7* 31.3* 31.3*   RDW 22.0* 22.6* 22.0* 22.7*   * 714* 586* 1666*       INR    Recent Labs  Lab 08/31/18  0822 08/30/18  0652 08/29/18  0829 08/28/18  0731   INR 1.47* 1.32* 1.23* 1.13             Procedure Summary:   A Plateletpheresis procedure was performed..  A dual lumen central line was used for access and allowed for appropriate flow during the procedure.  ACD-A was used for anticoagulation. To offset the effects of the citrate, calcium gluconate was given in the return line.  The " patient's vital signs were stable throughout.  The patient tolerated the procedure well without complication.  See apheresis flowsheet for additional details.      Attestation: During the procedure the patient was directly seen and evaluated by me, Jason Fowler MD.    Jason Fowler MD  Transfusion Medicine Attending  Laboratory Medicine & Pathology  Pager: [phone]

## 2018-09-01 ENCOUNTER — APPOINTMENT (OUTPATIENT)
Dept: GENERAL RADIOLOGY | Facility: CLINIC | Age: 68
DRG: 219 | End: 2018-09-01
Attending: PHYSICIAN ASSISTANT
Payer: MEDICARE

## 2018-09-01 LAB
ANION GAP SERPL CALCULATED.3IONS-SCNC: 8 MMOL/L (ref 3–14)
BUN SERPL-MCNC: 17 MG/DL (ref 7–30)
CA-I BLD-MCNC: 4.3 MG/DL (ref 4.4–5.2)
CALCIUM SERPL-MCNC: 8 MG/DL (ref 8.5–10.1)
CHLORIDE SERPL-SCNC: 99 MMOL/L (ref 94–109)
CO2 SERPL-SCNC: 30 MMOL/L (ref 20–32)
CREAT SERPL-MCNC: 0.85 MG/DL (ref 0.66–1.25)
ERYTHROCYTE [DISTWIDTH] IN BLOOD BY AUTOMATED COUNT: 22.7 % (ref 10–15)
GFR SERPL CREATININE-BSD FRML MDRD: 90 ML/MIN/1.7M2
GLUCOSE SERPL-MCNC: 86 MG/DL (ref 70–99)
HCT VFR BLD AUTO: 29.5 % (ref 40–53)
HGB BLD-MCNC: 9.1 G/DL (ref 13.3–17.7)
INR PPP: 1.84 (ref 0.86–1.14)
LMWH PPP CHRO-ACNC: 0.32 IU/ML
MAGNESIUM SERPL-MCNC: 2 MG/DL (ref 1.6–2.3)
MCH RBC QN AUTO: 30.6 PG (ref 26.5–33)
MCHC RBC AUTO-ENTMCNC: 30.8 G/DL (ref 31.5–36.5)
MCV RBC AUTO: 99 FL (ref 78–100)
PLATELET # BLD AUTO: 397 10E9/L (ref 150–450)
POTASSIUM SERPL-SCNC: 4.2 MMOL/L (ref 3.4–5.3)
RBC # BLD AUTO: 2.97 10E12/L (ref 4.4–5.9)
SODIUM SERPL-SCNC: 136 MMOL/L (ref 133–144)
WBC # BLD AUTO: 6.5 10E9/L (ref 4–11)

## 2018-09-01 PROCEDURE — A9270 NON-COVERED ITEM OR SERVICE: HCPCS | Mod: GY | Performed by: PHYSICIAN ASSISTANT

## 2018-09-01 PROCEDURE — 25000125 ZZHC RX 250: Performed by: THORACIC SURGERY (CARDIOTHORACIC VASCULAR SURGERY)

## 2018-09-01 PROCEDURE — A9270 NON-COVERED ITEM OR SERVICE: HCPCS | Mod: GY | Performed by: NURSE PRACTITIONER

## 2018-09-01 PROCEDURE — 25000132 ZZH RX MED GY IP 250 OP 250 PS 637: Mod: GY | Performed by: STUDENT IN AN ORGANIZED HEALTH CARE EDUCATION/TRAINING PROGRAM

## 2018-09-01 PROCEDURE — 71046 X-RAY EXAM CHEST 2 VIEWS: CPT

## 2018-09-01 PROCEDURE — A9270 NON-COVERED ITEM OR SERVICE: HCPCS | Mod: GY | Performed by: THORACIC SURGERY (CARDIOTHORACIC VASCULAR SURGERY)

## 2018-09-01 PROCEDURE — 36415 COLL VENOUS BLD VENIPUNCTURE: CPT | Performed by: INTERNAL MEDICINE

## 2018-09-01 PROCEDURE — 85520 HEPARIN ASSAY: CPT | Performed by: INTERNAL MEDICINE

## 2018-09-01 PROCEDURE — 83735 ASSAY OF MAGNESIUM: CPT | Performed by: INTERNAL MEDICINE

## 2018-09-01 PROCEDURE — 82330 ASSAY OF CALCIUM: CPT | Performed by: INTERNAL MEDICINE

## 2018-09-01 PROCEDURE — 80048 BASIC METABOLIC PNL TOTAL CA: CPT | Performed by: INTERNAL MEDICINE

## 2018-09-01 PROCEDURE — 25000132 ZZH RX MED GY IP 250 OP 250 PS 637: Mod: GY | Performed by: THORACIC SURGERY (CARDIOTHORACIC VASCULAR SURGERY)

## 2018-09-01 PROCEDURE — 25000128 H RX IP 250 OP 636: Performed by: PHYSICIAN ASSISTANT

## 2018-09-01 PROCEDURE — 25000132 ZZH RX MED GY IP 250 OP 250 PS 637: Mod: GY | Performed by: PHYSICIAN ASSISTANT

## 2018-09-01 PROCEDURE — 85610 PROTHROMBIN TIME: CPT | Performed by: INTERNAL MEDICINE

## 2018-09-01 PROCEDURE — 21400006 ZZH R&B CCU INTERMEDIATE UMMC

## 2018-09-01 PROCEDURE — A9270 NON-COVERED ITEM OR SERVICE: HCPCS | Mod: GY | Performed by: STUDENT IN AN ORGANIZED HEALTH CARE EDUCATION/TRAINING PROGRAM

## 2018-09-01 PROCEDURE — 85027 COMPLETE CBC AUTOMATED: CPT | Performed by: INTERNAL MEDICINE

## 2018-09-01 PROCEDURE — 40000802 ZZH SITE CHECK

## 2018-09-01 PROCEDURE — 99232 SBSQ HOSP IP/OBS MODERATE 35: CPT | Performed by: INTERNAL MEDICINE

## 2018-09-01 PROCEDURE — 25000128 H RX IP 250 OP 636: Performed by: STUDENT IN AN ORGANIZED HEALTH CARE EDUCATION/TRAINING PROGRAM

## 2018-09-01 PROCEDURE — 25000128 H RX IP 250 OP 636: Performed by: NURSE PRACTITIONER

## 2018-09-01 PROCEDURE — 25000132 ZZH RX MED GY IP 250 OP 250 PS 637: Mod: GY | Performed by: NURSE PRACTITIONER

## 2018-09-01 RX ORDER — FUROSEMIDE 10 MG/ML
20 INJECTION INTRAMUSCULAR; INTRAVENOUS ONCE
Status: COMPLETED | OUTPATIENT
Start: 2018-09-01 | End: 2018-09-01

## 2018-09-01 RX ORDER — WARFARIN SODIUM 3 MG/1
6 TABLET ORAL
Status: COMPLETED | OUTPATIENT
Start: 2018-09-01 | End: 2018-09-01

## 2018-09-01 RX ORDER — POTASSIUM CHLORIDE 1.5 G/1.58G
20 POWDER, FOR SOLUTION ORAL ONCE
Status: COMPLETED | OUTPATIENT
Start: 2018-09-01 | End: 2018-09-01

## 2018-09-01 RX ADMIN — HYDROXYUREA 1500 MG: 500 CAPSULE ORAL at 07:59

## 2018-09-01 RX ADMIN — FUROSEMIDE 20 MG: 20 TABLET ORAL at 07:57

## 2018-09-01 RX ADMIN — POTASSIUM CHLORIDE 10 MEQ: 750 TABLET, EXTENDED RELEASE ORAL at 07:56

## 2018-09-01 RX ADMIN — Medication 12.5 MG: at 22:08

## 2018-09-01 RX ADMIN — FUROSEMIDE 20 MG: 10 INJECTION, SOLUTION INTRAVENOUS at 11:24

## 2018-09-01 RX ADMIN — HYDROXYUREA 1500 MG: 500 CAPSULE ORAL at 20:07

## 2018-09-01 RX ADMIN — HEPARIN SODIUM 800 UNITS/HR: 10000 INJECTION, SOLUTION INTRAVENOUS at 15:05

## 2018-09-01 RX ADMIN — CLOPIDOGREL 75 MG: 75 TABLET, FILM COATED ORAL at 07:56

## 2018-09-01 RX ADMIN — PANTOPRAZOLE SODIUM 40 MG: 40 TABLET, DELAYED RELEASE ORAL at 07:56

## 2018-09-01 RX ADMIN — ATORVASTATIN CALCIUM 40 MG: 40 TABLET, FILM COATED ORAL at 19:57

## 2018-09-01 RX ADMIN — PANTOPRAZOLE SODIUM 40 MG: 40 TABLET, DELAYED RELEASE ORAL at 16:38

## 2018-09-01 RX ADMIN — SODIUM CHLORIDE, PRESERVATIVE FREE 10 ML: 5 INJECTION INTRAVENOUS at 15:05

## 2018-09-01 RX ADMIN — THERA TABS 1 TABLET: TAB at 07:56

## 2018-09-01 RX ADMIN — Medication 2 G: at 15:04

## 2018-09-01 RX ADMIN — Medication 12.5 MG: at 19:57

## 2018-09-01 RX ADMIN — AMIODARONE HYDROCHLORIDE 200 MG: 200 TABLET ORAL at 07:57

## 2018-09-01 RX ADMIN — LEVOTHYROXINE SODIUM 75 MCG: 75 TABLET ORAL at 07:56

## 2018-09-01 RX ADMIN — AMIODARONE HYDROCHLORIDE 200 MG: 200 TABLET ORAL at 19:57

## 2018-09-01 RX ADMIN — POTASSIUM CHLORIDE 20 MEQ: 1.5 POWDER, FOR SOLUTION ORAL at 11:24

## 2018-09-01 RX ADMIN — WARFARIN SODIUM 6 MG: 3 TABLET ORAL at 17:38

## 2018-09-01 RX ADMIN — Medication 12.5 MG: at 07:56

## 2018-09-01 ASSESSMENT — ACTIVITIES OF DAILY LIVING (ADL)
ADLS_ACUITY_SCORE: 12

## 2018-09-01 NOTE — CONSULTS
Hematology Consult Service     Follow Up Consult Note:    Patient: Castillo De Anda  MRN: [id number]  : 1950  DOS: 2018  Date Admitted:2018  Hospital Day:26      Primary Team: MARNIS  Other Inpatient Consultants: Cardiology, Transfusion Medicine    Reason for Consultation:  Hematology is consulted on Castillo De Anda for evaluation and treatment of Jak2+ Myeloproliferative neoplasm.   __________________________________________________________________________________________________________________________________________________________________________________________________________________________________________  Assessment:  In summary, Castillo De Anda is 68 year old man with JAK2+ myeloproliferative neoplasm (polycythemia vera), recently switched by primary heme/onc provider from Hydrea to Anagrelide with very poor tolerance. Reason for switching apparently was that his anemia was felt to be due to Hydrea.  He is admitted s/p STEMI with ischemia-related VSD requiring surgical intervention. He is high-risk for ongoing thrombocytosis and we restarted hydrea on 18.   He has now completed two sessions of plateletpheresis with platelet counts of 359 followed by 397. Although new NCCN guidelines give a target platelet count of 425, there is no strong evidence for a particular target. Goal of therapy was to reduce count to <1 million to reduce overall risk of both thrombosis and bleeding complications from acquired von willebrand disease. Anticipate that addition of iron to treat iron deficiency also helped reduce thrombopoietic drive.     Recommendations:  1. Will pull aphresis catheter   2. Continue with HU at 20 mg/kg dose.  3. If platelet count start to rise precipitously, will start Jakafi based on Blood 2017 paper (Ruxolitinib for essential thrombocythemia refractory to or intolerant of hydroxyurea, Radha S, et al. DOI 10.1182/loyll-[id number])  4. Would continue systemic  anticoagulation with bridging as patient is at high risk for bleeding.   5. No further iron infusions needed at present.       We will continue to follow the patient.     Leny Oconnell MD/PhD   of Medicine  Division of Hematology, Oncology and Transplantation  Pager [phone]  09/01/2018 4:00 PM    __________________________________________________________________________________________________________________________________________________________________________________________________________________________________________  Interval History:  Patient doing well. Up enjoying football with family. No new symptoms.       Medications:  Facility Administered Medications as of 9/1/2018             acetaminophen (TYLENOL) tablet 650 mg 2 tablets (650 mg) by Oral or Feeding Tube route every 4 hours as needed for other (multimodal surgical pain management along with NSAIDS and opioid medication as indicated based on pain control and physical function.)    albuterol neb solution 2.5 mg Take 3 mLs (2.5 mg) by nebulization every 4 hours as needed for wheezing    amiodarone (PACERONE/CODARONE) tablet 200 mg Take 1 tablet (200 mg) by mouth 2 times daily    amiodarone (PACERONE/CODARONE) tablet 200 mg Starting on 9/2/2018. Take 1 tablet (200 mg) by mouth daily    atorvastatin (LIPITOR) tablet 40 mg 1 tablet (40 mg) by Oral or Feeding Tube route every evening    benzocaine-menthol (CEPACOL) 15-3.6 MG lozenge 1 lozenge Place 1 lozenge inside cheek every hour as needed for sore throat    calcium gluconate 1 g in D5W 100 mL intermittent infusion Inject 1 g into the vein continuous prn for calcium supplementation (For ionized calcium less than 4.5)    Carboxymethylcellulose Sod PF (REFRESH PLUS) 0.5 % ophthalmic solution 1 drop Place 1 drop into both eyes 3 times daily as needed for dry eyes    clopidogrel (PLAVIX) tablet 75 mg Take 1 tablet (75 mg) by mouth daily    dextrose 50 % injection 25-50 mL  "Inject 25-50 mLs into the vein every 15 minutes as needed for low blood sugar    Linked Group 1:  \"Or\" Linked Group Details     diphenhydrAMINE (BENADRYL) injection 50 mg Inject 1 mL (50 mg) into the vein once as needed for other (hives, itching, rash, flushing, swollen lips/tongue, or respiratory distress associated with IV iron hypersensitivity.)    EPINEPHrine (ADRENALIN) kit 0.3 mg Inject 0.3 mLs (0.3 mg) into the muscle every 3 minutes as needed (hypotension or airway obstruction associated with IV iron hypersensitivity.)    furosemide (LASIX) injection 20 mg Inject 2 mLs (20 mg) into the vein once    furosemide (LASIX) tablet 20 mg Take 1 tablet (20 mg) by mouth daily    glucagon injection 1 mg Inject 1 mg Subcutaneous every 15 minutes as needed for low blood sugar (May repeat x 1 only)    Linked Group 1:  \"Or\" Linked Group Details     glucose gel 15-30 g Take 15-30 g by mouth every 15 minutes as needed for low blood sugar    Linked Group 1:  \"Or\" Linked Group Details     heparin  drip 25,000 units in 0.45% NaCl 250 mL (see additional administration details for dose) Inject 0-3,500 Units/hr into the vein continuous    heparin bolus from infusion pump Inject into the vein every 6 hours as needed (to reach target Heparin Xa (10a) goal. GOAL: Heparin Xa (10a) LEVEL = 0.15-0.35 (PTT = 45-65 seconds).)    heparin lock flush 10 UNIT/ML injection 2-5 mL 2-5 mLs by Intracatheter route once as needed for line flush (for locking each dormant lumen with line placement)    heparin lock flush 10 UNIT/ML injection 5-10 mL 5-10 mLs by Intracatheter route every 24 hours    heparin lock flush 10 UNIT/ML injection 5-10 mL 5-10 mLs by Intracatheter route every hour as needed for other (to lock each CVC - Open Ended (Tunneled and Non-Tunneled) dormant lumen.)    hydrALAZINE (APRESOLINE) injection 10 mg Inject 0.5 mLs (10 mg) into the vein every 30 minutes as needed for high blood pressure (Systolic Blood Pressure greater than 140 " mmHg or Diastolic Blood Pressure greater than 90 mmHg)    HYDROmorphone (PF) (DILAUDID) injection 0.3-0.5 mg Inject 0.3-0.5 mg into the vein every 2 hours as needed for other (pain control or improvement in physical function. Hold dose for analgesic side effects.)    hydroxyurea (HYDREA) capsule CHEMO 1,500 mg Take 3 capsules (1,500 mg) by mouth 2 times daily    levothyroxine (SYNTHROID/LEVOTHROID) tablet 75 mcg 1 tablet (75 mcg) by Oral or Feeding Tube route daily    lidocaine (LMX4) cream Apply topically once as needed for moderate pain (for local anesthetic during PICC insertion)    lidocaine (LMX4) cream Apply topically every hour as needed for pain (with VAD insertion or accessing implanted port.)    lidocaine 1 % 1 mL 1 mL by Other route every hour as needed (mild pain with VAD insertion or accessing implanted port)    magnesium sulfate 2 g in NS intermittent infusion (Phar[employer] or FV Cmpd) Inject 50 mLs (2 g) into the vein daily as needed for magnesium supplementation    magnesium sulfate 4 g in 100 mL sterile water (premade) Inject 100 mLs (4 g) into the vein every 4 hours as needed for magnesium supplementation    May continue current IV fluids if patient has IV fluids infusing. continuous prn    melatonin tablet 1 mg Take 1 tablet (1 mg) by mouth nightly as needed for sleep    methocarbamol (ROBAXIN) tablet 500 mg 1 tablet (500 mg) by Oral or Feeding Tube route 4 times daily as needed for muscle spasms    methylPREDNISolone sodium succinate (solu-MEDROL) injection 125 mg Inject 2 mLs (125 mg) into the vein once as needed (respiratory distress associated with hypersensitivity reaction to iron.)    metoprolol (LOPRESSOR) injection 2.5 mg Inject 2.5 mLs (2.5 mg) into the vein every 4 hours as needed for other (For sustained HR>140)    metoprolol tartrate (LOPRESSOR) half-tab 12.5 mg Take 1 half-tab (12.5 mg) by mouth 2 times daily    multivitamin, therapeutic (THERA-VIT) tablet 1 tablet Take 1 tablet by  "mouth daily    naloxone (NARCAN) injection 0.1-0.4 mg Inject 0.25-1 mLs (0.1-0.4 mg) into the vein every 2 minutes as needed for opioid reversal    ondansetron (ZOFRAN) injection 4 mg Inject 2 mLs (4 mg) into the vein every 6 hours as needed for nausea or vomiting    Linked Group 2:  \"Or\" Linked Group Details     ondansetron (ZOFRAN-ODT) ODT tab 4 mg Take 1 tablet (4 mg) by mouth every 6 hours as needed for nausea or vomiting    Linked Group 2:  \"Or\" Linked Group Details     oxyCODONE IR (ROXICODONE) tablet 5-10 mg 1-2 tablets (5-10 mg) by Per Feeding Tube route every 4 hours as needed for other (pain control or improvement in physical function. Hold dose for analgesic side effects.)    pantoprazole (PROTONIX) EC tablet 40 mg Take 1 tablet (40 mg) by mouth 2 times daily (before meals)    potassium chloride (KLOR-CON) Packet 20 mEq Take 20 mEq by mouth once    potassium chloride (KLOR-CON) Packet 20-40 mEq 20-40 mEq by Oral or Feeding Tube route every 2 hours as needed for potassium supplementation    potassium chloride 10 mEq in 100 mL intermittent infusion with 10 mg lidocaine Inject 100 mLs (10 mEq) into the vein every hour as needed for potassium supplementation    potassium chloride 10 mEq in 100 mL sterile water intermittent infusion (premix) Inject 100 mLs (10 mEq) into the vein every hour as needed for potassium supplementation    potassium chloride 20 mEq in 50 mL intermittent infusion Inject 50 mLs (20 mEq) into the vein every hour as needed for potassium supplementation    potassium chloride SA (K-DUR/KLOR-CON M) CR tablet 10 mEq Take 1 tablet (10 mEq) by mouth daily    potassium chloride SA (K-DUR/KLOR-CON M) CR tablet 20-40 mEq Take 2-4 tablets (20-40 mEq) by mouth every 2 hours as needed for potassium supplementation    potassium phosphate 15 mmol in D5W 250 mL intermittent infusion Inject 15 mmol into the vein daily as needed for phosphorous supplementation    potassium phosphate 20 mmol in D5W 250 mL " "intermittent infusion Inject 20 mmol into the vein every 6 hours as needed for phosphorous supplementation    potassium phosphate 20 mmol in D5W 500 mL intermittent infusion Inject 20 mmol into the vein every 6 hours as needed for phosphorous supplementation    potassium phosphate 25 mmol in D5W 500 mL intermittent infusion Inject 25 mmol into the vein every 8 hours as needed for phosphorous supplementation    prochlorperazine (COMPAZINE) injection 5 mg Inject 1 mL (5 mg) into the vein every 6 hours as needed for nausea or vomiting    Linked Group 3:  \"Or\" Linked Group Details     prochlorperazine (COMPAZINE) tablet 5 mg Take 1 tablet (5 mg) by mouth every 6 hours as needed for nausea or vomiting    Linked Group 3:  \"Or\" Linked Group Details     QUEtiapine (SEROquel) half-tab 12.5 mg 1 half-tab (12.5 mg) by Oral or Feeding Tube route 2 times daily as needed (anxiety)    QUEtiapine (SEROquel) half-tab 12.5 mg 1 half-tab (12.5 mg) by Oral or Feeding Tube route At Bedtime    ranitidine (ZANTAC) injection 50 mg Inject 2 mLs (50 mg) into the vein once as needed (hives, itching, rash associated with IV iron hypersensitivity.  )    Reason ACE/ARB/ARNI order not selected by Other route DOES NOT GO TO MAR for other    Reason beta blocker not prescribed DOES NOT GO TO MAR for other    Reason beta blocker order not selected DOES NOT GO TO MAR for other    senna-docusate (SENOKOT-S;PERICOLACE) 8.6-50 MG per tablet 2 tablet 2 tablets by Oral or Feeding Tube route 2 times daily    Linked Group 4:  \"Or\" Linked Group Details     senna-docusate (SENOKOT-S;PERICOLACE) 8.6-50 MG per tablet 1 tablet 1 tablet by Oral or Feeding Tube route 2 times daily    Linked Group 4:  \"Or\" Linked Group Details     sodium chloride (PF) 0.9% PF flush 10-20 mL 10-20 mLs by Intracatheter route every hour as needed for line flush or post meds or blood draw    sodium chloride (PF) 0.9% PF flush 3 mL Inject 3 mLs into the vein q1 min prn for line flush " (after medication administration. For peripheral IV flush post IV meds)    sodium chloride (PF) 0.9% PF flush 3 mL 3 mLs by Intracatheter route every hour as needed for line flush (for peripheral IV flush post IV meds)    sodium chloride (PF) 0.9% PF flush 3 mL 3 mLs by Intracatheter route every 8 hours    sodium chloride (PF) 0.9% PF flush 5-50 mL 5-50 mLs by Intracatheter route once as needed for line flush (to flush each lumen with line placement)    warfarin (COUMADIN) tablet 6 mg Take 2 tablets (6 mg) by mouth Once at 6pm    warfarin (COUMADIN) tablet 6 mg Take 2 tablets (6 mg) by mouth Once at 6pm    Warfarin Therapy Reminder (Check START DATE - warfarin may be starting in the FUTURE) 1 each continuous prn        A 14 point ROS is negative except as stated in the HPI    Objective:  Vitals: B/P: 130/85, T: 97.8, P: 68, R: 18, Wt: 279 lbs 4.8 oz  Exam:   Constitutional: Appears well, no distress  HEENT: No scleral icterus or hemorrhage. Mucous membranes moist with no wet purpura.   CV: scar present on chest  Respiratory: no accessory muscle use  Mus/Skele: no edema  Skin: no petechiae, no ecchymosis.  Neuro:  AOx3    Labs: personally reviewed with relevant trends annotated below  CBC RESULTS:   Recent Labs   Lab Test  09/01/18   0608   WBC  6.5   RBC  2.97*   HGB  9.1*   HCT  29.5*   MCV  99   MCH  30.6   MCHC  30.8*   RDW  22.7*   PLT  397

## 2018-09-01 NOTE — PLAN OF CARE
Problem: Patient Care Overview  Goal: Plan of Care/Patient Progress Review  Outcome: Improving  D: Admitted 8/6  S/p STEMI with ichemia-related VSD with TVR 8/10. History of JAK2+ polycuthemia vera    I: Monitored vitals and assessed pt status.   Changed: -  Running: Heparin gtt 800 units/hr (8 ml/hr)  PRN: IV Iron x1, Potassium (3.9) replaced, Magnesium (1.9) replaced    A: A0x4. VSS. NSR HR 70-80's. 2L O2 via NC. Afebrile. Urinating adequately. +BM. CT to suction, minimal output, hopeful removal of CT tomorrow. CT dressing changed, incisions WNL. Sternal incision open to air. Tolerated Iron infusion well. Plateletpheresis performed this afternoon for several hours, tolerated well. Denies pain, nausea. Up with assist x1 and walker. Spouse pleasant today, here throughout afternoon.     P: Continue to monitor Pt status and report changes to CVTS. Plan for discharge to TCU next mid-week.

## 2018-09-01 NOTE — PROGRESS NOTES
CARDIOTHORACIC SURGERY PROGRESS NOTE  09/01/2018      SUBJECTIVE:    No acute issues over night.   Breathing is improving, no acute complaints  Patient denies SOB, CP, fever, chills, sweats.     Patient has been tolerating diet. + BM. + flatus.        OBJECTIVE:   Temp:  [97.7  F (36.5  C)-98.4  F (36.9  C)] 97.7  F (36.5  C)  Pulse:  [73-78] 76  Heart Rate:  [80-85] 80  Resp:  [18-22] 18  BP: ()/(65-74) 96/66  SpO2:  [91 %-100 %] 99 %    Gen: A&Ox3, NAD   CV: RRR, normal S1, S2, no murmurs, rubs or gallops   Pulm: Lungs diminished in bases, otherwise CTA, no wheezing or  rhonchi  Abd: Soft, NT, ND, +BS  Ext: 1+ bilateral pitting LE edema, compression stockings in place.   Neuro: Nonfocal  Incision: c/d/i, no erythema, sternum stable  Tubes/drains: Dressing clean and dry, 220cc serosanguinous output, no air leak.     I&O's:  I/O last 3 completed shifts:  In: 1407 [P.O.:300; I.V.:1107]  Out: 1090 [Urine:900; Chest Tube:190]    Labs:   BMP    Recent Labs  Lab 09/01/18  0608 08/31/18  0822 08/30/18  0652 08/29/18  0829    133 133 133   POTASSIUM 4.2 3.9 4.4 4.7   CHLORIDE 99 96 100 101   GABRIELA 8.0* 8.8 7.8* 7.8*   CO2 30 30 26 24   BUN 17 22 27 29   CR 0.85 0.91 0.83 0.70   GLC 86 142* 88 109*     CBC    Recent Labs  Lab 09/01/18  0608 08/31/18  1600 08/31/18  1350 08/31/18  0822   WBC 6.5 6.6 7.3 7.3   RBC 2.97* 2.73* 2.85* 3.18*   HGB 9.1* 8.4* 8.7* 9.8*   HCT 29.5* 27.1* 27.9* 31.9*   MCV 99 99 98 100   MCH 30.6 30.8 30.5 30.8   MCHC 30.8* 31.0* 31.2* 30.7*   RDW 22.7* 21.8* 22.0* 22.6*    359 685* 714*     INR    Recent Labs  Lab 09/01/18  0608 08/31/18  0822 08/30/18  0652 08/29/18  0829   INR 1.84* 1.47* 1.32* 1.23*      Hepatic Panel     Recent Labs  Lab 08/31/18  0822 08/26/18  0600   AST 41 86*   ALT 68 153*   ALKPHOS 161* 197*   BILITOTAL 0.2 0.2   ALBUMIN 2.0* 2.0*     Glucose  Recent Labs  Lab 09/01/18  0608 08/31/18  0822 08/30/18  1633 08/30/18  1212 08/30/18  0720 08/30/18  0652  08/30/18  0307 08/30/18  0001 08/29/18  2042  08/29/18  0829  08/28/18  0731  08/27/18  0703   GLC 86 142*  --   --   --  88  --   --   --   --  109*  --  98  --  102*   BGM  --   --  88 87 100*  --  99 94 107*  < >  --   < >  --   < >  --    < > = values in this interval not displayed.    Imaging:   No results found for this or any previous visit (from the past 24 hour(s)).  CXR 9/1: pending      ASSESSMENT/PLAN: Patient is a 68 year old male with a history of s/p HUSSEIN to RCA and LAD for STEMI with post infarction basal VSD, who on 8/10/2018 underwent repair of posterior VSD and TVR with bioprosthetic valve.  He was kept open chest due to coagulopathy and RV dysfunction. Chest closed on 8/13/2018, IABP removed 8/14/18 and he was extubated on POD #11.       Neuro:  -Acute post-op pain: IV dilaudid, tylenol and oxycodone prn  - Wean Seroquel as tolerated, decreased to 12.5 mg HS      CV:  - ASA 81 mg, atorvastatin 40 mg daily,   - Systolic Blood pressure: 80-98  - TPW capped  - ICU course complicated by a-flutter, EP consult with plan to anticoagulate and then attempt to overdrive pace with A lead.  While in the ICU, patient also went into narrow complex SVT with MAPs in 50s. Adenosine 6 mg was administered, resulting in 20 seconds of asystole and brief CPR with ROSC.    - 8/25 patient converted from NSR to a-fib overnight. During morning therapy, patient had a-fib with RVR (rates 140s) and symptomatic. EKGs appreciable for a-fib and SVT. Metoprolol 2.5 mg IV administered with improved rate control. Discussed case with EP fellow, amio bolus and gtt started.  Patient converted to NSR afternoon on 8/25. Amiodarone 400 mg PO BID 8/26.  8/31 Start Amiodarone taper 200 mg BID x 4 doses, then 200 mg daily for 21 doses   - Cut TPW 8/27, no bradycardia on PO Amio but prolonged QTc.   - Start Metoprolol 12.5 mg po bid.       Resp:   - Extubated POD 11  - IS, encourage activity  - Mediastinal CT with minimal output, Pleural  chest tubes with moderate, serosanguinous output. Right Output 220 mL, keep today, will give one dose of IV lasix .   -  Pulled Left pleural tube, f/u CXR.  POC U/S by Medicine for Left pleural effusion today-findings appreciable for loculated fluid. IR consult with CT chest w/o contrast. Possible chest tube placement with IR on .   -  IR recommended thoracentesis for left pleural effusion.  Per Dr. Franco, no thoracentesis necessary at this time.    - :  f/u 2 view CXR on , for pleural effusion and eval for Chest Tube removal      FEN/GI:   - Reg Diet with thin liquids, speech following.   - Continuous TF, consider calorie counts once patient's diet advances   - Bowel regimen, + BM  -  EGD: two gastric ulcers, no active bleeding or high-risk features.        Renal:   - Creatinine WNL, adequate UOP  - Volume status: hypervolemic, dry weight ~ 170 lbs, current weight 169.  - Diurese Lasix 20 mg po daily , will give one dose IV lasix 20 mg x1           ID:   - Completed alban-op abx  - WBC WNL  and trending down, afebrile, no signs or symptoms of infection      Heme:   - Hgb stable, no signs or symptoms of bleeding   - Continue to monitor for signs of GI bleed, transfuse for Hgb < 7  - Hematology followin/30 Plt 1534, Hydrea 1500 mg BID, Continue to trend plts, Heme recommended Iron replacement, started .    -  Plateletpheresis for Plts >1500, IR to place double lumen non-tunneled catheter prior to procedure  -  Plts 714 s/p plateletpheresis, repeat plateletpheresis x 1 today, give 2nd dose of 200 mg Iron Sucrose.  Hematology will convert oral medications  or , appreciate Heme rec, ongoing. Does not need to stay inpatient from hematology standpoint. If patient discharges over weekend, call hematology for hydroxyurea dosing.      Endo:   - BG 100s, on sliding scale insulin. Will discontinue once diet improves.       PPx:   - PPI      Anticoagulation:   -  start  low intensity heparin gtt   - 8/26 start warfarin, INR 2-3. INR 1.47->1.84  - Plavix 75 mg daily      Dispo:   - 6C since 8/24  - Therapy recommending d/c to TCU  - Must be therapeutic INR before discharge due to high clotting risk.        Staff surgeons have been informed of changes through both verbal and written communication.         Tyrese Pollack PA-C  Cardiothoracic Surgery  September 1, 2018 6:56 AM   p: [phone]

## 2018-09-01 NOTE — PLAN OF CARE
Problem: Patient Care Overview  Goal: Plan of Care/Patient Progress Review  Outcome: No Change  D:Patient had STEMI with s/p HUSSEIN to RCA and LAD.  C/B VSD and underwent VSD repair and TVR with bioprosthetic valve on 8/10.  I/A: On NC with 2L oxygen, denies pain and nausea, with SR rhythm.  Started on taper oral Amiodarone dose. On heparin gtt at 800units/hr.  On hydroxyurea oral dosing and INR goal of 2-3.  Voiding spontaneously via bedside urinal.    P: Plan to have INR per goal 2-3, before discharging.

## 2018-09-02 ENCOUNTER — APPOINTMENT (OUTPATIENT)
Dept: OCCUPATIONAL THERAPY | Facility: CLINIC | Age: 68
DRG: 219 | End: 2018-09-02
Attending: INTERNAL MEDICINE
Payer: MEDICARE

## 2018-09-02 LAB
ANION GAP SERPL CALCULATED.3IONS-SCNC: 8 MMOL/L (ref 3–14)
BUN SERPL-MCNC: 15 MG/DL (ref 7–30)
CA-I BLD-MCNC: 4.2 MG/DL (ref 4.4–5.2)
CALCIUM SERPL-MCNC: 7.6 MG/DL (ref 8.5–10.1)
CHLORIDE SERPL-SCNC: 101 MMOL/L (ref 94–109)
CO2 SERPL-SCNC: 28 MMOL/L (ref 20–32)
CREAT SERPL-MCNC: 0.8 MG/DL (ref 0.66–1.25)
ERYTHROCYTE [DISTWIDTH] IN BLOOD BY AUTOMATED COUNT: 22.7 % (ref 10–15)
GFR SERPL CREATININE-BSD FRML MDRD: >90 ML/MIN/1.7M2
GLUCOSE SERPL-MCNC: 82 MG/DL (ref 70–99)
HCT VFR BLD AUTO: 28.2 % (ref 40–53)
HGB BLD-MCNC: 8.6 G/DL (ref 13.3–17.7)
INR PPP: 2.19 (ref 0.86–1.14)
LMWH PPP CHRO-ACNC: 0.33 IU/ML
MAGNESIUM SERPL-MCNC: 2.3 MG/DL (ref 1.6–2.3)
MCH RBC QN AUTO: 30.6 PG (ref 26.5–33)
MCHC RBC AUTO-ENTMCNC: 30.5 G/DL (ref 31.5–36.5)
MCV RBC AUTO: 100 FL (ref 78–100)
PLATELET # BLD AUTO: 487 10E9/L (ref 150–450)
POTASSIUM SERPL-SCNC: 4 MMOL/L (ref 3.4–5.3)
RBC # BLD AUTO: 2.81 10E12/L (ref 4.4–5.9)
SODIUM SERPL-SCNC: 137 MMOL/L (ref 133–144)
WBC # BLD AUTO: 6 10E9/L (ref 4–11)

## 2018-09-02 PROCEDURE — 97110 THERAPEUTIC EXERCISES: CPT | Mod: GO

## 2018-09-02 PROCEDURE — 25000132 ZZH RX MED GY IP 250 OP 250 PS 637: Mod: GY | Performed by: NURSE PRACTITIONER

## 2018-09-02 PROCEDURE — 80048 BASIC METABOLIC PNL TOTAL CA: CPT | Performed by: INTERNAL MEDICINE

## 2018-09-02 PROCEDURE — 21400006 ZZH R&B CCU INTERMEDIATE UMMC

## 2018-09-02 PROCEDURE — 82330 ASSAY OF CALCIUM: CPT | Performed by: INTERNAL MEDICINE

## 2018-09-02 PROCEDURE — 85610 PROTHROMBIN TIME: CPT | Performed by: INTERNAL MEDICINE

## 2018-09-02 PROCEDURE — 40000133 ZZH STATISTIC OT WARD VISIT

## 2018-09-02 PROCEDURE — 83735 ASSAY OF MAGNESIUM: CPT | Performed by: INTERNAL MEDICINE

## 2018-09-02 PROCEDURE — 25000132 ZZH RX MED GY IP 250 OP 250 PS 637: Mod: GY | Performed by: STUDENT IN AN ORGANIZED HEALTH CARE EDUCATION/TRAINING PROGRAM

## 2018-09-02 PROCEDURE — 25000132 ZZH RX MED GY IP 250 OP 250 PS 637: Mod: GY | Performed by: PHYSICIAN ASSISTANT

## 2018-09-02 PROCEDURE — 97535 SELF CARE MNGMENT TRAINING: CPT | Mod: GO

## 2018-09-02 PROCEDURE — A9270 NON-COVERED ITEM OR SERVICE: HCPCS | Mod: GY | Performed by: PHYSICIAN ASSISTANT

## 2018-09-02 PROCEDURE — 25000132 ZZH RX MED GY IP 250 OP 250 PS 637: Mod: GY | Performed by: THORACIC SURGERY (CARDIOTHORACIC VASCULAR SURGERY)

## 2018-09-02 PROCEDURE — 97530 THERAPEUTIC ACTIVITIES: CPT | Mod: GO

## 2018-09-02 PROCEDURE — 25000128 H RX IP 250 OP 636: Performed by: PHYSICIAN ASSISTANT

## 2018-09-02 PROCEDURE — A9270 NON-COVERED ITEM OR SERVICE: HCPCS | Mod: GY | Performed by: THORACIC SURGERY (CARDIOTHORACIC VASCULAR SURGERY)

## 2018-09-02 PROCEDURE — A9270 NON-COVERED ITEM OR SERVICE: HCPCS | Mod: GY | Performed by: STUDENT IN AN ORGANIZED HEALTH CARE EDUCATION/TRAINING PROGRAM

## 2018-09-02 PROCEDURE — 36592 COLLECT BLOOD FROM PICC: CPT | Performed by: INTERNAL MEDICINE

## 2018-09-02 PROCEDURE — 40000802 ZZH SITE CHECK

## 2018-09-02 PROCEDURE — A9270 NON-COVERED ITEM OR SERVICE: HCPCS | Mod: GY | Performed by: INTERNAL MEDICINE

## 2018-09-02 PROCEDURE — 25000132 ZZH RX MED GY IP 250 OP 250 PS 637: Mod: GY | Performed by: INTERNAL MEDICINE

## 2018-09-02 PROCEDURE — A9270 NON-COVERED ITEM OR SERVICE: HCPCS | Mod: GY | Performed by: NURSE PRACTITIONER

## 2018-09-02 PROCEDURE — 85520 HEPARIN ASSAY: CPT | Performed by: INTERNAL MEDICINE

## 2018-09-02 PROCEDURE — 25000128 H RX IP 250 OP 636: Performed by: STUDENT IN AN ORGANIZED HEALTH CARE EDUCATION/TRAINING PROGRAM

## 2018-09-02 PROCEDURE — 85027 COMPLETE CBC AUTOMATED: CPT | Performed by: INTERNAL MEDICINE

## 2018-09-02 PROCEDURE — 99232 SBSQ HOSP IP/OBS MODERATE 35: CPT | Performed by: INTERNAL MEDICINE

## 2018-09-02 RX ORDER — FUROSEMIDE 10 MG/ML
20 INJECTION INTRAMUSCULAR; INTRAVENOUS ONCE
Status: COMPLETED | OUTPATIENT
Start: 2018-09-02 | End: 2018-09-02

## 2018-09-02 RX ORDER — WARFARIN SODIUM 4 MG/1
4 TABLET ORAL
Status: COMPLETED | OUTPATIENT
Start: 2018-09-02 | End: 2018-09-02

## 2018-09-02 RX ADMIN — SODIUM CHLORIDE, PRESERVATIVE FREE 10 ML: 5 INJECTION INTRAVENOUS at 16:40

## 2018-09-02 RX ADMIN — Medication 12.5 MG: at 08:19

## 2018-09-02 RX ADMIN — FUROSEMIDE 20 MG: 10 INJECTION, SOLUTION INTRAVENOUS at 12:13

## 2018-09-02 RX ADMIN — AMIODARONE HYDROCHLORIDE 200 MG: 200 TABLET ORAL at 19:41

## 2018-09-02 RX ADMIN — Medication 12.5 MG: at 19:41

## 2018-09-02 RX ADMIN — ATORVASTATIN CALCIUM 40 MG: 40 TABLET, FILM COATED ORAL at 19:41

## 2018-09-02 RX ADMIN — HYDROXYUREA 1500 MG: 500 CAPSULE ORAL at 08:24

## 2018-09-02 RX ADMIN — LEVOTHYROXINE SODIUM 75 MCG: 75 TABLET ORAL at 08:18

## 2018-09-02 RX ADMIN — Medication 5 ML: at 06:22

## 2018-09-02 RX ADMIN — CLOPIDOGREL 75 MG: 75 TABLET, FILM COATED ORAL at 08:18

## 2018-09-02 RX ADMIN — HYDROXYUREA 1500 MG: 500 CAPSULE ORAL at 19:42

## 2018-09-02 RX ADMIN — Medication 12.5 MG: at 21:55

## 2018-09-02 RX ADMIN — SENNOSIDES AND DOCUSATE SODIUM 2 TABLET: 8.6; 5 TABLET ORAL at 19:45

## 2018-09-02 RX ADMIN — PANTOPRAZOLE SODIUM 40 MG: 40 TABLET, DELAYED RELEASE ORAL at 08:18

## 2018-09-02 RX ADMIN — POTASSIUM CHLORIDE 10 MEQ: 750 TABLET, EXTENDED RELEASE ORAL at 08:18

## 2018-09-02 RX ADMIN — WARFARIN SODIUM 4 MG: 4 TABLET ORAL at 18:13

## 2018-09-02 RX ADMIN — PANTOPRAZOLE SODIUM 40 MG: 40 TABLET, DELAYED RELEASE ORAL at 16:40

## 2018-09-02 RX ADMIN — FUROSEMIDE 20 MG: 20 TABLET ORAL at 08:18

## 2018-09-02 RX ADMIN — THERA TABS 1 TABLET: TAB at 08:18

## 2018-09-02 ASSESSMENT — ACTIVITIES OF DAILY LIVING (ADL)
ADLS_ACUITY_SCORE: 12

## 2018-09-02 NOTE — PROGRESS NOTES
CARDIOTHORACIC SURGERY PROGRESS NOTE  09/02/2018      SUBJECTIVE:    No acute issues over night.   Breathing is improving, does report some tingling in the bottom of his feet, but no pain, otherwise no acute complaints  Patient denies SOB, CP, fever, chills, sweats.     Patient has been tolerating diet. + BM. + flatus.      OBJECTIVE:   Temp:  [97.7  F (36.5  C)-98.8  F (37.1  C)] 98.8  F (37.1  C)  Heart Rate:  [75-95] 84  Resp:  [17-18] 17  BP: ()/(58-69) 101/69  SpO2:  [91 %-100 %] 92 %    Gen: A&Ox3, NAD   CV: RRR, normal S1, S2, no murmurs, rubs or gallops. +distal pulses in LE  Pulm: Lungs diminished in bases, otherwise CTA, no wheezing or  rhonchi  Abd: Soft, NT, ND, +BS  Ext: 1+ bilateral pitting LE edema, compression stockings in place.   Neuro: Nonfocal  Incision: c/d/i, no erythema, sternum stable  Tubes/drains: Dressing clean and dry, 220cc serosanguinous output, no air leak.     I&O's:  I/O last 3 completed shifts:  In: 1420.13 [P.O.:1240; I.V.:180.13]  Out: 1945 [Urine:1725; Chest Tube:220]    Labs:   BMP    Recent Labs  Lab 09/02/18  0626 09/01/18  0608 08/31/18  0822 08/30/18  0652    136 133 133   POTASSIUM 4.0 4.2 3.9 4.4   CHLORIDE 101 99 96 100   GABRIELA 7.6* 8.0* 8.8 7.8*   CO2 28 30 30 26   BUN 15 17 22 27   CR 0.80 0.85 0.91 0.83   GLC 82 86 142* 88     CBC    Recent Labs  Lab 09/02/18  0626 09/01/18  0608 08/31/18  1600 08/31/18  1350   WBC 6.0 6.5 6.6 7.3   RBC 2.81* 2.97* 2.73* 2.85*   HGB 8.6* 9.1* 8.4* 8.7*   HCT 28.2* 29.5* 27.1* 27.9*    99 99 98   MCH 30.6 30.6 30.8 30.5   MCHC 30.5* 30.8* 31.0* 31.2*   RDW 22.7* 22.7* 21.8* 22.0*   * 397 359 685*     INR    Recent Labs  Lab 09/02/18  0626 09/01/18  0608 08/31/18  0822 08/30/18  0652   INR 2.19* 1.84* 1.47* 1.32*      Hepatic Panel     Recent Labs  Lab 08/31/18  0822   AST 41   ALT 68   ALKPHOS 161*   BILITOTAL 0.2   ALBUMIN 2.0*     Glucose  Recent Labs  Lab 09/02/18  0626 09/01/18  0608 08/31/18  0822  08/30/18  1633 08/30/18  1212 08/30/18  0720 08/30/18  0652 08/30/18  0307 08/30/18  0001 08/29/18  2042  08/29/18  0829  08/28/18  0731   GLC 82 86 142*  --   --   --  88  --   --   --   --  109*  --  98   BGM  --   --   --  88 87 100*  --  99 94 107*  < >  --   < >  --    < > = values in this interval not displayed.    Imaging:   Recent Results (from the past 24 hour(s))   XR Chest 2 Views    Narrative    Exam: XR CHEST 2 VW, 9/1/2018 11:43 AM    Indication: Interval;     Comparison: Radiograph of the chest 8/31/2018    Findings:   PA and lateral views of the chest. Right chest tube is stable. Right  PICC tip projects over the cavoatrial junction. Right IJ central  venous catheter projects with the tip in the low SVC. Postoperative  changes of VSD repair and tricuspid valve replacement. The  cardiomediastinal silhouette is stable. The pulmonary vasculature is  indistinct. Bibasilar opacities and a hazy left lateral midlung  opacity. Small bilateral pleural effusions, left greater than right.  Small right apical pneumothorax, not significantly changed the prior  radiograph. The upper abdomen is unremarkable. Midline sternotomy  wires are intact.      Impression    Impression:   1.  Unchanged small right hydropneumothorax with a stable right chest  tube .  2.  Unchanged small left pleural effusion with overlying  atelectasis/consolidation.  3.  New hazy lateral left midlung opacity. Considerations include  atelectasis and infection.  4.  Pulmonary vascular congestion.    I have personally reviewed the examination and initial interpretation  and I agree with the findings.    PATRICIA GARCIA MD         ASSESSMENT/PLAN: Patient is a 68 year old male with a history of s/p HUSSEIN to RCA and LAD for STEMI with post infarction basal VSD, who on 8/10/2018 underwent repair of posterior VSD and TVR with bioprosthetic valve.  He was kept open chest due to coagulopathy and RV dysfunction. Chest closed on 8/13/2018, IABP removed  8/14/18 and he was extubated on POD #11.       Neuro:  -Acute post-op pain: IV dilaudid, tylenol and oxycodone prn  - Wean Seroquel as tolerated, decreased to 12.5 mg HS      CV:  - ASA 81 mg, atorvastatin 40 mg daily,   - Systolic Blood pressure: 80-98  - TPW capped  - ICU course complicated by a-flutter, EP consult with plan to anticoagulate and then attempt to overdrive pace with A lead.  While in the ICU, patient also went into narrow complex SVT with MAPs in 50s. Adenosine 6 mg was administered, resulting in 20 seconds of asystole and brief CPR with ROSC.    - 8/25 patient converted from NSR to a-fib overnight. During morning therapy, patient had a-fib with RVR (rates 140s) and symptomatic. EKGs appreciable for a-fib and SVT. Metoprolol 2.5 mg IV administered with improved rate control. Discussed case with EP fellow, amio bolus and gtt started.  Patient converted to NSR afternoon on 8/25. Amiodarone 400 mg PO BID 8/26.  8/31 Start Amiodarone taper 200 mg BID x 4 doses, then 200 mg daily for 21 doses   - Cut TPW 8/27, no bradycardia on PO Amio but prolonged QTc.   - Metoprolol 12.5 mg po bid.       Resp:   - Extubated POD 11  - IS, encourage activity  - Mediastinal CT with minimal output, Pleural chest tubes with moderate, serosanguinous output. Right Output 220 mL, keep today, will give one dose of IV lasix 9/1.   - 8/29 Pulled Left pleural tube, f/u CXR.  POC U/S by Medicine for Left pleural effusion today-findings appreciable for loculated fluid. IR consult with CT chest w/o contrast. Possible chest tube placement with IR on 8/30.   - 8/30 IR recommended thoracentesis for left pleural effusion.  Per Dr. Franco, no thoracentesis necessary at this time.    - 8/31:  f/u 2 view CXR on 9/1, for pleural effusion,stable      FEN/GI:   - Reg Diet with thin liquids, speech following.   - Continuous TF, consider calorie counts once patient's diet advances   - Bowel regimen, + BM  - 8/24 EGD: two gastric ulcers, no  active bleeding or high-risk features.        Renal:   - Creatinine WNL, adequate UOP  - Volume status: hypervolemic, dry weight ~ 170 lbs, current weight 169.  - Diurese Lasix 20 mg po daily , will give one dose IV lasix 20 mg x1 , will dose again today          ID:   - Completed alban-op abx  - WBC WNL  and trending down, afebrile, no signs or symptoms of infection      Heme:   - Hgb stable, no signs or symptoms of bleeding   - Continue to monitor for signs of GI bleed, transfuse for Hgb < 7  - Hematology followin/30 Plt 1534, Hydrea 1500 mg BID, Continue to trend plts, Heme recommended Iron replacement, started .    -  Plateletpheresis for Plts >1500, IR to place double lumen non-tunneled catheter prior to procedure  -  Plts 714 s/p plateletpheresis, repeat plateletpheresis x 1 today, give 2nd dose of 200 mg Iron Sucrose.  Hematology will convert oral medications  or , appreciate Heme rec, ongoing. Does not need to stay inpatient from hematology standpoint. If patient discharges over weekend, call hematology for hydroxyurea dosing.      Endo:   - BG 100s, on sliding scale insulin. Will discontinue once diet improves.       PPx:   - PPI      Anticoagulation:   -  start low intensity heparin gtt, discontinue   -  start warfarin, INR 2-3. INR 1.84->2.19  - Plavix 75 mg daily      Dispo:   - 6C since   - Therapy recommending d/c to TCU, pending chest tube removal       Staff surgeons have been informed of changes through both verbal and written communication.         Tyrese Pollack PA-C  Cardiothoracic Surgery  2018 7:27 AM   p: [phone]

## 2018-09-02 NOTE — CONSULTS
Hematology Consult Service     Follow Up Consult Note:    Patient: Castillo De Anda  MRN: [id number]  : 1950  DOS: 2018  Date Admitted:2018  Hospital Day:27      Primary Team: [facility]  Other Inpatient Consultants: Cardiology, Transfusion Medicine    Reason for Consultation:  Hematology is consulted on Castillo De Anda for evaluation and treatment of Jak2+ Myeloproliferative neoplasm.   __________________________________________________________________________________________________________________________________________________________________________________________________________________________________________  Assessment:  In summary, Castillo De Anda is 68 year old man with JAK2+ myeloproliferative neoplasm (polycythemia vera), recently switched by primary heme/onc provider from Hydrea to Anagrelide with very poor tolerance. Reason for switching apparently was that his anemia was felt to be due to Hydrea.  He is admitted s/p STEMI with ischemia-related VSD requiring surgical intervention. He is high-risk for ongoing thrombocytosis and we restarted hydrea on 18.   He has now completed two sessions of plateletpheresis with platelet counts of 359 followed by 397. Although new NCCN guidelines give a target platelet count of 425, there is no strong evidence for a particular target. Goal of therapy was to reduce count to <1 million to reduce overall risk of both thrombosis and bleeding complications from acquired von willebrand disease. WE WILL NOT DO ANY FURTHER PLATELETPHERESIS--- PLEASE REMOVE LINE.      Discuss with patient the rational for edgardo-2 inhibition moving forward. Would like to obtain outpatient bone marrow in future. Patient would like to transfer care to [facility] in future. Will help arrange.     Recommendations:  1. PULL APHERESIS CATHETER  2. Continue with HU at 20 mg/kg dose.  3. Will assess platelet count in AM-- plan to start Jakafi based on Blood 2017 paper  (Ruxolitinib for essential thrombocythemia refractory to or intolerant of hydroxyurea, Radha BENNETT, et al. DOI 10.1182/[id number])  4. Would continue systemic anticoagulation with bridging as patient is at high risk for bleeding.   5. No further iron infusions needed at present.       We will continue to follow the patient.     Leny Oconnell MD/PhD   of Medicine  Division of Hematology, Oncology and Transplantation  Pager [phone]  09/02/2018 3:08 PM    __________________________________________________________________________________________________________________________________________________________________________________________________________________________________________  Interval History:  Patient doing well. No new symptoms.       Medications:  Facility Administered Medications as of 9/2/2018             acetaminophen (TYLENOL) tablet 650 mg 2 tablets (650 mg) by Oral or Feeding Tube route every 4 hours as needed for other (multimodal surgical pain management along with NSAIDS and opioid medication as indicated based on pain control and physical function.)    albuterol neb solution 2.5 mg Take 3 mLs (2.5 mg) by nebulization every 4 hours as needed for wheezing    amiodarone (PACERONE/CODARONE) tablet 200 mg Take 1 tablet (200 mg) by mouth 2 times daily    amiodarone (PACERONE/CODARONE) tablet 200 mg Take 1 tablet (200 mg) by mouth daily    atorvastatin (LIPITOR) tablet 40 mg 1 tablet (40 mg) by Oral or Feeding Tube route every evening    benzocaine-menthol (CEPACOL) 15-3.6 MG lozenge 1 lozenge Place 1 lozenge inside cheek every hour as needed for sore throat    calcium gluconate 1 g in D5W 100 mL intermittent infusion Inject 1 g into the vein continuous prn for calcium supplementation (For ionized calcium less than 4.5)    Carboxymethylcellulose Sod PF (REFRESH PLUS) 0.5 % ophthalmic solution 1 drop Place 1 drop into both eyes 3 times daily as needed for dry eyes  "   clopidogrel (PLAVIX) tablet 75 mg Take 1 tablet (75 mg) by mouth daily    dextrose 50 % injection 25-50 mL Inject 25-50 mLs into the vein every 15 minutes as needed for low blood sugar    Linked Group 1:  \"Or\" Linked Group Details     diphenhydrAMINE (BENADRYL) injection 50 mg Inject 1 mL (50 mg) into the vein once as needed for other (hives, itching, rash, flushing, swollen lips/tongue, or respiratory distress associated with IV iron hypersensitivity.)    EPINEPHrine (ADRENALIN) kit 0.3 mg Inject 0.3 mLs (0.3 mg) into the muscle every 3 minutes as needed (hypotension or airway obstruction associated with IV iron hypersensitivity.)    furosemide (LASIX) injection 20 mg Inject 2 mLs (20 mg) into the vein once    furosemide (LASIX) tablet 20 mg Take 1 tablet (20 mg) by mouth daily    glucagon injection 1 mg Inject 1 mg Subcutaneous every 15 minutes as needed for low blood sugar (May repeat x 1 only)    Linked Group 1:  \"Or\" Linked Group Details     glucose gel 15-30 g Take 15-30 g by mouth every 15 minutes as needed for low blood sugar    Linked Group 1:  \"Or\" Linked Group Details     heparin bolus from infusion pump Inject into the vein every 6 hours as needed (to reach target Heparin Xa (10a) goal. GOAL: Heparin Xa (10a) LEVEL = 0.15-0.35 (PTT = 45-65 seconds).)    heparin lock flush 10 UNIT/ML injection 2-5 mL 2-5 mLs by Intracatheter route once as needed for line flush (for locking each dormant lumen with line placement)    heparin lock flush 10 UNIT/ML injection 5-10 mL 5-10 mLs by Intracatheter route every 24 hours    heparin lock flush 10 UNIT/ML injection 5-10 mL 5-10 mLs by Intracatheter route every hour as needed for other (to lock each CVC - Open Ended (Tunneled and Non-Tunneled) dormant lumen.)    hydrALAZINE (APRESOLINE) injection 10 mg Inject 0.5 mLs (10 mg) into the vein every 30 minutes as needed for high blood pressure (Systolic Blood Pressure greater than 140 mmHg or Diastolic Blood Pressure " greater than 90 mmHg)    HYDROmorphone (PF) (DILAUDID) injection 0.3-0.5 mg Inject 0.3-0.5 mg into the vein every 2 hours as needed for other (pain control or improvement in physical function. Hold dose for analgesic side effects.)    hydroxyurea (HYDREA) capsule CHEMO 1,500 mg Take 3 capsules (1,500 mg) by mouth 2 times daily    levothyroxine (SYNTHROID/LEVOTHROID) tablet 75 mcg 1 tablet (75 mcg) by Oral or Feeding Tube route daily    lidocaine (LMX4) cream Apply topically once as needed for moderate pain (for local anesthetic during PICC insertion)    lidocaine (LMX4) cream Apply topically every hour as needed for pain (with VAD insertion or accessing implanted port.)    lidocaine 1 % 1 mL 1 mL by Other route every hour as needed (mild pain with VAD insertion or accessing implanted port)    magnesium sulfate 2 g in NS intermittent infusion (PharMEDium or FV Cmpd) Inject 50 mLs (2 g) into the vein daily as needed for magnesium supplementation    magnesium sulfate 4 g in 100 mL sterile water (premade) Inject 100 mLs (4 g) into the vein every 4 hours as needed for magnesium supplementation    May continue current IV fluids if patient has IV fluids infusing. continuous prn    melatonin tablet 1 mg Take 1 tablet (1 mg) by mouth nightly as needed for sleep    methocarbamol (ROBAXIN) tablet 500 mg 1 tablet (500 mg) by Oral or Feeding Tube route 4 times daily as needed for muscle spasms    methylPREDNISolone sodium succinate (solu-MEDROL) injection 125 mg Inject 2 mLs (125 mg) into the vein once as needed (respiratory distress associated with hypersensitivity reaction to iron.)    metoprolol (LOPRESSOR) injection 2.5 mg Inject 2.5 mLs (2.5 mg) into the vein every 4 hours as needed for other (For sustained HR>140)    metoprolol tartrate (LOPRESSOR) half-tab 12.5 mg Take 1 half-tab (12.5 mg) by mouth 2 times daily    multivitamin, therapeutic (THERA-VIT) tablet 1 tablet Take 1 tablet by mouth daily    naloxone (NARCAN)  "injection 0.1-0.4 mg Inject 0.25-1 mLs (0.1-0.4 mg) into the vein every 2 minutes as needed for opioid reversal    ondansetron (ZOFRAN) injection 4 mg Inject 2 mLs (4 mg) into the vein every 6 hours as needed for nausea or vomiting    Linked Group 2:  \"Or\" Linked Group Details     ondansetron (ZOFRAN-ODT) ODT tab 4 mg Take 1 tablet (4 mg) by mouth every 6 hours as needed for nausea or vomiting    Linked Group 2:  \"Or\" Linked Group Details     oxyCODONE IR (ROXICODONE) tablet 5-10 mg 1-2 tablets (5-10 mg) by Per Feeding Tube route every 4 hours as needed for other (pain control or improvement in physical function. Hold dose for analgesic side effects.)    pantoprazole (PROTONIX) EC tablet 40 mg Take 1 tablet (40 mg) by mouth 2 times daily (before meals)    potassium chloride (KLOR-CON) Packet 20-40 mEq 20-40 mEq by Oral or Feeding Tube route every 2 hours as needed for potassium supplementation    potassium chloride 10 mEq in 100 mL intermittent infusion with 10 mg lidocaine Inject 100 mLs (10 mEq) into the vein every hour as needed for potassium supplementation    potassium chloride 10 mEq in 100 mL sterile water intermittent infusion (premix) Inject 100 mLs (10 mEq) into the vein every hour as needed for potassium supplementation    potassium chloride 20 mEq in 50 mL intermittent infusion Inject 50 mLs (20 mEq) into the vein every hour as needed for potassium supplementation    potassium chloride SA (K-DUR/KLOR-CON M) CR tablet 10 mEq Take 1 tablet (10 mEq) by mouth daily    potassium chloride SA (K-DUR/KLOR-CON M) CR tablet 20-40 mEq Take 2-4 tablets (20-40 mEq) by mouth every 2 hours as needed for potassium supplementation    potassium phosphate 15 mmol in D5W 250 mL intermittent infusion Inject 15 mmol into the vein daily as needed for phosphorous supplementation    potassium phosphate 20 mmol in D5W 250 mL intermittent infusion Inject 20 mmol into the vein every 6 hours as needed for phosphorous supplementation " "   potassium phosphate 20 mmol in D5W 500 mL intermittent infusion Inject 20 mmol into the vein every 6 hours as needed for phosphorous supplementation    potassium phosphate 25 mmol in D5W 500 mL intermittent infusion Inject 25 mmol into the vein every 8 hours as needed for phosphorous supplementation    prochlorperazine (COMPAZINE) injection 5 mg Inject 1 mL (5 mg) into the vein every 6 hours as needed for nausea or vomiting    Linked Group 3:  \"Or\" Linked Group Details     prochlorperazine (COMPAZINE) tablet 5 mg Take 1 tablet (5 mg) by mouth every 6 hours as needed for nausea or vomiting    Linked Group 3:  \"Or\" Linked Group Details     QUEtiapine (SEROquel) half-tab 12.5 mg 1 half-tab (12.5 mg) by Oral or Feeding Tube route 2 times daily as needed (anxiety)    QUEtiapine (SEROquel) half-tab 12.5 mg 1 half-tab (12.5 mg) by Oral or Feeding Tube route At Bedtime    ranitidine (ZANTAC) injection 50 mg Inject 2 mLs (50 mg) into the vein once as needed (hives, itching, rash associated with IV iron hypersensitivity.  )    Reason ACE/ARB/ARNI order not selected by Other route DOES NOT GO TO MAR for other    Reason beta blocker not prescribed DOES NOT GO TO MAR for other    Reason beta blocker order not selected DOES NOT GO TO MAR for other    senna-docusate (SENOKOT-S;PERICOLACE) 8.6-50 MG per tablet 2 tablet 2 tablets by Oral or Feeding Tube route 2 times daily    Linked Group 4:  \"Or\" Linked Group Details     senna-docusate (SENOKOT-S;PERICOLACE) 8.6-50 MG per tablet 1 tablet 1 tablet by Oral or Feeding Tube route 2 times daily    Linked Group 4:  \"Or\" Linked Group Details     sodium chloride (PF) 0.9% PF flush 10-20 mL 10-20 mLs by Intracatheter route every hour as needed for line flush or post meds or blood draw    sodium chloride (PF) 0.9% PF flush 3 mL Inject 3 mLs into the vein q1 min prn for line flush (after medication administration. For peripheral IV flush post IV meds)    sodium chloride (PF) 0.9% PF flush " 3 mL 3 mLs by Intracatheter route every hour as needed for line flush (for peripheral IV flush post IV meds)    sodium chloride (PF) 0.9% PF flush 3 mL 3 mLs by Intracatheter route every 8 hours    sodium chloride (PF) 0.9% PF flush 5-50 mL 5-50 mLs by Intracatheter route once as needed for line flush (to flush each lumen with line placement)    warfarin (COUMADIN) tablet 4 mg Take 1 tablet (4 mg) by mouth Once at 6pm    warfarin (COUMADIN) tablet 6 mg Take 2 tablets (6 mg) by mouth Once at 6pm    Warfarin Therapy Reminder (Check START DATE - warfarin may be starting in the FUTURE) 1 each continuous prn    heparin  drip 25,000 units in 0.45% NaCl 250 mL (see additional administration details for dose) (Discontinued) Inject 0-3,500 Units/hr into the vein continuous        A 14 point ROS is negative except as stated in the HPI    Objective:  Temp: 97.8  F (36.6  C) Temp src: Oral BP: 101/69   Heart Rate: 83 Resp: 18 SpO2: 99 % O2 Device: Nasal cannula Oxygen Delivery: 2 LPM    Exam:   Constitutional: up in chair, Appears well, no distress  HEENT: No scleral icterus or hemorrhage. Mucous membranes moist with no wet purpura.   CV: scar present on chest  Respiratory: no accessory muscle use  Mus/Skele: no edema  Skin: no petechiae, no ecchymosis.  Neuro:  AOx3    Labs: personally reviewed with relevant trends annotated below  CBC RESULTS:   CBC RESULTS:   Recent Labs   Lab Test  09/02/18   0626   WBC  6.0   RBC  2.81*   HGB  8.6*   HCT  28.2*   MCV  100   MCH  30.6   MCHC  30.5*   RDW  22.7*   PLT  487*       Recent Labs   Lab Test  09/01/18   0608   WBC  6.5   RBC  2.97*   HGB  9.1*   HCT  29.5*   MCV  99   MCH  30.6   MCHC  30.8*   RDW  22.7*   PLT  397

## 2018-09-02 NOTE — PLAN OF CARE
Problem: Patient Care Overview  Goal: Plan of Care/Patient Progress Review  OT6C:   Discharge Planner OT   Patient plan for discharge: TCU   Current status: Pt supine<>sit EOB SBA, sit<>stand from bed CGA. Pt ambulated for ~17 minutes total 500+' SBA + 4WW. Pt needing x3 standing rest breaks and x1 seated rest break, SpO2% >90 on 3L of O2 via NC. Supine in bed pt maxA to don compression stockings and bilateral socks. Seated EOB pt demonstrating ability to use figure 4 technique to don elastic waist band shorts [employer]. Pt completed B UE Lightspeed x5 ~30 seconds each exercise. VSS on 2-3L of O2 throughout session.   Barriers to return to prior living situation: medical status, decreased strength and endurance, O2 needs, below baseline for functional mobility and I/ADLs    Recommendations for discharge: TCU   Rationale for recommendations: Pt would benefit from continued skilled therapy to progress safety and IND with I/ADLs and functional mobility and facilitate return to PLOF        Entered by: Sommer Reich 09/02/2018 1:26 PM

## 2018-09-02 NOTE — PLAN OF CARE
Problem: Patient Care Overview  Goal: Plan of Care/Patient Progress Review  Outcome: No Change  D: Patient had STEMI with S/P HUSSEIN to RCA and LAD. C/B VSD and underwent VSD repair and TVR with bioprosthetic valve on 8/10.  I/A: On NC with 2L oxygen, denies pain and nausea with SR rhythm.  Started on taper Amiodarone dose.  On heparin gtt at 800units/hr and therapeurtical and bridging with warfarin with INR goal of 2-3.  P: Waiting for INR to be within goal before discharging otherwise high risk of clotting d/t hx polycythemia vera.

## 2018-09-02 NOTE — PLAN OF CARE
Problem: Patient Care Overview  Goal: Plan of Care/Patient Progress Review  Outcome: Improving  D: Admitted 8/6  s/p STEMI with ichemia-related VSD with TVR 8/10. History of JAK2+ polycuthemia vera     I: Monitored vitals and assessed pt status.   Changed: -  Running: Heparin gtt 800 units/hr (8 ml/hr)  PRN: IVP Lasix x1, Magnesium (2.0) replaced     A: A0x4. VSS. NSR HR 70-80's. 3L O2 via NC. Afebrile. Urinating adequately. +BM. CT to suction, minimal output, hopeful removal of CT tomorrow. CT dressing changed, incisions WNL. Sternal incision open to air. Denies pain. Up with assist x1 and walker. Heparin 10a was therapeutic at 0.32 this morning, continue at 800units/hr. Coccyx reddened, mepilex applied, continue to monitor.       P: Continue to monitor Pt status and report changes to CVTS. Hopeful removal of CT depending on output. Plan for discharge to TCU next mid-week, family will notify SW with choice of TCU. INR goal 2-3 prior to discharge.

## 2018-09-03 LAB
ANION GAP SERPL CALCULATED.3IONS-SCNC: 7 MMOL/L (ref 3–14)
BUN SERPL-MCNC: 15 MG/DL (ref 7–30)
CA-I BLD-MCNC: 4.2 MG/DL (ref 4.4–5.2)
CALCIUM SERPL-MCNC: 7.3 MG/DL (ref 8.5–10.1)
CHLORIDE SERPL-SCNC: 102 MMOL/L (ref 94–109)
CO2 SERPL-SCNC: 26 MMOL/L (ref 20–32)
CREAT SERPL-MCNC: 0.93 MG/DL (ref 0.66–1.25)
ERYTHROCYTE [DISTWIDTH] IN BLOOD BY AUTOMATED COUNT: 22.8 % (ref 10–15)
GFR SERPL CREATININE-BSD FRML MDRD: 81 ML/MIN/1.7M2
GLUCOSE SERPL-MCNC: 80 MG/DL (ref 70–99)
HCT VFR BLD AUTO: 28.9 % (ref 40–53)
HGB BLD-MCNC: 8.9 G/DL (ref 13.3–17.7)
INR PPP: 2.25 (ref 0.86–1.14)
LMWH PPP CHRO-ACNC: <0.1 IU/ML
MAGNESIUM SERPL-MCNC: 2.2 MG/DL (ref 1.6–2.3)
MCH RBC QN AUTO: 31.1 PG (ref 26.5–33)
MCHC RBC AUTO-ENTMCNC: 30.8 G/DL (ref 31.5–36.5)
MCV RBC AUTO: 101 FL (ref 78–100)
PLATELET # BLD AUTO: 534 10E9/L (ref 150–450)
POTASSIUM SERPL-SCNC: 4.1 MMOL/L (ref 3.4–5.3)
RBC # BLD AUTO: 2.86 10E12/L (ref 4.4–5.9)
SODIUM SERPL-SCNC: 136 MMOL/L (ref 133–144)
WBC # BLD AUTO: 6 10E9/L (ref 4–11)

## 2018-09-03 PROCEDURE — A9270 NON-COVERED ITEM OR SERVICE: HCPCS | Mod: GY | Performed by: STUDENT IN AN ORGANIZED HEALTH CARE EDUCATION/TRAINING PROGRAM

## 2018-09-03 PROCEDURE — 25000128 H RX IP 250 OP 636: Performed by: STUDENT IN AN ORGANIZED HEALTH CARE EDUCATION/TRAINING PROGRAM

## 2018-09-03 PROCEDURE — A9270 NON-COVERED ITEM OR SERVICE: HCPCS | Mod: GY | Performed by: THORACIC SURGERY (CARDIOTHORACIC VASCULAR SURGERY)

## 2018-09-03 PROCEDURE — 25000132 ZZH RX MED GY IP 250 OP 250 PS 637: Mod: GY | Performed by: STUDENT IN AN ORGANIZED HEALTH CARE EDUCATION/TRAINING PROGRAM

## 2018-09-03 PROCEDURE — A9270 NON-COVERED ITEM OR SERVICE: HCPCS | Mod: GY | Performed by: INTERNAL MEDICINE

## 2018-09-03 PROCEDURE — 99232 SBSQ HOSP IP/OBS MODERATE 35: CPT | Mod: GC | Performed by: INTERNAL MEDICINE

## 2018-09-03 PROCEDURE — 85610 PROTHROMBIN TIME: CPT | Performed by: INTERNAL MEDICINE

## 2018-09-03 PROCEDURE — 83735 ASSAY OF MAGNESIUM: CPT | Performed by: INTERNAL MEDICINE

## 2018-09-03 PROCEDURE — 21400006 ZZH R&B CCU INTERMEDIATE UMMC

## 2018-09-03 PROCEDURE — 85520 HEPARIN ASSAY: CPT | Performed by: INTERNAL MEDICINE

## 2018-09-03 PROCEDURE — A9270 NON-COVERED ITEM OR SERVICE: HCPCS | Mod: GY | Performed by: PHYSICIAN ASSISTANT

## 2018-09-03 PROCEDURE — 82330 ASSAY OF CALCIUM: CPT | Performed by: INTERNAL MEDICINE

## 2018-09-03 PROCEDURE — 36415 COLL VENOUS BLD VENIPUNCTURE: CPT | Performed by: INTERNAL MEDICINE

## 2018-09-03 PROCEDURE — A9270 NON-COVERED ITEM OR SERVICE: HCPCS | Mod: GY | Performed by: NURSE PRACTITIONER

## 2018-09-03 PROCEDURE — 80048 BASIC METABOLIC PNL TOTAL CA: CPT | Performed by: INTERNAL MEDICINE

## 2018-09-03 PROCEDURE — 25000132 ZZH RX MED GY IP 250 OP 250 PS 637: Mod: GY | Performed by: THORACIC SURGERY (CARDIOTHORACIC VASCULAR SURGERY)

## 2018-09-03 PROCEDURE — 25000132 ZZH RX MED GY IP 250 OP 250 PS 637: Mod: GY | Performed by: NURSE PRACTITIONER

## 2018-09-03 PROCEDURE — 25000132 ZZH RX MED GY IP 250 OP 250 PS 637: Mod: GY | Performed by: INTERNAL MEDICINE

## 2018-09-03 PROCEDURE — 40000802 ZZH SITE CHECK

## 2018-09-03 PROCEDURE — 25000132 ZZH RX MED GY IP 250 OP 250 PS 637: Mod: GY | Performed by: PHYSICIAN ASSISTANT

## 2018-09-03 PROCEDURE — 85027 COMPLETE CBC AUTOMATED: CPT | Performed by: INTERNAL MEDICINE

## 2018-09-03 RX ORDER — WARFARIN SODIUM 4 MG/1
4 TABLET ORAL
Status: COMPLETED | OUTPATIENT
Start: 2018-09-03 | End: 2018-09-03

## 2018-09-03 RX ADMIN — HYDROXYUREA 1500 MG: 500 CAPSULE ORAL at 08:04

## 2018-09-03 RX ADMIN — Medication 12.5 MG: at 08:00

## 2018-09-03 RX ADMIN — HYDROXYUREA 1500 MG: 500 CAPSULE ORAL at 20:00

## 2018-09-03 RX ADMIN — WARFARIN SODIUM 4 MG: 4 TABLET ORAL at 18:28

## 2018-09-03 RX ADMIN — PANTOPRAZOLE SODIUM 40 MG: 40 TABLET, DELAYED RELEASE ORAL at 08:00

## 2018-09-03 RX ADMIN — CLOPIDOGREL 75 MG: 75 TABLET, FILM COATED ORAL at 08:01

## 2018-09-03 RX ADMIN — AMIODARONE HYDROCHLORIDE 200 MG: 200 TABLET ORAL at 08:00

## 2018-09-03 RX ADMIN — SODIUM CHLORIDE, PRESERVATIVE FREE 10 ML: 5 INJECTION INTRAVENOUS at 19:56

## 2018-09-03 RX ADMIN — FUROSEMIDE 20 MG: 20 TABLET ORAL at 08:00

## 2018-09-03 RX ADMIN — THERA TABS 1 TABLET: TAB at 08:00

## 2018-09-03 RX ADMIN — SODIUM CHLORIDE, PRESERVATIVE FREE 10 ML: 5 INJECTION INTRAVENOUS at 16:38

## 2018-09-03 RX ADMIN — PANTOPRAZOLE SODIUM 40 MG: 40 TABLET, DELAYED RELEASE ORAL at 16:37

## 2018-09-03 RX ADMIN — Medication 12.5 MG: at 20:02

## 2018-09-03 RX ADMIN — Medication 12.5 MG: at 21:58

## 2018-09-03 RX ADMIN — LEVOTHYROXINE SODIUM 75 MCG: 75 TABLET ORAL at 08:00

## 2018-09-03 RX ADMIN — POTASSIUM CHLORIDE 10 MEQ: 750 TABLET, EXTENDED RELEASE ORAL at 08:01

## 2018-09-03 RX ADMIN — ATORVASTATIN CALCIUM 40 MG: 40 TABLET, FILM COATED ORAL at 20:02

## 2018-09-03 RX ADMIN — ALTEPLASE 2 MG: 2.2 INJECTION, POWDER, LYOPHILIZED, FOR SOLUTION INTRAVENOUS at 18:31

## 2018-09-03 RX ADMIN — SENNOSIDES AND DOCUSATE SODIUM 1 TABLET: 8.6; 5 TABLET ORAL at 08:02

## 2018-09-03 RX ADMIN — SENNOSIDES AND DOCUSATE SODIUM 1 TABLET: 8.6; 5 TABLET ORAL at 20:02

## 2018-09-03 ASSESSMENT — ACTIVITIES OF DAILY LIVING (ADL)
ADLS_ACUITY_SCORE: 12

## 2018-09-03 NOTE — PROGRESS NOTES
Social Work Services Progress Note    Hospital Day: 29    Collaborated with:  Pt daughters, bedside team, EMR     Data:  SW assisting pt with discharge plan to TCU    Intervention:  SW asked by bedside RN to meet with pt dtrs for TCU choices. Dtrs were given a list previously and had questions about referral process.SW explained referral process and review by facilities and also discussed transportation options and costs. Medical team anticipates pt possible ready for discharge Wednesday to TCU. SW made following referrals in order of preference:    [facility]--[phone]--Reviewing Monday  [facility]--[phone]  [facility]--[phone]  [facility]--[phone]    Pt will need transportation and travel O2 scheduled once discharge location and date determined. Pt dtr Christine noted that pt name in chart lists a middle initial J. Pt actual middle initial is R. She would like it corrected in record.      Assessment:  See RN, PT/OT, Medical team notes    Plan:    Anticipated Disposition:  TCU: TBD    Barriers to d/c plan:  Bed availability, medical stability    Follow Up:  BÁRBARA will continue to follow for discharge planning    Amy BAPTISTE  Weekend SW  Pager: [phone]  On Call Pager: [phone] 4pm - Midnight

## 2018-09-03 NOTE — PLAN OF CARE
Problem: Patient Care Overview  Goal: Plan of Care/Patient Progress Review  Outcome: Improving  D: Admitted 8/6  s/p STEMI with ichemia-related VSD with TVR on 8/10. History of JAK2+ mutation.   A/I: Pt is A&Ox4. Pt is able to make needs known and uses call light appropriately. Pt VSS on 2L O2 via NC. Heart rhythm: SR HR 70-80's. Pt is up with 1 assist and walker. Pt is voiding adequate amounts in urinal overnight. Pt reports no pain or SOB. CT to suction, minimal output overnight. CT dressing CxDxI. Mepilex on Coccyx remains intact. RN encouraged/assisted with turns frequently while in bed overnight.   P: RN will continue to monitor and assess. RN will update team with any changes/concerns. Regine Domingo

## 2018-09-03 NOTE — PROGRESS NOTES
CARDIOTHORACIC SURGERY PROGRESS NOTE  09/03/2018      SUBJECTIVE:    No acute issues over night.   Feels he is continuing to improve this morning.    Patient has been tolerating diet. + BM. + flatus.      OBJECTIVE:   Temp:  [96.8  F (36  C)-98.1  F (36.7  C)] 97.9  F (36.6  C)  Heart Rate:  [] 83  Resp:  [18] 18  BP: ()/(66-79) 111/75  SpO2:  [96 %-100 %] 99 %    Gen: A&Ox3, NAD   CV: RRR  Pulm: NLB on NC  Abd: Soft  Ext: WWP  Neuro: Nonfocal  Incision: c/d/i, no erythema, sternum stable  Tubes/drains: Chest tube with serosanguinous output, no air leak.     I&O's:  I/O last 3 completed shifts:  In: 1273.47 [P.O.:1190; I.V.:83.47]  Out: 1995 [Urine:1725; Chest Tube:270]    Labs:   BMP    Recent Labs  Lab 09/03/18  0541 09/02/18  0626 09/01/18  0608 08/31/18  0822    137 136 133   POTASSIUM 4.1 4.0 4.2 3.9   CHLORIDE 102 101 99 96   GABRIELA 7.3* 7.6* 8.0* 8.8   CO2 26 28 30 30   BUN 15 15 17 22   CR 0.93 0.80 0.85 0.91   GLC 80 82 86 142*     CBC    Recent Labs  Lab 09/03/18  0541 09/02/18  0626 09/01/18  0608 08/31/18  1600   WBC 6.0 6.0 6.5 6.6   RBC 2.86* 2.81* 2.97* 2.73*   HGB 8.9* 8.6* 9.1* 8.4*   HCT 28.9* 28.2* 29.5* 27.1*   * 100 99 99   MCH 31.1 30.6 30.6 30.8   MCHC 30.8* 30.5* 30.8* 31.0*   RDW 22.8* 22.7* 22.7* 21.8*   * 487* 397 359     INR    Recent Labs  Lab 09/03/18  0541 09/02/18  0626 09/01/18  0608 08/31/18  0822   INR 2.25* 2.19* 1.84* 1.47*      Hepatic Panel     Recent Labs  Lab 08/31/18  0822   AST 41   ALT 68   ALKPHOS 161*   BILITOTAL 0.2   ALBUMIN 2.0*     Glucose  Recent Labs  Lab 09/03/18  0541 09/02/18  0626 09/01/18  0608 08/31/18  0822 08/30/18  1633 08/30/18  1212 08/30/18  0720 08/30/18  0652 08/30/18  0307 08/30/18  0001 08/29/18  2042  08/29/18  0829   GLC 80 82 86 142*  --   --   --  88  --   --   --   --  109*   BGM  --   --   --   --  88 87 100*  --  99 94 107*  < >  --    < > = values in this interval not displayed.    Imaging:   No results found  for this or any previous visit (from the past 24 hour(s)).      ASSESSMENT/PLAN: Patient is a 68 year old male with a history of s/p HUSSEIN to RCA and LAD for STEMI with post infarction basal VSD, who on 8/10/2018 underwent repair of posterior VSD and TVR with bioprosthetic valve.  He was kept open chest due to coagulopathy and RV dysfunction. Chest closed on 8/13/2018, IABP removed 8/14/18 and he was extubated on POD #11.       Neuro:  -Acute post-op pain: IV dilaudid, tylenol and oxycodone prn  - Wean Seroquel as tolerated, decreased to 12.5 mg HS      CV:  - ASA 81 mg, atorvastatin 40 mg daily,   - Systolic Blood pressure: 80-98  - TPW capped  - ICU course complicated by a-flutter, EP consult with plan to anticoagulate and then attempt to overdrive pace with A lead.  While in the ICU, patient also went into narrow complex SVT with MAPs in 50s. Adenosine 6 mg was administered, resulting in 20 seconds of asystole and brief CPR with ROSC.    - 8/25 patient converted from NSR to a-fib overnight. During morning therapy, patient had a-fib with RVR (rates 140s) and symptomatic. EKGs appreciable for a-fib and SVT. Metoprolol 2.5 mg IV administered with improved rate control. Discussed case with EP fellow, amio bolus and gtt started.  Patient converted to NSR afternoon on 8/25. Amiodarone 400 mg PO BID 8/26.  8/31 Start Amiodarone taper 200 mg BID x 4 doses, then 200 mg daily for 21 doses   - Cut TPW 8/27, no bradycardia on PO Amio but prolonged QTc.   - Metoprolol 12.5 mg po bid.       Resp:   - Extubated POD 11  - IS, encourage activity  - Mediastinal CT with minimal output, Pleural chest tubes with moderate, serosanguinous output. Keep chest tube pending output decrease.  - 8/29 Pulled Left pleural tube, f/u CXR.  POC U/S by Medicine for Left pleural effusion today-findings appreciable for loculated fluid. IR consult with CT chest w/o contrast. Possible chest tube placement with IR on 8/30.   - 8/30 IR recommended  thoracentesis for left pleural effusion.  Per Dr. Franco, no thoracentesis necessary at this time.    - :  f/u 2 view CXR on , for pleural effusion,stable      FEN/GI:   - Reg Diet with thin liquids, speech following.   - Continuous TF, consider calorie counts once patient's diet advances   - Bowel regimen, + BM  -  EGD: two gastric ulcers, no active bleeding or high-risk features.        Renal:   - Creatinine WNL, adequate UOP  - Volume status: hypervolemic, dry weight ~ 170 lbs, current weight 169.  - Diurese Lasix 20 mg po daily       ID:   - Completed alban-op abx  - WBC WNL  and trending down, afebrile, no signs or symptoms of infection      Heme:   - Hgb stable, no signs or symptoms of bleeding   - Continue to monitor for signs of GI bleed, transfuse for Hgb < 7  - Hematology followin/30 Plt 1534, Hydrea 1500 mg BID, Continue to trend plts, Heme recommended Iron replacement, started .    -  Plateletpheresis for Plts >1500, IR to place double lumen non-tunneled catheter prior to procedure  -  Plts 714 s/p plateletpheresis, repeat plateletpheresis x 1 today, give 2nd dose of 200 mg Iron Sucrose.  Hematology will convert oral medications  or , appreciate Heme rec, ongoing. Does not need to stay inpatient from hematology standpoint. If patient discharges over weekend, call hematology for hydroxyurea dosing.      Endo:   - BG 100s, on sliding scale insulin. Will discontinue once diet improves.       PPx:   - PPI      Anticoagulation:   -  start low intensity heparin gtt, discontinue   -  start warfarin, INR 2-3. INR 1.84->2.19->2.25  - Plavix 75 mg daily      Dispo:   - 6C since   - Therapy recommending d/c to TCU, pending chest tube removal     Seen and discussed with CVTS Fellow.    Jose Leslie MD  PGY-3 General Surgery

## 2018-09-03 NOTE — PROGRESS NOTES
HEMATOLOGY FOLLOW UP NOTE    Castillo Soler is a 68 year old man with JAK2+ polycythemia vera, recently switched by primary heme/onc provider from Hydrea to Anagrelide with very poor tolerance. Reason for switching apparently was that his anemia was felt to be due to Hydrea.  He is admitted s/p STEMI with ischemia-related VSD requiring surgical intervention. He is high-risk for ongoing thrombocytosis and we restarted hydrea on 8/7/18. Platelet count has continued to increase, hydrea dose increased to 1000 mg bid on 8/10. Work-up for platelet dysfunction has been negative.    Patient reports feeling well. He is ambulating. Denies sob, no headache, no fever/chills.   Offers no other complains.         Physical Examination:   Temp: 98.1  F (36.7  C) Temp src: Oral BP: 90/79   Heart Rate: 100 Resp: 18 SpO2: 96 % O2 Device: Nasal cannula Oxygen Delivery: 2 LPM  Constitutional: Awake and alert, not in acute distress.  Eyes: No scleral icterus. Eyes exhibit no discharge.  ENT/Mouth: Oral mucosa pink and moist  Cardiovascular: Normal rate, regular rhythm, S1, S2. No murmur or rub. No leg edema. Surgical incision in chest, healing well  Respiratory: Mild respiratory distress. No wheezes. Crackle in bibasilar area, right side is worse  Gastrointestinal: soft, no distention, no tenderness, active BS  Neurological: AAOX3, grossly non-focal  Psychiatric: Mentation and affect appear normal  Lines: PICC line in R arm, temporary HD cath still in.      Results for CASTILLO SOLER (MRN [id number]) as of 9/3/2018 08:03   Ref. Range 8/31/2018 13:50 8/31/2018 16:00 9/1/2018 06:08 9/2/2018 06:26 9/3/2018 05:41   Platelet Count Latest Ref Range: 150 - 450 10e9/L 685 (H) 359 397 487 (H) 534 (H)             Recommendation:  - Do not plan for more plateletpheresis, please remove the temporary HD cath  - may start Jakafi today  - no need to keep patient in the hospital just for his thrombocytosis. He can follow with Hem clinic after  discharge       We will continue to follow. Please page with any questions/concerns.       Plan was discussed with attending physician Dr. Oconnell.        Balaji Rodriguez MD/MPH  Heme/Onc Fellow, PGY-4

## 2018-09-03 NOTE — PLAN OF CARE
Problem: Patient Care Overview  Goal: Plan of Care/Patient Progress Review  Outcome: Improving  D: Admitted 8/6  s/p STEMI with ichemia-related VSD with TVR on 8/10. History of JAK2+ mutation.       I: Monitored vitals and assessed pt status.   Changed: INR therapeutic, Heparin gtt stopped  Running: -  PRN: IVP Lasix x1      A: A0x4. VSS. NSR HR 70-80's. 2L O2 via NC. Afebrile. Urinating moderate amounts in bedside urinal. CT to suction, minimal output, hopeful removal of CT tomorrow. CT dressing changed, incisions WNL. Sternal incision open to air. CT sites alban-wound reddened, scant drainage. Denies pain. Up with assist x1 and walker, ambulating x3 in hallway. Coccyx reddened, mepilex applied, encourage to turn frequently while in bed. Chaffed area between upper thighs, possibly d/t rubbing of CT tubing, monitor. Met INR goal today, Heparin gtt stopped.        P: Continue to monitor Pt status and report changes to CVTS. Hopeful removal of CT depending on output. Plan for discharge to TCU next mid-week, family has notified SW with their choice of TCU

## 2018-09-04 ENCOUNTER — APPOINTMENT (OUTPATIENT)
Dept: GENERAL RADIOLOGY | Facility: CLINIC | Age: 68
DRG: 219 | End: 2018-09-04
Attending: NURSE PRACTITIONER
Payer: MEDICARE

## 2018-09-04 ENCOUNTER — APPOINTMENT (OUTPATIENT)
Dept: PHYSICAL THERAPY | Facility: CLINIC | Age: 68
DRG: 219 | End: 2018-09-04
Attending: INTERNAL MEDICINE
Payer: MEDICARE

## 2018-09-04 LAB
ANION GAP SERPL CALCULATED.3IONS-SCNC: 6 MMOL/L (ref 3–14)
BUN SERPL-MCNC: 12 MG/DL (ref 7–30)
CA-I BLD-MCNC: 4.4 MG/DL (ref 4.4–5.2)
CALCIUM SERPL-MCNC: 7.6 MG/DL (ref 8.5–10.1)
CHLORIDE SERPL-SCNC: 103 MMOL/L (ref 94–109)
CO2 SERPL-SCNC: 25 MMOL/L (ref 20–32)
CREAT SERPL-MCNC: 0.73 MG/DL (ref 0.66–1.25)
ERYTHROCYTE [DISTWIDTH] IN BLOOD BY AUTOMATED COUNT: 23.5 % (ref 10–15)
GFR SERPL CREATININE-BSD FRML MDRD: >90 ML/MIN/1.7M2
GLUCOSE SERPL-MCNC: 77 MG/DL (ref 70–99)
HCT VFR BLD AUTO: 29.3 % (ref 40–53)
HGB BLD-MCNC: 8.9 G/DL (ref 13.3–17.7)
INR PPP: 2.49 (ref 0.86–1.14)
LMWH PPP CHRO-ACNC: <0.1 IU/ML
MAGNESIUM SERPL-MCNC: 2 MG/DL (ref 1.6–2.3)
MCH RBC QN AUTO: 30.9 PG (ref 26.5–33)
MCHC RBC AUTO-ENTMCNC: 30.4 G/DL (ref 31.5–36.5)
MCV RBC AUTO: 102 FL (ref 78–100)
PLATELET # BLD AUTO: 550 10E9/L (ref 150–450)
POTASSIUM SERPL-SCNC: 4.3 MMOL/L (ref 3.4–5.3)
RBC # BLD AUTO: 2.88 10E12/L (ref 4.4–5.9)
SODIUM SERPL-SCNC: 134 MMOL/L (ref 133–144)
WBC # BLD AUTO: 6.5 10E9/L (ref 4–11)

## 2018-09-04 PROCEDURE — 25000128 H RX IP 250 OP 636: Performed by: STUDENT IN AN ORGANIZED HEALTH CARE EDUCATION/TRAINING PROGRAM

## 2018-09-04 PROCEDURE — A9270 NON-COVERED ITEM OR SERVICE: HCPCS | Mod: GY | Performed by: STUDENT IN AN ORGANIZED HEALTH CARE EDUCATION/TRAINING PROGRAM

## 2018-09-04 PROCEDURE — 25000132 ZZH RX MED GY IP 250 OP 250 PS 637: Mod: GY | Performed by: NURSE PRACTITIONER

## 2018-09-04 PROCEDURE — 97530 THERAPEUTIC ACTIVITIES: CPT | Mod: GP | Performed by: REHABILITATION PRACTITIONER

## 2018-09-04 PROCEDURE — 85610 PROTHROMBIN TIME: CPT | Performed by: INTERNAL MEDICINE

## 2018-09-04 PROCEDURE — 85520 HEPARIN ASSAY: CPT | Performed by: INTERNAL MEDICINE

## 2018-09-04 PROCEDURE — 71046 X-RAY EXAM CHEST 2 VIEWS: CPT

## 2018-09-04 PROCEDURE — 80048 BASIC METABOLIC PNL TOTAL CA: CPT | Performed by: INTERNAL MEDICINE

## 2018-09-04 PROCEDURE — 40000193 ZZH STATISTIC PT WARD VISIT: Performed by: REHABILITATION PRACTITIONER

## 2018-09-04 PROCEDURE — A9270 NON-COVERED ITEM OR SERVICE: HCPCS | Mod: GY | Performed by: PHYSICIAN ASSISTANT

## 2018-09-04 PROCEDURE — 40000802 ZZH SITE CHECK

## 2018-09-04 PROCEDURE — 97116 GAIT TRAINING THERAPY: CPT | Mod: GP | Performed by: REHABILITATION PRACTITIONER

## 2018-09-04 PROCEDURE — A9270 NON-COVERED ITEM OR SERVICE: HCPCS | Mod: GY | Performed by: THORACIC SURGERY (CARDIOTHORACIC VASCULAR SURGERY)

## 2018-09-04 PROCEDURE — 85027 COMPLETE CBC AUTOMATED: CPT | Performed by: INTERNAL MEDICINE

## 2018-09-04 PROCEDURE — 25000128 H RX IP 250 OP 636: Performed by: NURSE PRACTITIONER

## 2018-09-04 PROCEDURE — 25000132 ZZH RX MED GY IP 250 OP 250 PS 637: Mod: GY | Performed by: THORACIC SURGERY (CARDIOTHORACIC VASCULAR SURGERY)

## 2018-09-04 PROCEDURE — 83735 ASSAY OF MAGNESIUM: CPT | Performed by: INTERNAL MEDICINE

## 2018-09-04 PROCEDURE — 97110 THERAPEUTIC EXERCISES: CPT | Mod: GP | Performed by: REHABILITATION PRACTITIONER

## 2018-09-04 PROCEDURE — 36592 COLLECT BLOOD FROM PICC: CPT | Performed by: INTERNAL MEDICINE

## 2018-09-04 PROCEDURE — 21400006 ZZH R&B CCU INTERMEDIATE UMMC

## 2018-09-04 PROCEDURE — A9270 NON-COVERED ITEM OR SERVICE: HCPCS | Mod: GY | Performed by: NURSE PRACTITIONER

## 2018-09-04 PROCEDURE — 25000132 ZZH RX MED GY IP 250 OP 250 PS 637: Mod: GY | Performed by: INTERNAL MEDICINE

## 2018-09-04 PROCEDURE — A9270 NON-COVERED ITEM OR SERVICE: HCPCS | Mod: GY | Performed by: INTERNAL MEDICINE

## 2018-09-04 PROCEDURE — 25000132 ZZH RX MED GY IP 250 OP 250 PS 637: Mod: GY | Performed by: STUDENT IN AN ORGANIZED HEALTH CARE EDUCATION/TRAINING PROGRAM

## 2018-09-04 PROCEDURE — 82330 ASSAY OF CALCIUM: CPT | Performed by: INTERNAL MEDICINE

## 2018-09-04 PROCEDURE — 25000132 ZZH RX MED GY IP 250 OP 250 PS 637: Mod: GY | Performed by: PHYSICIAN ASSISTANT

## 2018-09-04 RX ORDER — DIPHENHYDRAMINE HYDROCHLORIDE 50 MG/ML
50 INJECTION INTRAMUSCULAR; INTRAVENOUS
Status: DISCONTINUED | OUTPATIENT
Start: 2018-09-04 | End: 2018-09-07 | Stop reason: CLARIF

## 2018-09-04 RX ORDER — WARFARIN SODIUM 4 MG/1
4 TABLET ORAL
Status: COMPLETED | OUTPATIENT
Start: 2018-09-04 | End: 2018-09-04

## 2018-09-04 RX ORDER — METHYLPREDNISOLONE SODIUM SUCCINATE 125 MG/2ML
125 INJECTION, POWDER, LYOPHILIZED, FOR SOLUTION INTRAMUSCULAR; INTRAVENOUS
Status: DISCONTINUED | OUTPATIENT
Start: 2018-09-04 | End: 2018-09-07 | Stop reason: CLARIF

## 2018-09-04 RX ADMIN — PANTOPRAZOLE SODIUM 40 MG: 40 TABLET, DELAYED RELEASE ORAL at 08:31

## 2018-09-04 RX ADMIN — PANTOPRAZOLE SODIUM 40 MG: 40 TABLET, DELAYED RELEASE ORAL at 15:54

## 2018-09-04 RX ADMIN — HYDROXYUREA 1500 MG: 500 CAPSULE ORAL at 08:30

## 2018-09-04 RX ADMIN — SODIUM CHLORIDE, PRESERVATIVE FREE 5 ML: 5 INJECTION INTRAVENOUS at 15:57

## 2018-09-04 RX ADMIN — Medication 12.5 MG: at 20:14

## 2018-09-04 RX ADMIN — POTASSIUM CHLORIDE 10 MEQ: 750 TABLET, EXTENDED RELEASE ORAL at 08:30

## 2018-09-04 RX ADMIN — IRON SUCROSE 200 MG: 20 INJECTION, SOLUTION INTRAVENOUS at 12:10

## 2018-09-04 RX ADMIN — HYDROXYUREA 1500 MG: 500 CAPSULE ORAL at 20:12

## 2018-09-04 RX ADMIN — ATORVASTATIN CALCIUM 40 MG: 40 TABLET, FILM COATED ORAL at 20:14

## 2018-09-04 RX ADMIN — AMIODARONE HYDROCHLORIDE 200 MG: 200 TABLET ORAL at 08:29

## 2018-09-04 RX ADMIN — Medication 12.5 MG: at 08:29

## 2018-09-04 RX ADMIN — SENNOSIDES AND DOCUSATE SODIUM 2 TABLET: 8.6; 5 TABLET ORAL at 08:30

## 2018-09-04 RX ADMIN — UMECLIDINIUM BROMIDE AND VILANTEROL TRIFENATATE 1 PUFF: 62.5; 25 POWDER RESPIRATORY (INHALATION) at 12:18

## 2018-09-04 RX ADMIN — LEVOTHYROXINE SODIUM 75 MCG: 75 TABLET ORAL at 08:29

## 2018-09-04 RX ADMIN — FUROSEMIDE 20 MG: 20 TABLET ORAL at 08:30

## 2018-09-04 RX ADMIN — THERA TABS 1 TABLET: TAB at 08:31

## 2018-09-04 RX ADMIN — CLOPIDOGREL 75 MG: 75 TABLET, FILM COATED ORAL at 08:30

## 2018-09-04 RX ADMIN — WARFARIN SODIUM 4 MG: 4 TABLET ORAL at 17:10

## 2018-09-04 RX ADMIN — Medication 12.5 MG: at 21:40

## 2018-09-04 ASSESSMENT — ACTIVITIES OF DAILY LIVING (ADL)
ADLS_ACUITY_SCORE: 13

## 2018-09-04 NOTE — PLAN OF CARE
Problem: Patient Care Overview  Goal: Plan of Care/Patient Progress Review  PT - per plan established by the Physical Therapist, according to functional mobility the  discharge recommendation is TCU for cont skilled PT to progress functional mobility. Pt needing CGA to min A for all bed mob, and sit to stand. Pt amb up to 200'x 2 with WW and assist for 02 tank. Pt on 2L 02 during PT session with pt SATS at 94%   Discharge Planner PT   Patient plan for discharge: rehab.   Current status: see above.   Barriers to return to prior living situation: weakness, fatigue  Recommendations for discharge: TCU   Rationale for recommendations: cont skilled PT to progress functional mobilty.        Entered by: Salomón Flores 09/04/2018 10:09 AM

## 2018-09-04 NOTE — PLAN OF CARE
"Problem: Patient Care Overview  Goal: Plan of Care/Patient Progress Review  Outcome: Therapy, progress towards functional goals is fair  BP 97/72  Pulse 76  Temp 98  F (36.7  C) (Oral)  Resp 16  Ht 1.753 m (5' 9\")  Wt 75.4 kg (166 lb 3.2 oz)  SpO2 98%  BMI 24.54 kg/m2  Neuro: A&Ox4.   Cardiac: VSS.   Respiratory: 1.5L NC, has Chest tube to suction.  GI/: Voiding spontaneously. No BM this shift.   Diet/appetite: Tolerating diet.Poor appetite. Denies nausea   Activity: Up with SBA  Pain: Denies   Skin: Intact, no new deficits noted.  Lines: PICC  Drains:Chest tube.    Will continue to monitor and follow plan of care.           "

## 2018-09-04 NOTE — PROGRESS NOTES
"Social Work Services Progress Note    Hospital Day: 29  Date of Initial Social Work Evaluation: 8/9/18  Collaborated with:  Pt, spouse, SNF admissions, CVTS     Data:  Pt is a 68 year old male being followed by SW for placement to TCU.    Intervention:  SW discussed with pt progress in discharge planning.  SW also called spouse to update on discharge planning.  Family is aware that pt is not a candidate for [facility] due to medical needs.  Family's preference is [facility] due to distance to home.  SW to follow up with referrals for bed availability.    Addendum: SW in error called pt's spouse with updates on discharge planning.  SW recognized error and discussed with pt immediately after call was finished.  SW explained nature of the call to pt.  Pt states that this was \"ok\" and SW clarified with pt that communications in the future are to be with him and daughters.  Pt endorses no safety risks or concerns with spouse having this knowledge.  Pt and spouse note having discussed this prior to SW calling spouse.  SW apologized for error and asked pt to notify staff if safety concerns are noted due to this call.  Pt states understanding.    Referrals in Progress:   [facility]  PH: [phone]  F: [phone]    [facility]  PH: [phone]  F: [phone]    [facility]  PH: [phone]  F: [phone]    Assessment:  Pt and spouse in agreement with the plan.    Plan:    Anticipated Disposition:  Facility - TCU    Barriers to d/c plan:  Bed availability, medical stability    Follow Up:  SW to follow for discharge planning.    CAROL ANN Almanza, APSW  6C Unit   Phone: [phone]  Pager: [phone]  Unit: [phone]    ___________________________________________________________________________________________________________________________________________________  Referrals Discontinued:  [facility] - not a medical candidate  PH: [phone]  F: [phone]    Community Case Management/Community Services in place:   None reported.

## 2018-09-04 NOTE — PROGRESS NOTES
CARDIOTHORACIC SURGERY PROGRESS NOTE  09/04/2018      SUBJECTIVE:    No acute issues over night.   Worked with PT this morning, feels he is continuing to improve.     Denies CP, fever, chills. Reports intermittent SOB with activity.     Patient has been tolerating diet. + BM. + flatus.      OBJECTIVE:   Temp:  [97.7  F (36.5  C)-98.3  F (36.8  C)] 98.1  F (36.7  C)  Heart Rate:  [79-89] 89  Resp:  [16-18] 16  BP: ()/(61-81) 97/72  SpO2:  [91 %-99 %] 99 %    MAPs: 75-90  Gen: A&Ox3, NAD   CV: RRR  Pulm: NLB on NC  Abd: Soft  Ext: WWP  Neuro: Nonfocal  Incision: c/d/i, no erythema, sternum stable  Tubes/drains: Chest tube with serosanguinous output, no air leak.     I&O's:  I/O last 3 completed shifts:  In: 750 [P.O.:750]  Out: 1885 [Urine:1725; Chest Tube:160]    Labs:   BMP    Recent Labs  Lab 09/04/18  0557 09/03/18  0541 09/02/18  0626 09/01/18  0608    136 137 136   POTASSIUM 4.3 4.1 4.0 4.2   CHLORIDE 103 102 101 99   GABRIELA 7.6* 7.3* 7.6* 8.0*   CO2 25 26 28 30   BUN 12 15 15 17   CR 0.73 0.93 0.80 0.85   GLC 77 80 82 86     CBC    Recent Labs  Lab 09/04/18  0557 09/03/18  0541 09/02/18  0626 09/01/18  0608   WBC 6.5 6.0 6.0 6.5   RBC 2.88* 2.86* 2.81* 2.97*   HGB 8.9* 8.9* 8.6* 9.1*   HCT 29.3* 28.9* 28.2* 29.5*   * 101* 100 99   MCH 30.9 31.1 30.6 30.6   MCHC 30.4* 30.8* 30.5* 30.8*   RDW 23.5* 22.8* 22.7* 22.7*   * 534* 487* 397     INR    Recent Labs  Lab 09/04/18  0557 09/03/18  0541 09/02/18  0626 09/01/18  0608   INR 2.49* 2.25* 2.19* 1.84*      Hepatic Panel     Recent Labs  Lab 08/31/18  0822   AST 41   ALT 68   ALKPHOS 161*   BILITOTAL 0.2   ALBUMIN 2.0*     Glucose    Recent Labs  Lab 09/04/18  0557 09/03/18  0541 09/02/18  0626 09/01/18  0608 08/31/18  0822 08/30/18  1633 08/30/18  1212 08/30/18  0720 08/30/18  0652 08/30/18  0307 08/30/18  0001 08/29/18  2042   GLC 77 80 82 86 142*  --   --   --  88  --   --   --    BGM  --   --   --   --   --  88 87 100*  --  99 94 107*        Imaging:   No results found for this or any previous visit (from the past 24 hour(s)).      ASSESSMENT/PLAN: Patient is a 68 year old male with a history of s/p HUSSEIN to RCA and LAD for STEMI with post infarction basal VSD, who on 8/10/2018 underwent repair of posterior VSD and TVR with bioprosthetic valve.  He was kept open chest due to coagulopathy and RV dysfunction. Chest closed on 8/13/2018, IABP removed 8/14/18 and he was extubated on POD #11.       Neuro:  -Acute post-op pain: IV dilaudid, tylenol and oxycodone prn  - Wean Seroquel as tolerated, decreased to 12.5 mg HS      CV:  - ASA 81 mg, atorvastatin 40 mg daily,   - Systolic Blood pressure: 80-98  - TPW capped  - ICU course complicated by a-flutter, EP consult with plan to anticoagulate and then attempt to overdrive pace with A lead.  While in the ICU, patient also went into narrow complex SVT with MAPs in 50s. Adenosine 6 mg was administered, resulting in 20 seconds of asystole and brief CPR with ROSC.    - 8/25 patient converted from NSR to a-fib overnight. During morning therapy, patient had a-fib with RVR (rates 140s) and symptomatic. EKGs appreciable for a-fib and SVT. Metoprolol 2.5 mg IV administered with improved rate control. Discussed case with EP fellow, amio bolus and gtt started.  Patient converted to NSR afternoon on 8/25. Amiodarone 400 mg PO BID 8/26.  8/31 Start Amiodarone taper 200 mg BID x 4 doses, then 200 mg daily for 21 doses   - Cut TPW 8/27, no bradycardia on PO Amio but prolonged QTc.   - Metoprolol 12.5 mg po bid.       Resp:   - Extubated POD 11  - IS, encourage activity, albuterol inhaler QID  - Mediastinal CT with minimal output, Pleural chest tubes with moderate, serosanguinous output. Keep chest tube pending output decrease.  - 8/29 Pulled Left pleural tube, f/u CXR.  POC U/S by Medicine for Left pleural effusion today-findings appreciable for loculated fluid. IR consult with CT chest w/o contrast. Possible chest tube  placement with IR on .   -  IR recommended thoracentesis for left pleural effusion.  Per Dr. Franco, no thoracentesis necessary at this time.          FEN/GI:   - Reg Diet with thin liquids, speech following.   - Continuous TF, consider calorie counts once patient's diet advances   - Bowel regimen, + BM  -  EGD: two gastric ulcers, no active bleeding or high-risk features.        Renal:   - Creatinine WNL, adequate UOP  - Volume status: hypervolemic, dry weight ~ 170 lbs, current weight 169.  - Diurese Lasix 20 mg po daily    ID:   - Completed alban-op abx  - WBC WNL  and trending down, afebrile, no signs or symptoms of infection      Heme:   - Hgb stable, no signs or symptoms of bleeding   - Continue to monitor for signs of GI bleed, transfuse for Hgb < 7  - Hematology followin/30 Plt 1534, Hydrea 1500 mg BID, Continue to trend plts, Heme recommended Iron replacement, started .    -  Plateletpheresis for Plts >1500, IR to place double lumen non-tunneled catheter prior to procedure  -  Plts 714 s/p plateletpheresis, repeat plateletpheresis x 1  Per hematology, stopping plateletpheresis, okay to remove HD catheter.  Will follow-up outpatient for further management.        Endo:   - BG 100s, sliding scale insulin discontinue      PPx:   - PPI      Anticoagulation:   -  start low intensity heparin gtt, discontinue   -  start warfarin, INR 2-3. INR 2.49  - Plavix 75 mg daily      Dispo:   - 6C since   - Therapy recommending d/c to TCU, pending chest tube removal     Seen and discussed with CVTS Fellow.    STEPHANIE Conte CNP   Cardiothoracic Surgery  2018 at 9:20 AM

## 2018-09-04 NOTE — PLAN OF CARE
Problem: Patient Care Overview  Goal: Plan of Care/Patient Progress Review  Outcome: Improving  D: Admitted 8/6  s/p STEMI with ichemia-related VSD with TVR on 8/10. History of JAK2 mutation.       I: Monitored vitals and assessed pt status.   Changed: -  Running: -  PRN:-      A: A0x4. VSS. NSR HR 70-80's. 1.5L O2 via NC, slowly weaning off. Afebrile. Urinating moderate amounts in bedside urinal. CT to suction, minimal output, hopeful removal of CT tomorrow. CT dressing changed, CT sites alban-wound reddened, scant drainage. Sternal incision open to air. Denies pain. Up with assist x1 and walker, ambulating x4 in hallway. Coccyx reddened, mepilex applied, encourage to turn frequently while in bed. Chaffed area between upper thighs d/t rubbing of CT tubing, monitor. Therapeutic INR, 2.25. PICC white lumen occluded, no blood return, obtained order for TPA, after 30 mins of TPA in line, still no blood return, TPA to remain in line for another 90 minutes.      P: Continue to monitor Pt status and report changes to CVTS. Hopeful removal of CT depending on output. Plan for discharge to TCU next mid-week.

## 2018-09-04 NOTE — PLAN OF CARE
"Problem: Patient Care Overview  Goal: Plan of Care/Patient Progress Review  Outcome: No Change  /81  Pulse 76  Temp 98.3  F (36.8  C) (Oral)  Resp 18  Ht 1.753 m (5' 9\")  Wt 76.1 kg (167 lb 11.2 oz)  SpO2 92%  BMI 24.76 kg/m2  [id number]: A/Ox4, VSS on 1.5L nasal cannula. SR. Denies pain. Voiding spontaneously in urinal, adequate amounts. Blood return from both lumens of PICC. Currently Hep locked. CT with scant serous output. CT dressing CDI. Mepilex to coccyx and powder to chaffed area of BLE. Turning and repositioning q2h. Will continue to monitor per POC.     Problem: Cardiac Output Decreased (Adult)  Goal: Identify Related Risk Factors and Signs and Symptoms  Related risk factors and signs and symptoms are identified upon initiation of Human Response Clinical Practice Guideline (CPG).   Outcome: No Change   09/03/18 2218   Cardiac Output Decreased   Related Risk Factors (Cardiac Output Decreased) cardiac surgery/procedure;medication effects   Signs and Symptoms (Cardiac Output Decreased) edema;fatigue       Problem: Cardiac Surgery (Adult)  Goal: Signs and Symptoms of Listed Potential Problems Will be Absent, Minimized or Managed (Cardiac Surgery)  Signs and symptoms of listed potential problems will be absent, minimized or managed by discharge/transition of care (reference Cardiac Surgery (Adult) CPG).   Outcome: No Change   09/03/18 2218   Cardiac Surgery   Problems Assessed (Cardiac Surgery) all   Problems Present (Cardiac Surgery) situational response         "

## 2018-09-04 NOTE — PLAN OF CARE
Problem: Patient Care Overview  Goal: Plan of Care/Patient Progress Review  Outcome: No Change  D: S/p Repair of VSD and TVR  I/A: VSS. Denies pain. Sinus rhythm. Chest tube to suction with minimal output this shift. Up with SBA. Good urine output. Need order to pull internal jugular line.   P: Continue to monitor. DC to TCU pending chest tube removal.

## 2018-09-05 ENCOUNTER — APPOINTMENT (OUTPATIENT)
Dept: GENERAL RADIOLOGY | Facility: CLINIC | Age: 68
DRG: 219 | End: 2018-09-05
Attending: NURSE PRACTITIONER
Payer: MEDICARE

## 2018-09-05 ENCOUNTER — APPOINTMENT (OUTPATIENT)
Dept: OCCUPATIONAL THERAPY | Facility: CLINIC | Age: 68
DRG: 219 | End: 2018-09-05
Attending: INTERNAL MEDICINE
Payer: MEDICARE

## 2018-09-05 ENCOUNTER — APPOINTMENT (OUTPATIENT)
Dept: PHYSICAL THERAPY | Facility: CLINIC | Age: 68
DRG: 219 | End: 2018-09-05
Attending: INTERNAL MEDICINE
Payer: MEDICARE

## 2018-09-05 DIAGNOSIS — D47.1 MYELOPROLIFERATIVE DISEASE (H): Primary | ICD-10-CM

## 2018-09-05 LAB
ALBUMIN SERPL-MCNC: 2.2 G/DL (ref 3.4–5)
ALP SERPL-CCNC: 153 U/L (ref 40–150)
ALT SERPL W P-5'-P-CCNC: 53 U/L (ref 0–70)
ANION GAP SERPL CALCULATED.3IONS-SCNC: 6 MMOL/L (ref 3–14)
AST SERPL W P-5'-P-CCNC: 21 U/L (ref 0–45)
BILIRUB DIRECT SERPL-MCNC: 0.1 MG/DL (ref 0–0.2)
BILIRUB SERPL-MCNC: 0.4 MG/DL (ref 0.2–1.3)
BUN SERPL-MCNC: 12 MG/DL (ref 7–30)
CA-I BLD-MCNC: 4.4 MG/DL (ref 4.4–5.2)
CALCIUM SERPL-MCNC: 7.7 MG/DL (ref 8.5–10.1)
CHLORIDE SERPL-SCNC: 102 MMOL/L (ref 94–109)
CO2 SERPL-SCNC: 23 MMOL/L (ref 20–32)
CREAT SERPL-MCNC: 0.77 MG/DL (ref 0.66–1.25)
ERYTHROCYTE [DISTWIDTH] IN BLOOD BY AUTOMATED COUNT: 23.6 % (ref 10–15)
GFR SERPL CREATININE-BSD FRML MDRD: >90 ML/MIN/1.7M2
GLUCOSE SERPL-MCNC: 81 MG/DL (ref 70–99)
HCT VFR BLD AUTO: 29.9 % (ref 40–53)
HGB BLD-MCNC: 9.2 G/DL (ref 13.3–17.7)
INR PPP: 2.21 (ref 0.86–1.14)
MAGNESIUM SERPL-MCNC: 1.9 MG/DL (ref 1.6–2.3)
MCH RBC QN AUTO: 31.1 PG (ref 26.5–33)
MCHC RBC AUTO-ENTMCNC: 30.8 G/DL (ref 31.5–36.5)
MCV RBC AUTO: 101 FL (ref 78–100)
PLATELET # BLD AUTO: 586 10E9/L (ref 150–450)
POTASSIUM SERPL-SCNC: 4.1 MMOL/L (ref 3.4–5.3)
PROT SERPL-MCNC: 6.2 G/DL (ref 6.8–8.8)
RBC # BLD AUTO: 2.96 10E12/L (ref 4.4–5.9)
SODIUM SERPL-SCNC: 131 MMOL/L (ref 133–144)
WBC # BLD AUTO: 9.2 10E9/L (ref 4–11)

## 2018-09-05 PROCEDURE — A9270 NON-COVERED ITEM OR SERVICE: HCPCS | Mod: GY | Performed by: THORACIC SURGERY (CARDIOTHORACIC VASCULAR SURGERY)

## 2018-09-05 PROCEDURE — 80076 HEPATIC FUNCTION PANEL: CPT | Performed by: INTERNAL MEDICINE

## 2018-09-05 PROCEDURE — 40000558 ZZH STATISTIC CVC DRESSING CHANGE

## 2018-09-05 PROCEDURE — A9270 NON-COVERED ITEM OR SERVICE: HCPCS | Mod: GY | Performed by: STUDENT IN AN ORGANIZED HEALTH CARE EDUCATION/TRAINING PROGRAM

## 2018-09-05 PROCEDURE — 97116 GAIT TRAINING THERAPY: CPT | Mod: GP | Performed by: REHABILITATION PRACTITIONER

## 2018-09-05 PROCEDURE — 99232 SBSQ HOSP IP/OBS MODERATE 35: CPT | Mod: GC | Performed by: INTERNAL MEDICINE

## 2018-09-05 PROCEDURE — 25000132 ZZH RX MED GY IP 250 OP 250 PS 637: Mod: GY | Performed by: STUDENT IN AN ORGANIZED HEALTH CARE EDUCATION/TRAINING PROGRAM

## 2018-09-05 PROCEDURE — 40000802 ZZH SITE CHECK

## 2018-09-05 PROCEDURE — 25000125 ZZHC RX 250: Performed by: NURSE PRACTITIONER

## 2018-09-05 PROCEDURE — 25000128 H RX IP 250 OP 636: Performed by: STUDENT IN AN ORGANIZED HEALTH CARE EDUCATION/TRAINING PROGRAM

## 2018-09-05 PROCEDURE — 85610 PROTHROMBIN TIME: CPT | Performed by: INTERNAL MEDICINE

## 2018-09-05 PROCEDURE — A9270 NON-COVERED ITEM OR SERVICE: HCPCS | Mod: GY | Performed by: PHYSICIAN ASSISTANT

## 2018-09-05 PROCEDURE — 40000133 ZZH STATISTIC OT WARD VISIT

## 2018-09-05 PROCEDURE — A9270 NON-COVERED ITEM OR SERVICE: HCPCS | Mod: GY | Performed by: NURSE PRACTITIONER

## 2018-09-05 PROCEDURE — 97535 SELF CARE MNGMENT TRAINING: CPT | Mod: GO

## 2018-09-05 PROCEDURE — 97110 THERAPEUTIC EXERCISES: CPT | Mod: GO

## 2018-09-05 PROCEDURE — 25000132 ZZH RX MED GY IP 250 OP 250 PS 637: Mod: GY | Performed by: THORACIC SURGERY (CARDIOTHORACIC VASCULAR SURGERY)

## 2018-09-05 PROCEDURE — 40000193 ZZH STATISTIC PT WARD VISIT: Performed by: REHABILITATION PRACTITIONER

## 2018-09-05 PROCEDURE — 85027 COMPLETE CBC AUTOMATED: CPT | Performed by: INTERNAL MEDICINE

## 2018-09-05 PROCEDURE — 25000132 ZZH RX MED GY IP 250 OP 250 PS 637: Mod: GY | Performed by: PHYSICIAN ASSISTANT

## 2018-09-05 PROCEDURE — 82330 ASSAY OF CALCIUM: CPT | Performed by: INTERNAL MEDICINE

## 2018-09-05 PROCEDURE — 83735 ASSAY OF MAGNESIUM: CPT | Performed by: INTERNAL MEDICINE

## 2018-09-05 PROCEDURE — 25000132 ZZH RX MED GY IP 250 OP 250 PS 637: Mod: GY | Performed by: NURSE PRACTITIONER

## 2018-09-05 PROCEDURE — 71045 X-RAY EXAM CHEST 1 VIEW: CPT

## 2018-09-05 PROCEDURE — 97110 THERAPEUTIC EXERCISES: CPT | Mod: GP | Performed by: REHABILITATION PRACTITIONER

## 2018-09-05 PROCEDURE — 21400006 ZZH R&B CCU INTERMEDIATE UMMC

## 2018-09-05 PROCEDURE — 80048 BASIC METABOLIC PNL TOTAL CA: CPT | Performed by: INTERNAL MEDICINE

## 2018-09-05 PROCEDURE — 36592 COLLECT BLOOD FROM PICC: CPT | Performed by: INTERNAL MEDICINE

## 2018-09-05 RX ORDER — WARFARIN SODIUM 5 MG/1
5 TABLET ORAL
Status: COMPLETED | OUTPATIENT
Start: 2018-09-05 | End: 2018-09-05

## 2018-09-05 RX ORDER — ATORVASTATIN CALCIUM 80 MG/1
80 TABLET, FILM COATED ORAL EVERY EVENING
Status: DISCONTINUED | OUTPATIENT
Start: 2018-09-05 | End: 2018-09-11 | Stop reason: HOSPADM

## 2018-09-05 RX ADMIN — PANTOPRAZOLE SODIUM 40 MG: 40 TABLET, DELAYED RELEASE ORAL at 17:00

## 2018-09-05 RX ADMIN — SODIUM CHLORIDE, PRESERVATIVE FREE 5 ML: 5 INJECTION INTRAVENOUS at 16:55

## 2018-09-05 RX ADMIN — CLOPIDOGREL 75 MG: 75 TABLET, FILM COATED ORAL at 08:48

## 2018-09-05 RX ADMIN — Medication 12.5 MG: at 08:48

## 2018-09-05 RX ADMIN — Medication 2 G: at 10:20

## 2018-09-05 RX ADMIN — HYDROXYUREA 1500 MG: 500 CAPSULE ORAL at 08:49

## 2018-09-05 RX ADMIN — Medication 12.5 MG: at 20:33

## 2018-09-05 RX ADMIN — PANTOPRAZOLE SODIUM 40 MG: 40 TABLET, DELAYED RELEASE ORAL at 08:48

## 2018-09-05 RX ADMIN — POTASSIUM CHLORIDE 10 MEQ: 750 TABLET, EXTENDED RELEASE ORAL at 08:49

## 2018-09-05 RX ADMIN — AMIODARONE HYDROCHLORIDE 200 MG: 200 TABLET ORAL at 08:48

## 2018-09-05 RX ADMIN — THERA TABS 1 TABLET: TAB at 08:48

## 2018-09-05 RX ADMIN — ATORVASTATIN CALCIUM 80 MG: 80 TABLET, FILM COATED ORAL at 19:25

## 2018-09-05 RX ADMIN — Medication 12.5 MG: at 19:25

## 2018-09-05 RX ADMIN — HYDROXYUREA 1500 MG: 500 CAPSULE ORAL at 19:26

## 2018-09-05 RX ADMIN — LEVOTHYROXINE SODIUM 75 MCG: 75 TABLET ORAL at 08:48

## 2018-09-05 RX ADMIN — FUROSEMIDE 20 MG: 20 TABLET ORAL at 08:48

## 2018-09-05 RX ADMIN — UMECLIDINIUM BROMIDE AND VILANTEROL TRIFENATATE 1 PUFF: 62.5; 25 POWDER RESPIRATORY (INHALATION) at 08:47

## 2018-09-05 RX ADMIN — WARFARIN SODIUM 5 MG: 5 TABLET ORAL at 17:01

## 2018-09-05 ASSESSMENT — ACTIVITIES OF DAILY LIVING (ADL)
ADLS_ACUITY_SCORE: 13
ADLS_ACUITY_SCORE: 12
ADLS_ACUITY_SCORE: 13

## 2018-09-05 NOTE — PROGRESS NOTES
HEMATOLOGY FOLLOW UP NOTE    Castillo Soler is a 68 year old man with JAK2+ polycythemia vera, recently switched by primary heme/onc provider from Hydrea to Anagrelide with very poor tolerance. Reason for switching apparently was that his anemia was felt to be due to Hydrea.  He is admitted s/p STEMI with ischemia-related VSD requiring surgical intervention. He is high-risk for ongoing thrombocytosis and we restarted hydrea on 8/7/18. Platelet count has continued to increase, hydrea dose increased to 1000 mg bid on 8/10. Work-up for platelet dysfunction has been negative.    Patient is doing ok overall, he had chest tube removed yesterday, felt his breathing is a little different after tube removal, denies chest pain. No palpitation. Denies sob, no headache, no fever/chills.   Offers no other complains.         Physical Examination:   Temp: 97.7  F (36.5  C) Temp src: Oral BP: 103/69 Pulse: 99 Heart Rate: 93 Resp: 16 SpO2: 98 % O2 Device: None (Room air) Oxygen Delivery: 1 LPM  Constitutional: Awake and alert, not in acute distress.  Eyes: No scleral icterus. Eyes exhibit no discharge.  ENT/Mouth: Oral mucosa pink and moist  Cardiovascular: Normal rate, regular rhythm, S1, S2. No murmur or rub. No leg edema. Surgical incision in chest, healing well  Respiratory: No respiratory distress. No wheezes. Chest tube out.  Gastrointestinal: soft, no distention, no tenderness, active BS  Neurological: AAOX3, grossly non-focal  Psychiatric: Mentation and affect appear normal      Results for CASTILLO SOLER (MRN [id number]) as of 9/5/2018 13:07   Ref. Range 9/1/2018 06:08 9/2/2018 06:26 9/3/2018 05:41 9/4/2018 05:57 9/5/2018 06:16   Platelet Count Latest Ref Range: 150 - 450 10e9/L 397 487 (H) 534 (H) 550 (H) 586 (H)               Recommendation:  - His platelet counts are holding steady with hydroxyurea after plateletpheresis, given that, will not start Jakafi during this hospitalization  - patient can be discharged on  current dose of hydroxyurea, will likely repeat bone marrow biopsy before adjust treatment  - He will follow with Hem clinic after discharge       We will continue to follow. Please page with any questions/concerns.       Plan was discussed with attending physician Dr. Oconnell.        Balaji Rodriguez MD/MPH  Heme/Onc Fellow, PGY-4

## 2018-09-05 NOTE — PLAN OF CARE
Problem: Patient Care Overview  Goal: Plan of Care/Patient Progress Review  PT - per plan established by the Physical Therapist, according to functional mobility the  discharge recommendation is TCU for cont skilled PT to progress functional mobilty. Pt is limited by weakness, fatigue, and mild SOB. Pt is needing close SBA for all bed mobility and sit to stand needing V.c for sternal precautions. Pt demo amb up to 350'x 1 with WW needing assist for 02 tank. Pt on 1L 02 with resting SATS 96%, amb 94% 1L. Pt demo seated there ex x 10.   Discharge Planner PT   Patient plan for discharge: rehab.   Current status: see above.   Barriers to return to prior living situation: weakness, fatigue. SOB   Recommendations for discharge: TCU   Rationale for recommendations: skilled PT to progress functional mob.        Entered by: Salomón Flores 09/05/2018 8:58 AM

## 2018-09-05 NOTE — PLAN OF CARE
Problem: Patient Care Overview  Goal: Plan of Care/Patient Progress Review  OT/CR 6C  Discharge Planner OT   Patient plan for discharge: not stated this session  Current status: CGA/Min A sit<>stand x3 reps throughout session. Pt ambulated ~300ft x2 with FWW and SBA/CGA. Pt tolerated 8 minutes on the Nustep at a resistance of 1.   Barriers to return to prior living situation: weakness, deconditioning, acute medical needs  Recommendations for discharge: TCU  Rationale for recommendations: Pt is below baseline and would benefit from continued skilled therapy to increase activity tolerance and independence with ADLs.       Entered by: Serena Richardson 09/05/2018 2:42 PM

## 2018-09-05 NOTE — PLAN OF CARE
Problem: Patient Care Overview  Goal: Plan of Care/Patient Progress Review  Outcome: No Change  Pt continues to have CT to suction with 50 ml serous output this shift. 1.5 L nc, sats 90s. One incontinent BM, urinates in the urinal. Ate well sitting in the chair. Denies pain.

## 2018-09-05 NOTE — PROGRESS NOTES
CARDIOTHORACIC SURGERY PROGRESS NOTE  09/05/2018      SUBJECTIVE:    No acute issues over night.   Worked with PT this morning, feels he is continuing to improve.     Denies CP, fever, chills. Reports intermittent SOB with activity.     Patient has been tolerating diet. + BM. + flatus.      OBJECTIVE:   Temp:  [97.7  F (36.5  C)-98.4  F (36.9  C)] 97.7  F (36.5  C)  Pulse:  [99] 99  Heart Rate:  [79-94] 93  Resp:  [16] 16  BP: ()/(67-82) 103/69  SpO2:  [93 %-98 %] 98 %    MAPs: 77-90  Gen: A&Ox3, NAD   CV: RRR  Pulm: NLB on NC  Abd: Soft  Ext: WWP  Neuro: Nonfocal  Incision: c/d/i, no erythema, sternum stable  Tubes/drains: Chest tube with serosanguinous output, no air leak.   *right pleural CT removed without incident    I&O's:  I/O last 3 completed shifts:  In: -   Out: 2435 [Urine:2225; Chest Tube:210]    Labs:   BMP    Recent Labs  Lab 09/05/18  0616 09/04/18  0557 09/03/18  0541 09/02/18  0626   * 134 136 137   POTASSIUM 4.1 4.3 4.1 4.0   CHLORIDE 102 103 102 101   GABRIELA 7.7* 7.6* 7.3* 7.6*   CO2 23 25 26 28   BUN 12 12 15 15   CR 0.77 0.73 0.93 0.80   GLC 81 77 80 82     CBC    Recent Labs  Lab 09/05/18  0616 09/04/18  0557 09/03/18  0541 09/02/18  0626   WBC 9.2 6.5 6.0 6.0   RBC 2.96* 2.88* 2.86* 2.81*   HGB 9.2* 8.9* 8.9* 8.6*   HCT 29.9* 29.3* 28.9* 28.2*   * 102* 101* 100   MCH 31.1 30.9 31.1 30.6   MCHC 30.8* 30.4* 30.8* 30.5*   RDW 23.6* 23.5* 22.8* 22.7*   * 550* 534* 487*     INR    Recent Labs  Lab 09/05/18  0616 09/04/18  0557 09/03/18  0541 09/02/18  0626   INR 2.21* 2.49* 2.25* 2.19*      Hepatic Panel     Recent Labs  Lab 09/05/18  0616 08/31/18  0822   AST 21 41   ALT 53 68   ALKPHOS 153* 161*   BILITOTAL 0.4 0.2   ALBUMIN 2.2* 2.0*     Glucose    Recent Labs  Lab 09/05/18  0616 09/04/18  0557 09/03/18  0541 09/02/18  0626 09/01/18  0608 08/31/18  0822 08/30/18  1633 08/30/18  1212 08/30/18  0720  08/30/18  0307 08/30/18  0001 08/29/18  2042   GLC 81 77 80 82 86 142*   --   --   --   < >  --   --   --    BGM  --   --   --   --   --   --  88 87 100*  --  99 94 107*   < > = values in this interval not displayed.    Imaging:   No results found for this or any previous visit (from the past 24 hour(s)).      ASSESSMENT/PLAN: Patient is a 68 year old male with a history of s/p HUSSEIN to RCA and LAD for STEMI with post infarction basal VSD, who on 8/10/2018 underwent repair of posterior VSD and TVR with bioprosthetic valve.  He was kept open chest due to coagulopathy and RV dysfunction. Chest closed on 8/13/2018, IABP removed 8/14/18 and he was extubated on POD #11.       Neuro:  -Acute post-op pain: IV dilaudid, tylenol and oxycodone prn  - Wean Seroquel as tolerated, decreased to 12.5 mg HS      CV:  - ASA 81 mg, atorvastatin 40 mg daily,   - Systolic Blood pressure: 80-98  - TPW capped  - ICU course complicated by a-flutter, EP consult with plan to anticoagulate and then attempt to overdrive pace with A lead.  While in the ICU, patient also went into narrow complex SVT with MAPs in 50s. Adenosine 6 mg was administered, resulting in 20 seconds of asystole and brief CPR with ROSC.    - 8/25 patient converted from NSR to a-fib overnight. During morning therapy, patient had a-fib with RVR (rates 140s) and symptomatic. EKGs appreciable for a-fib and SVT. Metoprolol 2.5 mg IV administered with improved rate control. Discussed case with EP fellow, amio bolus and gtt started.  Patient converted to NSR afternoon on 8/25. Amiodarone 400 mg PO BID 8/26.  8/31 Start Amiodarone taper 200 mg BID x 4 doses, then 200 mg daily for 21 doses   - Cut TPW 8/27, no bradycardia on PO Amio but prolonged QTc.   - Metoprolol 12.5 mg po bid.       Resp:   - Extubated POD 11  - IS, encourage activity, albuterol inhaler QID  - 8/29 Pulled Left pleural tube, f/u CXR.  POC U/S by Medicine for Left pleural effusion today-findings appreciable for loculated fluid. IR consult with CT chest w/o contrast. Possible chest  tube placement with IR on .   -  IR recommended thoracentesis for left pleural effusion.  Per Dr. Franco, no thoracentesis necessary at this time.    -  right pleural chest tube removed, f/u CXR         FEN/GI:   - Reg Diet with thin liquids, speech following.   - Continuous TF, consider calorie counts once patient's diet advances   - Bowel regimen, + BM  -  EGD: two gastric ulcers, no active bleeding or high-risk features.        Renal:   - Creatinine WNL, adequate UOP  - Volume status: hypervolemic, dry weight ~ 170 lbs, current weight 169.  - Diurese Lasix 20 mg po daily    ID:   - Completed alban-op abx  - WBC WNL  and trending down, afebrile, no signs or symptoms of infection      Heme:   - Hgb stable, no signs or symptoms of bleeding   - Continue to monitor for signs of GI bleed, transfuse for Hgb < 7  - Hematology followin/30 Plt 1534, Hydrea 1500 mg BID, Continue to trend plts, Heme recommended Iron replacement, started .    -  Plateletpheresis for Plts >1500, IR to place double lumen non-tunneled catheter prior to procedure  -  Plts 714 s/p plateletpheresis, repeat plateletpheresis x 1  Per hematology, stopping plateletpheresis, okay to remove HD catheter.  Will follow-up with hematology as outpatient for further management.        Endo:   - BG 100s, sliding scale insulin discontinue      PPx:   - PPI      Anticoagulation:   -  start low intensity heparin gtt, discontinue   -  start warfarin, INR 2-3. INR 2.21  - Plavix 75 mg daily      Dispo:   - 6C since   - Therapy recommending d/c to TCU, okay to discharge in next 1-2 days       Seen and discussed with CVTS Fellow.    STEPHANIE Conte CNP   Cardiothoracic Surgery  2018 at 1:56 PM

## 2018-09-05 NOTE — PLAN OF CARE
Problem: Patient Care Overview  Goal: Plan of Care/Patient Progress Review  Outcome: No Change    Patient a/o x 4. VSSA. Continues with CT to suction. Minimal drainage this shift. Denies pain. Had 1 large soft bowel movement. Continue to monitor. Notify MD with any concerns.

## 2018-09-06 ENCOUNTER — APPOINTMENT (OUTPATIENT)
Dept: PHYSICAL THERAPY | Facility: CLINIC | Age: 68
DRG: 219 | End: 2018-09-06
Attending: INTERNAL MEDICINE
Payer: MEDICARE

## 2018-09-06 ENCOUNTER — APPOINTMENT (OUTPATIENT)
Dept: ULTRASOUND IMAGING | Facility: CLINIC | Age: 68
DRG: 219 | End: 2018-09-06
Attending: PHYSICIAN ASSISTANT
Payer: MEDICARE

## 2018-09-06 ENCOUNTER — APPOINTMENT (OUTPATIENT)
Dept: OCCUPATIONAL THERAPY | Facility: CLINIC | Age: 68
DRG: 219 | End: 2018-09-06
Attending: INTERNAL MEDICINE
Payer: MEDICARE

## 2018-09-06 ENCOUNTER — APPOINTMENT (OUTPATIENT)
Dept: GENERAL RADIOLOGY | Facility: CLINIC | Age: 68
DRG: 219 | End: 2018-09-06
Attending: PHYSICIAN ASSISTANT
Payer: MEDICARE

## 2018-09-06 LAB
ANION GAP SERPL CALCULATED.3IONS-SCNC: 8 MMOL/L (ref 3–14)
BUN SERPL-MCNC: 13 MG/DL (ref 7–30)
CA-I BLD-MCNC: 4.3 MG/DL (ref 4.4–5.2)
CALCIUM SERPL-MCNC: 7.5 MG/DL (ref 8.5–10.1)
CHLORIDE SERPL-SCNC: 101 MMOL/L (ref 94–109)
CO2 SERPL-SCNC: 23 MMOL/L (ref 20–32)
CREAT SERPL-MCNC: 0.62 MG/DL (ref 0.66–1.25)
ERYTHROCYTE [DISTWIDTH] IN BLOOD BY AUTOMATED COUNT: 24.1 % (ref 10–15)
GFR SERPL CREATININE-BSD FRML MDRD: >90 ML/MIN/1.7M2
GLUCOSE SERPL-MCNC: 83 MG/DL (ref 70–99)
HCT VFR BLD AUTO: 28.7 % (ref 40–53)
HGB BLD-MCNC: 8.8 G/DL (ref 13.3–17.7)
INR PPP: 2.67 (ref 0.86–1.14)
MAGNESIUM SERPL-MCNC: 2 MG/DL (ref 1.6–2.3)
MCH RBC QN AUTO: 31.2 PG (ref 26.5–33)
MCHC RBC AUTO-ENTMCNC: 30.7 G/DL (ref 31.5–36.5)
MCV RBC AUTO: 102 FL (ref 78–100)
PLATELET # BLD AUTO: 596 10E9/L (ref 150–450)
POTASSIUM SERPL-SCNC: 4.3 MMOL/L (ref 3.4–5.3)
RBC # BLD AUTO: 2.82 10E12/L (ref 4.4–5.9)
SODIUM SERPL-SCNC: 131 MMOL/L (ref 133–144)
WBC # BLD AUTO: 6.3 10E9/L (ref 4–11)

## 2018-09-06 PROCEDURE — 97116 GAIT TRAINING THERAPY: CPT | Mod: GP

## 2018-09-06 PROCEDURE — A9270 NON-COVERED ITEM OR SERVICE: HCPCS | Mod: GY | Performed by: STUDENT IN AN ORGANIZED HEALTH CARE EDUCATION/TRAINING PROGRAM

## 2018-09-06 PROCEDURE — A9270 NON-COVERED ITEM OR SERVICE: HCPCS | Mod: GY | Performed by: NURSE PRACTITIONER

## 2018-09-06 PROCEDURE — 25000132 ZZH RX MED GY IP 250 OP 250 PS 637: Mod: GY | Performed by: STUDENT IN AN ORGANIZED HEALTH CARE EDUCATION/TRAINING PROGRAM

## 2018-09-06 PROCEDURE — 25000132 ZZH RX MED GY IP 250 OP 250 PS 637: Mod: GY | Performed by: PHYSICIAN ASSISTANT

## 2018-09-06 PROCEDURE — 83735 ASSAY OF MAGNESIUM: CPT | Performed by: INTERNAL MEDICINE

## 2018-09-06 PROCEDURE — 25000128 H RX IP 250 OP 636: Performed by: STUDENT IN AN ORGANIZED HEALTH CARE EDUCATION/TRAINING PROGRAM

## 2018-09-06 PROCEDURE — 36592 COLLECT BLOOD FROM PICC: CPT | Performed by: INTERNAL MEDICINE

## 2018-09-06 PROCEDURE — 25000125 ZZHC RX 250: Performed by: PHYSICIAN ASSISTANT

## 2018-09-06 PROCEDURE — 40000133 ZZH STATISTIC OT WARD VISIT

## 2018-09-06 PROCEDURE — 21400006 ZZH R&B CCU INTERMEDIATE UMMC

## 2018-09-06 PROCEDURE — 97530 THERAPEUTIC ACTIVITIES: CPT | Mod: GP

## 2018-09-06 PROCEDURE — 97110 THERAPEUTIC EXERCISES: CPT | Mod: GP

## 2018-09-06 PROCEDURE — 71045 X-RAY EXAM CHEST 1 VIEW: CPT

## 2018-09-06 PROCEDURE — A9270 NON-COVERED ITEM OR SERVICE: HCPCS | Mod: GY | Performed by: THORACIC SURGERY (CARDIOTHORACIC VASCULAR SURGERY)

## 2018-09-06 PROCEDURE — 40000193 ZZH STATISTIC PT WARD VISIT

## 2018-09-06 PROCEDURE — 97535 SELF CARE MNGMENT TRAINING: CPT | Mod: GO

## 2018-09-06 PROCEDURE — 25000128 H RX IP 250 OP 636: Performed by: PHYSICIAN ASSISTANT

## 2018-09-06 PROCEDURE — 25000132 ZZH RX MED GY IP 250 OP 250 PS 637: Mod: GY | Performed by: THORACIC SURGERY (CARDIOTHORACIC VASCULAR SURGERY)

## 2018-09-06 PROCEDURE — 76604 US EXAM CHEST: CPT

## 2018-09-06 PROCEDURE — 85027 COMPLETE CBC AUTOMATED: CPT | Performed by: INTERNAL MEDICINE

## 2018-09-06 PROCEDURE — 94640 AIRWAY INHALATION TREATMENT: CPT

## 2018-09-06 PROCEDURE — 82330 ASSAY OF CALCIUM: CPT | Performed by: INTERNAL MEDICINE

## 2018-09-06 PROCEDURE — 25000132 ZZH RX MED GY IP 250 OP 250 PS 637: Mod: GY | Performed by: NURSE PRACTITIONER

## 2018-09-06 PROCEDURE — 85610 PROTHROMBIN TIME: CPT | Performed by: INTERNAL MEDICINE

## 2018-09-06 PROCEDURE — 80048 BASIC METABOLIC PNL TOTAL CA: CPT | Performed by: INTERNAL MEDICINE

## 2018-09-06 PROCEDURE — 40000802 ZZH SITE CHECK

## 2018-09-06 PROCEDURE — A9270 NON-COVERED ITEM OR SERVICE: HCPCS | Mod: GY | Performed by: PHYSICIAN ASSISTANT

## 2018-09-06 PROCEDURE — 40000275 ZZH STATISTIC RCP TIME EA 10 MIN

## 2018-09-06 PROCEDURE — 25000125 ZZHC RX 250: Performed by: THORACIC SURGERY (CARDIOTHORACIC VASCULAR SURGERY)

## 2018-09-06 RX ORDER — WARFARIN SODIUM 2.5 MG/1
2.5 TABLET ORAL
Status: DISCONTINUED | OUTPATIENT
Start: 2018-09-06 | End: 2018-09-06

## 2018-09-06 RX ORDER — FUROSEMIDE 10 MG/ML
20 INJECTION INTRAMUSCULAR; INTRAVENOUS ONCE
Status: COMPLETED | OUTPATIENT
Start: 2018-09-06 | End: 2018-09-06

## 2018-09-06 RX ADMIN — Medication 12.5 MG: at 21:23

## 2018-09-06 RX ADMIN — SODIUM CHLORIDE, PRESERVATIVE FREE 5 ML: 5 INJECTION INTRAVENOUS at 05:43

## 2018-09-06 RX ADMIN — AMIODARONE HYDROCHLORIDE 200 MG: 200 TABLET ORAL at 08:28

## 2018-09-06 RX ADMIN — CLOPIDOGREL 75 MG: 75 TABLET, FILM COATED ORAL at 08:28

## 2018-09-06 RX ADMIN — THERA TABS 1 TABLET: TAB at 08:28

## 2018-09-06 RX ADMIN — Medication 12.5 MG: at 08:28

## 2018-09-06 RX ADMIN — FUROSEMIDE 20 MG: 20 TABLET ORAL at 08:28

## 2018-09-06 RX ADMIN — ALBUTEROL SULFATE 2.5 MG: 2.5 SOLUTION RESPIRATORY (INHALATION) at 14:25

## 2018-09-06 RX ADMIN — FUROSEMIDE 20 MG: 10 INJECTION, SOLUTION INTRAVENOUS at 17:27

## 2018-09-06 RX ADMIN — POTASSIUM CHLORIDE 10 MEQ: 750 TABLET, EXTENDED RELEASE ORAL at 08:27

## 2018-09-06 RX ADMIN — SODIUM CHLORIDE, PRESERVATIVE FREE 5 ML: 5 INJECTION INTRAVENOUS at 15:58

## 2018-09-06 RX ADMIN — ATORVASTATIN CALCIUM 80 MG: 80 TABLET, FILM COATED ORAL at 21:23

## 2018-09-06 RX ADMIN — HYDROXYUREA 1500 MG: 500 CAPSULE ORAL at 08:27

## 2018-09-06 RX ADMIN — Medication 2 G: at 17:19

## 2018-09-06 RX ADMIN — PANTOPRAZOLE SODIUM 40 MG: 40 TABLET, DELAYED RELEASE ORAL at 08:27

## 2018-09-06 RX ADMIN — UMECLIDINIUM BROMIDE AND VILANTEROL TRIFENATATE 1 PUFF: 62.5; 25 POWDER RESPIRATORY (INHALATION) at 08:38

## 2018-09-06 RX ADMIN — PANTOPRAZOLE SODIUM 40 MG: 40 TABLET, DELAYED RELEASE ORAL at 15:56

## 2018-09-06 RX ADMIN — LEVOTHYROXINE SODIUM 75 MCG: 75 TABLET ORAL at 08:26

## 2018-09-06 RX ADMIN — HYDROXYUREA 1500 MG: 500 CAPSULE ORAL at 21:23

## 2018-09-06 ASSESSMENT — ACTIVITIES OF DAILY LIVING (ADL)
ADLS_ACUITY_SCORE: 12
ADLS_ACUITY_SCORE: 12
ADLS_ACUITY_SCORE: 14
ADLS_ACUITY_SCORE: 12

## 2018-09-06 NOTE — PLAN OF CARE
Problem: Patient Care Overview  Goal: Plan of Care/Patient Progress Review  Discharge Planner PT / 6C  Patient plan for discharge: Rehab.  Current status: Pt completes supine <> sit with CGA. Pt transfers sit <> stand with Fawn-ModA, increased assist required when transferring from lower surfaces. Pt ambulates ~100ft 2x with FWW + CGA-SBA. Seated break required between bouts 2/2 fatigue/SOB. Pt participates in seated LE therex. Pt on 1-2 L O2 via NC during session, reports SOB with activity and endorses improvement with rest, difficulty obtaining accurate SpO2 reading 2/2 poor wavelength.  Barriers to return to prior living situation: Medical status, post-op precautions, level of assist, SOB.  Recommendations for discharge: TCU.  Rationale for recommendations: Pt below baseline - would benefit from TCU stay to progress functional strength, balance, activity tolerance, and mobility.

## 2018-09-06 NOTE — PLAN OF CARE
"Problem: Patient Care Overview  Goal: Plan of Care/Patient Progress Review  Outcome: Therapy, progress towards functional goals is fair  /70 (BP Location: Left arm)  Pulse 99  Temp 97.5  F (36.4  C) (Oral)  Resp 16  Ht 1.753 m (5' 9\")  Wt 74.2 kg (163 lb 9.6 oz)  SpO2 97%  BMI 24.16 kg/m2  Neuro: A&Ox4.   Cardiac: VSS.                        Respiratory: weaned to RA.  GI/: Voiding spontaneously. No BM this shift.   Diet/appetite: Tolerating diet.Poor appetite.Drniking his boost supplements. Denies nausea   Activity: Up with SBA, Was able to ambulated around unit X2 with staff. Denied SOB.  Pain: Denies   Skin: Intact, no new deficits noted.  Lines: PICC  Drains: None     Mg replaced. Recheck in am. Awaiting for TCU placement. Will continue to monitor and follow plan of care.          "

## 2018-09-06 NOTE — PROGRESS NOTES
9/6/2018   CVTS Progress Note  Attending provider: Jason Franco, *        S: No acute issues over night. Patient had increasing SOB and wheezing throughout the day, and had difficulty with afternoon PT.  Needed neb treatment at 1300.  Patient denies CP, fever, chills, sweats.  Patient had a decreasing appetite since yesterday (9/5) but increased gas and burping.  He does not feel distended, last BM 9/5. Ambulating in halls with assistance. - BM. No arrhythmias.     O:   Vitals:    09/06/18 0534 09/06/18 0826 09/06/18 1143 09/06/18 1425   BP: 119/82 116/79 113/73    BP Location: Left arm Left arm Left arm    Pulse:       Resp: 16 16 16    Temp: 97.7  F (36.5  C) 98.7  F (37.1  C) 97.7  F (36.5  C)    TempSrc: Oral Oral Oral    SpO2: 94% 98% 95% 96%   Weight:       Height:         Vitals:    09/04/18 0612 09/05/18 0400 09/06/18 0100   Weight: 75.4 kg (166 lb 3.2 oz) 74.2 kg (163 lb 9.6 oz) 73.7 kg (162 lb 8 oz)       Intake/Output Summary (Last 24 hours) at 09/06/18 1529  Last data filed at 09/06/18 1332   Gross per 24 hour   Intake             1020 ml   Output             1725 ml   Net             -705 ml       Gen: AxOx3, NAD  CV: RRR, no murmurs, rubs, or gallops, HR   Pulm: Tachypnic, bilateral wheezing,  No rhonchi  Abd: soft, NT, ND, +BS, - BM  Ext: 1+ bilateral LE  edema  Incision: c/d/i, no erythema, sternal stable  Labs:     BMP  Recent Labs  Lab 09/06/18  0548 09/05/18  0616 09/04/18  0557 09/03/18  0541   * 131* 134 136   POTASSIUM 4.3 4.1 4.3 4.1   CHLORIDE 101 102 103 102   GABRIELA 7.5* 7.7* 7.6* 7.3*   CO2 23 23 25 26   BUN 13 12 12 15   CR 0.62* 0.77 0.73 0.93   GLC 83 81 77 80     CBC  Recent Labs  Lab 09/06/18  0548 09/05/18  0616 09/04/18  0557 09/03/18  0541   WBC 6.3 9.2 6.5 6.0   RBC 2.82* 2.96* 2.88* 2.86*   HGB 8.8* 9.2* 8.9* 8.9*   HCT 28.7* 29.9* 29.3* 28.9*   * 101* 102* 101*   MCH 31.2 31.1 30.9 31.1   MCHC 30.7* 30.8* 30.4* 30.8*   RDW 24.1* 23.6* 23.5* 22.8*   *  586* 550* 534*     INR  Recent Labs  Lab 09/06/18  0548 09/05/18  0616 09/04/18  0557 09/03/18  0541   INR 2.67* 2.21* 2.49* 2.25*      Hepatic Panel   Lab Results   Component Value Date    AST 21 09/05/2018     Lab Results   Component Value Date    ALT 53 09/05/2018     No results found for: BILICONJ   Lab Results   Component Value Date    BILITOTAL 0.4 09/05/2018     Lab Results   Component Value Date    ALBUMIN 2.2 09/05/2018     Lab Results   Component Value Date    PROTTOTAL 6.2 09/05/2018      Lab Results   Component Value Date    ALKPHOS 153 09/05/2018         Recent Labs  Lab 09/06/18  0548 09/05/18  0616 09/04/18  0557 09/03/18  0541 09/02/18  0626 09/01/18  0608  08/30/18  1633   GLC 83 81 77 80 82 86  < >  --    BGM  --   --   --   --   --   --   --  88   < > = values in this interval not displayed.      Imaging:   CXR: 9/5 follow up s/p chest tube removal: mildly increased right apical pneumothorax, per radiology    ASSESSMENT/PLAN: Patient is a 68 year old male with a history of s/p HUSSEIN to RCA and LAD for STEMI with post infarction basal VSD, who on 8/10/2018 underwent repair of posterior VSD and TVR with bioprosthetic valve.  He was kept open chest due to coagulopathy and RV dysfunction. Chest closed on 8/13/2018, IABP removed 8/14/18 and he was extubated on POD #11.       Neuro:  -Acute post-op pain: IV dilaudid, tylenol and oxycodone prn  - Wean Seroquel as tolerated, decreased to 12.5 mg HS      CV:  - ASA 81 mg, atorvastatin 40 mg daily,   - Systolic Blood pressure: 80-98  - TPW capped  - ICU course complicated by a-flutter, EP consult with plan to anticoagulate and then attempt to overdrive pace with A lead.  While in the ICU, patient also went into narrow complex SVT with MAPs in 50s. Adenosine 6 mg was administered, resulting in 20 seconds of asystole and brief CPR with ROSC.    - 8/25 patient converted from NSR to a-fib overnight. During morning therapy, patient had a-fib with RVR (rates 140s)  and symptomatic. EKGs appreciable for a-fib and SVT. Metoprolol 2.5 mg IV administered with improved rate control. Discussed case with EP fellow, amio bolus and gtt started.  Patient converted to NSR afternoon on . Amiodarone 400 mg PO BID .   Start Amiodarone taper 200 mg BID x 4 doses, then 200 mg daily for 21 doses   - Cut TPW , no bradycardia on PO Amio but prolonged QTc.   - Metoprolol 12.5 mg po bid.  - () Echo ordered to r/o pericardial effusion in setting of increased SOB       Resp:   - Extubated POD 11  - IS, encourage activity, albuterol inhaler QID  -  Pulled Left pleural tube, f/u CXR.  POC U/S by Medicine for Left pleural effusion today-findings appreciable for loculated fluid. IR consult with CT chest w/o contrast. Possible chest tube placement with IR on .   -  IR recommended thoracentesis for left pleural effusion.  Per Dr. Franco, no thoracentesis necessary at this time.    -  right pleural chest tube removed, f/u CXR  -  neb treatments q 4hrs, CXR for increased SOB         FEN/GI:   - Reg Diet with thin liquids, speech following.   - Continuous TF, consider calorie counts once patient's diet advances   - Bowel regimen, + BM  -  EGD: two gastric ulcers, no active bleeding or high-risk features.        Renal:   - Creatinine WNL, adequate UOP  - Volume status: hypervolemic, dry weight ~ 170 lbs, current weight 169.  - Diurese Lasix 20 mg po daily, with one dose additional lasix 20 mg       ID:   - Completed alban-op abx  - WBC WNL  and trending down, afebrile, no signs or symptoms of infection      Heme:   - Hgb stable, no signs or symptoms of bleeding   - Continue to monitor for signs of GI bleed, transfuse for Hgb < 7  - Hematology followin/30 Plt 1534, Hydrea 1500 mg BID, Continue to trend plts, Heme recommended Iron replacement, started .    -  Plateletpheresis for Plts >1500, IR to place double lumen non-tunneled catheter prior to  procedure  - 8/31 Plts 714 s/p plateletpheresis, repeat plateletpheresis x 1  Per hematology, stopping plateletpheresis, okay to remove HD catheter.  Will follow-up with hematology as outpatient for further management.      Endo:   - BG 100s, sliding scale insulin discontinue      PPx:   - PPI      Anticoagulation:   - 8/25 start low intensity heparin gtt, discontinue 9/2  - 8/26 start warfarin, INR 2-3. INR 2.67  - 9/6 Warfarin held for one dose 9/6, possible thoracentesis?    - Plavix 75 mg daily      Dispo:   - 6C since 8/24  - Therapy recommending d/c to TCU, okay to discharge in next 1-2 days, pending medical stabilization and bed availablity    Clay ARIAS  [facility]      I have seen and examined this patient with the PA student, I agree with the above findings.  Patient discussed with staff.     Torsten Ruiz PA-C  Cardiothoracic Surgery  Pager [phone]    5:13 PM   September 6, 2018

## 2018-09-06 NOTE — PLAN OF CARE
Problem: Patient Care Overview  Goal: Plan of Care/Patient Progress Review  Discharge Planner OT   Patient plan for discharge: rehab  Current status: SBA for bed mobility supine <> sit EOB. Completed toilet transfer with CGA, increased attempts needed due to lower surface for sit <> stand. SBA for LB dressing with compensatory technique, pt fatigued with task. SBA UB dressing with setup. Completed step-in shower transfer with close CGA and VCs and shower chair. Educated pt on energy conservation techniques throughout, demo'd understanding. Completed seated bathing task with SBA, min A to dry. Pt significantly fatigued and audibly SOB with activity, VSS on room air: HR , BP immediately following activity 105/81 (90), SpO2 97%.   Barriers to return to prior living situation: decreased strength/activity tolerance, post-surgical precautions, fatigue, impaired ADLs, impaired mobility  Recommendations for discharge: TCU  Rationale for recommendations: Pt is currently below functional baseline for ADLs, transfers, and functional mobility, would benefit from continued therapy to address above deficits and maximize strength/independence in order to return to PLOF.       Entered by: Nicole Carmen 09/06/2018 9:38 AM

## 2018-09-06 NOTE — PLAN OF CARE
Problem: Patient Care Overview  Goal: Plan of Care/Patient Progress Review  Outcome: No Change   09/05/18 2208   OTHER   Plan Of Care Reviewed With patient   Plan of Care Review   Progress progress towards functional goals is fair     Shift: 5505-4419  VS: Temp: 97.7  F (36.5  C) Temp src: Oral BP: 113/73   Heart Rate: 93 Resp: 16 SpO2: 96 % O2 Device: Nasal cannula with humidification Oxygen Delivery: 1 LPM  Pain: No c/o of pain  Neuro: A&Ox4, neuros intact ex. Baseline n/t in feet - s/e of Hydrea  Cardiac: VSS, SR  Respiratory: LS diminished, this afternoon at 1415, pt requested assistance with breathing, Pt's RR was increased with audible expiratory wheezing. O2 sats unchanged, in the mid-upper 90s, 1L NC O2 added per pt for comofort. PRN neb received, pt experiencing relief. Continue to monitor respiratory status.   GI/Diet/Appetite: Regular diet, appetite good, +bm  : wnl  LDA's: PICC HL'ed  Skin: Intact, pale, incision c/d/i  Activity: SBA/A1, pt worked with therapy, showered this am.     Plan: SW working on placement in TCU, discharge in 1-2 days.        Problem: Cardiac Output Decreased (Adult)  Goal: Identify Related Risk Factors and Signs and Symptoms  Related risk factors and signs and symptoms are identified upon initiation of Human Response Clinical Practice Guideline (CPG).   Outcome: No Change   09/06/18 0800   Cardiac Output Decreased   Related Risk Factors (Cardiac Output Decreased) cardiac muscle disease   Signs and Symptoms (Cardiac Output Decreased) fatigue

## 2018-09-06 NOTE — PLAN OF CARE
Problem: Patient Care Overview  Goal: Plan of Care/Patient Progress Review  Outcome: No Change  Pt appeared to sleep soundly between cares overnight. VSS. Tele SR. Is a/o x 4. Denies pain. Gets up with SBA. Loose BM last evening. Held stool softeners. Sternal inc CDI and POOJA. CT dressing CDI. Coccyx mepliex in place. PICC HL'd. Will continue to monitor pt.

## 2018-09-06 NOTE — PROGRESS NOTES
Social Work Services Progress Note    Hospital Day: 30  Date of Initial Social Work Evaluation: 8/9/18  Collaborated with:  Pt, daughter, SNF admissions, CVTS     Data:  Pt is a 68 year old male being followed by SW for placement to TCU.    Intervention:  SW discussed discharge planning with pt's daughter.  SW also called SNF facilities to follow up on pt's discharge planning.  SW will re-send referrals as facilities are needing updated copies.  Per snf admissions, each facility has openings coming up Friday and Saturday.    Referrals in Progress:   [facility]  PH: [phone]  F: [phone]    [facility]  PH: [phone]  F: [phone]    [facility]  PH: [phone]  F: [phone]    Assessment:  Pt and daughter in agreement with the plan.    Plan:    Anticipated Disposition:  Facility - TCU    Barriers to d/c plan:  Bed availability, medical stability    Follow Up:  SW to follow for discharge planning.    CAROL ANN Almanza, APSW  6C Unit   Phone: [phone]  Pager: [phone]  Unit: [phone]    ___________________________________________________________________________________________________________________________________________________  Referrals Discontinued:  [facility] - not a medical candidate  PH: [phone]  F: [phone]    Community Case Management/Community Services in place:   None reported.

## 2018-09-07 ENCOUNTER — APPOINTMENT (OUTPATIENT)
Dept: CARDIOLOGY | Facility: CLINIC | Age: 68
DRG: 219 | End: 2018-09-07
Attending: PHYSICIAN ASSISTANT
Payer: MEDICARE

## 2018-09-07 ENCOUNTER — APPOINTMENT (OUTPATIENT)
Dept: OCCUPATIONAL THERAPY | Facility: CLINIC | Age: 68
DRG: 219 | End: 2018-09-07
Attending: INTERNAL MEDICINE
Payer: MEDICARE

## 2018-09-07 ENCOUNTER — APPOINTMENT (OUTPATIENT)
Dept: PHYSICAL THERAPY | Facility: CLINIC | Age: 68
DRG: 219 | End: 2018-09-07
Attending: INTERNAL MEDICINE
Payer: MEDICARE

## 2018-09-07 ENCOUNTER — APPOINTMENT (OUTPATIENT)
Dept: CT IMAGING | Facility: CLINIC | Age: 68
DRG: 219 | End: 2018-09-07
Attending: NURSE PRACTITIONER
Payer: MEDICARE

## 2018-09-07 LAB
ANION GAP SERPL CALCULATED.3IONS-SCNC: 8 MMOL/L (ref 3–14)
BUN SERPL-MCNC: 14 MG/DL (ref 7–30)
CA-I BLD-MCNC: 4.3 MG/DL (ref 4.4–5.2)
CALCIUM SERPL-MCNC: 7.8 MG/DL (ref 8.5–10.1)
CHLORIDE SERPL-SCNC: 100 MMOL/L (ref 94–109)
CO2 SERPL-SCNC: 24 MMOL/L (ref 20–32)
CREAT SERPL-MCNC: 0.65 MG/DL (ref 0.66–1.25)
ERYTHROCYTE [DISTWIDTH] IN BLOOD BY AUTOMATED COUNT: 24.4 % (ref 10–15)
GFR SERPL CREATININE-BSD FRML MDRD: >90 ML/MIN/1.7M2
GLUCOSE SERPL-MCNC: 90 MG/DL (ref 70–99)
HCT VFR BLD AUTO: 27 % (ref 40–53)
HGB BLD-MCNC: 8.6 G/DL (ref 13.3–17.7)
INR PPP: 2.45 (ref 0.86–1.14)
MAGNESIUM SERPL-MCNC: 2.1 MG/DL (ref 1.6–2.3)
MCH RBC QN AUTO: 31.6 PG (ref 26.5–33)
MCHC RBC AUTO-ENTMCNC: 31.9 G/DL (ref 31.5–36.5)
MCV RBC AUTO: 99 FL (ref 78–100)
PLATELET # BLD AUTO: 518 10E9/L (ref 150–450)
POTASSIUM SERPL-SCNC: 4.2 MMOL/L (ref 3.4–5.3)
RBC # BLD AUTO: 2.72 10E12/L (ref 4.4–5.9)
SODIUM SERPL-SCNC: 131 MMOL/L (ref 133–144)
WBC # BLD AUTO: 5.5 10E9/L (ref 4–11)

## 2018-09-07 PROCEDURE — 83735 ASSAY OF MAGNESIUM: CPT | Performed by: INTERNAL MEDICINE

## 2018-09-07 PROCEDURE — 25000132 ZZH RX MED GY IP 250 OP 250 PS 637: Mod: GY | Performed by: INTERNAL MEDICINE

## 2018-09-07 PROCEDURE — A9270 NON-COVERED ITEM OR SERVICE: HCPCS | Mod: GY | Performed by: THORACIC SURGERY (CARDIOTHORACIC VASCULAR SURGERY)

## 2018-09-07 PROCEDURE — 21400006 ZZH R&B CCU INTERMEDIATE UMMC

## 2018-09-07 PROCEDURE — A9270 NON-COVERED ITEM OR SERVICE: HCPCS | Mod: GY | Performed by: STUDENT IN AN ORGANIZED HEALTH CARE EDUCATION/TRAINING PROGRAM

## 2018-09-07 PROCEDURE — 97535 SELF CARE MNGMENT TRAINING: CPT | Mod: GO

## 2018-09-07 PROCEDURE — 85610 PROTHROMBIN TIME: CPT | Performed by: INTERNAL MEDICINE

## 2018-09-07 PROCEDURE — 93308 TTE F-UP OR LMTD: CPT | Mod: 26 | Performed by: INTERNAL MEDICINE

## 2018-09-07 PROCEDURE — 93325 DOPPLER ECHO COLOR FLOW MAPG: CPT | Mod: 26 | Performed by: INTERNAL MEDICINE

## 2018-09-07 PROCEDURE — 40000133 ZZH STATISTIC OT WARD VISIT

## 2018-09-07 PROCEDURE — 82330 ASSAY OF CALCIUM: CPT | Performed by: INTERNAL MEDICINE

## 2018-09-07 PROCEDURE — 97116 GAIT TRAINING THERAPY: CPT | Mod: GP | Performed by: REHABILITATION PRACTITIONER

## 2018-09-07 PROCEDURE — 25000132 ZZH RX MED GY IP 250 OP 250 PS 637: Mod: GY | Performed by: STUDENT IN AN ORGANIZED HEALTH CARE EDUCATION/TRAINING PROGRAM

## 2018-09-07 PROCEDURE — 25000132 ZZH RX MED GY IP 250 OP 250 PS 637: Mod: GY | Performed by: NURSE PRACTITIONER

## 2018-09-07 PROCEDURE — 85027 COMPLETE CBC AUTOMATED: CPT | Performed by: INTERNAL MEDICINE

## 2018-09-07 PROCEDURE — A9270 NON-COVERED ITEM OR SERVICE: HCPCS | Mod: GY | Performed by: PHYSICIAN ASSISTANT

## 2018-09-07 PROCEDURE — A9270 NON-COVERED ITEM OR SERVICE: HCPCS | Mod: GY | Performed by: INTERNAL MEDICINE

## 2018-09-07 PROCEDURE — A9270 NON-COVERED ITEM OR SERVICE: HCPCS | Mod: GY | Performed by: NURSE PRACTITIONER

## 2018-09-07 PROCEDURE — 97110 THERAPEUTIC EXERCISES: CPT | Mod: GP | Performed by: REHABILITATION PRACTITIONER

## 2018-09-07 PROCEDURE — 93321 DOPPLER ECHO F-UP/LMTD STD: CPT | Mod: 26 | Performed by: INTERNAL MEDICINE

## 2018-09-07 PROCEDURE — 25000132 ZZH RX MED GY IP 250 OP 250 PS 637: Mod: GY | Performed by: THORACIC SURGERY (CARDIOTHORACIC VASCULAR SURGERY)

## 2018-09-07 PROCEDURE — 40000802 ZZH SITE CHECK

## 2018-09-07 PROCEDURE — 25000128 H RX IP 250 OP 636: Performed by: THORACIC SURGERY (CARDIOTHORACIC VASCULAR SURGERY)

## 2018-09-07 PROCEDURE — 25000128 H RX IP 250 OP 636: Performed by: SURGERY

## 2018-09-07 PROCEDURE — 71250 CT THORAX DX C-: CPT

## 2018-09-07 PROCEDURE — 97110 THERAPEUTIC EXERCISES: CPT | Mod: GO

## 2018-09-07 PROCEDURE — 80048 BASIC METABOLIC PNL TOTAL CA: CPT | Performed by: INTERNAL MEDICINE

## 2018-09-07 PROCEDURE — 0CJS8ZZ INSPECTION OF LARYNX, VIA NATURAL OR ARTIFICIAL OPENING ENDOSCOPIC: ICD-10-PCS | Performed by: OTOLARYNGOLOGY

## 2018-09-07 PROCEDURE — 25000128 H RX IP 250 OP 636: Performed by: STUDENT IN AN ORGANIZED HEALTH CARE EDUCATION/TRAINING PROGRAM

## 2018-09-07 PROCEDURE — 93325 DOPPLER ECHO COLOR FLOW MAPG: CPT

## 2018-09-07 PROCEDURE — 25000128 H RX IP 250 OP 636: Performed by: NURSE PRACTITIONER

## 2018-09-07 PROCEDURE — 40000193 ZZH STATISTIC PT WARD VISIT: Performed by: REHABILITATION PRACTITIONER

## 2018-09-07 PROCEDURE — 36592 COLLECT BLOOD FROM PICC: CPT | Performed by: INTERNAL MEDICINE

## 2018-09-07 PROCEDURE — 25000132 ZZH RX MED GY IP 250 OP 250 PS 637: Mod: GY | Performed by: PHYSICIAN ASSISTANT

## 2018-09-07 RX ORDER — MULTIPLE VITAMINS W/ MINERALS TAB 9MG-400MCG
1 TAB ORAL DAILY
Status: DISCONTINUED | OUTPATIENT
Start: 2018-09-08 | End: 2018-09-11 | Stop reason: HOSPADM

## 2018-09-07 RX ORDER — FUROSEMIDE 10 MG/ML
20 INJECTION INTRAMUSCULAR; INTRAVENOUS ONCE
Status: DISCONTINUED | OUTPATIENT
Start: 2018-09-07 | End: 2018-09-07

## 2018-09-07 RX ORDER — FUROSEMIDE 10 MG/ML
20 INJECTION INTRAMUSCULAR; INTRAVENOUS ONCE
Status: COMPLETED | OUTPATIENT
Start: 2018-09-07 | End: 2018-09-07

## 2018-09-07 RX ADMIN — HYDROXYUREA 1500 MG: 500 CAPSULE ORAL at 09:04

## 2018-09-07 RX ADMIN — LEVOTHYROXINE SODIUM 75 MCG: 75 TABLET ORAL at 09:04

## 2018-09-07 RX ADMIN — CLOPIDOGREL 75 MG: 75 TABLET, FILM COATED ORAL at 09:04

## 2018-09-07 RX ADMIN — PANTOPRAZOLE SODIUM 40 MG: 40 TABLET, DELAYED RELEASE ORAL at 09:04

## 2018-09-07 RX ADMIN — SODIUM CHLORIDE, PRESERVATIVE FREE 5 ML: 5 INJECTION INTRAVENOUS at 06:49

## 2018-09-07 RX ADMIN — SODIUM CHLORIDE, PRESERVATIVE FREE 10 ML: 5 INJECTION INTRAVENOUS at 21:42

## 2018-09-07 RX ADMIN — Medication 1 MG: at 13:29

## 2018-09-07 RX ADMIN — THERA TABS 1 TABLET: TAB at 09:04

## 2018-09-07 RX ADMIN — FUROSEMIDE 20 MG: 20 TABLET ORAL at 09:04

## 2018-09-07 RX ADMIN — CALCIUM GLUCONATE 1 G: 98 INJECTION, SOLUTION INTRAVENOUS at 20:24

## 2018-09-07 RX ADMIN — SODIUM CHLORIDE, PRESERVATIVE FREE 5 ML: 5 INJECTION INTRAVENOUS at 16:56

## 2018-09-07 RX ADMIN — POTASSIUM CHLORIDE 10 MEQ: 750 TABLET, EXTENDED RELEASE ORAL at 09:04

## 2018-09-07 RX ADMIN — ACETAMINOPHEN 650 MG: 325 TABLET, FILM COATED ORAL at 13:29

## 2018-09-07 RX ADMIN — HYDROXYUREA 1500 MG: 500 CAPSULE ORAL at 20:21

## 2018-09-07 RX ADMIN — AMIODARONE HYDROCHLORIDE 200 MG: 200 TABLET ORAL at 09:04

## 2018-09-07 RX ADMIN — SENNOSIDES AND DOCUSATE SODIUM 1 TABLET: 8.6; 5 TABLET ORAL at 20:21

## 2018-09-07 RX ADMIN — Medication 12.5 MG: at 09:04

## 2018-09-07 RX ADMIN — Medication 12.5 MG: at 20:21

## 2018-09-07 RX ADMIN — PANTOPRAZOLE SODIUM 40 MG: 40 TABLET, DELAYED RELEASE ORAL at 16:55

## 2018-09-07 RX ADMIN — ONDANSETRON 4 MG: 2 INJECTION INTRAMUSCULAR; INTRAVENOUS at 13:28

## 2018-09-07 RX ADMIN — ATORVASTATIN CALCIUM 80 MG: 80 TABLET, FILM COATED ORAL at 20:21

## 2018-09-07 RX ADMIN — UMECLIDINIUM BROMIDE AND VILANTEROL TRIFENATATE 1 PUFF: 62.5; 25 POWDER RESPIRATORY (INHALATION) at 09:11

## 2018-09-07 RX ADMIN — FUROSEMIDE 20 MG: 10 INJECTION, SOLUTION INTRAVENOUS at 13:28

## 2018-09-07 ASSESSMENT — ACTIVITIES OF DAILY LIVING (ADL)
ADLS_ACUITY_SCORE: 12
ADLS_ACUITY_SCORE: 14

## 2018-09-07 ASSESSMENT — PAIN DESCRIPTION - DESCRIPTORS: DESCRIPTORS: SORE

## 2018-09-07 NOTE — PLAN OF CARE
Problem: Patient Care Overview  Goal: Plan of Care/Patient Progress Review  OT/CR 6C  Discharge Planner OT   Patient plan for discharge: rehab  Current status: SBA supine<>EOB. CGA/Min A sit<>stand x4 reps throughout session. Pt tolerated 10 minutes on the Nustep at a resistance of 2. Pt attempted to ambulated back to room after Nustep completion, however became SOB quickly and required wc back to room.  Barriers to return to prior living situation: fatigue, SOB, deconditioning, acute medical needs  Recommendations for discharge: TCU  Rationale for recommendations: pt is below baseline and would benefit from continued skilled therapy to increase activity tolerance and independence with ADLs.        Entered by: Serena Richardson 09/07/2018 2:55 PM

## 2018-09-07 NOTE — PROGRESS NOTES
CLINICAL NUTRITION SERVICES - REASSESSMENT NOTE     Nutrition Prescription    RECOMMENDATIONS FOR MDs/PROVIDERS TO ORDER:  None at this time.     Malnutrition Status:    Severe malnutrition in the context of acute illness/injury     Recommendations already ordered by Registered Dietitian (RD):  Modified oral supplements  Modified multivitamin     Future/Additional Recommendations:  1. Continue regular diet, as ordered, to help encourage oral intake. Encourage high protein options and intake of oral supplements. Rec encourage small, frequent meals.   2. Monitor potential need for calcium supplementation.   3. Monitor BG control. Hgb A1c of 6.3 on 8/6.     EVALUATION OF THE PROGRESS TOWARD GOALS   Diet: Regular diet order. Has vanilla Boost Plus ordered at 14:00 and HS and has a berry Boost Breeze ordered at 10:00.   Intake: Good diet tolerance. Flowsheets indicate pt consuming 100% of meals 8/31, 100% of meals with a fair to good appetite 9/1, % of meals with a good appetite 9/2, 75% of a meals with a good appetite 9/3, 100% of meals 9/5, and % of meals with a fair to good appetite 9/6. Per pt, his appetite started to improve two to three days ago but then decreased again yesterday. Pt states he has a generalized lack of appetite and early satiety. Chart indicates pt with increased gas and burping. Not distended. Fluid may be contributing.      NEW FINDINGS   Wt Hx: 79.7 kg (8/22/17), 81.4 kg (5/22/18), 77.3 kg (7/30/18), 77.3 kg (8/6/18, admit), 72.7 kg (9/7/18) - Pt has lost 6% of his body wt over the past approximate month. Wt loss may be possibly greater than this, pending fluid status.     ASSESSED NUTRITION NEEDS (updated)  Dosing Weight: 73 kg (based on lowest wt this admission of 72.7 kg on 9/7)  Estimated Energy Needs: 9188-0159 kcals/day (25-30 kcals/kg)  Justification: Maintenance needs, rec high end of this range if continued wt loss  Estimated Protein Needs:  grams protein/day (1.2-1.5  grams of pro/kg)  Justification: Increased needs with post-op status, medical course, and wt loss.     MALNUTRITION  % Intake: < 75% for > 7 days (non-severe)  % Weight Loss: > 5% in 1 month (severe)  Subcutaneous Fat Loss: Facial region, Upper arm and Lower arm: Moderate, Orbital: Mild-Moderate  Muscle Loss: Temporal, Scapular bone, Thoracic region (clavicle, acromium bone, deltoid, trapezius, pectoral), Upper arm (bicep, tricep), Lower arm  (forearm) and Dorsal hand: Moderate-Severe  Fluid Accumulation/Edema: Does not meet criteria  Malnutrition Diagnosis: Severe malnutrition in the context of acute illness/injury     Previous Goals   Total average nutrition intake to meet 1764 - 2205 kcals/day (% MREE, 23-29 kcals/kg) and 116 - 154 grams protein/day (1.5 - 2 grams of pro/kg)  Evaluation: Meeting at times, but not consisently. Not meeting recently.     Previous Nutrition Diagnosis  Inadequate oral intake related to decreased appetite as evidenced by kcal counts average of 740 kcals and 30 g protein daily (per adjusted 8/28 kcal counts). Total average nutrition intake not meeting 1764 - 2205 kcals/day (% MREE, 23-29 kcals/kg) and 116 - 154 grams protein/day (1.5 - 2 grams of pro/kg). However, oral intake is improving.    Evaluation: Meeting at times, but not recently. Updated below.     CURRENT NUTRITION DIAGNOSIS  Inadequate oral intake related to lack of appetite, early satiety, and possible fluid status impacting oral intake as evidenced by pt consuming 50% of meals, at times.       INTERVENTIONS  Implementation  Multivitamin/mineral supplement therapy: Modified multivitamin to a multivitamin with minerals to help ensure micronutrient needs are met.   Medical food supplement therapy: Discussed oral supplements with pt. Changed Berry Boost Breeze to HS and modified vanilla Boost Plus to chocolate at 10:00. Provided oral supplement menu.  Nutrition education for nutrition relationship to  health/disease: Encouraged small, frequent meals. Encouraged intake on regular diet and also oral supplements.     Goals  Patient to consume % of nutritionally adequate meal trays TID, or the equivalent with supplements/snacks.    Monitoring/Evaluation  Progress toward goals will be monitored and evaluated per protocol.     Nutrition will continue to follow.      Betty Jennings MS, RD, LD, [facility]   6C Pgr: [phone]

## 2018-09-07 NOTE — PLAN OF CARE
"Problem: Patient Care Overview  Goal: Plan of Care/Patient Progress Review  D-S/P STEMI with post VSD infarct. VSD repair, TVR with bioprosthetic valve on 08/10/18. Complicated post op course, extubated POD #11. See flow sheet for vs and assessments. On initial assessment, pt c/o sob. Wheezing on lung assessment. LE edema noted. Received nebulizer treatment earlier. Pt verbalized it didn't seem to help.  I-Discussed with Torsten from CVTS. Obtained orders for chest xray, echo and extra dose of lasix, 20 mg IV.  A-See results flow sheets. Pt voided 550 cc after receiving lasix. When asked if his breathing had improved, he responded, \"maybe a little\".  P-Continue with current poc. Monitor respiratory status.          "

## 2018-09-07 NOTE — CONSULTS
INTERVENTIONAL RADIOLOGY CONSULT    Castillo De Anda is a 68 year old male with crecent cardiothoracic surgery who had a right chest tube removed 9/5 now found to have enlarging pneumothorax so IR has been consulted for apical chest tube placement.    Patient is on IR schedule for tomorrow for an apical chest tube placement. INR is elevated at 2.45 due to warfarin which was held for 1 day. Team to correct prior to procedure tomorrow, 9/8. No NPO required. Consent will be done prior to procedure. Discussed with Phyllis Padron PA-C and Dr. Michelle Tello PA-C  Interventional Radiology  [phone]

## 2018-09-07 NOTE — PLAN OF CARE
Problem: Patient Care Overview  Goal: Plan of Care/Patient Progress Review  PT - per plan established by the Physical Therapist, according to functional mobility the  discharge recommendation is TCU for cont skilled PT to progress functional mobility. Pt is limited by weakness fatigue and SOB. Pt demo supine to/from sitting with good log roll tech . Pt needing SBA for sit to stand for elevated bed but CGA to lower standard chair. Pt demo supine there ex x 10.  Discharge Planner PT   Patient plan for discharge: rehab.   Current status: see above  Barriers to return to prior living situation: weakness, fatigue.   Recommendations for discharge: TCU   Rationale for recommendations: cont skilled PT to progress functional mobility.        Entered by: Salomón Flores 09/07/2018 10:41 AM

## 2018-09-07 NOTE — PLAN OF CARE
Problem: Patient Care Overview  Goal: Plan of Care/Patient Progress Review  Outcome: No Change  No significant events over night. Pt slept well between cares. VSS. O2 sats >94% on 1L NC. Lung sounds diminished at bases. No c/o pain. Pt up with assist x1 and walker and tolerating activity well. Pt with good urine output. Continue to monitor. Notify MD of any changes/concerns.

## 2018-09-07 NOTE — CONSULTS
Otolaryngology Consult Note  September 7, 2018      CC: We were asked to evaluate the patient for a cause of his postoperative hoarseness    HPI: Castillo De Anda is a 68 year old male with a past medical history of meningioma s/p craniotomy and resection (2008) c/b DVT, polycythemia vera, and hx of STEMI c/b postinfarct VSD s/p VSD repair and TVR on 8/10/18. He was kept with an open chest due to coagulopathy and RV dysfunction. He was extubated on 8/21/18 (POD#11). The patient reports that his voice has been hoarse since his extubation. He describes his voice as gravelly and is only able to speak in short sentences due to loss of breath. He occasionally feels secretions pool in his throat but he is able to cough them up. He is tolerating a regular diet without signs or symptoms of aspiration. He was evaluated by speech therapy earlier on this admission who gave him vocal cord strengthening exercises for his voice and recommended outpatient ENT follow up. He denies any stridor, coughing or choking when drinking, or throat pain/odynophagia.    He had b/l chest tube placed from his surgery had has had b/l pleural effusions ost-op. He now has a right apical pneumothorax and will undergo pigtail placement by IR.       Past Medical History:   Diagnosis Date     DVT (deep venous thrombosis) (H) 2008    after craniotomy     Meningioma (H)      Polycythemia vera (H)      Prostate cancer (H)        Past Surgical History:   Procedure Laterality Date     ANKLE SURGERY Right 04/2018     APPENDECTOMY       CRANIOTOMY Right 2008     ESOPHAGOSCOPY, GASTROSCOPY, DUODENOSCOPY (EGD), COMBINED N/A 8/24/2018    Procedure: COMBINED ESOPHAGOSCOPY, GASTROSCOPY, DUODENOSCOPY (EGD);  Upper Endoscopy with No Intervention; Two Gastric Ulcers;  Surgeon: Rocael Barber MD;  Location: [facility] OR     IRRIGATION AND DEBRIDEMENT CHEST WASHOUT, COMBINED N/A 8/13/2018    Procedure: COMBINED IRRIGATION AND DEBRIDEMENT CHEST WASHOUT;  COMBINED  "IRRIGATION AND DEBRIDEMENT CHEST WASHOUT, Closure;  Surgeon: Jason Franco MD;  Location: [facility] OR     PROSTATECTOMY PERINEAL  2004     REPAIR VENTRICULAR SEPTAL DEFECT N/A 8/10/2018    Procedure: REPAIR VENTRICULAR SEPTAL DEFECT;  Median Sternotomy, REPAIR VENTRICULAR SEPTAL DEFECT on pump oxygenation, Tricuspid valve replacement ;  Surgeon: Jason Franco MD;  Location: [facility] OR     SPLENECTOMY      childhood     THYROID SURGERY  2012       No current outpatient prescriptions on file.          Allergies   Allergen Reactions     Anagrelide Rash     Noted to have small rash and nose bleeds after starting the prescription (prior to August 2018 hospitalization; prescribed by [facility] clinician); dosage had then been increased (by [facility] clinician) and subsequently developed full body rash within 4 hours of increased dose.       Social History     Social History     Marital status:      Spouse name: Radha     Number of children: N/A     Years of education: N/A     Occupational History     Not on file.     Social History Main Topics     Smoking status: Former Smoker     Smokeless tobacco: Never Used     Alcohol use 1.2 oz/week     2 Shots of liquor per week     Drug use: Not on file     Sexual activity: Not on file     Other Topics Concern     Not on file     Social History Narrative    Wife is Radha       Family History   Problem Relation Age of Onset     Hypertension Mother      Cerebrovascular Disease Paternal Uncle        ROS: 12 point review of systems is negative unless noted in HPI.    PHYSICAL EXAM:  /66 (BP Location: Left arm)  Pulse 94  Temp 97.6  F (36.4  C) (Oral)  Resp 20  Ht 1.753 m (5' 9\")  Wt 72.7 kg (160 lb 4.8 oz)  SpO2 99%  BMI 23.67 kg/m2    General: laying reclined bed, no acute distress  HEAD: normocephalic, atraumatic  Face: symmetrical, CN VII intact bilaterally (HB 1), no swelling, edema, or erythema.  Eyes: EOMI without spontaneous or gaze evoked nystagmus, " clear sclera  Ears: normal external ears  Nose: normal external nose, no anterior drainage, dry mucosa  Neck: no LAD, trachea midline  Neuro: cranial nerves 2-12 grossly intact  Pulm: breathing comfortably on 1.5 L NC, no stridor. Strong cough. Voice hoarse, speaks in short breathy sentences.   CV: extremities are warm and well perfused  Psych: pleasant affect    FIBEROPTIC ENDOSCOPY:  Due to hoarseness, fiberoptic laryngoscopy was indicated. After obtaining verbal consent, the nose was topically decongested and anesthetized. The fiberoptic laryngoscope was passed under endoscopic vision through the right nasal passage. The mucosa was dry and crusty, but otherwise clear, no purulence or masses. The nasopharynx was clear. The soft palate appeared normal with good mobility. The epiglottis was sharp, and the visualized portion of the vallecula and base of tongue were clear. Right vocal cord was fully mobile with normal epithelium. 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 There seems to be some very minor motion of the left cord. Good glottic closure during cough and phonation. The hypopharynx was clear.     ROUTINE IP LABS (Last four results)  BMP  Recent Labs  Lab 09/07/18  0653 09/06/18  0548 09/05/18  0616 09/04/18  0557   * 131* 131* 134   POTASSIUM 4.2 4.3 4.1 4.3   CHLORIDE 100 101 102 103   GABRIELA 7.8* 7.5* 7.7* 7.6*   CO2 24 23 23 25   BUN 14 13 12 12   CR 0.65* 0.62* 0.77 0.73   GLC 90 83 81 77     CBC  Recent Labs  Lab 09/07/18  0653 09/06/18  0548 09/05/18  0616 09/04/18  0557   WBC 5.5 6.3 9.2 6.5   RBC 2.72* 2.82* 2.96* 2.88*   HGB 8.6* 8.8* 9.2* 8.9*   HCT 27.0* 28.7* 29.9* 29.3*   MCV 99 102* 101* 102*   MCH 31.6 31.2 31.1 30.9   MCHC 31.9 30.7* 30.8* 30.4*   RDW 24.4* 24.1* 23.6* 23.5*   * 596* 586* 550*     INR  Recent Labs  Lab 09/07/18  0653 09/06/18  0548 09/05/18  0616 09/04/18  0557   INR 2.45* 2.67* 2.21* 2.49*         Assessment and Plan  Castillo Millerhlberg is a 68 year old male  with a past medical history of STEMI c/b postinfarct VSD s/p VSD repair and TVR with median sternotomy on 8/10/18 requiring prolonged intubation with hoarseness. Ddx includes vocal cord paresis/paralysis, laryngeal edema, vocal cord polyp vs granuloma from traumatic intubation. 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 The patient is compensating well with good glottic closure, no signs or symptoms of aspiration and strong cough, SLP has already evaluated the patient and is safe for a regular diet. Discussed with patient that he may regain function in time. We would like to see him as an outpatient in 2-4 weeks to reassess and discuss temporary VC injection if there is no improvement in his voice and this continues to be bothersome.    - no indication for acute intervention at this time  - recommend follow up as an outpatient in 2-4 weeks for reevaluation if symptoms do not improve  - please contact ENT with any questions or concerns    -- Will discuss patient and above plan with Dr. Tra Michael, PA-C  Otolaryngology-Head & Neck Surgery  Please page ENT with questions by dialing [phone] and entering job code [phone] when prompted.

## 2018-09-07 NOTE — PROGRESS NOTES
Social Work Services Progress Note    Hospital Day: 31  Date of Initial Social Work Evaluation: 8/9/18  Collaborated with:  Pt, daughter, SNF admissions, CVTS     Data:  Pt is a 68 year old male being followed by SW for placement to TCU.    Intervention: SW met with pt and daughter today to update on discharge planning.  Referrals were sent to facilities of pt/daughter's preference.  SW will follow up on referrals and assist with arranging transportation at discharge.    Referrals in Progress:   [facility]  PH: [phone]  F: [phone]    [facility]  PH: [phone]  F: [phone]    [facility]  PH: [phone]  F: [phone]    Assessment:  Pt and daughter in agreement with the plan.    Plan:    Anticipated Disposition:  Facility - TCU    Barriers to d/c plan:  Bed availability,    Follow Up:  SW to follow for discharge planning.    CAROL ANN Almanza, APSW  6C Unit   Phone: [phone]  Pager: [phone]  Unit: [phone]    ___________________________________________________________________________________________________________________________________________________  Referrals Discontinued:  [facility] - not a medical candidate  PH: [phone]  F: [phone]    Community Case Management/Community Services in place:   None reported.

## 2018-09-07 NOTE — PROGRESS NOTES
9/7/2018   CVTS Progress Note  Attending provider: Jason Franco, *        S: No acute issues over night. Yesterday, patient had increasing SOB and wheezing throughout the day, and had difficulty with afternoon PT.  Reports feeling slightly better this morning. Now requiring 1 L O2 per NC.     Patient denies CP, fever, chills, sweats.  Patient continues to have decreased appetite.  + flatus + BM. He does not feel distended, last BM 9/6. Ambulating in halls with assistance.   No arrhythmias.     O:   Vitals:    09/06/18 1913 09/07/18 0000 09/07/18 0315 09/07/18 0528   BP: 105/72 103/66 108/72    BP Location: Left arm Left arm Left arm    Pulse:       Resp: 16 20 18    Temp: 98.4  F (36.9  C) 98.2  F (36.8  C)     TempSrc: Oral Oral     SpO2: 96% 96% 94%    Weight:    72.7 kg (160 lb 4.8 oz)   Height:         Vitals:    09/05/18 0400 09/06/18 0100 09/07/18 0528   Weight: 74.2 kg (163 lb 9.6 oz) 73.7 kg (162 lb 8 oz) 72.7 kg (160 lb 4.8 oz)     +5 kg since admission, trending down      Intake/Output Summary (Last 24 hours) at 09/07/18 0750  Last data filed at 09/07/18 0648   Gross per 24 hour   Intake              340 ml   Output             1275 ml   Net             -935 ml       Gen: AxOx3, NAD, hoarse voice  CV: RRR, no murmurs, rubs, or gallops, HR   Pulm: BBS, bilateral wheezing,  No rhonchi  Abd: soft, NT, ND, +BS, - BM  Ext: 1+ bilateral LE  edema  Incision: c/d/i, no erythema, sternal stable    Labs:     BMP    Recent Labs  Lab 09/07/18  0653 09/06/18  0548 09/05/18  0616 09/04/18  0557   * 131* 131* 134   POTASSIUM 4.2 4.3 4.1 4.3   CHLORIDE 100 101 102 103   GABRIELA 7.8* 7.5* 7.7* 7.6*   CO2 24 23 23 25   BUN 14 13 12 12   CR 0.65* 0.62* 0.77 0.73   GLC 90 83 81 77     CBC    Recent Labs  Lab 09/07/18  0653 09/06/18  0548 09/05/18  0616 09/04/18  0557   WBC 5.5 6.3 9.2 6.5   RBC 2.72* 2.82* 2.96* 2.88*   HGB 8.6* 8.8* 9.2* 8.9*   HCT 27.0* 28.7* 29.9* 29.3*   MCV 99 102* 101* 102*   MCH 31.6 31.2  31.1 30.9   MCHC 31.9 30.7* 30.8* 30.4*   RDW 24.4* 24.1* 23.6* 23.5*   * 596* 586* 550*     INR    Recent Labs  Lab 09/07/18  0653 09/06/18  0548 09/05/18  0616 09/04/18  0557   INR 2.45* 2.67* 2.21* 2.49*      Hepatic Panel   Lab Results   Component Value Date    AST 21 09/05/2018     Lab Results   Component Value Date    ALT 53 09/05/2018     No results found for: BILICONJ   Lab Results   Component Value Date    BILITOTAL 0.4 09/05/2018     Lab Results   Component Value Date    ALBUMIN 2.2 09/05/2018     Lab Results   Component Value Date    PROTTOTAL 6.2 09/05/2018      Lab Results   Component Value Date    ALKPHOS 153 09/05/2018         Recent Labs  Lab 09/07/18  0653 09/06/18  0548 09/05/18  0616 09/04/18  0557 09/03/18  0541 09/02/18  0626   GLC 90 83 81 77 80 82         Imaging:   CXR: 9/6  Increased size of the right-sided hydropneumothorax.  Cardiomegaly with mild pulmonary edema.  Unchanged mild to moderate left pleural effusion with overlying atelectasis.    U/S Chest/Pleural Effusion: 9/6    Small bilateral pleural fluid collections. Echogenic foci in the right pleural fluid collection likely gas from pneumothorax.      ASSESSMENT/PLAN: Patient is a 68 year old male with a history of s/p HUSSEIN to RCA and LAD for STEMI with post infarction basal VSD, who on 8/10/2018 underwent repair of posterior VSD and TVR with bioprosthetic valve.  He was kept open chest due to coagulopathy and RV dysfunction. Chest closed on 8/13/2018, IABP removed 8/14/18 and he was extubated on POD #11.       Neuro:  -Acute post-op pain: IV dilaudid, tylenol and oxycodone prn  - Wean Seroquel as tolerated, decreased to 12.5 mg HS      ENT:   -ENT consult regarding hoarse voice, new following surgery      CV:  - ASA 81 mg, atorvastatin 40 mg daily,   - Systolic Blood pressure: 80-98  - TPW capped  - ICU course complicated by a-flutter, EP consult with plan to anticoagulate and then attempt to overdrive pace with A lead.  While  in the ICU, patient also went into narrow complex SVT with MAPs in 50s. Adenosine 6 mg was administered, resulting in 20 seconds of asystole and brief CPR with ROSC.    - 8/25 patient converted from NSR to a-fib overnight. During morning therapy, patient had a-fib with RVR (rates 140s) and symptomatic. EKGs appreciable for a-fib and SVT. Metoprolol 2.5 mg IV administered with improved rate control. Discussed case with EP fellow, amio bolus and gtt started.  Patient converted to NSR afternoon on 8/25. Amiodarone 400 mg PO BID 8/26.  8/31 Start Amiodarone taper 200 mg BID x 4 doses, then 200 mg daily for 21 doses   - Cut TPW 8/27, no bradycardia on PO Amio but prolonged QTc.   - Metoprolol 12.5 mg po bid.  - (9/6) Echo ordered to r/o pericardial effusion in setting of increased SOB. No appreciable pericardial effusion. Small bilateral pleural effusions.   - 9/7 repeat CT chest appreciable for moderate L pneumothorax      Resp:   - Extubated POD 11  - IS, encourage activity, albuterol inhaler QID  - 8/29 Pulled Left pleural tube, f/u CXR.  POC U/S by Medicine for Left pleural effusion today-findings appreciable for loculated fluid. IR consult with CT chest w/o contrast. Possible chest tube placement with IR on 8/30.   - 8/30 IR recommended thoracentesis for left pleural effusion.  Per Dr. Franco, no thoracentesis necessary at this time.    - 9/5 right pleural chest tube removed, f/u CXR, 9/7 dermabond old CT site  - 9/6 neb treatments q 4hrs, CXR for increased SOB  - 9/7 IR consult for R pigtail for increasing R apical pneumothorax         FEN/GI:   - Reg Diet with thin liquids, speech following.   - Continuous TF, consider calorie counts once patient's diet advances   - Bowel regimen, + BM  - 8/24 EGD: two gastric ulcers, no active bleeding or high-risk features.        Renal:   - Creatinine WNL, adequate UOP  - Volume status: hypervolemic, dry weight ~ 170 lbs, current weight 169.  - Diurese Lasix 20 mg po daily,  with one dose additional lasix 20 mg IV      ID:   - Completed alban-op abx  - WBC WNL  and trending down, afebrile, no signs or symptoms of infection      Heme:   - Hgb stable, no signs or symptoms of bleeding   - Continue to monitor for signs of GI bleed, transfuse for Hgb < 7  - Hematology followin/30 Plt 1534, Hydrea 1500 mg BID, Continue to trend plts, Heme recommended Iron replacement, started .    -  Plateletpheresis for Plts >1500, IR to place double lumen non-tunneled catheter prior to procedure  -  Plts 714 s/p plateletpheresis, repeat plateletpheresis x 1  Per hematology, stopping plateletpheresis, okay to remove HD catheter.  Will follow-up with hematology as outpatient for further management.        Endo:   - BG 100s, sliding scale insulin discontinue      PPx:   - PPI      Anticoagulation:   -  start low intensity heparin gtt, discontinue   -  start warfarin, INR 2-3. INR 2.45  -  warfarin held, Vit K 1 mg PO given preemptively for IR pigtail placement on . Goal INR 1.8-2 for IR tube placement .    May need FFP in AM prior to procedure if INR not therapeutic.   - Plavix 75 mg daily      Dispo:   - 6C since   - Therapy recommending d/c to TCU,  pending medical stabilization and bed availablity      Discussed with CVTS Fellow   Staff surgeons have been informed of changes through both  verbal and written communication.        STEPHANIE Conte CNP   Cardiothoracic Surgery  2018 at 7:55 AM  P: [phone]

## 2018-09-07 NOTE — PROGRESS NOTES
Social Work Services Progress Note    Hospital Day: 32  Date of Initial Social Work Evaluation: 8/9/18  Collaborated with:  Pt, SNF admissions, CVTS team      Data:  Pt is a 68 year old male being followed by SW for placement to TCU.    Intervention: Per CVTS team, pt will have a chest tube placed today due to increased concern for fluid buildup.  Discussed with pt that referrals were pended to [facility] and [facility] until at least Monday or Tuesday.  Pt updated his daughter with this plan.  SW to follow up with rehab facilities on Monday.    Referrals in Progress:   [facility]  PH: [phone]  F: [phone]    [facility]  PH: [phone]  F: [phone]    [facility]  PH: [phone]  F: [phone]    Assessment:  Pt in agreement with the plan.    Plan:    Anticipated Disposition:  Facility - TCU    Barriers to d/c plan:  Medical stability, bed availability    Follow Up:  SW to follow for discharge planning.    CAROL ANN Almanza, APSW  6C Unit   Phone: [phone]  Pager: [phone]  Unit: [phone]    ___________________________________________________________________________________________________________________________________________________  Referrals Discontinued:  [facility] - not a medical candidate  PH: [phone]  F: [phone]    Community Case Management/Community Services in place:   None reported.

## 2018-09-08 ENCOUNTER — APPOINTMENT (OUTPATIENT)
Dept: INTERVENTIONAL RADIOLOGY/VASCULAR | Facility: CLINIC | Age: 68
DRG: 219 | End: 2018-09-08
Attending: PHYSICIAN ASSISTANT
Payer: MEDICARE

## 2018-09-08 LAB
ANION GAP SERPL CALCULATED.3IONS-SCNC: 7 MMOL/L (ref 3–14)
BUN SERPL-MCNC: 16 MG/DL (ref 7–30)
CA-I BLD-MCNC: 4.4 MG/DL (ref 4.4–5.2)
CALCIUM SERPL-MCNC: 7.6 MG/DL (ref 8.5–10.1)
CHLORIDE SERPL-SCNC: 100 MMOL/L (ref 94–109)
CO2 SERPL-SCNC: 25 MMOL/L (ref 20–32)
CREAT SERPL-MCNC: 0.7 MG/DL (ref 0.66–1.25)
ERYTHROCYTE [DISTWIDTH] IN BLOOD BY AUTOMATED COUNT: 24.4 % (ref 10–15)
GFR SERPL CREATININE-BSD FRML MDRD: >90 ML/MIN/1.7M2
GLUCOSE SERPL-MCNC: 74 MG/DL (ref 70–99)
HCT VFR BLD AUTO: 25.6 % (ref 40–53)
HGB BLD-MCNC: 8.1 G/DL (ref 13.3–17.7)
INR PPP: 1.79 (ref 0.86–1.14)
MAGNESIUM SERPL-MCNC: 1.9 MG/DL (ref 1.6–2.3)
MCH RBC QN AUTO: 32.1 PG (ref 26.5–33)
MCHC RBC AUTO-ENTMCNC: 31.6 G/DL (ref 31.5–36.5)
MCV RBC AUTO: 102 FL (ref 78–100)
PLATELET # BLD AUTO: 496 10E9/L (ref 150–450)
POTASSIUM SERPL-SCNC: 4.2 MMOL/L (ref 3.4–5.3)
RBC # BLD AUTO: 2.52 10E12/L (ref 4.4–5.9)
SODIUM SERPL-SCNC: 132 MMOL/L (ref 133–144)
WBC # BLD AUTO: 4.8 10E9/L (ref 4–11)

## 2018-09-08 PROCEDURE — A9270 NON-COVERED ITEM OR SERVICE: HCPCS | Mod: GY | Performed by: THORACIC SURGERY (CARDIOTHORACIC VASCULAR SURGERY)

## 2018-09-08 PROCEDURE — 25000132 ZZH RX MED GY IP 250 OP 250 PS 637: Mod: GY | Performed by: STUDENT IN AN ORGANIZED HEALTH CARE EDUCATION/TRAINING PROGRAM

## 2018-09-08 PROCEDURE — 21400006 ZZH R&B CCU INTERMEDIATE UMMC

## 2018-09-08 PROCEDURE — A9270 NON-COVERED ITEM OR SERVICE: HCPCS | Mod: GY | Performed by: PHYSICIAN ASSISTANT

## 2018-09-08 PROCEDURE — 25000132 ZZH RX MED GY IP 250 OP 250 PS 637: Mod: GY | Performed by: NURSE PRACTITIONER

## 2018-09-08 PROCEDURE — 80048 BASIC METABOLIC PNL TOTAL CA: CPT | Performed by: INTERNAL MEDICINE

## 2018-09-08 PROCEDURE — A9270 NON-COVERED ITEM OR SERVICE: HCPCS | Mod: GY | Performed by: STUDENT IN AN ORGANIZED HEALTH CARE EDUCATION/TRAINING PROGRAM

## 2018-09-08 PROCEDURE — A9270 NON-COVERED ITEM OR SERVICE: HCPCS | Mod: GY | Performed by: INTERNAL MEDICINE

## 2018-09-08 PROCEDURE — 36592 COLLECT BLOOD FROM PICC: CPT | Performed by: INTERNAL MEDICINE

## 2018-09-08 PROCEDURE — 25000132 ZZH RX MED GY IP 250 OP 250 PS 637: Mod: GY | Performed by: THORACIC SURGERY (CARDIOTHORACIC VASCULAR SURGERY)

## 2018-09-08 PROCEDURE — 0W9B30Z DRAINAGE OF LEFT PLEURAL CAVITY WITH DRAINAGE DEVICE, PERCUTANEOUS APPROACH: ICD-10-PCS | Performed by: RADIOLOGY

## 2018-09-08 PROCEDURE — 25000128 H RX IP 250 OP 636: Performed by: STUDENT IN AN ORGANIZED HEALTH CARE EDUCATION/TRAINING PROGRAM

## 2018-09-08 PROCEDURE — 85610 PROTHROMBIN TIME: CPT | Performed by: INTERNAL MEDICINE

## 2018-09-08 PROCEDURE — 25000125 ZZHC RX 250: Performed by: THORACIC SURGERY (CARDIOTHORACIC VASCULAR SURGERY)

## 2018-09-08 PROCEDURE — 25000132 ZZH RX MED GY IP 250 OP 250 PS 637: Mod: GY | Performed by: PHYSICIAN ASSISTANT

## 2018-09-08 PROCEDURE — 82330 ASSAY OF CALCIUM: CPT | Performed by: INTERNAL MEDICINE

## 2018-09-08 PROCEDURE — 85027 COMPLETE CBC AUTOMATED: CPT | Performed by: INTERNAL MEDICINE

## 2018-09-08 PROCEDURE — 25000132 ZZH RX MED GY IP 250 OP 250 PS 637: Mod: GY | Performed by: INTERNAL MEDICINE

## 2018-09-08 PROCEDURE — 25000128 H RX IP 250 OP 636: Performed by: SURGERY

## 2018-09-08 PROCEDURE — 32557 INSERT CATH PLEURA W/ IMAGE: CPT | Mod: RT

## 2018-09-08 PROCEDURE — A9270 NON-COVERED ITEM OR SERVICE: HCPCS | Mod: GY | Performed by: NURSE PRACTITIONER

## 2018-09-08 PROCEDURE — 83735 ASSAY OF MAGNESIUM: CPT | Performed by: INTERNAL MEDICINE

## 2018-09-08 RX ORDER — WARFARIN SODIUM 3 MG/1
6 TABLET ORAL
Status: COMPLETED | OUTPATIENT
Start: 2018-09-08 | End: 2018-09-08

## 2018-09-08 RX ADMIN — HYDROXYUREA 1500 MG: 500 CAPSULE ORAL at 19:40

## 2018-09-08 RX ADMIN — ONDANSETRON 4 MG: 4 TABLET, ORALLY DISINTEGRATING ORAL at 14:36

## 2018-09-08 RX ADMIN — SENNOSIDES AND DOCUSATE SODIUM 2 TABLET: 8.6; 5 TABLET ORAL at 08:16

## 2018-09-08 RX ADMIN — OXYCODONE HYDROCHLORIDE 5 MG: 5 TABLET ORAL at 20:35

## 2018-09-08 RX ADMIN — POTASSIUM CHLORIDE 10 MEQ: 750 TABLET, EXTENDED RELEASE ORAL at 08:10

## 2018-09-08 RX ADMIN — Medication 12.5 MG: at 19:39

## 2018-09-08 RX ADMIN — Medication 12.5 MG: at 08:10

## 2018-09-08 RX ADMIN — HYDROXYUREA 1500 MG: 500 CAPSULE ORAL at 08:10

## 2018-09-08 RX ADMIN — PANTOPRAZOLE SODIUM 40 MG: 40 TABLET, DELAYED RELEASE ORAL at 16:00

## 2018-09-08 RX ADMIN — OXYCODONE HYDROCHLORIDE 10 MG: 5 TABLET ORAL at 11:56

## 2018-09-08 RX ADMIN — MULTIPLE VITAMINS W/ MINERALS TAB 1 TABLET: TAB at 08:10

## 2018-09-08 RX ADMIN — SODIUM CHLORIDE, PRESERVATIVE FREE 10 ML: 5 INJECTION INTRAVENOUS at 21:17

## 2018-09-08 RX ADMIN — PANTOPRAZOLE SODIUM 40 MG: 40 TABLET, DELAYED RELEASE ORAL at 08:10

## 2018-09-08 RX ADMIN — LEVOTHYROXINE SODIUM 75 MCG: 75 TABLET ORAL at 08:10

## 2018-09-08 RX ADMIN — SODIUM CHLORIDE, PRESERVATIVE FREE 5 ML: 5 INJECTION INTRAVENOUS at 16:00

## 2018-09-08 RX ADMIN — ACETAMINOPHEN 650 MG: 325 TABLET, FILM COATED ORAL at 19:39

## 2018-09-08 RX ADMIN — CALCIUM GLUCONATE 1 G: 98 INJECTION, SOLUTION INTRAVENOUS at 19:46

## 2018-09-08 RX ADMIN — FUROSEMIDE 20 MG: 20 TABLET ORAL at 08:10

## 2018-09-08 RX ADMIN — WARFARIN SODIUM 6 MG: 3 TABLET ORAL at 17:13

## 2018-09-08 RX ADMIN — ATORVASTATIN CALCIUM 80 MG: 80 TABLET, FILM COATED ORAL at 19:39

## 2018-09-08 RX ADMIN — AMIODARONE HYDROCHLORIDE 200 MG: 200 TABLET ORAL at 08:10

## 2018-09-08 RX ADMIN — CLOPIDOGREL 75 MG: 75 TABLET, FILM COATED ORAL at 08:10

## 2018-09-08 RX ADMIN — Medication 2 G: at 16:49

## 2018-09-08 RX ADMIN — UMECLIDINIUM BROMIDE AND VILANTEROL TRIFENATATE 1 PUFF: 62.5; 25 POWDER RESPIRATORY (INHALATION) at 08:17

## 2018-09-08 RX ADMIN — SENNOSIDES AND DOCUSATE SODIUM 1 TABLET: 8.6; 5 TABLET ORAL at 19:41

## 2018-09-08 ASSESSMENT — PAIN DESCRIPTION - DESCRIPTORS
DESCRIPTORS: ACHING
DESCRIPTORS: TENDER
DESCRIPTORS: ACHING

## 2018-09-08 ASSESSMENT — ACTIVITIES OF DAILY LIVING (ADL)
ADLS_ACUITY_SCORE: 12

## 2018-09-08 NOTE — PROGRESS NOTES
9/8/2018   CVTS Progress Note  Attending provider: Dr Franco    S:   States that pain is being controlled. Some pain from pigtail chest tube. Breathing is better. Working with IS. Appetite is good. +BM, denies any popping/clicking in his breast bone, ambulating, plans for TCU at [facility], able to get some sleep    O:   Vitals:    09/08/18 1040 09/08/18 1050 09/08/18 1100 09/08/18 1225   BP: 97/71 98/71 97/65 92/66   BP Location: Left arm Left arm Left arm Left arm   Pulse:       Resp: 20 20 20 18   Temp:    97.5  F (36.4  C)   TempSrc:    Oral   SpO2: 100% 99% 99% 93%   Weight:       Height:         Vitals:    09/06/18 0100 09/07/18 0528 09/08/18 0500   Weight: 73.7 kg (162 lb 8 oz) 72.7 kg (160 lb 4.8 oz) 70.6 kg (155 lb 9.6 oz)     Gen: up in bed, comfortable, -O2  CV: RRR, telemetry SR 80s  Pulm: CTA,  ml  Abd: BS+, NT/ND, soft  Incision: sternal incision D/I    Labs:     BMP    Recent Labs  Lab 09/08/18  0610 09/07/18  0653 09/06/18  0548 09/05/18  0616   * 131* 131* 131*   POTASSIUM 4.2 4.2 4.3 4.1   CHLORIDE 100 100 101 102   GABRIELA 7.6* 7.8* 7.5* 7.7*   CO2 25 24 23 23   BUN 16 14 13 12   CR 0.70 0.65* 0.62* 0.77   GLC 74 90 83 81     CBC    Recent Labs  Lab 09/08/18  0610 09/07/18  0653 09/06/18  0548 09/05/18  0616   WBC 4.8 5.5 6.3 9.2   RBC 2.52* 2.72* 2.82* 2.96*   HGB 8.1* 8.6* 8.8* 9.2*   HCT 25.6* 27.0* 28.7* 29.9*   * 99 102* 101*   MCH 32.1 31.6 31.2 31.1   MCHC 31.6 31.9 30.7* 30.8*   RDW 24.4* 24.4* 24.1* 23.6*   * 518* 596* 586*     INR    Recent Labs  Lab 09/08/18  0610 09/07/18  0653 09/06/18  0548 09/05/18  0616   INR 1.79* 2.45* 2.67* 2.21*      Hepatic Panel   Lab Results   Component Value Date    AST 21 09/05/2018     Lab Results   Component Value Date    ALT 53 09/05/2018     No results found for: BILICONJ   Lab Results   Component Value Date    BILITOTAL 0.4 09/05/2018     Lab Results   Component Value Date    ALBUMIN 2.2 09/05/2018     Lab Results    Component Value Date    PROTTOTAL 6.2 09/05/2018      Lab Results   Component Value Date    ALKPHOS 153 09/05/2018         Recent Labs  Lab 09/08/18  0610 09/07/18  0653 09/06/18  0548 09/05/18  0616 09/04/18  0557 09/03/18  0541   GLC 74 90 83 81 77 80     Imaging:   CXR: 9/6  Increased size of the right-sided hydropneumothorax.  Cardiomegaly with mild pulmonary edema.  Unchanged mild to moderate left pleural effusion with overlying atelectasis.    U/S Chest/Pleural Effusion: 9/6    Small bilateral pleural fluid collections. Echogenic foci in the right pleural fluid collection likely gas from pneumothorax.      ASSESSMENT/PLAN: Patient is a 68 year old male with a history of s/p HUSSEIN to RCA and LAD for STEMI with post infarction basal VSD, who on 8/10/2018 underwent repair of posterior VSD and TVR with bioprosthetic valve.  He was kept open chest due to coagulopathy and RV dysfunction. Chest closed on 8/13/2018, IABP removed 8/14/18 and he was extubated on POD #11.       Neuro:  -Acute post-op pain: IV dilaudid, tylenol and oxycodone prn  - Wean Seroquel as tolerated, decreased to 12.5 mg HS      ENT:   -ENT consult regarding hoarse voice, new following surgery      CV:  - ASA 81 mg, atorvastatin 40 mg daily,   - Systolic Blood pressure: 80-98  - TPW capped  - ICU course complicated by a-flutter, EP consult with plan to anticoagulate and then attempt to overdrive pace with A lead.  While in the ICU, patient also went into narrow complex SVT with MAPs in 50s. Adenosine 6 mg was administered, resulting in 20 seconds of asystole and brief CPR with ROSC.    - 8/25 patient converted from NSR to a-fib overnight. During morning therapy, patient had a-fib with RVR (rates 140s) and symptomatic. EKGs appreciable for a-fib and SVT. Metoprolol 2.5 mg IV administered with improved rate control. Discussed case with EP fellow, amio bolus and gtt started.  Patient converted to NSR afternoon on 8/25. Amiodarone 400 mg PO BID 8/26.    Start Amiodarone taper 200 mg BID x 4 doses, then 200 mg daily for 21 doses   - Cut TPW , no bradycardia on PO Amio but prolonged QTc.   - Metoprolol 12.5 mg po bid.  - () Echo ordered to r/o pericardial effusion in setting of increased SOB. No appreciable pericardial effusion. Small bilateral pleural effusions.   -  repeat CT chest appreciable for moderate R pneumothorax  - R sided pigtail placed on . Will plan to monitor for airleak. Will plan to keep x 2 days (Monday).      Resp:   - Extubated POD 11  - IS, encourage activity, albuterol inhaler QID  -  Pulled Left pleural tube, f/u CXR.  POC U/S by Medicine for Left pleural effusion today-findings appreciable for loculated fluid. IR consult with CT chest w/o contrast. Possible chest tube placement with IR on .   -  IR recommended thoracentesis for left pleural effusion.  Per Dr. Franco, no thoracentesis necessary at this time.    -  right pleural chest tube removed, f/u CXR,  dermabond old CT site  -  neb treatments q 4hrs, CXR for increased SOB  -  IR consult for R pigtail for increasing R apical pneumothorax       FEN/GI:   - Reg Diet with thin liquids, speech following.   - Continuous TF, consider calorie counts once patient's diet advances   - Bowel regimen, + BM  -  EGD: two gastric ulcers, no active bleeding or high-risk features.        Renal:   - Creatinine WNL, adequate UOP  - Volume status: hypervolemic, dry weight ~ 170 lbs, current weight 169.  - Diurese Lasix 20 mg po daily     ID:   - Completed alban-op abx  - WBC WNL  and trending down, afebrile, no signs or symptoms of infection      Heme:   - Hgb stable, no signs or symptoms of bleeding   - Continue to monitor for signs of GI bleed, transfuse for Hgb < 7  - Hematology followin/30 Plt 1534, Hydrea 1500 mg BID, Continue to trend plts, Heme recommended Iron replacement, started .    -  Plateletpheresis for Plts >1500, IR to place double  lumen non-tunneled catheter prior to procedure  - 8/31 Plts 714 s/p plateletpheresis, repeat plateletpheresis x 1  Per hematology, stopping plateletpheresis, okay to remove HD catheter.  Will follow-up with hematology as outpatient for further management.        Endo:   - BG 100s, sliding scale insulin discontinue      PPx:   - PPI      Anticoagulation:   - 8/25 start low intensity heparin gtt, discontinue 9/2  - 8/26 start warfarin, INR 2-3. INR 1.79  - will restart coumadin today  - Plavix 75 mg daily      Dispo:   - 6C since 8/24  - Therapy recommending d/c to TCU,  pending medical stabilization and bed availablity. Looking at likely discharge on Tuesday, September 11, 2018.    Abdelrahman Arredondo PA-C  [phone]

## 2018-09-08 NOTE — PLAN OF CARE
Problem: Patient Care Overview  Goal: Plan of Care/Patient Progress Review  Outcome: Improving    D: Pt admitted from outside hospital with post infarction vasal VSD and cardiogenic shock following STEMI. Now s/p posterior VSD repair and TVR with bioprosthetic valve 8/10 with many post op complications.     I/A: Vital signs stable, afebrile, A&Ox4, sinus rhythm. Complained of mild incisional pain d/t tele box but declined interventions. O2 sats stable on 2L NC. Incisions and dressings CDI. Ionized Ca of 4.3 replaced per protocol. PICC heparin locked following. Voiding well into urinal. Slept between cares. Up with assist of 1. Pleasant and cooperative with cares.      P: IR today for pigtail placement for L pneumothorax. Will discharge to TCU when medically ready. Continue to monitor and notify MD with pertinent changes.

## 2018-09-08 NOTE — PROGRESS NOTES
Patient Name: Castillo De Anda  Medical Record Number: [id number]  Today's Date: 9/8/2018    Procedure: apical chest  tube placement   Proceduralist: Dr.D'Souza Burger    Procedure start time: 1045  Procedure end time: 1100    Report given to: Roosevelt ANDRADE     Pt arrived to IR room 5 from 6C accompanied by daughter.Consent obtained  Pt denies any questions or concerns regarding procedure. Pt positioned supine  and monitored per protocol. Pt tolerated procedure without any noted complications. 8Fr Flexima chest tube to right apical chest.Pt transferred back to 6C.

## 2018-09-08 NOTE — PROCEDURES
Interventional Radiology Brief Post Procedure Note    Procedure: Right Chest Tube    Proceduralist: Maritza Martinez MD    Assistant: John Delacruz MD    Time Out: Prior to the start of the procedure and with procedural staff participation, I verbally confirmed the patient s identity using two indicators, relevant allergies, that the procedure was appropriate and matched the consent or emergent situation, and that the correct equipment/implants were available. Immediately prior to starting the procedure I conducted the Time Out with the procedural staff and re-confirmed the patient s name, procedure, and site/side. (The Joint Commission universal protocol was followed.)  Yes        Sedation: None. Local Anesthestic used    Findings: Right pneumothorax    Estimated Blood Loss: Minimal    Fluoroscopy Time:  minute(s)    SPECIMENS: None    Complications: 1. None     Condition: Stable    Plan: Chest tube to water seal -20 mmH20    Comments: See dictated procedure note for full details.    Eric Delacruz MD

## 2018-09-09 ENCOUNTER — APPOINTMENT (OUTPATIENT)
Dept: OCCUPATIONAL THERAPY | Facility: CLINIC | Age: 68
DRG: 219 | End: 2018-09-09
Attending: INTERNAL MEDICINE
Payer: MEDICARE

## 2018-09-09 ENCOUNTER — APPOINTMENT (OUTPATIENT)
Dept: PHYSICAL THERAPY | Facility: CLINIC | Age: 68
DRG: 219 | End: 2018-09-09
Attending: INTERNAL MEDICINE
Payer: MEDICARE

## 2018-09-09 ENCOUNTER — APPOINTMENT (OUTPATIENT)
Dept: GENERAL RADIOLOGY | Facility: CLINIC | Age: 68
DRG: 219 | End: 2018-09-09
Attending: PHYSICIAN ASSISTANT
Payer: MEDICARE

## 2018-09-09 LAB
ANION GAP SERPL CALCULATED.3IONS-SCNC: 7 MMOL/L (ref 3–14)
BUN SERPL-MCNC: 17 MG/DL (ref 7–30)
CA-I BLD-MCNC: 4.4 MG/DL (ref 4.4–5.2)
CALCIUM SERPL-MCNC: 8.1 MG/DL (ref 8.5–10.1)
CHLORIDE SERPL-SCNC: 99 MMOL/L (ref 94–109)
CO2 SERPL-SCNC: 25 MMOL/L (ref 20–32)
CREAT SERPL-MCNC: 0.72 MG/DL (ref 0.66–1.25)
ERYTHROCYTE [DISTWIDTH] IN BLOOD BY AUTOMATED COUNT: 24.5 % (ref 10–15)
GFR SERPL CREATININE-BSD FRML MDRD: >90 ML/MIN/1.7M2
GLUCOSE SERPL-MCNC: 98 MG/DL (ref 70–99)
HCT VFR BLD AUTO: 27.5 % (ref 40–53)
HGB BLD-MCNC: 8.7 G/DL (ref 13.3–17.7)
INR PPP: 1.21 (ref 0.86–1.14)
MAGNESIUM SERPL-MCNC: 2.1 MG/DL (ref 1.6–2.3)
MCH RBC QN AUTO: 31.6 PG (ref 26.5–33)
MCHC RBC AUTO-ENTMCNC: 31.6 G/DL (ref 31.5–36.5)
MCV RBC AUTO: 100 FL (ref 78–100)
PLATELET # BLD AUTO: 468 10E9/L (ref 150–450)
POTASSIUM SERPL-SCNC: 4.4 MMOL/L (ref 3.4–5.3)
RBC # BLD AUTO: 2.75 10E12/L (ref 4.4–5.9)
SODIUM SERPL-SCNC: 131 MMOL/L (ref 133–144)
WBC # BLD AUTO: 4.9 10E9/L (ref 4–11)

## 2018-09-09 PROCEDURE — A9270 NON-COVERED ITEM OR SERVICE: HCPCS | Mod: GY | Performed by: STUDENT IN AN ORGANIZED HEALTH CARE EDUCATION/TRAINING PROGRAM

## 2018-09-09 PROCEDURE — 82330 ASSAY OF CALCIUM: CPT | Performed by: INTERNAL MEDICINE

## 2018-09-09 PROCEDURE — 83735 ASSAY OF MAGNESIUM: CPT | Performed by: INTERNAL MEDICINE

## 2018-09-09 PROCEDURE — A9270 NON-COVERED ITEM OR SERVICE: HCPCS | Mod: GY | Performed by: NURSE PRACTITIONER

## 2018-09-09 PROCEDURE — 97110 THERAPEUTIC EXERCISES: CPT | Mod: GO | Performed by: OCCUPATIONAL THERAPIST

## 2018-09-09 PROCEDURE — 40000193 ZZH STATISTIC PT WARD VISIT: Performed by: PHYSICAL THERAPIST

## 2018-09-09 PROCEDURE — 97530 THERAPEUTIC ACTIVITIES: CPT | Mod: GP | Performed by: PHYSICAL THERAPIST

## 2018-09-09 PROCEDURE — 25000128 H RX IP 250 OP 636: Performed by: SURGERY

## 2018-09-09 PROCEDURE — 25000132 ZZH RX MED GY IP 250 OP 250 PS 637: Mod: GY | Performed by: INTERNAL MEDICINE

## 2018-09-09 PROCEDURE — 85027 COMPLETE CBC AUTOMATED: CPT | Performed by: INTERNAL MEDICINE

## 2018-09-09 PROCEDURE — 97530 THERAPEUTIC ACTIVITIES: CPT | Mod: GO | Performed by: OCCUPATIONAL THERAPIST

## 2018-09-09 PROCEDURE — A9270 NON-COVERED ITEM OR SERVICE: HCPCS | Mod: GY | Performed by: THORACIC SURGERY (CARDIOTHORACIC VASCULAR SURGERY)

## 2018-09-09 PROCEDURE — 25000125 ZZHC RX 250: Performed by: THORACIC SURGERY (CARDIOTHORACIC VASCULAR SURGERY)

## 2018-09-09 PROCEDURE — 36592 COLLECT BLOOD FROM PICC: CPT | Performed by: INTERNAL MEDICINE

## 2018-09-09 PROCEDURE — 25000132 ZZH RX MED GY IP 250 OP 250 PS 637: Mod: GY | Performed by: PHYSICIAN ASSISTANT

## 2018-09-09 PROCEDURE — A9270 NON-COVERED ITEM OR SERVICE: HCPCS | Mod: GY | Performed by: PHYSICIAN ASSISTANT

## 2018-09-09 PROCEDURE — 40000133 ZZH STATISTIC OT WARD VISIT: Performed by: OCCUPATIONAL THERAPIST

## 2018-09-09 PROCEDURE — 97116 GAIT TRAINING THERAPY: CPT | Mod: GP | Performed by: PHYSICAL THERAPIST

## 2018-09-09 PROCEDURE — 25000132 ZZH RX MED GY IP 250 OP 250 PS 637: Mod: GY | Performed by: STUDENT IN AN ORGANIZED HEALTH CARE EDUCATION/TRAINING PROGRAM

## 2018-09-09 PROCEDURE — 85610 PROTHROMBIN TIME: CPT | Performed by: INTERNAL MEDICINE

## 2018-09-09 PROCEDURE — 97110 THERAPEUTIC EXERCISES: CPT | Mod: GP | Performed by: PHYSICAL THERAPIST

## 2018-09-09 PROCEDURE — 21400006 ZZH R&B CCU INTERMEDIATE UMMC

## 2018-09-09 PROCEDURE — 80048 BASIC METABOLIC PNL TOTAL CA: CPT | Performed by: INTERNAL MEDICINE

## 2018-09-09 PROCEDURE — 71046 X-RAY EXAM CHEST 2 VIEWS: CPT

## 2018-09-09 PROCEDURE — 25000128 H RX IP 250 OP 636: Performed by: STUDENT IN AN ORGANIZED HEALTH CARE EDUCATION/TRAINING PROGRAM

## 2018-09-09 PROCEDURE — 25000132 ZZH RX MED GY IP 250 OP 250 PS 637: Mod: GY | Performed by: THORACIC SURGERY (CARDIOTHORACIC VASCULAR SURGERY)

## 2018-09-09 PROCEDURE — A9270 NON-COVERED ITEM OR SERVICE: HCPCS | Mod: GY | Performed by: INTERNAL MEDICINE

## 2018-09-09 PROCEDURE — 25000132 ZZH RX MED GY IP 250 OP 250 PS 637: Mod: GY | Performed by: NURSE PRACTITIONER

## 2018-09-09 PROCEDURE — 99232 SBSQ HOSP IP/OBS MODERATE 35: CPT | Mod: GC | Performed by: INTERNAL MEDICINE

## 2018-09-09 RX ORDER — WARFARIN SODIUM 3 MG/1
6 TABLET ORAL
Status: COMPLETED | OUTPATIENT
Start: 2018-09-09 | End: 2018-09-09

## 2018-09-09 RX ORDER — POLYETHYLENE GLYCOL 3350 17 G/17G
17 POWDER, FOR SOLUTION ORAL DAILY PRN
Status: DISCONTINUED | OUTPATIENT
Start: 2018-09-09 | End: 2018-09-11 | Stop reason: HOSPADM

## 2018-09-09 RX ORDER — SIMETHICONE 80 MG
80 TABLET,CHEWABLE ORAL 4 TIMES DAILY PRN
Status: DISCONTINUED | OUTPATIENT
Start: 2018-09-09 | End: 2018-09-11 | Stop reason: HOSPADM

## 2018-09-09 RX ORDER — BISACODYL 10 MG
10 SUPPOSITORY, RECTAL RECTAL DAILY PRN
Status: DISCONTINUED | OUTPATIENT
Start: 2018-09-09 | End: 2018-09-11 | Stop reason: HOSPADM

## 2018-09-09 RX ADMIN — PANTOPRAZOLE SODIUM 40 MG: 40 TABLET, DELAYED RELEASE ORAL at 09:52

## 2018-09-09 RX ADMIN — ACETAMINOPHEN 650 MG: 325 TABLET, FILM COATED ORAL at 06:36

## 2018-09-09 RX ADMIN — POTASSIUM CHLORIDE 10 MEQ: 750 TABLET, EXTENDED RELEASE ORAL at 09:53

## 2018-09-09 RX ADMIN — MULTIPLE VITAMINS W/ MINERALS TAB 1 TABLET: TAB at 09:52

## 2018-09-09 RX ADMIN — HYDROXYUREA 1500 MG: 500 CAPSULE ORAL at 19:27

## 2018-09-09 RX ADMIN — Medication 12.5 MG: at 19:26

## 2018-09-09 RX ADMIN — SODIUM CHLORIDE, PRESERVATIVE FREE 10 ML: 5 INJECTION INTRAVENOUS at 17:43

## 2018-09-09 RX ADMIN — FUROSEMIDE 20 MG: 20 TABLET ORAL at 09:52

## 2018-09-09 RX ADMIN — CLOPIDOGREL 75 MG: 75 TABLET, FILM COATED ORAL at 09:52

## 2018-09-09 RX ADMIN — LEVOTHYROXINE SODIUM 75 MCG: 75 TABLET ORAL at 09:52

## 2018-09-09 RX ADMIN — ATORVASTATIN CALCIUM 80 MG: 80 TABLET, FILM COATED ORAL at 19:26

## 2018-09-09 RX ADMIN — Medication 12.5 MG: at 09:52

## 2018-09-09 RX ADMIN — CALCIUM GLUCONATE 1 G: 98 INJECTION, SOLUTION INTRAVENOUS at 20:00

## 2018-09-09 RX ADMIN — SODIUM CHLORIDE, PRESERVATIVE FREE 10 ML: 5 INJECTION INTRAVENOUS at 20:48

## 2018-09-09 RX ADMIN — SENNOSIDES AND DOCUSATE SODIUM 2 TABLET: 8.6; 5 TABLET ORAL at 19:27

## 2018-09-09 RX ADMIN — ONDANSETRON 4 MG: 4 TABLET, ORALLY DISINTEGRATING ORAL at 12:26

## 2018-09-09 RX ADMIN — UMECLIDINIUM BROMIDE AND VILANTEROL TRIFENATATE 1 PUFF: 62.5; 25 POWDER RESPIRATORY (INHALATION) at 09:51

## 2018-09-09 RX ADMIN — SODIUM CHLORIDE, PRESERVATIVE FREE 5 ML: 5 INJECTION INTRAVENOUS at 06:34

## 2018-09-09 RX ADMIN — PANTOPRAZOLE SODIUM 40 MG: 40 TABLET, DELAYED RELEASE ORAL at 17:43

## 2018-09-09 RX ADMIN — SENNOSIDES AND DOCUSATE SODIUM 2 TABLET: 8.6; 5 TABLET ORAL at 09:53

## 2018-09-09 RX ADMIN — AMIODARONE HYDROCHLORIDE 200 MG: 200 TABLET ORAL at 09:52

## 2018-09-09 RX ADMIN — WARFARIN SODIUM 6 MG: 3 TABLET ORAL at 17:43

## 2018-09-09 RX ADMIN — SIMETHICONE CHEW TAB 80 MG 80 MG: 80 TABLET ORAL at 17:47

## 2018-09-09 RX ADMIN — HYDROXYUREA 1500 MG: 500 CAPSULE ORAL at 09:57

## 2018-09-09 ASSESSMENT — ACTIVITIES OF DAILY LIVING (ADL)
ADLS_ACUITY_SCORE: 12

## 2018-09-09 ASSESSMENT — PAIN DESCRIPTION - DESCRIPTORS: DESCRIPTORS: ACHING

## 2018-09-09 NOTE — PLAN OF CARE
Problem: Patient Care Overview  Goal: Plan of Care/Patient Progress Review  PT 6C: Discharge Planner PT   Patient plan for discharge: rehab  Current status: pt ambulates 300ft total first with FWW and SBA and then without AD and CGA. Pt limited by fatigue and SOB, vitals stable on room air. Min assist needed for STS transfers from low height.   Barriers to return to prior living situation: poor endurance, assist for safe transfers, post surgical precautions  Recommendations for discharge: ARU vs TCU  Rationale for recommendations: pt needing continued rehab to return to PLOF as pt IND at baseline, may be good ARU candidate as pt IND at baseline and motivated to participate in sessions.        Entered by: Nica Christiansen 09/09/2018 4:24 PM

## 2018-09-09 NOTE — CONSULTS
Hematology Consult Service     Follow Up Consult Note:    Patient: Castillo De Anda  MRN: [id number]  : 1950  DOS: 2018  Date Admitted:2018  Hospital Day:34      Primary Team: MARNIS  Other Inpatient Consultants: Cardiology, Transfusion Medicine    Reason for Consultation:  Hematology is consulted on Castillo De Anda for evaluation and treatment of Jak2+ Myeloproliferative neoplasm.   __________________________________________________________________________________________________________________________________________________________________________________________________________________________________________  Assessment:  In summary, Castillo De Anda is 68 year old man with JAK2+ myeloproliferative neoplasm (polycythemia vera), recently switched by primary heme/onc provider from Hydrea to Anagrelide with very poor tolerance. Reason for switching apparently was that his anemia was felt to be due to Hydrea.  He is admitted s/p STEMI with ischemia-related VSD requiring surgical intervention. He is high-risk for ongoing thrombocytosis and we restarted hydrea on 18.   He completed two sessions of plateletpheresis with platelet counts of 359 followed by 397. Although new NCCN guidelines give a target platelet count of 425, there is no strong evidence for a particular target. Goal of therapy was to reduce count to <1 million to reduce overall risk of both thrombosis and bleeding complications from acquired von willebrand disease.      Discuss with patient the rational for edgardo-2 inhibition moving forward. Would like to obtain outpatient bone marrow in future. Patient would like to transfer care to [facility] in future. Outpatient appointment with Dr. Oconnell set for .    Please send all labs to Dr. Oconnell's RN Care Coordinator: DANNY ShahN, RN, OCN       Recommendations:  1. Continue with HU at 20 mg/kg dose.  2. Patient needs weekly CBC with differential and comprehensive metabolic  "panel.   3. Would continue systemic anticoagulation (coumadin) with INR goal 2-3    We will continue to follow the patient peripherally.     Leny Oconnell MD/PhD   of Medicine  Division of Hematology, Oncology and Transplantation  Pager [phone]  09/09/2018 11:55 AM    __________________________________________________________________________________________________________________________________________________________________________________________________________________________________________  Interval History:  Patient up and out of bed. SOB improved and working with PT.      Medications:  Facility Administered Medications as of 9/9/2018             acetaminophen (TYLENOL) tablet 650 mg 2 tablets (650 mg) by Oral or Feeding Tube route every 4 hours as needed for other (multimodal surgical pain management along with NSAIDS and opioid medication as indicated based on pain control and physical function.)    albuterol neb solution 2.5 mg Take 3 mLs (2.5 mg) by nebulization every 4 hours as needed for wheezing    amiodarone (PACERONE/CODARONE) tablet 200 mg Take 1 tablet (200 mg) by mouth daily    atorvastatin (LIPITOR) tablet 80 mg 1 tablet (80 mg) by Oral or Feeding Tube route every evening    benzocaine-menthol (CEPACOL) 15-3.6 MG lozenge 1 lozenge Place 1 lozenge inside cheek every hour as needed for sore throat    calcium gluconate 1 g in D5W 100 mL intermittent infusion Inject 1 g into the vein continuous prn for calcium supplementation (For ionized calcium less than 4.5)    Carboxymethylcellulose Sod PF (REFRESH PLUS) 0.5 % ophthalmic solution 1 drop Place 1 drop into both eyes 3 times daily as needed for dry eyes    clopidogrel (PLAVIX) tablet 75 mg Take 1 tablet (75 mg) by mouth daily    dextrose 50 % injection 25-50 mL Inject 25-50 mLs into the vein every 15 minutes as needed for low blood sugar    Linked Group 1:  \"Or\" Linked Group Details     furosemide (LASIX) tablet 20 mg " "Take 1 tablet (20 mg) by mouth daily    glucagon injection 1 mg Inject 1 mg Subcutaneous every 15 minutes as needed for low blood sugar (May repeat x 1 only)    Linked Group 1:  \"Or\" Linked Group Details     glucose gel 15-30 g Take 15-30 g by mouth every 15 minutes as needed for low blood sugar    Linked Group 1:  \"Or\" Linked Group Details     heparin lock flush 10 UNIT/ML injection 5-10 mL 5-10 mLs by Intracatheter route every 24 hours    heparin lock flush 10 UNIT/ML injection 5-10 mL 5-10 mLs by Intracatheter route every hour as needed for other (to lock each CVC - Open Ended (Tunneled and Non-Tunneled) dormant lumen.)    hydrALAZINE (APRESOLINE) injection 10 mg Inject 0.5 mLs (10 mg) into the vein every 30 minutes as needed for high blood pressure (Systolic Blood Pressure greater than 140 mmHg or Diastolic Blood Pressure greater than 90 mmHg)    HYDROmorphone (PF) (DILAUDID) injection 0.3-0.5 mg Inject 0.3-0.5 mg into the vein every 2 hours as needed for other (pain control or improvement in physical function. Hold dose for analgesic side effects.)    hydroxyurea (HYDREA) capsule CHEMO 1,500 mg Take 3 capsules (1,500 mg) by mouth 2 times daily    levothyroxine (SYNTHROID/LEVOTHROID) tablet 75 mcg 1 tablet (75 mcg) by Oral or Feeding Tube route daily    lidocaine (LMX4) cream Apply topically once as needed for moderate pain (for local anesthetic during PICC insertion)    lidocaine 1 % 1 mL 1 mL by Other route every hour as needed (mild pain with VAD insertion or accessing implanted port)    lidocaine 3% (non-sterile), phenylephrine 0.25% solution for irrigation Irrigate with 5 mLs as directed once    magnesium sulfate 2 g in NS intermittent infusion (PharMEDium or FV Cmpd) Inject 50 mLs (2 g) into the vein daily as needed for magnesium supplementation    magnesium sulfate 4 g in 100 mL sterile water (premade) Inject 100 mLs (4 g) into the vein every 4 hours as needed for magnesium supplementation    May continue " "current IV fluids if patient has IV fluids infusing. continuous prn    melatonin tablet 1 mg Take 1 tablet (1 mg) by mouth nightly as needed for sleep    methocarbamol (ROBAXIN) tablet 500 mg 1 tablet (500 mg) by Oral or Feeding Tube route 4 times daily as needed for muscle spasms    metoprolol (LOPRESSOR) injection 2.5 mg Inject 2.5 mLs (2.5 mg) into the vein every 4 hours as needed for other (For sustained HR>140)    metoprolol tartrate (LOPRESSOR) half-tab 12.5 mg Take 1 half-tab (12.5 mg) by mouth 2 times daily    multivitamin, therapeutic with minerals (THERA-VIT-M) tablet 1 tablet Take 1 tablet by mouth daily    naloxone (NARCAN) injection 0.1-0.4 mg Inject 0.25-1 mLs (0.1-0.4 mg) into the vein every 2 minutes as needed for opioid reversal    ondansetron (ZOFRAN) injection 4 mg Inject 2 mLs (4 mg) into the vein every 6 hours as needed for nausea or vomiting    Linked Group 2:  \"Or\" Linked Group Details     ondansetron (ZOFRAN-ODT) ODT tab 4 mg Take 1 tablet (4 mg) by mouth every 6 hours as needed for nausea or vomiting    Linked Group 2:  \"Or\" Linked Group Details     oxyCODONE IR (ROXICODONE) tablet 5-10 mg 1-2 tablets (5-10 mg) by Per Feeding Tube route every 4 hours as needed for other (pain control or improvement in physical function. Hold dose for analgesic side effects.)    pantoprazole (PROTONIX) EC tablet 40 mg Take 1 tablet (40 mg) by mouth 2 times daily (before meals)    potassium chloride SA (K-DUR/KLOR-CON M) CR tablet 10 mEq Take 1 tablet (10 mEq) by mouth daily    potassium phosphate 15 mmol in D5W 250 mL intermittent infusion Inject 15 mmol into the vein daily as needed for phosphorous supplementation    potassium phosphate 20 mmol in D5W 250 mL intermittent infusion Inject 20 mmol into the vein every 6 hours as needed for phosphorous supplementation    potassium phosphate 20 mmol in D5W 500 mL intermittent infusion Inject 20 mmol into the vein every 6 hours as needed for phosphorous " "supplementation    potassium phosphate 25 mmol in D5W 500 mL intermittent infusion Inject 25 mmol into the vein every 8 hours as needed for phosphorous supplementation    prochlorperazine (COMPAZINE) injection 5 mg Inject 1 mL (5 mg) into the vein every 6 hours as needed for nausea or vomiting    Linked Group 3:  \"Or\" Linked Group Details     prochlorperazine (COMPAZINE) tablet 5 mg Take 1 tablet (5 mg) by mouth every 6 hours as needed for nausea or vomiting    Linked Group 3:  \"Or\" Linked Group Details     senna-docusate (SENOKOT-S;PERICOLACE) 8.6-50 MG per tablet 2 tablet 2 tablets by Oral or Feeding Tube route 2 times daily    Linked Group 4:  \"Or\" Linked Group Details     senna-docusate (SENOKOT-S;PERICOLACE) 8.6-50 MG per tablet 1 tablet 1 tablet by Oral or Feeding Tube route 2 times daily    Linked Group 4:  \"Or\" Linked Group Details     sodium chloride (PF) 0.9% PF flush 10-20 mL 10-20 mLs by Intracatheter route every hour as needed for line flush or post meds or blood draw    sodium chloride (PF) 0.9% PF flush 3 mL Inject 3 mLs into the vein q1 min prn for line flush (after medication administration. For peripheral IV flush post IV meds)    sodium chloride (PF) 0.9% PF flush 3 mL 3 mLs by Intracatheter route every hour as needed for line flush (for peripheral IV flush post IV meds)    sodium chloride (PF) 0.9% PF flush 3 mL 3 mLs by Intracatheter route every 8 hours    sodium chloride (PF) 0.9% PF flush 5-50 mL 5-50 mLs by Intracatheter route once as needed for line flush (to flush each lumen with line placement)    umeclidinium-vilanterol (ANORO ELLIPTA) 62.5-25 MCG/INH oral inhaler 1 puff Inhale 1 puff into the lungs daily    warfarin (COUMADIN) tablet 6 mg Take 2 tablets (6 mg) by mouth Once at 6pm    Warfarin Therapy Reminder (Check START DATE - warfarin may be starting in the FUTURE) 1 each continuous prn    HOLD MEDICATION (Discontinued) HOLD    potassium chloride (KLOR-CON) Packet 20-40 mEq " (Discontinued) 20-40 mEq by Oral or Feeding Tube route every 2 hours as needed for potassium supplementation    potassium chloride 10 mEq in 100 mL intermittent infusion with 10 mg lidocaine (Discontinued) Inject 100 mLs (10 mEq) into the vein every hour as needed for potassium supplementation    potassium chloride 10 mEq in 100 mL sterile water intermittent infusion (premix) (Discontinued) Inject 100 mLs (10 mEq) into the vein every hour as needed for potassium supplementation    potassium chloride 20 mEq in 50 mL intermittent infusion (Discontinued) Inject 50 mLs (20 mEq) into the vein every hour as needed for potassium supplementation    potassium chloride SA (K-DUR/KLOR-CON M) CR tablet 20-40 mEq (Discontinued) Take 2-4 tablets (20-40 mEq) by mouth every 2 hours as needed for potassium supplementation    QUEtiapine (SEROquel) half-tab 12.5 mg (Discontinued) 1 half-tab (12.5 mg) by Oral or Feeding Tube route 2 times daily as needed (anxiety)    Reason ACE/ARB/ARNI order not selected (Discontinued) by Other route DOES NOT GO TO MAR for other    Reason beta blocker not prescribed (Discontinued) DOES NOT GO TO MAR for other    Reason beta blocker order not selected (Discontinued) DOES NOT GO TO MAR for other          Objective:  Temp: 98.3  F (36.8  C) Temp src: Oral BP: 90/65 Pulse: 92 Heart Rate: 104 Resp: 16 SpO2: 97 % O2 Device: None (Room air) Oxygen Delivery: 2 LPM    Exam:   Constitutional: up in chair, Appears well, no distress  HEENT: No scleral icterus or hemorrhage. Mucous membranes moist with no wet purpura.   CV: scar present on chest  Respiratory: no accessory muscle use  Mus/Skele: no edema  Skin: no petechiae, no ecchymosis.  Neuro:  AOx3    Labs: personally reviewed with relevant trends annotated below  CBC RESULTS:   Recent Labs   Lab Test  09/08/18   0610   WBC  4.8   RBC  2.52*   HGB  8.1*   HCT  25.6*   MCV  102*   MCH  32.1   MCHC  31.6   RDW  24.4*   PLT  496*

## 2018-09-09 NOTE — PROGRESS NOTES
9/9/2018   CVTS Progress Note  Attending provider: Dr Franco    S:   States that pain is being controlled. Some pain from pigtail chest tube. Breathing is better. Working with IS. Appetite is good. +BM, denies any popping/clicking in his breast bone, ambulating, plans for TCU at [facility], able to get some sleep    O:   Vitals:    09/08/18 1900 09/08/18 2315 09/09/18 0427 09/09/18 0720   BP: 100/75 (!) 87/62 95/66 94/74   BP Location: Right arm Left arm Left arm Left arm   Pulse:       Resp: 16 16 16 16   Temp: 97.7  F (36.5  C) 97.8  F (36.6  C)  97.9  F (36.6  C)   TempSrc: Oral Oral  Oral   SpO2: 94% 94% 97% 92%   Weight:       Height:         Vitals:    09/06/18 0100 09/07/18 0528 09/08/18 0500   Weight: 73.7 kg (162 lb 8 oz) 72.7 kg (160 lb 4.8 oz) 70.6 kg (155 lb 9.6 oz)     Gen: up in bed, comfortable, -O2  CV: RRR, telemetry SR 90s  Pulm: CTA, IS 1000 ml  Abd: BS+, NT/ND, soft  Incision: sternal incision D/I  Labs: WBC 4.9   Hgb/Hct 8.7/27.5   Plt 468   INR 1.21    CXR: ordered    U/S Chest/Pleural Effusion: 9/6    Small bilateral pleural fluid collections. Echogenic foci in the right pleural fluid collection likely gas from pneumothorax.      ASSESSMENT/PLAN: Patient is a 68 year old male with a history of s/p HUSSEIN to RCA and LAD for STEMI with post infarction basal VSD, who on 8/10/2018 underwent repair of posterior VSD and TVR with bioprosthetic valve.  He was kept open chest due to coagulopathy and RV dysfunction. Chest closed on 8/13/2018, IABP removed 8/14/18 and he was extubated on POD #11.       Neuro:  -Acute post-op pain: IV dilaudid, tylenol and oxycodone prn  - Wean Seroquel as tolerated, decreased to 12.5 mg HS      ENT:   -ENT consult regarding hoarse voice, new following surgery      CV:  - ASA 81 mg, atorvastatin 40 mg daily,   - Systolic Blood pressure: 80-98  - TPW capped  - ICU course complicated by a-flutter, EP consult with plan to anticoagulate and then attempt to overdrive pace  with A lead.  While in the ICU, patient also went into narrow complex SVT with MAPs in 50s. Adenosine 6 mg was administered, resulting in 20 seconds of asystole and brief CPR with ROSC.    - 8/25 patient converted from NSR to a-fib overnight. During morning therapy, patient had a-fib with RVR (rates 140s) and symptomatic. EKGs appreciable for a-fib and SVT. Metoprolol 2.5 mg IV administered with improved rate control. Discussed case with EP fellow, amio bolus and gtt started.  Patient converted to NSR afternoon on 8/25. Amiodarone 400 mg PO BID 8/26.  8/31 Start Amiodarone taper 200 mg BID x 4 doses, then 200 mg daily for 21 doses   - Cut TPW 8/27, no bradycardia on PO Amio but prolonged QTc.   - Metoprolol 12.5 mg po bid.  - (9/6) Echo ordered to r/o pericardial effusion in setting of increased SOB. No appreciable pericardial effusion. Small bilateral pleural effusions.   - 9/7 repeat CT chest appreciable for moderate R pneumothorax  - R sided pigtail placed on 9/8. Will plan to monitor for airleak. Will plan to keep x 2 days (Monday). CXR today. Possible removal of chest tube on 9/10 if continues with no air leak. Output has decreased though pleurovac has been knocked over (fluid in 2 chambers - to 300 ml, and 180 ml)      Resp:   - Extubated POD 11  - IS, encourage activity, albuterol inhaler QID  - 8/29 Pulled Left pleural tube, f/u CXR.  POC U/S by Medicine for Left pleural effusion today-findings appreciable for loculated fluid. IR consult with CT chest w/o contrast. Possible chest tube placement with IR on 8/30.   - 8/30 IR recommended thoracentesis for left pleural effusion.  Per Dr. Franco, no thoracentesis necessary at this time.    - 9/5 right pleural chest tube removed, f/u CXR, 9/7 dermabond old CT site  - 9/6 neb treatments q 4hrs, CXR for increased SOB  - 9/7 IR consult for R pigtail for increasing R apical pneumothorax       FEN/GI:   - Reg Diet with thin liquids, speech following.   - Continuous  TF, consider calorie counts once patient's diet advances   - Bowel regimen, + BM  -  EGD: two gastric ulcers, no active bleeding or high-risk features.        Renal:   - Creatinine WNL, adequate UOP  - Volume status: hypervolemic, dry weight ~ 170 lbs, current weight 169.  - Diurese Lasix 20 mg po daily     ID:   - Completed alban-op abx  - WBC WNL  and trending down, afebrile, no signs or symptoms of infection      Heme:   - Hgb stable, no signs or symptoms of bleeding   - Continue to monitor for signs of GI bleed, transfuse for Hgb < 7  - Hematology followin/30 Plt 1534, Hydrea 1500 mg BID, Continue to trend plts, Heme recommended Iron replacement, started .    -  Plateletpheresis for Plts >1500, IR to place double lumen non-tunneled catheter prior to procedure  -  Plts 714 s/p plateletpheresis, repeat plateletpheresis x 1  Per hematology, stopping plateletpheresis, okay to remove HD catheter.  Will follow-up with hematology as outpatient for further management.        Endo:   - BG 100s, sliding scale insulin discontinue      PPx:   - PPI      Anticoagulation:   -  start low intensity heparin gtt, discontinue   -  start warfarin, INR 2-3. INR 1.21  - continue coumadin, will not bring now with subtherapeutic INR  - Plavix 75 mg daily      Dispo:   - 6C since   - Therapy recommending d/c to TCU,  pending medical stabilization and bed availablity. Looking at likely discharge on Tuesday, 2018.    Abdelrahman Arredondo PA-C  [phone]

## 2018-09-09 NOTE — PLAN OF CARE
Problem: Patient Care Overview  Goal: Plan of Care/Patient Progress Review  Discharge Planner OT   Patient plan for discharge: rehab  Current status: OT: Pt completed 21min on Nustep, previatls BP 99/57, post /63, Hr mid-high 90s, O2 94-96% on RA.  Barriers to return to prior living situation: medical status  Recommendations for discharge: ARU vs home w/ OP CR pending pt progress  Rationale for recommendations: to increase ind and endurance in ADLS/IADLS       Entered by: Rebeca Nunez 09/09/2018 12:37 PM

## 2018-09-09 NOTE — PLAN OF CARE
Problem: Patient Care Overview  Goal: Plan of Care/Patient Progress Review    D: Pt admitted from outside hospital with post infarction vasal VSD and cardiogenic shock following STEMI. Now s/p posterior VSD repair and TVR with bioprosthetic valve 8/10 with many post op complications.      I/A: Vital signs stable, afebrile, A&Ox4, sinus rhythm. Pigtail CT to suction, see flowsheets for output. Pt endorsed pain at CT site, treated with PRN tylenol and oxycodone as needed. O2 sats stable on room air. Incisions and dressings CDI. Ionized Ca of 4.4 replaced per protocol. PICC heparin locked following. Voiding well into urinal. Slept between cares. Up with assist of 1. Pleasant and cooperative with cares.       P:  Will discharge to TCU when medically ready. Continue to monitor and notify MD with pertinent changes.

## 2018-09-10 ENCOUNTER — APPOINTMENT (OUTPATIENT)
Dept: GENERAL RADIOLOGY | Facility: CLINIC | Age: 68
DRG: 219 | End: 2018-09-10
Attending: PHYSICIAN ASSISTANT
Payer: MEDICARE

## 2018-09-10 ENCOUNTER — APPOINTMENT (OUTPATIENT)
Dept: OCCUPATIONAL THERAPY | Facility: CLINIC | Age: 68
DRG: 219 | End: 2018-09-10
Attending: INTERNAL MEDICINE
Payer: MEDICARE

## 2018-09-10 ENCOUNTER — APPOINTMENT (OUTPATIENT)
Dept: PHYSICAL THERAPY | Facility: CLINIC | Age: 68
DRG: 219 | End: 2018-09-10
Attending: INTERNAL MEDICINE
Payer: MEDICARE

## 2018-09-10 LAB
ANION GAP SERPL CALCULATED.3IONS-SCNC: 8 MMOL/L (ref 3–14)
BUN SERPL-MCNC: 13 MG/DL (ref 7–30)
CA-I BLD-MCNC: 4.4 MG/DL (ref 4.4–5.2)
CALCIUM SERPL-MCNC: 7.8 MG/DL (ref 8.5–10.1)
CHLORIDE SERPL-SCNC: 99 MMOL/L (ref 94–109)
CO2 SERPL-SCNC: 24 MMOL/L (ref 20–32)
CREAT SERPL-MCNC: 0.72 MG/DL (ref 0.66–1.25)
ERYTHROCYTE [DISTWIDTH] IN BLOOD BY AUTOMATED COUNT: 24.7 % (ref 10–15)
GFR SERPL CREATININE-BSD FRML MDRD: >90 ML/MIN/1.7M2
GLUCOSE SERPL-MCNC: 81 MG/DL (ref 70–99)
HCT VFR BLD AUTO: 24.7 % (ref 40–53)
HGB BLD-MCNC: 7.7 G/DL (ref 13.3–17.7)
INR PPP: 1.9 (ref 0.86–1.14)
MAGNESIUM SERPL-MCNC: 1.9 MG/DL (ref 1.6–2.3)
MCH RBC QN AUTO: 31.7 PG (ref 26.5–33)
MCHC RBC AUTO-ENTMCNC: 31.2 G/DL (ref 31.5–36.5)
MCV RBC AUTO: 102 FL (ref 78–100)
PLATELET # BLD AUTO: 405 10E9/L (ref 150–450)
POTASSIUM SERPL-SCNC: 4.1 MMOL/L (ref 3.4–5.3)
RBC # BLD AUTO: 2.43 10E12/L (ref 4.4–5.9)
SODIUM SERPL-SCNC: 131 MMOL/L (ref 133–144)
WBC # BLD AUTO: 3.8 10E9/L (ref 4–11)

## 2018-09-10 PROCEDURE — 25000132 ZZH RX MED GY IP 250 OP 250 PS 637: Mod: GY | Performed by: STUDENT IN AN ORGANIZED HEALTH CARE EDUCATION/TRAINING PROGRAM

## 2018-09-10 PROCEDURE — 25000128 H RX IP 250 OP 636: Performed by: SURGERY

## 2018-09-10 PROCEDURE — 25000125 ZZHC RX 250: Performed by: THORACIC SURGERY (CARDIOTHORACIC VASCULAR SURGERY)

## 2018-09-10 PROCEDURE — 80048 BASIC METABOLIC PNL TOTAL CA: CPT | Performed by: INTERNAL MEDICINE

## 2018-09-10 PROCEDURE — 36592 COLLECT BLOOD FROM PICC: CPT | Performed by: THORACIC SURGERY (CARDIOTHORACIC VASCULAR SURGERY)

## 2018-09-10 PROCEDURE — 85027 COMPLETE CBC AUTOMATED: CPT | Performed by: INTERNAL MEDICINE

## 2018-09-10 PROCEDURE — A9270 NON-COVERED ITEM OR SERVICE: HCPCS | Mod: GY | Performed by: STUDENT IN AN ORGANIZED HEALTH CARE EDUCATION/TRAINING PROGRAM

## 2018-09-10 PROCEDURE — 71045 X-RAY EXAM CHEST 1 VIEW: CPT | Mod: 76

## 2018-09-10 PROCEDURE — 97535 SELF CARE MNGMENT TRAINING: CPT | Mod: GO

## 2018-09-10 PROCEDURE — 71045 X-RAY EXAM CHEST 1 VIEW: CPT

## 2018-09-10 PROCEDURE — 71046 X-RAY EXAM CHEST 2 VIEWS: CPT

## 2018-09-10 PROCEDURE — 25000132 ZZH RX MED GY IP 250 OP 250 PS 637: Mod: GY | Performed by: THORACIC SURGERY (CARDIOTHORACIC VASCULAR SURGERY)

## 2018-09-10 PROCEDURE — A9270 NON-COVERED ITEM OR SERVICE: HCPCS | Mod: GY | Performed by: NURSE PRACTITIONER

## 2018-09-10 PROCEDURE — 83735 ASSAY OF MAGNESIUM: CPT | Performed by: INTERNAL MEDICINE

## 2018-09-10 PROCEDURE — 97116 GAIT TRAINING THERAPY: CPT | Mod: GP | Performed by: REHABILITATION PRACTITIONER

## 2018-09-10 PROCEDURE — 97110 THERAPEUTIC EXERCISES: CPT | Mod: GO

## 2018-09-10 PROCEDURE — 25000132 ZZH RX MED GY IP 250 OP 250 PS 637: Mod: GY | Performed by: PHYSICIAN ASSISTANT

## 2018-09-10 PROCEDURE — 40000133 ZZH STATISTIC OT WARD VISIT

## 2018-09-10 PROCEDURE — 25000132 ZZH RX MED GY IP 250 OP 250 PS 637: Mod: GY | Performed by: NURSE PRACTITIONER

## 2018-09-10 PROCEDURE — 40000193 ZZH STATISTIC PT WARD VISIT: Performed by: REHABILITATION PRACTITIONER

## 2018-09-10 PROCEDURE — A9270 NON-COVERED ITEM OR SERVICE: HCPCS | Mod: GY | Performed by: PHYSICIAN ASSISTANT

## 2018-09-10 PROCEDURE — 25000132 ZZH RX MED GY IP 250 OP 250 PS 637: Mod: GY | Performed by: INTERNAL MEDICINE

## 2018-09-10 PROCEDURE — 25000128 H RX IP 250 OP 636: Performed by: STUDENT IN AN ORGANIZED HEALTH CARE EDUCATION/TRAINING PROGRAM

## 2018-09-10 PROCEDURE — 85610 PROTHROMBIN TIME: CPT | Performed by: INTERNAL MEDICINE

## 2018-09-10 PROCEDURE — A9270 NON-COVERED ITEM OR SERVICE: HCPCS | Mod: GY | Performed by: THORACIC SURGERY (CARDIOTHORACIC VASCULAR SURGERY)

## 2018-09-10 PROCEDURE — 97530 THERAPEUTIC ACTIVITIES: CPT | Mod: GP | Performed by: REHABILITATION PRACTITIONER

## 2018-09-10 PROCEDURE — 82330 ASSAY OF CALCIUM: CPT | Performed by: THORACIC SURGERY (CARDIOTHORACIC VASCULAR SURGERY)

## 2018-09-10 PROCEDURE — 21400006 ZZH R&B CCU INTERMEDIATE UMMC

## 2018-09-10 PROCEDURE — 40000802 ZZH SITE CHECK

## 2018-09-10 PROCEDURE — A9270 NON-COVERED ITEM OR SERVICE: HCPCS | Mod: GY | Performed by: INTERNAL MEDICINE

## 2018-09-10 RX ORDER — WARFARIN SODIUM 4 MG/1
4 TABLET ORAL
Status: COMPLETED | OUTPATIENT
Start: 2018-09-10 | End: 2018-09-10

## 2018-09-10 RX ORDER — HYDROXYUREA 500 MG/1
1000 CAPSULE ORAL
Status: DISCONTINUED | OUTPATIENT
Start: 2018-09-10 | End: 2018-09-11 | Stop reason: HOSPADM

## 2018-09-10 RX ORDER — HYDROXYUREA 500 MG/1
1500 CAPSULE ORAL DAILY
Status: DISCONTINUED | OUTPATIENT
Start: 2018-09-11 | End: 2018-09-11 | Stop reason: HOSPADM

## 2018-09-10 RX ADMIN — HYDROXYUREA 1000 MG: 500 CAPSULE ORAL at 20:49

## 2018-09-10 RX ADMIN — UMECLIDINIUM BROMIDE AND VILANTEROL TRIFENATATE 1 PUFF: 62.5; 25 POWDER RESPIRATORY (INHALATION) at 08:16

## 2018-09-10 RX ADMIN — SODIUM CHLORIDE, PRESERVATIVE FREE 5 ML: 5 INJECTION INTRAVENOUS at 16:16

## 2018-09-10 RX ADMIN — LEVOTHYROXINE SODIUM 75 MCG: 75 TABLET ORAL at 08:16

## 2018-09-10 RX ADMIN — Medication 12.5 MG: at 20:51

## 2018-09-10 RX ADMIN — MULTIPLE VITAMINS W/ MINERALS TAB 1 TABLET: TAB at 08:17

## 2018-09-10 RX ADMIN — ATORVASTATIN CALCIUM 80 MG: 80 TABLET, FILM COATED ORAL at 20:51

## 2018-09-10 RX ADMIN — POTASSIUM CHLORIDE 10 MEQ: 750 TABLET, EXTENDED RELEASE ORAL at 08:16

## 2018-09-10 RX ADMIN — WARFARIN SODIUM 4 MG: 4 TABLET ORAL at 17:45

## 2018-09-10 RX ADMIN — AMIODARONE HYDROCHLORIDE 200 MG: 200 TABLET ORAL at 08:17

## 2018-09-10 RX ADMIN — Medication 2 G: at 12:06

## 2018-09-10 RX ADMIN — HYDROXYUREA 1500 MG: 500 CAPSULE ORAL at 08:16

## 2018-09-10 RX ADMIN — FUROSEMIDE 20 MG: 20 TABLET ORAL at 08:17

## 2018-09-10 RX ADMIN — PANTOPRAZOLE SODIUM 40 MG: 40 TABLET, DELAYED RELEASE ORAL at 08:17

## 2018-09-10 RX ADMIN — Medication 12.5 MG: at 08:16

## 2018-09-10 RX ADMIN — CLOPIDOGREL 75 MG: 75 TABLET, FILM COATED ORAL at 08:17

## 2018-09-10 RX ADMIN — PANTOPRAZOLE SODIUM 40 MG: 40 TABLET, DELAYED RELEASE ORAL at 16:15

## 2018-09-10 RX ADMIN — CALCIUM GLUCONATE 1 G: 98 INJECTION, SOLUTION INTRAVENOUS at 16:16

## 2018-09-10 RX ADMIN — SODIUM CHLORIDE, PRESERVATIVE FREE 5 ML: 5 INJECTION INTRAVENOUS at 06:09

## 2018-09-10 RX ADMIN — SENNOSIDES AND DOCUSATE SODIUM 1 TABLET: 8.6; 5 TABLET ORAL at 20:52

## 2018-09-10 ASSESSMENT — ACTIVITIES OF DAILY LIVING (ADL)
ADLS_ACUITY_SCORE: 12
ADLS_ACUITY_SCORE: 12
ADLS_ACUITY_SCORE: 13
ADLS_ACUITY_SCORE: 12
ADLS_ACUITY_SCORE: 13
ADLS_ACUITY_SCORE: 13

## 2018-09-10 NOTE — PLAN OF CARE
Problem: Patient Care Overview  Goal: Plan of Care/Patient Progress Review  Discharge Planner OT   Patient plan for discharge: rehab  Current status: Min A/CGA for sit <> stands pending surface height with good adherence to precautions without VCs, due to fatigue required min A for toilet transfer, ind with alban cares, SBA LB and UB dressing. Completed ~250 ft with close SBA.  Completed x 15.5 minutes on Nu-step at increased resistance LV 2, VSS throughout: HR 88-96, SpO2 on room air 93-98%, /60 (80). Due to significant fatigue and perceived SOB post Nu-step required dependent wheelchair transport to return to room.  Barriers to return to prior living situation: post-surgical precautions, decreased strength/activity tolerance, impaired ADLs, impaired mobility  Recommendations for discharge: TCU  Rationale for recommendations: Pt is currently below functional baseline for ADLs, transfers, and functional mobility, would benefit from continued therapy to address above deficits and maximize strength/independence in order to return to PLOF.         Entered by: Nicole Carmen 09/10/2018 11:44 AM

## 2018-09-10 NOTE — PLAN OF CARE
Problem: Patient Care Overview  Goal: Plan of Care/Patient Progress Review    D: Pt admitted from outside hospital with post infarction vasal VSD and cardiogenic shock following STEMI. Now s/p posterior VSD repair and TVR with bioprosthetic valve 8/10 with many post op complications.       I/A: Vital signs stable, afebrile, A&Ox4, sinus rhythm. Pigtail CT to suction, see flowsheets for output. Denied pain overnight. O2 sats stable on room air. Incisions and dressings CDI. Ionized Ca of 4.4 replaced per protocol. PICC heparin locked following. Voiding well into urinal. Slept between cares. Up with SBA. Pleasant and cooperative with cares.        P:  Will discharge to TCU when medically ready. Possible removal of CT today. Continue to monitor and notify MD with pertinent changes.

## 2018-09-10 NOTE — PROGRESS NOTES
Social Work Services Progress Note    Hospital Day: 35  Date of Initial Social Work Evaluation: 8/9/18  Collaborated with:  SNF admissions,    Data:  Pt is a 68 year old male being followed by SW for placement to TCU vs ARU per rehab recommendations.    Intervention: On chart review, notes indicate pt's chest tube will be removed today.  SW called [facility] admissions to have pt reviewed for both transitional care and acute rehab placement.  SW to follow up with admissions on determination of placement.     Addendum: Sw met with pt in the afternoon to notify of progress in discharge planning.  Pt in agreement with the plans.  Pt could be ready for discharge as early as tomorrow (tuesday).  Pt states he would prefer to update is daughter and family this afternoon.    Referrals in Progress:    [facility] - re-sent updated notes/referral  PH: [phone]  F: [phone]    [facility] - pended for [facility] review  PH: [phone]  F: [phone]    Kirstie Jackson at [facility] - pended for [facility] review  PH: [phone]  F: [phone]    Assessment:  Did not meet with pt for this encounter note.    Plan:    Anticipated Disposition:  Facility - TCU    Barriers to d/c plan:  Medical stability with removal of chest tube, bed availability    Follow Up:  SW to follow for discharge planning.    CAROL ANN Almanza, APSW  6C Unit   Phone: [phone]  Pager: [phone]  Unit: [phone]    ___________________________________________________________________________________________________________________________________________________  Referrals Discontinued:  Uziel ARBOLEDA - not a medical candidate  PH: [phone]  F: [phone]    Community Case Management/Community Services in place:   None reported.

## 2018-09-10 NOTE — PROGRESS NOTES
9/10/2018   CVTS Progress Note  Attending provider: Jason Franco, *        S: No acute issues over night.  Breathing much improved and increasing appetite. Patient denies SOB, CP, fever, chills, sweats. Patient eating well/poor. Patient ambulating in halls. + BM today (9/10), some constipation yesterday. No arrhythmias. Lower extremity swelling is unchanged, and has had persistent coldness and decreased sensation in his toes bilaterally    O:   Vitals:    09/09/18 2317 09/10/18 0251 09/10/18 0614 09/10/18 0705   BP: 94/62 (!) 88/63  97/58   BP Location: Left arm Left arm  Left arm   Pulse:       Resp: 18 16 18   Temp: 98.4  F (36.9  C)   97.5  F (36.4  C)   TempSrc: Oral   Oral   SpO2: 93% 93%  95%   Weight:   72.6 kg (160 lb)    Height:         Vitals:    09/07/18 0528 09/08/18 0500 09/10/18 0614   Weight: 72.7 kg (160 lb 4.8 oz) 70.6 kg (155 lb 9.6 oz) 72.6 kg (160 lb)       Intake/Output Summary (Last 24 hours) at 09/10/18 0848  Last data filed at 09/10/18 0800   Gross per 24 hour   Intake              460 ml   Output              610 ml   Net             -150 ml       Gen: AxOx3, NAD  CV: RRR, no murmurs, rubs, or gallops, HR 85, -JVD  Pulm: CTA, decreased lung sounds lower left lobe, no wheezing, no rhonchi  Abd: soft, NT, ND, +BS, +BM  Ext: Mild LE edema, Gross Sensation and ROM intact in toes bilaterally  Incision: c/d/i, no erythema, sternal stable  Tubes/drains: Right Apical pigtail, dressings CDI    Labs:   BMP  Recent Labs  Lab 09/10/18  0630 09/09/18  0634 09/08/18  0610 09/07/18  0653   * 131* 132* 131*   POTASSIUM 4.1 4.4 4.2 4.2   CHLORIDE 99 99 100 100   GABRIELA 7.8* 8.1* 7.6* 7.8*   CO2 24 25 25 24   BUN 13 17 16 14   CR 0.72 0.72 0.70 0.65*   GLC 81 98 74 90     CBC  Recent Labs  Lab 09/10/18  0630 09/09/18  0634 09/08/18  0610 09/07/18  0653   WBC 3.8* 4.9 4.8 5.5   RBC 2.43* 2.75* 2.52* 2.72*   HGB 7.7* 8.7* 8.1* 8.6*   HCT 24.7* 27.5* 25.6* 27.0*   * 100 102* 99   MCH 31.7  31.6 32.1 31.6   MCHC 31.2* 31.6 31.6 31.9   RDW 24.7* 24.5* 24.4* 24.4*    468* 496* 518*     INR  Recent Labs  Lab 09/10/18  0630 18  0634 18  0610 18  0653   INR 1.90* 1.21* 1.79* 2.45*      Hepatic Panel   Lab Results   Component Value Date    AST 21 2018     Lab Results   Component Value Date    ALT 53 2018     No results found for: BILICONJ   Lab Results   Component Value Date    BILITOTAL 0.4 2018     Lab Results   Component Value Date    ALBUMIN 2.2 2018     Lab Results   Component Value Date    PROTTOTAL 6.2 2018      Lab Results   Component Value Date    ALKPHOS 153 2018         Recent Labs  Lab 09/10/18  0630 18  0634 18  0610 18  0653 18  0548 18  0616   GLC 81 98 74 90 83 81         Imagin/10/18  8:54 AM [id number] [facility], [place],  Radiology    [employer] Interactive Report and InfoRx   View the interactive report   PACS Images   Show images for XR Chest 2 Views   Study Result   Exam: XR CHEST 2 VW, 9/10/2018 8:54 AM     Indication: interval; right-sided pneumo status post placement of  chest tube      Comparison: 2018   Findings:   PA and lateral views of the chest. Postsurgical changes of VSD repair.  Stable median sternotomy wires. Right PICC tip projects over the  superior cavoatrial junction. Stable right apical chest tube. The  trachea is midline. The cardiomediastinal silhouette is stable. Stable  consolidation of the right lung base. No pneumothorax. Stable small  bilateral pleural effusions. Stable perihilar opacities. Upper abdomen  is unremarkable.         Impression:   1. Stable support devices with no evidence of recurrent pneumothorax.  2. Unchanged bilateral perihilar, right greater than left patchy  airspace opacities.  3. Unchanged consolidation in the right lung base.  4. Unchanged small bilateral pleural effusions, layering on the left,  with associated atelectasis.     I have personally  reviewed the examination and initial interpretation  and I agree with the findings.     TARAN LOCKE MD     Second CXR performed at 1250 after being on water seal for 3 hours, unremarkable for pneumothorax, official read pending.   Endoscopy 9/7:   Per ENT, Due to hoarseness, fiberoptic laryngoscopy was indicated. After obtaining verbal consent, the nose was topically decongested and anesthetized. The fiberoptic laryngoscope was passed under endoscopic vision through the right nasal passage. The mucosa was dry and crusty, but otherwise clear, no purulence or masses. The nasopharynx was clear. The soft palate appeared normal with good mobility. The epiglottis was sharp, and the visualized portion of the vallecula and base of tongue were clear. Right vocal cord was fully mobile with normal epithelium. 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 There seems to be some very minor motion of the left cord. Good glottic closure during cough and phonation. The hypopharynx was clear.  ASSESSMENT/PLAN: Patient is a 68 year old male with a history of s/p HUSSEIN to RCA and LAD for STEMI with post infarction basal VSD, who on 8/10/2018 underwent repair of posterior VSD and TVR with bioprosthetic valve.  He was kept open chest due to coagulopathy and RV dysfunction. Chest closed on 8/13/2018, IABP removed 8/14/18 and he was extubated on POD #11.       Neuro:  -Acute post-op pain: IV dilaudid, tylenol and oxycodone prn  - Wean Seroquel as tolerated, decreased to 12.5 mg HS      ENT:   -ENT consult regarding hoarse voice, new following surgery  - 9/10 ENT recommended no acute intervention from 9/7 Endoscopy, F/U as outpatient in 2-4 weeks        CV:  - ASA 81 mg, atorvastatin 40 mg daily,   - Systolic Blood pressure: 80-98  - TPW capped  - ICU course complicated by a-flutter, EP consult with plan to anticoagulate and then attempt to overdrive pace with A lead.  While in the ICU, patient also went into narrow complex SVT with  MAPs in 50s. Adenosine 6 mg was administered, resulting in 20 seconds of asystole and brief CPR with ROSC.    - 8/25 patient converted from NSR to a-fib overnight. During morning therapy, patient had a-fib with RVR (rates 140s) and symptomatic. EKGs appreciable for a-fib and SVT. Metoprolol 2.5 mg IV administered with improved rate control. Discussed case with EP fellow, amio bolus and gtt started.  Patient converted to NSR afternoon on 8/25. Amiodarone 400 mg PO BID 8/26.  8/31 Start Amiodarone taper 200 mg BID x 4 doses, then 200 mg daily for 21 doses   - Cut TPW 8/27, no bradycardia on PO Amio but prolonged QTc.   - Metoprolol 12.5 mg po bid.  - (9/6) Echo ordered to r/o pericardial effusion in setting of increased SOB. No appreciable pericardial effusion. Small bilateral pleural effusions.   - 9/7 repeat CT chest appreciable for moderate R pneumothorax  - R sided pigtail placed on 9/8 for pneumothorax.      Resp:   - Extubated POD 11  - IS, encourage activity, albuterol inhaler QID  - 8/29 Pulled Left pleural tube, f/u CXR.  POC U/S by Medicine for Left pleural effusion today-findings appreciable for loculated fluid. IR consult with CT chest w/o contrast. Possible chest tube placement with IR on 8/30.   - 8/30 IR recommended thoracentesis for left pleural effusion.  Per Dr. Franco, no thoracentesis necessary at this time.    - 9/5 right pleural chest tube removed, f/u CXR, 9/7 dermabond old CT site  - 9/6 neb treatments q 4hrs, CXR for increased SOB  - 9/7 IR consult for R pigtail for increasing R apical pneumothorax  - 9/10  Right pigtail output, 190 mL 24 Hrs, Last three shifts, 130 > 20 > 40 mL, No airleak, CXR showed no pneumothorax, Water seal trail with f/u CXR unremarkable. Pigtail removed without issue, will get f/u CXR      FEN/GI:   - Reg Diet with thin liquids, speech following.   - Continuous TF, consider calorie counts once patient's diet advances   - Bowel regimen, + BM  - 8/24 EGD: two gastric  ulcers, no active bleeding or high-risk features.        Renal:   - Creatinine WNL, adequate UOP  - Volume status: hypervolemic, dry weight ~ 170 lbs, current weight 160 lbs.  - Diurese Lasix 20 mg po daily, continue today and monitor.       ID:   - Completed alban-op abx  - WBC WNL  and trending down, afebrile, no signs or symptoms of infection      Heme:   - Hgb stable, no signs or symptoms of bleeding   - Continue to monitor for signs of GI bleed, transfuse for Hgb < 7  - Hematology followin/30 Plt 1534, Hydrea 1500 mg BID, Continue to trend plts, Heme recommended Iron replacement, started .    -  Plateletpheresis for Plts >1500, IR to place double lumen non-tunneled catheter prior to procedure  -  Plts 714 s/p plateletpheresis, repeat plateletpheresis x 1  Per hematology, stopping plateletpheresis, okay to remove HD catheter.  Will follow-up with hematology as outpatient for further management.     - 9/10 - Plts 405, stable      Endo:   - BG 100s, sliding scale insulin discontinue      PPx:   - PPI      Anticoagulation:   -  start low intensity heparin gtt, discontinue   -  start warfarin, INR 2-3. INR 1.90  - continue coumadin  - Plavix 75 mg daily      Dispo:   - 6C since   - Therapy recommending d/c to TCU,  pending medical stabilization and bed availablity. Looking at likely discharge on Tuesday, 2018. Patient medically ready to discharge tomorrow.     Clay NEELYS  [facility]    Janice Ellis PA-C  Cardiothoracic Surgery   Pager [phone]  September 10, 2018

## 2018-09-10 NOTE — PROGRESS NOTES
HEMATOLOGY FOLLOW UP NOTE    Castillo Soler is a 68 year old man with JAK2+ polycythemia vera, recently switched by primary heme/onc provider from Hydrea to Anagrelide with very poor tolerance. Reason for switching apparently was that his anemia was felt to be due to Hydrea.  He is admitted s/p STEMI with ischemia-related VSD requiring surgical intervention. He is high-risk for ongoing thrombocytosis and we restarted hydrea on 8/7/18. Platelet count has continued to increase, hydrea dose increased to 1000 mg bid on 8/10. Work-up for platelet dysfunction has been negative.    Patient is doing ok overall, denies chest pain. No palpitation. Denies sob, no headache, no fever/chills.   Offers no other complains.         Physical Examination:   Temp: 97.9  F (36.6  C) Temp src: Oral BP: 90/59   Heart Rate: 84 Resp: 18 SpO2: 98 % O2 Device: None (Room air)    Constitutional: Awake and alert, not in acute distress.  Eyes: No scleral icterus. Eyes exhibit no discharge.  ENT/Mouth: Oral mucosa pink and moist  Cardiovascular: Normal rate, regular rhythm, S1, S2. No murmur or rub. No leg edema. Surgical incision in chest, healing well  Respiratory: No respiratory distress. No wheezes. Chest tube out.  Gastrointestinal: soft, no distention, no tenderness, active BS  Neurological: AAOX3, grossly non-focal  Psychiatric: Mentation and affect appear normal      Results for CASTILLO SOLER (MRN [id number]) as of 9/10/2018 12:06   Ref. Range 9/3/2018 05:41 9/4/2018 05:57 9/5/2018 06:16 9/6/2018 05:48 9/7/2018 06:53 9/8/2018 06:10 9/9/2018 06:34 9/10/2018 06:30   Platelet Count Latest Ref Range: 150 - 450 10e9/L 534 (H) 550 (H) 586 (H) 596 (H) 518 (H) 496 (H) 468 (H) 405             Recommendation:  - His platelet counts are down trending now with hydroxyurea (1500mg bid), so is his hgb and WBC, would decrease his hydroxyurea dose to 1500mg @AM, then 1000mg @PM (order placed)   - will likely need repeat bone marrow biopsy before  adjust treatment if considering swithc to Jakafi, this can be planned as outpatient.  - He will follow with Hem clinic after discharge       We will continue to follow. Please page with any questions/concerns.       Plan was discussed with attending physician Dr. Oconnell.        Balaji Rodriguez MD/MPH  Heme/Onc Fellow, PGY-4

## 2018-09-10 NOTE — DISCHARGE SUMMARY
[facility]   Cardiothoracic Surgery Hospital Discharge Summary       Castillo De Anda MRN# [id number]   YOB: 1950 Age: 68 year old     Date of Admission:  8/6/2018  Date of Discharge::  9/11/2018  Admitting Physician:  Maritza Centeno MD  Discharge Physician:  Janice Ellis PA-C ([phone])   Primary Care Physician:        Vinod Frank          Primary Diagnoses:   1. STEMI s/p HUSSEIN to distal RCA and proximal LAD  2. Ischemic ventricular septal defect s/p repair  3. S/p TVR due to the septal leaflet of the tricuspid valve being included in the ventricular septal repair requiring valve replacement  4. Anticoagulation x 3 months s/p atrial flutter/atrial fibrillation          Secondary Diagnosis:   1. Coronary artery disease  2. Thyroid cancer  3. Polycythemia vera  4. Anemia  5. Deep vein thrombosis  6. Meningioma  7. Prostate Cancer         Procedures:   PROCEDURE: 8/10/18  1) Repair of posterior ventricular septal defect with 5x15mm Dacron patch  2) Tricuspid valve replacement - 33m St. Kt Epic mitral valve     SURGEON: Jason Franco MD     COSURGEON: Dianne Plunkett MD    PROCEDURE: 8/13/18  1) Chest washout  2) Chest closure     SURGEON: Jason Franco MD     ASSISTANT: Michael Viera MD        Non-operative procedures:   PICC line placement, Interventional radiology drain placement and Interventional radiology non-tunneled dialysis catheter line placement for plateletpheresis           Consultations:   CARDIAC REHAB IP CONSULT  NUTRITION SERVICES ADULT IP CONSULT  SOCIAL WORK IP CONSULT  CORE CLINIC EVALUATION IP CONSULT  HEMATOLOGY IP CONSULT  VASCULAR ACCESS CARE ADULT IP CONSULT  NEUROLOGY GENERAL ADULT IP CONSULT  PHARMACY IP CONSULT  NUTRITION SERVICES ADULT IP CONSULT  CARDIAC REHAB IP CONSULT  PHYSICAL THERAPY ADULT IP CONSULT  OCCUPATIONAL THERAPY ADULT IP CONSULT  PHARMACY TO DOSE VANCO  NUTRITION SERVICES ADULT IP  CONSULT  PHARMACY IP CONSULT  CARDIOLOGY ELECTROPHYSIOLOGY (EP) IP CONSULT  VASCULAR ACCESS CARE ADULT IP CONSULT  VASCULAR ACCESS CARE ADULT IP CONSULT  VASCULAR ACCESS CARE ADULT IP CONSULT  VASCULAR ACCESS CARE ADULT IP CONSULT  SWALLOW EVAL SPEECH PATH AT BEDSIDE IP CONSULT  VASCULAR ACCESS CARE ADULT IP CONSULT  CARDIOLOGY ELECTROPHYSIOLOGY (EP) IP CONSULT  GI LUMINAL ADULT IP CONSULT  VASCULAR ACCESS ADULT IP CONSULT  SPIRITUAL HEALTH SERVICES IP CONSULT  SOCIAL WORK IP CONSULT  PHARMACY TO DOSE WARFARIN  INTERVENTIONAL RADIOLOGY ADULT/PEDS IP CONSULT  INTERVENTIONAL RADIOLOGY ADULT/PEDS IP CONSULT  APHERESIS TRANSFUSION ADULT/PEDS IP CONSULT  INTERVENTIONAL RADIOLOGY ADULT/PEDS IP CONSULT  INTERVENTIONAL RADIOLOGY ADULT/PEDS IP CONSULT  ENT IP CONSULT          Brief History of Illness:   Castillo De Anda is a 68 year old male with a past medical history of coronary artery disease, myeloproliferative syndrome, and TIA who presented from OSH s/p inferior STEMI with drug eluding stents to the distal RCA and proximal LAD.  At time of admission, active problems included cardiogenic shock requiring IABP and inotrope support. Also found to have an ischemic ventricular septal defect, timeline unclear, but thought to have occurred at the time of symptom onset. Mr. De Anda underwent VSD repair and a bioprosthetic tricuspid valve replacement.            Hospital Course:   Castillo De Anda is a 68 year old male was admitted on 8/6/2018. Prior to surgery he required IABP and inotrope support.  Additionally, there was concern for focal seizures prior to surgery. Neurology recommended keppra load and scheduled dosing. Head CT appreciable for encephalomalacia.   Patient underwent surgery on 8/10/2018 for the above-named procedures.  Patient tolerated the operation and was admitted to the CVICU post-operatively with an open-chest due to coagulopathy.  Continued to required IABP and pressor support to maintain hemodynamic  stability.  Patient returned to OR on 8/13/2018 for chest washout and closure.  IABP removed 8/14/2018.   Per neurology recommendations, repeat EEGs consistent with moderate encephalopathy, right frontotemporal neuronal dysfunction, evidence of a known skull defect, and an elevated risk of partial epilepsy. Final repeat EEG on 8/16 was without seizure activity. Neuro recommends follow-up as outpatient in 3 months. Keppra discontinued 8/20.   Patient was extubated on POD # 11 after aggressive diuresis.  Pressors were weaned off as able.  Repeat ECHO on 8/20 appreciable for moderate size pericardial effusion with no clear signs of tamponade.   Patient's ICU stay was also remarkable for atrial flutter and SVT.  EP consulted, treated SVT with IV adenosine resulting in approximately 20 seconds of asystole with brief CPR and ROSC.   Prior to discharge from the ICU, patient had two gram hemoglobin drop and had melanotic stools and NG output.  Low intensity heparin stopped and patient received 3 units of PRBC. EGD appreciable for two gastric ulcers with no active bleeding.  Continued IV PPI for couple days, prior to transitioning to high dose PPI PO BID x 8 weeks.      Patient was transferred to the surgical floor on POD # 15.     ENT:   Concern for vocal cord paralysis s/p VSD repair and TVR, requiring prolonged intubation. ENT consult and exam appreciable for paretic left vocal cord. No immediate interventions.  Recommend following up with ENT as outpatient for possible temporary VC injections if there is no improvement in voice.      CV:  Patient started atorvastatin 40 mg daily, and metoprolol 12.5 mg BID. Hold aspirin, on coumadin and Plavix.   Patient converted from sinus rhythm to atrial fibrillation on 8/25/18.  Amiodarone bolus and drip started with successful rhythm conversion.  Transitioned to PO Amiodarone, does have notable prolonged QTc.  TPW were cut following no notable bradycardia. Last Echo showed stable  VSD patch protruding into the LV cavitity and continues to have mobile filamentous density attached to it. Spoke with Dr. Franco and he believes it is a fold in the VSD patch. Patient on coumadin regardless, will repeat ECHO at F/u with CV surgery if necessary.       Resp:   Patient participated in aggressive pulmonary toilet. POD #19 Left pleural chest tube removed.  Follow-up CXR appreciable for left pleural effusion. U/S completed with findings of loculated fluid. No thoracentesis performed per Dr. Franco.   POD # 26 Right pleural chest tube removed. Follow-up CXR and Chest CT appreciable for small right apical pneumothorax.  IR consulted and right pigtail chest tube placed on 9/8/2018 for treatment of right pneumothorax.  Pigtail removed 9/10/18 following resolution of pneumothorax.     FEN/GI:   Following prolonged intubation, patient started on continuous tube feedings in the ICU, and eventually transition to cycled tube feeds with PO intake.  Speech consulted for risk of aspiration.  Patient's diet advanced as tolerated, discharged on regular diet and thin liquids. Tube feedings discontinued following calorie counts and adequate calorie and protein intake. + flatus, +BM, bowel regimen in place.      Renal:   Creatinine WNL with adequate UOP. Diuresed throughout hospitalization to dry weight.  Plan to discharge on  Lasix 40 mg PO daily with KCL.      ID:   Completed alban-op abx.  WBC WNL  and trending down, afebrile, no signs or symptoms of infection      Heme:   At time of discharge, patient's hemoglobin stable, no signs or symptoms of bleeding.    Hematology consulted for management of myeloproliferative syndrome and polycythemia vera, accounting for thrombocytosis. Patient started on scheduled Hydrea 1500 mg BID, in addition to plateletpheresis x 2.  Per hematology, stopping plateletpheresis with improvement in platelet counts and will follow-up with hematology as outpatient for further management. Day  prior to discharge patient's dose was decreased to Hydrea 1500 mg qAM and 1,000 mg qpm. He will follow up with Hematology at the [facility] on 9/18. Please send labs to their office.       Endo:   Blood glucose well controlled without sliding scale insulin.       PPx:   Discharged on protonix EC 40 mg BID per GI recommendation s/p GI bleed.       Anticoagulation:   Started on low intensity heparin for atrial fibrillation with bridge to warfarin. INR goal 2-3 x 3 months for a-fib/TVR tissue.  Plavix 75 mg daily for at least one year s/p HUSSEIN to dRCA and pLAD. No aspirin due to coumadin and Plavix to avoid triple therapy.     Post-operative pain control included PO oxycodone and tylenol  and will be discharged on just Tylenol as patient no longer requiring narcotics.     Prior to discharge, patient's pain was controlled well, he was able to perform most ADLs and ambulate without difficulty, and had full return of bowel and bladder function.  On September 11, 2018, he was Discharged to rehabilitation facility in stable condition.      Day of Discharge Exam   Vitals:  Temp:  [97.7  F (36.5  C)-98.7  F (37.1  C)] 97.8  F (36.6  C)  Pulse:  [89] 89  Heart Rate:  [87-96] 87  Resp:  [18] 18  BP: ()/(61-80) 114/80  SpO2:  [93 %-100 %] 100 %  Wt: 160 lbs 0 oz, 72 kg      MAPs: 60's  Physical Exam:   Gen:  NAD, conversational  CV:  S1S2 normal, no murmurs, rubs, or gallops  Pulm:  CTA, no rhonchi or wheezes  Abd:  Soft, nondistended, NTTP  Ext: 1+ lower extremity edema  Incision:  Clean, dry, intact, no erythema      Labs:   BMP    Recent Labs  Lab 09/11/18  0526 09/10/18  0630 09/09/18  0634 09/08/18  0610   * 131* 131* 132*   POTASSIUM 4.1 4.1 4.4 4.2   CHLORIDE 99 99 99 100   CO2 26 24 25 25   BUN 13 13 17 16   CR 0.68 0.72 0.72 0.70   GLC 77 81 98 74   MAG 2.0 1.9 2.1 1.9     CBC    Recent Labs  Lab 09/11/18  0526 09/10/18  0630 09/09/18  0634 09/08/18  0610   WBC 3.7* 3.8* 4.9 4.8   RBC 2.44* 2.43* 2.75* 2.52*    HGB 7.8* 7.7* 8.7* 8.1*   HCT 24.4* 24.7* 27.5* 25.6*    102* 100 102*   MCH 32.0 31.7 31.6 32.1   MCHC 32.0 31.2* 31.6 31.6   RDW 24.7* 24.7* 24.5* 24.4*    405 468* 496*     INR    Recent Labs  Lab 09/11/18  0526 09/10/18  0630 09/09/18  0634 09/08/18  0610   INR 2.08* 1.90* 1.21* 1.79*      Hepatic Panel     Recent Labs  Lab 09/05/18  0616   AST 21   ALT 53   ALKPHOS 153*   BILITOTAL 0.4   ALBUMIN 2.2*            Imaging Studies:   CXR   9/10/18  3:33 PM [id number] [facility], [place],  Radiology    [employer] Interactive Report and InfoRx   View the interactive report   PACS Images   Show images for XR Chest Port 1 View   Study Result   Exam: XR CHEST PORT 1 VW, 9/10/2018 3:33 PM     Indication: CT removed;      Comparison: Same-day radiograph     Findings:   AP view of the chest. Postsurgical changes of VSD repair. Interval  removal of right apically directed chest tube. Stable position of  right PICC tip.The trachea is midline. The cardiomediastinal  silhouette is stable. Stable opacity of the right lung base, seen as  blood products in the major fissure on comparison CT. No pneumothorax.  Stable small bilateral pleural effusions. Unchanged perihilar  opacities.         Impression:   1. Removal of apically directed right chest tube without recurrent  pneumothorax.  2. Stable opacity of the right lung base, seen is blood products in  the major fissure on comparison CT.  3. Stable appearance of bilateral pleural effusions.     I have personally reviewed the examination and initial interpretation  and I agree with the findings.     TARAN LOCKE MD         ECHO 9/7/2018   Compared to study done 8/27/18 the pericardial effusion is decreased in size  and now only trivial.  VSD patch protruding into the LV cavitity and continues to have mobile  filamentous density attached to it.  Limited Color Doppler without signs of shunting.  IVC diameter at upper normal measuring 2.2 cm and without compression.          Final Pathology/Culture Result:   Heart, interventricular septum, repair:   Extensive myocardial necrosis, consistent with recent (3-7 days)   myocardial infarction       Drains/tubes Present at Discharge   None         Medications Prior to Admission:     No prescriptions prior to admission.             Discharge Medications:        Review of your medicines      START taking       Dose / Directions    acetaminophen 325 MG tablet   Commonly known as:  TYLENOL   Used for:  S/P VSD repair        Dose:  650 mg   2 tablets (650 mg) by Oral or Feeding Tube route every 4 hours as needed for other (multimodal surgical pain management along with NSAIDS and opioid medication as indicated based on pain control and physical function.)   Quantity:  100 tablet   Refills:  0       amiodarone 200 MG tablet   Commonly known as:  PACERONE/CODARONE   Used for:  Paroxysmal atrial fibrillation (H)        Dose:  200 mg   Start taking on:  9/22/2018   Take 1 tablet (200 mg) by mouth daily   Quantity:  60 tablet   Refills:  0       atorvastatin 80 MG tablet   Commonly known as:  LIPITOR   Used for:  S/P VSD repair        Dose:  80 mg   1 tablet (80 mg) by Oral or Feeding Tube route every evening   Quantity:  30 tablet   Refills:  0       Carboxymethylcellulose Sod PF 0.5 % Soln ophthalmic solution   Commonly known as:  REFRESH PLUS   Used for:  S/P VSD repair        Dose:  1 drop   Place 1 drop into both eyes 3 times daily as needed for dry eyes   Quantity:  1 Bottle   Refills:  0       clopidogrel 75 MG tablet   Commonly known as:  PLAVIX   Used for:  S/P VSD repair        Dose:  75 mg   Start taking on:  9/12/2018   Take 1 tablet (75 mg) by mouth daily   Quantity:  30 tablet   Refills:  0       furosemide 40 MG tablet   Commonly known as:  LASIX   Used for:  S/P VSD repair        Dose:  40 mg   Start taking on:  9/12/2018   Take 1 tablet (40 mg) by mouth daily   Quantity:  30 tablet   Refills:  0       * hydroxyurea 500 MG capsule CHEMO    Commonly known as:  HYDREA   Indication:  Essential Thrombocythemia   Used for:  Polycythemia vera (H)        Dose:  1000 mg   Take 2 capsules (1,000 mg) by mouth every evening   Refills:  0       * hydroxyurea 500 MG capsule CHEMO   Commonly known as:  HYDREA   Indication:  Essential Thrombocythemia   Used for:  S/P VSD repair        Dose:  1500 mg   Start taking on:  9/12/2018   Take 3 capsules (1,500 mg) by mouth daily   Refills:  0       levothyroxine 75 MCG tablet   Commonly known as:  SYNTHROID/LEVOTHROID   Used for:  S/P VSD repair        Dose:  75 mcg   Start taking on:  9/12/2018   1 tablet (75 mcg) by Oral or Feeding Tube route daily   Quantity:  30 tablet   Refills:  0       melatonin 1 MG Tabs tablet   Used for:  S/P VSD repair        Dose:  1 mg   Take 1 tablet (1 mg) by mouth nightly as needed for sleep   Refills:  0       methocarbamol 500 MG tablet   Commonly known as:  ROBAXIN   Used for:  S/P VSD repair        Dose:  500 mg   1 tablet (500 mg) by Oral or Feeding Tube route 4 times daily as needed for muscle spasms   Quantity:  120 tablet   Refills:  0       metoprolol tartrate 25 MG tablet   Commonly known as:  LOPRESSOR   Used for:  S/P VSD repair        Dose:  12.5 mg   Take 0.5 tablets (12.5 mg) by mouth 2 times daily   Quantity:  60 tablet   Refills:  0       multivitamin, therapeutic with minerals Tabs tablet   Used for:  S/P VSD repair        Dose:  1 tablet   Start taking on:  9/12/2018   Take 1 tablet by mouth daily   Quantity:  30 each   Refills:  0       pantoprazole 40 MG EC tablet   Commonly known as:  PROTONIX   Used for:  S/P VSD repair        Dose:  40 mg   Take 1 tablet (40 mg) by mouth 2 times daily (before meals)   Quantity:  30 tablet   Refills:  0       potassium chloride SA 20 MEQ CR tablet   Commonly known as:  K-DUR/KLOR-CON M   Used for:  S/P VSD repair        Dose:  20 mEq   Start taking on:  9/12/2018   Take 1 tablet (20 mEq) by mouth daily   Quantity:  90 tablet    Refills:  0       senna-docusate 8.6-50 MG per tablet   Commonly known as:  SENOKOT-S;PERICOLACE   Used for:  S/P VSD repair        Dose:  1 tablet   1 tablet by Oral or Feeding Tube route 2 times daily   Quantity:  100 tablet   Refills:  0       umeclidinium-vilanterol 62.5-25 MCG/INH oral inhaler   Commonly known as:  ANORO ELLIPTA   Used for:  S/P VSD repair        Dose:  1 puff   Start taking on:  9/12/2018   Inhale 1 puff into the lungs daily   Refills:  0       warfarin 5 MG tablet   Commonly known as:  COUMADIN   Used for:  S/P VSD repair        Dose:  5 mg   Take 1 tablet (5 mg) by mouth daily   Quantity:  30 tablet   Refills:  0       * Notice:  This list has 2 medication(s) that are the same as other medications prescribed for you. Read the directions carefully, and ask your doctor or other care provider to review them with you.         Where to get your medicines      Some of these will need a paper prescription and others can be bought over the counter. Ask your nurse if you have questions.     You don't need a prescription for these medications      acetaminophen 325 MG tablet     amiodarone 200 MG tablet     atorvastatin 80 MG tablet     Carboxymethylcellulose Sod PF 0.5 % Soln ophthalmic solution     clopidogrel 75 MG tablet     furosemide 40 MG tablet     hydroxyurea 500 MG capsule CHEMO     hydroxyurea 500 MG capsule CHEMO     levothyroxine 75 MCG tablet     melatonin 1 MG Tabs tablet     methocarbamol 500 MG tablet     metoprolol tartrate 25 MG tablet     multivitamin, therapeutic with minerals Tabs tablet     pantoprazole 40 MG EC tablet     potassium chloride SA 20 MEQ CR tablet     senna-docusate 8.6-50 MG per tablet     umeclidinium-vilanterol 62.5-25 MCG/INH oral inhaler     warfarin 5 MG tablet                  Discharge Instructions and Follow-Up:   Avoid lifting anything greater than ten pounds for 6 weeks after surgery and then less than 20 pounds for an additional 6 weeks.    No driving  for 4 weeks after surgery or while on pain medication. If you had a minimally invasive procedure, you may drive after three weeks if not taking pain medication.      Avoid strenuous activities such as bowling, vacuuming, raking, shoveling, golf or tennis for 12 weeks after your surgery. Splint chest incision by hugging a pillow or bringing arms across chest when coughing or sneezing. Avoid pushing off with arms when getting up for the first month if sternum was opened.    Shower or wash incisions daily with soap and water (or as instructed), pat dry. Watch for signs of infection: increased redness, tenderness, warmth or any drainage that appears infected (pus like) or is persistent.  Also a temperature > 100.5 F or chills. Call surgeon or primary care provider's office immediately. Remove any skin glue left on incisions after 10 days. This will not affect the incision and can speed up healing.    Avoid sitting for prolonged periods of time, try to walk every hour during the day. If patient has a leg incision, patient should elevate leg often when not walking.    Check weight when arriving at home from the hospital and continue to check it daily through recovery for at least a month. Patient instructed to call with any weight gain more than 3 pounds overnight or 5 pounds in a week.     We recommend using stool softeners while using narcotics for pain.      DENTAL VISITS AFTER SURGERY  If a heart valve was repaired or replaced, we do not recommend having any dental work done for 6 months and we recommend taking an antibiotic prior to dental visits from now on.  Patient is to notify their dentist before any procedure for the proper treatment needed. The antibiotic is taken by mouth one hour prior to visit. This includes routine cleanings.    POST-OPERATIVE CLINIC VISITS  Patient has a follow up visit with [facility] Surgery Advance Care Practitioners on 9/18/18 at 1:30pm at the [facility].  They will  then return to the care of their primary physician and cardiologist. Instructed to see PCP within 3-5 days of discharge and cardiologist at 1 month post surgery.         Home Health Care:   N/A- discharged to rehab facility           Discharge Disposition:   Discharged to rehabilitation facility      Condition at discharge: Stable    Janice Ellis PA-C  Cardiothoracic Surgery  Pager [phone]  September 11, 2018     CC:Vinod Blanco

## 2018-09-10 NOTE — PLAN OF CARE
"Problem: Patient Care Overview  Goal: Plan of Care/Patient Progress Review  PT - per plan established by the Physical Therapist, according to functional mobility the  discharge recommendation is ARU vs TCU for cont skilled PT. Pt needing SBA for all bed mob, but SBA to min A for sit to stand from 19\" seated surfaces. Pt amb up to 250'x 2 needing one standing rest. Pt not needing A.D for amb but pt did have one slight LOB not needing assist to maintain balance. Pt demo up and donw 3 steps x 1 with one rail needing short seated rest before and after.  Discharge Planner PT   Patient plan for discharge: rehab.   Current status: see above.   Barriers to return to prior living situation: weakness, fatigue  Recommendations for discharge: ARU vs TCU   Rationale for recommendations: cont skilled PT to progress functional mobility.        Entered by: Salomón Flores 09/10/2018 2:12 PM         "

## 2018-09-11 ENCOUNTER — APPOINTMENT (OUTPATIENT)
Dept: OCCUPATIONAL THERAPY | Facility: CLINIC | Age: 68
DRG: 219 | End: 2018-09-11
Attending: INTERNAL MEDICINE
Payer: MEDICARE

## 2018-09-11 ENCOUNTER — APPOINTMENT (OUTPATIENT)
Dept: GENERAL RADIOLOGY | Facility: CLINIC | Age: 68
DRG: 219 | End: 2018-09-11
Attending: PHYSICIAN ASSISTANT
Payer: MEDICARE

## 2018-09-11 VITALS
RESPIRATION RATE: 18 BRPM | TEMPERATURE: 97.8 F | OXYGEN SATURATION: 100 % | HEIGHT: 69 IN | DIASTOLIC BLOOD PRESSURE: 80 MMHG | SYSTOLIC BLOOD PRESSURE: 114 MMHG | BODY MASS INDEX: 23.7 KG/M2 | HEART RATE: 89 BPM | WEIGHT: 160 LBS

## 2018-09-11 LAB
ANION GAP SERPL CALCULATED.3IONS-SCNC: 6 MMOL/L (ref 3–14)
BUN SERPL-MCNC: 13 MG/DL (ref 7–30)
CA-I BLD-MCNC: 4.3 MG/DL (ref 4.4–5.2)
CALCIUM SERPL-MCNC: 7.6 MG/DL (ref 8.5–10.1)
CHLORIDE SERPL-SCNC: 99 MMOL/L (ref 94–109)
CO2 SERPL-SCNC: 26 MMOL/L (ref 20–32)
CREAT SERPL-MCNC: 0.68 MG/DL (ref 0.66–1.25)
ERYTHROCYTE [DISTWIDTH] IN BLOOD BY AUTOMATED COUNT: 24.7 % (ref 10–15)
GFR SERPL CREATININE-BSD FRML MDRD: >90 ML/MIN/1.7M2
GLUCOSE SERPL-MCNC: 77 MG/DL (ref 70–99)
HCT VFR BLD AUTO: 24.4 % (ref 40–53)
HGB BLD-MCNC: 7.8 G/DL (ref 13.3–17.7)
INR PPP: 2.08 (ref 0.86–1.14)
MAGNESIUM SERPL-MCNC: 2 MG/DL (ref 1.6–2.3)
MCH RBC QN AUTO: 32 PG (ref 26.5–33)
MCHC RBC AUTO-ENTMCNC: 32 G/DL (ref 31.5–36.5)
MCV RBC AUTO: 100 FL (ref 78–100)
PLATELET # BLD AUTO: 345 10E9/L (ref 150–450)
POTASSIUM SERPL-SCNC: 4.1 MMOL/L (ref 3.4–5.3)
RBC # BLD AUTO: 2.44 10E12/L (ref 4.4–5.9)
SODIUM SERPL-SCNC: 131 MMOL/L (ref 133–144)
WBC # BLD AUTO: 3.7 10E9/L (ref 4–11)

## 2018-09-11 PROCEDURE — 80048 BASIC METABOLIC PNL TOTAL CA: CPT | Performed by: INTERNAL MEDICINE

## 2018-09-11 PROCEDURE — 85610 PROTHROMBIN TIME: CPT | Performed by: INTERNAL MEDICINE

## 2018-09-11 PROCEDURE — A9270 NON-COVERED ITEM OR SERVICE: HCPCS | Mod: GY | Performed by: STUDENT IN AN ORGANIZED HEALTH CARE EDUCATION/TRAINING PROGRAM

## 2018-09-11 PROCEDURE — 25000128 H RX IP 250 OP 636: Performed by: SURGERY

## 2018-09-11 PROCEDURE — 71046 X-RAY EXAM CHEST 2 VIEWS: CPT

## 2018-09-11 PROCEDURE — G0463 HOSPITAL OUTPT CLINIC VISIT: HCPCS

## 2018-09-11 PROCEDURE — A9270 NON-COVERED ITEM OR SERVICE: HCPCS | Mod: GY | Performed by: THORACIC SURGERY (CARDIOTHORACIC VASCULAR SURGERY)

## 2018-09-11 PROCEDURE — 25000132 ZZH RX MED GY IP 250 OP 250 PS 637: Mod: GY | Performed by: STUDENT IN AN ORGANIZED HEALTH CARE EDUCATION/TRAINING PROGRAM

## 2018-09-11 PROCEDURE — 82330 ASSAY OF CALCIUM: CPT | Performed by: THORACIC SURGERY (CARDIOTHORACIC VASCULAR SURGERY)

## 2018-09-11 PROCEDURE — 25000132 ZZH RX MED GY IP 250 OP 250 PS 637: Mod: GY | Performed by: THORACIC SURGERY (CARDIOTHORACIC VASCULAR SURGERY)

## 2018-09-11 PROCEDURE — 25000128 H RX IP 250 OP 636: Performed by: STUDENT IN AN ORGANIZED HEALTH CARE EDUCATION/TRAINING PROGRAM

## 2018-09-11 PROCEDURE — 40000802 ZZH SITE CHECK

## 2018-09-11 PROCEDURE — 25000125 ZZHC RX 250: Performed by: THORACIC SURGERY (CARDIOTHORACIC VASCULAR SURGERY)

## 2018-09-11 PROCEDURE — 85027 COMPLETE CBC AUTOMATED: CPT | Performed by: INTERNAL MEDICINE

## 2018-09-11 PROCEDURE — 25000132 ZZH RX MED GY IP 250 OP 250 PS 637: Mod: GY | Performed by: PHYSICIAN ASSISTANT

## 2018-09-11 PROCEDURE — A9270 NON-COVERED ITEM OR SERVICE: HCPCS | Mod: GY | Performed by: PHYSICIAN ASSISTANT

## 2018-09-11 PROCEDURE — 83735 ASSAY OF MAGNESIUM: CPT | Performed by: INTERNAL MEDICINE

## 2018-09-11 PROCEDURE — 40000901 ZZH STATISTIC WOC PT EDUCATION, 0-15 MIN

## 2018-09-11 PROCEDURE — 97530 THERAPEUTIC ACTIVITIES: CPT | Mod: GO

## 2018-09-11 PROCEDURE — 97110 THERAPEUTIC EXERCISES: CPT | Mod: GO

## 2018-09-11 PROCEDURE — 36592 COLLECT BLOOD FROM PICC: CPT | Performed by: INTERNAL MEDICINE

## 2018-09-11 PROCEDURE — 40000133 ZZH STATISTIC OT WARD VISIT

## 2018-09-11 RX ORDER — FUROSEMIDE 40 MG
40 TABLET ORAL DAILY
Status: DISCONTINUED | OUTPATIENT
Start: 2018-09-12 | End: 2018-09-11 | Stop reason: HOSPADM

## 2018-09-11 RX ORDER — HYDROXYUREA 500 MG/1
1000 CAPSULE ORAL EVERY EVENING
DISCHARGE
Start: 2018-09-11 | End: 2018-09-12

## 2018-09-11 RX ORDER — AMIODARONE HYDROCHLORIDE 200 MG/1
200 TABLET ORAL DAILY
Qty: 60 TABLET | DISCHARGE
Start: 2018-09-22 | End: 2018-09-12

## 2018-09-11 RX ORDER — FUROSEMIDE 40 MG
40 TABLET ORAL DAILY
Qty: 30 TABLET | DISCHARGE
Start: 2018-09-12 | End: 2018-09-12

## 2018-09-11 RX ORDER — CARBOXYMETHYLCELLULOSE SODIUM 5 MG/ML
1 SOLUTION/ DROPS OPHTHALMIC 3 TIMES DAILY PRN
Qty: 1 BOTTLE | DISCHARGE
Start: 2018-09-11 | End: 2018-09-12

## 2018-09-11 RX ORDER — AMOXICILLIN 250 MG
1 CAPSULE ORAL 2 TIMES DAILY
Qty: 100 TABLET | DISCHARGE
Start: 2018-09-11 | End: 2018-09-12

## 2018-09-11 RX ORDER — PANTOPRAZOLE SODIUM 40 MG/1
40 TABLET, DELAYED RELEASE ORAL
Qty: 30 TABLET | DISCHARGE
Start: 2018-09-11 | End: 2018-09-12

## 2018-09-11 RX ORDER — METOPROLOL TARTRATE 25 MG/1
12.5 TABLET, FILM COATED ORAL 2 TIMES DAILY
Qty: 60 TABLET | DISCHARGE
Start: 2018-09-11 | End: 2018-09-12

## 2018-09-11 RX ORDER — ACETAMINOPHEN 325 MG/1
650 TABLET ORAL EVERY 4 HOURS PRN
Qty: 100 TABLET | DISCHARGE
Start: 2018-09-11 | End: 2018-09-12

## 2018-09-11 RX ORDER — FUROSEMIDE 20 MG
20 TABLET ORAL ONCE
Status: COMPLETED | OUTPATIENT
Start: 2018-09-11 | End: 2018-09-11

## 2018-09-11 RX ORDER — POTASSIUM CHLORIDE 750 MG/1
20 TABLET, EXTENDED RELEASE ORAL DAILY
Status: DISCONTINUED | OUTPATIENT
Start: 2018-09-12 | End: 2018-09-11 | Stop reason: HOSPADM

## 2018-09-11 RX ORDER — WARFARIN SODIUM 5 MG/1
5 TABLET ORAL
Status: COMPLETED | OUTPATIENT
Start: 2018-09-11 | End: 2018-09-11

## 2018-09-11 RX ORDER — WARFARIN SODIUM 5 MG/1
5 TABLET ORAL DAILY
Qty: 30 TABLET | DISCHARGE
Start: 2018-09-11 | End: 2018-09-12

## 2018-09-11 RX ORDER — MULTIPLE VITAMINS W/ MINERALS TAB 9MG-400MCG
1 TAB ORAL DAILY
Qty: 30 EACH | Refills: 0 | DISCHARGE
Start: 2018-09-12 | End: 2018-09-12

## 2018-09-11 RX ORDER — POTASSIUM CHLORIDE 1500 MG/1
20 TABLET, EXTENDED RELEASE ORAL DAILY
Qty: 90 TABLET | DISCHARGE
Start: 2018-09-12 | End: 2018-09-12

## 2018-09-11 RX ORDER — ATORVASTATIN CALCIUM 80 MG/1
80 TABLET, FILM COATED ORAL EVERY EVENING
Qty: 30 TABLET | DISCHARGE
Start: 2018-09-11 | End: 2018-09-12

## 2018-09-11 RX ORDER — FUROSEMIDE 40 MG
40 TABLET ORAL
Status: DISCONTINUED | OUTPATIENT
Start: 2018-09-11 | End: 2018-09-11

## 2018-09-11 RX ORDER — METHOCARBAMOL 500 MG/1
500 TABLET, FILM COATED ORAL 4 TIMES DAILY PRN
Qty: 120 TABLET | DISCHARGE
Start: 2018-09-11 | End: 2018-09-12

## 2018-09-11 RX ORDER — HYDROXYUREA 500 MG/1
1500 CAPSULE ORAL DAILY
DISCHARGE
Start: 2018-09-12 | End: 2018-09-12

## 2018-09-11 RX ORDER — LEVOTHYROXINE SODIUM 75 UG/1
75 TABLET ORAL DAILY
Qty: 30 TABLET | DISCHARGE
Start: 2018-09-12 | End: 2018-09-12

## 2018-09-11 RX ORDER — CLOPIDOGREL BISULFATE 75 MG/1
75 TABLET ORAL DAILY
Qty: 30 TABLET | DISCHARGE
Start: 2018-09-12 | End: 2018-09-12

## 2018-09-11 RX ADMIN — Medication 12.5 MG: at 09:10

## 2018-09-11 RX ADMIN — FUROSEMIDE 20 MG: 20 TABLET ORAL at 09:15

## 2018-09-11 RX ADMIN — LEVOTHYROXINE SODIUM 75 MCG: 75 TABLET ORAL at 09:10

## 2018-09-11 RX ADMIN — PANTOPRAZOLE SODIUM 40 MG: 40 TABLET, DELAYED RELEASE ORAL at 15:36

## 2018-09-11 RX ADMIN — POTASSIUM CHLORIDE 10 MEQ: 750 TABLET, EXTENDED RELEASE ORAL at 09:10

## 2018-09-11 RX ADMIN — CLOPIDOGREL 75 MG: 75 TABLET, FILM COATED ORAL at 09:10

## 2018-09-11 RX ADMIN — AMIODARONE HYDROCHLORIDE 200 MG: 200 TABLET ORAL at 09:11

## 2018-09-11 RX ADMIN — CALCIUM GLUCONATE 1 G: 98 INJECTION, SOLUTION INTRAVENOUS at 09:32

## 2018-09-11 RX ADMIN — PANTOPRAZOLE SODIUM 40 MG: 40 TABLET, DELAYED RELEASE ORAL at 09:10

## 2018-09-11 RX ADMIN — MULTIPLE VITAMINS W/ MINERALS TAB 1 TABLET: TAB at 09:10

## 2018-09-11 RX ADMIN — Medication 2 G: at 10:44

## 2018-09-11 RX ADMIN — WARFARIN SODIUM 5 MG: 5 TABLET ORAL at 15:36

## 2018-09-11 RX ADMIN — SODIUM CHLORIDE, PRESERVATIVE FREE 5 ML: 5 INJECTION INTRAVENOUS at 05:23

## 2018-09-11 RX ADMIN — UMECLIDINIUM BROMIDE AND VILANTEROL TRIFENATATE 1 PUFF: 62.5; 25 POWDER RESPIRATORY (INHALATION) at 09:10

## 2018-09-11 RX ADMIN — HYDROXYUREA 1500 MG: 500 CAPSULE ORAL at 09:10

## 2018-09-11 ASSESSMENT — ACTIVITIES OF DAILY LIVING (ADL)
ADLS_ACUITY_SCORE: 13
ADLS_ACUITY_SCORE: 12
ADLS_ACUITY_SCORE: 12
ADLS_ACUITY_SCORE: 13
ADLS_ACUITY_SCORE: 12

## 2018-09-11 NOTE — PLAN OF CARE
Problem: Patient Care Overview  Goal: Plan of Care/Patient Progress Review  Outcome: Improving  D/A/I:  Patient post-TVR and VSD repair, A&O x4.  Denied pain, palpitations, dizziness, and nausea.  Reported LOWRY, pleural rub auscultated in right lung base.  No abnormal heart sounds noted.  Reported loose stools, bowel medications held.  In sinus rhythm with HR 80s, SBP 90s, SaO2 95-98% on room air.  Magnesium and calcium replaced per protocol.  Pigtail chest tube removed during shift, post-removal chest X-ray performed, see Chart Review for results.  Ambulated in room with standby assist, was steady on his feet.    P:  Continue to monitor pain, VS, heart rhythm, chest tube site integrity, bowel status, cardiac and respiratory status.  Notify care team of changes in patient condition or other concerns.  Expected to have chest X-ray in AM.  Potential discharge to TCU in 1-2 days.

## 2018-09-11 NOTE — DISCHARGE INSTRUCTIONS
[facility]      AFTER YOU GO HOME FROM YOUR HEART SURGERY    You had a sternotomy, avoid lifting anything greater than ten pounds for 6 weeks after surgery and then less than 20 pounds for an additional 6 weeks.    No driving for 4 weeks after surgery or while on pain medication.    Avoid strenuous activities such as bowling, vacuuming, raking, shoveling, golf or tennis for 12 weeks after your surgery. It is okay to resume sex if you feel comfortable in doing so. You may have to try different positions with your partner.     Splint your chest incision by hugging a pillow or bringing your arms across your chest when coughing or sneezing. Avoid pushing off with your arms when getting up for the first month if you have had your sternum opened.    Shower or wash your incisions daily with soap and water (or as instructed), pat dry. Keep wound clean and dry, showers are okay after discharge, but don't let spray hit directly on incision. No baths or swimming for 1 month. Cover chest tube sites with gauze until they stop draining, then leave open to air. It is not abnormal for chest tube sites to drain yellowish/clear fluid for up to 2-3 weeks after surgery.   Watch for signs of infection: increased redness, tenderness, warmth or any drainage that appears infected (pus like) or is persistent.  Also a temperature > 100.5 F or chills. Call your surgeon or primary care provider's office immediately. Remove any skin glue left on incisions after 10-14 days. This will not affect your incision and can speed up healing.    Exercise is very important in your recovery. Please follow the guidelines set up for you in your cardiac rehab classes at the hospital. If outpatient cardiac rehab was ordered for you, we highly recommend you participate. If you have problems arranging your cardiac rehab, please call [phone].     Avoid sitting for prolonged periods of time, try to walk every hour during the day.  "If you have a leg incision, elevate your leg often when you are not walking.    Check your weight when you get home from the hospital and continue to check it daily through your recovery for at least a month. If you notice a weight gain of 2-3 pounds in a week, notify your primary care physician, cardiologist or surgeon.    Bowel activity may be slow after surgery. If necessary, you may take an over the counter laxative such as Milk of Magnesia or Miralax. You may have stool softeners prescribed (docusate sodium, Senokot). We recommend using stool softeners while using narcotics for pain (oxycodone/percocet, hydrocodone/vicodin, hydromorphone/dilaudid).      Wean OFF of narcotics (oxycodone, dilaudid, hydrocodone) as soon as possible. You should continue taking acetaminophen as long as you have any surgical pain as the first choice for pain control and add narcotics as necessary for pain to be tolerable.      DENTAL VISITS AFTER SURGERY  You have had your heart valve repaired or replaced, we do not recommend having any dental work done for 6 months and you will need to take an antibiotic prior to dental visits from now on.  Please notify your dentist before any procedure for the proper treatment needed. The antibiotic is taken by mouth one hour prior to visit. This includes routine cleanings.  You can sometimes hear a mechanical valve \"clicking,\" this is normal and not a sign of something wrong.    DO NOT SMOKE.  IF YOU NEED HELP QUITTING, PLEASE TALK WITH YOUR CARDIOLOGIST OR PRIMARY DOCTOR.    You are on a blood thinner, follow the instructions you were given in the hospital and DO NOT SKIP this medication. Try and take it the same time everyday. Your primary care physician or coumadin clinic will manage the dosing. INR goal is 2-3.      REGARDING PRESCRIPTION REFILLS.  If you need a refill on your pain medication contact us to discuss your pain and a possible one time refill.   All other medications will be " adjusted, discontinued and re-filled by your primary care physician and/or your cardiologist as they were prior to your surgery. We have given you enough for one to three month with possibly one refill.    POST-OPERATIVE CLINIC VISITS  You have a follow up visit with CVTS Surgery Advance Care Practitioners on 9/18/18 at 1:30 pm at the [facility].  You will then return to the care of your primary physician and your cardiologist.   It is important to see your cardiologist about 4-6 weeks after discharge and your primary care physician in 2-4 weeks after discharge.   If there is a need to return to see CT Surgery please call our  at [phone].    SURGICAL QUESTIONS  Please call Stacy Valero or Maria M Saldivar with surgical recovery and medication questions, the phone number is listed below.  They can assist you with your needs and contact other surgery care team members as indicated.    On weekends or after hours, please call [phone] and ask the  to   page the Cardiothoracic Surgery fellow on call.      Thank you,    Your Cardiothoracic Surgery Team  Stacy Valero RN Care Coordinator-  [phone]   Maria M Saldivar RN Care Coordinator-  [phone]  Janice YI           Patient will need outpatient INR and Warfarin monitoring arranged prior to discharge from rehab facility: for Atrial Fibrillation, goal=2-3.

## 2018-09-11 NOTE — PROGRESS NOTES
DISCHARGE   Discharged to: [facility]  Via: [email or web address] [place]  Accompanied by: Daughter (she will drive separately in her car)  Discharge Instructions: principal diagnosis, major findings/procedures, diet, activity, medications, follow up appointments, when to call the MD, and what to watchout for (i.e. s/s of infection, increasing SOB, palpitations, Angina). Pt states understanding of all discharge instructions.   Prescriptions: To be filled at TCU pharmacy; med orders faxed to their facility.  Follow Up Appointments: with PCP in 1-2 weeks, with CVTS and Hematology 9/18, with cardiology in 4-6 weeks.  Belongings: All sent with pt. Pt verifies that they are taking all belongings with them.   IV: discontinued.   Telemetry: discontinued.   Pt exhibits understanding of above discharge instructions; all questions answered.  Discharge Paperwork: faxed to discharge planner.

## 2018-09-11 NOTE — PLAN OF CARE
Problem: Patient Care Overview  Goal: Plan of Care/Patient Progress Review  Outcome: Adequate for Discharge Date Met: 09/11/18  D/A/I:  Patient A&O x4, denied pain, palpitations, dizziness, and nausea.  Had LOWRY, lung sounds diminished in left lung base, diminished with slight pleural rub/fine crackles auscultated in right lung base.  No abnormal heart sounds noted.  Had 1-2+ edema in legs and feet, compression stockings placed.  Was also given one-time dose of furosemide.  Patient had good urine output, see I&O flowsheet.  Lung sounds improved on reassessment, see PCS.  Reported loose/soft stools, bowel medications held this morning.  Midsternal incision clean and dry, no drainage noted.  Small amount of serosanguinous drainage noted during chest tube dressing change.  Coccyx wound blanchable and moist, dressing changed.  Was instructed to position off wound to promote wound healing.  In sinus rhythm with HR 80s-90s, SBP 100s-110s, SaO2 % on room air.  Magnesium and calcium replaced per protocol.  Chest X-ray performed during shift, see Chart Review for results.  Ambulated in room with standby assist, was steady on his feet.  Daughter visited during shift.  Patient verbalized readiness for discharge, is aware that transport is expected at 1600.    P:  Prepare for discharge.  Ensure patient understands follow-up cares and appointments.  Ensure patient understands and recognizes signs/symptoms that indicate a need for further medical consultation.  [facility]

## 2018-09-11 NOTE — PLAN OF CARE
Problem: Patient Care Overview  Goal: Plan of Care/Patient Progress Review  Discharge Planner OT    6A - OT/CR  Patient plan for discharge: TCU  Current status: PT supine <> EOB and STS CGA. Pt has good follow through with sternal precautions throughout entire session. Pt ambulated ~150' x2 CGA using IV pole for support. Pt required 3 standing rest breaks. Pt completed ~10 min on nustep, workload 3, averaging 53 steps per minute. Pt reported being at a 5 on the omni scale.   Barriers to return to prior living situation: medical status, sternal precautions, decreased, activity tolerance for ADL/IADL IND  Recommendations for discharge: TCU  Rationale for recommendations: Pt tolerating therapy well and is motivated to work. Pt to benefit from skilled OT intervention to progress activity tolerance and maximize ADL/IADL IND and safety.        Entered by: Sonja Alatorre 09/11/2018 11:32 AM

## 2018-09-11 NOTE — PROGRESS NOTES
[facility] Nurse Inpatient Pressure Injury Assessment   Reason for consultation: Evaluate and treat coccyx      ASSESSMENT  Pressure Injury: on coccyx  , hospital acquired ,   Pressure Injury is Stage 2   Contributing factor of the pressure injury: moisture  Status: initial assessment     TREATMENT PLAN  coccyx wound: Every 3 days:  Cleanse with microklenz spray and gauze.  Paint periwound with no-sting barrier film.  Cover with mepilex sacral border dressing  Geomat cushion in chair  Remind patient to shift weight every 10-15 minutes while in chair.    Limit sitting in chair to 2 hours as much as possible   Orders Written  WO Nurse follow-up plan:weekly  Nursing to notify the Provider(s) and re-consult the WO Nurse if wound(s) deteriorates or new skin concern.    Patient History  According to provider note(s): 68 year old male with a history of s/p HUSSEIN to RCA and LAD for STEMI with post infarction basal VSD, who on 8/10/2018 underwent repair of posterior VSD and TVR with bioprosthetic valve.  He was kept open chest due to coagulopathy and RV dysfunction. Chest closed on 8/13/2018, IABP removed 8/14/18 and he was extubated on POD #11  Objective Data  Containment of urine/stool: Diaper.  Pt now rarely incontinent, uses urinal at NOC    Current Diet/ Nutrition:    Active Diet Order      Regular Diet Adult Thin Liquids (water, ice chips, juice, milk, gelatin, ice cream, etc)      Advance Diet as Tolerated    Output:   I/O last 3 completed shifts:  In: 630 [P.O.:360; I.V.:270]  Out: 925 [Urine:925]    Risk Assessment:   Sensory Perception: 4-->no impairment  Moisture: 4-->rarely moist  Activity: 3-->walks occasionally  Mobility: 4-->no limitation  Nutrition: 3-->adequate  Friction and Shear: 3-->no apparent problem  Lee Score: 21      Labs:   Recent Labs  Lab 09/11/18  0526  09/05/18  0616   ALBUMIN  --   --  2.2*   HGB 7.8*  < > 9.2*   INR 2.08*  < > 2.21*   WBC 3.7*  < > 9.2   < > = values in this interval not  displayed.    Physical Exam  Skin inspection: focused buttocks   Wound Location: coccyx  Date of last Photo none, camera unavailable   Wound History:  Pt previously incontinent of bladder.    Measurements (length x width x depth, in cm)   Right coccyx:  1.2cm x 0.7cm  x  0.1 cm   Left coccyx: 0.5 cm x 0.5 cm  x  0.1 cm   Wound Base: 100 % dermis  Tunneling N/A  Undermining N/A  Palpation of the wound bed: normal   Periwound skin: intact  Color:  Up to 1 cm ring of blanchable erythema   Temperature: normal   Drainage:, scant  Description of drainage: serosanguinous  Odor: mild  Pain: when sitting    Interventions  Current support surface: Standard  Atmos Air mattress  Current off-loading measures: Foam padding  Repositioning aid:  Independent   Visual inspection of wound(s) completed   Wound Care: was done per plan of care.  Supplies: ordered: Geomat cushion   Educated provided: importance of repositioning and plan of care  Education provided to: patient  and spouse  Discussed plan of care with Nurse

## 2018-09-11 NOTE — PLAN OF CARE
Problem: Patient Care Overview  Goal: Plan of Care/Patient Progress Review  Physical Therapy Discharge Summary    Reason for therapy discharge:    Discharged to transitional care facility.    Progress towards therapy goal(s). See goals on Care Plan in [facility] electronic health record for goal details.  Goals partially met.  Barriers to achieving goals:   discharge from facility.    Therapy recommendation(s):    Continued therapy is recommended.  Rationale/Recommendations:  TCU to progress safety and indep with functional mobility.

## 2018-09-11 NOTE — PROGRESS NOTES
Gave RN to RN report to staff at [facility], phone number [phone].  All questions answered at this time.  Patient expected to transfer by wheelchair transport at 1600.

## 2018-09-11 NOTE — PLAN OF CARE
Problem: Patient Care Overview  Goal: Plan of Care/Patient Progress Review  Outcome: No Change    D: admitted 8/6 VSD and cardiogenic shock after STEMI, s/p VSD repair and bioprosthetic TVR, hx splenectomy, polycythemia vera, prostate cancer, thrombocytosis     I/A:   Neuro- A&Ox4, hoarse speech  CV- SR 80s, BP 90-100s/60-70s, denies CP  Pulm- sating well on RA  GI/- regular diet, BM 9/10, voiding adequately  LDA- R DL PICC  Skin- sternal incision, previous CT site CDI  Pain- denies     P:  F/U CXR today. Plan to d/c today to TCU. Notify MD of significant changes. Continue to monitor and notify CVTS with any changes or concerns.

## 2018-09-11 NOTE — PROGRESS NOTES
Social Work Services Discharge Note      Patient Name:  Castillo De Anda     Anticipated Discharge Date: 09/11/18 @ 1600    Discharge Disposition:   Facility: [facility]  PH: [phone]  F: [phone]  Nurse to Nurse Call: PH: [phone]    Following MD: Vinod Frank  [facility] [address] / [facility] [address]     Pre-Admission Screening (PAS) online form has been completed.  The Level of Care (LOC) is:  Determined  Confirmation Code is:  [id number]  Patient/caregiver informed of referral to [facility] for Pre-Admission Screening for skilled nursing facility (SNF) placement and to expect a phone call post discharge from SNF.     Additional Services/Equipment Arranged:    - Transportation: [employer] (PH: [phone]) wheelchair ride scheduled for 1600.  Pt and family are aware and in agreement that insurance may not cover wheelchair ride: YES     Patient / Family response to discharge plan:  Pt and family (Daughter Christine in room) in agreement with the plan.     Persons notified of above discharge plan:  Pt, Family (Christine), bedside RN, CVTS team, PCP, SNF admissions.    Staff Discharge Instructions:  Please fax discharge orders and signed hard scripts for any controlled substances.  Please print a packet and send with patient.     CTS Handoff completed:  YES    Medicare Notice of Rights provided to the patient/family:  YES    CAROL ANN Almanza, MYCHAL  [address] Unit   Phone: [phone]  Pager: [phone]  Unit: [phone]

## 2018-09-12 NOTE — PLAN OF CARE
Problem: Patient Care Overview  Goal: Plan of Care/Patient Progress Review  Occupational Therapy Discharge Summary    Reason for therapy discharge:    Discharged to transitional care facility.    Progress towards therapy goal(s). See goals on Care Plan in [facility] electronic health record for goal details.  Goals partially met.  Barriers to achieving goals:   discharge from facility.    Therapy recommendation(s):    Continued therapy is recommended.  Rationale/Recommendations:  to progress activity tolerance and maximize ADL/IADL IND..

## 2018-09-13 ENCOUNTER — TRANSFERRED RECORDS (OUTPATIENT)
Dept: HEALTH INFORMATION MANAGEMENT | Facility: CLINIC | Age: 68
End: 2018-09-13

## 2018-09-17 DIAGNOSIS — D47.1 MYELOPROLIFERATIVE NEOPLASM (H): Primary | ICD-10-CM

## 2018-09-18 ENCOUNTER — HOSPITAL ENCOUNTER (OUTPATIENT)
Dept: CARDIOLOGY | Facility: CLINIC | Age: 68
Discharge: HOME | DRG: 187 | End: 2018-09-18
Attending: PHYSICIAN ASSISTANT | Admitting: PHYSICIAN ASSISTANT
Payer: MEDICARE

## 2018-09-18 ENCOUNTER — OFFICE VISIT (OUTPATIENT)
Dept: CARDIOLOGY | Facility: CLINIC | Age: 68
DRG: 187 | End: 2018-09-18
Attending: THORACIC SURGERY (CARDIOTHORACIC VASCULAR SURGERY)
Payer: MEDICARE

## 2018-09-18 ENCOUNTER — APPOINTMENT (OUTPATIENT)
Dept: LAB | Facility: CLINIC | Age: 68
DRG: 187 | End: 2018-09-18
Attending: INTERNAL MEDICINE
Payer: MEDICARE

## 2018-09-18 ENCOUNTER — HOSPITAL ENCOUNTER (OUTPATIENT)
Dept: GENERAL RADIOLOGY | Facility: CLINIC | Age: 68
DRG: 187 | End: 2018-09-18
Attending: PHYSICIAN ASSISTANT
Payer: MEDICARE

## 2018-09-18 ENCOUNTER — HOSPITAL ENCOUNTER (OUTPATIENT)
Dept: CT IMAGING | Facility: CLINIC | Age: 68
DRG: 187 | End: 2018-09-18
Attending: PHYSICIAN ASSISTANT
Payer: MEDICARE

## 2018-09-18 VITALS
BODY MASS INDEX: 24.44 KG/M2 | OXYGEN SATURATION: 99 % | HEIGHT: 69 IN | WEIGHT: 165 LBS | HEART RATE: 95 BPM | SYSTOLIC BLOOD PRESSURE: 88 MMHG | DIASTOLIC BLOOD PRESSURE: 63 MMHG

## 2018-09-18 DIAGNOSIS — J90 PLEURAL EFFUSION: ICD-10-CM

## 2018-09-18 DIAGNOSIS — Z95.4 S/P TVR (TRICUSPID VALVE REPLACEMENT): ICD-10-CM

## 2018-09-18 DIAGNOSIS — Z95.4 S/P TVR (TRICUSPID VALVE REPLACEMENT): Primary | ICD-10-CM

## 2018-09-18 PROCEDURE — 71046 X-RAY EXAM CHEST 2 VIEWS: CPT

## 2018-09-18 PROCEDURE — 40000556 ZZH STATISTIC PERIPHERAL IV START W US GUIDANCE

## 2018-09-18 PROCEDURE — 93306 TTE W/DOPPLER COMPLETE: CPT

## 2018-09-18 PROCEDURE — 93005 ELECTROCARDIOGRAM TRACING: CPT

## 2018-09-18 PROCEDURE — 93306 TTE W/DOPPLER COMPLETE: CPT | Mod: 26 | Performed by: INTERNAL MEDICINE

## 2018-09-18 PROCEDURE — 71250 CT THORAX DX C-: CPT

## 2018-09-18 PROCEDURE — G0463 HOSPITAL OUTPT CLINIC VISIT: HCPCS | Mod: ZF

## 2018-09-18 RX ORDER — CARBOXYMETHYLCELLULOSE SODIUM 5 MG/ML
1 SOLUTION/ DROPS OPHTHALMIC 3 TIMES DAILY PRN
COMMUNITY

## 2018-09-18 RX ORDER — HYDROXYUREA 500 MG/1
CAPSULE ORAL
COMMUNITY
Start: 2018-01-30 | End: 2018-10-03

## 2018-09-18 RX ORDER — POLYETHYLENE GLYCOL 3350 17 G/17G
17 POWDER, FOR SOLUTION ORAL DAILY
Refills: 0 | COMMUNITY
Start: 2018-04-25

## 2018-09-18 ASSESSMENT — PAIN SCALES - GENERAL: PAINLEVEL: SEVERE PAIN (6)

## 2018-09-18 NOTE — MR AVS SNAPSHOT
After Visit Summary   9/18/2018    Castillo De Anda    MRN: [id number]           Patient Information     Date Of Birth          1950        Visit Information        Provider Department      9/18/2018 1:30 PM  CVTS [facility]        Today's Diagnoses     S/P TVR (tricuspid valve replacement)    -  1    Pleural effusion           Follow-ups after your visit        Your next 10 appointments already scheduled     Sep 25, 2018  7:15 AM CDT   (Arrive by 7:00 AM)   New Patient Visit with Leny Oconnell MD   [facility]    [address] [place] [address]  [address] [place] [id number] [phone]              Future tests that were ordered for you today     Open Future Orders        Priority Expected Expires Ordered    CT Chest w/o Contrast STAT 9/18/2018 9/18/2019 9/18/2018    EKG 12-lead, tracing only Routine 9/18/2018 10/18/2018 9/18/2018    Echocardiogram Complete Routine 9/18/2018 9/18/2019 9/18/2018    X-ray Chest 2 vws* Routine 9/18/2018 9/18/2019 9/18/2018            Who to contact     If you have questions or need follow up information about today's clinic visit or your schedule please contact [facility] directly at [phone].  Normal or non-critical lab and imaging results will be communicated to you by [email or web address], letter or phone within 4 business days after the clinic has received the results. If you do not hear from us within 7 days, please contact the clinic through [email or web address] or phone. If you have a critical or abnormal lab result, we will notify you by phone as soon as possible.  Submit refill requests through [employer] or call your pharmacy and they will forward the refill request to us. Please allow 3 business days for your refill to be completed.          Additional Information About Your Visit        [email or web address] Information     [employer] lets you send messages to your doctor, view your test results, renew your  "prescriptions, schedule appointments and more. To sign up, go to [email or web address] . Click on \"Log in\" on the left side of the screen, which will take you to the Welcome page. Then click on \"Sign up Now\" on the right side of the page.     You will be asked to enter the access code listed below, as well as some personal information. Please follow the directions to create your username and password.     Your access code is: [email or web address]  Expires: 2018  3:06 PM     Your access code will  in 90 days. If you need help or a new code, please call your [facility] clinic or [phone].        Your Vitals Were     Pulse Height Pulse Oximetry BMI (Body Mass Index)          95 1.753 m (5' 9\") 99% 24.37 kg/m2         Blood Pressure from Last 3 Encounters:   18 98/64   18 (!) 88/63   18 114/80    Weight from Last 3 Encounters:   18 74.8 kg (165 lb)   18 72.6 kg (160 lb)               Primary Care Provider Office Phone # Fax #    Vinod Frank [phone]       [facility] [address] [facility] [address]        Equal Access to Services     MORGAN SANCHEZ : Hadii rajendra ku hadasho Soomaali, waaxda luqadaha, qaybta kaalmada adeegyada, giana aguiar . So [facility] [phone].    ATENCIÓN: Si habla español, tiene a durbin disposición servicios gratuitos de asistencia lingüística. Llame al [phone].    We comply with applicable federal civil rights laws and [place] laws. We do not discriminate on the basis of race, color, national origin, age, disability, sex, sexual orientation, or gender identity.            Thank you!     Thank you for choosing [facility]  for your care. Our goal is always to provide you with excellent care. Hearing back from our patients is one way we can continue to improve our services. Please take a few minutes to complete the written survey that you may receive in the mail after your visit with us. Thank you!   "           Your Updated Medication List - Protect others around you: Learn how to safely use, store and throw away your medicines at www.disposemymeds.org.          This list is accurate as of 9/18/18 11:59 PM.  Always use your most recent med list.                   Brand Name Dispense Instructions for use Diagnosis    ACETAMINOPHEN PO      Take 325 mg by mouth every 4 hours as needed        AMIODARONE HCL PO      Take 200 mg by mouth daily        Carboxymethylcellulose Sod PF 0.5 % Soln ophthalmic solution    REFRESH PLUS     1 drop 3 times daily as needed for dry eyes        CLOPIDOGREL BISULFATE PO      Take 75 mg by mouth daily        DOCUSATE SODIUM PO      Take 100 mg by mouth daily        FUROSEMIDE PO      Take 40 mg by mouth daily        hydroxyurea 500 MG capsule CHEMO    HYDREA          LEVOTHYROXINE SODIUM PO      Take 75 mcg by mouth daily        LIPITOR PO      Take 80 mg by mouth daily        MELATONIN PO      Take 1 mg by mouth as needed        METHOCARBAMOL PO      Take 500 mg by mouth        METOPROLOL TARTRATE PO      Take 12.5 mg by mouth 2 times daily        MULTIVITAMIN ADULT PO      Take 1 tablet by mouth daily        PANTOPRAZOLE SODIUM PO      Take 40 mg by mouth 2 times daily        polyethylene glycol powder    MIRALAX/GLYCOLAX          POTASSIUM PO      Take 20 mEq by mouth daily        umeclidinium-vilanterol 62.5-25 MCG/INH oral inhaler    ANORO ELLIPTA     Inhale 1 puff into the lungs daily        WARFARIN SODIUM PO      Take 5 mg by mouth

## 2018-09-18 NOTE — PROGRESS NOTES
Reason for visit: Post-Op TV replacement and VSD repair by Dr. Franco on 8/10/18, chest washout and closure on 8/13/18    HPI: Castillo De Anda is a 68 year old male seen in clinic for follow-up appointment after surgery. Patient has past medical history of STEMI s/p HUSSEIN to distal RCA and proximal LAD. He developed a VSD and was referred to Dr. Franco at [facility] for surgical repair. He also has a past medical history of polycythemia vera, DVT, thyroid and prostate cancer, and meningioma. Hospital course was complicated by need for IABP and pressors immediately post op. Chest was initially left open and patient was taken back to surgery for chest washout and closure as above. He was also started on keppra due to concerns of local seizures prior to surgery. This was stopped 8/20 and neurology recommends EEG in 3 months. He also needed plasma phoresis for his polycythemia vera and was started on hydroxyurea by hematology. Patient had some post op atrial fibulation. Patient started on Metoprolol and amiodarone and converted to sinus rhythm. He was started on coumadin. Post op ECHO reported a mobile filamentous density. Spoke with Dr. Franco who believes this is a fold in the patch, will continue to monitor. Patient had a right pigtail placed 9/8 for a right apical pneumothorax after chest tube removal. It was removed 9/10 without issues. Patient received supplemental tube feeding while in hospital. This was stopped before discharge.      Patient has been at [facility] for inpatient rehab. He reports that he was doing well up until 4-5 days ago. Patient reports he is getting increasingly SOB with any activity and even gets SOB while talking for extended periods of time. He reports this is a big change for him as he felt he was making good progress prior. He also reports his BP has been a little low at the rehab with systolic in the 80's-90's. His Metoprolol has been held a few times. He denies any associated  lightheadedness, N/V, chest pain.     Patient reports incision is healing well.  Patient denies to any fever, chills, palpitations and worsening edema. He reports swelling is improved in his legs.      Patient is on Coumadin and Coumadin is therapeutic.  Weight  stable .       PAST MEDICAL HISTORY:  No past medical history on file.    PAST SURGICAL HISTORY:  Past Surgical History:   Procedure Laterality Date     ESOPHAGOSCOPY, GASTROSCOPY, DUODENOSCOPY (EGD), COMBINED N/A 8/24/2018    Procedure: COMBINED ESOPHAGOSCOPY, GASTROSCOPY, DUODENOSCOPY (EGD);  Upper Endoscopy with No Intervention; Two Gastric Ulcers;  Surgeon: Rocael Barber MD;  Location: [facility] OR     IRRIGATION AND DEBRIDEMENT CHEST WASHOUT, COMBINED N/A 8/13/2018    Procedure: COMBINED IRRIGATION AND DEBRIDEMENT CHEST WASHOUT;  COMBINED IRRIGATION AND DEBRIDEMENT CHEST WASHOUT, Closure;  Surgeon: Jason Franco MD;  Location: [facility] OR     REPAIR VENTRICULAR SEPTAL DEFECT N/A 8/10/2018    Procedure: REPAIR VENTRICULAR SEPTAL DEFECT;  Median Sternotomy, REPAIR VENTRICULAR SEPTAL DEFECT on pump oxygenation, Tricuspid valve replacement ;  Surgeon: Jason Franco MD;  Location: [facility] OR       CURRENT MEDICATIONS:   Current Outpatient Prescriptions:      ACETAMINOPHEN PO, Take 325 mg by mouth every 4 hours as needed , Disp: , Rfl:      AMIODARONE HCL PO, Take 200 mg by mouth daily , Disp: , Rfl:      Atorvastatin Calcium (LIPITOR PO), Take 80 mg by mouth daily , Disp: , Rfl:      Carboxymethylcellulose Sod PF (REFRESH PLUS) 0.5 % SOLN ophthalmic solution, 1 drop 3 times daily as needed for dry eyes, Disp: , Rfl:      CLOPIDOGREL BISULFATE PO, Take 75 mg by mouth daily , Disp: , Rfl:      DOCUSATE SODIUM PO, Take 100 mg by mouth daily, Disp: , Rfl:      FUROSEMIDE PO, Take 40 mg by mouth daily , Disp: , Rfl:      hydroxyurea (HYDREA) 500 MG capsule CHEMO, , Disp: , Rfl:      LEVOTHYROXINE SODIUM PO, Take 75 mcg by mouth daily , Disp: , Rfl:       MELATONIN PO, Take 1 mg by mouth as needed , Disp: , Rfl:      METHOCARBAMOL PO, Take 500 mg by mouth, Disp: , Rfl:      METOPROLOL TARTRATE PO, Take 12.5 mg by mouth 2 times daily, Disp: , Rfl:      Multiple Vitamins-Minerals (MULTIVITAMIN ADULT PO), Take 1 tablet by mouth daily, Disp: , Rfl:      PANTOPRAZOLE SODIUM PO, Take 40 mg by mouth 2 times daily , Disp: , Rfl:      polyethylene glycol (MIRALAX/GLYCOLAX) powder, , Disp: , Rfl: 0     POTASSIUM PO, Take 20 mEq by mouth daily, Disp: , Rfl:      umeclidinium-vilanterol (ANORO ELLIPTA) 62.5-25 MCG/INH oral inhaler, Inhale 1 puff into the lungs daily, Disp: , Rfl:      WARFARIN SODIUM PO, Take 5 mg by mouth, Disp: , Rfl:     ALLERGIES:  No Known Allergies     LABS:  CBC RESULTS:  Lab Results   Component Value Date    WBC 3.7 (L) 2018    RBC 2.44 (L) 2018    HGB 7.8 (L) 2018    HCT 24.4 (L) 2018     2018    MCH 32.0 2018    MCHC 32.0 2018    RDW 24.7 (H) 2018     2018       BMP RESULTS:  Lab Results   Component Value Date     (L) 2018    POTASSIUM 4.1 2018    CHLORIDE 99 2018    CO2 26 2018    ANIONGAP 6 2018    GLC 77 2018    BUN 13 2018    CR 0.68 2018    GFRESTIMATED >90 2018    GFRESTBLACK >90 2018    GABRIELA 7.6 (L) 2018        INR RESULTS:  Lab Results   Component Value Date    INR 2.08 (H) 2018       IMAGING:   Ignacia Morales MD 2018    Narrative         [id number]  [id number]  [id number]  [id number]^[email or web address]^SID^CASA           [facility],[place]  Echocardiography Laboratory  [address] [place] [id number]     Name: JOSE SOLER JOSHUA  MRN: [id number]  : 1950  Study Date: 2018 03:42 PM  Age: 68 yrs  Gender: Male  Patient Location: [facility]  Reason For Study: , S/P TVR (tricuspid valve replacement)  Ordering Physician: SID TREVINO  Referring Physician: SID TREVINO  J  Performed By: Andre Murguia RDCS     BSA: 1.9 m2  Height: 69 in  Weight: 165 lb  BP: 98/64 mmHg  _____________________________________________________________________________  __        Procedure  Echocardiogram with two-dimensional, color and spectral Doppler performed.  _____________________________________________________________________________  __        Interpretation Summary  Technically difficult study.     Borderline (EF 50-55%) reduced left ventricular function is present. VSD patch  is intact and protruding into the LV cavity with echodensity on LV side.  Global right ventricular function is mildly to moderately reduced.  A bioprosthetic tricuspid valve is present with mean gradient 3 mmHg.  The inferior vena cava was normal in size with preserved respiratory  variability.  Small circumferential pericardial effusion is present without any hemodynamic  significance. Chamber compression is not present; there is no evidence for  tamponade.     This study was compared with the study from 09/07/2018. Pericardial effusion  is smaller.  _____________________________________________________________________________  __        Left Ventricle  Left ventricular wall thickness is normal. Left ventricular size is normal.  Borderline (EF 50-55%) reduced left ventricular function is present. Left  ventricular diastolic function is indeterminate. No regional wall motion  abnormalities are seen. VSD patch is intact and protruding into the LV cavity  with fixed echodensity on LV side.     Right Ventricle  The right ventricle is normal size. Global right ventricular function is  mildly to moderately reduced.     Atria  Both atria appear normal.     Mitral Valve  The mitral valve is normal.        Aortic Valve  Aortic valve is normal in structure and function.     Tricuspid Valve  A bioprosthetic tricuspid valve is present. Mean gradient across valve is 3  mmHg.     Pulmonic Valve  The pulmonic valve is  normal.     Vessels  The aorta root cannot be assessed. The thoracic aorta cannot be assessed. The  pulmonary artery is normal. The inferior vena cava was normal in size with  preserved respiratory variability. Estimated mean right atrial pressure is 3  mmHg (normal).     Pericardium  Small circumferential pericardial effusion is present without any hemodynamic  significance. Chamber compression is not present; there is no evidence for  tamponade.        Compared to Previous Study  This study was compared with the study from 2018 .     Attestation  I have personally viewed the imaging and agree with the interpretation and  report as documented by the fellow, Lavinia Hinton, and/or edited by me.  _____________________________________________________________________________  __           Doppler Measurements & Calculations  TV V2 max: 113.3 cm/sec  TV max P.2 mmHg  TV mean P.7 mmHg  TV V2 VTI: 25.5 cm     _____________________________________________________________________________  __     18  4:44 PM [id number] [facility], [place],  Radiology    [employer] Interactive Report and InfoRx   View the interactive report   PACS Images   Show images for X-ray Chest 2 vws*   Study Result   Exam: Chest x-ray, 2 views, 2018.     COMPARISON: 2018.     HISTORY: Status post tricuspid valve replacement.     FINDINGS: PA and lateral views of the chest were obtained. Interval  removal of the previously seen right upper extremity PICC.  Cardiomediastinal silhouette is unchanged. Distinct pulmonary  vasculature. Stable opacity in the right lung base compatible with  blood products in the major fissure on comparison CT exam. Bilateral  pleural effusions, right greater than left, stable. Streaky left  retrocardiac opacities, stable. Median sternotomy wires. Degenerative  changes of the spine. No appreciable pneumothorax.         IMPRESSION:  1. Bilateral pleural effusions, right greater than left with  stable  streaky left retrocardiac opacities, likely atelectasis.  2. Stable round opacity in the right lung base compatible with broad  productive seen on prior CT.     LAKESHA AUSTIN MD     9/18/18  6:09 PM [id number] [facility], [place], [place]    [employer] Interactive Report and InfoRx   View the interactive report   PACS Images   Show images for CT Chest w/o Contrast   Study Result   EXAMINATION: Chest CT  9/18/2018 6:11 PM     CLINICAL HISTORY: checking for bilateral pleural effusions; Pleural  effusion     COMPARISON: Same day radiograph, 9/7/2018 CT.     TECHNIQUE: CT imaging obtained through the chest without contrast.  Coronal and axial MIP reformatted images obtained.      FINDINGS:  Postoperative changes with mediastinal surgical clips. Thyroidectomy.  The heart is mildly enlarged. There is a persistent mild pericardial  effusion. Trace foci of air again seen within the right pericardial  space. Tricuspid valve prosthesis. Epicardial pacing wires from the  subxiphoid approach. Myocardial calcifications of the ventricles  bilaterally. LAD stent. The ascending thoracic aorta measures 4.0 cm,  stable. The thoracic vessels are normal in caliber and configuration  on this noncontrast examination. Mild prominent mediastinal  lymphadenopathy is likely reactive.     The central tracheobronchial tree is patent. No substantial change in  bilateral layering pleural effusions and associated bibasilar  atelectasis and/or consolidation. Persistent dense consolidation of  the right middle lobe. Right-sided pneumothorax has resolved. No  suspicious pulmonary nodule.     Bones and soft tissues: No suspicious bone findings. No acute osseous  abnormality. Recent median sternotomy with adjacent subcutaneous soft  tissue stranding. Interval removal of the previously described right  upper extremity PICC.     Partially imaged upper abdomen: Limited. Atherosclerotic  calcifications of the abdominal aorta.         IMPRESSION:   1.  "Since prior CT dated 9/7/2018, the gaseous component of the  right-sided hydropneumothorax has resolved. There are persistent  bilateral pleural effusions and associated bibasilar atelectasis  and/or consolidation, not significantly changed from prior  examination.  2. Persistent dense consolidation of the right middle lobe.  3. Tricuspid valve prosthesis. VSD repair changes. Intracardiac  calcifications. Stable small pericardial effusion.     These findings were communicated to Torsten Ruiz PA-C by Dr. Woods at 6:10 PM on 9/18/2018.     I have personally reviewed the examination and initial interpretation  and I agree with the findings.     LAKESHA AUSTIN MD         PHYSICAL EXAM:   BP (!) 88/63 (BP Location: Left arm, Patient Position: Chair, Cuff Size: Adult Large)  Pulse 95  Ht 1.753 m (5' 9\")  Wt 74.8 kg (165 lb)  SpO2 99%  BMI 24.37 kg/m2   Recheck of BP: 95/64  General: alert and oriented x 3, pleasant, no acute distress, normal mood and affect  CV: S1 S2, no murmurs, rubs or gallops, regular rate and rhythm, trace-1+ peripheral edema. EKG ordered shows NSR, no JVD appreciated.   Pulm: bilateral breath sounds, clear to auscultation, easy work of breathing. Diminished in bases.   Incision: incision clean dry and intact without erythema, swelling or drainage  Neuro: nonfocal      ASSESSMENT/PLAN:  Castillo is a 68 year old male  Status post VSD repair/TVR who returns to clinic for postop visit.    1. SOB: patient is significantly more SOB over the last 4-5 days. ECHO unremarkable for pericardial effusion or other etiology of SOB. CXR and chest CT do show effusions and consolidation particularly on the right which likely are the cause of patient's SOB. Patient reports that he does not feel bad enough to be admitted and would still like to precede with trying to treat his SOB as an outpatient. Will try to set patient up with right thoracentesis later in week. Called rehab and gave order to hold " both Plavix and Coumadin for procedure.   2. Volume status: patient overall appeared euvolemic today. His IVC was also confirmed as normal on ECHO. Due to effusions and mild weight gain with some pulmonary congestion, patient's lasix was increased to 40 mg po bid. Rehab was notified with change in order.   3. Will contact patient tomorrow to get update on his condition and also schedule thoracentesis. Patient will contact CV surgery or go to ED if condition worsens before that.     Approximately 90 minutes spent with the patient in clinic at this visit and in coordination of care.    Patient discussed with Dr. Cabrera who agrees with plan.     CC  Patient Care Team:  Vinod Frank as PCP - General (Family Practice)  BESS CABRERA

## 2018-09-18 NOTE — NURSING NOTE
Chief Complaint   Patient presents with     Follow Up For     CVTS     Medications reviewed and vitals performed.   Josephine Sherman CMA

## 2018-09-18 NOTE — LETTER
9/18/2018      RE: Castillo De Anda  [address] [facility] [address]       Dear Colleague,    Thank you for the opportunity to participate in the care of your patient, Castillo De Anda, at the [facility]. Please see a copy of my visit note below.    Reason for visit: Post-Op TV replacement and VSD repair by Dr. Franco on 8/10/18, chest washout and closure on 8/13/18    HPI: Castillo De Anda is a 68 year old male seen in clinic for follow-up appointment after surgery. Patient has past medical history of STEMI s/p HUSSEIN to distal RCA and proximal LAD. He developed a VSD and was referred to Dr. Franco at [facility] for surgical repair. He also has a past medical history of polycythemia vera, DVT, thyroid and prostate cancer, and meningioma. Hospital course was complicated by need for IABP and pressors immediately post op. Chest was initially left open and patient was taken back to surgery for chest washout and closure as above. He was also started on keppra due to concerns of local seizures prior to surgery. This was stopped 8/20 and neurology recommends EEG in 3 months. He also needed plasma phoresis for his polycythemia vera and was started on hydroxyurea by hematology. Patient had some post op atrial fibulation. Patient started on Metoprolol and amiodarone and converted to sinus rhythm. He was started on coumadin. Post op ECHO reported a mobile filamentous density. Spoke with Dr. Franco who believes this is a fold in the patch, will continue to monitor. Patient had a right pigtail placed 9/8 for a right apical pneumothorax after chest tube removal. It was removed 9/10 without issues. Patient received supplemental tube feeding while in hospital. This was stopped before discharge.      Patient has been at [facility] for inpatient rehab. He reports that he was doing well up until 4-5 days ago. Patient reports he is getting increasingly SOB with any  activity and even gets SOB while talking for extended periods of time. He reports this is a big change for him as he felt he was making good progress prior. He also reports his BP has been a little low at the rehab with systolic in the 80's-90's. His Metoprolol has been held a few times. He denies any associated lightheadedness, N/V, chest pain.     Patient reports incision is healing well.  Patient denies to any fever, chills, palpitations and worsening edema. He reports swelling is improved in his legs.      Patient is on Coumadin and Coumadin is therapeutic.  Weight  stable .       PAST MEDICAL HISTORY:  No past medical history on file.    PAST SURGICAL HISTORY:  Past Surgical History:   Procedure Laterality Date     ESOPHAGOSCOPY, GASTROSCOPY, DUODENOSCOPY (EGD), COMBINED N/A 8/24/2018    Procedure: COMBINED ESOPHAGOSCOPY, GASTROSCOPY, DUODENOSCOPY (EGD);  Upper Endoscopy with No Intervention; Two Gastric Ulcers;  Surgeon: Rocael Barber MD;  Location: [facility] OR     IRRIGATION AND DEBRIDEMENT CHEST WASHOUT, COMBINED N/A 8/13/2018    Procedure: COMBINED IRRIGATION AND DEBRIDEMENT CHEST WASHOUT;  COMBINED IRRIGATION AND DEBRIDEMENT CHEST WASHOUT, Closure;  Surgeon: Jason Franco MD;  Location: [facility] OR     REPAIR VENTRICULAR SEPTAL DEFECT N/A 8/10/2018    Procedure: REPAIR VENTRICULAR SEPTAL DEFECT;  Median Sternotomy, REPAIR VENTRICULAR SEPTAL DEFECT on pump oxygenation, Tricuspid valve replacement ;  Surgeon: Jason Franco MD;  Location: [facility] OR       CURRENT MEDICATIONS:   Current Outpatient Prescriptions:      ACETAMINOPHEN PO, Take 325 mg by mouth every 4 hours as needed , Disp: , Rfl:      AMIODARONE HCL PO, Take 200 mg by mouth daily , Disp: , Rfl:      Atorvastatin Calcium (LIPITOR PO), Take 80 mg by mouth daily , Disp: , Rfl:      Carboxymethylcellulose Sod PF (REFRESH PLUS) 0.5 % SOLN ophthalmic solution, 1 drop 3 times daily as needed for dry eyes, Disp: , Rfl:      CLOPIDOGREL  BISULFATE PO, Take 75 mg by mouth daily , Disp: , Rfl:      DOCUSATE SODIUM PO, Take 100 mg by mouth daily, Disp: , Rfl:      FUROSEMIDE PO, Take 40 mg by mouth daily , Disp: , Rfl:      hydroxyurea (HYDREA) 500 MG capsule CHEMO, , Disp: , Rfl:      LEVOTHYROXINE SODIUM PO, Take 75 mcg by mouth daily , Disp: , Rfl:      MELATONIN PO, Take 1 mg by mouth as needed , Disp: , Rfl:      METHOCARBAMOL PO, Take 500 mg by mouth, Disp: , Rfl:      METOPROLOL TARTRATE PO, Take 12.5 mg by mouth 2 times daily, Disp: , Rfl:      Multiple Vitamins-Minerals (MULTIVITAMIN ADULT PO), Take 1 tablet by mouth daily, Disp: , Rfl:      PANTOPRAZOLE SODIUM PO, Take 40 mg by mouth 2 times daily , Disp: , Rfl:      polyethylene glycol (MIRALAX/GLYCOLAX) powder, , Disp: , Rfl: 0     POTASSIUM PO, Take 20 mEq by mouth daily, Disp: , Rfl:      umeclidinium-vilanterol (ANORO ELLIPTA) 62.5-25 MCG/INH oral inhaler, Inhale 1 puff into the lungs daily, Disp: , Rfl:      WARFARIN SODIUM PO, Take 5 mg by mouth, Disp: , Rfl:     ALLERGIES:  No Known Allergies     LABS:  CBC RESULTS:  Lab Results   Component Value Date    WBC 3.7 (L) 09/11/2018    RBC 2.44 (L) 09/11/2018    HGB 7.8 (L) 09/11/2018    HCT 24.4 (L) 09/11/2018     09/11/2018    MCH 32.0 09/11/2018    MCHC 32.0 09/11/2018    RDW 24.7 (H) 09/11/2018     09/11/2018       BMP RESULTS:  Lab Results   Component Value Date     (L) 09/11/2018    POTASSIUM 4.1 09/11/2018    CHLORIDE 99 09/11/2018    CO2 26 09/11/2018    ANIONGAP 6 09/11/2018    GLC 77 09/11/2018    BUN 13 09/11/2018    CR 0.68 09/11/2018    GFRESTIMATED >90 09/11/2018    GFRESTBLACK >90 09/11/2018    GABRIELA 7.6 (L) 09/11/2018        INR RESULTS:  Lab Results   Component Value Date    INR 2.08 (H) 09/11/2018       IMAGING:   Ignacia Morales MD 9/18/2018    Narrative         [id number]  [id number]  [id number]  [id number]^URIEL^SID^J           [facility]  Laboratory  [address] [place] [address]     Name: JOSE SOLER  MRN: [id number]  : 1950  Study Date: 2018 03:42 PM  Age: 68 yrs  Gender: Male  Patient Location: [facility]  Reason For Study: , S/P TVR (tricuspid valve replacement)  Ordering Physician: SID TREVINO  Referring Physician: SID TREVINO  Performed By: Andre Murguia RDCS     BSA: 1.9 m2  Height: 69 in  Weight: 165 lb  BP: 98/64 mmHg  _____________________________________________________________________________  __        Procedure  Echocardiogram with two-dimensional, color and spectral Doppler performed.  _____________________________________________________________________________  __        Interpretation Summary  Technically difficult study.     Borderline (EF 50-55%) reduced left ventricular function is present. VSD patch  is intact and protruding into the LV cavity with echodensity on LV side.  Global right ventricular function is mildly to moderately reduced.  A bioprosthetic tricuspid valve is present with mean gradient 3 mmHg.  The inferior vena cava was normal in size with preserved respiratory  variability.  Small circumferential pericardial effusion is present without any hemodynamic  significance. Chamber compression is not present; there is no evidence for  tamponade.     This study was compared with the study from 2018. Pericardial effusion  is smaller.  _____________________________________________________________________________  __        Left Ventricle  Left ventricular wall thickness is normal. Left ventricular size is normal.  Borderline (EF 50-55%) reduced left ventricular function is present. Left  ventricular diastolic function is indeterminate. No regional wall motion  abnormalities are seen. VSD patch is intact and protruding into the LV cavity  with fixed echodensity on LV side.     Right Ventricle  The right ventricle is normal size. Global right ventricular function is  mildly to  moderately reduced.     Atria  Both atria appear normal.     Mitral Valve  The mitral valve is normal.        Aortic Valve  Aortic valve is normal in structure and function.     Tricuspid Valve  A bioprosthetic tricuspid valve is present. Mean gradient across valve is 3  mmHg.     Pulmonic Valve  The pulmonic valve is normal.     Vessels  The aorta root cannot be assessed. The thoracic aorta cannot be assessed. The  pulmonary artery is normal. The inferior vena cava was normal in size with  preserved respiratory variability. Estimated mean right atrial pressure is 3  mmHg (normal).     Pericardium  Small circumferential pericardial effusion is present without any hemodynamic  significance. Chamber compression is not present; there is no evidence for  tamponade.        Compared to Previous Study  This study was compared with the study from 2018 .     Attestation  I have personally viewed the imaging and agree with the interpretation and  report as documented by the fellow, Lavinia Hinton, and/or edited by me.  _____________________________________________________________________________  __           Doppler Measurements & Calculations  TV V2 max: 113.3 cm/sec  TV max P.2 mmHg  TV mean P.7 mmHg  TV V2 VTI: 25.5 cm     _____________________________________________________________________________  __     18  4:44 PM [id number] [facility], [place],  Radiology    [employer] Interactive Report and InfoRx   View the interactive report   PACS Images   Show images for X-ray Chest 2 vws*   Study Result   Exam: Chest x-ray, 2 views, 2018.     COMPARISON: 2018.     HISTORY: Status post tricuspid valve replacement.     FINDINGS: PA and lateral views of the chest were obtained. Interval  removal of the previously seen right upper extremity PICC.  Cardiomediastinal silhouette is unchanged. Distinct pulmonary  vasculature. Stable opacity in the right lung base compatible with  blood products in the major  fissure on comparison CT exam. Bilateral  pleural effusions, right greater than left, stable. Streaky left  retrocardiac opacities, stable. Median sternotomy wires. Degenerative  changes of the spine. No appreciable pneumothorax.         IMPRESSION:  1. Bilateral pleural effusions, right greater than left with stable  streaky left retrocardiac opacities, likely atelectasis.  2. Stable round opacity in the right lung base compatible with broad  productive seen on prior CT.     LAKESHA AUSTIN MD     9/18/18  6:09 PM [id number] [facility], [place], [place]    [employer] Interactive Report and InfoRx   View the interactive report   PACS Images   Show images for CT Chest w/o Contrast   Study Result   EXAMINATION: Chest CT  9/18/2018 6:11 PM     CLINICAL HISTORY: checking for bilateral pleural effusions; Pleural  effusion     COMPARISON: Same day radiograph, 9/7/2018 CT.     TECHNIQUE: CT imaging obtained through the chest without contrast.  Coronal and axial MIP reformatted images obtained.      FINDINGS:  Postoperative changes with mediastinal surgical clips. Thyroidectomy.  The heart is mildly enlarged. There is a persistent mild pericardial  effusion. Trace foci of air again seen within the right pericardial  space. Tricuspid valve prosthesis. Epicardial pacing wires from the  subxiphoid approach. Myocardial calcifications of the ventricles  bilaterally. LAD stent. The ascending thoracic aorta measures 4.0 cm,  stable. The thoracic vessels are normal in caliber and configuration  on this noncontrast examination. Mild prominent mediastinal  lymphadenopathy is likely reactive.     The central tracheobronchial tree is patent. No substantial change in  bilateral layering pleural effusions and associated bibasilar  atelectasis and/or consolidation. Persistent dense consolidation of  the right middle lobe. Right-sided pneumothorax has resolved. No  suspicious pulmonary nodule.     Bones and soft tissues: No suspicious bone  "findings. No acute osseous  abnormality. Recent median sternotomy with adjacent subcutaneous soft  tissue stranding. Interval removal of the previously described right  upper extremity PICC.     Partially imaged upper abdomen: Limited. Atherosclerotic  calcifications of the abdominal aorta.         IMPRESSION:   1. Since prior CT dated 9/7/2018, the gaseous component of the  right-sided hydropneumothorax has resolved. There are persistent  bilateral pleural effusions and associated bibasilar atelectasis  and/or consolidation, not significantly changed from prior  examination.  2. Persistent dense consolidation of the right middle lobe.  3. Tricuspid valve prosthesis. VSD repair changes. Intracardiac  calcifications. Stable small pericardial effusion.     These findings were communicated to Torsten Ruiz PA-C by Dr. Woods at 6:10 PM on 9/18/2018.     I have personally reviewed the examination and initial interpretation  and I agree with the findings.     LAKESHA AUSTIN MD         PHYSICAL EXAM:   BP (!) 88/63 (BP Location: Left arm, Patient Position: Chair, Cuff Size: Adult Large)  Pulse 95  Ht 1.753 m (5' 9\")  Wt 74.8 kg (165 lb)  SpO2 99%  BMI 24.37 kg/m2   Recheck of BP: 95/64  General: alert and oriented x 3, pleasant, no acute distress, normal mood and affect  CV: S1 S2, no murmurs, rubs or gallops, regular rate and rhythm, trace-1+ peripheral edema. EKG ordered shows NSR, no JVD appreciated.   Pulm: bilateral breath sounds, clear to auscultation, easy work of breathing. Diminished in bases.   Incision: incision clean dry and intact without erythema, swelling or drainage  Neuro: nonfocal      ASSESSMENT/PLAN:  Castillo is a 68 year old male  Status post VSD repair/TVR who returns to clinic for postop visit.    1. SOB: patient is significantly more SOB over the last 4-5 days. ECHO unremarkable for pericardial effusion or other etiology of SOB. CXR and chest CT do show effusions and consolidation " particularly on the right which likely are the cause of patient's SOB. Patient reports that he does not feel bad enough to be admitted and would still like to precede with trying to treat his SOB as an outpatient. Will try to set patient up with right thoracentesis later in week. Called rehab and gave order to hold both Plavix and Coumadin for procedure.   2. Volume status: patient overall appeared euvolemic today. His IVC was also confirmed as normal on ECHO. Due to effusions and mild weight gain with some pulmonary congestion, patient's lasix was increased to 40 mg po bid. Rehab was notified with change in order.   3. Will contact patient tomorrow to get update on his condition and also schedule thoracentesis. Patient will contact CV surgery or go to ED if condition worsens before that.     Approximately 90 minutes spent with the patient in clinic at this visit and in coordination of care.    Patient discussed with Dr. Cabrera who agrees with plan.     CC  Patient Care Team:  Vinod Frank as PCP - General (Family Practice)  BESS CABRERA    Please do not hesitate to contact me if you have any questions/concerns.     Sincerely,     Cardiovascular Thoracic Surgery

## 2018-09-19 ENCOUNTER — HOSPITAL ENCOUNTER (INPATIENT)
Facility: CLINIC | Age: 68
LOS: 5 days | Discharge: NURSING, REHAB OR LONG-TERM CARE | DRG: 187 | End: 2018-09-24
Attending: THORACIC SURGERY (CARDIOTHORACIC VASCULAR SURGERY) | Admitting: THORACIC SURGERY (CARDIOTHORACIC VASCULAR SURGERY)
Payer: MEDICARE

## 2018-09-19 DIAGNOSIS — E87.70 HYPERVOLEMIA, UNSPECIFIED HYPERVOLEMIA TYPE: ICD-10-CM

## 2018-09-19 DIAGNOSIS — Z87.74 S/P VSD REPAIR: ICD-10-CM

## 2018-09-19 DIAGNOSIS — I48.0 PAROXYSMAL ATRIAL FIBRILLATION (H): ICD-10-CM

## 2018-09-19 DIAGNOSIS — I25.10 CORONARY ARTERY DISEASE INVOLVING NATIVE CORONARY ARTERY OF NATIVE HEART WITHOUT ANGINA PECTORIS: Primary | ICD-10-CM

## 2018-09-19 PROBLEM — R06.00 DYSPNEA: Status: ACTIVE | Noted: 2018-09-19

## 2018-09-19 LAB
ALBUMIN UR-MCNC: NEGATIVE MG/DL
APPEARANCE UR: CLEAR
BASOPHILS # BLD AUTO: 0 10E9/L (ref 0–0.2)
BASOPHILS NFR BLD AUTO: 0.6 %
BILIRUB UR QL STRIP: NEGATIVE
COLOR UR AUTO: YELLOW
CRP SERPL-MCNC: 86 MG/L (ref 0–8)
DIFFERENTIAL METHOD BLD: ABNORMAL
EOSINOPHIL # BLD AUTO: 0 10E9/L (ref 0–0.7)
EOSINOPHIL NFR BLD AUTO: 0.8 %
ERYTHROCYTE [DISTWIDTH] IN BLOOD BY AUTOMATED COUNT: 27 % (ref 10–15)
GLUCOSE UR STRIP-MCNC: NEGATIVE MG/DL
GRAM STN SPEC: ABNORMAL
HCT VFR BLD AUTO: 28.5 % (ref 40–53)
HGB BLD-MCNC: 9.3 G/DL (ref 13.3–17.7)
HGB UR QL STRIP: NEGATIVE
IMM GRANULOCYTES # BLD: 0 10E9/L (ref 0–0.4)
IMM GRANULOCYTES NFR BLD: 0 %
INR PPP: 1.86 (ref 0.86–1.14)
INTERPRETATION ECG - MUSE: NORMAL
KETONES UR STRIP-MCNC: NEGATIVE MG/DL
LEUKOCYTE ESTERASE UR QL STRIP: NEGATIVE
LYMPHOCYTES # BLD AUTO: 1.5 10E9/L (ref 0.8–5.3)
LYMPHOCYTES NFR BLD AUTO: 27.7 %
Lab: ABNORMAL
MAGNESIUM SERPL-MCNC: 1.9 MG/DL (ref 1.6–2.3)
MCH RBC QN AUTO: 33.1 PG (ref 26.5–33)
MCHC RBC AUTO-ENTMCNC: 32.6 G/DL (ref 31.5–36.5)
MCV RBC AUTO: 101 FL (ref 78–100)
MONOCYTES # BLD AUTO: 0.3 10E9/L (ref 0–1.3)
MONOCYTES NFR BLD AUTO: 5 %
NEUTROPHILS # BLD AUTO: 3.5 10E9/L (ref 1.6–8.3)
NEUTROPHILS NFR BLD AUTO: 65.9 %
NITRATE UR QL: NEGATIVE
NRBC # BLD AUTO: 0.2 10*3/UL
NRBC BLD AUTO-RTO: 3 /100
PH UR STRIP: 6.5 PH (ref 5–7)
PLATELET # BLD AUTO: 320 10E9/L (ref 150–450)
PROCALCITONIN SERPL-MCNC: <0.05 NG/ML
RBC # BLD AUTO: 2.81 10E12/L (ref 4.4–5.9)
SOURCE: NORMAL
SP GR UR STRIP: 1.01 (ref 1–1.03)
SPECIMEN SOURCE: ABNORMAL
UROBILINOGEN UR STRIP-MCNC: NORMAL MG/DL (ref 0–2)
WBC # BLD AUTO: 5.2 10E9/L (ref 4–11)

## 2018-09-19 PROCEDURE — 94640 AIRWAY INHALATION TREATMENT: CPT

## 2018-09-19 PROCEDURE — 21400006 ZZH R&B CCU INTERMEDIATE UMMC

## 2018-09-19 PROCEDURE — 25000132 ZZH RX MED GY IP 250 OP 250 PS 637: Mod: GY | Performed by: PHYSICIAN ASSISTANT

## 2018-09-19 PROCEDURE — 84145 PROCALCITONIN (PCT): CPT | Performed by: PHYSICIAN ASSISTANT

## 2018-09-19 PROCEDURE — 87070 CULTURE OTHR SPECIMN AEROBIC: CPT | Performed by: PHYSICIAN ASSISTANT

## 2018-09-19 PROCEDURE — 81003 URINALYSIS AUTO W/O SCOPE: CPT | Performed by: PHYSICIAN ASSISTANT

## 2018-09-19 PROCEDURE — 40000556 ZZH STATISTIC PERIPHERAL IV START W US GUIDANCE

## 2018-09-19 PROCEDURE — 36415 COLL VENOUS BLD VENIPUNCTURE: CPT | Performed by: THORACIC SURGERY (CARDIOTHORACIC VASCULAR SURGERY)

## 2018-09-19 PROCEDURE — 36415 COLL VENOUS BLD VENIPUNCTURE: CPT | Performed by: PHYSICIAN ASSISTANT

## 2018-09-19 PROCEDURE — 87205 SMEAR GRAM STAIN: CPT | Performed by: PHYSICIAN ASSISTANT

## 2018-09-19 PROCEDURE — 86140 C-REACTIVE PROTEIN: CPT | Performed by: PHYSICIAN ASSISTANT

## 2018-09-19 PROCEDURE — 83735 ASSAY OF MAGNESIUM: CPT | Performed by: PHYSICIAN ASSISTANT

## 2018-09-19 PROCEDURE — A9270 NON-COVERED ITEM OR SERVICE: HCPCS | Mod: GY | Performed by: THORACIC SURGERY (CARDIOTHORACIC VASCULAR SURGERY)

## 2018-09-19 PROCEDURE — 85025 COMPLETE CBC W/AUTO DIFF WBC: CPT | Performed by: PHYSICIAN ASSISTANT

## 2018-09-19 PROCEDURE — 25000125 ZZHC RX 250: Performed by: PHYSICIAN ASSISTANT

## 2018-09-19 PROCEDURE — 25000132 ZZH RX MED GY IP 250 OP 250 PS 637: Mod: GY | Performed by: THORACIC SURGERY (CARDIOTHORACIC VASCULAR SURGERY)

## 2018-09-19 PROCEDURE — 85610 PROTHROMBIN TIME: CPT | Performed by: THORACIC SURGERY (CARDIOTHORACIC VASCULAR SURGERY)

## 2018-09-19 PROCEDURE — A9270 NON-COVERED ITEM OR SERVICE: HCPCS | Mod: GY | Performed by: PHYSICIAN ASSISTANT

## 2018-09-19 RX ORDER — HYDROXYUREA 500 MG/1
1500 CAPSULE ORAL EVERY MORNING
Status: DISCONTINUED | OUTPATIENT
Start: 2018-09-20 | End: 2018-09-24 | Stop reason: HOSPADM

## 2018-09-19 RX ORDER — FUROSEMIDE 20 MG
40 TABLET ORAL 2 TIMES DAILY
Status: DISCONTINUED | OUTPATIENT
Start: 2018-09-19 | End: 2018-09-19

## 2018-09-19 RX ORDER — HYDROXYUREA 500 MG/1
1000 CAPSULE ORAL EVERY EVENING
Status: DISCONTINUED | OUTPATIENT
Start: 2018-09-19 | End: 2018-09-19

## 2018-09-19 RX ORDER — PANTOPRAZOLE SODIUM 40 MG/1
40 TABLET, DELAYED RELEASE ORAL
Status: DISCONTINUED | OUTPATIENT
Start: 2018-09-19 | End: 2018-09-24 | Stop reason: HOSPADM

## 2018-09-19 RX ORDER — LIDOCAINE 40 MG/G
CREAM TOPICAL
Status: DISCONTINUED | OUTPATIENT
Start: 2018-09-19 | End: 2018-09-20

## 2018-09-19 RX ORDER — MULTIPLE VITAMINS W/ MINERALS TAB 9MG-400MCG
1 TAB ORAL DAILY
Status: DISCONTINUED | OUTPATIENT
Start: 2018-09-20 | End: 2018-09-24 | Stop reason: HOSPADM

## 2018-09-19 RX ORDER — HYDROXYUREA 500 MG/1
1000 CAPSULE ORAL 2 TIMES DAILY
Status: DISCONTINUED | OUTPATIENT
Start: 2018-09-19 | End: 2018-09-19

## 2018-09-19 RX ORDER — POLYETHYLENE GLYCOL 3350 17 G/17G
17 POWDER, FOR SOLUTION ORAL DAILY
Status: DISCONTINUED | OUTPATIENT
Start: 2018-09-20 | End: 2018-09-24 | Stop reason: HOSPADM

## 2018-09-19 RX ORDER — FUROSEMIDE 20 MG
40 TABLET ORAL 2 TIMES DAILY
Status: DISCONTINUED | OUTPATIENT
Start: 2018-09-20 | End: 2018-09-20

## 2018-09-19 RX ORDER — LEVOTHYROXINE SODIUM 75 UG/1
75 TABLET ORAL DAILY
Status: DISCONTINUED | OUTPATIENT
Start: 2018-09-20 | End: 2018-09-24 | Stop reason: HOSPADM

## 2018-09-19 RX ORDER — POTASSIUM CHLORIDE 750 MG/1
20 TABLET, EXTENDED RELEASE ORAL 2 TIMES DAILY
Status: DISCONTINUED | OUTPATIENT
Start: 2018-09-19 | End: 2018-09-20

## 2018-09-19 RX ORDER — CARBOXYMETHYLCELLULOSE SODIUM 5 MG/ML
1 SOLUTION/ DROPS OPHTHALMIC 3 TIMES DAILY PRN
Status: DISCONTINUED | OUTPATIENT
Start: 2018-09-19 | End: 2018-09-24 | Stop reason: HOSPADM

## 2018-09-19 RX ORDER — ACETAMINOPHEN 325 MG/1
325 TABLET ORAL EVERY 4 HOURS PRN
Status: DISCONTINUED | OUTPATIENT
Start: 2018-09-19 | End: 2018-09-20

## 2018-09-19 RX ORDER — CLOPIDOGREL BISULFATE 75 MG/1
75 TABLET ORAL DAILY
Status: DISCONTINUED | OUTPATIENT
Start: 2018-09-20 | End: 2018-09-20

## 2018-09-19 RX ORDER — IPRATROPIUM BROMIDE AND ALBUTEROL SULFATE 2.5; .5 MG/3ML; MG/3ML
3 SOLUTION RESPIRATORY (INHALATION)
Status: DISCONTINUED | OUTPATIENT
Start: 2018-09-19 | End: 2018-09-19

## 2018-09-19 RX ORDER — ATORVASTATIN CALCIUM 80 MG/1
80 TABLET, FILM COATED ORAL EVERY EVENING
Status: DISCONTINUED | OUTPATIENT
Start: 2018-09-19 | End: 2018-09-24 | Stop reason: HOSPADM

## 2018-09-19 RX ORDER — DOCUSATE SODIUM 100 MG/1
100 CAPSULE, LIQUID FILLED ORAL DAILY
Status: DISCONTINUED | OUTPATIENT
Start: 2018-09-20 | End: 2018-09-24 | Stop reason: HOSPADM

## 2018-09-19 RX ORDER — ALBUTEROL SULFATE 0.83 MG/ML
2.5 SOLUTION RESPIRATORY (INHALATION)
Status: DISCONTINUED | OUTPATIENT
Start: 2018-09-19 | End: 2018-09-21

## 2018-09-19 RX ORDER — ONDANSETRON 2 MG/ML
4 INJECTION INTRAMUSCULAR; INTRAVENOUS EVERY 6 HOURS PRN
Status: DISCONTINUED | OUTPATIENT
Start: 2018-09-19 | End: 2018-09-24 | Stop reason: HOSPADM

## 2018-09-19 RX ORDER — HYDROXYUREA 500 MG/1
1000 CAPSULE ORAL EVERY EVENING
Status: DISCONTINUED | OUTPATIENT
Start: 2018-09-19 | End: 2018-09-24 | Stop reason: HOSPADM

## 2018-09-19 RX ORDER — AMIODARONE HYDROCHLORIDE 200 MG/1
200 TABLET ORAL DAILY
Status: DISCONTINUED | OUTPATIENT
Start: 2018-09-19 | End: 2018-09-24 | Stop reason: HOSPADM

## 2018-09-19 RX ORDER — ONDANSETRON 4 MG/1
4 TABLET, ORALLY DISINTEGRATING ORAL EVERY 6 HOURS PRN
Status: DISCONTINUED | OUTPATIENT
Start: 2018-09-19 | End: 2018-09-24 | Stop reason: HOSPADM

## 2018-09-19 RX ADMIN — PANTOPRAZOLE SODIUM 40 MG: 40 TABLET, DELAYED RELEASE ORAL at 20:05

## 2018-09-19 RX ADMIN — POTASSIUM CHLORIDE 20 MEQ: 750 TABLET, EXTENDED RELEASE ORAL at 20:04

## 2018-09-19 RX ADMIN — AMIODARONE HYDROCHLORIDE 200 MG: 200 TABLET ORAL at 20:05

## 2018-09-19 RX ADMIN — Medication 12.5 MG: at 20:05

## 2018-09-19 RX ADMIN — ACETAMINOPHEN 325 MG: 325 TABLET, FILM COATED ORAL at 23:15

## 2018-09-19 RX ADMIN — ATORVASTATIN CALCIUM 80 MG: 80 TABLET, FILM COATED ORAL at 20:05

## 2018-09-19 RX ADMIN — HYDROXYUREA 1000 MG: 500 CAPSULE ORAL at 20:07

## 2018-09-19 RX ADMIN — ALBUTEROL SULFATE 2.5 MG: 2.5 SOLUTION RESPIRATORY (INHALATION) at 20:35

## 2018-09-19 ASSESSMENT — ACTIVITIES OF DAILY LIVING (ADL): ADLS_ACUITY_SCORE: 11

## 2018-09-19 ASSESSMENT — PAIN DESCRIPTION - DESCRIPTORS: DESCRIPTORS: PRESSURE

## 2018-09-19 NOTE — LETTER
Health Information Management Services               Recipient:  [facility]  PH: [phone]  F: [phone]      Sender:  CAROL ANN Almanza, MYCHAL  [address]   Phone: [phone]  Pager: [phone]  Unit: [phone]        Date: September 24, 2018  Patient Name:  Castillo De Anda  Routing Message:  Discharge orders enclosed.  Will send summary separately.          The documents accompanying this notice contain confidential information belonging to the sender.  This information is intended only for the use of the individual or entity named above.  The authorized recipient of this information is prohibited from disclosing this information to any other party and is required to destroy the information after its stated need has been fulfilled, unless otherwise required by state law.      If you are not the intended recipient, you are hereby notified that any disclosure, copying, distribution or action taken in reliance on the contents of these documents is strictly prohibited. If you have received this document in error, please notify [facility] immediately at [phone].  You may return the document via fax ([phone]) or return mail  (Health Information Management, , [address] [place] [address]).

## 2018-09-19 NOTE — IP AVS SNAPSHOT
"` `           [address] [facility]: [phone]                                              INTERAGENCY TRANSFER FORM - NURSING   2018                    Hospital Admission Date: 2018  JOSE SOLER   : 1950  Sex: Male        Attending Provider: Jason Franco MD     Allergies:  No Known Allergies    Infection:  None   Service:  CARDIOTHORAC    Ht:  1.753 m (5' 9\")   Wt:  72.3 kg (159 lb 6.4 oz)   Admission Wt:  71.3 kg (157 lb 3.2 oz)    BMI:  23.54 kg/m 2   BSA:  1.88 m 2            Patient PCP Information     Provider PCP Type    TRAN Poe      Current Code Status     Date Active Code Status Order ID Comments User Context       Prior      Code Status History     Date Active Date Inactive Code Status Order ID Comments User Context    2018 10:50 AM  Full Code [id number]  Torsten Ruiz PA Outpatient    2018  4:54 PM 2018 10:50 AM Full Code [id number]  Torsten Ruiz PA Inpatient    2018 12:49 PM 2018 12:45 PM Full Code [id number]  Janice Disla PA-C Outpatient    8/10/2018 10:41 PM 2018 12:49 PM Full Code [id number]  Hiram Meyers MD Inpatient    2018  5:33 AM 8/10/2018 10:41 PM Full Code [id number]  Hermes Munroe MD Inpatient      Advance Directives        Scanned docmt in ACP Activity?           No scanned doc        Hospital Problems as of 2018              Priority Class Noted POA    Dyspnea Medium  2018 Yes      Non-Hospital Problems as of 2018     None      Immunizations     Name Date      Influenza (High Dose) 3 valent vaccine 18          END      ASSESSMENT     Discharge Profile Flowsheet     DISCHARGE NEEDS ASSESSMENT     Inspection of bony prominences  Full except (identify areas not inspected) 18 1124    Equipment Currently Used at Home  walker, standard;grab bar 18 1542   Full except areas not inspected   Hip, left;Hip, right;Buttock, left;Buttock, " "right;Sacrum;Coccyx (repositions regularly and independently) 09/24/18 1124    Transportation Available  car 09/21/18 1542   Inspection under devices  Full 09/24/18 1124    GASTROINTESTINAL (ADULT,PEDIATRIC,OB)     Not Inspected under devices  Other (chest tube dressing) 09/21/18 0958    GI WDL  WDL 09/24/18 1124   Skin WDL  ex 09/24/18 1124    Last Bowel Movement  09/24/18 09/24/18 1124   Skin Temperature  warm 09/23/18 2339    GI Signs/Symptoms  diarrhea 09/21/18 0801   Skin Moisture  flaky;dry 09/24/18 1124    Passing flatus  yes 09/24/18 1124   Skin Integrity  bruise(s);scar(s);incision(s) 09/24/18 1124    COMMUNICATION ASSESSMENT     SAFETY      Patient's communication style  spoken language (English or Bilingual) 09/19/18 1633   Safety WDL  WDL 09/24/18 1124    SKIN     All Alarms  none present 09/24/18 1124                 Assessment WDL (Within Defined Limits) Definitions           Safety WDL     Effective: 09/28/15    Row Information: <b>WDL Definition:</b> Bed in low position, wheels locked; call light in reach; upper side rails up x 2; ID band on<br> <font color=\"gray\"><i>Item=AS safety wdl>>List=AS safety wdl>>Version=F14</i></font>      Skin WDL     Effective: 09/28/15    Row Information: <b>WDL Definition:</b> Warm; dry; intact; elastic; without discoloration; pressure points without redness<br> <font color=\"gray\"><i>Item=AS skin wdl>>List=AS skin wdl>>Version=F14</i></font>      Vitals     Vital Signs Flowsheet     VITAL SIGNS     Sleep  normal sleep 09/24/18 1057    Temp  98.1  F (36.7  C) 09/24/18 1057   ANALGESIA SIDE EFFECTS MONITORING      Temp src  Oral 09/24/18 1057   Side Effects Monitoring: Respiratory Quality  R 09/24/18 1057    Resp  18 09/24/18 1057   Side Effects Monitoring: Respiratory Depth  N 09/24/18 1057    Pulse  101 09/20/18 1831   Side Effects Monitoring: Sedation Level  1 09/24/18 1057    Heart Rate  93 09/24/18 1057   HEIGHT AND WEIGHT      Pulse/Heart Rate Source  Monitor " "09/23/18 1057   Height  1.753 m (5' 9\") 09/18/18 1316    BP  98/77 09/24/18 1056   Weight  72.3 kg (159 lb 6.4 oz) 09/24/18 0531    BP Location  Right arm 09/24/18 1057   Weight Method  Standing scale 09/24/18 0531    Pain Score  0 (None) 09/18/18 1455   POSITIONING      OXYGEN THERAPY     Body Position  sitting up in bed 09/24/18 1057    SpO2  99 % 09/24/18 1056   Head of Bed (HOB)  HOB at 45 degrees 09/24/18 1057    O2 Device  None (Room air) 09/24/18 1057   Chair  Upright in chair 09/24/18 0743    Oxygen Delivery  2 LPM 09/20/18 1622   Positioning/Transfer Devices  pillows 09/20/18 2248    PAIN/COMFORT     DAILY CARE      Patient Currently in Pain  denies 09/24/18 1057   Activity Management  sitting, edge of bed 09/24/18 1057    Preferred Pain Scale  CAPA (Clinically Aligned Pain Assessment) ([facility] Adults Only) 09/24/18 1057   Activity Assistance Provided  assistance, stand-by 09/24/18 1057    Pain Location  Head 09/19/18 2310   Assistive Device Utilized  walker 09/24/18 1057    Pain Descriptors  Pressure 09/19/18 2310   ECG      Pain Intervention(s)  Medication (See eMAR) 09/19/18 2344   ECG Rhythm  Sinus rhythm 09/24/18 1057    CLINICALLY ALIGNED PAIN ASSESSMENT (CAPA) ([facility] ADULTS ONLY)     Ectopy  None 09/24/18 1057    Comfort  negligible pain 09/24/18 1057   Ectopy Frequency  Occasional 09/20/18 0933    Change in Pain  about the same 09/24/18 1057   Lead Monitored  Lead II 09/20/18 0933    Pain Control  fully effective 09/24/18 1057   Equipment  electrodes changed 09/20/18 0933    Functioning  can do everything I need to 09/24/18 1057                 Patient Lines/Drains/Airways Status    Active LINES/DRAINS/AIRWAYS     Name: Placement date: Placement time: Site: Days: Last dressing change:    Chest Tube 1 Right 14 Polish 09/20/18   1821      3     Pressure Injury 09/01/18 Coccyx Reddened, Blanchable NA 09/01/18   1918    22     Incision/Surgical Site 08/10/18 " Bilateral Chest 08/10/18   1802    44             Patient Lines/Drains/Airways Status    Active PICC/CVC     None            Intake/Output Detail Report     Date Intake     Output   Net    Shift P.O. Other IV Piggyback Total Urine Chest Tube Total       Day 09/23/18 0000 - 09/23/18 0659 -- -- -- -- 475 -- 475 -475    Anabella 09/23/18 0700 - 09/23/18 1459 600 -- -- 600 200 -- 200 400    Noc 09/23/18 1500 - 09/23/18 2359 240 -- -- 240 300 -- 300 -60    Day 09/24/18 0000 - 09/24/18 0659 -- -- -- -- 375 -- 375 -375    Anabella 09/24/18 0700 - 09/24/18 1459 -- -- -- -- 250 -- 250 -250      Last Void/BM       Most Recent Value    Urine Occurrence 1 at 09/21/2018 0500    Stool Occurrence 1 at 09/19/2018 2000      Case Management/Discharge Planning     Case Management/Discharge Planning Flowsheet     LIVING ENVIRONMENT     Filed Complexity Screen Score  9 09/20/18 0916    Lives With  spouse 09/21/18 1542   ABUSE RISK SCREEN      Living Arrangements  house (split level) 09/21/18 1542   QUESTION TO PATIENT:  Has a member of your family or a partner(now or in the past) intimidated, hurt, manipulated, or controlled you in any way?  no 09/19/18 1647    COPING/STRESS     QUESTION TO PATIENT: Do you feel safe going back to the place where you are living?  yes 09/19/18 1647    Major Change/Loss/Stressor  other (see comments) (fall, broken aaron) 09/19/18 1633   OBSERVATION: Is there reason to believe there has been maltreatment of a vulnerable adult (ie. Physical/Sexual/Emotional abuse, self neglect, lack of adequate food, shelter, medical care, or financial exploitation)?  no 09/19/18 1647    DISCHARGE PLANNING     OTHER      Transportation Available  car 09/21/18 1542   Are you depressed or being treated for depression?  No 09/19/18 1647    FINAL RESOURCES     HOMICIDE RISK      Equipment Currently Used at Home  walker, standard;grab bar 09/21/18 1542   Feels Like Hurting Others  no 09/19/18 1647    / CAREGIVER

## 2018-09-19 NOTE — IP AVS SNAPSHOT
` `     UNIT [address] [facility] BANK: [phone]            Medication Administration Report for Castillo De Anda as of 09/24/18 1432   Legend:    Given Hold Not Given Due Canceled Entry Other Actions    Time Time (Time) Time  Time-Action       Inactive    Active    Linked        Medications 09/18/18 09/19/18 09/20/18 09/21/18 09/22/18 09/23/18 09/24/18    acetaminophen (TYLENOL) tablet 650 mg  Dose: 650 mg  Freq: EVERY 4 HOURS PRN Route: PO  PRN Comment: pain  Start: 09/20/18 0025   Admin Instructions: Maximum acetaminophen dose from all sources = 75 mg/kg/day not to exceed 4 grams/day.    Admin. Amount: 2 tablet (2 × 325 mg tablet)  Last Admin: 09/20/18 2115  Dispense Loc: [facility] ADS 6C2       2115 (650 mg)-Given               albuterol neb solution 2.5 mg  Dose: 2.5 mg  Freq: 4 TIMES DAILY PRN Route: NEBULIZATION  PRN Reason: wheezing  Start: 09/21/18 1715   Admin. Amount: 2.5 mg = 3 mL Conc: 2.5 mg/3 mL  Dispense Loc: [facility] ADS [facility]  Volume: 3 mL               amiodarone (PACERONE/CODARONE) tablet 200 mg  Dose: 200 mg  Freq: DAILY Route: PO  Start: 09/19/18 1900   Admin Instructions: Avoid grapefruit juice during oral amiodarone treatment.    Admin. Amount: 1 tablet (1 × 200 mg tablet)  Last Admin: 09/24/18 0752  Dispense Loc: [facility] ADS 6C2      2005 (200 mg)-Given        0911 (200 mg)-Given        0841 (200 mg)-Given        0804 (200 mg)-Given        0906 (200 mg)-Given        0752 (200 mg)-Given           atorvastatin (LIPITOR) tablet 80 mg  Dose: 80 mg  Freq: EVERY EVENING Route: PO  Start: 09/19/18 1800   Admin. Amount: 1 tablet (1 × 80 mg tablet)  Last Admin: 09/23/18 1812  Dispense Loc: [facility] ADS 6C2      2005 (80 mg)-Given        1941 (80 mg)-Given        1749 (80 mg)-Given        1757 (80 mg)-Given        1812 (80 mg)-Given        [ ] 1800           Carboxymethylcellulose Sod PF (REFRESH PLUS) 0.5 % ophthalmic solution 1 drop  Dose: 1 drop  Freq: 3 TIMES DAILY PRN Route: Both Eyes  PRN Reason: dry  eyes  Start: 09/19/18 1654   Admin. Amount: 1 drop  Dispense Loc: [facility]               clopidogrel (PLAVIX) tablet 75 mg  Dose: 75 mg  Freq: DAILY Route: PO  Start: 09/22/18 0800   Admin. Amount: 1 tablet (1 × 75 mg tablet)  Last Admin: 09/24/18 0752  Dispense Loc: [facility] ADS 6C2         0804 (75 mg)-Given        0906 (75 mg)-Given        0752 (75 mg)-Given           docusate sodium (COLACE) capsule 100 mg  Dose: 100 mg  Freq: DAILY Route: PO  Start: 09/20/18 0800   Admin. Amount: 1 capsule (1 × 100 mg capsule)  Last Admin: 09/20/18 0911  Dispense Loc: [facility] ADS 6C2       0911 (100 mg)-Given        (0749)-Not Given [C]        (0808)-Not Given        (0910)-Not Given        (0751)-Not Given           hydroxyurea (HYDREA) capsule CHEMO 1,000 mg  Dose: 1,000 mg  Freq: EVERY EVENING Route: PO  Indications Comment: ESSENTIAL THROMBOCYTHEMIA  Start: 09/19/18 2000   Admin Instructions: Do not crush  1500 mg in the am, 1000 mg in the evening    Admin. Amount: 2 capsule (2 × 500 mg capsule)  Last Admin: 09/23/18 1931  Dispense Loc: [facility] ADS 6C2      2007 (1,000 mg)-Given        2114 (1,000 mg)-Given        1941 (1,000 mg)-Given        2057 (1,000 mg)-Given        1931 (1,000 mg)-Given        [ ] 2000           hydroxyurea (HYDREA) capsule CHEMO 1,500 mg  Dose: 1,500 mg  Freq: EVERY MORNING Route: PO  Indications Comment: ESSENTIAL THROMBOCYTHEMIA  Start: 09/20/18 0800   Admin Instructions: Do not crush  1500 mg in the am, 1000 mg in the evening    Admin. Amount: 3 capsule (3 × 500 mg capsule)  Last Admin: 09/24/18 0752  Dispense Loc: [facility] ADS 6C2       0925 (1,500 mg)-Given        0841 (1,500 mg)-Given        0803 (1,500 mg)-Given        0906 (1,500 mg)-Given        0752 (1,500 mg)-Given           levothyroxine (SYNTHROID/LEVOTHROID) tablet 75 mcg  Dose: 75 mcg  Freq: DAILY Route: PO  Start: 09/20/18 0800   Admin. Amount: 1 tablet (1 × 75 mcg tablet)  Last Admin: 09/24/18 0752  Dispense Loc: [facility]  "ADS 6C2       0911 (75 mcg)-Given        0841 (75 mcg)-Given        0804 (75 mcg)-Given        0906 (75 mcg)-Given        0752 (75 mcg)-Given           lidocaine (LMX4) cream  Freq: EVERY 1 HOUR PRN Route: Top  PRN Reason: pain  PRN Comment: with VAD insertion or accessing implanted port.  Start: 09/20/18 1324   Admin Instructions: Do NOT give if patient has a history of allergy to any local anesthetic or any \"dara\" product.   Apply 30 minutes prior to VAD insertion or port access. MAX Dose: 2.5 g (  of 5 g tube)    Dispense Loc: [facility] ADS 6C2  POC: IR Pre-procedure               lidocaine 1 % 1 mL  Dose: 1 mL  Freq: EVERY 1 HOUR PRN Route: OTHER  PRN Comment: mild pain with VAD insertion or accessing implanted port  Start: 09/20/18 1324   Admin Instructions: Do NOT give if patient has a history of allergy to any local anesthetic or any \"dara\" product. MAX dose 1 mL subcutaneous OR intradermal in divided doses.    Admin. Amount: 1 mL  Dispense Loc: [facility] ADS 6C2  Volume: 2 mL  POC: IR Pre-procedure               medication instruction  Freq: CONTINUOUS PRN Route: XX  Start: 09/19/18 1654   Admin Instructions: No IV fluids    Dispense Loc: [facility]               melatonin tablet 1 mg  Dose: 1 mg  Freq: AT BEDTIME PRN Route: PO  PRN Comment: insomnia  Start: 09/19/18 1654   Admin. Amount: 1 tablet (1 × 1 mg tablet)  Dispense Loc: [facility]               metoprolol tartrate (LOPRESSOR) half-tab 12.5 mg  Dose: 12.5 mg  Freq: 2 TIMES DAILY Route: PO  Start: 09/19/18 2000   Admin. Amount: 1 half-tab (1 × 12.5 mg half-tab)  Last Admin: 09/24/18 0752  Dispense Loc: [facility]      2005 (12.5 mg)-Given        0911 (12.5 mg)-Given       2115 (12.5 mg)-Given        0841 (12.5 mg)-Given       1943 (12.5 mg)-Given        1007 (12.5 mg)-Given       2058 (12.5 mg)-Given        0906 (12.5 mg)-Given       1933 (12.5 mg)-Given        0752 (12.5 mg)-Given       [ ] 2000           multivitamin, " therapeutic with minerals (THERA-VIT-M) tablet 1 tablet  Dose: 1 tablet  Freq: DAILY Route: PO  Start: 09/20/18 0800   Admin. Amount: 1 tablet  Last Admin: 09/24/18 0752  Dispense Loc: [facility] ADS 6C2       0911 (1 tablet)-Given        0841 (1 tablet)-Given        0804 (1 tablet)-Given        0906 (1 tablet)-Given        0752 (1 tablet)-Given           ondansetron (ZOFRAN-ODT) ODT tab 4 mg  Dose: 4 mg  Freq: EVERY 6 HOURS PRN Route: PO  PRN Reasons: nausea,vomiting  Start: 09/19/18 1654   Admin Instructions: This is Step 1 of nausea and vomiting management.  If nausea not resolved in 15 minutes, go to Step 2 prochlorperazine (COMPAZINE). Do not push through foil backing. Peel back foil and gently remove. Place on tongue immediately. Administration with liquid unnecessary  With dry hands, peel back foil backing and gently remove tablet; do not push oral disintegrating tablet through foil backing; administer immediately on tongue and oral disintegrating tablet dissolves in seconds; then swallow with saliva; liquid not required.    Admin. Amount: 1 tablet (1 × 4 mg tablet)  Dispense Loc: [facility] Main [facility]              Or  ondansetron (ZOFRAN) injection 4 mg  Dose: 4 mg  Freq: EVERY 6 HOURS PRN Route: IV  PRN Reasons: nausea,vomiting  Start: 09/19/18 1654   Admin Instructions: This is Step 1 of nausea and vomiting management.  If nausea not resolved in 15 minutes, go to Step 2 prochlorperazine (COMPAZINE).  Irritant. For ordered IV doses 0.1-4 mg, give IV Push undiluted over 2-5 minutes.    Admin. Amount: 4 mg = 2 mL Conc: 4 mg/2 mL  Dispense Loc: [facility] ADS 6C2  Infused Over: 2-5 Minutes  Volume: 2 mL               pantoprazole (PROTONIX) EC tablet 40 mg  Dose: 40 mg  Freq: 2 TIMES DAILY BEFORE MEALS Route: PO  Start: 09/19/18 1815   Admin. Amount: 1 tablet (1 × 40 mg tablet)  Last Admin: 09/24/18 0752  Dispense Loc: [facility] ADS 6C2      2005 (40 mg)-Given        0911 (40 mg)-Given       1941 (40 mg)-Given         0841 (40 mg)-Given       1612 (40 mg)-Given        0804 (40 mg)-Given       1633 (40 mg)-Given        0906 (40 mg)-Given       1624 (40 mg)-Given        0752 (40 mg)-Given       [ ] 1630           Patient is already receiving anticoagulation with heparin, enoxaparin (LOVENOX), warfarin (COUMADIN)  or other anticoagulant medication  Freq: CONTINUOUS PRN Route: XX  Start: 09/19/18 1654   Dispense Loc: [facility]               polyethylene glycol (MIRALAX/GLYCOLAX) Packet 17 g  Dose: 17 g  Freq: DAILY Route: PO  Start: 09/20/18 0800   Admin Instructions: 1 Packet = 17 grams. Mixed prescribed dose in 8 ounces of water. Follow with 8 oz. of water.    Admin. Amount: 17 g  Last Admin: 09/24/18 0751  Dispense Loc: [facility] ADS 6C2       0918 (17 g)-Given        (0750)-Not Given [C]        0808 (17 g)-Given        0906 (17 g)-Given        0751 (17 g)-Given           sodium chloride (PF) 0.9% PF flush 3 mL  Dose: 3 mL  Freq: EVERY 8 HOURS Route: IK  Start: 09/20/18 1330   Admin Instructions: And Q1H PRN, to lock peripheral IV dormant line.    Admin. Amount: 3 mL  Last Admin: 09/24/18 0532  Dispense Loc: [facility] Floor Stock  Volume: 3 mL  POC: IR Pre-procedure       (1953)-Not Given       (2100)-Not Given        0554 (3 mL)-Given       (1405)-Not Given [C]       2206 (3 mL)-Given        (0616)-Not Given              2059 (3 mL)-Given        0637 (3 mL)-Given       1444 (3 mL)-Given       2139 (3 mL)-Given        0532 (3 mL)-Given       [ ] 1330       [ ] 2130           sodium chloride (PF) 0.9% PF flush 3 mL  Dose: 3 mL  Freq: EVERY 1 HOUR PRN Route: IK  PRN Reason: line flush  Start: 09/20/18 1324   Admin Instructions: for peripheral IV flush post IV meds    Admin. Amount: 3 mL  Dispense Loc: [facility] Floor Stock  Volume: 3 mL  POC: IR Pre-procedure               sodium chloride (PF) 0.9% PF flush 3 mL  Dose: 3 mL  Freq: EVERY 1 HOUR PRN Route: IK  PRN Reason: line flush  PRN Comment: for peripheral IV flush post IV  meds  Start: 18   Admin. Amount: 3 mL  Dispense Loc: [facility] Floor Stock  Volume: 3 mL               umeclidinium-vilanterol (ANORO ELLIPTA) 62.5-25 MCG/INH oral inhaler 1 puff  Dose: 1 puff  Freq: DAILY Route: IN  Start: 18 0800   Admin. Amount: 1 puff  Last Admin: 18 0751  Dispense Loc: [facility]       0918 (1 puff)-Given        0841 (1 puff)-Given        0803 (1 puff)-Given        0909 (1 puff)-Given        0751 (1 puff)-Given           Warfarin Therapy Reminder (Check START DATE - warfarin may be starting in the FUTURE)  Dose: 1 each  Freq: CONTINUOUS PRN Route: XX  Start: 18   Admin Instructions: Patient is on Warfarin Therapy - check for daily order    Admin. Amount: 1 each  Dispense Loc: [facility]              Future Medications  Medications 18       warfarin (COUMADIN) tablet 5 mg  Dose: 5 mg  Freq: ONCE AT 6PM Route: PO  Start: 18 1800   Admin. Amount: 1 tablet (1 × 5 mg tablet)  Dispense Loc: [facility]  Administrations Remainin           [ ] 1800          Completed Medications  Medications 18         Dose: 5 mg  Freq: ONCE AT 6PM Route: PO  Start: 18 1800   End: 18   Admin Instructions: Per pharmacy warfarin management protocol    Admin. Amount: 1 tablet (1 × 5 mg tablet)  Last Admin: 18  Dispense Loc: [facility]  Administrations Remainin         1757 (5 mg)-Given               Dose: 5 mg  Freq: ONCE AT 6PM Route: PO  Start: 18 1800   End: 18   Admin. Amount: 1 tablet (1 × 5 mg tablet)  Last Admin: 18  Dispense Loc: [facility]  Administrations Remainin        1749 (5 mg)-Given                Dose: 7.5 mg  Freq: ONCE AT 6PM Route: PO  Start: 18 1800   End: 18   Admin Instructions: Per pharmacy warfarin  management protocol    Admin. Amount: 1 tablet (1 × 7.5 mg tablet)  Last Admin: 18  Dispense Loc: [facility]  Administrations Remainin          1812 (7.5 mg)-Given           Discontinued Medications  Medications 18         Dose: 2.5 mg  Freq: EVERY 4 HOURS WHILE AWAKE Route: NEBULIZATION  Start: 18 1800   End: 18   Admin. Amount: 2.5 mg = 3 mL Conc: 2.5 mg/3 mL  Last Admin: 18  Dispense Loc: [facility] ADS 6C2  Volume: 3 mL      2035 (2.5 mg)-Given                      0859 (2.5 mg)-Given       1300 (2.5 mg)-Given       1614 (2.5 mg)-Given       1953 (2.5 mg)-Given               0822 (2.5 mg)-Given       1228 (2.5 mg)-Given       1634 (2.5 mg)-Given       1709-Med Discontinued            Dose: 3 mL  Freq: EVERY 8 HOURS Route: IK  Start: 18 1700   End: 18 1303   Admin Instructions: And Q1H PRN, to lock peripheral IV dormant line.    Admin. Amount: 3 mL  Last Admin: 18  Dispense Loc: [facility] Floor Stock  Volume: 3 mL              0039 (3 mL)-Given       0912 (3 mL)-Given       1941 (3 mL)-Given        (0119)-Not Given       0843 (3 mL)-Given       1612 (3 mL)-Given        (0247)-Not Given       0806 (3 mL)-Given       1633 (3 mL)-Given        (0359)-Not Given       (0910)-Not Given [C]       1303-Med Discontinued

## 2018-09-19 NOTE — IP AVS SNAPSHOT
` `       Patient Information     Patient Name Sex     Castillo De Anda ([id number]) Male 1950       Room Bed    6507 6505-01      Patient Demographics     Address Phone    [address] [facility] [address] [phone] (Home)  [phone] (Mobile)      Patient Ethnicity & Race     Ethnic Group Patient Race    American White      Emergency Contact(s)     Name Relation Home Work Mobile    Radha De Anda Spouse [phone]      Christine  Daughter [phone]        Documents on File        Status Date Received Description       Documents for the Patient    Consent for EHR Access       Insurance Card Received 18 Medicare (new)    Patient ID Received 18 photo on file    [facility] Specified Other       Privacy Notice - Brandon Received 18     Consent for Services - Hospital and Clinic Received 18     HIE Auth Received 18     Consent for EHR Access Received 18     Insurance Card Received 18     Advance Directives and Living Will Received 18 Health Care Directive 18    Advance Directives and Living Will Not Received  Validation of AD 18    Patient Photo   Photo of Patient       Documents for the Encounter    CMS IM for Patient Signature Received 18       Admission Information     Attending Provider Admitting Provider Admission Type Admission Date/Time    Jason Franco MD Shaffer, Andrew Wesley, MD Urgent 18  1605    Discharge Date Hospital Service Auth/Cert Status Service Area     Cardiothoracic Surgery [facility]    Unit Room/Bed Admission Status       [facility] 6505/6505-01 Admission (Confirmed)       Admission     Complaint    shortness of breath, Dyspnea      Hospital Account     Name Acct ID Class Status Primary Coverage    Castillo De Anda [id number] Inpatient Open MEDICARE - MEDICARE            Guarantor Account (for Hospital Account #[id number])     Name Relation to Pt Service Area Active? Acct Type     Castillo De Anda Self FCS Yes Personal/Family    Address Phone          [address] [place] [address] [phone](H)              Coverage Information (for Hospital Account #[id number])     1. MEDICARE/MEDICARE     F/O Payor/Plan Precert #    MEDICARE/MEDICARE     Subscriber Subscriber #    Castillo De Anda [id number]    Address Phone    [employer]  [address]  [place] [address] [phone]          2. // FOR LIFE     F/O Payor/Plan Precert #    // FOR LIFE     Subscriber Subscriber #    Castillo De Anda [id number]    Address Phone     [employer]  [address]  [place] [address] [phone]

## 2018-09-19 NOTE — LETTER
Health Information Management Services               Recipient:  [facility]  PH: [phone]  F: [phone]        Sender:  CAROL ANN Almanza, MYCHAL  [address]   Phone: [phone]  Pager: [phone]  Unit: [phone]          Date: September 24, 2018  Patient Name:  Castillo De Anda  Routing Message:  Discharge orders enclosed.  Will send summary separately.  Lula Dickens          The documents accompanying this notice contain confidential information belonging to the sender.  This information is intended only for the use of the individual or entity named above.  The authorized recipient of this information is prohibited from disclosing this information to any other party and is required to destroy the information after its stated need has been fulfilled, unless otherwise required by state law.      If you are not the intended recipient, you are hereby notified that any disclosure, copying, distribution or action taken in reliance on the contents of these documents is strictly prohibited. If you have received this document in error, please notify [facility] immediately at [phone].  You may return the document via fax ([phone]) or return mail  (Health Information Management, , [address] [place] [address]).

## 2018-09-19 NOTE — LETTER
Transition Communication Hand-off for Care Transitions to Next Level of Care Provider    Name: Castillo De Anda  : 1950  MRN #: [id number]  Primary Care Provider: TRAN ARRIOLA     Primary Clinic: [address] [address]     Reason for Hospitalization:  shortness of breath  Dyspnea  Admit Date/Time: 2018  4:05 PM  Discharge Date: 18  Payor Source: Payor: MEDICARE / Plan: MEDICARE / Product Type: Medicare /     Readmission Assessment Measure (MARGARET) Risk Score/category: elevated           Reason for Communication Hand-off Referral: Fragility    Discharge Plan:  [facility]  PH: [phone]  F: [phone]     Concern for non-adherence with plan of care:   Y/N N  Discharge Needs Assessment:  Needs       Most Recent Value    Equipment Currently Used at Home walker, standard, grab bar    Transportation Available car          Already enrolled in Tele-monitoring program and name of program:  Unknown  Follow-up specialty is recommended: Yes    Follow-up plan:  Future Appointments  Date Time Provider Department Center   10/2/2018 1:15 PM UCXR1 UCCXR [facility]   10/2/2018 2:00 PM UC CVTS UCCTS [facility]   10/3/2018 9:45 AM Leny Oconnell MD [facility]       Any outstanding tests or procedures:    Procedures     Future Labs/Procedures    Oxygen - Nasal cannula     Comments:    Titrate PRN Lpm by nasal cannula to keep O2 sats 92% or greater.        Radiology & Cardiology Orders     Future Labs/Procedures Complete By Expires    X-ray Chest 2 vws*  10/2/2018 (Approximate) 2018        Referrals     Future Labs/Procedures    Occupational Therapy Adult Consult     Comments:    Evaluate and treat as clinically indicated.    Reason:  Deconditioning, sternal precautions    Physical Therapy Adult Consult     Comments:    Evaluate and treat as clinically indicated.    Reason:  Deconditioning, sternal precautions            Anguiano Recommendations:      None for today.   Thank you,    CAROL ANN Almanza, APSW  [address]   Phone: [phone]  Pager: [phone]  Unit: [phone]

## 2018-09-19 NOTE — H&P
History and Physical     Castillo De Anda   1950   MRN: [id number]         Cardiothoracic Admission Note      HPI:   Castillo De Anda is a 68 year old male with a history of CAD with STEMI and placement of HUSSEIN with subsequent ischemic VSD, s/p repair of VSD and tissue Tricuspid valve replacement (8/10/18 with Dr Franco). He was discharged to local TCU and evaluated in clinic for routine visit 9/18 with complaints of SOB.    His SOB has worsened over the past 4-5 days at his TCU facility.  He was seen in Surgery clinic on 9/18 and a CT showed persistent bilateral effusions and associated bibasilar atelectasis, RML consolidation, and elevated L hemidiaphragm.    Some chills this AM but no productive cough.    Some cold hands and feet.     Home Meds:  Prescriptions Prior to Admission   Medication Sig Dispense Refill Last Dose     ACETAMINOPHEN PO Take 325 mg by mouth every 4 hours as needed    Taking     AMIODARONE HCL PO Take 200 mg by mouth daily    Taking     Atorvastatin Calcium (LIPITOR PO) Take 80 mg by mouth daily    Taking     Carboxymethylcellulose Sod PF (REFRESH PLUS) 0.5 % SOLN ophthalmic solution 1 drop 3 times daily as needed for dry eyes   Taking     CLOPIDOGREL BISULFATE PO Take 75 mg by mouth daily    Taking     DOCUSATE SODIUM PO Take 100 mg by mouth daily   Taking     FUROSEMIDE PO Take 40 mg by mouth daily    Taking     hydroxyurea (HYDREA) 500 MG capsule CHEMO    Taking     LEVOTHYROXINE SODIUM PO Take 75 mcg by mouth daily    Taking     MELATONIN PO Take 1 mg by mouth as needed    Taking     METHOCARBAMOL PO Take 500 mg by mouth   Taking     METOPROLOL TARTRATE PO Take 12.5 mg by mouth 2 times daily   Taking     Multiple Vitamins-Minerals (MULTIVITAMIN ADULT PO) Take 1 tablet by mouth daily   Taking     PANTOPRAZOLE SODIUM PO Take 40 mg by mouth 2 times daily    Taking     polyethylene glycol (MIRALAX/GLYCOLAX) powder   0 Taking     POTASSIUM PO Take 20 mEq by mouth daily   Taking      umeclidinium-vilanterol (ANORO ELLIPTA) 62.5-25 MCG/INH oral inhaler Inhale 1 puff into the lungs daily   Taking     WARFARIN SODIUM PO Take 5 mg by mouth   Taking       Allergies:  No Known Allergies    PMH:  Past Medical History:   Diagnosis Date     Atrial flutter (H)      Coronary artery disease     R coronary occlusions treated with HUSSEIN, proximal LAD stenosis treated with HUSSEIN     Meningioma (H)      Myeloproliferative disorder (H)      Prostate cancer (H)      Tuberculosis      Ventricular septal defect        PSH:  Past Surgical History:   Procedure Laterality Date     C OPEN TREATMENT CALCANEAL FRACTURE W BONE GRAFT Right      CHOLECYSTECTOMY       ESOPHAGOSCOPY, GASTROSCOPY, DUODENOSCOPY (EGD), COMBINED N/A 8/24/2018    Procedure: COMBINED ESOPHAGOSCOPY, GASTROSCOPY, DUODENOSCOPY (EGD);  Upper Endoscopy with No Intervention; Two Gastric Ulcers;  Surgeon: Rocael Barber MD;  Location: [facility] OR     IRRIGATION AND DEBRIDEMENT CHEST WASHOUT, COMBINED N/A 8/13/2018    Procedure: COMBINED IRRIGATION AND DEBRIDEMENT CHEST WASHOUT;  COMBINED IRRIGATION AND DEBRIDEMENT CHEST WASHOUT, Closure;  Surgeon: Jason Franco MD;  Location: [facility] OR     REPAIR VENTRICULAR SEPTAL DEFECT N/A 8/10/2018    Procedure: REPAIR VENTRICULAR SEPTAL DEFECT;  Median Sternotomy, REPAIR VENTRICULAR SEPTAL DEFECT on pump oxygenation, Tricuspid valve replacement ;  Surgeon: Jason Franco MD;  Location: [facility] OR     SPLENECTOMY         FH:  No family history on file.    SH:  Social History     Social History     Marital status:      Spouse name: N/A     Number of children: N/A     Years of education: N/A     Social History Main Topics     Smoking status: Former Smoker     Smokeless tobacco: Never Used     Alcohol use No     Drug use: Not on file     Sexual activity: Not on file     Other Topics Concern     Not on file     Social History Narrative       ROS: No fevers or night sweats. No recent weight changes.  No new  visual or hearing complaints. No sore throat or nasal congestion.  No CP, palpitations, syncopal episodes, or dependent edema.  No new muscle or joint pain.  No weakness, numbness, or tingling of extremities. No new headaches. No changes in memory, mood, or affect.  No new rashes or bruises.     Physical Exam:  Temp:  [97.9  F (36.6  C)] 97.9  F (36.6  C)  Pulse:  [98] 98  Heart Rate:  [98] 98  Resp:  [18] 18  BP: (100)/(71) 100/71  SpO2:  [93 %] 93 %  Gen: NAD, resting comfortably in bed, conversational  Skin: no rashes or bruises, warm, dry  HEENT: normocephalic, atraumatic cranium, EOMI, sclerae anicteric. Oral mucosa pink and moist, no tonsillar edema or erythema, midline trachea, nonpalpable thyroid  Lungs: diminished bibasilar, otherwise CTA in all fields, no wheezing or rhonchi  CV: RRR, S1S2 normal, no murmur. Radial pulses and DP pulses symmetric. No dependent edema.   Abd: no scars, positive normal pitched bowel sounds, overall soft and non distended, nontender  Musculoskeletal: grossly intact, strength 5/5 upper and lower extremities  Neuro: AOx3, CN II-VII grossly intact, sensation/motor intact in upper and lower extremities  Mental: normal mood and affect, regular rate of speech    Labs:  BMP    Recent Labs  Lab 09/18/18  1516   *   POTASSIUM 4.9   CHLORIDE 98   GABRIELA 8.2*   CO2 22   BUN 12   CR 0.61*   *     CBC    Recent Labs  Lab 09/18/18  1516   WBC 5.1   RBC 2.60*   HGB 8.8*   HCT 26.9*   *   MCH 33.8*   MCHC 32.7   RDW 26.4*        INR    Recent Labs  Lab 09/18/18  1516   INR 2.18*      Hepatic Panel   Lab Results   Component Value Date    AST 35 09/18/2018     Lab Results   Component Value Date    ALT 56 09/18/2018     Lab Results   Component Value Date    ALBUMIN 2.4 09/18/2018       Imaging:  Chest CT 9/18/18-   1. Since prior CT dated 9/7/2018, the gaseous component of the right-sided hydropneumothorax has  resolved. There are persistent bilateral pleural effusions and  associated bibasilar atelectasis and/or  consolidation, not significantly changed from prior examination.  2. Persistent dense consolidation of the right middle lobe.  3. Tricuspid valve prosthesis. VSD repair changes. Intracardiac calcifications. Stable small pericardial effusion.      A/P: Castillo De Anda is a 68 year old male with a history of CAD/STEMI with ischemic VSD s/p repair and Tissue TVR and Hx post-op atrial fibrillation on coumadin (3 months).      - Procalcitonin, CRP inflammation, sputum sample, UA   - Hold one dose warfarin for afib tonight in plans for thoracentesis PRN   - SOB: Continue diuresis, + Nebs, pulm toilet, IS, flutter valve. Sniff test for L diaphragm.        Thank you for the opportunity to participate in the care of Castillo De Anda.    Patient and plan discussed with attending.      Torsten Ruiz PA-C  Cardiothoracic Surgery  Pager [phone]    1:15 PM   September 19, 2018

## 2018-09-19 NOTE — IP AVS SNAPSHOT
"    [address] [facility]: [phone]                                              INTERAGENCY TRANSFER FORM - PHYSICIAN ORDERS   2018                    Hospital Admission Date: 2018  JOSE SOLER   : 1950  Sex: Male        Attending Provider: Jason Franco MD     Allergies:  No Known Allergies    Infection:  None   Service:  CARDIOTHORAC    Ht:  1.753 m (5' 9\")   Wt:  72.3 kg (159 lb 6.4 oz)   Admission Wt:  71.3 kg (157 lb 3.2 oz)    BMI:  23.54 kg/m 2   BSA:  1.88 m 2            Patient PCP Information     Provider PCP Type    TRAN  [facility]      ED Clinical Impression     Diagnosis Description Comment Added By Time Added    Coronary artery disease involving native coronary artery of native heart without angina pectoris [I25.10] Coronary artery disease involving native coronary artery of native heart without angina pectoris [I25.10]  Janice Disla PA-C 2018  6:33 PM    Paroxysmal atrial fibrillation (H) [I48.0] Paroxysmal atrial fibrillation (H) [I48.0] post-op  Torsten Ruiz PA 2018 10:40 AM    Hypervolemia, unspecified hypervolemia type [E87.70] Hypervolemia, unspecified hypervolemia type [E87.70]  Torsten Ruiz PA 2018 10:42 AM    S/P VSD repair [Z98.890, Z87.74] S/P VSD repair [Z98.890, Z87.74]  Torsten Ruiz PA 2018 11:17 AM      Hospital Problems as of 2018              Priority Class Noted POA    Dyspnea Medium  2018 Yes      Non-Hospital Problems as of 2018     None      Code Status History     Date Active Date Inactive Code Status Order ID Comments User Context    2018 10:50 AM  Full Code [id number]  Torsten Ruiz PA Outpatient    2018  4:54 PM 2018 10:50 AM Full Code [id number]  Torsten Ruiz PA Inpatient    2018 12:49 PM 2018 12:45 PM Full Code [id number]  Janice Disla PA-C Outpatient    8/10/2018 10:41 PM 2018 12:49 PM Full Code " [id number]  Hiram Meyers MD Inpatient    8/6/2018  5:33 AM 8/10/2018 10:41 PM Full Code [id number]  Hermes Munroe MD Inpatient         Medication Review      START taking        Dose / Directions Comments    * Warfarin Therapy Reminder   Used for:  Paroxysmal atrial fibrillation (H)   Replaces:  WARFARIN SODIUM PO        Dose:  1 each   1 each continuous prn   Refills:  0        * warfarin 2 MG tablet   Commonly known as:  COUMADIN   Used for:  Paroxysmal atrial fibrillation (H)        Take 6 mg PO daily until next INR check, then dose per INR goal 2-3   Quantity:  90 tablet   Refills:  0        * Notice:  This list has 2 medication(s) that are the same as other medications prescribed for you. Read the directions carefully, and ask your doctor or other care provider to review them with you.      CONTINUE these medications which may have CHANGED, or have new prescriptions. If we are uncertain of the size of tablets/capsules you have at home, strength may be listed as something that might have changed.        Dose / Directions Comments    furosemide 20 MG tablet   Commonly known as:  LASIX   This may have changed:  medication strength   Used for:  Hypervolemia, unspecified hypervolemia type        Dose:  40 mg   Take 2 tablets (40 mg) by mouth 2 times daily   Quantity:  30 tablet   Refills:  0          CONTINUE these medications which have NOT CHANGED        Dose / Directions Comments    ACETAMINOPHEN PO        Dose:  650 mg   Take 650 mg by mouth every 4 hours as needed   Refills:  0        AMIODARONE HCL PO        Dose:  200 mg   Take 200 mg by mouth daily TCU has order to start 9/22 - this was how the script was sent when patient d/c'ed on 9/11 so pt has not been receiving at the TCU   Refills:  0        Carboxymethylcellulose Sod PF 0.5 % Soln ophthalmic solution   Commonly known as:  REFRESH PLUS        Dose:  1 drop   1 drop 3 times daily as needed for dry eyes   Refills:  0        CLOPIDOGREL BISULFATE PO         Dose:  75 mg   Take 75 mg by mouth daily   Refills:  0        DOCUSATE SODIUM PO        Dose:  100 mg   Take 100 mg by mouth daily   Refills:  0        hydroxyurea 500 MG capsule CHEMO   Commonly known as:  HYDREA        1500mg qam and 1000mg qpm   Refills:  0        LEVOTHYROXINE SODIUM PO        Dose:  75 mcg   Take 75 mcg by mouth daily   Refills:  0        LIPITOR PO        Dose:  80 mg   Take 80 mg by mouth daily   Refills:  0        MELATONIN PO        Dose:  1 mg   Take 1 mg by mouth as needed   Refills:  0        METHOCARBAMOL PO        Dose:  500 mg   Take 500 mg by mouth every 6 hours as needed   Refills:  0        METOPROLOL TARTRATE PO        Dose:  12.5 mg   Take 12.5 mg by mouth 2 times daily   Refills:  0        MULTIVITAMIN ADULT PO        Dose:  1 tablet   Take 1 tablet by mouth daily   Refills:  0        PANTOPRAZOLE SODIUM PO        Dose:  40 mg   Take 40 mg by mouth 2 times daily   Refills:  0        polyethylene glycol powder   Commonly known as:  MIRALAX/GLYCOLAX        Dose:  17 g   Take 17 g by mouth daily   Refills:  0        POTASSIUM PO        Dose:  20 mEq   Take 20 mEq by mouth 2 times daily   Refills:  0        umeclidinium-vilanterol 62.5-25 MCG/INH oral inhaler   Commonly known as:  ANORO ELLIPTA        Dose:  1 puff   Inhale 1 puff into the lungs daily   Refills:  0          STOP taking     WARFARIN SODIUM PO   Replaced by:  Warfarin Therapy Reminder                     Further instructions from your care team       [facility]      AFTER YOU GO HOME FROM YOUR HEART SURGERY   Surgery date 8/10/18    You previously had a sternotomy, avoid lifting anything greater than ten pounds for 6 weeks after surgery date and then less than 20 pounds for an additional 6 weeks.  No driving for 4 weeks after surgery or while on pain medication.     Avoid strenuous activities such as bowling, vacuuming, raking, shoveling, golf or tennis for 12 weeks after your  surgery date. It is okay to resume sex if you feel comfortable in doing so. You may have to try different positions with your partner.     Splint your chest incision by hugging a pillow or bringing your arms across your chest when coughing or sneezing. Avoid pushing off with your arms when getting up for the first month if you have had your sternum opened.    Shower or wash your incisions daily with soap and water (or as instructed), pat dry. Keep wound clean and dry, showers are okay after discharge, but don't let spray hit directly on incision. No baths or swimming for 1 month. Cover chest tube sites with gauze until they stop draining, then leave open to air. It is not abnormal for chest tube sites to drain yellowish/clear fluid for up to 2-3 weeks after surgery.   Watch for signs of infection: increased redness, tenderness, warmth or any drainage that appears infected (pus like) or is persistent.  Also a temperature > 100.5 F or chills. Call your surgeon or primary care provider's office immediately. Remove any skin glue left on incisions after 10-14 days. This will not affect your incision and can speed up healing.    Exercise is very important in your recovery. Please follow the guidelines set up for you in your cardiac rehab classes at the hospital. If outpatient cardiac rehab was ordered for you, we highly recommend you participate. If you have problems arranging your cardiac rehab, please call [phone].     Avoid sitting for prolonged periods of time, try to walk every hour during the day. If you have a leg incision, elevate your leg often when you are not walking.    Check your weight when you get home from the hospital and continue to check it daily through your recovery for at least a month. If you notice a weight gain of 2-3 pounds in a week, notify your primary care physician, cardiologist or surgeon.    Bowel activity may be slow after surgery. If necessary, you may take an over the counter  "laxative such as Milk of Magnesia or Miralax. You may have stool softeners prescribed (docusate sodium, Senokot). We recommend using stool softeners while using narcotics for pain (oxycodone/percocet, hydrocodone/vicodin, hydromorphone/dilaudid).      Wean OFF of narcotics (oxycodone, dilaudid, hydrocodone) as soon as possible. You should continue taking acetaminophen as long as you have any surgical pain as the first choice for pain control and add narcotics as necessary for pain to be tolerable.      DENTAL VISITS AFTER SURGERY  If you have had your heart valve repaired or replaced, we do not recommend having any dental work done for 6 months and you will need to take an antibiotic prior to dental visits from now on.  Please notify your dentist before any procedure for the proper treatment needed. The antibiotic is taken by mouth one hour prior to visit. This includes routine cleanings.  You can sometimes hear a mechanical valve \"clicking,\" this is normal and not a sign of something wrong.    DO NOT SMOKE.  IF YOU NEED HELP QUITTING, PLEASE TALK WITH YOUR CARDIOLOGIST OR PRIMARY DOCTOR.    You are on a blood thinner, follow the instructions you were given in the hospital and DO NOT SKIP this medication. Try and take it the same time everyday. Your primary care physician or coumadin clinic will manage the dosing. INR goal is 2-3 for 3 months for atrial fibrillation.     REGARDING PRESCRIPTION REFILLS.  If you need a refill on your pain medication contact us to discuss your pain and a possible one time refill.   All other medications will be adjusted, discontinued and re-filled by your primary care physician and/or your cardiologist as they were prior to your surgery. We have given you enough for one to three month with possibly one refill.    POST-OPERATIVE CLINIC VISITS  You have a follow up visit with [facility] Surgery Advance Care Practitioners on 10/2/18 at 2 pm (with a Chest X-ray prior to visit at 1:15 pm on 1st " floor) at the [facility].  You will then return to the care of your primary physician and your cardiologist.   It is important to see your cardiologist about 4-6 weeks after discharge and your primary care physician in 2-4 weeks after discharge. If you do not hear from a  in 7 days, please call [phone] (choose option 1) and request to be seen with a general cardiologist or someone that you have seen in the past.   If there is a need to return to see CT Surgery please call our  at [phone].    SURGICAL QUESTIONS  Please call Stacy Valero or Maria M Saldivar with surgical recovery and medication questions, the phone number is listed below.  They can assist you with your needs and contact other surgery care team members as indicated.    On weekends or after hours, please call [phone] and ask the  to   page the Cardiothoracic Surgery fellow on call.      Thank you,    Your Cardiothoracic Surgery Team  Stacy Valero RN Care Coordinator-  [phone]   Maria M Saldivar RN Care Coordinator-  [phone]  Janice Padron NP-C                     Summary of Visit     Reason for your hospital stay       You returned to the hospital for diuresis and drainage of your right pleural effusion.             After Care     Activity - Up with nursing assistance           Advance Diet as Tolerated       Follow this diet upon discharge: Orders Placed This Encounter      Combination Diet Regular Diet Adult       Cardiac sternal precautions           Daily weights       Call Provider for weight gain of more than 2 pounds per day or 5 pounds per week.       General info for SNF       Length of Stay Estimate: Short Term Care: Estimated # of Days <30  Condition at Discharge: Improving  Level of care:skilled   Rehabilitation Potential: Good  Admission H&P remains valid and up-to-date: Yes  Recent  Chemotherapy: N/A  Use Nursing Home Standing Orders: Yes       Intake and output       Every shift       Mantoux instructions       Give two-step Mantoux (PPD) Per Facility Policy Yes       Wound care (specify)       Site:   Sternum and chest tube sites   Instructions:    You have dissolvable sutures under your skin that do not need to be removed.  Pat wound dressing dry after showers.  Skin glue will eventually fall off in 1-2 weeks.     Keep wound clean and dry, showers are okay after discharge, but don't let spray hit directly on incision. No baths or swimming for 1 month.  Clean wounds twice a day for 2 weeks with microklenz spray if available or just soap and water. Cover chest tube sites with gauze until they stop draining, then leave open.             Procedures     Oxygen - Nasal cannula       Titrate PRN Lpm by nasal cannula to keep O2 sats 92% or greater.             Referrals     Occupational Therapy Adult Consult       Evaluate and treat as clinically indicated.    Reason:  Deconditioning, sternal precautions       Physical Therapy Adult Consult       Evaluate and treat as clinically indicated.    Reason:  Deconditioning, sternal precautions             Radiology & Cardiology Orders     Future Labs/Procedures Complete By Expires    X-ray Chest 2 vws*  10/2/2018 (Approximate) 12/23/2018      Radiology & Cardiology Orders     X-ray Chest 2 vws*                 Your next 10 appointments already scheduled     Oct 02, 2018  1:15 PM CDT   XR CHEST 2 VIEWS with [id number]   [facility] ([facility])    [address]  [facility] [address] [phone]           How do I prepare for my exam? (Food and drink instructions) No Food and Drink Restrictions.  How do I prepare for my exam? (Other instructions) You do not need to do anything special for this exam.  What should I wear: Wear comfortable clothes.  How long does the exam take: Most scans take less  than 5 minutes.  What should I bring: Bring a list of your medicines, including vitamins, minerals and over-the-counter drugs. It is safest to leave personal items at home.  Do I need a :  No  is needed.  What do I need to tell my doctor: Tell your doctor if there s any chance you are pregnant.  What should I do after the exam: No restrictions, You may resume normal activities.  What is this test: An image of a specific body part shown in shades of black and white.  Who should I call with questions: If you have any questions, please call the Imaging Department where you will have your exam. Directions, parking instructions, and other information is available on our website, [email or web address].            Oct 02, 2018  2:00 PM CDT   (Arrive by 1:45 PM)   Return Visit with  AV   [facility] ([facility])    [address] [place] [address]  [address]  [facility] [address] [phone]            Oct 03, 2018  9:45 AM CDT   (Arrive by 9:30 AM)   New Patient Visit with Leny Oconnell MD   [facility] ([facility])    [address] [place] [address]  [address]  [facility] [address] [phone]              Follow-Up Appointment Instructions     Future Labs/Procedures    Follow Up and recommended labs and tests     Comments:    Follow up with Rehab provider care team.  The following labs/tests are recommended: Follow INR.      Follow-Up Appointment Instructions     Follow Up and recommended labs and tests       Follow up with Rehab provider care team.  The following labs/tests are recommended: Follow INR.             Statement of Approval     Ordered          09/24/18 1202  I have reviewed and agree with all the recommendations and orders detailed in this document.  EFFECTIVE NOW     Approved and electronically signed by:  Torsten Ruiz PA

## 2018-09-19 NOTE — IP AVS SNAPSHOT
[address] [address]    Phone:  [phone]                                       After Visit Summary   9/19/2018    Castillo De Anda    MRN: [id number]           After Visit Summary Signature Page     I have received my discharge instructions, and my questions have been answered. I have discussed any challenges I see with this plan with the nurse or doctor.    ..........................................................................................................................................  Patient/Patient Representative Signature      ..........................................................................................................................................  Patient Representative Print Name and Relationship to Patient    ..................................................               ................................................  Date                                   Time    ..........................................................................................................................................  Reviewed by Signature/Title    ...................................................              ..............................................  Date                                               Time          22EPIC Rev 08/18

## 2018-09-19 NOTE — IP AVS SNAPSHOT
MRN:[id number]                      After Visit Summary   9/19/2018    Castillo De Anda    MRN: [id number]           Thank you!     Thank you for choosing [facility] for your care. Our goal is always to provide you with excellent care. Hearing back from our patients is one way we can continue to improve our services. Please take a few minutes to complete the written survey that you may receive in the mail after you visit with us. Thank you!        Patient Information     Date Of Birth          1950        Designated Caregiver       Most Recent Value    Caregiver    Will someone help with your care after discharge? yes    Name of designated caregiver spouse    Phone number of caregiver na    Caregiver address na      About your hospital stay     You were admitted on:  September 19, 2018 You last received care in the:  [address] [facility] [place]    You were discharged on:  September 24, 2018        Reason for your hospital stay       You returned to the hospital for diuresis and drainage of your right pleural effusion.                  Who to Call     For medical emergencies, please call 911.  For non-urgent questions about your medical care, please call your primary care provider or clinic, [phone]          Attending Provider     Provider Specialty    Jason Franco MD Cardiology       Primary Care Provider Office Phone # Fax #    Vinod Frank [phone]      After Care Instructions     Activity - Up with nursing assistance           Advance Diet as Tolerated       Follow this diet upon discharge: Orders Placed This Encounter      Combination Diet Regular Diet Adult            Cardiac sternal precautions           Daily weights       Call Provider for weight gain of more than 2 pounds per day or 5 pounds per week.            General info for SNF       Length of Stay Estimate: Short Term Care: Estimated # of Days <30  Condition at Discharge: Improving  Level of  care:skilled   Rehabilitation Potential: Good  Admission H&P remains valid and up-to-date: Yes  Recent Chemotherapy: N/A  Use Nursing Home Standing Orders: Yes            Intake and output       Every shift            Mantoux instructions       Give two-step Mantoux (PPD) Per Facility Policy Yes            Oxygen - Nasal cannula       Titrate PRN Lpm by nasal cannula to keep O2 sats 92% or greater.            Wound care (specify)       Site:   Sternum and chest tube sites   Instructions:    You have dissolvable sutures under your skin that do not need to be removed.  Pat wound dressing dry after showers.  Skin glue will eventually fall off in 1-2 weeks.     Keep wound clean and dry, showers are okay after discharge, but don't let spray hit directly on incision. No baths or swimming for 1 month.  Clean wounds twice a day for 2 weeks with microklenz spray if available or just soap and water. Cover chest tube sites with gauze until they stop draining, then leave open.                  Follow-up Appointments     Follow Up and recommended labs and tests       Follow up with Rehab provider care team.  The following labs/tests are recommended: Follow INR.                  Your next 10 appointments already scheduled     Oct 02, 2018  1:15 PM CDT   XR CHEST 2 VIEWS with UCXR1   [facility] ([facility])    [address]  1st Floor  [facility] [address] [phone]           How do I prepare for my exam? (Food and drink instructions) No Food and Drink Restrictions.  How do I prepare for my exam? (Other instructions) You do not need to do anything special for this exam.  What should I wear: Wear comfortable clothes.  How long does the exam take: Most scans take less than 5 minutes.  What should I bring: Bring a list of your medicines, including vitamins, minerals and over-the-counter drugs. It is safest to leave personal items at home.  Do I need a :  No  is  needed.  What do I need to tell my doctor: Tell your doctor if there s any chance you are pregnant.  What should I do after the exam: No restrictions, You may resume normal activities.  What is this test: An image of a specific body part shown in shades of black and white.  Who should I call with questions: If you have any questions, please call the Imaging Department where you will have your exam. Directions, parking instructions, and other information is available on our website, [email or web address].            Oct 02, 2018  2:00 PM CDT   (Arrive by 1:45 PM)   Return Visit with  CVTS   [facility] ([facility])    [address] [place] [address]  [address]  [facility] [id number] [phone]            Oct 03, 2018  9:45 AM CDT   (Arrive by 9:30 AM)   New Patient Visit with Leny Oconnell MD   [facility] ([facility])    [address] [place] [address]  [address]  [facility] [id number] [phone]              Additional Services     Occupational Therapy Adult Consult       Evaluate and treat as clinically indicated.    Reason:  Deconditioning, sternal precautions            Physical Therapy Adult Consult       Evaluate and treat as clinically indicated.    Reason:  Deconditioning, sternal precautions                  Future tests that were ordered for you     X-ray Chest 2 vws*                 Further instructions from your care team       [facility]      AFTER YOU GO HOME FROM YOUR HEART SURGERY   Surgery date 8/10/18    You previously had a sternotomy, avoid lifting anything greater than ten pounds for 6 weeks after surgery date and then less than 20 pounds for an additional 6 weeks.  No driving for 4 weeks after surgery or while on pain medication.     Avoid strenuous activities such as bowling, vacuuming, raking, shoveling, golf or tennis for 12 weeks after your surgery date. It is okay to resume sex if you  feel comfortable in doing so. You may have to try different positions with your partner.     Splint your chest incision by hugging a pillow or bringing your arms across your chest when coughing or sneezing. Avoid pushing off with your arms when getting up for the first month if you have had your sternum opened.    Shower or wash your incisions daily with soap and water (or as instructed), pat dry. Keep wound clean and dry, showers are okay after discharge, but don't let spray hit directly on incision. No baths or swimming for 1 month. Cover chest tube sites with gauze until they stop draining, then leave open to air. It is not abnormal for chest tube sites to drain yellowish/clear fluid for up to 2-3 weeks after surgery.   Watch for signs of infection: increased redness, tenderness, warmth or any drainage that appears infected (pus like) or is persistent.  Also a temperature > 100.5 F or chills. Call your surgeon or primary care provider's office immediately. Remove any skin glue left on incisions after 10-14 days. This will not affect your incision and can speed up healing.    Exercise is very important in your recovery. Please follow the guidelines set up for you in your cardiac rehab classes at the hospital. If outpatient cardiac rehab was ordered for you, we highly recommend you participate. If you have problems arranging your cardiac rehab, please call [phone].     Avoid sitting for prolonged periods of time, try to walk every hour during the day. If you have a leg incision, elevate your leg often when you are not walking.    Check your weight when you get home from the hospital and continue to check it daily through your recovery for at least a month. If you notice a weight gain of 2-3 pounds in a week, notify your primary care physician, cardiologist or surgeon.    Bowel activity may be slow after surgery. If necessary, you may take an over the counter laxative such as Milk of Magnesia or Miralax. You  "may have stool softeners prescribed (docusate sodium, Senokot). We recommend using stool softeners while using narcotics for pain (oxycodone/percocet, hydrocodone/vicodin, hydromorphone/dilaudid).      Wean OFF of narcotics (oxycodone, dilaudid, hydrocodone) as soon as possible. You should continue taking acetaminophen as long as you have any surgical pain as the first choice for pain control and add narcotics as necessary for pain to be tolerable.      DENTAL VISITS AFTER SURGERY  If you have had your heart valve repaired or replaced, we do not recommend having any dental work done for 6 months and you will need to take an antibiotic prior to dental visits from now on.  Please notify your dentist before any procedure for the proper treatment needed. The antibiotic is taken by mouth one hour prior to visit. This includes routine cleanings.  You can sometimes hear a mechanical valve \"clicking,\" this is normal and not a sign of something wrong.    DO NOT SMOKE.  IF YOU NEED HELP QUITTING, PLEASE TALK WITH YOUR CARDIOLOGIST OR PRIMARY DOCTOR.    You are on a blood thinner, follow the instructions you were given in the hospital and DO NOT SKIP this medication. Try and take it the same time everyday. Your primary care physician or coumadin clinic will manage the dosing. INR goal is 2-3 for 3 months for atrial fibrillation.     REGARDING PRESCRIPTION REFILLS.  If you need a refill on your pain medication contact us to discuss your pain and a possible one time refill.   All other medications will be adjusted, discontinued and re-filled by your primary care physician and/or your cardiologist as they were prior to your surgery. We have given you enough for one to three month with possibly one refill.    POST-OPERATIVE CLINIC VISITS  You have a follow up visit with CVTS Surgery Advance Care Practitioners on 10/2/18 at 2 pm (with a Chest X-ray prior to visit at 1:15 pm on 1st floor) at the [facility] building.  " You will then return to the care of your primary physician and your cardiologist.   It is important to see your cardiologist about 4-6 weeks after discharge and your primary care physician in 2-4 weeks after discharge. If you do not hear from a  in 7 days, please call [phone] (choose option 1) and request to be seen with a general cardiologist or someone that you have seen in the past.   If there is a need to return to see CT Surgery please call our  at [phone].    SURGICAL QUESTIONS  Please call Stacy Valero or Maria M Saldivar with surgical recovery and medication questions, the phone number is listed below.  They can assist you with your needs and contact other surgery care team members as indicated.    On weekends or after hours, please call [phone] and ask the  to   page the Cardiothoracic Surgery fellow on call.      Thank you,    Your Cardiothoracic Surgery Team  Stacy Valero RN Care Coordinator-  [phone]   Maria M Saldivar RN Care Coordinator-  [phone]  Janice Padron NP-C                     Warfarin Instruction     You have started taking a medicine called warfarin. This is a blood-thinning medicine (anticoagulant). It helps prevent and treat blood clots.      Before leaving the hospital, make sure you know how much to take and how long to take it.      You will need regular blood tests to make sure your blood is clotting safely. It is very important to see your doctor for regular blood tests.    Talk to your doctor before taking any new medicine (this includes over-the-counter drugs and herbal products). Many medicines can interact with warfarin. This may cause more bleeding or too much clotting.     Eating a lot of vitamin K--found in green, leafy vegetables--can change the way warfarin works in your body. Do NOT avoid these foods. Instead, try to eat the same amount each  "day.     Bleeding is the most common side-effect of warfarin. You may notice bleeding gums, a bloody nose, bruises and bleeding longer when you cut yourself. See a doctor at once if:   o You cough up blood  o You find blood in your stool (poop)  o You have a deep cut, or a cut that bleeds longer than 10 minutes   o You have a bad cut, hard fall, accident or hit your head (go to urgent care or the emergency room).    For women who can get pregnant: This medicine can harm an unborn baby. Be very careful not to get pregnant while taking this medicine. If you think you might be pregnant, call your doctor right away.    For more information, read \"Guide to Warfarin Therapy,  the booklet you received in the hospital.        Pending Results     Date and Time Order Name Status Description    9/24/2018 1101 Fungus culture blood Preliminary     9/23/2018 1718 EKG 12-lead, tracing only Preliminary     9/20/2018 0926 POC US Guide for Thorcentesis In process             Statement of Approval     Ordered          09/24/18 1202  I have reviewed and agree with all the recommendations and orders detailed in this document.  EFFECTIVE NOW     Approved and electronically signed by:  Torsten Ruiz PA             Admission Information     Date & Time Provider Department Dept. Phone    9/19/2018 Jason Franco MD [address] [facility] [phone]      Your Vitals Were     Blood Pressure Pulse Temperature Respirations Weight Pulse Oximetry    98/77 (BP Location: Right arm, Cuff Size: Adult Regular) 101 98.1  F (36.7  C) (Oral) 18 72.3 kg (159 lb 6.4 oz) 99%    BMI (Body Mass Index)                   23.54 kg/m2           [employer] Information     Petsy lets you send messages to your doctor, view your test results, renew your prescriptions, schedule appointments and more. To sign up, go to [email or web address] . Click on \"Log in\" on the left side of the screen, which will take you to the Welcome page. Then click on " "\"Sign up Now\" on the right side of the page.     You will be asked to enter the access code listed below, as well as some personal information. Please follow the directions to create your username and password.     Your access code is: [email or web address]  Expires: 2018  3:06 PM     Your access code will  in 90 days. If you need help or a new code, please call your [facility] or [phone].        Equal Access to Services     [facility]: Hadii aad ku hadasho Soomaali, waaxda luqadaha, qaybta kaalmada adeegyada, giana aguiar . So [facility] [phone].    ATENCIÓN: Si habla español, tiene a durbin disposición servicios gratuitos de asistencia lingüística. [facility] [phone].    We comply with applicable federal civil rights laws and [place] laws. We do not discriminate on the basis of race, color, national origin, age, disability, sex, sexual orientation, or gender identity.               Review of your medicines      START taking        Dose / Directions    * Warfarin Therapy Reminder   Used for:  Paroxysmal atrial fibrillation (H)   Replaces:  WARFARIN SODIUM PO        Dose:  1 each   1 each continuous prn   Refills:  0       * warfarin 2 MG tablet   Commonly known as:  COUMADIN   Used for:  Paroxysmal atrial fibrillation (H)        Take 6 mg PO daily until next INR check, then dose per INR goal 2-3   Quantity:  90 tablet   Refills:  0       * Notice:  This list has 2 medication(s) that are the same as other medications prescribed for you. Read the directions carefully, and ask your doctor or other care provider to review them with you.      CONTINUE these medicines which may have CHANGED, or have new prescriptions. If we are uncertain of the size of tablets/capsules you have at home, strength may be listed as something that might have changed.        Dose / Directions    furosemide 20 MG tablet   Commonly known as:  LASIX   This may have changed:  medication strength   Used " for:  Hypervolemia, unspecified hypervolemia type        Dose:  40 mg   Take 2 tablets (40 mg) by mouth 2 times daily   Quantity:  30 tablet   Refills:  0         CONTINUE these medicines which have NOT CHANGED        Dose / Directions    ACETAMINOPHEN PO        Dose:  650 mg   Take 650 mg by mouth every 4 hours as needed   Refills:  0       AMIODARONE HCL PO        Dose:  200 mg   Take 200 mg by mouth daily TCU has order to start 9/22 - this was how the script was sent when patient d/c'ed on 9/11 so pt has not been receiving at the TCU   Refills:  0       Carboxymethylcellulose Sod PF 0.5 % Soln ophthalmic solution   Commonly known as:  REFRESH PLUS        Dose:  1 drop   1 drop 3 times daily as needed for dry eyes   Refills:  0       CLOPIDOGREL BISULFATE PO        Dose:  75 mg   Take 75 mg by mouth daily   Refills:  0       DOCUSATE SODIUM PO        Dose:  100 mg   Take 100 mg by mouth daily   Refills:  0       hydroxyurea 500 MG capsule CHEMO   Commonly known as:  HYDREA        1500mg qam and 1000mg qpm   Refills:  0       LEVOTHYROXINE SODIUM PO        Dose:  75 mcg   Take 75 mcg by mouth daily   Refills:  0       LIPITOR PO        Dose:  80 mg   Take 80 mg by mouth daily   Refills:  0       MELATONIN PO        Dose:  1 mg   Take 1 mg by mouth as needed   Refills:  0       METHOCARBAMOL PO        Dose:  500 mg   Take 500 mg by mouth every 6 hours as needed   Refills:  0       METOPROLOL TARTRATE PO        Dose:  12.5 mg   Take 12.5 mg by mouth 2 times daily   Refills:  0       MULTIVITAMIN ADULT PO        Dose:  1 tablet   Take 1 tablet by mouth daily   Refills:  0       PANTOPRAZOLE SODIUM PO        Dose:  40 mg   Take 40 mg by mouth 2 times daily   Refills:  0       polyethylene glycol powder   Commonly known as:  MIRALAX/GLYCOLAX        Dose:  17 g   Take 17 g by mouth daily   Refills:  0       POTASSIUM PO        Dose:  20 mEq   Take 20 mEq by mouth 2 times daily   Refills:  0       umeclidinium-vilanterol  62.5-25 MCG/INH oral inhaler   Commonly known as:  ANORO ELLIPTA        Dose:  1 puff   Inhale 1 puff into the lungs daily   Refills:  0         STOP taking     WARFARIN SODIUM PO   Replaced by:  Warfarin Therapy Reminder                Where to get your medicines      Some of these will need a paper prescription and others can be bought over the counter. Ask your nurse if you have questions.     You don't need a prescription for these medications     furosemide 20 MG tablet    warfarin 2 MG tablet    Warfarin Therapy Reminder                Protect others around you: Learn how to safely use, store and throw away your medicines at www.disposemymeds.org.             Medication List: This is a list of all your medications and when to take them. Check marks below indicate your daily home schedule. Keep this list as a reference.      Medications           Morning Afternoon Evening Bedtime As Needed    ACETAMINOPHEN PO   Take 650 mg by mouth every 4 hours as needed   Last time this was given:  650 mg on 9/20/2018  9:15 PM                                   AMIODARONE HCL PO   Take 200 mg by mouth daily TCU has order to start 9/22 - this was how the script was sent when patient d/c'ed on 9/11 so pt has not been receiving at the TCU   Last time this was given:  200 mg on 9/24/2018  7:52 AM                                   Carboxymethylcellulose Sod PF 0.5 % Soln ophthalmic solution   Commonly known as:  REFRESH PLUS   1 drop 3 times daily as needed for dry eyes                                   CLOPIDOGREL BISULFATE PO   Take 75 mg by mouth daily   Last time this was given:  75 mg on 9/24/2018  7:52 AM                                   DOCUSATE SODIUM PO   Take 100 mg by mouth daily   Last time this was given:  100 mg on 9/20/2018  9:11 AM                                   furosemide 20 MG tablet   Commonly known as:  LASIX   Take 2 tablets (40 mg) by mouth 2 times daily   Last time this was given:  40 mg on 9/20/2018   9:11 AM                                      hydroxyurea 500 MG capsule CHEMO   Commonly known as:  HYDREA   1500mg qam and 1000mg qpm   Last time this was given:  1,500 mg on 9/24/2018  7:52 AM            1500 mg @ 8AM           1000 mg @ 8PM               LEVOTHYROXINE SODIUM PO   Take 75 mcg by mouth daily   Last time this was given:  75 mcg on 9/24/2018  7:52 AM                                   LIPITOR PO   Take 80 mg by mouth daily   Last time this was given:  80 mg on 9/23/2018  6:12 PM                                   MELATONIN PO   Take 1 mg by mouth as needed                                      METHOCARBAMOL PO   Take 500 mg by mouth every 6 hours as needed                                   METOPROLOL TARTRATE PO   Take 12.5 mg by mouth 2 times daily                                      MULTIVITAMIN ADULT PO   Take 1 tablet by mouth daily   Last time this was given:  1 tablet on 9/24/2018  7:52 AM                                   PANTOPRAZOLE SODIUM PO   Take 40 mg by mouth 2 times daily   Last time this was given:  40 mg on 9/24/2018  7:52 AM                   Recommended to take ~30 minutes before eating                   polyethylene glycol powder   Commonly known as:  MIRALAX/GLYCOLAX   Take 17 g by mouth daily                                   POTASSIUM PO   Take 20 mEq by mouth 2 times daily                                      umeclidinium-vilanterol 62.5-25 MCG/INH oral inhaler   Commonly known as:  ANORO ELLIPTA   Inhale 1 puff into the lungs daily   Last time this was given:  1 puff on 9/24/2018  7:51 AM                                   * Warfarin Therapy Reminder   1 each continuous prn                                * warfarin 2 MG tablet   Commonly known as:  COUMADIN   Take 6 mg PO daily until next INR check, then dose per INR goal 2-3   Last time this was given:  7.5 mg on 9/23/2018  6:12 PM                                   * Notice:  This list has 2 medication(s) that are the same as  other medications prescribed for you. Read the directions carefully, and ask your doctor or other care provider to review them with you.

## 2018-09-19 NOTE — LETTER
Health Information Management Services               Recipient:  [facility]  PH: [phone]  F: [phone]          Sender:  CAROL ANN Almanza, MYCHAL  [address]   Phone: [phone]  Pager: [phone]  Unit: [phone]          Date: September 21, 2018  Patient Name:  Castillo De Anda  Routing Message:  Updated notes for pt.  He would like to return to TCU at discharge.  Will have POLST completed as requested by sending charge RN.  Could discharge as early as Sunday or Monday pending stability with removal of chest tube.  I will have weekend staff update you throughout the weekend as well.  Thank you, Lula          The documents accompanying this notice contain confidential information belonging to the sender.  This information is intended only for the use of the individual or entity named above.  The authorized recipient of this information is prohibited from disclosing this information to any other party and is required to destroy the information after its stated need has been fulfilled, unless otherwise required by state law.      If you are not the intended recipient, you are hereby notified that any disclosure, copying, distribution or action taken in reliance on the contents of these documents is strictly prohibited. If you have received this document in error, please notify [facility] immediately at [phone].  You may return the document via fax ([phone]) or return mail  (Health Information Management, , [address] [place] [address]).

## 2018-09-19 NOTE — PHARMACY-ADMISSION MEDICATION HISTORY
Admission medication history interview status for the 9/19/2018 admission is complete. See Epic admission navigator for allergy information, pharmacy, prior to admission medications and immunization status.     Medication history interview sources:  MAR from TCU - [facility] ([phone])    Changes made to PTA medication list (reason)  Added: none    Deleted: none    Changed:   - added directions for miralax, methocarbamol, and hydroxyurea   - acetaminophen 325mg --> 650mg  - furosemide 40mg qday -->  BID  - potassium 20mEq qday --> BID  - updated recent warfarin doses    Additional medication history information (including reliability of information, actions taken by pharmacist):  - amiodarone 200mg qday: order on MAR is timed to start 9/22 - TCU recalled and confirmed with RN that patient has NOT received any amiodarone while at the TCU.  Per RN that is how the TCU received the amiodarone order when he was admitted there. Patient was discharged from here on 9/11 so patient's last dose of amiodarone was 9/11. Reviewed discharge summary from 9/11 and order was indeed written to start 9/22.  Unclear if this was intentional as patient was on amiodarone 200mg qday when he was discharged 9/11 (and had been on this dose since 9/2).   - furosemide and potassium dose increases started today (9/19). These doses were hard to read of faxed MAR. TCU recalled and doses confirmed   - clopidogrel was held this AM  - Most recent warfarin doses per TCU MAR   9/12: 5mg   9/13: 3mg    9/14: 3mg   9/15: 3mg    9/16: 3mg    9/17: 4mg   9/18: held was planned for 4mg    Prior to Admission medications    Medication Sig Last Dose Taking? Auth Provider   ACETAMINOPHEN PO Take 650 mg by mouth every 4 hours as needed  Past Week at Unknown time Yes Reported, Patient   Atorvastatin Calcium (LIPITOR PO) Take 80 mg by mouth daily  9/18/2018 at 1800 Yes Reported, Patient   Carboxymethylcellulose Sod PF (REFRESH PLUS) 0.5 % SOLN  ophthalmic solution 1 drop 3 times daily as needed for dry eyes Past Week at Unknown time Yes Reported, Patient   CLOPIDOGREL BISULFATE PO Take 75 mg by mouth daily  9/18/2018 at 0800 Yes Reported, Patient   DOCUSATE SODIUM PO Take 100 mg by mouth daily 9/19/2018 at 0800 Yes Reported, Patient   FUROSEMIDE PO Take 40 mg by mouth 2 times daily  9/19/2018 at 1300 Yes Reported, Patient   hydroxyurea (HYDREA) 500 MG capsule CHEMO 1500mg qam and 1000mg qpm 9/19/2018 at 0800 Yes Reported, Patient   LEVOTHYROXINE SODIUM PO Take 75 mcg by mouth daily  9/19/2018 at 0800 Yes Reported, Patient   MELATONIN PO Take 1 mg by mouth as needed  Past Week at Unknown time Yes Reported, Patient   METHOCARBAMOL PO Take 500 mg by mouth every 6 hours as needed  Past Week at Unknown time Yes Reported, Patient   METOPROLOL TARTRATE PO Take 12.5 mg by mouth 2 times daily 9/19/2018 at 0800 Yes Reported, Patient   Multiple Vitamins-Minerals (MULTIVITAMIN ADULT PO) Take 1 tablet by mouth daily 9/19/2018 at 0800 Yes Reported, Patient   PANTOPRAZOLE SODIUM PO Take 40 mg by mouth 2 times daily  9/19/2018 at 0800 Yes Reported, Patient   polyethylene glycol (MIRALAX/GLYCOLAX) powder Take 17 g by mouth daily  9/19/2018 at 0800 Yes Reported, Patient   POTASSIUM PO Take 20 mEq by mouth 2 times daily  9/19/2018 at AM Yes Reported, Patient   umeclidinium-vilanterol (ANORO ELLIPTA) 62.5-25 MCG/INH oral inhaler Inhale 1 puff into the lungs daily 9/19/2018 at 0800 Yes Reported, Patient   WARFARIN SODIUM PO Most recent doses per TCU MAR  9/12: 5mg  9/13: 3mg   9/14: 3mg  9/15: 3mg   9/16: 3mg   9/17: 4mg  9/18: held was planned for 4mg 9/17/2018 at 2000 Yes Reported, Patient   AMIODARONE HCL PO Take 200 mg by mouth daily TCU has order to start 9/22 - this was how the script was sent when patient d/c'ed on 9/11 so pt has not been receiving at the TCU 09/11/2018  Reported, Patient       Medication history completed by:   Sakina SuarezD  Pager:  [phone]

## 2018-09-20 ENCOUNTER — APPOINTMENT (OUTPATIENT)
Dept: GENERAL RADIOLOGY | Facility: CLINIC | Age: 68
DRG: 187 | End: 2018-09-20
Attending: PHYSICIAN ASSISTANT
Payer: MEDICARE

## 2018-09-20 ENCOUNTER — APPOINTMENT (OUTPATIENT)
Dept: GENERAL RADIOLOGY | Facility: CLINIC | Age: 68
DRG: 187 | End: 2018-09-20
Attending: THORACIC SURGERY (CARDIOTHORACIC VASCULAR SURGERY)
Payer: MEDICARE

## 2018-09-20 ENCOUNTER — APPOINTMENT (OUTPATIENT)
Dept: INTERVENTIONAL RADIOLOGY/VASCULAR | Facility: CLINIC | Age: 68
DRG: 187 | End: 2018-09-20
Attending: RADIOLOGY PRACTITIONER ASSISTANT
Payer: MEDICARE

## 2018-09-20 LAB
ANION GAP SERPL CALCULATED.3IONS-SCNC: 11 MMOL/L (ref 3–14)
BUN SERPL-MCNC: 16 MG/DL (ref 7–30)
CALCIUM SERPL-MCNC: 8.4 MG/DL (ref 8.5–10.1)
CHLORIDE SERPL-SCNC: 100 MMOL/L (ref 94–109)
CO2 SERPL-SCNC: 20 MMOL/L (ref 20–32)
CREAT SERPL-MCNC: 0.8 MG/DL (ref 0.66–1.25)
ERYTHROCYTE [DISTWIDTH] IN BLOOD BY AUTOMATED COUNT: 27.2 % (ref 10–15)
GFR SERPL CREATININE-BSD FRML MDRD: >90 ML/MIN/1.7M2
GLUCOSE SERPL-MCNC: 86 MG/DL (ref 70–99)
HCT VFR BLD AUTO: 29.1 % (ref 40–53)
HGB BLD-MCNC: 9.2 G/DL (ref 13.3–17.7)
INR PPP: 1.77 (ref 0.86–1.14)
MCH RBC QN AUTO: 32.7 PG (ref 26.5–33)
MCHC RBC AUTO-ENTMCNC: 31.6 G/DL (ref 31.5–36.5)
MCV RBC AUTO: 104 FL (ref 78–100)
NT-PROBNP SERPL-MCNC: 1140 PG/ML (ref 0–900)
PLATELET # BLD AUTO: 338 10E9/L (ref 150–450)
POTASSIUM SERPL-SCNC: 4.2 MMOL/L (ref 3.4–5.3)
RBC # BLD AUTO: 2.81 10E12/L (ref 4.4–5.9)
SODIUM SERPL-SCNC: 131 MMOL/L (ref 133–144)
WBC # BLD AUTO: 4.9 10E9/L (ref 4–11)

## 2018-09-20 PROCEDURE — C1769 GUIDE WIRE: HCPCS

## 2018-09-20 PROCEDURE — 90662 IIV NO PRSV INCREASED AG IM: CPT | Performed by: THORACIC SURGERY (CARDIOTHORACIC VASCULAR SURGERY)

## 2018-09-20 PROCEDURE — 83880 ASSAY OF NATRIURETIC PEPTIDE: CPT | Performed by: PHYSICIAN ASSISTANT

## 2018-09-20 PROCEDURE — 85610 PROTHROMBIN TIME: CPT | Performed by: PHYSICIAN ASSISTANT

## 2018-09-20 PROCEDURE — 76000 FLUOROSCOPY <1 HR PHYS/QHP: CPT

## 2018-09-20 PROCEDURE — 36415 COLL VENOUS BLD VENIPUNCTURE: CPT | Performed by: PHYSICIAN ASSISTANT

## 2018-09-20 PROCEDURE — 71046 X-RAY EXAM CHEST 2 VIEWS: CPT

## 2018-09-20 PROCEDURE — 94640 AIRWAY INHALATION TREATMENT: CPT

## 2018-09-20 PROCEDURE — 25000125 ZZHC RX 250: Performed by: RADIOLOGY

## 2018-09-20 PROCEDURE — 25000128 H RX IP 250 OP 636: Performed by: THORACIC SURGERY (CARDIOTHORACIC VASCULAR SURGERY)

## 2018-09-20 PROCEDURE — A9270 NON-COVERED ITEM OR SERVICE: HCPCS | Mod: GY | Performed by: THORACIC SURGERY (CARDIOTHORACIC VASCULAR SURGERY)

## 2018-09-20 PROCEDURE — 40000275 ZZH STATISTIC RCP TIME EA 10 MIN

## 2018-09-20 PROCEDURE — 27211039 ZZH NEEDLE CR2

## 2018-09-20 PROCEDURE — 25000132 ZZH RX MED GY IP 250 OP 250 PS 637: Mod: GY | Performed by: THORACIC SURGERY (CARDIOTHORACIC VASCULAR SURGERY)

## 2018-09-20 PROCEDURE — 27210738 ZZH ACCESSORY CR2

## 2018-09-20 PROCEDURE — 25000132 ZZH RX MED GY IP 250 OP 250 PS 637: Mod: GY | Performed by: STUDENT IN AN ORGANIZED HEALTH CARE EDUCATION/TRAINING PROGRAM

## 2018-09-20 PROCEDURE — 32557 INSERT CATH PLEURA W/ IMAGE: CPT | Mod: RT

## 2018-09-20 PROCEDURE — C1729 CATH, DRAINAGE: HCPCS

## 2018-09-20 PROCEDURE — 25000132 ZZH RX MED GY IP 250 OP 250 PS 637: Mod: GY | Performed by: PHYSICIAN ASSISTANT

## 2018-09-20 PROCEDURE — 27210903 ZZH KIT CR5

## 2018-09-20 PROCEDURE — 27210886 ZZH ACCESSORY CR5

## 2018-09-20 PROCEDURE — 80048 BASIC METABOLIC PNL TOTAL CA: CPT | Performed by: PHYSICIAN ASSISTANT

## 2018-09-20 PROCEDURE — 94640 AIRWAY INHALATION TREATMENT: CPT | Mod: 76

## 2018-09-20 PROCEDURE — A9270 NON-COVERED ITEM OR SERVICE: HCPCS | Mod: GY | Performed by: STUDENT IN AN ORGANIZED HEALTH CARE EDUCATION/TRAINING PROGRAM

## 2018-09-20 PROCEDURE — 25000125 ZZHC RX 250: Performed by: PHYSICIAN ASSISTANT

## 2018-09-20 PROCEDURE — 21400006 ZZH R&B CCU INTERMEDIATE UMMC

## 2018-09-20 PROCEDURE — 85027 COMPLETE CBC AUTOMATED: CPT | Performed by: PHYSICIAN ASSISTANT

## 2018-09-20 PROCEDURE — A9270 NON-COVERED ITEM OR SERVICE: HCPCS | Mod: GY | Performed by: PHYSICIAN ASSISTANT

## 2018-09-20 PROCEDURE — 27210732 ZZH ACCESSORY CR1

## 2018-09-20 PROCEDURE — 0W9930Z DRAINAGE OF RIGHT PLEURAL CAVITY WITH DRAINAGE DEVICE, PERCUTANEOUS APPROACH: ICD-10-PCS | Performed by: RADIOLOGY

## 2018-09-20 RX ORDER — WARFARIN SODIUM 5 MG/1
5 TABLET ORAL
Status: COMPLETED | OUTPATIENT
Start: 2018-09-20 | End: 2018-09-20

## 2018-09-20 RX ORDER — LIDOCAINE HYDROCHLORIDE 10 MG/ML
1-30 INJECTION, SOLUTION EPIDURAL; INFILTRATION; INTRACAUDAL; PERINEURAL
Status: DISCONTINUED | OUTPATIENT
Start: 2018-09-20 | End: 2018-09-20

## 2018-09-20 RX ORDER — LIDOCAINE 40 MG/G
CREAM TOPICAL
Status: DISCONTINUED | OUTPATIENT
Start: 2018-09-20 | End: 2018-09-24 | Stop reason: HOSPADM

## 2018-09-20 RX ORDER — ACETAMINOPHEN 325 MG/1
650 TABLET ORAL EVERY 4 HOURS PRN
Status: DISCONTINUED | OUTPATIENT
Start: 2018-09-20 | End: 2018-09-24 | Stop reason: HOSPADM

## 2018-09-20 RX ADMIN — PANTOPRAZOLE SODIUM 40 MG: 40 TABLET, DELAYED RELEASE ORAL at 19:41

## 2018-09-20 RX ADMIN — LIDOCAINE HYDROCHLORIDE 10 ML: 10 INJECTION, SOLUTION EPIDURAL; INFILTRATION; INTRACAUDAL; PERINEURAL at 18:18

## 2018-09-20 RX ADMIN — HYDROXYUREA 1500 MG: 500 CAPSULE ORAL at 09:25

## 2018-09-20 RX ADMIN — ATORVASTATIN CALCIUM 80 MG: 80 TABLET, FILM COATED ORAL at 19:41

## 2018-09-20 RX ADMIN — ALBUTEROL SULFATE 2.5 MG: 2.5 SOLUTION RESPIRATORY (INHALATION) at 13:00

## 2018-09-20 RX ADMIN — Medication 12.5 MG: at 09:11

## 2018-09-20 RX ADMIN — Medication 12.5 MG: at 21:15

## 2018-09-20 RX ADMIN — AMIODARONE HYDROCHLORIDE 200 MG: 200 TABLET ORAL at 09:11

## 2018-09-20 RX ADMIN — ALBUTEROL SULFATE 2.5 MG: 2.5 SOLUTION RESPIRATORY (INHALATION) at 19:53

## 2018-09-20 RX ADMIN — MULTIPLE VITAMINS W/ MINERALS TAB 1 TABLET: TAB at 09:11

## 2018-09-20 RX ADMIN — PANTOPRAZOLE SODIUM 40 MG: 40 TABLET, DELAYED RELEASE ORAL at 09:11

## 2018-09-20 RX ADMIN — UMECLIDINIUM BROMIDE AND VILANTEROL TRIFENATATE 1 PUFF: 62.5; 25 POWDER RESPIRATORY (INHALATION) at 09:18

## 2018-09-20 RX ADMIN — POLYETHYLENE GLYCOL 3350 17 G: 17 POWDER, FOR SOLUTION ORAL at 09:18

## 2018-09-20 RX ADMIN — ACETAMINOPHEN 650 MG: 325 TABLET, FILM COATED ORAL at 21:15

## 2018-09-20 RX ADMIN — ALBUTEROL SULFATE 2.5 MG: 2.5 SOLUTION RESPIRATORY (INHALATION) at 08:59

## 2018-09-20 RX ADMIN — LEVOTHYROXINE SODIUM 75 MCG: 75 TABLET ORAL at 09:11

## 2018-09-20 RX ADMIN — ALBUTEROL SULFATE 2.5 MG: 2.5 SOLUTION RESPIRATORY (INHALATION) at 16:14

## 2018-09-20 RX ADMIN — POTASSIUM CHLORIDE 20 MEQ: 750 TABLET, EXTENDED RELEASE ORAL at 09:11

## 2018-09-20 RX ADMIN — FUROSEMIDE 40 MG: 20 TABLET ORAL at 09:11

## 2018-09-20 RX ADMIN — WARFARIN SODIUM 5 MG: 5 TABLET ORAL at 19:41

## 2018-09-20 RX ADMIN — HYDROXYUREA 1000 MG: 500 CAPSULE ORAL at 21:14

## 2018-09-20 RX ADMIN — DOCUSATE SODIUM 100 MG: 100 CAPSULE, LIQUID FILLED ORAL at 09:11

## 2018-09-20 RX ADMIN — INFLUENZA A VIRUS A/MICHIGAN/45/2015 X-275 (H1N1) ANTIGEN (FORMALDEHYDE INACTIVATED), INFLUENZA A VIRUS A/SINGAPORE/INFIMH-16-0019/2016 IVR-186 (H3N2) ANTIGEN (FORMALDEHYDE INACTIVATED), AND INFLUENZA B VIRUS B/MARYLAND/15/2016 BX-69A (A B/COLORADO/6/2017-LIKE VIRUS) ANTIGEN (FORMALDEHYDE INACTIVATED) 0.5 ML: 60; 60; 60 INJECTION, SUSPENSION INTRAMUSCULAR at 09:52

## 2018-09-20 RX ADMIN — Medication 2 G: at 09:11

## 2018-09-20 ASSESSMENT — ACTIVITIES OF DAILY LIVING (ADL)
ADLS_ACUITY_SCORE: 11

## 2018-09-20 NOTE — PROGRESS NOTES
Patient Name: Castillo De Anda  Medical Record Number: [id number]  Today's Date: 9/20/2018    Procedure: Right sided chest tube placement  Proceduralist: Dr. Ortiz and Dr. Gomes    Procedure start time: 1815  Puncture time: 1815  Procedure end time: 1830    Report given to: Roosevelt ANDRADE    Other Notes: Pt arrived to IR room 1 from . Pt denies any questions or concerns regarding procedure. Pt positioned sitting position and monitored per protocol. 14f x 25cm Flexima APD J-tip catheter placed in right chest. Pt tolerated procedure without any noted complications. Pt transferred back to .    Trace Schaeffer RN

## 2018-09-20 NOTE — PROGRESS NOTES
9/20/2018   CVTS Progress Note  Attending provider: Jason Franco, *        S: Continued SOB but improved today, was able to sleep after being placed upright mild pleuritic chest pain on lateral right chest. Patient denies fever, chills, sweats. Patient eating well. Not ambulating in halls.+ BM.  Sinus rhythm, occasional PVC     O:   Vitals:    09/20/18 0756 09/20/18 0859 09/20/18 0900 09/20/18 1104   BP: 104/71   (!) 85/61   BP Location: Right arm   Left arm   Cuff Size:   Adult Regular    Pulse:   101    Resp: 16   16   Temp: 97.6  F (36.4  C)   98  F (36.7  C)   TempSrc: Axillary   Oral   SpO2: 91% 96%  94%   Weight:         Vitals:    09/20/18 0601   Weight: 71.3 kg (157 lb 3.2 oz)       Intake/Output Summary (Last 24 hours) at 09/20/18 0751  Last data filed at 09/20/18 0600   Gross per 24 hour   Intake              290 ml   Output              300 ml   Net              -10 ml       Gen: AxOx3, NAD  CV: RRR, no murmurs, rubs, or gallops, HR 80-90, -JVD  Pulm: decreased lung sounds in lower lobes, bilaterally, no wheezing, occasional rhonchi and non productive cough  Abd: soft, NT, ND, +BS, +BM  Ext: minimal LE edema, Right > Left, decreased skin turgor in arms.   Incision: c/d/i, no erythema, sternal stable      BMP    Recent Labs  Lab 09/20/18  0907 09/18/18  1516   * 129*   POTASSIUM 4.2 4.9   CHLORIDE 100 98   GABRIELA 8.4* 8.2*   CO2 20 22   BUN 16 12   CR 0.80 0.61*   GLC 86 102*     CBC    Recent Labs  Lab 09/20/18  0907 09/19/18  1716 09/18/18  1516   WBC 4.9 5.2 5.1   RBC 2.81* 2.81* 2.60*   HGB 9.2* 9.3* 8.8*   HCT 29.1* 28.5* 26.9*   * 101* 104*   MCH 32.7 33.1* 33.8*   MCHC 31.6 32.6 32.7   RDW 27.2* 27.0* 26.4*    320 306     INR    Recent Labs  Lab 09/20/18  1030 09/19/18  1727 09/18/18  1516   INR 1.77* 1.86* 2.18*      Hepatic Panel   Lab Results   Component Value Date    AST 35 09/18/2018     Lab Results   Component Value Date    ALT 56 09/18/2018     No results found for:  BILICONJ   Lab Results   Component Value Date    BILITOTAL 0.3 09/18/2018     Lab Results   Component Value Date    ALBUMIN 2.4 09/18/2018     Lab Results   Component Value Date    PROTTOTAL 7.2 09/18/2018      Lab Results   Component Value Date    ALKPHOS 158 09/18/2018         Recent Labs  Lab 09/20/18  0907 09/18/18  1516   GLC 86 102*         Imaging:   EXAMINATION: Chest CT  9/18/2018 6:11 PM     CLINICAL HISTORY: checking for bilateral pleural effusions; Pleural  effusion     COMPARISON: Same day radiograph, 9/7/2018 CT.     TECHNIQUE: CT imaging obtained through the chest without contrast.  Coronal and axial MIP reformatted images obtained.      FINDINGS:  Postoperative changes with mediastinal surgical clips. Thyroidectomy.  The heart is mildly enlarged. There is a persistent mild pericardial  effusion. Trace foci of air again seen within the right pericardial  space. Tricuspid valve prosthesis. Epicardial pacing wires from the  subxiphoid approach. Myocardial calcifications of the ventricles  bilaterally. LAD stent. The ascending thoracic aorta measures 4.0 cm,  stable. The thoracic vessels are normal in caliber and configuration  on this noncontrast examination. Mild prominent mediastinal  lymphadenopathy is likely reactive.     The central tracheobronchial tree is patent. No substantial change in  bilateral layering pleural effusions and associated bibasilar  atelectasis and/or consolidation. Persistent dense consolidation of  the right middle lobe. Right-sided pneumothorax has resolved. No  suspicious pulmonary nodule.     Bones and soft tissues: No suspicious bone findings. No acute osseous  abnormality. Recent median sternotomy with adjacent subcutaneous soft  tissue stranding. Interval removal of the previously described right  upper extremity PICC.     Partially imaged upper abdomen: Limited. Atherosclerotic  calcifications of the abdominal aorta.         IMPRESSION:   1. Since prior CT dated  9/7/2018, the gaseous component of the  right-sided hydropneumothorax has resolved. There are persistent  bilateral pleural effusions and associated bibasilar atelectasis  and/or consolidation, not significantly changed from prior  examination.  2. Persistent dense consolidation of the right middle lobe.  3. Tricuspid valve prosthesis. VSD repair changes. Intracardiac  calcifications. Stable small pericardial effusion.     These findings were communicated to Torsten Ruiz PA-C by Dr. Woods at 6:10 PM on 9/18/2018.     I have personally reviewed the examination and initial interpretation  and I agree with the findings.     LAKESHA AUSTIN MD    A/P: 68 y.o male with PMHx of STEMI s/p HUSSEIN to RCA and LAD, s/p VSD repair and bioprosthetic Tricuspid valve replacement on 8/10/18 with Dr. Franco. Surgical admission complicated by right sided pneumothorax and thrombocytopenia 2/2 polycythemia vera.  Admitted from TCU on 9/19/18 for SOB, right middle lobe consolidation and increased right sided pleural effusion.  1. Neuro:  - 9/20: -Tylenol prn    2. CV:  PTA cardiac meds: Amiodarone 200 mg PO daily, Lasix 40 mg PO BID, Lopressor 12.5 mg PO BID  - 9/20: Hypotensive, 80/54, MAPs 60's, historically SBP 80-90's last admission and at rehab, watch carefully for over diureses, infection, normal ECHO.      3. Pulm:  - 9/20: Right sided pleural effusion and right middle lobe consolidation,  IR consult for pigtail placement today, will get placed this afternoon, encourage IS, pulmonary toileting, and activity as tolerated, maintain 2L O2,  Albuterol PRN. Elevation of left diaphragm on CT scan, will get sniff test today.      4. ID:   - 9/20:  SOB, Right middle lobe consolidation Sputum: mixed Gram (-) with Gram (+) Cocci in chains and clusters, Cx pending.  Based on normal WBC, afebrile, and clinical picture, pneumonia less likely, more likely consolidation 2/2 pleural effusion.  Will follow clinically.     5. GI:  -  9/20: Regular Adult diet as tolerated    6. FEN:  - 9/20: Hyponatremic, Na: 131 trending up from 129    7. Renal/:  Admit wt: 71.3kg, down from discharge wt: 72.0 kg Cr 0.61. Volume status stable, no need for additional diureses. Currently on lasix 40 mg po bid. Increased 9/18, was on daily previously. Hold evening lasix and reassess in AM. Patient had some skin tenting/decreased turgor and sunken temples on exam.     8. Heme:  - Hematology following as outpatient for polycythemia, plts 320.  INR: 1.77, Re dose tonight after pigtail placement. Continue PTA Hydrea.     9. Endo:  - BG in 100s, not diabetic.    10: PPX:  - Protonix    11: Dispo:  - Discharge to TCU once medically stable    Clay NEELY S  [facility]    Patient seen with me and I agree with above.     Janice Ellis PA-C  Cardiothoracic Surgery   Pager [phone]  September 20, 2018

## 2018-09-20 NOTE — PROCEDURES
Interventional Radiology Brief Post Procedure Note    Procedure: IR CHEST TUBE PLACEMENT NON-TUNNELED RIGHT    Proceduralist: Maritza Martinez MD and Hermes Ortiz MD    Assistant: IR Fellow Physician, Kelvin Gomes MD and Radiology Resident Physician, Kelvin Edwards MD    Time Out: Prior to the start of the procedure and with procedural staff participation, I verbally confirmed the patient s identity using two indicators, relevant allergies, that the procedure was appropriate and matched the consent or emergent situation, and that the correct equipment/implants were available. Immediately prior to starting the procedure I conducted the Time Out with the procedural staff and re-confirmed the patient s name, procedure, and site/side. (The Joint Commission universal protocol was followed.)  Yes    Medications   Medication Event Details Admin User Admin Time   lidocaine (PF) (XYLOCAINE) 1 % injection 1-30 mL Medication Given by Other Clinician Dose: 10 mL; Route: Subcutaneous Dose, Trace RN 9/20/2018  6:18 PM       Sedation: None. Local Anesthestic used    Findings: Successful Right Chest Tube Placement    Estimated Blood Loss: None    Fluoroscopy Time:  minute(s)    SPECIMENS: None    Complications: 1. None     Condition: Stable    Plan: Per Orders    Comments: See dictated procedure note for full details.    Kelvin Edwards MD

## 2018-09-20 NOTE — PROVIDER NOTIFICATION
MD Notification    MD Notified: Dr. Tulio Goddard     Notification date/time: 09/18/18 0930    Notification interaction: Spoke over phone    Purpose of notification: Pt SBP 70-80, DBP 50-60. Also feeling SOB, O2 sats remain stable on RA-2L NC. Pt reports pressure like headache. States these were the symptoms he was having at the TCU before he was admitted, states head pressure feeling worse then it was earlier.     Comments: MD up to see pt. Originally wanted to give fluid bolus but cancelled after talking with fellow d/t bilateral pleural effusions. MD also notified of sputum culture being positive for gram (+) cocci. No orders received at this time. Will continue to monitor.

## 2018-09-20 NOTE — PLAN OF CARE
Problem: Patient Care Overview  Goal: Plan of Care/Patient Progress Review  Outcome: No Change  D: SOB, weakness. Recent TVR (8/10/2018). Hx: CAD, STEMI s/p HUSSEIN, VSD, Afib, prostate cancer.     I: Coumadin held 9/19. UA and Sputum culture sent. PRN Tylenol for headache. Monitoring BP closely     A: Hypotensive, MD aware (see previous note), other vitals are stable. Pt reports SOB at times, oxygen stable on RA but wearing 2L NC for comfort. Breathing improved after elevating HOB, pt was able to fall asleep. Tele SR, HR 80-90's. Sputum culture positive, MD aware. Voiding but minimal urine output overnight, (+) BM. Appeared to sleep well between cares.     P: Possible thoracentesis today, pt has been NPO since midnight. Will continue to monitor and notify MD of pertinent changes.

## 2018-09-20 NOTE — PLAN OF CARE
-Pt remained NPO throughout the day and went to IR to have a pleural drain placed at the end of the shift.  -Pt calls appropriately and uses safe judgement during activity.

## 2018-09-20 NOTE — PROGRESS NOTES
Cross-cover note:    09/20/2018    General Surgery Cross Cover Note    Was called by nursing regarding BP consistently in 80s. Went to see the pt at bedside. He is mentating well. Alert and oriented. He is little short of breath. Denies chest pain. Urine output has been adequate.  Other vital signs stable except mild tachycardia. BP was checked in both arms and had similar readings. Pt also complaining of headache and states pressure like frontal headache.     B/P: 71/50, T: 97.7, P: 98, R: 18    PE:  Alert, awake, NAD  Breathing non-labored on 2 L  Abd soft, non-tender, non-distended  Extr. Warm to touch    Plan:  - will continue to monitor it for now.  - if continues to drop down, will give 250 ml bolus.    Plan discussed with Dr. Meyers.    Tulio Goddard MD  General Surgery PGY-1  [phone]

## 2018-09-20 NOTE — CONSULTS
Patient is on IR schedule 9/20/2018 for a Right chest tube placement for plural effusion .   Labs WNL for procedure.    No NPO required.   Consent will be done prior to procedure.      Please contact the IR charge RN at [phone] for estimated time of procedure.       Rayne Garcia IR RPA  [phone] Call pager  [phone] pager

## 2018-09-20 NOTE — PROGRESS NOTES
Received late in day hand off from [facility] Charge RN that the pt will need to have a POLST if he is returning to [facility] at discharge.  CVTS team also aware of this need.  Updated Charge RN that when pt arrives, he is to be made confidential as he was confidential on last admission.  SW to follow for return to TCU as needed.    CAROL ANN Almanza, APSW  6[address] Unit   Phone: [phone]  Pager: [phone]  Unit: [phone]

## 2018-09-20 NOTE — PHARMACY-ANTICOAGULATION SERVICE
Clinical Pharmacy - Warfarin Dosing Consult     Pharmacy has been consulted to manage this patient s warfarin therapy.  Indication: Atrial Fibrillation  Therapy Goal: INR 2-3  Warfarin Prior to Admission: Yes  Warfarin PTA Regimen: Most recent warfarin doses per TCU MAR: 9/12 5mg; 9/13-9/16 3mg; 9/17 4mg; 9/18 held (was planned for 4mg)  Significant drug interactions: amiodarone, clopidogrel, pantoprazole     INR   Date Value Ref Range Status   09/19/2018 1.86 (H) 0.86 - 1.14 Final   09/18/2018 2.18 (H) 0.86 - 1.14 Final       Recommend no warfarin dose tonight per Torsten PA as patient may have thoracentesis tomorrow.  Pharmacy will monitor Castillo De Anda daily and order warfarin doses to achieve specified goal.      Please contact pharmacy as soon as possible if the warfarin needs to be held for a procedure or if the warfarin goals change.      Maricruz Guzman, PharmD  Pager: [phone]

## 2018-09-21 ENCOUNTER — APPOINTMENT (OUTPATIENT)
Dept: GENERAL RADIOLOGY | Facility: CLINIC | Age: 68
DRG: 187 | End: 2018-09-21
Attending: PHYSICIAN ASSISTANT
Payer: MEDICARE

## 2018-09-21 ENCOUNTER — APPOINTMENT (OUTPATIENT)
Dept: OCCUPATIONAL THERAPY | Facility: CLINIC | Age: 68
DRG: 187 | End: 2018-09-21
Attending: PHYSICIAN ASSISTANT
Payer: MEDICARE

## 2018-09-21 LAB
ANION GAP SERPL CALCULATED.3IONS-SCNC: 8 MMOL/L (ref 3–14)
BACTERIA SPEC CULT: NORMAL
BUN SERPL-MCNC: 18 MG/DL (ref 7–30)
CALCIUM SERPL-MCNC: 8.3 MG/DL (ref 8.5–10.1)
CHLORIDE SERPL-SCNC: 98 MMOL/L (ref 94–109)
CO2 SERPL-SCNC: 26 MMOL/L (ref 20–32)
CREAT SERPL-MCNC: 0.8 MG/DL (ref 0.66–1.25)
ERYTHROCYTE [DISTWIDTH] IN BLOOD BY AUTOMATED COUNT: 27.4 % (ref 10–15)
GFR SERPL CREATININE-BSD FRML MDRD: >90 ML/MIN/1.7M2
GLUCOSE SERPL-MCNC: 95 MG/DL (ref 70–99)
HCT VFR BLD AUTO: 27 % (ref 40–53)
HGB BLD-MCNC: 8.7 G/DL (ref 13.3–17.7)
INR PPP: 1.67 (ref 0.86–1.14)
MCH RBC QN AUTO: 33.3 PG (ref 26.5–33)
MCHC RBC AUTO-ENTMCNC: 32.2 G/DL (ref 31.5–36.5)
MCV RBC AUTO: 103 FL (ref 78–100)
PLATELET # BLD AUTO: 391 10E9/L (ref 150–450)
POTASSIUM SERPL-SCNC: 4.5 MMOL/L (ref 3.4–5.3)
RBC # BLD AUTO: 2.61 10E12/L (ref 4.4–5.9)
SODIUM SERPL-SCNC: 133 MMOL/L (ref 133–144)
SPECIMEN SOURCE: NORMAL
WBC # BLD AUTO: 4.1 10E9/L (ref 4–11)

## 2018-09-21 PROCEDURE — 25000125 ZZHC RX 250: Performed by: PHYSICIAN ASSISTANT

## 2018-09-21 PROCEDURE — 97110 THERAPEUTIC EXERCISES: CPT | Mod: GO

## 2018-09-21 PROCEDURE — 40000133 ZZH STATISTIC OT WARD VISIT

## 2018-09-21 PROCEDURE — 36415 COLL VENOUS BLD VENIPUNCTURE: CPT | Performed by: PHYSICIAN ASSISTANT

## 2018-09-21 PROCEDURE — 97535 SELF CARE MNGMENT TRAINING: CPT | Mod: GO

## 2018-09-21 PROCEDURE — 40000275 ZZH STATISTIC RCP TIME EA 10 MIN

## 2018-09-21 PROCEDURE — 80048 BASIC METABOLIC PNL TOTAL CA: CPT | Performed by: PHYSICIAN ASSISTANT

## 2018-09-21 PROCEDURE — 71046 X-RAY EXAM CHEST 2 VIEWS: CPT | Mod: 76

## 2018-09-21 PROCEDURE — 97165 OT EVAL LOW COMPLEX 30 MIN: CPT | Mod: GO

## 2018-09-21 PROCEDURE — 94640 AIRWAY INHALATION TREATMENT: CPT | Mod: 76

## 2018-09-21 PROCEDURE — 71046 X-RAY EXAM CHEST 2 VIEWS: CPT

## 2018-09-21 PROCEDURE — 94640 AIRWAY INHALATION TREATMENT: CPT

## 2018-09-21 PROCEDURE — 21400006 ZZH R&B CCU INTERMEDIATE UMMC

## 2018-09-21 PROCEDURE — 85027 COMPLETE CBC AUTOMATED: CPT | Performed by: PHYSICIAN ASSISTANT

## 2018-09-21 PROCEDURE — A9270 NON-COVERED ITEM OR SERVICE: HCPCS | Mod: GY | Performed by: THORACIC SURGERY (CARDIOTHORACIC VASCULAR SURGERY)

## 2018-09-21 PROCEDURE — A9270 NON-COVERED ITEM OR SERVICE: HCPCS | Mod: GY | Performed by: PHYSICIAN ASSISTANT

## 2018-09-21 PROCEDURE — 40000274 ZZH STATISTIC RCP CONSULT EA 30 MIN

## 2018-09-21 PROCEDURE — 25000132 ZZH RX MED GY IP 250 OP 250 PS 637: Mod: GY | Performed by: THORACIC SURGERY (CARDIOTHORACIC VASCULAR SURGERY)

## 2018-09-21 PROCEDURE — 25000132 ZZH RX MED GY IP 250 OP 250 PS 637: Mod: GY | Performed by: PHYSICIAN ASSISTANT

## 2018-09-21 PROCEDURE — 85610 PROTHROMBIN TIME: CPT | Performed by: PHYSICIAN ASSISTANT

## 2018-09-21 RX ORDER — ALBUTEROL SULFATE 0.83 MG/ML
2.5 SOLUTION RESPIRATORY (INHALATION) 4 TIMES DAILY PRN
Status: DISCONTINUED | OUTPATIENT
Start: 2018-09-21 | End: 2018-09-24 | Stop reason: HOSPADM

## 2018-09-21 RX ORDER — CLOPIDOGREL BISULFATE 75 MG/1
75 TABLET ORAL DAILY
Status: DISCONTINUED | OUTPATIENT
Start: 2018-09-22 | End: 2018-09-24 | Stop reason: HOSPADM

## 2018-09-21 RX ORDER — WARFARIN SODIUM 5 MG/1
5 TABLET ORAL
Status: COMPLETED | OUTPATIENT
Start: 2018-09-21 | End: 2018-09-21

## 2018-09-21 RX ADMIN — UMECLIDINIUM BROMIDE AND VILANTEROL TRIFENATATE 1 PUFF: 62.5; 25 POWDER RESPIRATORY (INHALATION) at 08:41

## 2018-09-21 RX ADMIN — WARFARIN SODIUM 5 MG: 5 TABLET ORAL at 17:49

## 2018-09-21 RX ADMIN — PANTOPRAZOLE SODIUM 40 MG: 40 TABLET, DELAYED RELEASE ORAL at 08:41

## 2018-09-21 RX ADMIN — AMIODARONE HYDROCHLORIDE 200 MG: 200 TABLET ORAL at 08:41

## 2018-09-21 RX ADMIN — Medication 12.5 MG: at 19:43

## 2018-09-21 RX ADMIN — ALBUTEROL SULFATE 2.5 MG: 2.5 SOLUTION RESPIRATORY (INHALATION) at 12:28

## 2018-09-21 RX ADMIN — HYDROXYUREA 1000 MG: 500 CAPSULE ORAL at 19:41

## 2018-09-21 RX ADMIN — Medication 12.5 MG: at 08:41

## 2018-09-21 RX ADMIN — ALBUTEROL SULFATE 2.5 MG: 2.5 SOLUTION RESPIRATORY (INHALATION) at 08:22

## 2018-09-21 RX ADMIN — ALBUTEROL SULFATE 2.5 MG: 2.5 SOLUTION RESPIRATORY (INHALATION) at 16:34

## 2018-09-21 RX ADMIN — HYDROXYUREA 1500 MG: 500 CAPSULE ORAL at 08:41

## 2018-09-21 RX ADMIN — ATORVASTATIN CALCIUM 80 MG: 80 TABLET, FILM COATED ORAL at 17:49

## 2018-09-21 RX ADMIN — LEVOTHYROXINE SODIUM 75 MCG: 75 TABLET ORAL at 08:41

## 2018-09-21 RX ADMIN — PANTOPRAZOLE SODIUM 40 MG: 40 TABLET, DELAYED RELEASE ORAL at 16:12

## 2018-09-21 RX ADMIN — MULTIPLE VITAMINS W/ MINERALS TAB 1 TABLET: TAB at 08:41

## 2018-09-21 ASSESSMENT — ACTIVITIES OF DAILY LIVING (ADL)
ADLS_ACUITY_SCORE: 11
ADLS_ACUITY_SCORE: 11
PREVIOUS_RESPONSIBILITIES: MEAL PREP;HOUSEKEEPING;LAUNDRY;SHOPPING;YARDWORK;MEDICATION MANAGEMENT;FINANCES;DRIVING
ADLS_ACUITY_SCORE: 11
ADLS_ACUITY_SCORE: 16
ADLS_ACUITY_SCORE: 16
ADLS_ACUITY_SCORE: 11

## 2018-09-21 NOTE — PROVIDER NOTIFICATION
IR staff communicated to previous Nurse to keep CT to water seal. Sign and held order indicated to place on -20 suction. Currently no SOB. Room air. Draining adequately. Cross cover surgery MD states to continue on water seal at this time. Continue to monitor.

## 2018-09-21 NOTE — PROGRESS NOTES
Social Work Services Progress Note     Hospital Day: 2  Date of Initial Social Work Evaluation:  Completed on previous admission 9/8/18 - pt reports no major updates/changes to assessment.  Pt has been in TCU since discharge (about 8 days) before readmitting to [facility].  Collaborated with:  Pt, Radha (spouse/caregiver), CVTS team, SNF admissions     Data: Pt is a 68 year old male being followed by SW for placement to TCU if needed.     Intervention:  SW met with pt in his room and spouse was present at bedside.  SW allowed for pt to direct conversation as on last admission pt was confidential for medical information.  Pt openly discussed rehab with spouse present and SW offered choice of continuing transitional care versus discharging to home pending rehab recommendations.  Pt would like his referral sent to [facility] at [facility].  SW faxed referral and called admissions for follow up.  Pt hoping to discharge over the weekend if medically stable.  SW will ask weekend team to follow up with [facility] on bed availability as pt opted to not hold his bed there.  POLST was completed with NP and placed in pt's hard chart for discharge.     - Referrals in Process:    [facility]  PH: [phone]  Unit: [phone]  F: [phone]     Assessment: Pt openly discussing discharge plans with wife at bedside.  Pt is aware he has the right to be confidential this admission.     Plan:    Anticipated Disposition: Facility:  TCU    Barriers to d/c plan: Medical stability, bed availability    Follow Up: Will ask weekend SW to follow for return to [facility].     CAROL ANN Almanza, APSW  [address] Unit   Phone: [phone]  Pager: [phone]  Unit: [phone]    ___________________________________________________________________________________________________________________________________________________    Referrals Discontinued:  None    Community Case Management/Community Services  in place:   None

## 2018-09-21 NOTE — PROGRESS NOTES
9/21/2018   CVTS Progress Note  Attending provider: Jason Franco, *        S: Patient reports breathing a lot better after having right pigtail placed. Sniff test came back positive showing some paralysis of left diaphragm. Patient denies fever, chills, sweats. Patient eating well. Not ambulating in halls, needs therapies ordered. Placed order today. + BM.  Sinus rhythm.    O:   Vitals:    09/21/18 0000 09/21/18 0300 09/21/18 0752 09/21/18 1104   BP: (!) 82/53 96/70 97/71 92/61   BP Location: Right arm Right arm Right arm Right arm   Cuff Size: Adult Regular Adult Regular Adult Regular Adult Regular   Pulse:       Resp: 18 18 18 18   Temp: 98.7  F (37.1  C)  97.7  F (36.5  C) 98.2  F (36.8  C)   TempSrc: Oral  Oral Oral   SpO2:  97% 96% 98%   Weight:  70.9 kg (156 lb 6.4 oz)       Vitals:    09/20/18 0601 09/21/18 0300   Weight: 71.3 kg (157 lb 3.2 oz) 70.9 kg (156 lb 6.4 oz)       Intake/Output Summary (Last 24 hours) at 09/20/18 0751  Last data filed at 09/20/18 0600   Gross per 24 hour   Intake              290 ml   Output              300 ml   Net              -10 ml       Gen: AxOx3, NAD  CV: RRR, no murmurs, rubs, or gallops, HR 80-90, -JVD  Pulm: improved lung sounds, no wheezing, occasional rhonchi and non productive cough  Abd: soft, NT, ND, +BS, +BM  Ext: minimal LE edema  Incision: c/d/i, no erythema, sternal stable  Right pigtail: 910/40 ml, no air leak. On suction   BMP    Recent Labs  Lab 09/21/18  0752 09/20/18  0907 09/18/18  1516    131* 129*   POTASSIUM 4.5 4.2 4.9   CHLORIDE 98 100 98   GABRIELA 8.3* 8.4* 8.2*   CO2 26 20 22   BUN 18 16 12   CR 0.80 0.80 0.61*   GLC 95 86 102*     CBC    Recent Labs  Lab 09/21/18  0752 09/20/18  0907 09/19/18  1716 09/18/18  1516   WBC 4.1 4.9 5.2 5.1   RBC 2.61* 2.81* 2.81* 2.60*   HGB 8.7* 9.2* 9.3* 8.8*   HCT 27.0* 29.1* 28.5* 26.9*   * 104* 101* 104*   MCH 33.3* 32.7 33.1* 33.8*   MCHC 32.2 31.6 32.6 32.7   RDW 27.4* 27.2* 27.0* 26.4*   PLT  391 338 320 306     INR    Recent Labs  Lab 18  0752 18  1030 18  1727 18  1516   INR 1.67* 1.77* 1.86* 2.18*      Hepatic Panel   Lab Results   Component Value Date    AST 35 2018     Lab Results   Component Value Date    ALT 56 2018     No results found for: BILICONJ   Lab Results   Component Value Date    BILITOTAL 0.3 2018     Lab Results   Component Value Date    ALBUMIN 2.4 2018     Lab Results   Component Value Date    PROTTOTAL 7.2 2018      Lab Results   Component Value Date    ALKPHOS 158 2018         Recent Labs  Lab 18  0752 18  0907 18  1516   GLC 95 86 102*         Imagin18  8:58 PM [id number] [facility], [place],  Radiology    [employer] Interactive Report and InfoRx   View the interactive report   PACS Images   Show images for XR Chest 2 Views   Study Result   EXAM: XR CHEST 2 VW  2018 8:58 PM       HISTORY: Right Chest Tube Placement;      COMPARISON: CT and chest x-ray 2018     FINDINGS: PA and lateral images of the chest. Postsurgical changes of  cardiac surgery, including median sternotomy wires. Epicardial pacer  wires New right basilar chest tube. The right pleural effusion has  decreased in size. Unchanged small left pleural effusion, with  unchanged left hemidiaphragm elevation. The trachea is midline. The  cardiac silhouette is within normal limits. Patchy bibasilar  opacities, greatest in the right base, corresponding with the  consolidation seen on prior CT. Upper abdomen is unremarkable. No  acute bony findings.          IMPRESSION:   1. New right basilar chest tube with decreased small right pleural  effusion. There may be a trace amount of postprocedural pleural gas at  the apex.  2. Unchanged small left pleural effusion.  3. Unchanged patchy airspace opacities bilaterally, greatest in the  right base, corresponding with the consolidation seen on prior CT.     I have personally reviewed the  examination and initial interpretation  and I agree with the findings.     PATRICIA GARCIA MD     9/20/18  1:34 PM [id number] [facility], [place],  Radiology    [employer] Interactive Report and InfoRx   View the interactive report   PACS Images   Show images for XR Chest w Fluoro 2 Views   Study Result   Exam: XR CHEST WITH FLUORO 2 VIEWS, 9/20/2018 1:34 PM     Indication: possible Left hemidiaphragm elevation/paralysis;      Comparison: CT thorax 9/18/2018     Findings:   AP and right posterior oblique images of the lower thorax are obtained  under fluoroscopy. Study is limited to the lung bases. Blunting of the  left costophrenic angle with likely left pleural effusion. Sternotomy  wires overlie the cardiac silhouette. There is decreased and  paradoxical motion of the left hemidiaphragm compared to the right.             Impression: Decreased and paradoxical motion of the left hemidiaphragm  consistent with left diaphragm paralysis.     I have personally reviewed the examination and initial interpretation  and I agree with the findings.     EFREN CORDOBA MD       A/P: 68 y.o male with PMHx of STEMI s/p HUSSEIN to RCA and LAD, s/p VSD repair and bioprosthetic Tricuspid valve replacement on 8/10/18 with Dr. Franco. Surgical admission complicated by right sided pneumothorax and thrombocytopenia 2/2 polycythemia vera.  Admitted from TCU on 9/19/18 for SOB, right middle lobe consolidation and increased right sided pleural effusion.  1. Neuro:  - 9/20: -Tylenol prn    2. CV:  PTA cardiac meds: Amiodarone 200 mg PO daily, Lasix 40 mg PO BID, Lopressor 12.5 mg PO BID  - 9/20: Hypotensive, 80/54, MAPs 60's, historically SBP 80-90's last admission and at rehab, watch carefully for over diureses, infection, normal ECHO.      3. Pulm:  - 9/20: Right sided pleural effusion and right middle lobe consolidation, Right pigtail placement 9/20 for pleural effusion with 1L of drainage. Patient feeling much better. Encourage IS, pulmonary  toileting, and activity as tolerated, patient currently off supp O2,  Albuterol PRN. 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 Will monitor symptomatically. No intervention at this time. Thoracic consult in future if patient is symptomatic.   4. ID:   - 9/20:  SOB improved after pigatil, Right middle lobe consolidation Sputum: mixed Gram (-) with Gram (+) Cocci in chains and clusters, normal mariama.  Normal WBC, afebrile, and clinical picture, pneumonia less likely, more likely consolidation 2/2 pleural effusion.       5. GI:  - 9/20: Regular Adult diet as tolerated    6. FEN:  - 9/20: Hyponatremic, Na: 133 trending up from 131, patient likely getting over diuresed. Continue to hold lasix. Will also talk with patient about water intake.     7. Renal/:  Admit wt: 71.3kg, down from discharge wt: 72.0 kg Cr 0.61. Volume status stable, no need for additional diureses. Currently on lasix 40 mg po bid. Increased 9/18, was on daily previously. Hold evening lasix on 9/20. Patient had some skin tenting/decreased turgor and sunken temples on exam. Will continue to hold, patient does not appear hypervolemic and weight in down. SOB likely due to pleural effusion and is now significantly improved after drainage.     8. Heme:  - Hematology following as outpatient for polycythemia, plts 391. Continue Hydroxyurea at current dose per Hematology. They will see patient this coming Tuesday as an outpatient.  INR: 1.67, Re dose tonight. Will talk to Dr. Franco about starting Plavix today for previous stent.      9. Endo:  - BG in 100s, not diabetic.    10: PPX:  - Protonix    11: Dispo:  - Discharge to TCU once medically stable, likely Sat/Sunday after pigtail removal.     Discussed with staff    Janice Ellis PA-C  Cardiothoracic Surgery   Pager [phone]  September 21, 2018

## 2018-09-21 NOTE — PLAN OF CARE
Problem: Patient Care Overview  Goal: Plan of Care/Patient Progress Review  Outcome: Improving  D/A/I:  Patient A&O x4, denied pain, palpitations, dizziness, and nausea.  Reported LOWRY, lung sounds diminished in left lung base, pleural rub auscultated in all right lung fields.  Encouraged IS and Acapella use.  Had 1-2+ edema in legs and feet, compression stockings placed.  Patient had good urine output, see I&O flowsheet.  Patient also reported diarrhea, bowel medications held this morning.  In sinus rhythm with HR 90s, SBP 90s, SaO2 96-98% on room air.  Chest tube removed during shift, post-removal X-ray performed during shift, see Chart Review for results.  Ambulated in room with standby assist.  Wife visited during shift.    P:  Continue to monitor pain, VS, heart rhythm, chest tube site integrity, fluid status, bowel status, cardiac and respiratory status.  Notify care team of changes in patient condition or other concerns.  Encourage IS and Acapella use to promote lung function.  Potential discharge 9/23 or 9/24.

## 2018-09-21 NOTE — PLAN OF CARE
Problem: Patient Care Overview  Goal: Plan of Care/Patient Progress Review  PT / 6C - PT orders received and acknowledged. Attempted PT eval however RN reporting plan to remove chest tube shortly. PT unable to return post CT removal. Rescheduled for 9/22.

## 2018-09-21 NOTE — PLAN OF CARE
Problem: Patient Care Overview  Goal: Plan of Care/Patient Progress Review  OT 6C  Discharge Planner OT   Patient plan for discharge: TCU  Current status: Eval complete, treatment indicated. SBA supine<>EOB with HOB elevated. SBA sit<>stand x3 reps throughout session. SBA for g/h at the sink with FWW. SBA for a toilet transfer and toilet hygiene. Pt completed 6 UE calisthenics while standing with FWW. Pt required frequent rest breaks due to fatigue and SOB throughout session.  Barriers to return to prior living situation: fatigue, SOB, deconditioning, acute medical needs  Recommendations for discharge: TCU  Rationale for recommendations: pt is below baseline and would benefit from continued skilled therapy to increase activity tolerance and independence with ADLs       Entered by: Serena Richardson 09/21/2018 4:19 PM

## 2018-09-21 NOTE — PROGRESS NOTES
09/21/18 1500   Quick Adds   Type of Visit Initial Occupational Therapy Evaluation   Living Environment   Lives With spouse   Living Arrangements house  (split level)   Home Accessibility bed and bath on same level   Number of Stairs to Enter Home 2   Number of Stairs Within Home 14   Stair Railings at Home inside, present on left side   Transportation Available car   Living Environment Comment Pt lives in a split level home with his spouse. The bedroom and bathroom are upstairs. Pt and his spouse are retired and therefore the spouse is home during the day   Self-Care   Dominant Hand right   Usual Activity Tolerance good   Current Activity Tolerance moderate   Regular Exercise yes   Activity/Exercise Type other (see comments)  (PT and OT at TCU)   Exercise Amount/Frequency daily   Equipment Currently Used at Home walker, standard;grab bar   Activity/Exercise/Self-Care Comment Pt admitted from TCU. Functional level prior was when at TCU. Prior to previous admission, pt was independent with all ADLs   Functional Level Prior   Ambulation 1-->assistive equipment   Transferring 1-->assistive equipment   Toileting 1-->assistive equipment   Bathing 3-->assistive equipment and person   Dressing 0-->independent   Eating 0-->independent   Communication 0-->understands/communicates without difficulty   Cognition 0 - no cognition issues reported   Fall history within last six months yes   Number of times patient has fallen within last six months 1   Which of the above functional risks had a recent onset or change? ambulation;toileting;bathing       Present no   Language english   General Information   Onset of Illness/Injury or Date of Surgery - Date 09/19/18   Referring Physician Janice Disla PA-C   Patient/Family Goals Statement To return to TCU   Additional Occupational Profile Info/Pertinent History of Current Problem 68 y.o male with PMHx of STEMI s/p HUSSEIN to RCA and LAD, s/p VSD repair and  bioprosthetic Tricuspid valve replacement on 8/10/18 with Dr. Franco. Surgical admission complicated by right sided pneumothorax and thrombocytopenia 2/2 polycythemia vera.  Admitted from TCU on 9/19/18 for SOB, right middle lobe consolidation and increased right sided pleural effusion.   Precautions/Limitations sternal precautions   General Observations Pt is pleasant and agreeable to therapy   General Info Comments Activity: ambulate every shift   Cognitive Status Examination   Orientation orientation to person, place and time   Cognitive Comment Pt is alert, oriented and appropriate in conversation. No cognitive deficits noted. Will continue to monitor   Visual Perception   Visual Perception Wears glasses   Visual Perception Comments Pt denies vision changes   Sensory Examination   Sensory Comments Pt has slight numbness in bilateral hands and feet   Pain Assessment   Patient Currently in Pain No   Integumentary/Edema   Integumentary/Edema Comments Pt has bilateral LE edema and is currently wearing compression socks   Range of Motion (ROM)   ROM Comment BUE not formally assess due to sternal precautions   Strength   Strength Comments Not formally assessed, per observation grossly 5/5, however generalized weakness   Hand Strength   Hand Strength Comments Bilateral  strength WFL   Mobility   Bed Mobility Bed mobility skill: Sit to supine;Bed mobility skill: Supine to sit   Bed Mobility Skill: Sit to Supine   Level of [place]: Sit/Supine stand-by assist   Physical Assist/Nonphysical Assist: Sit/Supine supervision   Bed Mobility Skill: Supine to Sit   Level of [place]: Supine/Sit stand-by assist   Physical Assist/Nonphysical Assist: Supine/Sit supervision   Transfer Skill: Sit to Stand   Level of [place]: Sit/Stand stand-by assist   Physical Assist/Nonphysical Assist: Sit/Stand supervision   Transfer Skill: Sit to Stand full weight-bearing   Assistive Device for Transfer: Sit/Stand standard walker  "  Transfer Skill: Toilet Transfer   Level of [place]: Toilet stand-by assist   Physical Assist/Nonphysical Assist: Toilet supervision   Weight-Bearing Restrictions: Toilet full weight-bearing   Assistive Device standard walker   Grooming   Level of [place]: Grooming stand-by assist   Physical Assist/Nonphysical Assist: Grooming supervision   Instrumental Activities of Daily Living (IADL)   Previous Responsibilities meal prep;housekeeping;laundry;shopping;yardwork;medication management;finances;driving   IADL Comments Prior to recent admission, pt was independent with IADL completion   Activities of Daily Living Analysis   Impairments Contributing to Impaired Activities of Daily Living balance impaired;ROM decreased;strength decreased;post surgical precautions   General Therapy Interventions   Planned Therapy Interventions ADL retraining;IADL retraining;ROM;strengthening;home program guidelines;progressive activity/exercise;risk factor education   Clinical Impression   Criteria for Skilled Therapeutic Interventions Met yes, treatment indicated   OT Diagnosis decreased activity tolerance and independence with ADL   Influenced by the following impairments SOB, fatigue, weakness   Assessment of Occupational Performance 3-5 Performance Deficits   Identified Performance Deficits functional mobility, bathing, dressing, g/h, toileting   Clinical Decision Making (Complexity) Low complexity   Therapy Frequency other (see comments)  (6x per week)   Predicted Duration of Therapy Intervention (days/wks) 9/28/18   Anticipated Equipment Needs at Discharge other (see comments)  (TBD)   Anticipated Discharge Disposition Transitional Care Facility   Risks and Benefits of Treatment have been explained. Yes   Patient, Family & other staff in agreement with plan of care Yes   [facility] AM-PAC TM \"6 Clicks\"   2016, Trustees of [facility], under license to [employer].  All rights reserved.   6 Clicks Short Forms " "Daily Activity Inpatient Short Form   [employer] AM-PAC  \"6 Clicks\" Daily Activity Inpatient Short Form   1. Putting on and taking off regular lower body clothing? 3 - A Little   2. Bathing (including washing, rinsing, drying)? 2 - A Lot   3. Toileting, which includes using toilet, bedpan or urinal? 4 - None   4. Putting on and taking off regular upper body clothing? 4 - None   5. Taking care of personal grooming such as brushing teeth? 4 - None   6. Eating meals? 4 - None   Daily Activity Raw Score (Score out of 24.Lower scores equate to lower levels of function) 21   Total Evaluation Time   Total Evaluation Time (Minutes) 5     "

## 2018-09-21 NOTE — PLAN OF CARE
Problem: Patient Care Overview  Goal: Plan of Care/Patient Progress Review  Outcome: No Change  Pt A&O x4. VSS. RA with O2 sats 95-98%. Pt up to bathroom with SBA and tolerating activity well. Pt denies pain. Good urine output. Continue to encourage increase in activity level. Continue to monitor CT output. Notify MD of any changes/concerns.

## 2018-09-22 ENCOUNTER — APPOINTMENT (OUTPATIENT)
Dept: GENERAL RADIOLOGY | Facility: CLINIC | Age: 68
DRG: 187 | End: 2018-09-22
Attending: PHYSICIAN ASSISTANT
Payer: MEDICARE

## 2018-09-22 ENCOUNTER — PRE VISIT (OUTPATIENT)
Dept: ONCOLOGY | Facility: CLINIC | Age: 68
End: 2018-09-22

## 2018-09-22 ENCOUNTER — APPOINTMENT (OUTPATIENT)
Dept: OCCUPATIONAL THERAPY | Facility: CLINIC | Age: 68
DRG: 187 | End: 2018-09-22
Attending: THORACIC SURGERY (CARDIOTHORACIC VASCULAR SURGERY)
Payer: MEDICARE

## 2018-09-22 LAB
ANION GAP SERPL CALCULATED.3IONS-SCNC: 10 MMOL/L (ref 3–14)
BUN SERPL-MCNC: 15 MG/DL (ref 7–30)
CALCIUM SERPL-MCNC: 8.1 MG/DL (ref 8.5–10.1)
CHLORIDE SERPL-SCNC: 100 MMOL/L (ref 94–109)
CO2 SERPL-SCNC: 23 MMOL/L (ref 20–32)
CREAT SERPL-MCNC: 0.64 MG/DL (ref 0.66–1.25)
ERYTHROCYTE [DISTWIDTH] IN BLOOD BY AUTOMATED COUNT: 27.8 % (ref 10–15)
GFR SERPL CREATININE-BSD FRML MDRD: >90 ML/MIN/1.7M2
GLUCOSE SERPL-MCNC: 86 MG/DL (ref 70–99)
HCT VFR BLD AUTO: 25.8 % (ref 40–53)
HGB BLD-MCNC: 8.1 G/DL (ref 13.3–17.7)
INR PPP: 2.06 (ref 0.86–1.14)
MCH RBC QN AUTO: 33.1 PG (ref 26.5–33)
MCHC RBC AUTO-ENTMCNC: 31.4 G/DL (ref 31.5–36.5)
MCV RBC AUTO: 105 FL (ref 78–100)
PLATELET # BLD AUTO: 395 10E9/L (ref 150–450)
POTASSIUM SERPL-SCNC: 4.3 MMOL/L (ref 3.4–5.3)
RBC # BLD AUTO: 2.45 10E12/L (ref 4.4–5.9)
SODIUM SERPL-SCNC: 133 MMOL/L (ref 133–144)
WBC # BLD AUTO: 4 10E9/L (ref 4–11)

## 2018-09-22 PROCEDURE — A9270 NON-COVERED ITEM OR SERVICE: HCPCS | Mod: GY | Performed by: PHYSICIAN ASSISTANT

## 2018-09-22 PROCEDURE — 80048 BASIC METABOLIC PNL TOTAL CA: CPT | Performed by: PHYSICIAN ASSISTANT

## 2018-09-22 PROCEDURE — 97530 THERAPEUTIC ACTIVITIES: CPT | Mod: GO

## 2018-09-22 PROCEDURE — 97110 THERAPEUTIC EXERCISES: CPT | Mod: GO

## 2018-09-22 PROCEDURE — 25000132 ZZH RX MED GY IP 250 OP 250 PS 637: Mod: GY | Performed by: PHYSICIAN ASSISTANT

## 2018-09-22 PROCEDURE — 25000132 ZZH RX MED GY IP 250 OP 250 PS 637: Mod: GY | Performed by: THORACIC SURGERY (CARDIOTHORACIC VASCULAR SURGERY)

## 2018-09-22 PROCEDURE — 85027 COMPLETE CBC AUTOMATED: CPT | Performed by: PHYSICIAN ASSISTANT

## 2018-09-22 PROCEDURE — 71046 X-RAY EXAM CHEST 2 VIEWS: CPT

## 2018-09-22 PROCEDURE — 36415 COLL VENOUS BLD VENIPUNCTURE: CPT | Performed by: PHYSICIAN ASSISTANT

## 2018-09-22 PROCEDURE — 40000133 ZZH STATISTIC OT WARD VISIT

## 2018-09-22 PROCEDURE — 21400006 ZZH R&B CCU INTERMEDIATE UMMC

## 2018-09-22 PROCEDURE — 85610 PROTHROMBIN TIME: CPT | Performed by: PHYSICIAN ASSISTANT

## 2018-09-22 PROCEDURE — A9270 NON-COVERED ITEM OR SERVICE: HCPCS | Mod: GY | Performed by: THORACIC SURGERY (CARDIOTHORACIC VASCULAR SURGERY)

## 2018-09-22 RX ORDER — WARFARIN SODIUM 5 MG/1
5 TABLET ORAL
Status: COMPLETED | OUTPATIENT
Start: 2018-09-22 | End: 2018-09-22

## 2018-09-22 RX ADMIN — PANTOPRAZOLE SODIUM 40 MG: 40 TABLET, DELAYED RELEASE ORAL at 08:04

## 2018-09-22 RX ADMIN — WARFARIN SODIUM 5 MG: 5 TABLET ORAL at 17:57

## 2018-09-22 RX ADMIN — Medication 12.5 MG: at 20:58

## 2018-09-22 RX ADMIN — AMIODARONE HYDROCHLORIDE 200 MG: 200 TABLET ORAL at 08:04

## 2018-09-22 RX ADMIN — HYDROXYUREA 1500 MG: 500 CAPSULE ORAL at 08:03

## 2018-09-22 RX ADMIN — LEVOTHYROXINE SODIUM 75 MCG: 75 TABLET ORAL at 08:04

## 2018-09-22 RX ADMIN — CLOPIDOGREL 75 MG: 75 TABLET, FILM COATED ORAL at 08:04

## 2018-09-22 RX ADMIN — Medication 12.5 MG: at 10:07

## 2018-09-22 RX ADMIN — POLYETHYLENE GLYCOL 3350 17 G: 17 POWDER, FOR SOLUTION ORAL at 08:08

## 2018-09-22 RX ADMIN — PANTOPRAZOLE SODIUM 40 MG: 40 TABLET, DELAYED RELEASE ORAL at 16:33

## 2018-09-22 RX ADMIN — UMECLIDINIUM BROMIDE AND VILANTEROL TRIFENATATE 1 PUFF: 62.5; 25 POWDER RESPIRATORY (INHALATION) at 08:03

## 2018-09-22 RX ADMIN — MULTIPLE VITAMINS W/ MINERALS TAB 1 TABLET: TAB at 08:04

## 2018-09-22 RX ADMIN — HYDROXYUREA 1000 MG: 500 CAPSULE ORAL at 20:57

## 2018-09-22 RX ADMIN — ATORVASTATIN CALCIUM 80 MG: 80 TABLET, FILM COATED ORAL at 17:57

## 2018-09-22 ASSESSMENT — ACTIVITIES OF DAILY LIVING (ADL)
ADLS_ACUITY_SCORE: 16

## 2018-09-22 NOTE — TELEPHONE ENCOUNTER
Date of appointment: 10/3/2018   Diagnosis/reason for appointment: Myeloproliferative neoplasm   Referring provider/facility:  Who called:  Scheduled by PHYLLIS SHEEHAN

## 2018-09-22 NOTE — PLAN OF CARE
Problem: Patient Care Overview  Goal: Plan of Care/Patient Progress Review  Vital signs stable, patient denies pain, telemetry shows sinus rhythm. Compression stockings taken off at bedtime. Continue to monitor respiratory status, comfort, vitals and lab values.

## 2018-09-22 NOTE — PLAN OF CARE
Problem: Patient Care Overview  Goal: Plan of Care/Patient Progress Review  PT 6C: Holding - PT orders acknowledged and appreciated. Following discussion with OT, pt mobilizing well and at SBA. Pt with plans to discharge with safe discharge plan already set up in 1-2 days and would not require IP PT to facilitate this discharge. If pt does not discharge, will initiate treatment. Pt may benefit from HHPT to progress his community mobility and balance further. Will continue to monitor.

## 2018-09-22 NOTE — PROGRESS NOTES
09/22/18  CVTS Progress Note  Attending provider: Jason Franco, *        S: YOLI. Patient reports breathing continues to improve after right pigtail placed, now removed.     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 Patient denies fever, chills, sweats. Patient eating well. Working with PT/OT. + BM.  Sinus rhythm.    O:   Vitals:    09/21/18 1938 09/22/18 0143 09/22/18 0300 09/22/18 0746   BP: 103/72 90/66  99/73   BP Location: Left arm Right arm  Right arm   Cuff Size:    Adult Regular   Pulse:       Resp: 18 18 18   Temp: 97.9  F (36.6  C)   98.5  F (36.9  C)   TempSrc: Oral   Oral   SpO2: 99% 96%  95%   Weight:   71.4 kg (157 lb 6.4 oz)      Vitals:    09/20/18 0601 09/21/18 0300 09/22/18 0300   Weight: 71.3 kg (157 lb 3.2 oz) 70.9 kg (156 lb 6.4 oz) 71.4 kg (157 lb 6.4 oz)     I/O last 3 completed shifts:  In: 600 [P.O.:600]  Out: 625 [Urine:625]  Gen: AxOx3, NAD  CV: RRR  Pulm: NLB on RA  Abd: soft, NT, ND  Ext: minimal LE edema  Incision: c/d/i, no erythema, sternal stable  Labs: reviewed    A/P: 68 y.o male with PMHx of STEMI s/p HUSSEIN to RCA and LAD, s/p VSD repair and bioprosthetic Tricuspid valve replacement on 8/10/18 with Dr. Franco. Surgical admission complicated by right sided pneumothorax and thrombocytopenia 2/2 polycythemia vera.  Admitted from TCU on 9/19/18 for SOB, right middle lobe consolidation and increased right sided pleural effusion.  1. Neuro:  - 9/20: -Tylenol prn    2. CV:  PTA cardiac meds: Amiodarone 200 mg PO daily, Lasix 40 mg PO BID, Lopressor 12.5 mg PO BID  - 9/20: Hypotensive, 80/54, MAPs 60's, historically SBP 80-90's last admission and at rehab, watch carefully for over diureses, infection, normal ECHO.      3. Pulm:  - 9/20: Right sided pleural effusion and right middle lobe consolidation, Right pigtail placement 9/20 for pleural effusion with 1L of drainage. Patient feeling much better. R pigtail removed 9/22.  Encourage IS, pulmonary toileting,  and activity as tolerated, patient currently off supp O2,  Albuterol PRN. 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 Will monitor symptomatically. No intervention at this time. Thoracic consult in future if patient is symptomatic.     4. ID:   - 9/20:  SOB improved after pigatil, Right middle lobe consolidation Sputum: mixed Gram (-) with Gram (+) Cocci in chains and clusters, normal mariama.  Normal WBC, afebrile, and clinical picture, pneumonia less likely, more likely consolidation 2/2 pleural effusion.       5. GI:  - 9/20: Regular Adult diet as tolerated    6. FEN:  - 9/20: Hyponatremic, Na: 133 trending up from 131, patient likely getting over diuresed. Continue to hold lasix. Will also talk with patient about water intake.     7. Renal/:  Admit wt: 71.3kg, down from discharge wt: 72.0 kg Cr 0.61. Volume status stable, no need for additional diureses. Currently on lasix 40 mg po bid. Increased 9/18, was on daily previously. Hold evening lasix on 9/20. Patient had some skin tenting/decreased turgor and sunken temples on exam. Will continue to hold, patient does not appear hypervolemic and weight in down. SOB likely due to pleural effusion and is now significantly improved after drainage.     8. Heme:  - Hematology following as outpatient for polycythemia, plts 391. Continue Hydroxyurea at current dose per Hematology. They will see patient this coming Tuesday as an outpatient.  INR: 1.67, Re dose tonight.      9. Endo:  - BG in 100s, not diabetic.    10: PPX:  - Protonix    11: Dispo:  - Discharge to TCU once medically stable, possibly Sunday.    Discussed with CVTS Fellow    Jose Leslie MD  PGY-3 General Surgery

## 2018-09-22 NOTE — PLAN OF CARE
Problem: Patient Care Overview  Goal: Plan of Care/Patient Progress Review  Outcome: Improving  D/A/I:  Patient A&O x4, denied pain, palpitations, dizziness, and nausea.  Had LOWRY, lung sounds diminished in left lung base, pleural rub auscultated in right middle and lower lung lobes.  Had 1-2+ edema in legs and feet, compression stockings applied this morning.  Chest tube site clean, small amount of serosanguinous drainage noted on dressing at time of dressing change.  In sinus rhythm with HR 80s-90s, SBP 90s-100s, SaO2 95-98% on room air.  Chest X-ray performed during shift, see Chart Review for results.  Ambulated in hallway with standby assist and use of walker, was steady on his feet.  Daughter visited during shift.    P:  Continue to monitor pain, VS, heart rhythm, chest tube site integrity, fluid status, bowel status, cardiac and respiratory status.  Notify care team of changes in patient condition or other concerns.  Potential discharge 9/23.

## 2018-09-23 ENCOUNTER — APPOINTMENT (OUTPATIENT)
Dept: OCCUPATIONAL THERAPY | Facility: CLINIC | Age: 68
DRG: 187 | End: 2018-09-23
Attending: THORACIC SURGERY (CARDIOTHORACIC VASCULAR SURGERY)
Payer: MEDICARE

## 2018-09-23 LAB — INR PPP: 1.87 (ref 0.86–1.14)

## 2018-09-23 PROCEDURE — 21400006 ZZH R&B CCU INTERMEDIATE UMMC

## 2018-09-23 PROCEDURE — 36415 COLL VENOUS BLD VENIPUNCTURE: CPT | Performed by: PHYSICIAN ASSISTANT

## 2018-09-23 PROCEDURE — 25000132 ZZH RX MED GY IP 250 OP 250 PS 637: Mod: GY | Performed by: PHYSICIAN ASSISTANT

## 2018-09-23 PROCEDURE — 25000132 ZZH RX MED GY IP 250 OP 250 PS 637: Mod: GY | Performed by: THORACIC SURGERY (CARDIOTHORACIC VASCULAR SURGERY)

## 2018-09-23 PROCEDURE — 93010 ELECTROCARDIOGRAM REPORT: CPT | Performed by: INTERNAL MEDICINE

## 2018-09-23 PROCEDURE — A9270 NON-COVERED ITEM OR SERVICE: HCPCS | Mod: GY | Performed by: THORACIC SURGERY (CARDIOTHORACIC VASCULAR SURGERY)

## 2018-09-23 PROCEDURE — 85610 PROTHROMBIN TIME: CPT | Performed by: PHYSICIAN ASSISTANT

## 2018-09-23 PROCEDURE — 93005 ELECTROCARDIOGRAM TRACING: CPT

## 2018-09-23 PROCEDURE — 97530 THERAPEUTIC ACTIVITIES: CPT | Mod: GO

## 2018-09-23 PROCEDURE — A9270 NON-COVERED ITEM OR SERVICE: HCPCS | Mod: GY | Performed by: PHYSICIAN ASSISTANT

## 2018-09-23 PROCEDURE — 40000133 ZZH STATISTIC OT WARD VISIT

## 2018-09-23 PROCEDURE — 97535 SELF CARE MNGMENT TRAINING: CPT | Mod: GO

## 2018-09-23 RX ORDER — WARFARIN SODIUM 7.5 MG/1
7.5 TABLET ORAL
Status: COMPLETED | OUTPATIENT
Start: 2018-09-23 | End: 2018-09-23

## 2018-09-23 RX ADMIN — POLYETHYLENE GLYCOL 3350 17 G: 17 POWDER, FOR SOLUTION ORAL at 09:06

## 2018-09-23 RX ADMIN — LEVOTHYROXINE SODIUM 75 MCG: 75 TABLET ORAL at 09:06

## 2018-09-23 RX ADMIN — WARFARIN SODIUM 7.5 MG: 7.5 TABLET ORAL at 18:12

## 2018-09-23 RX ADMIN — HYDROXYUREA 1500 MG: 500 CAPSULE ORAL at 09:06

## 2018-09-23 RX ADMIN — PANTOPRAZOLE SODIUM 40 MG: 40 TABLET, DELAYED RELEASE ORAL at 16:24

## 2018-09-23 RX ADMIN — UMECLIDINIUM BROMIDE AND VILANTEROL TRIFENATATE 1 PUFF: 62.5; 25 POWDER RESPIRATORY (INHALATION) at 09:09

## 2018-09-23 RX ADMIN — Medication 12.5 MG: at 19:33

## 2018-09-23 RX ADMIN — HYDROXYUREA 1000 MG: 500 CAPSULE ORAL at 19:31

## 2018-09-23 RX ADMIN — CLOPIDOGREL 75 MG: 75 TABLET, FILM COATED ORAL at 09:06

## 2018-09-23 RX ADMIN — ATORVASTATIN CALCIUM 80 MG: 80 TABLET, FILM COATED ORAL at 18:12

## 2018-09-23 RX ADMIN — Medication 12.5 MG: at 09:06

## 2018-09-23 RX ADMIN — PANTOPRAZOLE SODIUM 40 MG: 40 TABLET, DELAYED RELEASE ORAL at 09:06

## 2018-09-23 RX ADMIN — MULTIPLE VITAMINS W/ MINERALS TAB 1 TABLET: TAB at 09:06

## 2018-09-23 RX ADMIN — AMIODARONE HYDROCHLORIDE 200 MG: 200 TABLET ORAL at 09:06

## 2018-09-23 ASSESSMENT — ACTIVITIES OF DAILY LIVING (ADL)
ADLS_ACUITY_SCORE: 16

## 2018-09-23 NOTE — PROGRESS NOTES
09/23/2018  CVTS Progress Note  Attending provider: Jason rFanco, *      S: YOLI. Patient reports breathing continues to improve after right pigtail placed, now removed.     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 Patient denies fever, chills, sweats. Patient eating well. Working with PT/OT. + BM.  Sinus rhythm.    O:   Vitals:    09/23/18 0400 09/23/18 0600 09/23/18 0742 09/23/18 1056   BP:   112/81 91/58   BP Location:   Right arm Right arm   Cuff Size:   Adult Regular Adult Regular   Pulse:       Resp: 18 18 18   Temp:   98.1  F (36.7  C) 98  F (36.7  C)   TempSrc:   Oral Oral   SpO2:   97% 98%   Weight:  71.9 kg (158 lb 8 oz)       Vitals:    09/21/18 0300 09/22/18 0300 09/23/18 0600   Weight: 70.9 kg (156 lb 6.4 oz) 71.4 kg (157 lb 6.4 oz) 71.9 kg (158 lb 8 oz)     I/O last 3 completed shifts:  In: 840 [P.O.:840]  Out: 975 [Urine:975]     Gen: Alert, pleasant, NAD  CV: RRR  Pulm: NLB on RA  Abd: soft, NT, ND  Ext: minimal LE edema  Incision: c/d/i, no erythema, sternal stable  Labs: reviewed    A/P: 68 y.o male with PMHx of STEMI s/p HUSSEIN to RCA and LAD, s/p VSD repair and bioprosthetic Tricuspid valve replacement on 8/10/18 with Dr. Franco. Surgical admission complicated by right sided pneumothorax and thrombocytopenia 2/2 polycythemia vera.  Admitted from TCU on 9/19/18 for SOB, right middle lobe consolidation and increased right sided pleural effusion.  1. Neuro:  - 9/20: -Tylenol prn    2. CV:  PTA cardiac meds: Amiodarone 200 mg PO daily, Lasix 40 mg PO BID, Lopressor 12.5 mg PO BID  - 9/20: Hypotensive, 80/54, MAPs 60's, historically SBP 80-90's last admission and at rehab, watch carefully for over diureses, infection, normal ECHO.      3. Pulm:  - 9/20: Right sided pleural effusion and right middle lobe consolidation, Right pigtail placement 9/20 for pleural effusion with 1L of drainage. Patient feeling much better. R pigtail removed 9/22.  Encourage IS, pulmonary  toileting, and activity as tolerated, patient currently off supp O2,  Albuterol PRN. 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 Will monitor symptomatically. No intervention at this time. Thoracic consult in future if patient is symptomatic.     4. ID:   - 9/20:  SOB improved after pigatil, Right middle lobe consolidation Sputum: mixed Gram (-) with Gram (+) Cocci in chains and clusters, normal mariama.  Normal WBC, afebrile, and clinical picture, pneumonia less likely, more likely consolidation 2/2 pleural effusion.       5. GI:  - 9/20: Regular Adult diet as tolerated    6. FEN:  - 9/20: Hyponatremic, Na: 133 trending up from 131, patient likely getting over diuresed. Continue to hold lasix. Will also talk with patient about water intake.     7. Renal/:  Admit wt: 71.3kg, down from discharge wt: 72.0 kg Cr 0.61. Volume status stable, no need for additional diureses. Currently on lasix 40 mg po bid. Increased 9/18, was on daily previously. Hold evening lasix on 9/20. Patient had some skin tenting/decreased turgor and sunken temples on exam. Will continue to hold, patient does not appear hypervolemic and weight in down. SOB likely due to pleural effusion and is now significantly improved after drainage.     8. Heme:  - Hematology following as outpatient for polycythemia, plts 391. Continue Hydroxyurea at current dose per Hematology. They will see patient this coming Tuesday as an outpatient.  INR: 1.67, Re dose tonight.      9. Endo:  - BG in 100s, not diabetic.    10: PPX:  - Protonix    11: Dispo:  - Discharge to TCU once medically stable, pending bed availability.    Discussed with CVTS Fellow    Jose Leslie MD  PGY-3 General Surgery

## 2018-09-23 NOTE — PROVIDER NOTIFICATION
Issue:  Received notification from telemetry monitor that patient's HR dropped from 90s down into 50s.    Assessment:  Found patient sitting in chair, eating dinner.  Patient stated that he felt significantly nauseated; denied pain, dizziness, and difficulty breathing.  Monitor showed heart rate fluctuating between 50s and 100s over course of about 5 minutes.    Notified:  Dr. Jose Leslie, pager [phone]    Action: Shortly after notifying Dr. Leslie, heart rate stabilized between 95 and 100 and patient reported resolution of nausea, reported no other symptoms.  EKG ordered, will continue to monitor.

## 2018-09-23 NOTE — PLAN OF CARE
Problem: Patient Care Overview  Goal: Plan of Care/Patient Progress Review  Patient in sinus rhythm, vital signs stable, patient denies pain. Sleeping overnight with no complaints. Plan likely discharge today to TCU or home. Continue plan of care.

## 2018-09-23 NOTE — PLAN OF CARE
Problem: Patient Care Overview  Goal: Plan of Care/Patient Progress Review  Outcome: Improving  D/A/I:  Patient A&O x4, denied pain, palpitations, difficulty breathing, SOB, and dizziness.  Reported transient nausea, declined interventions, was able to make episodes pass with measured breathing.  Had episode of nausea that corresponded with heart rate fluctuations, see Provider Notify note.  Lung sounds diminished in left lung base, pleural rub auscultated in right middle and lower lobes.  Encouraged IS and Acapella use.  Chest tube site clean with scant serous drainage noted on dressing at time of dressing change.  Had 1-2+ edema in legs and feet, compression stockings placed for the day.  In sinus rhythm with HR 80s-90s, SBP 90s-110s, SaO2 97-98% on room air.  Ambulated in hallway with standby assist and use of walker.  Verbalized desire to discharge from hospital, but stated he would prefer to discharge to TCU as opposed to home with OP therapies.  Discussed this preference with Dr. Leslie from surgical team, who shared his thoughts.    P:  Continue to monitor pain, VS, heart rhythm, chest tube site integrity, fluid status, cardiac and respiratory status.  Notify care team of changes in patient condition or other concerns.  Potential discharge to TCU 9/24 pending room availability.

## 2018-09-23 NOTE — PLAN OF CARE
Problem: Patient Care Overview  Goal: Plan of Care/Patient Progress Review  OT/CR 6C  Discharge Planner OT   Patient plan for discharge: After discussion with pt, pt stated he would like to return to TCU for continued rehab before discharge to home. Pt stated he is also comfortable to discharge to home if his previous TCU does not re-admit him  Current status: SBA sit<>stand x5 reps throughout session. SBA for clothing retrieval and UB, LB dressing while seated EOB. SBA for a toilet transfer. Pt ambulated 200ft x2 with FWW and SBA. Pt ambulated up and down 3 stairs x2 reps while holding one hand railing and SBA.  Barriers to return to prior living situation: fatigue, acute medical needs  Recommendations for discharge: Home with assist and OP CR  Rationale for recommendations: Pt is primarily independent with ADL completion and has assist at at home IADLs.       Entered by: Serena Richardson 09/23/2018 8:27 AM

## 2018-09-24 ENCOUNTER — APPOINTMENT (OUTPATIENT)
Dept: GENERAL RADIOLOGY | Facility: CLINIC | Age: 68
DRG: 187 | End: 2018-09-24
Attending: PHYSICIAN ASSISTANT
Payer: MEDICARE

## 2018-09-24 ENCOUNTER — APPOINTMENT (OUTPATIENT)
Dept: OCCUPATIONAL THERAPY | Facility: CLINIC | Age: 68
DRG: 187 | End: 2018-09-24
Attending: THORACIC SURGERY (CARDIOTHORACIC VASCULAR SURGERY)
Payer: MEDICARE

## 2018-09-24 VITALS
OXYGEN SATURATION: 99 % | WEIGHT: 159.4 LBS | RESPIRATION RATE: 18 BRPM | HEART RATE: 101 BPM | SYSTOLIC BLOOD PRESSURE: 98 MMHG | BODY MASS INDEX: 23.54 KG/M2 | TEMPERATURE: 98.1 F | DIASTOLIC BLOOD PRESSURE: 77 MMHG

## 2018-09-24 LAB
ANION GAP SERPL CALCULATED.3IONS-SCNC: 9 MMOL/L (ref 3–14)
BUN SERPL-MCNC: 10 MG/DL (ref 7–30)
CALCIUM SERPL-MCNC: 8 MG/DL (ref 8.5–10.1)
CHLORIDE SERPL-SCNC: 98 MMOL/L (ref 94–109)
CO2 SERPL-SCNC: 24 MMOL/L (ref 20–32)
CREAT SERPL-MCNC: 0.69 MG/DL (ref 0.66–1.25)
ERYTHROCYTE [DISTWIDTH] IN BLOOD BY AUTOMATED COUNT: 28.3 % (ref 10–15)
GFR SERPL CREATININE-BSD FRML MDRD: >90 ML/MIN/1.7M2
GLUCOSE SERPL-MCNC: 104 MG/DL (ref 70–99)
HCT VFR BLD AUTO: 25.5 % (ref 40–53)
HGB BLD-MCNC: 7.9 G/DL (ref 13.3–17.7)
INR PPP: 2.18 (ref 0.86–1.14)
INTERPRETATION ECG - MUSE: NORMAL
MAGNESIUM SERPL-MCNC: 1.8 MG/DL (ref 1.6–2.3)
MCH RBC QN AUTO: 32.6 PG (ref 26.5–33)
MCHC RBC AUTO-ENTMCNC: 31 G/DL (ref 31.5–36.5)
MCV RBC AUTO: 105 FL (ref 78–100)
PLATELET # BLD AUTO: 472 10E9/L (ref 150–450)
POTASSIUM SERPL-SCNC: 4 MMOL/L (ref 3.4–5.3)
RBC # BLD AUTO: 2.42 10E12/L (ref 4.4–5.9)
SODIUM SERPL-SCNC: 131 MMOL/L (ref 133–144)
WBC # BLD AUTO: 3.8 10E9/L (ref 4–11)

## 2018-09-24 PROCEDURE — 83735 ASSAY OF MAGNESIUM: CPT | Performed by: PHYSICIAN ASSISTANT

## 2018-09-24 PROCEDURE — 85610 PROTHROMBIN TIME: CPT | Performed by: PHYSICIAN ASSISTANT

## 2018-09-24 PROCEDURE — 97535 SELF CARE MNGMENT TRAINING: CPT | Mod: GO

## 2018-09-24 PROCEDURE — 36415 COLL VENOUS BLD VENIPUNCTURE: CPT | Performed by: PHYSICIAN ASSISTANT

## 2018-09-24 PROCEDURE — 80048 BASIC METABOLIC PNL TOTAL CA: CPT | Performed by: PHYSICIAN ASSISTANT

## 2018-09-24 PROCEDURE — 71046 X-RAY EXAM CHEST 2 VIEWS: CPT

## 2018-09-24 PROCEDURE — 85027 COMPLETE CBC AUTOMATED: CPT | Performed by: PHYSICIAN ASSISTANT

## 2018-09-24 PROCEDURE — A9270 NON-COVERED ITEM OR SERVICE: HCPCS | Mod: GY | Performed by: THORACIC SURGERY (CARDIOTHORACIC VASCULAR SURGERY)

## 2018-09-24 PROCEDURE — 40000133 ZZH STATISTIC OT WARD VISIT

## 2018-09-24 PROCEDURE — A9270 NON-COVERED ITEM OR SERVICE: HCPCS | Mod: GY | Performed by: PHYSICIAN ASSISTANT

## 2018-09-24 PROCEDURE — 97530 THERAPEUTIC ACTIVITIES: CPT | Mod: GO

## 2018-09-24 PROCEDURE — 25000132 ZZH RX MED GY IP 250 OP 250 PS 637: Mod: GY | Performed by: THORACIC SURGERY (CARDIOTHORACIC VASCULAR SURGERY)

## 2018-09-24 PROCEDURE — 25000132 ZZH RX MED GY IP 250 OP 250 PS 637: Mod: GY | Performed by: PHYSICIAN ASSISTANT

## 2018-09-24 PROCEDURE — 87103 BLOOD FUNGUS CULTURE: CPT | Performed by: PHYSICIAN ASSISTANT

## 2018-09-24 PROCEDURE — 97110 THERAPEUTIC EXERCISES: CPT | Mod: GO

## 2018-09-24 RX ORDER — FUROSEMIDE 20 MG
20 TABLET ORAL 2 TIMES DAILY
Qty: 30 TABLET | DISCHARGE
Start: 2018-09-24 | End: 2018-09-24

## 2018-09-24 RX ORDER — FUROSEMIDE 20 MG
40 TABLET ORAL 2 TIMES DAILY
Qty: 30 TABLET | DISCHARGE
Start: 2018-09-24

## 2018-09-24 RX ORDER — WARFARIN SODIUM 2 MG/1
TABLET ORAL
Qty: 90 TABLET | DISCHARGE
Start: 2018-09-24 | End: 2018-10-05

## 2018-09-24 RX ORDER — WARFARIN SODIUM 5 MG/1
5 TABLET ORAL
Status: DISCONTINUED | OUTPATIENT
Start: 2018-09-24 | End: 2018-09-24 | Stop reason: HOSPADM

## 2018-09-24 RX ADMIN — CLOPIDOGREL 75 MG: 75 TABLET, FILM COATED ORAL at 07:52

## 2018-09-24 RX ADMIN — POLYETHYLENE GLYCOL 3350 17 G: 17 POWDER, FOR SOLUTION ORAL at 07:51

## 2018-09-24 RX ADMIN — PANTOPRAZOLE SODIUM 40 MG: 40 TABLET, DELAYED RELEASE ORAL at 07:52

## 2018-09-24 RX ADMIN — LEVOTHYROXINE SODIUM 75 MCG: 75 TABLET ORAL at 07:52

## 2018-09-24 RX ADMIN — AMIODARONE HYDROCHLORIDE 200 MG: 200 TABLET ORAL at 07:52

## 2018-09-24 RX ADMIN — MULTIPLE VITAMINS W/ MINERALS TAB 1 TABLET: TAB at 07:52

## 2018-09-24 RX ADMIN — UMECLIDINIUM BROMIDE AND VILANTEROL TRIFENATATE 1 PUFF: 62.5; 25 POWDER RESPIRATORY (INHALATION) at 07:51

## 2018-09-24 RX ADMIN — Medication 12.5 MG: at 07:52

## 2018-09-24 RX ADMIN — HYDROXYUREA 1500 MG: 500 CAPSULE ORAL at 07:52

## 2018-09-24 ASSESSMENT — ACTIVITIES OF DAILY LIVING (ADL)
ADLS_ACUITY_SCORE: 16

## 2018-09-24 NOTE — PLAN OF CARE
DISCHARGE   Discharged to: [facility]  Via: Automobile  Accompanied by: Son  Discharge Instructions: principal diagnosis, major findings/procedures, diet, activity, medications, follow up appointments, when to call the MD, and what to watchout for (i.e. s/s of infection, increasing SOB, palpitations, Angina). Pt states understanding of all discharge instructions.   Prescriptions: To be filled at TCU while in their residence; scripts faxed to their pharmacy.  Follow Up Appointments: with CVTS 10/2 at 1400, with cardiology in 4-6 weeks, with PCP in 2-4 weeks  Belongings: All sent with pt. Pt verifies that they are taking all belongings with them.   IV: discontinued.   Telemetry: discontinued.   Pt exhibits understanding of above discharge instructions; all questions answered.  Discharge Paperwork: faxed to discharge planner.

## 2018-09-24 NOTE — PROGRESS NOTES
9/24/2018   CVTS Progress Note  Attending provider: Jason Franco, *        S: Patient had some nausea and an episode of fluctuating heart rate and bradycardia 50s -100s for approximately 5 mins.  Heart rate stabilized and nausea resolved without intervention.  EKG was performed, no new EKG changes.    Patient denies SOB, CP, fever, chills, sweats. Patient eating well.  Ambulating for PT and with nursing staff. + BM. Sinus rhythm.    O:   Vitals:    09/24/18 0100 09/24/18 0500 09/24/18 0734 09/24/18 0951   BP: 97/69  111/82 96/71   BP Location: Right arm  Right arm Right arm   Cuff Size:   Adult Regular    Pulse:       Resp: 18 18    Temp: 97.9  F (36.6  C)  98.1  F (36.7  C)    TempSrc: Oral  Oral    SpO2: 94%  95%    Weight:  72.3 kg (159 lb 6.4 oz)       Vitals:    09/22/18 0300 09/23/18 0600 09/24/18 0500   Weight: 71.4 kg (157 lb 6.4 oz) 71.9 kg (158 lb 8 oz) 72.3 kg (159 lb 6.4 oz)       Intake/Output Summary (Last 24 hours) at 09/24/18 0823  Last data filed at 09/24/18 0500   Gross per 24 hour   Intake              480 ml   Output              875 ml   Net             -395 ml       Gen: AxOx3, NAD  CV: RRR, no murmurs, rubs, or gallops, HR 85  Pulm: CTA with diminished lung sounds on lower left, no wheezing, no rhonchi  Abd: soft, NT, ND, +BS, +BM  Ext: mild LE edema  Incision: c/d/i, no erythema, sternal stable    BMP    Recent Labs  Lab 09/22/18  0743 09/21/18  0752 09/20/18  0907 09/18/18  1516    133 131* 129*   POTASSIUM 4.3 4.5 4.2 4.9   CHLORIDE 100 98 100 98   GABRIELA 8.1* 8.3* 8.4* 8.2*   CO2 23 26 20 22   BUN 15 18 16 12   CR 0.64* 0.80 0.80 0.61*   GLC 86 95 86 102*     CBC    Recent Labs  Lab 09/24/18  0949 09/22/18  0743 09/21/18  0752 09/20/18  0907   WBC 3.8* 4.0 4.1 4.9   RBC 2.42* 2.45* 2.61* 2.81*   HGB 7.9* 8.1* 8.7* 9.2*   HCT 25.5* 25.8* 27.0* 29.1*   * 105* 103* 104*   MCH 32.6 33.1* 33.3* 32.7   MCHC 31.0* 31.4* 32.2 31.6   RDW 28.3* 27.8* 27.4* 27.2*   * 395  391 338     INR    Recent Labs  Lab 09/24/18  0525 09/23/18  0736 09/22/18  0743 09/21/18  0752   INR 2.18* 1.87* 2.06* 1.67*      Hepatic Panel   Lab Results   Component Value Date    AST 35 09/18/2018     Lab Results   Component Value Date    ALT 56 09/18/2018     No results found for: BILICONJ   Lab Results   Component Value Date    BILITOTAL 0.3 09/18/2018     Lab Results   Component Value Date    ALBUMIN 2.4 09/18/2018     Lab Results   Component Value Date    PROTTOTAL 7.2 09/18/2018      Lab Results   Component Value Date    ALKPHOS 158 09/18/2018       Imaging:   CXR 9/24-   Small recurrent right pleural effusion with fissure fluid. No pneumothorax.       A/P: 68 y.o male with PMHx of STEMI s/p HUSSEIN to RCA and LAD, s/p VSD repair and bioprosthetic Tricuspid valve replacement on 8/10/18 with Dr. Franco. Surgical admission complicated by right sided pneumothorax and thrombocytopenia 2/2 polycythemia vera.  Admitted from TCU on 9/19/18 for SOB, right middle lobe consolidation and increased right sided pleural effusion.  1. Neuro:  - Tylenol prn     2. CV:   - PTA cardiac meds: Amiodarone 200 mg PO daily, Lasix 40 mg PO BID, Lopressor 12.5 mg PO BID  - 9/20: Hypotensive, 80/54, MAPs 60's, historically SBP 80-90's last admission and at rehab, watch carefully for over diureses, infection, normal ECHO.       3. Pulm:  - 9/20: Right sided pleural effusion and right middle lobe consolidation, Right pigtail placement 9/20 for pleural effusion with 1L of drainage. Patient feeling much better. R pigtail removed 9/22.  Encourage IS, pulmonary toileting, and activity as tolerated, patient currently off supp O2,  Albuterol PRN. 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 Will monitor symptomatically. No intervention at this time. Thoracic consult in future if patient is symptomatic.      4. ID:   - 9/20:  SOB improved after pigatil, Right middle lobe consolidation   - Sputum:  mixed Gram (-) with Gram (+) Cocci in chains and clusters, normal mariama.  - Normal WBC, afebrile, and clinical picture, pneumonia less likely, more likely consolidation 2/2 pleural effusion.        5. GI:  - 9/20: Regular Adult diet as tolerated     6. FEN:  - 9/20: Hyponatremic, Na: 133 trending up from 131, patient likely getting over diuresed. Continue to hold lasix. Will also talk with patient about water intake.  - 9/24: Restart Lasix 40 mg PO BID.     7. Renal/:  - Admit wt: 71.3kg, currently 72.3 kg, trending up, Cr 0.61. Volume status stable, no need for additional diureses. Currently on lasix 40 mg po bid. Increased 9/18, was on daily previously. Hold evening lasix on 9/20. Patient had some skin tenting/decreased turgor and sunken temples on exam. Will continue to hold, patient does not appear hypervolemic and weight in down. SOB likely due to pleural effusion and is now significantly improved after drainage.  - 9/24: appearing euvolemic, no JVD, no skin tenting, minimal LE edema, restart Lasix 40 mg PO BID.    8. Heme:  - Hematology following as outpatient for polycythemia, plts 365. Continue Hydroxyurea at current dose per Hematology. They will see patient this coming Tuesday as an outpatient.  INR: 2.18.      9. Endo:  - BG in 100s, not diabetic.     10: PPX:  - Protonix     11: Dispo:  - Discharge to TCU once medically stable, 9/24/18.      Clay NEELYS  [facility] [place]    I have seen and examined this patient with the PA student, I agree with the above findings.  Patient discussed with staff.     Torsten Ruiz PA-C  Cardiothoracic Surgery  Pager [phone]    9:15 AM   September 24, 2018

## 2018-09-24 NOTE — PROGRESS NOTES
Social Work Services Discharge Note      Patient Name:  Castillo De Anda     Anticipated Discharge Date: 09/24/18 @ 1500    Discharge Disposition:   Facility: [facility]  PH: [phone]  F: [phone]  Nurse to Nurse Call: PH: [phone]    Following MD: Vinod Frank  [facility] [address] / [facility] [address]     Pre-Admission Screening (PAS) online form has been completed.  The Level of Care (LOC) is:  Determined  Confirmation Code is:  NA  Patient/caregiver informed of referral to [facility] for Pre-Admission Screening for skilled nursing facility (SNF) placement and to expect a phone call post discharge from SNF.     Additional Services/Equipment Arranged:    - Transportation: Family - Son to transport pt at 1500.     Patient / Family response to discharge plan:  Pt and family (daughter Christine) in agreement with the plan.     Persons notified of above discharge plan:  Pt, Family (called pt's daughter Christine), bedside RN, CVTS team, PCP, SNF admissions.    Staff Discharge Instructions:  Please fax discharge orders and signed hard scripts for any controlled substances.  Please print a packet and send with patient.     CTS Handoff completed:  YES    Medicare Notice of Rights provided to the patient/family:  YES    CAROL ANN Almanza, ILENEW  6C Unit   Phone: [phone]  Pager: [phone]  Unit: [phone]

## 2018-09-24 NOTE — PROGRESS NOTES
RN to RN report [facility] to [facility] at [facility], phone number [phone].  All questions answered at this time.  Expected to transfer at 1430, son will drive patient to facility.  Will ensure orders are faxed to TCU.

## 2018-09-24 NOTE — DISCHARGE INSTRUCTIONS
[facility]      AFTER YOU GO HOME FROM YOUR HEART SURGERY   Surgery date 8/10/18    You previously had a sternotomy, avoid lifting anything greater than ten pounds for 6 weeks after surgery date and then less than 20 pounds for an additional 6 weeks.  No driving for 4 weeks after surgery or while on pain medication.     Avoid strenuous activities such as bowling, vacuuming, raking, shoveling, golf or tennis for 12 weeks after your surgery date. It is okay to resume sex if you feel comfortable in doing so. You may have to try different positions with your partner.     Splint your chest incision by hugging a pillow or bringing your arms across your chest when coughing or sneezing. Avoid pushing off with your arms when getting up for the first month if you have had your sternum opened.    Shower or wash your incisions daily with soap and water (or as instructed), pat dry. Keep wound clean and dry, showers are okay after discharge, but don't let spray hit directly on incision. No baths or swimming for 1 month. Cover chest tube sites with gauze until they stop draining, then leave open to air. It is not abnormal for chest tube sites to drain yellowish/clear fluid for up to 2-3 weeks after surgery.   Watch for signs of infection: increased redness, tenderness, warmth or any drainage that appears infected (pus like) or is persistent.  Also a temperature > 100.5 F or chills. Call your surgeon or primary care provider's office immediately. Remove any skin glue left on incisions after 10-14 days. This will not affect your incision and can speed up healing.    Exercise is very important in your recovery. Please follow the guidelines set up for you in your cardiac rehab classes at the hospital. If outpatient cardiac rehab was ordered for you, we highly recommend you participate. If you have problems arranging your cardiac rehab, please call [phone].     Avoid sitting for prolonged periods of  "time, try to walk every hour during the day. If you have a leg incision, elevate your leg often when you are not walking.    Check your weight when you get home from the hospital and continue to check it daily through your recovery for at least a month. If you notice a weight gain of 2-3 pounds in a week, notify your primary care physician, cardiologist or surgeon.    Bowel activity may be slow after surgery. If necessary, you may take an over the counter laxative such as Milk of Magnesia or Miralax. You may have stool softeners prescribed (docusate sodium, Senokot). We recommend using stool softeners while using narcotics for pain (oxycodone/percocet, hydrocodone/vicodin, hydromorphone/dilaudid).      Wean OFF of narcotics (oxycodone, dilaudid, hydrocodone) as soon as possible. You should continue taking acetaminophen as long as you have any surgical pain as the first choice for pain control and add narcotics as necessary for pain to be tolerable.      DENTAL VISITS AFTER SURGERY  If you have had your heart valve repaired or replaced, we do not recommend having any dental work done for 6 months and you will need to take an antibiotic prior to dental visits from now on.  Please notify your dentist before any procedure for the proper treatment needed. The antibiotic is taken by mouth one hour prior to visit. This includes routine cleanings.  You can sometimes hear a mechanical valve \"clicking,\" this is normal and not a sign of something wrong.    DO NOT SMOKE.  IF YOU NEED HELP QUITTING, PLEASE TALK WITH YOUR CARDIOLOGIST OR PRIMARY DOCTOR.    You are on a blood thinner, follow the instructions you were given in the hospital and DO NOT SKIP this medication. Try and take it the same time everyday. Your primary care physician or coumadin clinic will manage the dosing. INR goal is 2-3 for 3 months for atrial fibrillation.     REGARDING PRESCRIPTION REFILLS.  If you need a refill on your pain medication contact us to " discuss your pain and a possible one time refill.   All other medications will be adjusted, discontinued and re-filled by your primary care physician and/or your cardiologist as they were prior to your surgery. We have given you enough for one to three month with possibly one refill.    POST-OPERATIVE CLINIC VISITS  You have a follow up visit with CVTS Surgery Advance Care Practitioners on 10/2/18 at 2 pm (with a Chest X-ray prior to visit at 1:15 pm on 1st floor) at the [facility] building.  You will then return to the care of your primary physician and your cardiologist.   It is important to see your cardiologist about 4-6 weeks after discharge and your primary care physician in 2-4 weeks after discharge. If you do not hear from a  in 7 days, please call [phone] (choose option 1) and request to be seen with a general cardiologist or someone that you have seen in the past.   If there is a need to return to see CT Surgery please call our  at [phone].    SURGICAL QUESTIONS  Please call Stacy Valero or Maria M Saldivar with surgical recovery and medication questions, the phone number is listed below.  They can assist you with your needs and contact other surgery care team members as indicated.    On weekends or after hours, please call [phone] and ask the  to   page the Cardiothoracic Surgery fellow on call.      Thank you,    Your Cardiothoracic Surgery Team  Stacy Valero RN Care Coordinator-  [phone]   Maria M Saldivar RN Care Coordinator-  [phone]  Janice [facility]

## 2018-09-24 NOTE — PLAN OF CARE
"Received phone call from patient's wife in which she spoke in a flurried manner, stating that she was ill, had difficulty with her illness, and that she \"had given up on life\".  Wife hung up phone before writer could ask questions for clarification.  Spoke briefly with patient to state that wife had made these comments and that writer needed an address to place a welfare check with police.  Patient stated that wife has made similar comments before and that, to this point, such comments have appeared to be attention-seeking behavior because she had done nothing to harm herself.  Daughter was in the room and supported his comment, stating that wife had self-diagnosed a disease and has been skeptical of medical advice and treatment.  Daughter stated that wife had been staying at her house and provided her address.  Welfare check called in to [place] [employer], patient and daughter stated that they would call the wife to check on her and follow up.  "

## 2018-09-24 NOTE — PLAN OF CARE
Problem: Patient Care Overview  Goal: Plan of Care/Patient Progress Review  OT6C:   Discharge Planner OT   Patient plan for discharge: Rehab  Current status: Pt supine<>sit EOB SBA, ambulated ~100' + 125' + 200' SBA + FWW, pt needing x3 standing rest breaks and x2 seated rest breaks. Pt tolerated 12 continuous minutes of exercise using NuStep machine at WL 2, x1 rest breaks. Pt VSS on RA.   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    Progress towards therapy goal(s). See goals on Care Plan in [facility] electronic health record for goal details.  Goals partially met.  Barriers to achieving goals:   discharge from facility.    Therapy recommendation(s):    Continued therapy is recommended.  Rationale/Recommendations:  TCU to progress pt strength and endurance needed to participate in I/ADLs at PLOF.

## 2018-09-24 NOTE — PLAN OF CARE
Problem: Patient Care Overview  Goal: Plan of Care/Patient Progress Review  Patient in sinus rhythm, no episodes of nausea last evening or overnight. Vital signs stable, patient calm and pleasant, one of his daughters visited this evening and supportive. Compression stockings off at bedtime. Patient denies pain, sleeping long intervals overnight between cares. Plan discharge soon to TCU.

## 2018-09-24 NOTE — PLAN OF CARE
Problem: Patient Care Overview  Goal: Plan of Care/Patient Progress Review  Outcome: Adequate for Discharge Date Met: 09/24/18  D/A/I:  Patient A&O x4, denied pain, palpitations, difficulty breathing, SOB, and dizziness.  Reported transient nausea this morning that resolved with measured breathing.  Lung sounds diminished in left lung base, pleural rub auscultated in right lung base.  Encouraged IS and Acapella use.  Had 1-2+ edema in feet and ankles, compression stockings in place.  Had adequate urine output, see I&O flowsheet.  In sinus rhythm with HR 90s, SBP 90s-110s, SaO2 95-99% on room air.  Chest X-ray performed during shift, see Chart Review for results.  Ambulated in hallway with standby assist and use of walker.  Verbalized readiness for discharge, son will drive patient to [facility].    P:  Prepare for discharge.  Ensure patient understands follow-up cares and appointments.  Ensure patient understands and recognizes signs/symptoms that indicate a need for further medical consultation.

## 2018-09-24 NOTE — PROGRESS NOTES
Social Work Services Progress Note     Hospital Day: 5  Date of Initial Social Work Evaluation:  Completed on previous admission 9/8/18 - pt reports no major updates/changes to assessment.  Pt has been in TCU since discharge (about 8 days) before readmitting to [facility].  Collaborated with:  Pt, daughter Christine via phone CVTS team, SNF admissions, bedside RN     Data: Pt is a 68 year old male being followed by SW for placement to TCU if needed.     Intervention: SW met with pt today to follow up on this weekend's concerns with spouse's safety.  Pt states he has no need for follow up and is looking forward to discharging to rehab today.  Pt is aware that his son will transport at 3 pm today.     - Referrals in Process:    [facility]  PH: [phone]  Unit: [phone]  F: [phone]     Assessment: Pt and jose alberto updated on discharge plan and are in agreement.     Plan:    Anticipated Disposition: Facility:  TCU    Barriers to d/c plan: None    Follow Up:  Discharge to [facility].     CAROL ANN Almanza, APSW  6C Unit   Phone: [phone]  Pager: [phone]  Unit: [phone]    ___________________________________________________________________________________________________________________________________________________    Referrals Discontinued:  None    Community Case Management/Community Services in place:   None

## 2018-09-24 NOTE — DISCHARGE SUMMARY
[facility] [place]   Cardiothoracic Surgery Hospital Discharge Summary     Castillo De Anda MRN# [id number]   Age: 68 year old YOB: 1950     Admitting Physician:  Jason Franco MD  Discharge Physician:  JORGE Martinez  Primary Care Physician:        Vinod Frank     DATE OF ADMISSION: 9/19/2018     DATE OF DISCHARGE: September 24, 2018          Primary Diagnoses:   1. Right pleural effusion, s/p drainage  2. Left conner-diaphragm elevation            Secondary Diagnoses:   1. Hx CAD with HUSSEIN to LAD, Hx STEMI.   2. Hx repair of ischemic VSD repair 8/10/18  3. Hx tissue Tricuspid valve replacement 8/10/18  4. Anticoagulation x 3 months s/p atrial flutter/atrial fibrillation  5. Thyroid cancer  6. Polycythemia vera  7. Anemia  8. Deep vein thrombosis  9. Meningioma  10. Prostate Cancer    PROCEDURES PERFORMED:   Date: 9/20/2018.  Surgeon: Dr. Maritza Martinez   IR CHEST TUBE PLACEMENT NON-TUNNELED RIGHT    CULTURE RESULTS:    1. Sputum 9/19/18: moderate growth normal mariama     DRAINS/TUBES PRESENT AT DISCHARGE:  None    CONSULTS:    1.  PT/OT  2.  Interventional Radiology     Patient discharged on aspirin:  No, plavix and coumadin   Patient discharged on beta blocker: yes    Patient discharged on ACE Inhibitor/ARB: no          Discharge Disposition:   Discharged to rehabilitation facility            Condition on Discharge:   Discharge condition: Stable   Discharge vitals: Blood pressure 98/77, pulse 101, temperature 98.1  F (36.7  C), temperature source Oral, resp. rate 18, weight 72.3 kg (159 lb 6.4 oz), SpO2 99 %.     Code status on discharge: Full Code     Vitals:    09/22/18 0300 09/23/18 0600 09/24/18 0500   Weight: 71.4 kg (157 lb 6.4 oz) 71.9 kg (158 lb 8 oz) 72.3 kg (159 lb 6.4 oz)       DAY OF DISCHARGE PHYSICAL EXAM:  MAPs: 77 - 89   Gen:  NAD, conversational  CV: RRR, S1S2 normal, no murmurs, rubs, or gallops  Pulm:  Diminished LLL otherwise  CTA, no rhonchi or wheezes  Abd:  Soft, nondistended, NTTP  Ext: trace dependent edema, + 1 pitting  Incision: Clean, dry, intact, no erythema  Chest Tube sites: Dressings clean and dry     BMP    Recent Labs  Lab 09/24/18  0949 09/22/18  0743 09/21/18  0752 09/20/18  0907   * 133 133 131*   POTASSIUM 4.0 4.3 4.5 4.2   CHLORIDE 98 100 98 100   GABRIELA 8.0* 8.1* 8.3* 8.4*   CO2 24 23 26 20   BUN 10 15 18 16   CR 0.69 0.64* 0.80 0.80   * 86 95 86     CBC    Recent Labs  Lab 09/24/18  0949 09/22/18  0743 09/21/18  0752 09/20/18  0907   WBC 3.8* 4.0 4.1 4.9   RBC 2.42* 2.45* 2.61* 2.81*   HGB 7.9* 8.1* 8.7* 9.2*   HCT 25.5* 25.8* 27.0* 29.1*   * 105* 103* 104*   MCH 32.6 33.1* 33.3* 32.7   MCHC 31.0* 31.4* 32.2 31.6   RDW 28.3* 27.8* 27.4* 27.2*   * 395 391 338     INR    Recent Labs  Lab 09/24/18  0525 09/23/18  0736 09/22/18  0743 09/21/18  0752   INR 2.18* 1.87* 2.06* 1.67*      Hepatic Panel   Lab Results   Component Value Date    AST 35 09/18/2018     Lab Results   Component Value Date    ALT 56 09/18/2018     Lab Results   Component Value Date    ALBUMIN 2.4 09/18/2018       Recent Labs  Lab 09/24/18  0949 09/22/18  0743 09/21/18  0752 09/20/18  0907 09/18/18  1516   * 86 95 86 102*       BRIEF HISTORY OF ILLNESS:  Castillo De Anda is a 68 year old male with a history of CAD with STEMI and placement of HUSSEIN with subsequent ischemic VSD, s/p repair of VSD and tissue Tricuspid valve replacement (8/10/18 with Dr Franco). He was discharged to local TCU and evaluated in clinic for routine visit 9/18 with complaints of SOB.    His SOB had worsened over the previous 4-5 days at his TCU facility.  He was seen in Surgery clinic on 9/18 and a CT showed persistent bilateral effusions and associated bibasilar atelectasis, RML consolidation, and elevated L hemidiaphragm.  Some chills but no productive cough. Some cold hands and feet.   He was admitted 9/19/18 for treatment of SOB and pleural  effusion.     HOSPITAL COURSE:   Castillo required some oxygen during his stay but was weaned off after a Right pleural pigtail was placed for fluid removal.  The tube was removed the next day.  He continued to require diuresis during his stay, his sodium was low when he was over-diuresed. He was restarted on lasix 40 mg PO BID at discharge to decrease chances of pleural effusions.   He was also found to have a left hemidiaphragm elevation with signs of paralysis on imaging.  This likely contributed to his SOB.  His labs were stable on day of discharge, INR 2.18.  Plts remained WNL on hydroxyurea.      Prior to discharge, his pain was controlled well, he was able to perform most ADLs and ambulate without difficulty, and had full return of bowel and bladder function.  On September 24, 2018, he was discharged to a local TCU in stable condition.    ECHOCARDIOGRAM, 9/18/2018-   Borderline (EF 50-55%) reduced left ventricular function is present. VSD patch  is intact and protruding into the LV cavity with echodensity on LV side.  Global right ventricular function is mildly to moderately reduced.  A bioprosthetic tricuspid valve is present with mean gradient 3 mmHg.  The inferior vena cava was normal in size with preserved respiratory variability.  Small circumferential pericardial effusion is present without any hemodynamic  significance. Chamber compression is not present; there is no evidence for tamponade.     This study was compared with the study from 09/07/2018. Pericardial effusion is smaller.  _____________________________________________________________     Left Ventricle-   Left ventricular wall thickness is normal. Left ventricular size is normal. Borderline (EF  50-55%) reduced left ventricular function is present. Left ventricular diastolic function is  indeterminate. No regional wall motion abnormalities are seen. VSD patch is intact and  protruding into the LV cavity with fixed echodensity on LV  side.     Right Ventricle-    The right ventricle is normal size. Global right ventricular function is mildly to moderately reduced.     Atria  Both atria appear normal.     Mitral Valve-  The mitral valve is normal.     Aortic Valve-  Aortic valve is normal in structure and function.     Tricuspid Valve-  A bioprosthetic tricuspid valve is present. Mean gradient across valve is 3  mmHg.     Pulmonic Valve-  The pulmonic valve is normal.     Vessels-  The aorta root cannot be assessed. The thoracic aorta cannot be assessed. The  pulmonary artery is normal. The inferior vena cava was normal in size with preserved  respiratory variability. Estimated mean right atrial pressure is 3 mmHg (normal).     Pericardium-  Small circumferential pericardial effusion is present without any hemodynamic  significance. Chamber compression is not present; there is no evidence for tamponade.      DISCHARGE INSTRUCTIONS:  You had a sternotomy, avoid lifting anything greater than ten pounds for 6 weeks after surgery and then less than 20 pounds for an additional 6 weeks.  No driving for 4 weeks after surgery or while on pain medication.     Avoid strenuous activities such as bowling, vacuuming, raking, shoveling, golf or tennis for 12 weeks after your surgery. It is okay to resume sex if you feel comfortable in doing so. You may have to try different positions with your partner.     Splint your chest incision by hugging a pillow or bringing your arms across your chest when coughing or sneezing. Avoid pushing off with your arms when getting up for the first month if you have had your sternum opened.    Shower or wash your incisions daily with soap and water (or as instructed), pat dry. Keep wound clean and dry, showers are okay after discharge, but don't let spray hit directly on incision. No baths or swimming for 1 month. Cover chest tube sites with gauze until they stop draining, then leave open to air. It is not abnormal for chest  tube sites to drain yellowish/clear fluid for up to 2-3 weeks after surgery.   Watch for signs of infection: increased redness, tenderness, warmth or any drainage that appears infected (pus like) or is persistent.  Also a temperature > 100.5 F or chills. Call your surgeon or primary care provider's office immediately. Remove any skin glue left on incisions after 10-14 days. This will not affect your incision and can speed up healing.    Exercise is very important in your recovery. Please follow the guidelines set up for you in your cardiac rehab classes at the hospital. If outpatient cardiac rehab was ordered for you, we highly recommend you participate. If you have problems arranging your cardiac rehab, please call [phone].     Avoid sitting for prolonged periods of time, try to walk every hour during the day. If you have a leg incision, elevate your leg often when you are not walking.    Check your weight when you get home from the hospital and continue to check it daily through your recovery for at least a month. If you notice a weight gain of 2-3 pounds in a week, notify your primary care physician, cardiologist or surgeon.    Bowel activity may be slow after surgery. If necessary, you may take an over the counter laxative such as Milk of Magnesia or Miralax. You may have stool softeners prescribed (docusate sodium, Senokot). We recommend using stool softeners while using narcotics for pain (oxycodone/percocet, hydrocodone/vicodin, hydromorphone/dilaudid).      Wean OFF of narcotics (oxycodone, dilaudid, hydrocodone) as soon as possible. You should continue taking acetaminophen as long as you have any surgical pain as the first choice for pain control and add narcotics as necessary for pain to be tolerable.      DENTAL VISITS AFTER SURGERY  If you have had your heart valve repaired or replaced, we do not recommend having any dental work done for 6 months and you will need to take an antibiotic prior to  "dental visits from now on.  Please notify your dentist before any procedure for the proper treatment needed. The antibiotic is taken by mouth one hour prior to visit. This includes routine cleanings.  You can sometimes hear a mechanical valve \"clicking,\" this is normal and not a sign of something wrong.    DO NOT SMOKE.  IF YOU NEED HELP QUITTING, PLEASE TALK WITH YOUR CARDIOLOGIST OR PRIMARY DOCTOR.    You are on a blood thinner, follow the instructions you were given in the hospital and DO NOT SKIP this medication. Try and take it the same time everyday. Your primary care physician or coumadin clinic will manage the dosing. INR goal is 2-3 for 3 months for atrial fibrillation.     REGARDING PRESCRIPTION REFILLS.  If you need a refill on your pain medication contact us to discuss your pain and a possible one time refill.   All other medications will be adjusted, discontinued and re-filled by your primary care physician and/or your cardiologist as they were prior to your surgery. We have given you enough for one to three month with possibly one refill.    POST-OPERATIVE CLINIC VISITS  You have a follow up visit with CVTS Surgery Advance Care Practitioners on 10/2/18 at 2 pm (with a CXR prior to visit) at the [facility].  You will then return to the care of your primary physician and your cardiologist.   It is important to see your cardiologist about 4-6 weeks after discharge and your primary care physician in 2-4 weeks after discharge. If you do not hear from a  in 7 days, please call [phone] (choose option 1) and request to be seen with a general cardiologist or someone that you have seen in the past.   If there is a need to return to see CT Surgery please call our  at [phone].    PRE-ADMISSION MEDICATIONS:    No current facility-administered medications on file prior to encounter.   Current Outpatient Prescriptions on File Prior to Encounter:  ACETAMINOPHEN PO " Take 650 mg by mouth every 4 hours as needed    Atorvastatin Calcium (LIPITOR PO) Take 80 mg by mouth daily    Carboxymethylcellulose Sod PF (REFRESH PLUS) 0.5 % SOLN ophthalmic solution 1 drop 3 times daily as needed for dry eyes   CLOPIDOGREL BISULFATE PO Take 75 mg by mouth daily    DOCUSATE SODIUM PO Take 100 mg by mouth daily   hydroxyurea (HYDREA) 500 MG capsule CHEMO 1500mg qam and 1000mg qpm   LEVOTHYROXINE SODIUM PO Take 75 mcg by mouth daily    MELATONIN PO Take 1 mg by mouth as needed    METHOCARBAMOL PO Take 500 mg by mouth every 6 hours as needed    METOPROLOL TARTRATE PO Take 12.5 mg by mouth 2 times daily   Multiple Vitamins-Minerals (MULTIVITAMIN ADULT PO) Take 1 tablet by mouth daily   PANTOPRAZOLE SODIUM PO Take 40 mg by mouth 2 times daily    polyethylene glycol (MIRALAX/GLYCOLAX) powder Take 17 g by mouth daily    POTASSIUM PO Take 20 mEq by mouth 2 times daily    umeclidinium-vilanterol (ANORO ELLIPTA) 62.5-25 MCG/INH oral inhaler Inhale 1 puff into the lungs daily   AMIODARONE HCL PO Take 200 mg by mouth daily TCU has order to start 9/22 - this was how the script was sent when patient d/c'ed on 9/11 so pt has not been receiving at the TCU   [DISCONTINUED] FUROSEMIDE PO Take 40 mg by mouth 2 times daily    [DISCONTINUED] WARFARIN SODIUM PO Most recent doses per TCU MAR9/12: 5mg9/13: 3mg 9/14: 3mg9/15: 3mg 9/16: 3mg 9/17: 4mg9/18: held was planned for 4mg        DISCHARGE MEDICATIONS:    Castillo De Anda   Home Medication Instructions SAPPHIRE:[id number]    Printed on:09/24/18 1050   Medication Information                      ACETAMINOPHEN PO  Take 650 mg by mouth every 4 hours as needed              AMIODARONE HCL PO  Take 200 mg by mouth daily TCU has order to start 9/22 - this was how the script was sent when patient d/c'ed on 9/11 so pt has not been receiving at the TCU             Atorvastatin Calcium (LIPITOR PO)  Take 80 mg by mouth daily              Carboxymethylcellulose Sod PF (REFRESH  PLUS) 0.5 % SOLN ophthalmic solution  1 drop 3 times daily as needed for dry eyes             CLOPIDOGREL BISULFATE PO  Take 75 mg by mouth daily              DOCUSATE SODIUM PO  Take 100 mg by mouth daily             furosemide (LASIX) 40 MG tablet  Take 1 tablet (40 mg) by mouth 2 times daily             hydroxyurea (HYDREA) 500 MG capsule CHEMO  1500mg qam and 1000mg qpm             LEVOTHYROXINE SODIUM PO  Take 75 mcg by mouth daily              MELATONIN PO  Take 1 mg by mouth as needed              METHOCARBAMOL PO  Take 500 mg by mouth every 6 hours as needed              METOPROLOL TARTRATE PO  Take 12.5 mg by mouth 2 times daily             Multiple Vitamins-Minerals (MULTIVITAMIN ADULT PO)  Take 1 tablet by mouth daily             PANTOPRAZOLE SODIUM PO  Take 40 mg by mouth 2 times daily              polyethylene glycol (MIRALAX/GLYCOLAX) powder  Take 17 g by mouth daily              POTASSIUM PO  Take 20 mEq by mouth 2 times daily              umeclidinium-vilanterol (ANORO ELLIPTA) 62.5-25 MCG/INH oral inhaler  Inhale 1 puff into the lungs daily             warfarin (COUMADIN) 2 MG tablet  Take 6 mg PO daily until next INR check, then dose per INR goal 2-3             Warfarin Therapy Reminder  1 each continuous prn                 CC:nivia [facility]   Cardiothoracic Surgery  Office phone: [phone]  Office fax: [phone]

## 2018-09-26 ENCOUNTER — PRE VISIT (OUTPATIENT)
Dept: ONCOLOGY | Facility: CLINIC | Age: 68
End: 2018-09-26

## 2018-09-26 NOTE — TELEPHONE ENCOUNTER
Date of appointment: 10/3/2018   Diagnosis/reason for appointment:   Myeloproliferative neoplasm  Referring provider/facility:  Per message from Dr. Oconnell    Additional information:  Message from Dr Oconnell stating records are needed for PT from [place] in [place] and [place].  Unable to retrieve records from [facility]

## 2018-09-26 NOTE — TELEPHONE ENCOUNTER
September 26, 2018 7:50 AM  Faxed STAT request to [facility] for all records scans, and Bx's  Follow-up message sent to Dr. Oconnell

## 2018-09-27 ENCOUNTER — TELEPHONE (OUTPATIENT)
Dept: ONCOLOGY | Facility: CLINIC | Age: 68
End: 2018-09-27

## 2018-09-27 DIAGNOSIS — D47.1 MYELOPROLIFERATIVE DISORDER (H): ICD-10-CM

## 2018-09-27 NOTE — TELEPHONE ENCOUNTER
Contacted Issa Nurse at TCU at [phone]    Discussed that platelet count of 576 does not required more urgent visit or a change in medications.    CBC 4.1>8.1/24.6<576    Will see patient as scheduled.    Leny Oconnell MD/PhD   of Medicine  Division of Hematology, Oncology and Transplantation    Pager [phone]  Office phone: [phone]  Email: [email or web address]

## 2018-09-27 NOTE — TELEPHONE ENCOUNTER
TCU reporting platelets are 576,000. TCU providers would like Dr. Oconnell's recommendations.     RN Issa with TCU also reported patient is suffering from hyponatremia, 129. TCU providers are holding Lasix today and tomorrow. He is also hypotensive, BP 91/63 and 88/62.     Paged Dr. Oconnell. She returned the page and will call the facility directly at [phone]

## 2018-10-01 NOTE — TELEPHONE ENCOUNTER
October 1, 2018  Follow-up call to [facility] for records and scans, stated records were mailed and she will push scans today.  LLUVIA   3:37 PM  Slides recevied from [place] and sent to Pathology

## 2018-10-02 ENCOUNTER — RADIANT APPOINTMENT (OUTPATIENT)
Dept: GENERAL RADIOLOGY | Facility: CLINIC | Age: 68
End: 2018-10-02
Attending: PHYSICIAN ASSISTANT
Payer: MEDICARE

## 2018-10-02 ENCOUNTER — OFFICE VISIT (OUTPATIENT)
Dept: CARDIOLOGY | Facility: CLINIC | Age: 68
End: 2018-10-02
Attending: THORACIC SURGERY (CARDIOTHORACIC VASCULAR SURGERY)
Payer: MEDICARE

## 2018-10-02 VITALS
HEART RATE: 58 BPM | SYSTOLIC BLOOD PRESSURE: 107 MMHG | WEIGHT: 165.8 LBS | DIASTOLIC BLOOD PRESSURE: 73 MMHG | BODY MASS INDEX: 24.56 KG/M2 | HEIGHT: 69 IN | OXYGEN SATURATION: 98 %

## 2018-10-02 DIAGNOSIS — Z87.74 S/P VSD REPAIR: ICD-10-CM

## 2018-10-02 DIAGNOSIS — J90 PLEURAL EFFUSION: ICD-10-CM

## 2018-10-02 DIAGNOSIS — Z95.4 S/P TVR (TRICUSPID VALVE REPLACEMENT): Primary | ICD-10-CM

## 2018-10-02 PROCEDURE — G0463 HOSPITAL OUTPT CLINIC VISIT: HCPCS

## 2018-10-02 PROCEDURE — 00000345 ZZHCL STATISTIC REV BONE MARROW OUTSIDE SLIDES TC 88321: Performed by: INTERNAL MEDICINE

## 2018-10-02 ASSESSMENT — PAIN SCALES - GENERAL: PAINLEVEL: NO PAIN (0)

## 2018-10-02 NOTE — NURSING NOTE
Chief Complaint   Patient presents with     Follow Up For     hx VSD repair and TVR      Vitals were taken and medications were reconciled.   Dulce Maria Kline RMA  1:35 PM

## 2018-10-02 NOTE — LETTER
10/2/2018      RE: Castillo De Anda  [address]  [facility] [address]       Dear Colleague,    Thank you for the opportunity to participate in the care of your patient, Castillo De Anda, at the [facility] at [facility]. Please see a copy of my visit note below.    CARDIOTHORACIC SURGERY FOLLOW-UP VISIT     Castillo De Anda   1950   [id number]      Reason for visit: Post-Op repair of VSD and tissue TVR with Dr. Franco on 8/10/2018, and recurrent admission on 9/119/18 for right pleural effusion, s/p right pigtail and left conner diaphragm elevation.      HPI: Castillo De Anda is a 68 year old year old male seen in clinic for a routine follow-up appointment after surgery. Patient has past medical history of coronary artery disease, myeloproliferative syndrome, and TIA .    Hospital course was remarkable for right pleural effusion requiring oxygen, which was subsequently titrated off following a Right pleural pigtail.  The tube was removed the next day.  Patient required diuresis and was discharged on lasix 40 mg PO BID.  Since then, his lasix dose has been decreased to 20 mg daily at his TCU facility  He was also found to have a left hemidiaphragm elevation with signs of paralysis on imaging.  This likely contributed to his SOB..    Patient has been doing well since discharge and now returns to clinic for postop visit.    Patient endorses feeling more frequently cold.  This is attributed to his reported low hemoglobin checked by outside facility. Per NP at TCU, patient's Hgb have been trending in the 7's, will continue to monitor. No need for transfusion at this point in time.     Patient reports incision is healing well.    Patient denies any fever, chills, chest pain, palpitations, edema, SOB, lightheadedness and nausea.    Patient is having Normal bowel movements and voiding without problems.    Has not been attending cardiac rehabilitation as he is still admitted to a  TCU.  Reports strength and endurance have been improving. Per patient, he will be discharging home on 10/4/18 and plans to start cardiac rehab in the following weeks.      Patient is on Coumadin and INR is therapeutic.  Weight has been stable overall.    Blood glucose levels have been under good control.      PAST MEDICAL HISTORY:  Past Medical History:   Diagnosis Date     Atrial flutter (H)      Coronary artery disease     R coronary occlusions treated with HUSSEIN, proximal LAD stenosis treated with HUSSEIN     Meningioma (H)      Myeloproliferative disorder (H)      Prostate cancer (H)      Tuberculosis      Ventricular septal defect        PAST SURGICAL HISTORY:  Past Surgical History:   Procedure Laterality Date     C OPEN TREATMENT CALCANEAL FRACTURE W BONE GRAFT Right      CHOLECYSTECTOMY       ESOPHAGOSCOPY, GASTROSCOPY, DUODENOSCOPY (EGD), COMBINED N/A 8/24/2018    Procedure: COMBINED ESOPHAGOSCOPY, GASTROSCOPY, DUODENOSCOPY (EGD);  Upper Endoscopy with No Intervention; Two Gastric Ulcers;  Surgeon: Rocael Barber MD;  Location: [facility] OR     IRRIGATION AND DEBRIDEMENT CHEST WASHOUT, COMBINED N/A 8/13/2018    Procedure: COMBINED IRRIGATION AND DEBRIDEMENT CHEST WASHOUT;  COMBINED IRRIGATION AND DEBRIDEMENT CHEST WASHOUT, Closure;  Surgeon: Jason Franco MD;  Location: [facility] OR     REPAIR VENTRICULAR SEPTAL DEFECT N/A 8/10/2018    Procedure: REPAIR VENTRICULAR SEPTAL DEFECT;  Median Sternotomy, REPAIR VENTRICULAR SEPTAL DEFECT on pump oxygenation, Tricuspid valve replacement ;  Surgeon: Jason Franco MD;  Location: [facility] OR     SPLENECTOMY         CURRENT MEDICATIONS:   Current Outpatient Prescriptions   Medication     ACETAMINOPHEN PO     AMIODARONE HCL PO     Atorvastatin Calcium (LIPITOR PO)     Carboxymethylcellulose Sod PF (REFRESH PLUS) 0.5 % SOLN ophthalmic solution     CLOPIDOGREL BISULFATE PO     DOCUSATE SODIUM PO     furosemide (LASIX) 20 MG tablet     hydroxyurea (HYDREA) 500 MG capsule  CHEMO     LEVOTHYROXINE SODIUM PO     MELATONIN PO     METHOCARBAMOL PO     METOPROLOL TARTRATE PO     Multiple Vitamins-Minerals (MULTIVITAMIN ADULT PO)     PANTOPRAZOLE SODIUM PO     polyethylene glycol (MIRALAX/GLYCOLAX) powder     POTASSIUM PO     umeclidinium-vilanterol (ANORO ELLIPTA) 62.5-25 MCG/INH oral inhaler     warfarin (COUMADIN) 2 MG tablet     Warfarin Therapy Reminder     No current facility-administered medications for this visit.        ALLERGIES:    No Known Allergies      ROS:  Review of symptoms otherwise negative unless commented about in HPI.     LABS:  Last Basic Metabolic Panel:  Lab Results   Component Value Date     09/24/2018      Lab Results   Component Value Date    POTASSIUM 4.0 09/24/2018     Lab Results   Component Value Date    CHLORIDE 98 09/24/2018     Lab Results   Component Value Date    GABRIELA 8.0 09/24/2018     Lab Results   Component Value Date    CO2 24 09/24/2018     Lab Results   Component Value Date    BUN 10 09/24/2018     Lab Results   Component Value Date    CR 0.69 09/24/2018     Lab Results   Component Value Date     09/24/2018       Last CBC:   Lab Results   Component Value Date    WBC 3.8 09/24/2018     Lab Results   Component Value Date    RBC 2.42 09/24/2018     Lab Results   Component Value Date    HGB 7.9 09/24/2018     Lab Results   Component Value Date    HCT 25.5 09/24/2018     No components found for: MCT  Lab Results   Component Value Date     09/24/2018     Lab Results   Component Value Date    MCH 32.6 09/24/2018     Lab Results   Component Value Date    MCHC 31.0 09/24/2018     Lab Results   Component Value Date    RDW 28.3 09/24/2018     Lab Results   Component Value Date     09/24/2018       INR:  Lab Results   Component Value Date    INR 2.18 09/24/2018    INR 1.87 09/23/2018    INR 2.06 09/22/2018    INR 1.67 09/21/2018    INR 1.77 09/20/2018    INR 1.86 09/19/2018    INR 2.18 09/18/2018    INR 2.08 09/11/2018    INR 1.90  "09/10/2018    INR 1.21 09/09/2018    INR 1.79 09/08/2018    INR 2.45 09/07/2018         IMAGING: CXR 10/2/18-  Small improvement in small bilateral pleural effusions and  adjacent atelectasis and/or consolidation, including loculated fluid  in the right minor fissure    PHYSICAL EXAM:   /73 (BP Location: Left arm, Patient Position: Chair, Cuff Size: Adult Regular)  Pulse 58  Ht 1.753 m (5' 9\")  Wt 75.2 kg (165 lb 12.8 oz)  SpO2 98%  BMI 24.48 kg/m2  General: alert and oriented x 3, pleasant, no acute distress, normal mood and affect  Neuro: no focal deficits   CV: S1 S2, no murmurs, rubs or gallops, regular rate and rhythm, no peripheral edema  Pulm: bilateral breath sounds, clear to auscultation, easy work of breathing  Incision: incisions clean dry and intact without erythema, swelling or drainage    PROCEDURES: None       ASSESSMENT/PLAN:  Castillo De Anda is a 68 year old year old male status post VSD repair and TVR, and draining of right pleural effusion who returns to clinic for postop visit.     1. Surgically doing well overall.  Incisions are healing well with no signs of infection. Increasing activity and strength overall.   2. Hemodynamics are stable. No medication changes were needed today.  3. Follow up with your cardiologist at  once discharged home.   4. Follow up with Dr. Oconnell from oncology for management of myeloproliferative syndrome  4. Start Outpatient Cardiac Rehab once discharged to home.   5. Continue sternal precautions for 12 weeks from surgery date.   6. No driving for 4 weeks from surgery date.       The total time spent with the patient was 25 minutes, > 50% of which was spent in counseling.    STEPHANIE Conte CNP   Cardiothoracic Surgery   10/2/2018 at 3:34 PM      [facility]         "

## 2018-10-02 NOTE — MR AVS SNAPSHOT
"              After Visit Summary   10/2/2018    Castillo De Anda    MRN: [id number]           Patient Information     Date Of Birth          1950        Visit Information        Provider Department      10/2/2018 2:00 PM  [facility]        Today's Diagnoses     S/P TVR (tricuspid valve replacement)    -  1    Pleural effusion           Follow-ups after your visit        Follow-up notes from your care team     Return if symptoms worsen or fail to improve.      Your next 10 appointments already scheduled     Oct 03, 2018  9:45 AM CDT   (Arrive by 9:30 AM)   New Patient Visit with Leny Oconnell MD   [facility] ([facility])    [address]  [address]  [facility] [address] [phone]              Who to contact     If you have questions or need follow up information about today's clinic visit or your schedule please contact [facility] directly at [phone].  Normal or non-critical lab and imaging results will be communicated to you by [employer], letter or phone within 4 business days after the clinic has received the results. If you do not hear from us within 7 days, please contact the clinic through [employer] or phone. If you have a critical or abnormal lab result, we will notify you by phone as soon as possible.  Submit refill requests through [employer] or call your pharmacy and they will forward the refill request to us. Please allow 3 business days for your refill to be completed.          Additional Information About Your Visit        Etalia Information     E[employer]ia lets you send messages to your doctor, view your test results, renew your prescriptions, schedule appointments and more. To sign up, go to [email or web address] . Click on \"Log in\" on the left side of the screen, which will take you to the Welcome page. Then click on \"Sign up Now\" on the right side of the page.     You will be asked to enter the access code " "listed below, as well as some personal information. Please follow the directions to create your username and password.     Your access code is: [email or web address]  Expires: 2018  3:06 PM     Your access code will  in 90 days. If you need help or a new code, please call your [facility] or [phone].        Your Vitals Were     Pulse Height Pulse Oximetry BMI (Body Mass Index)          58 1.753 m (5' 9\") 98% 24.48 kg/m2         Blood Pressure from Last 3 Encounters:   10/02/18 107/73   18 98/77   18 98/64    Weight from Last 3 Encounters:   10/02/18 75.2 kg (165 lb 12.8 oz)   18 72.3 kg (159 lb 6.4 oz)   18 74.8 kg (165 lb)              Today, you had the following     No orders found for display       Primary Care Provider Office Phone # Fax #    Vinod Valenciaeffie [phone]       [facility] [address] [facility] [address]        Equal Access to Services     [facility] : Hadii aad ku hadasho Soomaali, waaxda luqadaha, qaybta kaalmada adeegyada, giana aguiar . So [facility] [phone].    ATENCIÓN: Si habla español, tiene a durbin disposición servicios gratuitos de asistencia lingüística. Llame al [phone].    We comply with applicable federal civil rights laws and [place] laws. We do not discriminate on the basis of race, color, national origin, age, disability, sex, sexual orientation, or gender identity.            Thank you!     Thank you for choosing [facility]  for your care. Our goal is always to provide you with excellent care. Hearing back from our patients is one way we can continue to improve our services. Please take a few minutes to complete the written survey that you may receive in the mail after your visit with us. Thank you!             Your Updated Medication List - Protect others around you: Learn how to safely use, store and throw away your medicines at [email or web address].[email or web address].          This list is " accurate as of 10/2/18  3:37 PM.  Always use your most recent med list.                   Brand Name Dispense Instructions for use Diagnosis    ACETAMINOPHEN PO      Take 650 mg by mouth every 4 hours as needed        AMIODARONE HCL PO      Take 200 mg by mouth daily TCU has order to start 9/22 - this was how the script was sent when patient d/c'ed on 9/11 so pt has not been receiving at the TCU        Carboxymethylcellulose Sod PF 0.5 % Soln ophthalmic solution    REFRESH PLUS     1 drop 3 times daily as needed for dry eyes        CLOPIDOGREL BISULFATE PO      Take 75 mg by mouth daily        DOCUSATE SODIUM PO      Take 100 mg by mouth daily        furosemide 20 MG tablet    LASIX    30 tablet    Take 2 tablets (40 mg) by mouth 2 times daily    Hypervolemia, unspecified hypervolemia type       hydroxyurea 500 MG capsule CHEMO    HYDREA     1500mg qam and 1000mg qpm        LEVOTHYROXINE SODIUM PO      Take 75 mcg by mouth daily        LIPITOR PO      Take 80 mg by mouth daily        MELATONIN PO      Take 1 mg by mouth as needed        METHOCARBAMOL PO      Take 500 mg by mouth every 6 hours as needed        METOPROLOL TARTRATE PO      Take 12.5 mg by mouth 2 times daily        MULTIVITAMIN ADULT PO      Take 1 tablet by mouth daily        PANTOPRAZOLE SODIUM PO      Take 40 mg by mouth 2 times daily        polyethylene glycol powder    MIRALAX/GLYCOLAX     Take 17 g by mouth daily        POTASSIUM PO      Take 20 mEq by mouth 2 times daily        umeclidinium-vilanterol 62.5-25 MCG/INH oral inhaler    ANORO ELLIPTA     Inhale 1 puff into the lungs daily        * Warfarin Therapy Reminder      1 each continuous prn    Paroxysmal atrial fibrillation (H)       * warfarin 2 MG tablet    COUMADIN    90 tablet    Take 6 mg PO daily until next INR check, then dose per INR goal 2-3    Paroxysmal atrial fibrillation (H)       * Notice:  This list has 2 medication(s) that are the same as other medications prescribed for  you. Read the directions carefully, and ask your doctor or other care provider to review them with you.

## 2018-10-02 NOTE — PROGRESS NOTES
CARDIOTHORACIC SURGERY FOLLOW-UP VISIT     Castillo De Anda   1950   [id number]      Reason for visit: Post-Op repair of VSD and tissue TVR with Dr. Franco on 8/10/2018, and recurrent admission on 9/119/18 for right pleural effusion, s/p right pigtail and left conner diaphragm elevation.      HPI: Castillo De Anda is a 68 year old year old male seen in clinic for a routine follow-up appointment after surgery. Patient has past medical history of coronary artery disease, myeloproliferative syndrome, and TIA .    Hospital course was remarkable for right pleural effusion requiring oxygen, which was subsequently titrated off following a Right pleural pigtail.  The tube was removed the next day.  Patient required diuresis and was discharged on lasix 40 mg PO BID.  Since then, his lasix dose has been decreased to 20 mg daily at his TCU facility  He was also found to have a left hemidiaphragm elevation with signs of paralysis on imaging.  This likely contributed to his SOB..    Patient has been doing well since discharge and now returns to clinic for postop visit.    Patient endorses feeling more frequently cold.  This is attributed to his reported low hemoglobin checked by outside facility. Per NP at TCU, patient's Hgb have been trending in the 7's, will continue to monitor. No need for transfusion at this point in time.     Patient reports incision is healing well.    Patient denies any fever, chills, chest pain, palpitations, edema, SOB, lightheadedness and nausea.    Patient is having Normal bowel movements and voiding without problems.    Has not been attending cardiac rehabilitation as he is still admitted to a TCU.  Reports strength and endurance have been improving. Per patient, he will be discharging home on 10/4/18 and plans to start cardiac rehab in the following weeks.      Patient is on Coumadin and INR is therapeutic.  Weight has been stable overall.    Blood glucose levels have been under good control.       PAST MEDICAL HISTORY:  Past Medical History:   Diagnosis Date     Atrial flutter (H)      Coronary artery disease     R coronary occlusions treated with HUSSEIN, proximal LAD stenosis treated with HUSSEIN     Meningioma (H)      Myeloproliferative disorder (H)      Prostate cancer (H)      Tuberculosis      Ventricular septal defect        PAST SURGICAL HISTORY:  Past Surgical History:   Procedure Laterality Date     C OPEN TREATMENT CALCANEAL FRACTURE W BONE GRAFT Right      CHOLECYSTECTOMY       ESOPHAGOSCOPY, GASTROSCOPY, DUODENOSCOPY (EGD), COMBINED N/A 8/24/2018    Procedure: COMBINED ESOPHAGOSCOPY, GASTROSCOPY, DUODENOSCOPY (EGD);  Upper Endoscopy with No Intervention; Two Gastric Ulcers;  Surgeon: Rocael Barber MD;  Location: [facility] OR     IRRIGATION AND DEBRIDEMENT CHEST WASHOUT, COMBINED N/A 8/13/2018    Procedure: COMBINED IRRIGATION AND DEBRIDEMENT CHEST WASHOUT;  COMBINED IRRIGATION AND DEBRIDEMENT CHEST WASHOUT, Closure;  Surgeon: Jason Franco MD;  Location: [facility] OR     REPAIR VENTRICULAR SEPTAL DEFECT N/A 8/10/2018    Procedure: REPAIR VENTRICULAR SEPTAL DEFECT;  Median Sternotomy, REPAIR VENTRICULAR SEPTAL DEFECT on pump oxygenation, Tricuspid valve replacement ;  Surgeon: Jason Franco MD;  Location: [facility] OR     SPLENECTOMY         CURRENT MEDICATIONS:   Current Outpatient Prescriptions   Medication     ACETAMINOPHEN PO     AMIODARONE HCL PO     Atorvastatin Calcium (LIPITOR PO)     Carboxymethylcellulose Sod PF (REFRESH PLUS) 0.5 % SOLN ophthalmic solution     CLOPIDOGREL BISULFATE PO     DOCUSATE SODIUM PO     furosemide (LASIX) 20 MG tablet     hydroxyurea (HYDREA) 500 MG capsule CHEMO     LEVOTHYROXINE SODIUM PO     MELATONIN PO     METHOCARBAMOL PO     METOPROLOL TARTRATE PO     Multiple Vitamins-Minerals (MULTIVITAMIN ADULT PO)     PANTOPRAZOLE SODIUM PO     polyethylene glycol (MIRALAX/GLYCOLAX) powder     POTASSIUM PO     umeclidinium-vilanterol (ANORO ELLIPTA) 62.5-25  MCG/INH oral inhaler     warfarin (COUMADIN) 2 MG tablet     Warfarin Therapy Reminder     No current facility-administered medications for this visit.        ALLERGIES:    No Known Allergies      ROS:  Review of symptoms otherwise negative unless commented about in HPI.     LABS:  Last Basic Metabolic Panel:  Lab Results   Component Value Date     09/24/2018      Lab Results   Component Value Date    POTASSIUM 4.0 09/24/2018     Lab Results   Component Value Date    CHLORIDE 98 09/24/2018     Lab Results   Component Value Date    GABRIELA 8.0 09/24/2018     Lab Results   Component Value Date    CO2 24 09/24/2018     Lab Results   Component Value Date    BUN 10 09/24/2018     Lab Results   Component Value Date    CR 0.69 09/24/2018     Lab Results   Component Value Date     09/24/2018       Last CBC:   Lab Results   Component Value Date    WBC 3.8 09/24/2018     Lab Results   Component Value Date    RBC 2.42 09/24/2018     Lab Results   Component Value Date    HGB 7.9 09/24/2018     Lab Results   Component Value Date    HCT 25.5 09/24/2018     No components found for: MCT  Lab Results   Component Value Date     09/24/2018     Lab Results   Component Value Date    MCH 32.6 09/24/2018     Lab Results   Component Value Date    MCHC 31.0 09/24/2018     Lab Results   Component Value Date    RDW 28.3 09/24/2018     Lab Results   Component Value Date     09/24/2018       INR:  Lab Results   Component Value Date    INR 2.18 09/24/2018    INR 1.87 09/23/2018    INR 2.06 09/22/2018    INR 1.67 09/21/2018    INR 1.77 09/20/2018    INR 1.86 09/19/2018    INR 2.18 09/18/2018    INR 2.08 09/11/2018    INR 1.90 09/10/2018    INR 1.21 09/09/2018    INR 1.79 09/08/2018    INR 2.45 09/07/2018         IMAGING: CXR 10/2/18-  Small improvement in small bilateral pleural effusions and  adjacent atelectasis and/or consolidation, including loculated fluid  in the right minor fissure    PHYSICAL EXAM:   /73 (BP  "Location: Left arm, Patient Position: Chair, Cuff Size: Adult Regular)  Pulse 58  Ht 1.753 m (5' 9\")  Wt 75.2 kg (165 lb 12.8 oz)  SpO2 98%  BMI 24.48 kg/m2  General: alert and oriented x 3, pleasant, no acute distress, normal mood and affect  Neuro: no focal deficits   CV: S1 S2, no murmurs, rubs or gallops, regular rate and rhythm, no peripheral edema  Pulm: bilateral breath sounds, clear to auscultation, easy work of breathing  Incision: incisions clean dry and intact without erythema, swelling or drainage    PROCEDURES: None       ASSESSMENT/PLAN:  Castillo De Anda is a 68 year old year old male status post VSD repair and TVR, and draining of right pleural effusion who returns to clinic for postop visit.     1. Surgically doing well overall.  Incisions are healing well with no signs of infection. Increasing activity and strength overall.   2. Hemodynamics are stable. No medication changes were needed today.  3. Follow up with your cardiologist at [facility] once discharged home.   4. Follow up with Dr. Oconnell from oncology for management of myeloproliferative syndrome  4. Start Outpatient Cardiac Rehab once discharged to home.   5. Continue sternal precautions for 12 weeks from surgery date.   6. No driving for 4 weeks from surgery date.       The total time spent with the patient was 25 minutes, > 50% of which was spent in counseling.    STEPHANIE Conte CNP   Cardiothoracic Surgery   10/2/2018 at 3:34 PM      [facility]     "

## 2018-10-03 ENCOUNTER — ONCOLOGY VISIT (OUTPATIENT)
Dept: ONCOLOGY | Facility: CLINIC | Age: 68
End: 2018-10-03
Attending: INTERNAL MEDICINE
Payer: MEDICARE

## 2018-10-03 ENCOUNTER — INFUSION THERAPY VISIT (OUTPATIENT)
Dept: INFUSION THERAPY | Facility: CLINIC | Age: 68
End: 2018-10-03
Attending: INTERNAL MEDICINE
Payer: MEDICARE

## 2018-10-03 VITALS
OXYGEN SATURATION: 100 % | BODY MASS INDEX: 25.16 KG/M2 | WEIGHT: 166 LBS | HEIGHT: 68 IN | DIASTOLIC BLOOD PRESSURE: 67 MMHG | TEMPERATURE: 97.5 F | HEART RATE: 99 BPM | SYSTOLIC BLOOD PRESSURE: 98 MMHG

## 2018-10-03 VITALS
OXYGEN SATURATION: 99 % | DIASTOLIC BLOOD PRESSURE: 84 MMHG | TEMPERATURE: 97.4 F | SYSTOLIC BLOOD PRESSURE: 125 MMHG | HEART RATE: 85 BPM

## 2018-10-03 DIAGNOSIS — D47.1 MYELOPROLIFERATIVE DISORDER (H): Primary | ICD-10-CM

## 2018-10-03 DIAGNOSIS — D47.1 MYELOPROLIFERATIVE NEOPLASM (H): Primary | ICD-10-CM

## 2018-10-03 DIAGNOSIS — D47.1 MYELOPROLIFERATIVE NEOPLASM (H): ICD-10-CM

## 2018-10-03 DIAGNOSIS — D50.0 IRON DEFICIENCY ANEMIA DUE TO CHRONIC BLOOD LOSS: ICD-10-CM

## 2018-10-03 LAB
ABO + RH BLD: NORMAL
ABO + RH BLD: NORMAL
ALBUMIN SERPL-MCNC: 2.3 G/DL (ref 3.4–5)
ALP SERPL-CCNC: 129 U/L (ref 40–150)
ALT SERPL W P-5'-P-CCNC: 27 U/L (ref 0–70)
ANION GAP SERPL CALCULATED.3IONS-SCNC: 7 MMOL/L (ref 3–14)
AST SERPL W P-5'-P-CCNC: 15 U/L (ref 0–45)
BASOPHILS # BLD AUTO: 0 10E9/L (ref 0–0.2)
BASOPHILS NFR BLD AUTO: 0.8 %
BILIRUB SERPL-MCNC: 0.3 MG/DL (ref 0.2–1.3)
BLD GP AB SCN SERPL QL: NORMAL
BLD PROD TYP BPU: NORMAL
BLD PROD TYP BPU: NORMAL
BLD UNIT ID BPU: 0
BLOOD BANK CMNT PATIENT-IMP: NORMAL
BLOOD PRODUCT CODE: NORMAL
BPU ID: NORMAL
BUN SERPL-MCNC: 12 MG/DL (ref 7–30)
CA-I BLD-MCNC: 4.5 MG/DL (ref 4.4–5.2)
CALCIUM SERPL-MCNC: 8.2 MG/DL (ref 8.5–10.1)
CHLORIDE SERPL-SCNC: 96 MMOL/L (ref 94–109)
CO2 SERPL-SCNC: 25 MMOL/L (ref 20–32)
CREAT SERPL-MCNC: 0.84 MG/DL (ref 0.66–1.25)
DIFFERENTIAL METHOD BLD: ABNORMAL
EOSINOPHIL # BLD AUTO: 0 10E9/L (ref 0–0.7)
EOSINOPHIL NFR BLD AUTO: 1.1 %
ERYTHROCYTE [DISTWIDTH] IN BLOOD BY AUTOMATED COUNT: 28.9 % (ref 10–15)
FERRITIN SERPL-MCNC: 647 NG/ML (ref 26–388)
GFR SERPL CREATININE-BSD FRML MDRD: >90 ML/MIN/1.7M2
GLUCOSE SERPL-MCNC: 107 MG/DL (ref 70–99)
HCT VFR BLD AUTO: 24 % (ref 40–53)
HGB BLD-MCNC: 8 G/DL (ref 13.3–17.7)
IMM GRANULOCYTES # BLD: 0 10E9/L (ref 0–0.4)
IMM GRANULOCYTES NFR BLD: 0 %
IRON SATN MFR SERPL: 20 % (ref 15–46)
IRON SERPL-MCNC: 47 UG/DL (ref 35–180)
LYMPHOCYTES # BLD AUTO: 0.9 10E9/L (ref 0.8–5.3)
LYMPHOCYTES NFR BLD AUTO: 34.2 %
MCH RBC QN AUTO: 34.8 PG (ref 26.5–33)
MCHC RBC AUTO-ENTMCNC: 33.3 G/DL (ref 31.5–36.5)
MCV RBC AUTO: 104 FL (ref 78–100)
MONOCYTES # BLD AUTO: 0.2 10E9/L (ref 0–1.3)
MONOCYTES NFR BLD AUTO: 8.6 %
NEUTROPHILS # BLD AUTO: 1.5 10E9/L (ref 1.6–8.3)
NEUTROPHILS NFR BLD AUTO: 55.3 %
NRBC # BLD AUTO: 0.1 10*3/UL
NRBC BLD AUTO-RTO: 4 /100
NUM BPU REQUESTED: 1
PLATELET # BLD AUTO: 573 10E9/L (ref 150–450)
POTASSIUM SERPL-SCNC: 4.2 MMOL/L (ref 3.4–5.3)
PROT SERPL-MCNC: 7.4 G/DL (ref 6.8–8.8)
RBC # BLD AUTO: 2.3 10E12/L (ref 4.4–5.9)
RETICS # AUTO: 39.3 10E9/L (ref 25–95)
RETICS/RBC NFR AUTO: 1.7 % (ref 0.5–2)
SODIUM SERPL-SCNC: 127 MMOL/L (ref 133–144)
SPECIMEN EXP DATE BLD: NORMAL
TIBC SERPL-MCNC: 234 UG/DL (ref 240–430)
TRANSFUSION STATUS PATIENT QL: NORMAL
TRANSFUSION STATUS PATIENT QL: NORMAL
WBC # BLD AUTO: 2.7 10E9/L (ref 4–11)

## 2018-10-03 PROCEDURE — 86923 COMPATIBILITY TEST ELECTRIC: CPT | Performed by: INTERNAL MEDICINE

## 2018-10-03 PROCEDURE — 86900 BLOOD TYPING SEROLOGIC ABO: CPT | Performed by: INTERNAL MEDICINE

## 2018-10-03 PROCEDURE — 83550 IRON BINDING TEST: CPT | Performed by: INTERNAL MEDICINE

## 2018-10-03 PROCEDURE — 36430 TRANSFUSION BLD/BLD COMPNT: CPT

## 2018-10-03 PROCEDURE — P9040 RBC LEUKOREDUCED IRRADIATED: HCPCS | Performed by: INTERNAL MEDICINE

## 2018-10-03 PROCEDURE — G0463 HOSPITAL OUTPT CLINIC VISIT: HCPCS | Mod: ZF,25

## 2018-10-03 PROCEDURE — 81403 MOPATH PROCEDURE LEVEL 4: CPT | Performed by: INTERNAL MEDICINE

## 2018-10-03 PROCEDURE — 81219 CALR GENE COM VARIANTS: CPT | Performed by: INTERNAL MEDICINE

## 2018-10-03 PROCEDURE — 00000345 ZZHCL STATISTIC REV BONE MARROW OUTSIDE SLIDES TC 88321: Performed by: INTERNAL MEDICINE

## 2018-10-03 PROCEDURE — 85025 COMPLETE CBC W/AUTO DIFF WBC: CPT | Performed by: INTERNAL MEDICINE

## 2018-10-03 PROCEDURE — 82330 ASSAY OF CALCIUM: CPT | Performed by: INTERNAL MEDICINE

## 2018-10-03 PROCEDURE — 86901 BLOOD TYPING SEROLOGIC RH(D): CPT | Performed by: INTERNAL MEDICINE

## 2018-10-03 PROCEDURE — 82728 ASSAY OF FERRITIN: CPT | Performed by: INTERNAL MEDICINE

## 2018-10-03 PROCEDURE — 86850 RBC ANTIBODY SCREEN: CPT | Performed by: INTERNAL MEDICINE

## 2018-10-03 PROCEDURE — 83540 ASSAY OF IRON: CPT | Performed by: INTERNAL MEDICINE

## 2018-10-03 PROCEDURE — 85045 AUTOMATED RETICULOCYTE COUNT: CPT | Performed by: INTERNAL MEDICINE

## 2018-10-03 PROCEDURE — 99215 OFFICE O/P EST HI 40 MIN: CPT | Mod: ZP | Performed by: INTERNAL MEDICINE

## 2018-10-03 PROCEDURE — 80053 COMPREHEN METABOLIC PANEL: CPT | Performed by: INTERNAL MEDICINE

## 2018-10-03 PROCEDURE — 81403 MOPATH PROCEDURE LEVEL 4: CPT | Mod: 91 | Performed by: INTERNAL MEDICINE

## 2018-10-03 RX ORDER — HYDROXYUREA 500 MG/1
CAPSULE ORAL
Qty: 120 CAPSULE | Refills: 1
Start: 2018-10-03

## 2018-10-03 RX ORDER — ACETAMINOPHEN 325 MG/1
650 TABLET ORAL ONCE
Status: CANCELLED
Start: 2018-10-03 | End: 2018-10-03

## 2018-10-03 ASSESSMENT — PAIN SCALES - GENERAL: PAINLEVEL: NO PAIN (0)

## 2018-10-03 NOTE — MR AVS SNAPSHOT
After Visit Summary   10/3/2018    Castillo De Anda    MRN: [id number]           Patient Information     Date Of Birth          1950        Visit Information        Provider Department      10/3/2018 3:00 PM  51 ATC;  SPEC INFUSION [facility] Specialty and Procedure        Today's Diagnoses     Myeloproliferative neoplasm (H)    -  1      Care Instructions    Home Care Instructions for  Patients Receiving Blood Products  You have just received a blood product. During the next 48 hours, be aware of  the following symptoms of a blood product reaction.  The symptoms are:    Chills    Fever temperature above 100  orally    Headache    Nausea    Persistent non-productive cough    Hives    Itching    Facial swelling or flushing    Difficulty breathing or wheezing    Bloody urine  If you experience any of these symptoms contact:  Emergency Room: [phone]  IF THIS IS A MEDICAL EMERGENCY, CALL 911  If you are out of town, you may want to contact your local MD.  If you have other issues or concerns: Contact your physician or Care Coordinator.          Follow-ups after your visit        Your next 10 appointments already scheduled     Oct 05, 2018  7:30 AM CDT   Infusion 360 with  ONCOLOGY INFUSION,  32 ATC   [facility] ([facility])    [address] [place] [address]  [address] [place] [id number] [phone]            Apr 03, 2019  2:30 PM CDT   [facility]   [facility] Lab Draw ([facility])    [address] [place] [address]  [address]  [facility] [id number] [phone]            Apr 03, 2019  3:00 PM CDT   (Arrive by 2:45 PM)   Return Visit with Leny Oconnell MD   [facility] ([facility])    [address] [place] [address]  [address]  [facility] [id number] [phone]              Future tests that were ordered for you today      "Open Standing Orders        Priority Remaining Interval Expires Ordered    Transfuse red blood cell unit Routine 99/100 TRANSFUSE 1 UNIT  10/3/2018    Red blood cell prepare order unit Routine 99/100 CONDITIONAL (SPECIFY) BLOOD  10/3/2018            Who to contact     If you have questions or need follow up information about today's clinic visit or your schedule please contact [facility] SPECIALTY AND PROCEDURE directly at [phone].  Normal or non-critical lab and imaging results will be communicated to you by Via6hart, letter or phone within 4 business days after the clinic has received the results. If you do not hear from us within 7 days, please contact the clinic through Works.iot or phone. If you have a critical or abnormal lab result, we will notify you by phone as soon as possible.  Submit refill requests through CommonFloor or call your pharmacy and they will forward the refill request to us. Please allow 3 business days for your refill to be completed.          Additional Information About Your Visit        [employer] Information     CommonFloor lets you send messages to your doctor, view your test results, renew your prescriptions, schedule appointments and more. To sign up, go to [email or web address] . Click on \"Log in\" on the left side of the screen, which will take you to the Welcome page. Then click on \"Sign up Now\" on the right side of the page.     You will be asked to enter the access code listed below, as well as some personal information. Please follow the directions to create your username and password.     Your access code is: [email or web address]  Expires: 2018  3:06 PM     Your access code will  in 90 days. If you need help or a new code, please call your [place] clinic or [phone].        Your Vitals Were     Pulse Temperature Pulse Oximetry             85 97.6  F (36.4  C) (Oral) 99%          Blood Pressure from Last 3 Encounters:   10/03/18 125/84   10/03/18 98/67 "   10/02/18 107/73    Weight from Last 3 Encounters:   10/03/18 75.3 kg (166 lb)   10/02/18 75.2 kg (165 lb 12.8 oz)   09/24/18 72.3 kg (159 lb 6.4 oz)              We Performed the Following     Transfuse red blood cell unit          Today's Medication Changes          These changes are accurate as of 10/3/18  5:02 PM.  If you have any questions, ask your nurse or doctor.               These medicines have changed or have updated prescriptions.        Dose/Directions    hydroxyurea 500 MG capsule CHEMO   Commonly known as:  HYDREA   This may have changed:  additional instructions   Used for:  Myeloproliferative disorder (H)   Changed by:  Leny Oconnell MD        1000mg qam and 1000mg qpm   Quantity:  120 capsule   Refills:  1            Where to get your medicines      Some of these will need a paper prescription and others can be bought over the counter.  Ask your nurse if you have questions.     You don't need a prescription for these medications     hydroxyurea 500 MG capsule CHEMO                Primary Care Provider Office Phone # Fax #    Vinod Valenciaeffie [phone]       [address] [facility] [address]        Equal Access to Services     AILYN SANCHEZ : Hadii rajendra ku hadasho Soomaali, waaxda luqadaha, qaybta kaalmada adeegyada, giana aguiar . So [facility] [phone].    ATENCIÓN: Si habla español, tiene a durbin disposición servicios gratuitos de asistencia lingüística. Llame al [phone].    We comply with applicable federal civil rights laws and [place] laws. We do not discriminate on the basis of race, color, national origin, age, disability, sex, sexual orientation, or gender identity.            Thank you!     Thank you for choosing [facility] SPECIALTY AND PROCEDURE  for your care. Our goal is always to provide you with excellent care. Hearing back from our patients is one way we can continue to improve our services.  Please take a few minutes to complete the written survey that you may receive in the mail after your visit with us. Thank you!             Your Updated Medication List - Protect others around you: Learn how to safely use, store and throw away your medicines at www.disposemymeds.org.          This list is accurate as of 10/3/18  5:02 PM.  Always use your most recent med list.                   Brand Name Dispense Instructions for use Diagnosis    ACETAMINOPHEN PO      Take 650 mg by mouth every 4 hours as needed        AMIODARONE HCL PO      Take 200 mg by mouth daily TCU has order to start 9/22 - this was how the script was sent when patient d/c'ed on 9/11 so [facility] not [facility] receiving [facility]        Carboxymethylcellulose Sod PF 0.5 % Soln ophthalmic solution    REFRESH PLUS     1 drop 3 times daily as needed for dry eyes        CLOPIDOGREL BISULFATE PO      Take 75 mg by mouth daily        DOCUSATE SODIUM PO      Take 100 mg by mouth daily        furosemide 20 MG tablet    LASIX    30 tablet    Take 2 tablets (40 mg) by mouth 2 times daily    Hypervolemia, unspecified hypervolemia type       hydroxyurea 500 MG capsule CHEMO    HYDREA    120 capsule    1000mg qam and 1000mg qpm    Myeloproliferative disorder (H)       LEVOTHYROXINE SODIUM PO      Take 75 mcg by mouth daily        LIPITOR PO      Take 80 mg by mouth daily        MELATONIN PO      Take 1 mg by mouth as needed        METHOCARBAMOL PO      Take 500 mg by mouth every 6 hours as needed        METOPROLOL TARTRATE PO      Take 12.5 mg by mouth 2 times daily        MULTIVITAMIN ADULT PO      Take 1 tablet by mouth daily        PANTOPRAZOLE SODIUM PO      Take 40 mg by mouth 2 times daily        polyethylene glycol powder    MIRALAX/GLYCOLAX     Take 17 g by mouth daily        POTASSIUM PO      Take 20 mEq by mouth 2 times daily        umeclidinium-vilanterol 62.5-25 MCG/INH oral inhaler    ANORO ELLIPTA     Inhale 1 puff into the lungs daily        *  Warfarin Therapy Reminder      1 each continuous prn    Paroxysmal atrial fibrillation (H)       * warfarin 2 MG tablet    COUMADIN    90 tablet    Take 6 mg PO daily until next INR check, then dose per INR goal 2-3    Paroxysmal atrial fibrillation (H)       * Notice:  This list has 2 medication(s) that are the same as other medications prescribed for you. Read the directions carefully, and ask your doctor or other care provider to review them with you.

## 2018-10-03 NOTE — NURSING NOTE
Anita/nurse at [facility]  PH: [phone] TCU called to inform to decrease the Castillo's Hydroxyurea to 1000 mg BID. Anita expressed good understanding of the above. Plan is for discharge soon.  Will include new dose on discharge instructions.

## 2018-10-03 NOTE — MR AVS SNAPSHOT
After Visit Summary   10/3/2018    Castillo De Anda    MRN: [id number]           Patient Information     Date Of Birth          1950        Visit Information        Provider Department      10/3/2018 9:45 AM Leny Oconnell MD [facility]        Today's Diagnoses     Myeloproliferative disorder (H)    -  1    Myeloproliferative neoplasm (H)          Care Instructions    Continue the hydroxyurea. No goal platelet count, but would like it to be steady- not increasing.     We will increase your body iron stores to help reduce your platelet count further.     Reduce your iron dose to 325 mg once daily.     Will transfuse 1 units of red blood cells for hemoglobin <8 today    Try schedule iron infusion for iron dextran before Friday.     I am sending an extended next generation sequencing panel to assess for Jak2 exon 12, calreticulin and MPL mutations.     I would recommend a bone marrow biopsy sometime in the next several weeks.     At present, your low hemoglobin prevents us from starting ruxolitinib. If your hemoglobin increases we could consider starting this.     Follow up with Dr. Oconnell in 6 months.    Leny Oconnell MD/PhD   of Medicine  Division of Hematology, Oncology and Transplantation    [facility] [address], Mail [address]  [place] [address]    Clinic [phone]  Nurse Line: [phone]    RN Care Coordinator: EULALIA Shah, RN, OCN                  Follow-ups after your visit        Follow-up notes from your care team     Return in about 6 months (around 4/3/2019).      Your next 10 appointments already scheduled     Oct 03, 2018  3:00 PM CDT   Infusion 120 with UC ONCOLOGY INFUSION, UC 15 ATC   [facility] ([facility])    [address] [place] [address]  [address] [place] [address] [phone]            Oct 03, 2018  3:00 PM CDT  "  Infusion 180 with UC SPEC INFUSION, UC 51 ATC   [facility] [place] Special[facility] Procedure ([facility])    [address]  [address]  [facility] [id number] [phone]            Oct 05, 2018  7:30 AM CDT   Infusion 360 with UC ONCOLOGY INFUSION, UC 32 ATC   [facility] ([facility])    [address]  [address]  [facility] [id number] [phone]            Apr 03, 2019  2:30 PM CDT   [facility] Lab Draw with  [facility] LAB DRAW   [facility] Lab Draw ([facility])    [address]  [address]  [facility] [id number] [phone]            Apr 03, 2019  3:00 PM CDT   (Arrive by 2:45 PM)   Return Visit with Leny Oconnell MD   [facility] [place] ([facility])    [address] [place] [address]  [address]  [facility] [id number] [phone]              Who to contact     If you have questions or need follow up information about today's clinic visit or your schedule please contact [facility] directly at [phone].  Normal or non-critical lab and imaging results will be communicated to you by MyChart, letter or phone within 4 business days after the clinic has received the results. If you do not hear from us within 7 days, please contact the clinic through MyChart or phone. If you have a critical or abnormal lab result, we will notify you by phone as soon as possible.  Submit refill requests through Wowcracy or call your pharmacy and they will forward the refill request to us. Please allow 3 business days for your refill to be completed.          Additional Information About Your Visit        [employer] In[employer] Information     Wowcracy lets you send messages to your doctor, view your test results, renew your prescriptions, schedule appointments and more. To sign up, go to [email or web address] . Click on \"Log in\" on the left " "side of the screen, which will take you to the Welcome page. Then click on \"Sign up Now\" on the right side of the page.     You will be asked to enter the access code listed below, as well as some personal information. Please follow the directions to create your username and password.     Your access code is: [email or web address]  Expires: 2018  3:06 PM     Your access code will  in 90 days. If you need help or a new code, please call your [facility] or [phone].        Your Vitals Were     Pulse Temperature Height Pulse Oximetry BMI (Body Mass Index)       99 97.5  F (36.4  C) (Oral) 1.715 m (5' 7.5\") 100% 25.62 kg/m2        Blood Pressure from Last 3 Encounters:   10/03/18 98/67   10/02/18 107/73   18 98/77    Weight from Last 3 Encounters:   10/03/18 75.3 kg (166 lb)   10/02/18 75.2 kg (165 lb 12.8 oz)   18 72.3 kg (159 lb 6.4 oz)              We Performed the Following     ABO/Rh type and screen     CBC with platelets differential     Ferritin     Iron and iron binding capacity     Next Generation Sequencing Oncology: Myeloproliferative Neoplasm Panel     Reticulocyte count        Primary Care Provider Office Phone # Fax #    Vinod Frank [phone]       [address] [address]        Equal Access to Services     MORGAN SANCHEZ : Hadii rajendra garciao Sokeisha, waaxda luqadaha, qaybta kaalmada ademitchyada, giana harris. So [facility] [phone].    ATENCIÓN: Si habla español, tiene a durbin disposición servicios gratuitos de asistencia lingüística. Llkianna al [phone].    We comply with applicable federal civil rights laws and [place] laws. We do not discriminate on the basis of race, color, national origin, age, disability, sex, sexual orientation, or gender identity.            Thank you!     Thank you for choosing [facility]  for your care. Our goal is always to provide you with excellent care. " Hearing back from our patients is one way we can continue to improve our services. Please take a few minutes to complete the written survey that you may receive in the mail after your visit with us. Thank you!             Your Updated Medication List - Protect others around you: Learn how to safely use, store and throw away your medicines at www.disposemymeds.org.          This list is accurate as of 10/3/18 12:29 PM.  Always use your most recent med list.                   Brand Name Dispense Instructions for use Diagnosis    ACETAMINOPHEN PO      Take 650 mg by mouth every 4 hours as needed        AMIODARONE HCL PO      Take 200 mg by mouth daily TCU has order to start 9/22 - this was how the script was sent when patient d/c'ed on 9/11 so [facility] not [facility] receiving [facility]        Carboxymethylcellulose Sod PF 0.5 % Soln ophthalmic solution    REFRESH PLUS     1 drop 3 times daily as needed for dry eyes        CLOPIDOGREL BISULFATE PO      Take 75 mg by mouth daily        DOCUSATE SODIUM PO      Take 100 mg by mouth daily        furosemide 20 MG tablet    LASIX    30 tablet    Take 2 tablets (40 mg) by mouth 2 times daily    Hypervolemia, unspecified hypervolemia type       hydroxyurea 500 MG capsule CHEMO    HYDREA     1500mg qam and 1000mg qpm        LEVOTHYROXINE SODIUM PO      Take 75 mcg by mouth daily        LIPITOR PO      Take 80 mg by mouth daily        MELATONIN PO      Take 1 mg by mouth as needed        METHOCARBAMOL PO      Take 500 mg by mouth every 6 hours as needed        METOPROLOL TARTRATE PO      Take 12.5 mg by mouth 2 times daily        MULTIVITAMIN ADULT PO      Take 1 tablet by mouth daily        PANTOPRAZOLE SODIUM PO      Take 40 mg by mouth 2 times daily        polyethylene glycol powder    MIRALAX/GLYCOLAX     Take 17 g by mouth daily        POTASSIUM PO      Take 20 mEq by mouth 2 times daily        umeclidinium-vilanterol 62.5-25 MCG/INH oral inhaler    ANORO ELLIPTA     Inhale 1 puff  into the lungs daily        * Warfarin Therapy Reminder      1 each continuous prn    Paroxysmal atrial fibrillation (H)       * warfarin 2 MG tablet    COUMADIN    90 tablet    Take 6 mg PO daily until next INR check, then dose per INR goal 2-3    Paroxysmal atrial fibrillation (H)       * Notice:  This list has 2 medication(s) that are the same as other medications prescribed for you. Read the directions carefully, and ask your doctor or other care provider to review them with you.

## 2018-10-03 NOTE — PROGRESS NOTES
Blood Product Transfusion Nursing Note:    Castillo De Anda presents today to [facility] for a 1 unit PRBC transfusion.  During today's [facility] appointment orders from Dr. Oconnell were completed.    Progress note:  ID verified by name and .  Assessment completed.  Vitals were stable throughout time in [facility].  verbal and printed education given to patient/representative regarding transfusion and possible side effects.  Patient/representative verbalized understanding.    Type and Crossmatch completed on: 10/03/18      All pertinent labs reviewed prior to infusion: YES    Blood Product order verified and double checked for accuracy: YES  2nd : Aracely Villagran RN     Date of consent or authorization: 10/03/18    Patient tolerated the procedure well        /80  Pulse 85  Temp 97.4  F (36.3  C) (Oral)    Transfusion given over approximately  2 hours; Dr. Oconnell was paged d/t pt's cardiac history and reports to start transfusion slowly but as long as pt tolerates it is okay to administer over two hours.      Discharge Plan:    Discharge instructions were reviewed with patient Yes  Patient/representative verbalized understanding of discharge instructions and all questions answered Yes.    Discharged from [facility] at 1710 with son to home.    Rani Up RN

## 2018-10-03 NOTE — PATIENT INSTRUCTIONS
Home Care Instructions for  Patients Receiving Blood Products  You have just received a blood product. During the next 48 hours, be aware of  the following symptoms of a blood product reaction.  The symptoms are:    Chills    Fever temperature above 100  orally    Headache    Nausea    Persistent non-productive cough    Hives    Itching    Facial swelling or flushing    Difficulty breathing or wheezing    Bloody urine  If you experience any of these symptoms contact:  Emergency Room: [phone]  IF THIS IS A MEDICAL EMERGENCY, CALL 911  If you are out of town, you may want to contact your local MD.  If you have other issues or concerns: Contact your physician or Care Coordinator.

## 2018-10-03 NOTE — NURSING NOTE
Labs completed via venipuncture, patient discharged.    See flow sheets.    Yen Esquivel CMA ([facility])

## 2018-10-03 NOTE — PATIENT INSTRUCTIONS
Continue the hydroxyurea. No goal platelet count, but would like it to be steady- not increasing.     We will increase your body iron stores to help reduce your platelet count further.     Reduce your iron dose to 325 mg once daily.     Will transfuse 1 units of red blood cells for hemoglobin <8 today    Try schedule iron infusion for iron dextran before Friday.     I am sending an extended next generation sequencing panel to assess for Jak2 exon 12, calreticulin and MPL mutations.     I would recommend a bone marrow biopsy sometime in the next several weeks.     At present, your low hemoglobin prevents us from starting ruxolitinib. If your hemoglobin increases we could consider starting this.     Follow up with Dr. Oconnell in 6 months.    Leny Oconnell MD/PhD   of Medicine  Division of Hematology, Oncology and Transplantation    [facility]  Cl[facility] and [facility]  [address] [place] [address], Mail [address]  [place] [address]    Clinic [phone]  Nurse Line: [phone]    RN Care Coordinator: DANNY ShahN, RN, OCN

## 2018-10-03 NOTE — NURSING NOTE
"Oncology Rooming Note    October 3, 2018 10:01 AM   Castillo De Anda is a 68 year old male who presents for:    Chief Complaint   Patient presents with     Oncology Clinic Visit     New; Myeloproliferative neoplasm     Initial Vitals: BP 98/67  Pulse 99  Temp 97.5  F (36.4  C) (Oral)  Ht 1.715 m (5' 7.5\")  Wt 75.3 kg (166 lb)  SpO2 100%  BMI 25.62 kg/m2 Estimated body mass index is 25.62 kg/(m^2) as calculated from the following:    Height as of this encounter: 1.715 m (5' 7.5\").    Weight as of this encounter: 75.3 kg (166 lb). Body surface area is 1.89 meters squared.  No Pain (0) Comment: Data Unavailable   No LMP for male patient.  Allergies reviewed: Yes  Medications reviewed: Yes    Medications: Medication refills not needed today.  Pharmacy name entered into EPIC: Data Unavailable    Clinical concerns:      10 minutes for nursing intake (face to face time)     Yen Esquivel CMA              "

## 2018-10-03 NOTE — PROGRESS NOTES
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    Assessment:  In summary, Castillo De Anda is a 68 year old gentleman with Salbador 2- myeloproliferative neoplasm.  Patient presented with worsening thrombocythemia.    1.  Salbador 2- myeloproliferative neoplasm: Patient with recurrent worsening thrombocythemia as well as a bone marrow showing increased megakaryocytes in .  Suspect he may actually have a masked polycythemia given his iron deficiency. Iron labs obtained AFTER consultation reflect recent initiation of oral iron. Estimate total body iron deficient of ~2 grams.  His platelet count decreased when he was provided with iron and he is on maximum dose of hydroxyurea I am tempted to provide him with more iron to drive his platelet count down. Furthermore, given his fatigue, need for systemic anticoagulation and antiplatelet therapy, his risk for GI bleeding remains high. Therefore, I would like to provide him with 1 unit pRBC as well as IV iron (1500 mg IV iron dextran). His anticoagulation and antiplatelet agents will provide protection against further thrombotic complications.      Moving forward, I would like to have the patient receive a bone marrow in the next few months to assess if his marrow has increased fibrosis. Furthermore, we discussed ordering a full next generation sequencing panel.This was drawn after visit and results will be back within 2-3 weeks. If he has increased fibrosis or  mutations could consider referral to BMT.     Lastly if he continues to be refractory to Hydrea to control his counts will consider starting Salbador 2 inhibition with her ruxolitinib.  At present would not be able to start because hemoglobin is less than 8.  However if his hemoglobin was to be  greater than 8 we could initiate dose at 10 mg twice daily and titrate up to max dose of 25 twice daily if patient tolerates.    Plan:  1. Majority of today's visit was spent counseling the patient regarding management of his myeloproliferative neoplasm.    2.   We will get a repeat CBC as well as INR metabolic panel today  Orders Placed This Encounter   Procedures     Next Generation Sequencing Oncology: Myeloproliferative Neoplasm Panel     3. If patient has a Hgb<8 we will plan to transfuse him with 1 unit pRBC.    4. If able to be scheduled would like patient to receive 1500 mg IV iron dextran prior to going back to [place]. If unable- would recommend and request Dr. Espinal transfuse at least 1000 mg IV iron of their formulary preference.    5. Recommend patient get a repeat bone marrow in the next several weeks    6. Labs obtained AFTER face-to-face with ANC 1.5. Will DECREASE hydroxyurea dose to 1000 mg BID.     7. Consider ruxolitinib if Hgb>8 and platelets continue to increase to >700K    The patient is given our center's contact information and is instructed to call if he should have any further questions or concerns.  Otherwise, we will plan on seeing him back Follow up with Provider - 6 months with labs prior .      Total Time Spent:  I spent a total of 60 minutes face-to-face with Castillo De Anda during today's office visit.  Over 50% of this time was spent counseling the patient and/or coordinating care regarding myeloproliferative neoplasm.      Leny Oconnell MD/PhD   of Medicine  [facility] [employer]   ----------------------------------------------------------------------------------------------------------------------    History of Present Illness:  Castillo De Anda is a 68 year old gentleman with complex past medical history notable for a Salbador 2- myeloproliferative neoplasm diagnosed in 2008 at [facility] who most recently had a myocardial infarction  resulting in a ventricular septal defect requiring operative management.  Postop course was complicated by pleural effusion as well as severe thrombocythemia with a platelet count over 1 million.  Patient received platelet pheresis as well as maximum doses of hydroxyurea.  Also received IV iron (iron sucrose, 200 mg x3) in order to decrease drive toward platelet formation. He was discharged on hydroxyurea and returns to clinic for evaluation and establish care as an outpatient.     Evaluation of patient's history reveals that he was diagnosed in 2008 after undergoing a resection of his meningioma at [facility].  Was found to have an elevated hematocrit is diagnosed with Salbador 2- polycythemia vera.  He started on phlebotomy as well as 500 mg twice daily of hydroxyurea.  Patient is stable on this until 2014 and was noted to have increasing platelet counts.  Hydroxyurea level was titrated up.  His last bone marrow was in 2015 which showed increased megakaryocytes use without increased reticulin.  In April 2018 after having a surgery he was found to have a platelet count over 1 million.  His hydroxyurea was stopped and he was started on anagrelide 1 mg twice daily.  Platelet count continues to rise and so he was then titrated up on his anagrelide dose.  However this was stopped due to patient intolerance.  He unfortunately suffered a for mentioned MI.    Today in clinic, Mr. De Anad is accompanied by his son Cornelio. He reports that he is progressing with rehab and is able to do a 6-minute walk without stopping.  He denies having any worsening shortness of breath.  He also denies having any orthopnea.  He reports that the numbness in his feet and hands is improved.  He does endorse school in the last 3 hands and feet.  Is having any pica symptoms including wanting to chew ice or eat starchy foods.  His appetite has been good and his weight has been stable to increasing.  He denies having any new sores on his hands or  feet.  He also denies having mouth ulcers.  He was sent to the [facility] emergency room due to concern for low hemoglobin.  However they evaluated him and did not give him a transfusion.  He was started on iron pill twice a day.  This is causing some mild constipation.  He denies having any nosebleeds or gum bleeding.  He also does not noticed any increased bruising.  He is excited to be discharged from rehab and plans to return back to [place] for follow-up.         Past Medical History:  Past Medical History:   Diagnosis Date     Acquired asplenia      Atrial flutter (H)      Coronary artery disease     R coronary occlusions treated with HUSSEIN, proximal LAD stenosis treated with HUSSEIN     Meningioma (H) 2008     Myeloproliferative neoplasm (H)      Prostate cancer (H) 2005     Thyroid cancer (H) 2012     Tuberculosis      Ventricular septal defect        Past Surgical History:  Past Surgical History:   Procedure Laterality Date     BONE MARROW ASPIRATE &BIOPSY  2015     C OPEN TREATMENT CALCANEAL FRACTURE W BONE GRAFT Right      CHOLECYSTECTOMY       ESOPHAGOSCOPY, GASTROSCOPY, DUODENOSCOPY (EGD), COMBINED N/A 8/24/2018    Procedure: COMBINED ESOPHAGOSCOPY, GASTROSCOPY, DUODENOSCOPY (EGD);  Upper Endoscopy with No Intervention; Two Gastric Ulcers;  Surgeon: Rocael Barber MD;  Location: [facility] OR     IRRIGATION AND DEBRIDEMENT CHEST WASHOUT, COMBINED N/A 8/13/2018    Procedure: COMBINED IRRIGATION AND DEBRIDEMENT CHEST WASHOUT;  COMBINED IRRIGATION AND DEBRIDEMENT CHEST WASHOUT, Closure;  Surgeon: Jason Franco MD;  Location: [facility] OR     PROSTATECTOMY PERINEAL RADICAL  2005     REPAIR VENTRICULAR SEPTAL DEFECT N/A 8/10/2018    Procedure: REPAIR VENTRICULAR SEPTAL DEFECT;  Median Sternotomy, REPAIR VENTRICULAR SEPTAL DEFECT on pump oxygenation, Tricuspid valve replacement ;  Surgeon: Jason Franco MD;  Location: [facility] OR     SPLENECTOMY       THYROIDECTOMY  2012       Medications:  Current  Outpatient Prescriptions   Medication Sig Dispense Refill     ACETAMINOPHEN PO Take 650 mg by mouth every 4 hours as needed        AMIODARONE HCL PO Take 200 mg by mouth daily TCU has order to start 9/22 - this was how the script was sent when patient d/c'ed on 9/11 so pt has not been receiving at the TCU       Atorvastatin Calcium (LIPITOR PO) Take 80 mg by mouth daily        Carboxymethylcellulose Sod PF (REFRESH PLUS) 0.5 % SOLN ophthalmic solution 1 drop 3 times daily as needed for dry eyes       CLOPIDOGREL BISULFATE PO Take 75 mg by mouth daily        DOCUSATE SODIUM PO Take 100 mg by mouth daily       furosemide (LASIX) 20 MG tablet Take 2 tablets (40 mg) by mouth 2 times daily 30 tablet      hydroxyurea (HYDREA) 500 MG capsule CHEMO 1500mg qam and 1000mg qpm       LEVOTHYROXINE SODIUM PO Take 75 mcg by mouth daily        MELATONIN PO Take 1 mg by mouth as needed        METHOCARBAMOL PO Take 500 mg by mouth every 6 hours as needed        METOPROLOL TARTRATE PO Take 12.5 mg by mouth 2 times daily       Multiple Vitamins-Minerals (MULTIVITAMIN ADULT PO) Take 1 tablet by mouth daily       PANTOPRAZOLE SODIUM PO Take 40 mg by mouth 2 times daily        polyethylene glycol (MIRALAX/GLYCOLAX) powder Take 17 g by mouth daily   0     POTASSIUM PO Take 20 mEq by mouth 2 times daily        umeclidinium-vilanterol (ANORO ELLIPTA) 62.5-25 MCG/INH oral inhaler Inhale 1 puff into the lungs daily       warfarin (COUMADIN) 2 MG tablet Take 6 mg PO daily until next INR check, then dose per INR goal 2-3 90 tablet      Warfarin Therapy Reminder 1 each continuous prn          Allergies:  No Known Allergies    ROS:  A 14 point ROS is negative except as stated in the HPI    Social History:  Former smoker, no current tobacco use. No recent alcohol use due to surgeries. Denies any illicit drug use. Patient is retired.     Family History:  Patient has three children. All in good health.   Mother with hypertension hx.  Paternal uncle  "with CVA history    Objective:  Vitals:BP 98/67  Pulse 99  Temp 97.5  F (36.4  C) (Oral)  Ht 1.715 m (5' 7.5\")  Wt 75.3 kg (166 lb)  SpO2 100%  BMI 25.62 kg/m2  Exam:   Constitutional: pale, chronically ill appearing gentleman in  no distress  HEENT: Pupils equal and reactive to light. No scleral icterus or hemorrhage. Nares without evidence of telangiectasia. Mucous membranes moist with no wet purpura. Dentition overall ok with no signs of decay. No pharyngeal exudates. No lymphadenopathy, no thyromeagaly  CV: regular rate and rhythm, no murmurs  Respiratory: no accessory muscle use. Diminished breath sounds at bases bilaterally. No crackles or wheezes  GI: abdomen soft, nontender, without guarding or rebound. No hepatomeagaly. No splenomegaly.   Mus/Skele: Trace bilateral edema to mid-calf.   Skin: no petechiae, no ecchymosis. No rashes  Neuro: CN II-XII intact. Broad based gait. AOx3  Heme/Lymph: no supraclavicular, axillary or umbilical adenopathy.     Labs: personally reviewed with relevant trends annotated below  CBC RESULTS:   Recent Labs   Lab Test  09/24/18   0949   WBC  3.8*   RBC  2.42*   HGB  7.9*   HCT  25.5*   MCV  105*   MCH  32.6   MCHC  31.0*   RDW  28.3*   PLT  472*     Results for orders placed or performed in visit on 10/03/18 (from the past 24 hour(s))   ABO/Rh type and screen   Result Value Ref Range    Units Ordered 1     ABO O     RH(D) Neg     Antibody Screen Neg     Test Valid Only At          [facility]    Specimen Expires 10/06/2018     Crossmatch Red Blood Cells    Blood component   Result Value Ref Range    Unit Number [id number]     Blood Component Type Red Blood Cells LeukoRed Irrad (Part 2)     Division Number 00     Status of Unit Ready for patient 10/03/2018 1354     Blood Product Code [id number]     Unit Status ASYA    CBC with platelets differential   Result Value Ref Range    WBC 2.7 (L) 4.0 - 11.0 10e9/L    RBC Count 2.30 (L) " 4.4 - 5.9 10e12/L    Hemoglobin 8.0 (L) 13.3 - 17.7 g/dL    Hematocrit 24.0 (L) 40.0 - 53.0 %     (H) 78 - 100 fl    MCH 34.8 (H) 26.5 - 33.0 pg    MCHC 33.3 31.5 - 36.5 g/dL    RDW 28.9 (H) 10.0 - 15.0 %    Platelet Count 573 (H) 150 - 450 10e9/L    Diff Method Automated Method     % Neutrophils 55.3 %    % Lymphocytes 34.2 %    % Monocytes 8.6 %    % Eosinophils 1.1 %    % Basophils 0.8 %    % Immature Granulocytes 0.0 %    Nucleated RBCs 4 (H) 0 /100    Absolute Neutrophil 1.5 (L) 1.6 - 8.3 10e9/L    Absolute Lymphocytes 0.9 0.8 - 5.3 10e9/L    Absolute Monocytes 0.2 0.0 - 1.3 10e9/L    Absolute Eosinophils 0.0 0.0 - 0.7 10e9/L    Absolute Basophils 0.0 0.0 - 0.2 10e9/L    Abs Immature Granulocytes 0.0 0 - 0.4 10e9/L    Absolute Nucleated RBC 0.1    Iron and iron binding capacity   Result Value Ref Range    Iron 47 35 - 180 ug/dL    Iron Binding Cap 234 (L) 240 - 430 ug/dL    Iron Saturation Index 20 15 - 46 %   Ferritin   Result Value Ref Range    Ferritin 647 (H) 26 - 388 ng/mL   Reticulocyte count   Result Value Ref Range    % Retic 1.7 0.5 - 2.0 %    Absolute Retic 39.3 25 - 95 10e9/L       Imaging:  Personally reviewed, stable bilateral opacities.

## 2018-10-03 NOTE — Clinical Note
Blood draw today If hemoglobin <8 would schedule blood transfusion - orders placed If possible IV iron dextran scheduled prior to Friday as patient is moving back to [place]. If not possible let Dr. Oconnell know via in-basket and she will contact oncologist in [place]. Follow up with Dr. Oconnell with lab prior in 6 months.

## 2018-10-03 NOTE — LETTER
10/3/2018       RE: Castillo De Anda  [address] [facility] [address]     Dear Colleague,    Thank you for referring your patient, Castillo De Anda, to the [facility]. Please see a copy of my visit note below.        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    Assessment:  In summary, Castillo De Anda is a 68 year old gentleman with Salbador 2- myeloproliferative neoplasm.  Patient presented with worsening thrombocythemia.    1.  Salbador 2- myeloproliferative neoplasm: Patient with recurrent worsening thrombocythemia as well as a bone marrow showing increased megakaryocytes in .  Suspect he may actually have a masked polycythemia given his iron deficiency. Iron labs obtained AFTER consultation reflect recent initiation of oral iron. Estimate total body iron deficient of ~2 grams.  His platelet count decreased when he was provided with iron and he is on maximum dose of hydroxyurea I am tempted to provide him with more iron to drive his platelet count down. Furthermore, given his fatigue, need for systemic anticoagulation and antiplatelet therapy, his risk for GI bleeding remains high. Therefore, I would like to provide him with 1 unit pRBC as well as IV iron (1500 mg IV iron dextran). His anticoagulation and antiplatelet agents will provide protection against further thrombotic complications.      Moving forward, I would like to have the patient receive a bone marrow in the next few months to assess if his marrow has increased fibrosis. Furthermore, we discussed ordering a full next generation sequencing panel.This was drawn after visit and results will be back within 2-3 weeks. If he has increased fibrosis or  mutations could consider referral to BMT.     Lastly if  he continues to be refractory to Hydrea to control his counts will consider starting Salbador 2 inhibition with her ruxolitinib.  At present would not be able to start because hemoglobin is less than 8.  However if his hemoglobin was to be greater than 8 we could initiate dose at 10 mg twice daily and titrate up to max dose of 25 twice daily if patient tolerates.    Plan:  1. Majority of today's visit was spent counseling the patient regarding management of his myeloproliferative neoplasm.    2.   We will get a repeat CBC as well as INR metabolic panel today  Orders Placed This Encounter   Procedures     Next Generation Sequencing Oncology: Myeloproliferative Neoplasm Panel     3. If patient has a Hgb<8 we will plan to transfuse him with 1 unit pRBC.    4. If able to be scheduled would like patient to receive 1500 mg IV iron dextran prior to going back to [place]. If unable- would recommend and request Dr. Espinal transfuse at least 1000 mg IV iron of their formulary preference.    5. Recommend patient get a repeat bone marrow in the next several weeks    6. Labs obtained AFTER face-to-face with ANC 1.5. Will DECREASE hydroxyurea dose to 1000 mg BID.     7. Consider ruxolitinib if Hgb>8 and platelets continue to increase to >700K    The patient is given our center's contact information and is instructed to call if he should have any further questions or concerns.  Otherwise, we will plan on seeing him back Follow up with Provider - 6 months with labs prior .      Total Time Spent:  I spent a total of 60 minutes face-to-face with Castillo De Anda during today's office visit.  Over 50% of this time was spent counseling the patient and/or coordinating care regarding myeloproliferative neoplasm.      Leny Oconnell MD/PhD   of Medicine  [facility] [employer] [employer]   ----------------------------------------------------------------------------------------------------------------------    History  of Present Illness:  Castillo De Anda is a 68 year old gentleman with complex past medical history notable for a Salbador 2- myeloproliferative neoplasm diagnosed in 2008 at [facility] who most recently had a myocardial infarction resulting in a ventricular septal defect requiring operative management.  Postop course was complicated by pleural effusion as well as severe thrombocythemia with a platelet count over 1 million.  Patient received platelet pheresis as well as maximum doses of hydroxyurea.  Also received IV iron (iron sucrose, 200 mg x3) in order to decrease drive toward platelet formation. He was discharged on hydroxyurea and returns to clinic for evaluation and establish care as an outpatient.     Evaluation of patient's history reveals that he was diagnosed in 2008 after undergoing a resection of his meningioma at [facility].  Was found to have an elevated hematocrit is diagnosed with Salbador 2- polycythemia vera.  He started on phlebotomy as well as 500 mg twice daily of hydroxyurea.  Patient is stable on this until 2014 and was noted to have increasing platelet counts.  Hydroxyurea level was titrated up.  His last bone marrow was in 2015 which showed increased megakaryocytes use without increased reticulin.  In April 2018 after having a surgery he was found to have a platelet count over 1 million.  His hydroxyurea was stopped and he was started on anagrelide 1 mg twice daily.  Platelet count continues to rise and so he was then titrated up on his anagrelide dose.  However this was stopped due to patient intolerance.  He unfortunately suffered a for mentioned MI.    Today in clinic, Mr. De Anda is accompanied by his son Cornelio. He reports that he is progressing with rehab and is able to do a 6-minute walk without stopping.  He denies having any worsening shortness of breath.  He also denies having any orthopnea.  He reports that the numbness in his feet and hands is improved.  He does endorse school in the  last 3 hands and feet.  Is having any pica symptoms including wanting to chew ice or eat starchy foods.  His appetite has been good and his weight has been stable to increasing.  He denies having any new sores on his hands or feet.  He also denies having mouth ulcers.  He was sent to the [facility] emergency room due to concern for low hemoglobin.  However they evaluated him and did not give him a transfusion.  He was started on iron pill twice a day.  This is causing some mild constipation.  He denies having any nosebleeds or gum bleeding.  He also does not noticed any increased bruising.  He is excited to be discharged from rehab and plans to return back to [place] for follow-up.         Past Medical History:  Past Medical History:   Diagnosis Date     Acquired asplenia      Atrial flutter (H)      Coronary artery disease     R coronary occlusions treated with HUSSEIN, proximal LAD stenosis treated with HUSSEIN     Meningioma (H) 2008     Myeloproliferative neoplasm (H)      Prostate cancer (H) 2005     Thyroid cancer (H) 2012     Tuberculosis      Ventricular septal defect        Past Surgical History:  Past Surgical History:   Procedure Laterality Date     BONE MARROW ASPIRATE &BIOPSY  2015     C OPEN TREATMENT CALCANEAL FRACTURE W BONE GRAFT Right      CHOLECYSTECTOMY       ESOPHAGOSCOPY, GASTROSCOPY, DUODENOSCOPY (EGD), COMBINED N/A 8/24/2018    Procedure: COMBINED ESOPHAGOSCOPY, GASTROSCOPY, DUODENOSCOPY (EGD);  Upper Endoscopy with No Intervention; Two Gastric Ulcers;  Surgeon: Rocael Barber MD;  Location: [facility] OR     IRRIGATION AND DEBRIDEMENT CHEST WASHOUT, COMBINED N/A 8/13/2018    Procedure: COMBINED IRRIGATION AND DEBRIDEMENT CHEST WASHOUT;  COMBINED IRRIGATION AND DEBRIDEMENT CHEST WASHOUT, Closure;  Surgeon: Jason Franco MD;  Location: [facility] OR     PROSTATECTOMY PERINEAL RADICAL  2005     REPAIR VENTRICULAR SEPTAL DEFECT N/A 8/10/2018    Procedure: REPAIR VENTRICULAR SEPTAL DEFECT;  Median  Sternotomy, REPAIR VENTRICULAR SEPTAL DEFECT on pump oxygenation, Tricuspid valve replacement ;  Surgeon: Jason Franco MD;  Location: [facility] OR     SPLENECTOMY       THYROIDECTOMY  2012       Medications:  Current Outpatient Prescriptions   Medication Sig Dispense Refill     ACETAMINOPHEN PO Take 650 mg by mouth every 4 hours as needed        AMIODARONE HCL PO Take 200 mg by mouth daily TCU has order to start 9/22 - this was how the script was sent when patient d/c'ed on 9/11 so pt has not been receiving at the TCU       Atorvastatin Calcium (LIPITOR PO) Take 80 mg by mouth daily        Carboxymethylcellulose Sod PF (REFRESH PLUS) 0.5 % SOLN ophthalmic solution 1 drop 3 times daily as needed for dry eyes       CLOPIDOGREL BISULFATE PO Take 75 mg by mouth daily        DOCUSATE SODIUM PO Take 100 mg by mouth daily       furosemide (LASIX) 20 MG tablet Take 2 tablets (40 mg) by mouth 2 times daily 30 tablet      hydroxyurea (HYDREA) 500 MG capsule CHEMO 1500mg qam and 1000mg qpm       LEVOTHYROXINE SODIUM PO Take 75 mcg by mouth daily        MELATONIN PO Take 1 mg by mouth as needed        METHOCARBAMOL PO Take 500 mg by mouth every 6 hours as needed        METOPROLOL TARTRATE PO Take 12.5 mg by mouth 2 times daily       Multiple Vitamins-Minerals (MULTIVITAMIN ADULT PO) Take 1 tablet by mouth daily       PANTOPRAZOLE SODIUM PO Take 40 mg by mouth 2 times daily        polyethylene glycol (MIRALAX/GLYCOLAX) powder Take 17 g by mouth daily   0     POTASSIUM PO Take 20 mEq by mouth 2 times daily        umeclidinium-vilanterol (ANORO ELLIPTA) 62.5-25 MCG/INH oral inhaler Inhale 1 puff into the lungs daily       warfarin (COUMADIN) 2 MG tablet Take 6 mg PO daily until next INR check, then dose per INR goal 2-3 90 tablet      Warfarin Therapy Reminder 1 each continuous prn          Allergies:  No Known Allergies    ROS:  A 14 point ROS is negative except as stated in the HPI    Social History:  Former smoker, no  "current tobacco use. No recent alcohol use due to surgeries. Denies any illicit drug use. Patient is retired.     Family History:  Patient has three children. All in good health.   Mother with hypertension hx.  Paternal uncle with CVA history    Objective:  Vitals:BP 98/67  Pulse 99  Temp 97.5  F (36.4  C) (Oral)  Ht 1.715 m (5' 7.5\")  Wt 75.3 kg (166 lb)  SpO2 100%  BMI 25.62 kg/m2  Exam:   Constitutional: pale, chronically ill appearing gentleman in  no distress  HEENT: Pupils equal and reactive to light. No scleral icterus or hemorrhage. Nares without evidence of telangiectasia. Mucous membranes moist with no wet purpura. Dentition overall ok with no signs of decay. No pharyngeal exudates. No lymphadenopathy, no thyromeagaly  CV: regular rate and rhythm, no murmurs  Respiratory: no accessory muscle use. Diminished breath sounds at bases bilaterally. No crackles or wheezes  GI: abdomen soft, nontender, without guarding or rebound. No hepatomeagaly. No splenomegaly.   Mus/Skele: Trace bilateral edema to mid-calf.   Skin: no petechiae, no ecchymosis. No rashes  Neuro: CN II-XII intact. Broad based gait. AOx3  Heme/Lymph: no supraclavicular, axillary or umbilical adenopathy.     Labs: personally reviewed with relevant trends annotated below  CBC RESULTS:   Recent Labs   Lab Test  09/24/18   0949   WBC  3.8*   RBC  2.42*   HGB  7.9*   HCT  25.5*   MCV  105*   MCH  32.6   MCHC  31.0*   RDW  28.3*   PLT  472*     Results for orders placed or performed in visit on 10/03/18 (from the past 24 hour(s))   ABO/Rh type and screen   Result Value Ref Range    Units Ordered 1     ABO O     RH(D) Neg     Antibody Screen Neg     Test Valid Only At          [facility]    Specimen Expires 10/06/2018     Crossmatch Red Blood Cells    Blood component   Result Value Ref Range    Unit Number [id number]     Blood Component Type Red Blood Cells LeukoRed Irrad (Part 2)     Division " Number 00     Status of Unit Ready for patient 10/03/2018 1354     Blood Product Code [id number]     Unit Status ASYA    CBC with platelets differential   Result Value Ref Range    WBC 2.7 (L) 4.0 - 11.0 10e9/L    RBC Count 2.30 (L) 4.4 - 5.9 10e12/L    Hemoglobin 8.0 (L) 13.3 - 17.7 g/dL    Hematocrit 24.0 (L) 40.0 - 53.0 %     (H) 78 - 100 fl    MCH 34.8 (H) 26.5 - 33.0 pg    MCHC 33.3 31.5 - 36.5 g/dL    RDW 28.9 (H) 10.0 - 15.0 %    Platelet Count 573 (H) 150 - 450 10e9/L    Diff Method Automated Method     % Neutrophils 55.3 %    % Lymphocytes 34.2 %    % Monocytes 8.6 %    % Eosinophils 1.1 %    % Basophils 0.8 %    % Immature Granulocytes 0.0 %    Nucleated RBCs 4 (H) 0 /100    Absolute Neutrophil 1.5 (L) 1.6 - 8.3 10e9/L    Absolute Lymphocytes 0.9 0.8 - 5.3 10e9/L    Absolute Monocytes 0.2 0.0 - 1.3 10e9/L    Absolute Eosinophils 0.0 0.0 - 0.7 10e9/L    Absolute Basophils 0.0 0.0 - 0.2 10e9/L    Abs Immature Granulocytes 0.0 0 - 0.4 10e9/L    Absolute Nucleated RBC 0.1    Iron and iron binding capacity   Result Value Ref Range    Iron 47 35 - 180 ug/dL    Iron Binding Cap 234 (L) 240 - 430 ug/dL    Iron Saturation Index 20 15 - 46 %   Ferritin   Result Value Ref Range    Ferritin 647 (H) 26 - 388 ng/mL   Reticulocyte count   Result Value Ref Range    % Retic 1.7 0.5 - 2.0 %    Absolute Retic 39.3 25 - 95 10e9/L       Imaging:  Personally reviewed, stable bilateral opacities.         Again, thank you for allowing me to participate in the care of your patient.      Sincerely,    Leny Oconnell MD

## 2018-10-04 PROBLEM — D50.0 IRON DEFICIENCY ANEMIA DUE TO CHRONIC BLOOD LOSS: Status: ACTIVE | Noted: 2018-10-04

## 2018-10-05 ENCOUNTER — INFUSION THERAPY VISIT (OUTPATIENT)
Dept: ONCOLOGY | Facility: CLINIC | Age: 68
End: 2018-10-05
Attending: INTERNAL MEDICINE
Payer: MEDICARE

## 2018-10-05 VITALS
SYSTOLIC BLOOD PRESSURE: 128 MMHG | TEMPERATURE: 97.6 F | RESPIRATION RATE: 18 BRPM | DIASTOLIC BLOOD PRESSURE: 89 MMHG | OXYGEN SATURATION: 99 % | HEART RATE: 85 BPM

## 2018-10-05 DIAGNOSIS — D50.0 IRON DEFICIENCY ANEMIA DUE TO CHRONIC BLOOD LOSS: Primary | ICD-10-CM

## 2018-10-05 DIAGNOSIS — I48.0 PAROXYSMAL ATRIAL FIBRILLATION (H): ICD-10-CM

## 2018-10-05 DIAGNOSIS — D47.1 MYELOPROLIFERATIVE NEOPLASM (H): ICD-10-CM

## 2018-10-05 LAB — INR PPP: 1.56 (ref 0.86–1.14)

## 2018-10-05 PROCEDURE — 25000132 ZZH RX MED GY IP 250 OP 250 PS 637: Mod: ZF | Performed by: INTERNAL MEDICINE

## 2018-10-05 PROCEDURE — 96365 THER/PROPH/DIAG IV INF INIT: CPT

## 2018-10-05 PROCEDURE — A9270 NON-COVERED ITEM OR SERVICE: HCPCS | Mod: ZF | Performed by: INTERNAL MEDICINE

## 2018-10-05 PROCEDURE — 40000556 ZZH STATISTIC PERIPHERAL IV START W US GUIDANCE: Mod: ZF

## 2018-10-05 PROCEDURE — 96366 THER/PROPH/DIAG IV INF ADDON: CPT

## 2018-10-05 PROCEDURE — 85610 PROTHROMBIN TIME: CPT | Performed by: INTERNAL MEDICINE

## 2018-10-05 PROCEDURE — 25000128 H RX IP 250 OP 636: Mod: ZF | Performed by: INTERNAL MEDICINE

## 2018-10-05 RX ORDER — ACETAMINOPHEN 325 MG/1
650 TABLET ORAL ONCE
Status: CANCELLED
Start: 2018-10-05 | End: 2018-10-05

## 2018-10-05 RX ORDER — WARFARIN SODIUM 2 MG/1
TABLET ORAL
Qty: 90 TABLET | Refills: 0 | Status: SHIPPED | OUTPATIENT
Start: 2018-10-05

## 2018-10-05 RX ORDER — ACETAMINOPHEN 325 MG/1
650 TABLET ORAL ONCE
Status: COMPLETED | OUTPATIENT
Start: 2018-10-05 | End: 2018-10-05

## 2018-10-05 RX ADMIN — SODIUM CHLORIDE 250 ML: 9 INJECTION, SOLUTION INTRAVENOUS at 08:26

## 2018-10-05 RX ADMIN — IRON DEXTRAN 1475 MG: 50 INJECTION INTRAMUSCULAR; INTRAVENOUS at 10:23

## 2018-10-05 RX ADMIN — ACETAMINOPHEN 650 MG: 325 TABLET ORAL at 07:56

## 2018-10-05 RX ADMIN — IRON DEXTRAN 25 MG: 50 INJECTION INTRAMUSCULAR; INTRAVENOUS at 08:26

## 2018-10-05 ASSESSMENT — PAIN SCALES - GENERAL: PAINLEVEL: NO PAIN (0)

## 2018-10-05 NOTE — PATIENT INSTRUCTIONS
Contact Numbers  [facility] Nurse Triage: [phone]  After Hours Nurse Line:  [phone]    Please call the [facility] Triage line if you experience a temperature greater than or equal to 100.5, shaking chills, have uncontrolled nausea, vomiting and/or diarrhea, dizziness, shortness of breath, chest pain, bleeding, unexplained bruising, or if you have any other new/concerning symptoms, questions or concerns.     If it is after hours, weekends, or holidays, please call either the after hours nurse line listed above.     If you are having any concerning symptoms or wish to speak to a provider before your next infusion visit, please call your care coordinator or triage to notify them so we can adequately serve you.     If you need a refill on a narcotic prescription or other medication, please call triage before your infusion appointment.         October 2018 Sunday Monday Tuesday Wednesday Thursday Friday Saturday        1     LAB    3:30 PM   (15 min.)   SPECIMEN MGMT   [facility], Lab 2     XR CHEST 2 VIEWS    1:15 PM   (15 min.)   UCXR1   [facility] Xray     [facility] RETURN    1:45 PM   (30 min.)   UC CVTS   [facility] 3     [facility] NEW    9:30 AM   (60 min.)   Leny Oconnell MD   [facility]     LAB   10:45 AM   (15 min.)   SPECIMEN MGMT   [facility] [place], Lab     [facility] SPEC INFUSION 180    3:00 PM   (180 min.)   UC SPEC INFUSION   [facility] [place] [facility]     UMP ONC INFUSION 120    3:00 PM   (120 min.)   UC ONCOLOGY INFUSION   [facility] 4     5     UMP ONC INFUSION 360    7:30 AM   (360 min.)   UC ONCOLOGY INFUSION   [facility] 6       7     8     9     10     11     12     13       14     15     16     17     18     19     20       21     22     23     24     25     26     27       28     29     30     31 November 2018 Sunday Monday Tuesday Wednesday  Thursday Friday Saturday                       1     2     3       4     5     6     7     8     9     10       11     12     13     14     15     16     17       18     19     20     21     22     23     24       25     26     27     28     29     30                       Lab Results:  Recent Results (from the past 12 hour(s))   INR    Collection Time: 10/05/18  8:00 AM   Result Value Ref Range    INR 1.56 (H) 0.86 - 1.14

## 2018-10-05 NOTE — PROGRESS NOTES
Infusion Nursing Note:  Castillo De Anda presents today for Infed-NEW.    Patient seen by provider today: No  Was seen in clinic by Dr. Oconnell on 10/3/15    Intravenous Access:  Peripheral IV placed.    Treatment Conditions:  Not Applicable.  Today's INR: 1.56.    Note:   Patient was seen in clinic by Dr. Oconnell on 10/3/18 and received 1 unit PRBC that day. Per patient, was discharged on 10/4/18. Denied any new or concerning symptoms. Based on how he feels from today's Infed infusion, plans to travel home to [place], [place] today or tomorrow. Per patient, had some type of iron infusion during most recent hospitalization without issue.    Per patient, patient was discharged from TCU 10/4/18 without a current INR and is out of coumadin. Will not be seen by PCP until next week in [place], [place].    TORB: Dr. Oconnell/Roselyn Boyer RN, 10/5/18 @ 0802: Draw INR level today. Page with results for Coumadin dose and preferred pharmacy. Inform patient to follow-up with cardiologist or PCP for INR monitoring and Coumadin dosing.    Today's INR 1.56. Message sent to Dr. Oconnell about INR. MD to send Coumadin script to [facility].    Post Infusion Assessment:  Patient tolerated infusion without incident.  Patient observed for 60 minutes post test dose of Infed per protocol.  Patient observed 30 minutes post Infed per protocol.  Blood return noted pre and post infusion.  Site patent and intact, free from redness, edema or discomfort.  No evidence of extravasations.  Access discontinued per protocol.    Discharge Plan:   Prescription refills given for Coumadin.  Discharge instructions reviewed with: Patient and Family.  Patient and/or family verbalized understanding of discharge instructions and all questions answered.  Copy of AVS reviewed with patient and/or family.  Patient will return 4/3/18 for labs and follow-up appt with Dr. Oconnell.  Patient discharged in stable condition accompanied by: self and wife.  Departure Mode:  Wheelchair.    Roselyn Boyer RN

## 2018-10-05 NOTE — PROGRESS NOTES
Patient was discharged from TCU without prescription for coumadin.   Discussed that I do not follow coumadin levels but given that he has a PCP appointment next week I will prescribe coumadin today as he is out. INR is 1.56. WIll have patient take 6 mg daily until he can be checked next week. Ok to not bridge.   Leny Oconnell MD/PhD   of Medicine  Division of Hematology, Oncology and Transplantation    Pager [phone]  Office phone: [phone]  Email: [email or web address]

## 2018-10-05 NOTE — MR AVS SNAPSHOT
After Visit Summary   10/5/2018    Castillo De Anda    MRN: [id number]           Patient Information     Date Of Birth          1950        Visit Information        Provider Department      10/5/2018 7:30 AM UC 32 ATC;  ONCOLOGY INFUSION [facility]        Today's Diagnoses     Iron deficiency anemia due to chronic blood loss    -  1    Myeloproliferative neoplasm (H)          Care Instructions    Contact Numbers  [facility] Nurse Triage: [phone]  After Hours Nurse Line:  [phone]    Please call the [facility] Triage line if you experience a temperature greater than or equal to 100.5, shaking chills, have uncontrolled nausea, vomiting and/or diarrhea, dizziness, shortness of breath, chest pain, bleeding, unexplained bruising, or if you have any other new/concerning symptoms, questions or concerns.     If it is after hours, weekends, or holidays, please call either the after hours nurse line listed above.     If you are having any concerning symptoms or wish to speak to a provider before your next infusion visit, please call your care coordinator or triage to notify them so we can adequately serve you.     If you need a refill on a narcotic prescription or other medication, please call triage before your infusion appointment.         October 2018 Sunday Monday Tuesday Wednesday Thursday Friday Saturday 1     LAB    3:30 PM   (15 min.)   SPECIMEN MGMT   [facility], Lab 2     XR CHEST 2 VIEWS    1:15 PM   (15 min.)   UCXR1   [facility] Xray     UMP RETURN    1:45 PM   (30 min.)   UC CVTS   [facility] 3     P NEW    9:30 AM   (60 min.)   Leny Oconnell MD   [facility]     LAB   10:45 AM   (15 min.)   SPECIMEN MGMT   [facility], Lab     UMP SPEC INFUSION 180    3:00 PM   (180 min.)   UC SPEC INFUSION   [facility] Specialty and Procedure     UM ONC INFUSION 120    3:00 PM   (120  min.)    ONCOLOGY INFUSION   [facility] 4     5     UMP ONC INFUSION 360    7:30 AM   (360 min.)    ONCOLOGY INFUSION   [facility] 6       7     8     9     10     11     12     13       14     15     16     17     18     19     20       21     22     23     24     25     26     27       28     29     30     31 November 2018 Sunday Monday Tuesday Wednesday Thursday Friday Saturday                       1     2     3       4     5     6     7     8     9     10       11     12     13     14     15     16     17       18     19     20     21     22     23     24       25     26     27     28     29     30                       Lab Results:  Recent Results (from the past 12 hour(s))   INR    Collection Time: 10/05/18  8:00 AM   Result Value Ref Range    INR 1.56 (H) 0.86 - 1.14               Follow-ups after your visit        Your next 10 appointments already scheduled     Apr 03, 2019  2:30 PM CDT   [facility] Draw with [facility] LAB DRAW   [facility] Lab Draw ([facility])    [address] [place] [address]  [address]  [facility] [address] [phone]            Apr 03, 2019  3:00 PM CDT   (Arrive by 2:45 PM)   Return Visit with Leny Oconnell MD   [facility] ([facility])    [address] [place] [address]  [address]  [facility] [address] [phone]              Who to contact     If you have questions or need follow up information about today's clinic visit or your schedule please contact [facility] directly at [phone].  Normal or non-critical lab and imaging results will be communicated to you by MyChart, letter or phone within 4 business days after the clinic has received the results. If you do not hear from us within 7 days, please contact the clinic through MyChart or phone. If you have a critical or abnormal lab result, we will notify you  "by phone as soon as possible.  Submit refill requests through [employer] or call your pharmacy and they will forward the refill request to us. Please allow 3 business days for your refill to be completed.          Additional Information About Your Visit        [employer] Information     [employer] lets you send messages to your doctor, view your test results, renew your prescriptions, schedule appointments and more. To sign up, go to [email or web address] . Click on \"Log in\" on the left side of the screen, which will take you to the Welcome page. Then click on \"Sign up Now\" on the right side of the page.     You will be asked to enter the access code listed below, as well as some personal information. Please follow the directions to create your username and password.     Your access code is: [email or web address]  Expires: 2018  3:06 PM     Your access code will  in 90 days. If you need help or a new code, please call your [facility] or [phone].        Your Vitals Were     Pulse Temperature Respirations Pulse Oximetry          85 97.6  F (36.4  C) (Oral) 18 99%         Blood Pressure from Last 3 Encounters:   10/05/18 128/89   10/03/18 125/84   10/03/18 98/67    Weight from Last 3 Encounters:   10/03/18 75.3 kg (166 lb)   10/02/18 75.2 kg (165 lb 12.8 oz)   18 72.3 kg (159 lb 6.4 oz)              We Performed the Following     INR          Where to get your medicines      These medications were sent to [place] [id number] [facility] [address]  [address] [place] [address] [address] [address]    Hours:  TRANSPLANT PHONE NUMBER [phone] Phone:  [phone]     warfarin 2 MG tablet          Primary Care Provider Office Phone # Fax #    Vinod Frank [phone]       [place] [id number] [facility] [address]        Equal Access to Services     MORGAN SANCHEZ AH: Hadii rajendra ku hadalexsandrao Sokeisha, waaxda luqadaha, qaybta kaalmada jose, " giana correamitch naylor'aan ah. So [facility] [phone].    ATENCIÓN: Si shayyla radhames, tiene a durbin disposición servicios gratuitos de asistencia lingüística. Cyrus lassiter [phone].    We comply with applicable federal civil rights laws and [place] laws. We do not discriminate on the basis of race, color, national origin, age, disability, sex, sexual orientation, or gender identity.            Thank you!     Thank you for choosing [facility]  for your care. Our goal is always to provide you with excellent care. Hearing back from our patients is one way we can continue to improve our services. Please take a few minutes to complete the written survey that you may receive in the mail after your visit with us. Thank you!             Your Updated Medication List - Protect others around you: Learn how to safely use, store and throw away your medicines at www.disposemymeds.org.          This list is accurate as of 10/5/18 12:44 PM.  Always use your most recent med list.                   Brand Name Dispense Instructions for use Diagnosis    ACETAMINOPHEN PO      Take 650 mg by mouth every 4 hours as needed        AMIODARONE HCL PO      Take 200 mg by mouth daily TCU has order to start 9/22 - this was how the script was sent when patient d/c'ed on 9/11 so pt has not been receiving at the TCU        Carboxymethylcellulose Sod PF 0.5 % Soln ophthalmic solution    REFRESH PLUS     1 drop 3 times daily as needed for dry eyes        CLOPIDOGREL BISULFATE PO      Take 75 mg by mouth daily        DOCUSATE SODIUM PO      Take 100 mg by mouth daily        furosemide 20 MG tablet    LASIX    30 tablet    Take 2 tablets (40 mg) by mouth 2 times daily    Hypervolemia, unspecified hypervolemia type       hydroxyurea 500 MG capsule CHEMO    HYDREA    120 capsule    1000mg qam and 1000mg qpm    Myeloproliferative disorder (H)       LEVOTHYROXINE SODIUM PO      Take 75 mcg by mouth daily        LIPITOR PO       Take 80 mg by mouth daily        MELATONIN PO      Take 1 mg by mouth as needed        METHOCARBAMOL PO      Take 500 mg by mouth every 6 hours as needed        METOPROLOL TARTRATE PO      Take 12.5 mg by mouth 2 times daily        MULTIVITAMIN ADULT PO      Take 1 tablet by mouth daily        PANTOPRAZOLE SODIUM PO      Take 40 mg by mouth 2 times daily        polyethylene glycol powder    MIRALAX/GLYCOLAX     Take 17 g by mouth daily        POTASSIUM PO      Take 20 mEq by mouth 2 times daily        umeclidinium-vilanterol 62.5-25 MCG/INH oral inhaler    ANORO ELLIPTA     Inhale 1 puff into the lungs daily        * Warfarin Therapy Reminder      1 each continuous prn    Paroxysmal atrial fibrillation (H)       * warfarin 2 MG tablet    COUMADIN    90 tablet    Take 6 mg PO daily until next INR check, then dose per INR goal 2-3    Paroxysmal atrial fibrillation (H)       * Notice:  This list has 2 medication(s) that are the same as other medications prescribed for you. Read the directions carefully, and ask your doctor or other care provider to review them with you.

## 2018-10-08 NOTE — TELEPHONE ENCOUNTER
October 8, 2018 9:09 AM  Per call to Rosey [email or web address].  She is requesting the records be resent again today as they were not received.  Records will be faxed if possible, otherwise they will come via Fed-Ex

## 2018-10-09 LAB
% GRANULOCYTES: ABNORMAL %
COPATH REPORT: NORMAL
ERYTHROCYTE [DISTWIDTH] IN BLOOD BY AUTOMATED COUNT: 28.3 %
HCT VFR BLD AUTO: 29.9 %
HEMOGLOBIN: 9.8 G/DL (ref 13.3–17.7)
LYMPHOCYTES NFR BLD AUTO: ABNORMAL %
MCH RBC QN AUTO: 35 PG
MCHC RBC AUTO-ENTMCNC: 32.8 G/DL
MCV RBC AUTO: 106.8 FL
MID %: ABNORMAL % (ref 0.1–24)
PLATELET # BLD AUTO: 304 10^9/L
PMV BLD: 8.7 FL
RBC # BLD AUTO: 2.8 10^12/L
WBC # BLD AUTO: 5.1 10^9/L

## 2018-10-12 ENCOUNTER — TRANSFERRED RECORDS (OUTPATIENT)
Dept: HEALTH INFORMATION MANAGEMENT | Facility: CLINIC | Age: 68
End: 2018-10-12

## 2018-10-12 ENCOUNTER — TELEPHONE (OUTPATIENT)
Dept: ONCOLOGY | Facility: CLINIC | Age: 68
End: 2018-10-12

## 2018-10-12 DIAGNOSIS — D47.1 MYELOPROLIFERATIVE NEOPLASM (H): Primary | ICD-10-CM

## 2018-10-12 LAB
% GRANULOCYTES: ABNORMAL %
COPATH REPORT: NORMAL
ERYTHROCYTE [DISTWIDTH] IN BLOOD BY AUTOMATED COUNT: 28.8 %
HCT VFR BLD AUTO: 31 %
HEMOGLOBIN: 10.2 G/DL (ref 13.3–17.7)
LYMPHOCYTES NFR BLD AUTO: ABNORMAL %
MCH RBC QN AUTO: 35.3 PG
MCHC RBC AUTO-ENTMCNC: 32.9 G/DL
MCV RBC AUTO: 107.3 FL
MID %: ABNORMAL % (ref 0.1–24)
PLATELET # BLD AUTO: 201 10^9/L
PMV BLD: 8.6 FL
RBC # BLD AUTO: 2.89 10^12/L
WBC # BLD AUTO: 6.2 10^9/L

## 2018-10-12 NOTE — TELEPHONE ENCOUNTER
Contacted patient to discuss his molecular tests and INR testing.    Castillo requested I speak to his wife    Summary of an over 30 minute conversation below:    Wife has a chronic systemic infection with gram negative bacteria, lyme, and candida infections.     Wife has noted that the patient's count have increased since she had an infection.    She is concerned that he has positive lyme and mycoplasma titers in values drawn from [facility]. She ties these to stress and systemic symptoms of stress.     She has obtained his records and is fixated on possibility that he has chronic infections as well. Fixated on CD61- states it is due to mycoplasma. Wife states that his whole body rash-- which was related to medication.     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     Patient is having a biopsy Tuesday.   She would like to have patient evaluated for B. Brucellosis and other infectious in his bone marrow    I discussed with her that I recommended repeat marrow at [place], but as I do not have priveledges there I can not order fungal or bacterial cultures and testing. Suggested she have physicians in [place] contact me regarding this testing. Further offered to refer patient to [facility] to discuss her concerns. She declined this.     Finally got to speak to patient. Informed him of his test results (Jak2 exon 12 positive) and confirmed he has a local clinic following his INR.     Leny Oconnell MD/PhD   of Medicine  Division of Hematology, Oncology and Transplantation    Pager [phone]  Office phone: [phone]  Email: [email or web address]          
Cardiac

## 2018-10-22 LAB
BACTERIA SPEC CULT: NORMAL
Lab: NORMAL
SPECIMEN SOURCE: NORMAL

## 2018-11-02 ENCOUNTER — DOCUMENTATION ONLY (OUTPATIENT)
Dept: OTHER | Facility: CLINIC | Age: 68
End: 2018-11-02

## 2019-01-05 ENCOUNTER — TRANSFERRED RECORDS (OUTPATIENT)
Dept: HEALTH INFORMATION MANAGEMENT | Facility: CLINIC | Age: 69
End: 2019-01-05

## 2019-02-14 NOTE — PLAN OF CARE
Problem: PHYSICAL THERAPY ADULT  Goal: Performs mobility at highest level of function for planned discharge setting  See evaluation for individualized goals  Description  Treatment/Interventions: Functional transfer training, LE strengthening/ROM, Elevations, Therapeutic exercise, Endurance training, Cognitive reorientation, Patient/family training, Equipment eval/education, Bed mobility, Gait training, Compensatory technique education, Continued evaluation, Spoke to nursing  Equipment Recommended: Nuris Gonzales       See flowsheet documentation for full assessment, interventions and recommendations     Outcome: Progressing Problem: Patient Care Overview  Goal: Plan of Care/Patient Progress Review  PT 4C: Pt not medically appropriate for PT this morning, will check back or reschedule appropriately.

## 2020-03-11 ENCOUNTER — HEALTH MAINTENANCE LETTER (OUTPATIENT)
Age: 70
End: 2020-03-11

## 2020-07-08 ENCOUNTER — MEDICAL CORRESPONDENCE (OUTPATIENT)
Dept: HEALTH INFORMATION MANAGEMENT | Facility: CLINIC | Age: 70
End: 2020-07-08

## 2020-07-08 ENCOUNTER — TELEPHONE (OUTPATIENT)
Dept: SURGERY | Facility: CLINIC | Age: 70
End: 2020-07-08

## 2020-07-08 NOTE — TELEPHONE ENCOUNTER
"Patient call:     He is s/p repair of VSD and tissue TVR with Dr Franco on 8/20/2018.     Received call that he is having a Cardiac MRI on July 22, 2020. This is being pursued due to a finding on his ultrasound (likely a echocardiogram) showing \"a mass on his heart.\"     Left a message today informing him to have the MRI on July 22.  This may clear everything up, the mass may just be scar tissue, and thus no further needs.  He can call back after the imaging to discuss follow up, if needed.       Torsten Ruiz PA-C  Cardiothoracic Surgery  Pager [phone]    2:34 PM   July 8, 2020        "

## 2020-07-09 DIAGNOSIS — Z87.74 S/P VSD REPAIR: Primary | ICD-10-CM

## 2020-07-15 ENCOUNTER — HOSPITAL ENCOUNTER (OUTPATIENT)
Dept: CARDIOLOGY | Facility: CLINIC | Age: 70
Discharge: HOME | End: 2020-07-15
Attending: INTERNAL MEDICINE | Admitting: INTERNAL MEDICINE
Payer: MEDICARE

## 2020-07-15 VITALS — SYSTOLIC BLOOD PRESSURE: 147 MMHG | HEART RATE: 58 BPM | DIASTOLIC BLOOD PRESSURE: 86 MMHG

## 2020-07-15 DIAGNOSIS — Z87.74 S/P VSD REPAIR: ICD-10-CM

## 2020-07-15 LAB
CREAT BLD-MCNC: 1.2 MG/DL (ref 0.66–1.25)
GFR SERPL CREATININE-BSD FRML MDRD: 60 ML/MIN/{1.73_M2}

## 2020-07-15 PROCEDURE — 75565 CARD MRI VELOC FLOW MAPPING: CPT

## 2020-07-15 PROCEDURE — 75565 CARD MRI VELOC FLOW MAPPING: CPT | Mod: 26 | Performed by: INTERNAL MEDICINE

## 2020-07-15 PROCEDURE — 82565 ASSAY OF CREATININE: CPT

## 2020-07-15 PROCEDURE — 75561 CARDIAC MRI FOR MORPH W/DYE: CPT | Mod: 26 | Performed by: INTERNAL MEDICINE

## 2020-07-15 PROCEDURE — A9585 GADOBUTROL INJECTION: HCPCS | Performed by: INTERNAL MEDICINE

## 2020-07-15 PROCEDURE — 25500064 ZZH RX 255 OP 636: Performed by: INTERNAL MEDICINE

## 2020-07-15 RX ORDER — DIAZEPAM 5 MG
5 TABLET ORAL EVERY 30 MIN PRN
Status: DISCONTINUED | OUTPATIENT
Start: 2020-07-15 | End: 2020-07-16 | Stop reason: HOSPADM

## 2020-07-15 RX ORDER — GADOBUTROL 604.72 MG/ML
10 INJECTION INTRAVENOUS ONCE
Status: COMPLETED | OUTPATIENT
Start: 2020-07-15 | End: 2020-07-15

## 2020-07-15 RX ORDER — AMINOPHYLLINE 25 MG/ML
100 INJECTION, SOLUTION INTRAVENOUS ONCE
Status: DISCONTINUED | OUTPATIENT
Start: 2020-07-15 | End: 2020-07-16 | Stop reason: HOSPADM

## 2020-07-15 RX ORDER — ACYCLOVIR 200 MG/1
0-1 CAPSULE ORAL
Status: DISCONTINUED | OUTPATIENT
Start: 2020-07-15 | End: 2020-07-16 | Stop reason: HOSPADM

## 2020-07-15 RX ORDER — REGADENOSON 0.08 MG/ML
0.4 INJECTION, SOLUTION INTRAVENOUS ONCE
Status: DISCONTINUED | OUTPATIENT
Start: 2020-07-15 | End: 2020-07-16 | Stop reason: HOSPADM

## 2020-07-15 RX ORDER — ALBUTEROL SULFATE 90 UG/1
2 AEROSOL, METERED RESPIRATORY (INHALATION) EVERY 5 MIN PRN
Status: DISCONTINUED | OUTPATIENT
Start: 2020-07-15 | End: 2020-07-16 | Stop reason: HOSPADM

## 2020-07-15 RX ORDER — DIPHENHYDRAMINE HYDROCHLORIDE 50 MG/ML
25-50 INJECTION INTRAMUSCULAR; INTRAVENOUS
Status: DISCONTINUED | OUTPATIENT
Start: 2020-07-15 | End: 2020-07-16 | Stop reason: HOSPADM

## 2020-07-15 RX ORDER — METHYLPREDNISOLONE SODIUM SUCCINATE 125 MG/2ML
125 INJECTION, POWDER, LYOPHILIZED, FOR SOLUTION INTRAMUSCULAR; INTRAVENOUS
Status: DISCONTINUED | OUTPATIENT
Start: 2020-07-15 | End: 2020-07-16 | Stop reason: HOSPADM

## 2020-07-15 RX ORDER — DIPHENHYDRAMINE HCL 25 MG
25 CAPSULE ORAL
Status: DISCONTINUED | OUTPATIENT
Start: 2020-07-15 | End: 2020-07-16 | Stop reason: HOSPADM

## 2020-07-15 RX ORDER — ONDANSETRON 2 MG/ML
4 INJECTION INTRAMUSCULAR; INTRAVENOUS
Status: DISCONTINUED | OUTPATIENT
Start: 2020-07-15 | End: 2020-07-16 | Stop reason: HOSPADM

## 2020-07-15 RX ADMIN — GADOBUTROL 10 ML: 604.72 INJECTION INTRAVENOUS at 10:21

## 2020-07-16 ENCOUNTER — TELEPHONE (OUTPATIENT)
Dept: CARDIOLOGY | Facility: CLINIC | Age: 70
End: 2020-07-16

## 2020-07-16 NOTE — TELEPHONE ENCOUNTER
"Cardiac MRI done 7-  1. The global LV systolic function is mildly to moderately decreased and the LVEF is 46%. There is basal  and mid-inferior and inferoseptal akinesis and mid-inferolateral hypokinesis.  2. 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  3. A non-perfusing structure consistent with the known inferior/inferoseptal Dacron patch is present.   There is a < 1 cm. non-mobile projection of the patch edge into the LV.  There is are \"filamentous\",  minimally mobile structures at the basal and distal edges of the patch in the LV which appear contiguous  with residual myocardium.  They likely represent the myocardium at the borders of the VSD.    Dr. Henry to review.   "

## 2020-07-16 NOTE — TELEPHONE ENCOUNTER
Patient calling for results of his MRI, patient had surgery 2018 by Dr Franco, MRI ordered by patient's cardiologist in [place].  Sent message to Torsten Ruiz PA-C asking if he would call patient or is this writer should have Dr Franco call patient tomorrow.

## 2020-07-17 NOTE — TELEPHONE ENCOUNTER
7/17/2020 1500 message from Dr. Henry:  Called results to his cardiologist in [place], Dr. Stover (an electrophysiologist) yesterday.   Thank you.   CD

## 2020-07-20 NOTE — TELEPHONE ENCOUNTER
No response received from Torsten Ruiz PA-C.  Per chart note patient cardiologist Dr Stover was notified of the patient's results.  Called patient back to let him know he should contact his cardiologist with questions.

## 2021-01-03 ENCOUNTER — HEALTH MAINTENANCE LETTER (OUTPATIENT)
Age: 71
End: 2021-01-03

## 2021-04-25 ENCOUNTER — HEALTH MAINTENANCE LETTER (OUTPATIENT)
Age: 71
End: 2021-04-25

## 2021-10-10 ENCOUNTER — HEALTH MAINTENANCE LETTER (OUTPATIENT)
Age: 71
End: 2021-10-10

## 2021-12-01 NOTE — PROGRESS NOTES
CVP ScVO2 around 6am was 43%. CI ~1.3 SVR 1800. This is on norepi 0.02. Started Dobutamine to treat low cardiac output state.    Beside echo showed moderate-severe RV dilation with severe RV dysfunction. Normal LV size and function. L->R shunt based on color Doppler through a basal to mid inferioseptal wall VSD.    Dr. Recinos and Dr. Porras to discuss P2Y12 inhibitor plan given that he might go for surgery soon. Currently no P2Y12 inhibitor ordered. This was communicated to daytime Cards-2 team.    Richard Horton  General Cardiology Fellow, PGY5  Pager [phone]   Yes

## 2022-05-21 ENCOUNTER — HEALTH MAINTENANCE LETTER (OUTPATIENT)
Age: 72
End: 2022-05-21

## 2022-09-18 ENCOUNTER — HEALTH MAINTENANCE LETTER (OUTPATIENT)
Age: 72
End: 2022-09-18

## 2023-05-24 NOTE — PROVIDER NOTIFICATION
Pt c/o sob, wheezing note. Had nebulizer rx with no relief of symptoms. Discussed with Janice from CVTS. She will notify Torsten from CVTS. Continue to monitor.   no

## 2023-06-04 ENCOUNTER — HEALTH MAINTENANCE LETTER (OUTPATIENT)
Age: 73
End: 2023-06-04

## (undated) DEVICE — SU PLEDGET SOFT TFE 13MMX7MMX1.5MM D7044

## (undated) DEVICE — TOURNIQUET VASCULAR KIT ARGYLE 8888-585000

## (undated) DEVICE — SOL NACL 0.9% IRRIG 3000ML BAG 2B7477

## (undated) DEVICE — GLOVE PROTEXIS POWDER FREE SMT 7.5  2D72PT75X

## (undated) DEVICE — BASIN SET SINGLE STERILE 13752-624

## (undated) DEVICE — TUBING INSUFFLATION W/FILTER CPC TO LUER 620-030-301

## (undated) DEVICE — SU ETHIBOND 0 CT-1 CR 8X18" CX21D

## (undated) DEVICE — SU ETHILON 3-0 PS-1 18" 1663H

## (undated) DEVICE — DRAPE IOBAN INCISE 23X17" 6650EZ

## (undated) DEVICE — ADH BIOGLUE CARTRIDGE 10ML BG3510-5-US

## (undated) DEVICE — PAD CHUX UNDERPAD 23X24" 7136

## (undated) DEVICE — SU ETHIBOND 0 TIE 6X30" X306H

## (undated) DEVICE — WIPES FOLEY CARE SURESTEP PROVON DFC100

## (undated) DEVICE — DEFIB PRO-PADZ LVP LQD GEL ADULT 8900-2105-01

## (undated) DEVICE — SOL WATER IRRIG 1000ML BOTTLE 2F7114

## (undated) DEVICE — LINEN GOWN XLG 5407

## (undated) DEVICE — LINEN TOWEL PACK X6 WHITE 5487

## (undated) DEVICE — PACK ADULT HEART UMMC PV15CG92D

## (undated) DEVICE — BNDG ESMARK 6" STERILE LF 820-3612

## (undated) DEVICE — BLADE CLIPPER SGL USE 9680

## (undated) DEVICE — DRAIN CHEST TUBE 32FR STR 8032

## (undated) DEVICE — SOL NACL 0.9% 10ML VIAL 0409-4888-02

## (undated) DEVICE — BLADE SAW STERNAL 20X30MM KM-32

## (undated) DEVICE — SOL NACL 0.9% IRRIG 1000ML BOTTLE 2F7124

## (undated) DEVICE — DRAIN CHEST TUBE 36FR STR 8036

## (undated) DEVICE — SUCTION CATH AIRLIFE TRI-FLO W/CONTROL PORT 14FR  T60C

## (undated) DEVICE — SU PROLENE 3-0 MH 36" 8842H

## (undated) DEVICE — DRSG ABDOMINAL 07 1/2X8" 7197D

## (undated) DEVICE — PREP POVIDONE IODINE SCRUB 7.5% 4OZ APL82212

## (undated) DEVICE — ENDO BITE BLOCK ADULT OMNI-BLOC

## (undated) DEVICE — KIT DRSG PREVENA PLUS NEG PRESSURE W/CANISTER PRE4001US

## (undated) DEVICE — KIT ENDO FIRST STEP DISINFECTANT 200ML W/POUCH EP-4

## (undated) DEVICE — PREP CHLORHEXIDINE 4% 4OZ (HIBICLENS) 57504

## (undated) DEVICE — TUBING SUCTION 10'X3/16" N510

## (undated) DEVICE — CLIP HORIZON MED BLUE 002200

## (undated) DEVICE — ENDO SYSTEM WATER BOTTLE & TUBING W/CO2 FILTER 00711549

## (undated) DEVICE — CONNECTOR DRAIN CHEST Y EXTENSION SET 19909

## (undated) DEVICE — DRSG DRAIN 4X4" 7086

## (undated) DEVICE — DRAPE SLUSH/WARMER 66X44" ORS-320

## (undated) DEVICE — SU PLEDGET SOFT TFE 3/8"X3/26"X1/16" PCP40

## (undated) DEVICE — PREP SKIN SCRUB TRAY 4461A

## (undated) DEVICE — ESU ELEC BLADE 2.75" COATED/INSULATED E1455

## (undated) DEVICE — TAPE MEDIPORE 4"X2YD 2864

## (undated) DEVICE — PROBE RECTAL MONATHERM 9FR 90050

## (undated) DEVICE — PREP POVIDONE IODINE SOLUTION 10% 4OZ

## (undated) DEVICE — SUCTION DRY CHEST DRAIN OASIS 3600-100

## (undated) DEVICE — LINEN TOWEL PACK X30 5481

## (undated) DEVICE — SU DERMABOND ADVANCED .7ML DNX12

## (undated) DEVICE — DRAPE SHEET MED 44X70" 9355

## (undated) DEVICE — SU ETHIBOND 2-0 V-7 DA 10X30" PXX77

## (undated) DEVICE — JELLY LUBRICATING SURGILUBE 2OZ TUBE

## (undated) DEVICE — SU PROLENE 6-0 C-1DA 30" 8706H

## (undated) DEVICE — SU STEEL 6 CCS 4X18" M654G

## (undated) DEVICE — TIES BANDING T50R

## (undated) DEVICE — PROTECTOR ARM ONE-STEP TRENDELENBURG 40418

## (undated) DEVICE — SU PROLENE 3-0 SHDA 36" 8522H

## (undated) DEVICE — PREP CHLORAPREP 26ML TINTED ORANGE  260815

## (undated) DEVICE — SYR 30ML SLIP TIP W/O NDL 302833

## (undated) DEVICE — NDL COUNTER 20CT 31142493

## (undated) DEVICE — SU VICRYL 0 CTX 36" J370H

## (undated) DEVICE — STPL SKIN 35W 059037

## (undated) DEVICE — BONE WAX 2.5GM W31G

## (undated) DEVICE — SU PROLENE 4-0 RB-1DA 36" 8557H

## (undated) DEVICE — TAPE CLOTH 3" CARDINAL 3TRCL03

## (undated) DEVICE — SU ETHIBOND 2-0 SHDA 30" X563H

## (undated) DEVICE — SUCTION MANIFOLD DORNOCH ULTRA CART UL-CL500

## (undated) DEVICE — SU ETHIBOND 3-0 BBDA 36" X588H

## (undated) DEVICE — SU PROLENE 4-0 SHDA 36" 8521H

## (undated) DEVICE — Device

## (undated) DEVICE — ESU ELEC BLADE 6" COATED E1450-6

## (undated) DEVICE — SU PROLENE 5-0 RB-2DA 30" 8710H

## (undated) DEVICE — LEAD PACER MYOCARDIAL BIPOLAR TEMPORARY 53CM 6495F

## (undated) RX ORDER — EPHEDRINE SULFATE 50 MG/ML
INJECTION, SOLUTION INTRAMUSCULAR; INTRAVENOUS; SUBCUTANEOUS
Status: DISPENSED
Start: 2018-08-13

## (undated) RX ORDER — PROPOFOL 10 MG/ML
INJECTION, EMULSION INTRAVENOUS
Status: DISPENSED
Start: 2018-08-13

## (undated) RX ORDER — PROTAMINE SULFATE 10 MG/ML
INJECTION, SOLUTION INTRAVENOUS
Status: DISPENSED
Start: 2018-08-10

## (undated) RX ORDER — SIMETHICONE 20 MG/.3ML
EMULSION ORAL
Status: DISPENSED
Start: 2018-08-24

## (undated) RX ORDER — ONDANSETRON 2 MG/ML
INJECTION INTRAMUSCULAR; INTRAVENOUS
Status: DISPENSED
Start: 2018-08-24

## (undated) RX ORDER — CEFAZOLIN SODIUM 1 G/3ML
INJECTION, POWDER, FOR SOLUTION INTRAMUSCULAR; INTRAVENOUS
Status: DISPENSED
Start: 2018-08-10

## (undated) RX ORDER — ONDANSETRON 2 MG/ML
INJECTION INTRAMUSCULAR; INTRAVENOUS
Status: DISPENSED
Start: 2018-08-13

## (undated) RX ORDER — FENTANYL CITRATE 50 UG/ML
INJECTION, SOLUTION INTRAMUSCULAR; INTRAVENOUS
Status: DISPENSED
Start: 2018-08-24

## (undated) RX ORDER — HEPARIN SODIUM (PORCINE) LOCK FLUSH IV SOLN 100 UNIT/ML 100 UNIT/ML
SOLUTION INTRAVENOUS
Status: DISPENSED
Start: 2018-08-30

## (undated) RX ORDER — FENTANYL CITRATE 50 UG/ML
INJECTION, SOLUTION INTRAMUSCULAR; INTRAVENOUS
Status: DISPENSED
Start: 2018-08-10

## (undated) RX ORDER — ESMOLOL HYDROCHLORIDE 10 MG/ML
INJECTION INTRAVENOUS
Status: DISPENSED
Start: 2018-08-13

## (undated) RX ORDER — LIDOCAINE HYDROCHLORIDE 10 MG/ML
INJECTION, SOLUTION EPIDURAL; INFILTRATION; INTRACAUDAL; PERINEURAL
Status: DISPENSED
Start: 2018-08-30

## (undated) RX ORDER — FENTANYL CITRATE 50 UG/ML
INJECTION, SOLUTION INTRAMUSCULAR; INTRAVENOUS
Status: DISPENSED
Start: 2018-08-13

## (undated) RX ORDER — HEPARIN SODIUM 1000 [USP'U]/ML
INJECTION, SOLUTION INTRAVENOUS; SUBCUTANEOUS
Status: DISPENSED
Start: 2018-08-10

## (undated) RX ORDER — ESMOLOL HYDROCHLORIDE 10 MG/ML
INJECTION INTRAVENOUS
Status: DISPENSED
Start: 2018-08-24

## (undated) RX ORDER — PHENYLEPHRINE HCL IN 0.9% NACL 1 MG/10 ML
SYRINGE (ML) INTRAVENOUS
Status: DISPENSED
Start: 2018-08-13

## (undated) RX ORDER — DEXTROSE MONOHYDRATE 25 G/50ML
INJECTION, SOLUTION INTRAVENOUS
Status: DISPENSED
Start: 2018-08-24

## (undated) RX ORDER — LIDOCAINE HYDROCHLORIDE 10 MG/ML
INJECTION, SOLUTION EPIDURAL; INFILTRATION; INTRACAUDAL; PERINEURAL
Status: DISPENSED
Start: 2018-08-22

## (undated) RX ORDER — PROPOFOL 10 MG/ML
INJECTION, EMULSION INTRAVENOUS
Status: DISPENSED
Start: 2018-08-24

## (undated) RX ORDER — LIDOCAINE HYDROCHLORIDE 10 MG/ML
INJECTION, SOLUTION EPIDURAL; INFILTRATION; INTRACAUDAL; PERINEURAL
Status: DISPENSED
Start: 2018-09-08